# Patient Record
Sex: MALE | Race: WHITE | NOT HISPANIC OR LATINO | Employment: OTHER | URBAN - METROPOLITAN AREA
[De-identification: names, ages, dates, MRNs, and addresses within clinical notes are randomized per-mention and may not be internally consistent; named-entity substitution may affect disease eponyms.]

---

## 2017-02-11 LAB — HCV AB SER-ACNC: NEGATIVE

## 2017-05-02 ENCOUNTER — ALLSCRIPTS OFFICE VISIT (OUTPATIENT)
Dept: OTHER | Facility: OTHER | Age: 57
End: 2017-05-02

## 2017-06-30 LAB — EXTERNAL HIV SCREEN: NORMAL

## 2017-09-22 ENCOUNTER — ALLSCRIPTS OFFICE VISIT (OUTPATIENT)
Dept: OTHER | Facility: OTHER | Age: 57
End: 2017-09-22

## 2017-12-06 ENCOUNTER — ALLSCRIPTS OFFICE VISIT (OUTPATIENT)
Dept: OTHER | Facility: OTHER | Age: 57
End: 2017-12-06

## 2017-12-06 DIAGNOSIS — M62.81 MUSCLE WEAKNESS (GENERALIZED): ICD-10-CM

## 2017-12-06 DIAGNOSIS — E78.5 HYPERLIPIDEMIA: ICD-10-CM

## 2017-12-06 DIAGNOSIS — E11.9 TYPE 2 DIABETES MELLITUS WITHOUT COMPLICATIONS (HCC): ICD-10-CM

## 2017-12-06 DIAGNOSIS — D69.6 THROMBOCYTOPENIA (HCC): ICD-10-CM

## 2017-12-07 NOTE — PROGRESS NOTES
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)    Plan  Diabetes mellitus    · (1) HEMOGLOBIN A1C; Status:Active; Requested for:22Sna5315;    · (1) MICROALBUMIN CREATININE RATIO, RANDOM URINE; Status:Active; Requestedfor:23Thn4707;   Hyperlipidemia    · (1) LIPID PANEL, FASTING; Status:Active; Requested for:50Opx8047;   Muscle weakness    · (1) ALPHA 1 ANTITRYPSIN; Status:Active; Requested for:59Ade3666;    · (1) CBC/PLT/DIFF; Status:Active; Requested for:42Wgu5963;    · (1) CK (CPK); Status:Active; Requested for:93Zpt8359;    · (1) COENZYME Q10; Status:Active; Requested for:69Awu1560;    · (1) COMPREHENSIVE METABOLIC PANEL; Status:Active; Requested for:33Jwy9890;    · (1) LYME ANTIBODY, WESTERN BLOT; Status:Active; Requested for:97Erf1970;    · (1) MITOCHONDRIAL ANTIBODY; Status:Active; Requested for:97Ogn3529;    · (1) TSH; Status:Active; Requested for:86Jme3398;    · (1) VITAMIN D 25-HYDROXY; Status:Active; Requested for:50Uot6814;   Muscle weakness, Thrombocytopenia    · (1) FOLATE; Status:Active; Requested for:01Xsr5225;    · (1) VITAMIN B12; Status:Active; Requested for:03Lxi9814;   PMH: Myotonic dystrophy    · *1 - [facility]  *  Status: Active  Requested for: 64VJS9088  Care Summary provided  : Yes  Thrombocytopenia    · (1) LD (LDH); Status:Active; Requested for:81Xsy3840;    · (1) URIC ACID; Status:Active; Requested for:07Wzi1280;   I will obtain the ordered lab work, if there is anything grossly abnormal we will contact the patient prior to his next visit  I will see him in 1 month  I will refer him to Neurology  Discussion/Summary  With the patient's worsening weakness, additional studies are warranted    I will refer him to Neurology, he may need a repeat EMG, which is how he says he was diagnosed the last time  Chief Complaint  Chief Complaint Free Text Note Form: follow up htn,lipid  no refills needed      History of Present Illness  HPI: Patient is a 71-year-old male presenting to the office to establish as a new patient  He has a history of coronary artery disease, diabetes mellitus, hyperlipidemia, thrombocytopenia, weight loss, and a remote history of muscular dystonia  He had about a 20-35 lb weight loss approximately 2-3 years ago  He was then explain in he continues to complain    Primary Care Diagnostic Constellation: The patient is here today for a follow-up visit  His diabetes mellitus type 2 is stable  the patient is adherent with his medication regimen  -- the patient complains of medication side effects  The patient complains of side effect from Metformin  Medication side effects: diarrhea  His hyperlipidemia has been stable  His LDL goal is 100 mg/dL  the patient is adherent with his medication regimen  His Multi vessel CAD is stable  the patient is adherent with his medication regimen  Symptoms: denies chest pain,-- denies intermittent leg claudication,-- denies dyspnea,-- denies lower extremity edema,-- denies exercise intolerance,-- worsened fatigue,-- denies numbness of the feet,-- denies foot pain,-- denies a foot ulcer,-- denies visual impairment,-- denies muscle pain-- and-- worsened muscle weakness  Associated symptoms include no orthopnea,-- no polyuria,-- no focal neurologic deficits,-- no palpitations,-- no syncope,-- no headache,-- no PND,-- no memory loss,-- no polydipsia-- and-- no heartburn--   The patient presents with complaints of 20 - 29 pound recent weight loss  Previous Evaluation: Weight seems to have stabilized over the last year-year and a half  Lifestyle and Disease Management: Diet: He consumes a diverse and healthy diet  Weight Issues: He has weight concerns   Exercise: He does not (359 21) (G71 11)  Active Problems And Past Medical History Reviewed: The active problems and past medical history were reviewed and updated today  Surgical History  1  History of Cath Stent Placement   2  History of Cath Stent Placement   3  History of Cholecystectomy   4  History of Complete Colonoscopy   5  History of Elective Circumcision   6  History of Inguinal Hernia Repair   7  History of Open Treatment Of Multiple Fractures Of The Radial Shaft   8  History of Open Treatment Of Multiple Fractures Of The Ulnar Shaft   9  History of Tonsillectomy With Adenoidectomy  Surgical History Reviewed: The surgical history was reviewed and updated today  Family History  Mother    1  Family history of blood clots (V18 3) (Z82 49)   2  Family history of cardiac disorder (V17 49) (Z82 49)   3  Family history of malignant neoplasm (V16 9) (Z80 9)   4  Family history of High cholesterol  Uncle    5  Family history of coronary artery disease (V17 3) (Z82 49)  Family History Reviewed: The family history was reviewed and updated today  Social History     · Denied: History of Drug use   ·    · Never a smoker   · Social alcohol use (Z78 9)   · Weight loss (783 21) (R63 4)  Social History Reviewed: The social history was reviewed and updated today  The social history was reviewed and is unchanged  Current Meds   1  Aspirin EC Lo-Dose 81 MG TBEC; Take 1 tablet daily Recorded   2  Atorvastatin Calcium 40 MG Oral Tablet; take 1 tablet daily at bedtime; Therapy: 79XDY8256 to (96 019089)  Requested for: 12RPU7378; Last Rx:04Jan2017 Ordered   3  Cialis 5 MG Oral Tablet; One tablet daily; Therapy: 71RYD8936 to (Marcela Hong)  Requested for: 59Ycu5108; Last Rx:23Ico4632 Ordered   4  MetFORMIN HCl - 1000 MG Oral Tablet; take 1 tablet by mouth twice daily; Therapy: (Recorded:42Jgf7737) to Recorded   5  Xarelto 20 MG Oral Tablet; 1 tablet daily; Therapy: 95PAI5538 to Recorded    Allergies  1  No Known Drug Allergies    Vitals  Vital Signs    Recorded: 61XUM4158 97:56DT   Systolic 98   Diastolic 62   Height 5 ft 9 in   Weight 144 lb    BMI Calculated 21 27   BSA Calculated 1 8       Physical Exam   Constitutional  General appearance: No acute distress, well appearing and well nourished  Eyes  Conjunctiva and lids: No swelling, erythema, or discharge  Pupils and irises: Equal, round and reactive to light  Ears, Nose, Mouth, and Throat  External inspection of ears and nose: Normal    Otoscopic examination: Tympanic membrance translucent with normal light reflex  Canals patent without erythema  Nasal mucosa, septum, and turbinates: Normal without edema or erythema  Oropharynx: Normal with no erythema, edema, exudate or lesions  Pulmonary  Respiratory effort: No increased work of breathing or signs of respiratory distress  Auscultation of lungs: Clear to auscultation, equal breath sounds bilaterally, no wheezes, no rales, no rhonci  Cardiovascular  Palpation of heart: Normal PMI, no thrills  Auscultation of heart: Normal rate and rhythm, normal S1 and S2, without murmurs  Examination of extremities for edema and/or varicosities: Normal    Carotid pulses: Normal    Abdomen  Abdomen: Non-tender, no masses  Liver and spleen: No hepatomegaly or splenomegaly  Lymphatic  Palpation of lymph nodes in neck: No lymphadenopathy  Musculoskeletal  Gait and station: Normal    Digits and nails: Normal without clubbing or cyanosis  Inspection/palpation of joints, bones, and muscles: Normal  -- Decreased hand strength 4/5 bilaterally  Flexion and extension grossly intact with the arms and legs  Skin  Skin and subcutaneous tissue: Normal without rashes or lesions  Neurologic  Cranial nerves: Cranial nerves 2-12 intact  Reflexes: 2+ and symmetric  Sensation: No sensory loss     Psychiatric  Orientation to person, place and time: Normal    Mood and affect: Normal          Results/Data  I reviewed his lab work from last October, old notes that are available to me today  Reviewed the last cardiology note-cardiology says he stable        Future Appointments    Date/Time Provider Specialty Site   01/10/2018 01:00 PM Kristi Camara DO Family Medicine [facility]       Signatures   Electronically signed by : Todd Zavala DO; Dec  6 2017 12:44PM EST                       (Author)

## 2018-01-12 ENCOUNTER — LAB CONVERSION - ENCOUNTER (OUTPATIENT)
Dept: OTHER | Facility: OTHER | Age: 58
End: 2018-01-12

## 2018-01-12 LAB
25(OH)D3 SERPL-MCNC: 38 NG/ML (ref 30–100)
A/G RATIO (HISTORICAL): 2.1 (CALC) (ref 1–2.5)
ALBUMIN SERPL BCP-MCNC: 3.9 G/DL (ref 3.6–5.1)
ALP SERPL-CCNC: 52 U/L (ref 40–115)
ALPHA-1 ANTITRYPSIN (HISTORICAL): 143 MG/DL (ref 83–199)
ALT SERPL W P-5'-P-CCNC: 40 U/L (ref 9–46)
AST SERPL W P-5'-P-CCNC: 29 U/L (ref 10–35)
BASOPHILS # BLD AUTO: 0.8 %
BASOPHILS # BLD AUTO: 29 CELLS/UL (ref 0–200)
BILIRUB SERPL-MCNC: 1.4 MG/DL (ref 0.2–1.2)
BUN SERPL-MCNC: 18 MG/DL (ref 7–25)
BUN/CREA RATIO (HISTORICAL): ABNORMAL (CALC) (ref 6–22)
CALCIUM SERPL-MCNC: 9.1 MG/DL (ref 8.6–10.3)
CHLORIDE SERPL-SCNC: 103 MMOL/L (ref 98–110)
CHOLEST SERPL-MCNC: 133 MG/DL
CHOLEST/HDLC SERPL: 2.4 (CALC)
CK SERPL-CCNC: 344 U/L (ref 44–196)
CO2 SERPL-SCNC: 31 MMOL/L (ref 20–31)
CREAT SERPL-MCNC: 0.76 MG/DL (ref 0.7–1.33)
CREATININE, RANDOM URINE (HISTORICAL): 195 MG/DL (ref 20–370)
DEPRECATED RDW RBC AUTO: 12.7 % (ref 11–15)
EGFR AFRICAN AMERICAN (HISTORICAL): 117 ML/MIN/1.73M2
EGFR-AMERICAN CALC (HISTORICAL): 101 ML/MIN/1.73M2
EOSINOPHIL # BLD AUTO: 10.4 %
EOSINOPHIL # BLD AUTO: 374 CELLS/UL (ref 15–500)
FOLATE SERPL-MCNC: 23 NG/ML
GAMMA GLOBULIN (HISTORICAL): 1.9 G/DL (CALC) (ref 1.9–3.7)
GLUCOSE (HISTORICAL): 201 MG/DL (ref 65–99)
HBA1C MFR BLD HPLC: 7.4 % OF TOTAL HGB
HCT VFR BLD AUTO: 44.9 % (ref 38.5–50)
HDLC SERPL-MCNC: 56 MG/DL
HGB BLD-MCNC: 15.8 G/DL (ref 13.2–17.1)
LDH (HISTORICAL): 196 U/L (ref 120–250)
LDL CHOLESTEROL (HISTORICAL): 58 MG/DL (CALC)
LYME IGG/IGM AB (HISTORICAL): <0.9 INDEX
LYMPHOCYTES # BLD AUTO: 15.4 %
LYMPHOCYTES # BLD AUTO: 554 CELLS/UL (ref 850–3900)
Lab: 1.11 MG/L (ref 0.44–1.64)
MAGNESIUM, UR (HISTORICAL): 0.4 MG/DL
MCH RBC QN AUTO: 31.3 PG (ref 27–33)
MCHC RBC AUTO-ENTMCNC: 35.2 G/DL (ref 32–36)
MCV RBC AUTO: 88.9 FL (ref 80–100)
MICROALBUMIN/CREATININE RATIO (HISTORICAL): 2 MCG/MG CREAT
MITOCHONDRIAL ANTIBODY (HISTORICAL): NEGATIVE
MONOCYTES # BLD AUTO: 346 CELLS/UL (ref 200–950)
MONOCYTES (HISTORICAL): 9.6 %
NEUTROPHILS # BLD AUTO: 2297 CELLS/UL (ref 1500–7800)
NEUTROPHILS # BLD AUTO: 63.8 %
NON-HDL-CHOL (CHOL-HDL) (HISTORICAL): 77 MG/DL (CALC)
PLATELET # BLD AUTO: 114 THOUSAND/UL (ref 140–400)
PMV BLD AUTO: 10.5 FL (ref 7.5–12.5)
POTASSIUM SERPL-SCNC: 4.1 MMOL/L (ref 3.5–5.3)
RBC # BLD AUTO: 5.05 MILLION/UL (ref 4.2–5.8)
SODIUM SERPL-SCNC: 141 MMOL/L (ref 135–146)
TOTAL PROTEIN (HISTORICAL): 5.8 G/DL (ref 6.1–8.1)
TRIGL SERPL-MCNC: 105 MG/DL
TSH SERPL DL<=0.05 MIU/L-ACNC: 0.82 MIU/L (ref 0.4–4.5)
URIC ACID (HISTORICAL): 4.8 MG/DL (ref 4–8)
VIT B12 SERPL-MCNC: 417 PG/ML (ref 200–1100)
WBC # BLD AUTO: 3.6 THOUSAND/UL (ref 3.8–10.8)

## 2018-01-13 VITALS
HEIGHT: 69 IN | HEART RATE: 47 BPM | SYSTOLIC BLOOD PRESSURE: 98 MMHG | WEIGHT: 141.38 LBS | BODY MASS INDEX: 20.94 KG/M2 | DIASTOLIC BLOOD PRESSURE: 86 MMHG

## 2018-01-14 ENCOUNTER — GENERIC CONVERSION - ENCOUNTER (OUTPATIENT)
Dept: OTHER | Facility: OTHER | Age: 58
End: 2018-01-14

## 2018-01-15 ENCOUNTER — GENERIC CONVERSION - ENCOUNTER (OUTPATIENT)
Dept: OTHER | Facility: OTHER | Age: 58
End: 2018-01-15

## 2018-01-15 ENCOUNTER — TRANSCRIBE ORDERS (OUTPATIENT)
Dept: ADMINISTRATIVE | Facility: HOSPITAL | Age: 58
End: 2018-01-15

## 2018-01-15 ENCOUNTER — APPOINTMENT (OUTPATIENT)
Dept: RADIOLOGY | Facility: MEDICAL CENTER | Age: 58
End: 2018-01-15
Payer: COMMERCIAL

## 2018-01-15 DIAGNOSIS — R63.4 ABNORMAL WEIGHT LOSS: ICD-10-CM

## 2018-01-15 PROCEDURE — 71046 X-RAY EXAM CHEST 2 VIEWS: CPT

## 2018-01-16 ENCOUNTER — GENERIC CONVERSION - ENCOUNTER (OUTPATIENT)
Dept: OTHER | Facility: OTHER | Age: 58
End: 2018-01-16

## 2018-01-16 NOTE — MISCELLANEOUS
We had the pleasure doing a stress echo on the patient today  He exercised through 12-1/2 minutes of the Pato protocol  He reached target heart rate  He had no EKG, symptomatic or echo evidence for inducible ischemia  He is  doing well now 12 years status post stenting  He will continue metoprolol, Aspirin and his statin as before  I will see him again in one years time  Electronically signed by:Ventura RAMIREZ    May  2 2016  3:43PM EST

## 2018-01-18 NOTE — RESULT NOTES
Verified Results  ECHO STRESS TEST W CONTRAST IF INDICATED [id number] 02:37PM Mary Becerra     Test Name Result Flag Reference   ECHO STRESS TEST W CONTRAST IF INDICATED (Report)     [address]   [address]  [place], [address]   [phone]     Exercise Stress Echocardiography     Study date: 02-May-2016     Patient: Kristy Marcial   MR number: [id number]   Account number: [de-identified]   : 1960   Age: 64 years   Gender: Male   Study date: 02-May-2016   Status: Outpatient   [facility]   Height: 69 in   Weight: 153 lb   BP: 98// 62 mmHg     Indications: Evaluation of known coronary artery disease  Diagnosis: I25 10 - Atherosclerotic heart disease of native coronary artery   without angina pectoris     Sonographer: HIRA Lanza   Primary Physician: Valerie Snider MD   Referring Physician: Keely Vo MD   Group: [facility]   Interpreting Physician: Keely Vo MD     IMPRESSIONS:   Normal study after maximal exercise  SUMMARY     STRESS RESULTS:   Duration of exercise was 12 min and 30 sec  Maximal work rate was 11 5 METs  Maximal heart rate during stress was 150 bpm ( 91 % of maximal predicted heart   rate)  Target heart rate was achieved  There was no chest pain during stress  ECG CONCLUSIONS:   The stress ECG was negative for ischemia  BASELINE:   Estimated left ventricular ejection fraction was 60 %   HISTORY: The patient is a 64year old male  Chest pain status: no chest pain  Coronary artery disease risk factors: dyslipidemia and family history of   coronary artery disease  Cardiovascular history: coronary artery disease  Prior   cardiovascular procedures: percutaneous transcoronary angioplasty  Medications:   a beta blocker, aspirin, and a lipid lowering agent  REST ECG: Normal sinus rhythm   Poor Septal R wave progression with minor   nonspecific T wave abnls in the septal leads     PROCEDURE: The study was performed in the stress lab  The study was performed   in the [address] [place] [address]  The procedure was explained to the   patient and informed consent was obtained  Treadmill exercise testing was   performed, using the Madeleine protocol  Stress and rest echocardiographic   evaluation with 2D imaging, spectral Doppler, and color Doppler was performed   from multiple acoustic windows for evaluation of ventricular function  MADELEINE PROTOCOL:   HR bpm SBP mmHg DBP mmHg Symptoms   Baseline 54 98 62 none   Stage 1 72 122 62 --   Stage 2 80 -- -- --   Stage 3 103 158 68 --   Stage 4 141 178 70 --   Stage 5 148 -- -- --   Immediate 150 178 70 --   Recovery 2 81 162 80 --   Recovery 5 69 122 82 --     Resting SPO2 98%  Peak SPO2 96%  MEDICATIONS GIVEN: No medications or fluids given  STRESS RESULTS: Duration of exercise was 12 min and 30 sec  The patient   exercised to protocol stage 5  Maximal work rate was 11 5 METs  Maximal heart   rate during stress was 150 bpm ( 91 % of maximal predicted heart rate)  Target   heart rate was achieved  The heart rate response to stress was normal  Maximal   systolic blood pressure during stress was 178 mmHg  There was normal resting   blood pressure with an appropriate response to stress  The rate-pressure   product for the peak heart rate and blood pressure was 41854  There was no   chest pain during stress  The stress test was terminated due to achievement of   maximal (symptom limited) exercise and achievement of target heart rate  ECG CONCLUSIONS: The stress ECG was negative for ischemia  Arrhythmia during   stress: isolated premature ventricular beats  STRESS 2D ECHO RESULTS:     BASELINE: Left ventricular size was normal  Overall left ventricular systolic   function was normal  Estimated left ventricular ejection fraction was 60 %        PEAK STRESS: There was an appropriate reduction in left ventricular size  There   was an appropriate augmentation in LV function       Prepared and electronically signed by     Miley Mesa MD   Signed 02-O-9432 16:39:38

## 2018-01-22 ENCOUNTER — GENERIC CONVERSION - ENCOUNTER (OUTPATIENT)
Dept: FAMILY MEDICINE CLINIC | Facility: CLINIC | Age: 58
End: 2018-01-22

## 2018-01-23 VITALS
SYSTOLIC BLOOD PRESSURE: 98 MMHG | HEIGHT: 69 IN | BODY MASS INDEX: 21.33 KG/M2 | WEIGHT: 144 LBS | DIASTOLIC BLOOD PRESSURE: 62 MMHG

## 2018-01-23 NOTE — RESULT NOTES
Discussion/Summary   sugarss are a little high, one muscle marker is a little  elevated, will discuss when he comes in,  He should see a rheumatologist about the muscle issue  Has he ever seen hematology( blood specialist) about the low platelets?      Verified Results  (1) VITAMIN B12 11JEZ4179 07:02AM [facility]     Test Name Result Flag Reference   VITAMIN B12 417 pg/mL  200-1100     (1) FOLATE 46EZR0882 07:02AM [facility]     Test Name Result Flag Reference   FOLATE, SERUM 23 0 ng/mL     Reference Range                             Low:           <3 4                             Borderline:    3 4-5 4                             Normal:        >5 4     (1) CK (CPK) 17XZZ3407 07:02AM [facility]     Test Name Result Flag Reference   CREATINE KINASE, TOTAL 344 U/L H      (1) CBC/PLT/DIFF 19QEU9752 07:02AM [facility]     Test Name Result Flag Reference   WHITE BLOOD CELL COUNT 3 6 Thousand/uL L 3 8-10 8   RED BLOOD CELL COUNT 5 05 Million/uL  4 20-5 80   HEMOGLOBIN 15 8 g/dL  13 2-17 1   HEMATOCRIT 44 9 %  38 5-50 0   MCV 88 9 fL  80 0-100 0   MCH 31 3 pg  27 0-33 0   MCHC 35 2 g/dL  32 0-36 0   RDW 12 7 %  11 0-15 0   PLATELET COUNT 905 Thousand/uL L 140-400   ABSOLUTE NEUTROPHILS 2297 cells/uL  6059-7199   ABSOLUTE LYMPHOCYTES 554 cells/uL L 850-3900   ABSOLUTE MONOCYTES 346 cells/uL  200-950   ABSOLUTE EOSINOPHILS 374 cells/uL     ABSOLUTE BASOPHILS 29 cells/uL  0-200   NEUTROPHILS 63 8 %     LYMPHOCYTES 15 4 %     MONOCYTES 9 6 %     EOSINOPHILS 10 4 %     BASOPHILS 0 8 %     MPV 10 5 fL  7 5-12 5     (1) COMPREHENSIVE METABOLIC PANEL 83ABB4665 10:24ID [facility]     Test Name Result Flag Reference   GLUCOSE 201 mg/dL H 65-99   Fasting reference interval     For someone without known diabetes, a glucose  value >125 mg/dL indicates that they may have  diabetes and this should be confirmed with a  follow-up test    UREA NITROGEN (BUN) 18 mg/dL  7-25   CREATININE 0 76 mg/dL 0  70-1 33   For patients >52years of age, the reference limit  for Creatinine is approximately 13% higher for people  identified as -American  eGFR NON-AFR  AMERICAN 101 mL/min/1 73m2  > OR = 60   eGFR AFRICAN AMERICAN 117 mL/min/1 73m2  > OR = 60   BUN/CREATININE RATIO   0-32   NOT APPLICABLE (calc)   SODIUM 141 mmol/L  135-146   POTASSIUM 4 1 mmol/L  3 5-5 3   CHLORIDE 103 mmol/L     CARBON DIOXIDE 31 mmol/L  20-31   CALCIUM 9 1 mg/dL  8 6-10 3   PROTEIN, TOTAL 5 8 g/dL L 6 1-8 1   ALBUMIN 3 9 g/dL  3 6-5 1   GLOBULIN 1 9 g/dL (calc)  1 9-3 7   ALBUMIN/GLOBULIN RATIO 2 1 (calc)  1 0-2 5   BILIRUBIN, TOTAL 1 4 mg/dL H 0 2-1 2   ALKALINE PHOSPHATASE 52 U/L     AST 29 U/L  10-35   ALT 40 U/L  9-46     (1) LIPID PANEL, FASTING 43GEJ1043 07:02AM [facility]     Test Name Result Flag Reference   CHOLESTEROL, TOTAL 133 mg/dL  <200   HDL CHOLESTEROL 56 mg/dL  >79   TRIGLICERIDES 018 mg/dL  <150   LDL-CHOLESTEROL 58 mg/dL (calc)     Reference range: <100     Desirable range <100 mg/dL for patients with CHD or  diabetes and <70 mg/dL for diabetic patients with  known heart disease  LDL-C is now calculated using the Avi-Molina   calculation, which is a validated novel method providing   better accuracy than the Friedewald equation in the   estimation of LDL-C  Jeffrey Houser  [facility]  6283;270(17): [phone]   (http://[employer]/faq/MHT693)   CHOL/HDLC RATIO 2 4 (calc)  <5 0   NON HDL CHOLESTEROL 77 mg/dL (calc)  <130   For patients with diabetes plus 1 major ASCVD risk   factor, treating to a non-HDL-C goal of <100 mg/dL   (LDL-C of <70 mg/dL) is considered a therapeutic   option      (1) URIC ACID [id number] 07:02AM [facility]     Test Name Result Flag Reference   URIC ACID 4 8 mg/dL  4 0-8 0   Therapeutic target for gout patients: <6 0 mg/dL     (1) LD (LDH) 73WZQ0440 07:02AM Sonja Riley     Test Name Result Flag Reference    U/L  120-250     (Q) MICROALBUMIN, RANDOM URINE (W/CREATININE) [id number] 07:02AM [facility]     Test Name Result Flag Reference   CREATININE, RANDOM URINE 195 mg/dL     MICROALBUMIN 0 4 mg/dL     Reference Range  Not established   MICROALBUMIN/CREATININE$RATIO, RANDOM URINE 2 mcg/mg creat  <30   The ADA defines abnormalities in albumin  excretion as follows:     Category         Result (mcg/mg creatinine)     Normal                    <30  Microalbuminuria            Clinical albuminuria   > OR = 300     The ADA recommends that at least two of three  specimens collected within a 3-6 month period be  abnormal before considering a patient to be  within a diagnostic category  (Q) COENZYME Q10 [id number] 07:02AM [facility] [facility]     Test Name Result Flag Reference   COENZYME Q10 1 11 mg/L  0 44-1 64   This test was developed and its analytical  performance characteristics have been determined  by [place], [place]  It has not been cleared or approved by the FDA  This  assay has been validated pursuant to the CLIA  regulations and is used for clinical purposes  (Q) LYME DISEASE AB, TOTAL W/REFL WB (IGG, IGM) [id number] 07:02AM [facility] [employer]     Test Name Result Flag Reference   LYME AB SCREEN <0 90 index     Index                Interpretation                     -----                --------------                     < 0 90               Negative                     0  90-1 09            Equivocal                     > 1 09               Positive      As recommended by the Food and Drug Administration   (FDA), all samples with positive or equivocal   results in a Borrelia burgdorferi antibody screen  will be tested using a blot method  Positive or   equivocal screening test results should not be   interpreted as truly positive until verified as such   using a supplemental assay (e g , B  burgdorferi blot)       The screening test and/or blot for B  burgdorferi   antibodies may be falsely negative in early stages  of Lyme disease, including the period when erythema   migrans is apparent  (Q) ALPHA-1-ANTITRYPSIN QN [id number] 07:02AM [facility]     Test Name Result Flag Reference   ALPHA-1-ANTITRYPSIN  mg/dL       (Q) MITOCHONDRIAL ANTIBODY W/REFL TITER [id number] 07:02AM [facility]     Test Name Result Flag Reference   MITOCHONDRIAL AB SCREEN NEGATIVE  NEGATIVE     (Q) TSH, 3RD GENERATION [id number] 07:02AM [facility]     Test Name Result Flag Reference   TSH 0 82 mIU/L  0 40-4 50     *(Q) VITAMIN D, 25-HYDROXY, LC/MS/MS [id number] 07:02AM [facility]     Test Name Result Flag Reference   VITAMIN D, 25-OH, TOTAL 38 ng/mL     Vitamin D Status         25-OH Vitamin D:     Deficiency:                    <20 ng/mL  Insufficiency:             20 - 29 ng/mL  Optimal:                 > or = 30 ng/mL     For 25-OH Vitamin D testing on patients on   D2-supplementation and patients for whom quantitation   of D2 and D3 fractions is required, the QuestAssureD(TM)  25-OH VIT D, (D2,D3), LC/MS/MS is recommended: order   code 61761 (patients >2yrs)  For more information on this test, go to:  http://[employer]/faq/YBP687  (This link is being provided for   informational/educational purposes only )     (Q) HEMOGLOBIN A1c [id number] 07:02AM [facility]   REPORT COMMENT:  FASTING:YES     Test Name Result Flag Reference   HEMOGLOBIN A1c 7 4 % of total Hgb H <5 7   For someone without known diabetes, a hemoglobin A1c  value of 6 5% or greater indicates that they may have   diabetes and this should be confirmed with a follow-up   test      For someone with known diabetes, a value <7% indicates   that their diabetes is well controlled and a value   greater than or equal to 7% indicates suboptimal   control  A1c targets should be individualized based on   duration of diabetes, age, comorbid conditions, and   other considerations       Currently, no consensus exists regarding use of  hemoglobin A1c for diagnosis of diabetes for children

## 2018-01-23 NOTE — RESULT NOTES
Discussion/Summary   Chest x-ray was normal   Await the rest of his workup  Verified Results  * XR CHEST PA & LATERAL [id number] 12:19PM Maria Del Carmen Donnelly Order Number: [id number]     Test Name Result Flag Reference   XR CHEST PA & LATERAL (Report)     CHEST      INDICATION: Abnormal weight loss     COMPARISON: None     VIEWS: Frontal and lateral projections     IMAGES: 2     FINDINGS:        Cardiomediastinal silhouette appears unremarkable  The lungs are clear  No pneumothorax or pleural effusion  Visualized osseous structures appear within normal limits for the patient's age  IMPRESSION:     No active pulmonary disease         Workstation performed: [id number]     Signed by:   Curt Ureña MD   1/16/18

## 2018-01-24 VITALS
SYSTOLIC BLOOD PRESSURE: 118 MMHG | BODY MASS INDEX: 20.88 KG/M2 | WEIGHT: 141 LBS | HEIGHT: 69 IN | DIASTOLIC BLOOD PRESSURE: 80 MMHG

## 2018-02-05 ENCOUNTER — TRANSCRIBE ORDERS (OUTPATIENT)
Dept: ADMINISTRATIVE | Facility: HOSPITAL | Age: 58
End: 2018-02-05

## 2018-02-05 ENCOUNTER — OFFICE VISIT (OUTPATIENT)
Dept: HEMATOLOGY ONCOLOGY | Facility: CLINIC | Age: 58
End: 2018-02-05
Payer: COMMERCIAL

## 2018-02-05 ENCOUNTER — APPOINTMENT (OUTPATIENT)
Dept: LAB | Facility: MEDICAL CENTER | Age: 58
End: 2018-02-05
Payer: COMMERCIAL

## 2018-02-05 VITALS
BODY MASS INDEX: 21.48 KG/M2 | HEART RATE: 60 BPM | OXYGEN SATURATION: 97 % | WEIGHT: 145 LBS | SYSTOLIC BLOOD PRESSURE: 112 MMHG | HEIGHT: 69 IN | RESPIRATION RATE: 16 BRPM | DIASTOLIC BLOOD PRESSURE: 72 MMHG | TEMPERATURE: 97.1 F

## 2018-02-05 DIAGNOSIS — D72.819 LEUKOPENIA, UNSPECIFIED TYPE: Primary | ICD-10-CM

## 2018-02-05 DIAGNOSIS — D72.819 LEUKOPENIA, UNSPECIFIED TYPE: ICD-10-CM

## 2018-02-05 DIAGNOSIS — D69.6 THROMBOCYTOPENIA (HCC): ICD-10-CM

## 2018-02-05 LAB
IGA SERPL-MCNC: 40 MG/DL (ref 70–400)
IGG SERPL-MCNC: 361 MG/DL (ref 700–1600)
IGM SERPL-MCNC: 208 MG/DL (ref 40–230)

## 2018-02-05 PROCEDURE — 86334 IMMUNOFIX E-PHORESIS SERUM: CPT | Performed by: INTERNAL MEDICINE

## 2018-02-05 PROCEDURE — 84165 PROTEIN E-PHORESIS SERUM: CPT | Performed by: INTERNAL MEDICINE

## 2018-02-05 PROCEDURE — 87389 HIV-1 AG W/HIV-1&-2 AB AG IA: CPT | Performed by: INTERNAL MEDICINE

## 2018-02-05 PROCEDURE — 82784 ASSAY IGA/IGD/IGG/IGM EACH: CPT | Performed by: INTERNAL MEDICINE

## 2018-02-05 PROCEDURE — 86334 IMMUNOFIX E-PHORESIS SERUM: CPT | Performed by: PATHOLOGY

## 2018-02-05 PROCEDURE — 84165 PROTEIN E-PHORESIS SERUM: CPT | Performed by: PATHOLOGY

## 2018-02-05 PROCEDURE — 99244 OFF/OP CNSLTJ NEW/EST MOD 40: CPT | Performed by: INTERNAL MEDICINE

## 2018-02-05 PROCEDURE — 83883 ASSAY NEPHELOMETRY NOT SPEC: CPT | Performed by: INTERNAL MEDICINE

## 2018-02-05 PROCEDURE — 36415 COLL VENOUS BLD VENIPUNCTURE: CPT | Performed by: INTERNAL MEDICINE

## 2018-02-05 RX ORDER — ATORVASTATIN CALCIUM 40 MG/1
40 TABLET, FILM COATED ORAL DAILY
COMMUNITY
End: 2018-02-14 | Stop reason: SDUPTHER

## 2018-02-05 RX ORDER — ASPIRIN 81 MG/1
81 TABLET ORAL DAILY
COMMUNITY
End: 2019-03-27

## 2018-02-05 RX ORDER — GLIMEPIRIDE 1 MG/1
1 TABLET ORAL
COMMUNITY
End: 2018-02-14 | Stop reason: SDUPTHER

## 2018-02-05 NOTE — PROGRESS NOTES
Hematology / Oncology Outpatient Consult Note    Luis Galeano 62 y o  male [id number]         Date:  2/5/2018        Assessment / Plan:    A 80-year-old gentleman who has chronic mild leukopenia and thrombocytopenia  He has no anemia  His leukopenia and thrombocytopenia existed in 2014  His CBC has been stable since then  Based on history as well as physical examination, there is no clear etiology for leukopenia and thrombocytopenia found  S81 and folic acid deficiency were ruled out  He has no risk factors for HIV  His chemistry showed slightly decreased total protein with normal albumin  I recommended him to have SPEP and quantitative immunoglobulin to rule out plasma cell disorder or low-grade lymphoproliferative disorder which could be the reason for leukopenia and thrombocytopenia  I also recommended him to have HIV testing  My expectation further diagnostic yield is low  Once I obtain the laboratory report, I will contact him  Since he has very stable CBC for at least 4 years, he may not need to have any further investigation except I mentioned above  I recommended him to be monitored his CBC by primary care physician, assuming that his SPEP and HIV test were negative  He is in agreement with my recommendations  Subjective:     HPI:  A 80-year-old gentleman who was referred to me by primary care physician to evaluate his chronic mild leukopenia and thrombocytopenia  By looking back his laboratory, he had same leukopenia and thrombocytopenia since 2014  He has no symptomatology from hematology standpoint  He has no fever, chills or night sweats  Approximately 3-4 years ago, he has significant weight loss  However, in the last 18 months, he has weight has been stable  He has no complaint of pain  He denied any respiratory symptoms  He is a lifetime never smoker  He does not drink alcohol    He has congenital myotonia, 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 who has same condition  He has diabetes as well as coronary artery disease  He underwent stent placement in the past   He has history of pancreatitis, approximately 10 years ago  He has history of superficial thrombosis twice as well as deep venous thrombosis once in the past   Therefore, he has been on chronic anticoagulation with rivaroxaban  He has no history of liver cirrhosis  He has no risk factors for HIV or hepatitis  His performance status is normal       Interval History:          Objective:     Primary Diagnosis:    Chronic mild leukopenia and thrombocytopenia  Cancer Staging:  No matching staging information was found for the patient  Previous Hematologic/ Oncologic Treatment:         Current Hematologic/ Oncologic Treatment:      Observation  Disease Status:     NA    Test Results:    Pathology:        Radiology:        Laboratory:  WBC 3 6 hemoglobin 15 8 platelet count 087  Absolute lymphocyte was slightly decreased  Otherwise unremarkable differential   Total protein 5 8  Albumin 3 9  Otherwise unremarkable C MP   W71 and folic acid were within normal limits  Physical Exam:      General Appearance:    Alert, oriented        Eyes:    PERRL   Ears:    Normal external ear canals, both ears   Nose:   Nares normal, septum midline   Throat:   Mucosa moist  Pharynx without injection  Neck:   Supple       Lungs:     Clear to auscultation bilaterally   Chest Wall:    No tenderness or deformity    Heart:    Regular rate and rhythm       Abdomen:     Soft, non-tender, bowel sounds +, no organomegaly           Extremities:   Extremities no cyanosis or edema       Skin:   no rash or icterus  Lymph nodes:   Cervical, supraclavicular, and axillary nodes normal   Neurologic:   CNII-XII intact, normal strength, sensation and reflexes     Throughout          Breast exam: Not applicable  ROS: Review of Systems   Musculoskeletal:        Some muscle weakness     All other systems reviewed and are negative  Imaging: Xr Chest Pa & Lateral    Result Date: 1/16/2018  Narrative: CHEST INDICATION:  Abnormal weight loss COMPARISON:  None VIEWS:  Frontal and lateral projections IMAGES:  2 FINDINGS:     Cardiomediastinal silhouette appears unremarkable  The lungs are clear  No pneumothorax or pleural effusion  Visualized osseous structures appear within normal limits for the patient's age  Impression: No active pulmonary disease  Workstation performed: [id number]         Labs: No results found for: WBC, HGB, HCT, MCV, PLT  No results found for: NA, K, CL, CO2, ANIONGAP, BUN, CREATININE, GLUCOSE, GLUF, CALCIUM, CORRECTEDCA, AST, ALT, ALKPHOS, PROT, ALBUMIN, BILITOT, EGFR      Vital Sign:    Body surface area is 1 79 meters squared  Wt Readings from Last 3 Encounters:   02/05/18 65 8 kg (145 lb)   01/15/18 64 kg (141 lb)   12/06/17 65 3 kg (144 lb)        Temp Readings from Last 3 Encounters:   02/05/18 (!) 97 1 °F (36 2 °C) (Tympanic)        BP Readings from Last 3 Encounters:   02/05/18 112/72   01/15/18 118/80   12/06/17 98/62         Pulse Readings from Last 3 Encounters:   02/05/18 60   09/22/17 (!) 47   05/11/15 60     @LASTSAO2(3)@    Active Problems:   Patient Active Problem List   Diagnosis    Leukopenia    Thrombocytopenia (HCC)       Past Medical History:   Past Medical History:   Diagnosis Date    CAD (coronary artery disease)     Congenital myotonia     Diabetes ([address] )     DVT (deep vein thrombosis) in pregnancy ([facility] [address]     Pancreatitis     Superficial thrombophlebitis        Surgical History:   Past Surgical History:   Procedure Laterality Date    CHOLECYSTECTOMY         Family History:    Family History   Problem Relation Age of Onset    Brain cancer Mother        Cancer-related family history includes Brain cancer in his mother      Social History:   Social History     Social History    Marital status: /Civil Union     Spouse name: N/A    Number of children: N/A    Years of education: N/A     Occupational History    Not on file  Social History Main Topics    Smoking status: Never Smoker    Smokeless tobacco: Never Used    Alcohol use No    Drug use: No    Sexual activity: Not on file     Other Topics Concern    Not on file     Social History Narrative    No narrative on file       Current Medications:   Current Outpatient Prescriptions   Medication Sig Dispense Refill    aspirin (ECOTRIN LOW STRENGTH) 81 mg EC tablet Take 81 mg by mouth daily      atorvastatin (LIPITOR) 40 mg tablet Take 40 mg by mouth daily      glimepiride (AMARYL) 1 mg tablet Take 1 mg by mouth every morning before breakfast      metFORMIN (GLUCOPHAGE) 1000 MG tablet Take 1,000 mg by mouth 2 (two) times a day with meals      rivaroxaban (XARELTO) 20 mg tablet Take 20 mg by mouth       No current facility-administered medications for this visit          Allergies: No Known Allergies

## 2018-02-05 NOTE — LETTER
February 5, 2018     Je Said, DO  [id number]  [address]    Patient: Bryant Cisneros   YOB: 1960   Date of Visit: 2/5/2018       Dear Dr Aung Puckett: Thank you for referring Bryant Cisneros to me for evaluation  Below are my notes for this consultation  If you have questions, please do not hesitate to call me  I look forward to following your patient along with you  Sincerely,        Zaki Kovacs MD        CC: No Recipients  Zaki Kovacs MD  2/5/2018  2:39 PM  Sign at close encounter  Hematology / Oncology Outpatient Consult Note    Bryant Cisneros 62 y o  male DOB1960 [id number]         Date:  2/5/2018        Assessment / Plan:    A 55-year-old gentleman who has chronic mild leukopenia and thrombocytopenia  He has no anemia  His leukopenia and thrombocytopenia existed in 2014  His CBC has been stable since then  Based on history as well as physical examination, there is no clear etiology for leukopenia and thrombocytopenia found  W96 and folic acid deficiency were ruled out  He has no risk factors for HIV  His chemistry showed slightly decreased total protein with normal albumin  I recommended him to have SPEP and quantitative immunoglobulin to rule out plasma cell disorder or low-grade lymphoproliferative disorder which could be the reason for leukopenia and thrombocytopenia  I also recommended him to have HIV testing  My expectation further diagnostic yield is low  Once I obtain the laboratory report, I will contact him  Since he has very stable CBC for at least 4 years, he may not need to have any further investigation except I mentioned above  I recommended him to be monitored his CBC by primary care physician, assuming that his SPEP and HIV test were negative  He is in agreement with my recommendations          Subjective:     HPI:  A 55-year-old gentleman who was referred to me by primary care physician to evaluate his chronic mild leukopenia and thrombocytopenia  By looking back his laboratory, he had same leukopenia and thrombocytopenia since 2014  He has no symptomatology from hematology standpoint  He has no fever, chills or night sweats  Approximately 3-4 years ago, he has significant weight loss  However, in the last 18 months, he has weight has been stable  He has no complaint of pain  He denied any respiratory symptoms  He is a lifetime never smoker  He does not drink alcohol  He has congenital myotonia, 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 who has same condition  He has diabetes as well as coronary artery disease  He underwent stent placement in the past   He has history of pancreatitis, approximately 10 years ago  He has history of superficial thrombosis twice as well as deep venous thrombosis once in the past   Therefore, he has been on chronic anticoagulation with rivaroxaban  He has no history of liver cirrhosis  He has no risk factors for HIV or hepatitis  His performance status is normal       Interval History:          Objective:     Primary Diagnosis:    Chronic mild leukopenia and thrombocytopenia  Cancer Staging:  No matching staging information was found for the patient  Previous Hematologic/ Oncologic Treatment:         Current Hematologic/ Oncologic Treatment:      Observation  Disease Status:     NA    Test Results:    Pathology:        Radiology:        Laboratory:  WBC 3 6 hemoglobin 15 8 platelet count 638  Absolute lymphocyte was slightly decreased  Otherwise unremarkable differential   Total protein 5 8  Albumin 3 9  Otherwise unremarkable C MP   O94 and folic acid were within normal limits  Physical Exam:      General Appearance:    Alert, oriented        Eyes:    PERRL   Ears:    Normal external ear canals, both ears   Nose:   Nares normal, septum midline   Throat:   Mucosa moist  Pharynx without injection      Neck:   Supple       Lungs:     Clear to auscultation bilaterally Chest Wall:    No tenderness or deformity    Heart:    Regular rate and rhythm       Abdomen:     Soft, non-tender, bowel sounds +, no organomegaly           Extremities:   Extremities no cyanosis or edema       Skin:   no rash or icterus  Lymph nodes:   Cervical, supraclavicular, and axillary nodes normal   Neurologic:   CNII-XII intact, normal strength, sensation and reflexes     Throughout          Breast exam: Not applicable  ROS: Review of Systems   Musculoskeletal:        Some muscle weakness  All other systems reviewed and are negative  Imaging: Xr Chest Pa & Lateral    Result Date: 1/16/2018  Narrative: CHEST INDICATION:  Abnormal weight loss COMPARISON:  None VIEWS:  Frontal and lateral projections IMAGES:  2 FINDINGS:     Cardiomediastinal silhouette appears unremarkable  The lungs are clear  No pneumothorax or pleural effusion  Visualized osseous structures appear within normal limits for the patient's age  Impression: No active pulmonary disease  Workstation performed: [id number]         Labs: No results found for: WBC, HGB, HCT, MCV, PLT  No results found for: NA, K, CL, CO2, ANIONGAP, BUN, CREATININE, GLUCOSE, GLUF, CALCIUM, CORRECTEDCA, AST, ALT, ALKPHOS, PROT, ALBUMIN, BILITOT, EGFR      Vital Sign:    Body surface area is 1 79 meters squared      Wt Readings from Last 3 Encounters:   02/05/18 65 8 kg (145 lb)   01/15/18 64 kg (141 lb)   12/06/17 65 3 kg (144 lb)        Temp Readings from Last 3 Encounters:   02/05/18 (!) 97 1 °F (36 2 °C) (Tympanic)        BP Readings from Last 3 Encounters:   02/05/18 112/72   01/15/18 118/80   12/06/17 98/62         Pulse Readings from Last 3 Encounters:   02/05/18 60   09/22/17 (!) 47   05/11/15 60     @LASTSAO2(3)@    Active Problems:   Patient Active Problem List   Diagnosis    Leukopenia    Thrombocytopenia (HCC)       Past Medical History:   Past Medical History:   Diagnosis Date    CAD (coronary artery disease)     Congenital myotonia     [facility] [address] [facility]     DVT (deep vein thrombosis) in pregnancy ([facility] [address] )     Pancreatitis     Superficial thrombophlebitis        Surgical History:   Past Surgical History:   Procedure Laterality Date    CHOLECYSTECTOMY         Family History:    Family History   Problem Relation Age of Onset    Brain cancer Mother        Cancer-related family history includes Brain cancer in his mother  Social History:   Social History     Social History    Marital status: /Civil Union     Spouse name: N/A    Number of children: N/A    Years of education: N/A     Occupational History    Not on file  Social History Main Topics    Smoking status: Never Smoker    Smokeless tobacco: Never Used    Alcohol use No    Drug use: No    Sexual activity: Not on file     Other Topics Concern    Not on file     Social History Narrative    No narrative on file       Current Medications:   Current Outpatient Prescriptions   Medication Sig Dispense Refill    aspirin (ECOTRIN LOW STRENGTH) 81 mg EC tablet Take 81 mg by mouth daily      atorvastatin (LIPITOR) 40 mg tablet Take 40 mg by mouth daily      glimepiride (AMARYL) 1 mg tablet Take 1 mg by mouth every morning before breakfast      metFORMIN (GLUCOPHAGE) 1000 MG tablet Take 1,000 mg by mouth 2 (two) times a day with meals      rivaroxaban (XARELTO) 20 mg tablet Take 20 mg by mouth       No current facility-administered medications for this visit          Allergies: No Known Allergies

## 2018-02-06 LAB
KAPPA LC FREE SER-MCNC: 19.3 MG/L (ref 3.3–19.4)
KAPPA LC FREE/LAMBDA FREE SER: 1.35 {RATIO} (ref 0.26–1.65)
LAMBDA LC FREE SERPL-MCNC: 14.3 MG/L (ref 5.7–26.3)

## 2018-02-07 LAB — HIV 1+2 AB+HIV1 P24 AG SERPL QL IA: NORMAL

## 2018-02-08 LAB
ALBUMIN SERPL ELPH-MCNC: 3.83 G/DL (ref 3.5–5)
ALBUMIN SERPL ELPH-MCNC: 67.2 % (ref 52–65)
ALPHA1 GLOB SERPL ELPH-MCNC: 0.27 G/DL (ref 0.1–0.4)
ALPHA1 GLOB SERPL ELPH-MCNC: 4.8 % (ref 2.5–5)
ALPHA2 GLOB SERPL ELPH-MCNC: 0.55 G/DL (ref 0.4–1.2)
ALPHA2 GLOB SERPL ELPH-MCNC: 9.7 % (ref 7–13)
BETA GLOB ABNORMAL SERPL ELPH-MCNC: 0.41 G/DL (ref 0.4–0.8)
BETA1 GLOB SERPL ELPH-MCNC: 7.2 % (ref 5–13)
BETA2 GLOB SERPL ELPH-MCNC: 3.8 % (ref 2–8)
BETA2+GAMMA GLOB SERPL ELPH-MCNC: 0.22 G/DL (ref 0.2–0.5)
GAMMA GLOB ABNORMAL SERPL ELPH-MCNC: 0.42 G/DL (ref 0.5–1.6)
GAMMA GLOB SERPL ELPH-MCNC: 7.3 % (ref 12–22)
IGG/ALB SER: 2.05 {RATIO} (ref 1.1–1.8)
INTERPRETATION UR IFE-IMP: NORMAL
M PROTEIN 1 MFR SERPL ELPH: 2.8 %
M PROTEIN 1 SERPL ELPH-MCNC: 0.16 G/DL
PROT SERPL-MCNC: 5.7 G/DL (ref 6.4–8.2)

## 2018-02-11 PROBLEM — R19.4 CHANGE IN BOWEL HABITS: Status: ACTIVE | Noted: 2018-01-15

## 2018-02-11 RX ORDER — CYPROHEPTADINE HYDROCHLORIDE 4 MG/1
1 TABLET ORAL 3 TIMES DAILY
COMMUNITY
Start: 2018-01-15 | End: 2018-02-14 | Stop reason: SDUPTHER

## 2018-02-12 ENCOUNTER — TELEPHONE (OUTPATIENT)
Dept: HEMATOLOGY ONCOLOGY | Facility: CLINIC | Age: 58
End: 2018-02-12

## 2018-02-12 NOTE — TELEPHONE ENCOUNTER
I called patient and found voicemail  I left a message to call me back to discuss laboratory results

## 2018-02-13 ENCOUNTER — OFFICE VISIT (OUTPATIENT)
Dept: NEUROLOGY | Facility: CLINIC | Age: 58
End: 2018-02-13
Payer: COMMERCIAL

## 2018-02-13 ENCOUNTER — TELEPHONE (OUTPATIENT)
Dept: HEMATOLOGY ONCOLOGY | Facility: CLINIC | Age: 58
End: 2018-02-13

## 2018-02-13 VITALS
RESPIRATION RATE: 14 BRPM | HEIGHT: 69 IN | HEART RATE: 62 BPM | BODY MASS INDEX: 21.48 KG/M2 | WEIGHT: 145 LBS | SYSTOLIC BLOOD PRESSURE: 110 MMHG | DIASTOLIC BLOOD PRESSURE: 64 MMHG

## 2018-02-13 DIAGNOSIS — G62.89 OTHER POLYNEUROPATHY: ICD-10-CM

## 2018-02-13 DIAGNOSIS — R63.4 WEIGHT LOSS: ICD-10-CM

## 2018-02-13 DIAGNOSIS — E13.40 OTHER SPECIFIED DIABETES MELLITUS WITH DIABETIC NEUROPATHY, WITHOUT LONG-TERM CURRENT USE OF INSULIN (HCC): ICD-10-CM

## 2018-02-13 DIAGNOSIS — G71.11 MYOTONIC DYSTROPHY (HCC): Primary | ICD-10-CM

## 2018-02-13 DIAGNOSIS — I25.2 HISTORY OF MI (MYOCARDIAL INFARCTION): ICD-10-CM

## 2018-02-13 DIAGNOSIS — Z87.820 HISTORY OF MULTIPLE CONCUSSIONS: ICD-10-CM

## 2018-02-13 PROCEDURE — 99244 OFF/OP CNSLTJ NEW/EST MOD 40: CPT | Performed by: PSYCHIATRY & NEUROLOGY

## 2018-02-14 ENCOUNTER — OFFICE VISIT (OUTPATIENT)
Dept: FAMILY MEDICINE CLINIC | Facility: CLINIC | Age: 58
End: 2018-02-14
Payer: COMMERCIAL

## 2018-02-14 VITALS
HEIGHT: 69 IN | BODY MASS INDEX: 21.48 KG/M2 | SYSTOLIC BLOOD PRESSURE: 100 MMHG | DIASTOLIC BLOOD PRESSURE: 74 MMHG | TEMPERATURE: 98 F | WEIGHT: 145 LBS

## 2018-02-14 DIAGNOSIS — I25.10 ARTERIOSCLEROSIS OF CORONARY ARTERY: ICD-10-CM

## 2018-02-14 DIAGNOSIS — G71.11 MYOTONIC DYSTROPHY (HCC): ICD-10-CM

## 2018-02-14 DIAGNOSIS — R19.4 CHANGE IN BOWEL HABITS: ICD-10-CM

## 2018-02-14 DIAGNOSIS — D80.1 HYPOGAMMAGLOBULINEMIA (HCC): Primary | ICD-10-CM

## 2018-02-14 DIAGNOSIS — IMO0001 UNCONTROLLED TYPE 2 DIABETES MELLITUS WITHOUT COMPLICATION, WITHOUT LONG-TERM CURRENT USE OF INSULIN: Primary | ICD-10-CM

## 2018-02-14 PROCEDURE — 99214 OFFICE O/P EST MOD 30 MIN: CPT | Performed by: FAMILY MEDICINE

## 2018-02-14 RX ORDER — ATORVASTATIN CALCIUM 40 MG/1
40 TABLET, FILM COATED ORAL DAILY
Qty: 90 TABLET | Refills: 1 | Status: SHIPPED | OUTPATIENT
Start: 2018-02-14 | End: 2018-11-01 | Stop reason: SDUPTHER

## 2018-02-14 RX ORDER — GLIMEPIRIDE 1 MG/1
1 TABLET ORAL
Qty: 90 TABLET | Refills: 1 | Status: SHIPPED | OUTPATIENT
Start: 2018-02-14 | End: 2018-10-26 | Stop reason: SDUPTHER

## 2018-02-14 NOTE — TELEPHONE ENCOUNTER
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

## 2018-02-14 NOTE — PROGRESS NOTES
Assessment/Plan:    No problem-specific Assessment & Plan notes found for this encounter  Diagnoses and all orders for this visit:    Uncontrolled type 2 diabetes mellitus without complication, without long-term current use of insulin (HCC)  -     glimepiride (AMARYL) 1 mg tablet; Take 1 tablet (1 mg total) by mouth daily with breakfast  -     metFORMIN (GLUCOPHAGE) 1000 MG tablet; Take 1 tablet (1,000 mg total) by mouth 2 (two) times a day with meals  -     Comprehensive metabolic panel; Future  -     Hemoglobin A1c; Future    Arteriosclerosis of coronary artery  -     atorvastatin (LIPITOR) 40 mg tablet; Take 1 tablet (40 mg total) by mouth daily Hold until patient calls for refill  -     rivaroxaban (XARELTO) 20 mg tablet; Take 1 tablet (20 mg total) by mouth daily with breakfast    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[address] )    Change in bowel habits          Subjective:   Chief Complaint   Patient presents with    Hyperlipidemia    Diabetes          Patient ID: Daryl White is a 62 y o  male  Patient is a 49-year-old male presenting to the office for follow-up on his diabetes, fatigue, weight loss, muscle weakness, and general health  Since adding glimepiride, his sugars are better, mostly under 150  He has seen Hematology and Neurology since I last saw him  Neurology feels most to his current conditions can be explained by his history of myotonic dystrophy  Also I think some of his weight loss may be due to his uncontrolled diabetes as well  He has gained 4 lb since his last visit  The following portions of the patient's history were reviewed and updated as appropriate: allergies, current medications, past family history, past medical history, past social history, past surgical history and problem list     Review of Systems   Constitutional: Negative for appetite change, chills, fatigue and fever     HENT: Negative for congestion, ear pain, hearing loss, nosebleeds, postnasal drip, rhinorrhea, sinus pressure, sore throat, tinnitus and trouble swallowing  Eyes: Negative for photophobia, pain, discharge, redness, itching and visual disturbance  Respiratory: Negative for cough, chest tightness, shortness of breath and wheezing  Cardiovascular: Negative for chest pain, palpitations and leg swelling  Denies orthopnea , dyspnea on exertion   Gastrointestinal: Negative for abdominal distention, abdominal pain, blood in stool, constipation, diarrhea, nausea and vomiting  Endocrine: Negative  Genitourinary: Negative for difficulty urinating, dysuria, flank pain, frequency, hematuria and urgency  Denies nocturia , erectile dysfunction   Musculoskeletal: Negative for arthralgias, back pain, gait problem, joint swelling and myalgias  Denies joint stiffnes-some weakness with decreased strength  He is able to exercise for approximately 2 hours a day doing some cardio and light weights  Skin: Negative for pallor, rash and wound  Denies skin lesions   Allergic/Immunologic: Negative for environmental allergies, food allergies and immunocompromised state  Neurological: Negative for dizziness, tremors, seizures, syncope, speech difficulty, weakness, numbness and headaches  Hematological: Negative for adenopathy  Does not bruise/bleed easily  Psychiatric/Behavioral: Negative for behavioral problems, confusion, sleep disturbance and suicidal ideas  The patient is not nervous/anxious  Objective:    Vitals:    02/14/18 1122   BP: 100/74   Temp: 98 °F (36 7 °C)        Physical Exam   Constitutional: He is oriented to person, place, and time  He appears well-developed and well-nourished  HENT:   Head: Normocephalic and atraumatic  Nose: Nose normal    Mouth/Throat: Oropharynx is clear and moist    Eyes: Conjunctivae and EOM are normal  Pupils are equal, round, and reactive to light  Neck: Normal range of motion  Neck supple  No JVD present  No tracheal deviation present  No thyromegaly present  Cardiovascular: Normal rate, regular rhythm and intact distal pulses  No murmur heard  Pulmonary/Chest: Effort normal and breath sounds normal  He has no wheezes  He has no rales  Abdominal: Soft  Bowel sounds are normal  He exhibits no mass  There is no tenderness  There is no rebound and no guarding  Musculoskeletal: He exhibits no edema, tenderness or deformity  Lymphadenopathy:     He has no cervical adenopathy  Neurological: He is alert and oriented to person, place, and time  He has normal reflexes  No cranial nerve deficit  He exhibits normal muscle tone  Coordination normal    Skin: Skin is warm and dry  No lesion and no rash noted  Nails show no clubbing  Psychiatric: He has a normal mood and affect   Judgment normal

## 2018-02-15 DIAGNOSIS — G71.11 MYOTONIC DYSTROPHY (HCC): Primary | ICD-10-CM

## 2018-02-15 NOTE — PROGRESS NOTES
Placing ophthalmology consult, please call and notify patient that he should see opthalmology- per neuromuscular specialist's recommendations, if he has not done so recently  Thanks!     [facility]

## 2018-02-16 ENCOUNTER — TRANSCRIBE ORDERS (OUTPATIENT)
Dept: ADMINISTRATIVE | Facility: HOSPITAL | Age: 58
End: 2018-02-16

## 2018-02-16 ENCOUNTER — APPOINTMENT (OUTPATIENT)
Dept: LAB | Facility: MEDICAL CENTER | Age: 58
End: 2018-02-16
Payer: COMMERCIAL

## 2018-02-16 DIAGNOSIS — R63.4 LOSS OF WEIGHT: ICD-10-CM

## 2018-02-16 DIAGNOSIS — D80.1 COMMON VARIABLE AGAMMAGLOBULINEMIA (HCC): ICD-10-CM

## 2018-02-16 DIAGNOSIS — I43 DILATED CARDIOMYOPATHY SECONDARY TO MYOTONIC DYSTROPHY (HCC): Primary | ICD-10-CM

## 2018-02-16 DIAGNOSIS — I43 DILATED CARDIOMYOPATHY SECONDARY TO MYOTONIC DYSTROPHY (HCC): ICD-10-CM

## 2018-02-16 DIAGNOSIS — D69.49 OTHER PRIMARY THROMBOCYTOPENIA (HCC): ICD-10-CM

## 2018-02-16 DIAGNOSIS — R19.7 DIARRHEA, UNSPECIFIED TYPE: ICD-10-CM

## 2018-02-16 DIAGNOSIS — D70.8 CHRONIC BENIGN NEUTROPENIA (HCC): ICD-10-CM

## 2018-02-16 DIAGNOSIS — G71.11 DILATED CARDIOMYOPATHY SECONDARY TO MYOTONIC DYSTROPHY (HCC): ICD-10-CM

## 2018-02-16 DIAGNOSIS — G71.11 DILATED CARDIOMYOPATHY SECONDARY TO MYOTONIC DYSTROPHY (HCC): Primary | ICD-10-CM

## 2018-02-16 LAB
25(OH)D3 SERPL-MCNC: 22.9 NG/ML (ref 30–100)
BASOPHILS # BLD AUTO: 0.01 THOUSANDS/ΜL (ref 0–0.1)
BASOPHILS NFR BLD AUTO: 0 % (ref 0–1)
C3 SERPL-MCNC: 132 MG/DL (ref 90–180)
C4 SERPL-MCNC: 10 MG/DL (ref 10–40)
CRP SERPL QL: <3 MG/L
EOSINOPHIL # BLD AUTO: 0.21 THOUSAND/ΜL (ref 0–0.61)
EOSINOPHIL NFR BLD AUTO: 6 % (ref 0–6)
ERYTHROCYTE [DISTWIDTH] IN BLOOD BY AUTOMATED COUNT: 13.8 % (ref 11.6–15.1)
ERYTHROCYTE [SEDIMENTATION RATE] IN BLOOD: 2 MM/HOUR (ref 0–10)
HCT VFR BLD AUTO: 41.9 % (ref 36.5–49.3)
HGB BLD-MCNC: 14.6 G/DL (ref 12–17)
LYMPHOCYTES # BLD AUTO: 0.49 THOUSANDS/ΜL (ref 0.6–4.47)
LYMPHOCYTES NFR BLD AUTO: 13 % (ref 14–44)
MCH RBC QN AUTO: 31.1 PG (ref 26.8–34.3)
MCHC RBC AUTO-ENTMCNC: 34.8 G/DL (ref 31.4–37.4)
MCV RBC AUTO: 89 FL (ref 82–98)
MONOCYTES # BLD AUTO: 0.5 THOUSAND/ΜL (ref 0.17–1.22)
MONOCYTES NFR BLD AUTO: 13 % (ref 4–12)
NEUTROPHILS # BLD AUTO: 2.61 THOUSANDS/ΜL (ref 1.85–7.62)
NEUTS SEG NFR BLD AUTO: 68 % (ref 43–75)
NRBC BLD AUTO-RTO: 0 /100 WBCS
PLATELET # BLD AUTO: 116 THOUSANDS/UL (ref 149–390)
PMV BLD AUTO: 10.5 FL (ref 8.9–12.7)
RBC # BLD AUTO: 4.69 MILLION/UL (ref 3.88–5.62)
WBC # BLD AUTO: 3.83 THOUSAND/UL (ref 4.31–10.16)

## 2018-02-16 PROCEDURE — 85025 COMPLETE CBC W/AUTO DIFF WBC: CPT

## 2018-02-16 PROCEDURE — 86255 FLUORESCENT ANTIBODY SCREEN: CPT

## 2018-02-16 PROCEDURE — 86140 C-REACTIVE PROTEIN: CPT

## 2018-02-16 PROCEDURE — 83516 IMMUNOASSAY NONANTIBODY: CPT

## 2018-02-16 PROCEDURE — 86235 NUCLEAR ANTIGEN ANTIBODY: CPT

## 2018-02-16 PROCEDURE — 85652 RBC SED RATE AUTOMATED: CPT

## 2018-02-16 PROCEDURE — 82306 VITAMIN D 25 HYDROXY: CPT

## 2018-02-16 PROCEDURE — 36415 COLL VENOUS BLD VENIPUNCTURE: CPT

## 2018-02-16 PROCEDURE — 86038 ANTINUCLEAR ANTIBODIES: CPT

## 2018-02-16 PROCEDURE — 86225 DNA ANTIBODY NATIVE: CPT

## 2018-02-16 PROCEDURE — 82784 ASSAY IGA/IGD/IGG/IGM EACH: CPT

## 2018-02-16 PROCEDURE — 86160 COMPLEMENT ANTIGEN: CPT

## 2018-02-17 LAB
CK MB CFR SERPL ELPH: 18.4 NG/ML (ref 0–5)
DSDNA AB SER-ACNC: <1 IU/ML (ref 0–9)
ENA SS-A AB SER-ACNC: <0.2 AI (ref 0–0.9)
ENA SS-B AB SER-ACNC: <0.2 AI (ref 0–0.9)
ENDOMYSIUM IGA SER QL: NEGATIVE
GLIADIN PEPTIDE IGA SER-ACNC: 1 UNITS (ref 0–19)
GLIADIN PEPTIDE IGG SER-ACNC: 2 UNITS (ref 0–19)
IGA SERPL-MCNC: 34 MG/DL (ref 90–386)
TTG IGA SER-ACNC: <2 U/ML (ref 0–3)
TTG IGG SER-ACNC: <2 U/ML (ref 0–5)

## 2018-02-18 LAB — RYE IGE QN: NEGATIVE

## 2018-02-21 ENCOUNTER — TELEPHONE (OUTPATIENT)
Dept: FAMILY MEDICINE CLINIC | Facility: CLINIC | Age: 58
End: 2018-02-21

## 2018-02-28 ENCOUNTER — EVALUATION (OUTPATIENT)
Dept: PHYSICAL THERAPY | Facility: CLINIC | Age: 58
End: 2018-02-28
Payer: COMMERCIAL

## 2018-02-28 DIAGNOSIS — G71.11 MYOTONIC DYSTROPHY (HCC): ICD-10-CM

## 2018-02-28 DIAGNOSIS — G62.89 OTHER POLYNEUROPATHY: ICD-10-CM

## 2018-02-28 PROCEDURE — 97162 PT EVAL MOD COMPLEX 30 MIN: CPT | Performed by: PHYSICAL THERAPIST

## 2018-02-28 PROCEDURE — G8994 SUB PT/OT GOAL STATUS: HCPCS | Performed by: PHYSICAL THERAPIST

## 2018-02-28 PROCEDURE — G8993 SUB PT/OT CURRENT STATUS: HCPCS | Performed by: PHYSICAL THERAPIST

## 2018-02-28 NOTE — PROGRESS NOTES
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 Ambulatory referral to Physical Therapy   2  Other polyneuropathy G62 89 Ambulatory referral to Physical Therapy              Assessment  Impairments: abnormal muscle tone, activity intolerance, impaired balance, impaired physical strength and safety issue    Assessment details: Pt is a 62year old male presenting to PT with MD diagnosis of myotonic dystrophy  He demonstrates decreased functional strength/endurance, impaired balance, and decreased tolerance to activity  Patient would benefit from skilled PT services to address these issues and to maximize function  Thank you for the referral      Understanding of Dx/Px/POC: good   Prognosis: good    Goals  STG  1  Increase strength 1/2 grade in 4 weeks  2  Balance & endurance & gait & locomotion are improved by 50% in 4 weeks    LTG  1  Ambulation is improved to maximal level of function  2  IADL performance in related activities is improved to maximal level of function  3  Patient is independent with HEP  4  Stair climbing is improved to maximal level of function      Plan  Planned modality interventions: prn  Planned therapy interventions: balance, neuromuscular re-education, patient education, strengthening, functional ROM exercises, therapeutic exercise and home exercise program  Frequency: 2x week  Duration in visits: 8        Subjective Evaluation    History of Present Illness  Date of onset: 1988  Mechanism of injury: Pt is a 62year old male presenting to PT with MD diagnosis of myotonic dystrophy  He reports this was diagnosed at a young age (around 80) in his teens with a muscle biopsy  Reports his dad has a similar abnormality   States did not have muscle weakness then but states over the last 20 years has noted slow gradually diffuse muscle weakness  States most notable with gripping, with inability to release   Pt states he is active, he lifts and works out but feels he is not getting any stronger  Pt denies pain  Reports weakness does not interfere with daily activities of living  Does state difficulty ascending stairs  Pt states he is unable to run recreationally anymore  Reports occasional instances of tripping over feet  Radha Hernandes 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  Follow-up with neurologist scheduled for 3/12/18, states he will be having EMG testing at that time        PMH also significant for CAD with stent placement prior to having heart attack   States follows with cardiology  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 but had had medication (xarelto) for management  [facility] does have a mild polyneuropathy (underlying DMII), he reports not constant, states he does not feel off balance from this      Pain  Current pain ratin  At best pain ratin  At worst pain ratin    Social Support  Steps to enter house: no  Stairs in house: yes (split level)   Lives in: multiple-level home  Lives with: spouse    Employment status: not working  Hand dominance: right      Diagnostic Tests  No diagnostic tests performed  Patient Goals  Patient goals for therapy: increased strength, independence with ADLs/IADLs, return to sport/leisure activities and improved balance          Objective     Strength/Myotome Testing     Left Hip   Planes of Motion   Flexion: 4  External rotation: 3+  Internal rotation: 4    Right Hip   Planes of Motion   Flexion: 4  External rotation: 3+  Internal rotation: 4    Left Knee   Flexion: 5  Extension: 4+    Right Knee   Flexion: 5  Extension: 4+    Left Ankle/Foot   Dorsiflexion: 4+  Plantar flexion: 4    Right Ankle/Foot   Dorsiflexion: 4+  Plantar flexion: 4    Functional Assessment     Comments  Gross range of motion: wnl  DL squat: able to obtain full squat with minimal pain  SL squat: off-balance with collapse of bilateral hip   SL heel raise: able to perform 10 on each leg with no increase in pain  Reflexes (upper/lower): intact 2+ throughout   Limited hip IR PROM  Reduced quad length bilaterally  Transitions from supine to sit, difficulty displayed with increased time   Poor core strength     Balance:  DL eyes closed: moderate sway, 30 seconds  SL eyes open on L: LOB after 22 seconds  SL eyes open on R: LOB after 10 seconds         General Comments     Shoulder Comments   Upper quarter MMT: 4+/5 throughout   strength intact: unable to release  for 3 seconds        Flowsheet Rows    Flowsheet Row Most Recent Value   PT/OT G-Codes   Current Score  99   Projected Score  97   FOTO information reviewed  Yes   Assessment Type  Evaluation   G code set  Other PT/OT Secondary   Other PT Secondary Current Status ()  CI   Other PT Secondary Goal Status ()  CI          Precautions: CAD with stent placement, myotonic dystrophy, DMII, history of DVT    Daily Treatment Diary     Manual  2/28            prn                                                                     Exercise Diary  2/28            Bike warm up NV            Standing gastroc block stretch NV            SL balance on foam NV            Tandem balance on foam NV            Step ups with eccentric control NV            Standing marches/exaggerated gait NV            Functional heel raises NV            VG DL squats NV            BIODEX balance activities NV                                                                                                                                                                        POC to focus on functional strengthening/balance     Modalities  2/28            prn

## 2018-03-01 ENCOUNTER — TELEPHONE (OUTPATIENT)
Dept: FAMILY MEDICINE CLINIC | Facility: CLINIC | Age: 58
End: 2018-03-01

## 2018-03-06 ENCOUNTER — OFFICE VISIT (OUTPATIENT)
Dept: PHYSICAL THERAPY | Facility: CLINIC | Age: 58
End: 2018-03-06
Payer: COMMERCIAL

## 2018-03-06 DIAGNOSIS — G71.11 MYOTONIC DYSTROPHY (HCC): Primary | ICD-10-CM

## 2018-03-06 DIAGNOSIS — G62.89 OTHER POLYNEUROPATHY: ICD-10-CM

## 2018-03-06 PROCEDURE — 97112 NEUROMUSCULAR REEDUCATION: CPT

## 2018-03-06 PROCEDURE — 97110 THERAPEUTIC EXERCISES: CPT

## 2018-03-06 NOTE — PROGRESS NOTES
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    2  Other polyneuropathy G62 89                   Subjective: Patient had no new reports since initial evaluation  Objective: See treatment diary below  Progressed LE strength and balance / stabilization  Assessment: Tolerated treatment well  Demonstrated functional LE weakness and instability, apparent with LOB and occasional use of hand support with strengthening / balance program  Patient would benefit from continued PT      Plan: Continue per plan of care  Progress treatment as tolerated  Precautions: CAD with stent placement, myotonic dystrophy, DMII, history of DVT    Daily Treatment Diary     Manual  2/28 3/6           prn                                                                     Exercise Diary  2/28 3/6           Bike warm up NV 10 min L4           Standing gastroc block stretch NV 30"x3 b/l           SL balance on foam NV 10"x5 b/l           Tandem balance on foam NV 30"x2 ea           Step ups with eccentric control NV x10 b/l           Standing marches / exaggerated gait NV 2 laps           Functional heel raises NV 5"x10 b/l           VG DL squats NV 45% 4 min           BIODEX PS NV 30"x2 ea  12-10 ; 10-8           BIODEX LOS  med x2                                                                                                                                                           Biodex LOS results for 3/6 - 55%, 59 sec ; 65%, 48 sec    POC to focus on functional strengthening / balance     Modalities  2/28 3/6           prn                                         Updated and reviewed home program with patient    HEP: standing gastroc stretch at block, SL balance, tandem balance, exaggerated gait, functional heel raises

## 2018-03-08 ENCOUNTER — OFFICE VISIT (OUTPATIENT)
Dept: PHYSICAL THERAPY | Facility: CLINIC | Age: 58
End: 2018-03-08
Payer: COMMERCIAL

## 2018-03-08 DIAGNOSIS — G71.11 MYOTONIC DYSTROPHY (HCC): Primary | ICD-10-CM

## 2018-03-08 DIAGNOSIS — G62.89 OTHER POLYNEUROPATHY: ICD-10-CM

## 2018-03-08 PROCEDURE — 97112 NEUROMUSCULAR REEDUCATION: CPT

## 2018-03-08 PROCEDURE — 97110 THERAPEUTIC EXERCISES: CPT

## 2018-03-08 NOTE — PROGRESS NOTES
Daily Note     Today's date: 3/8/2018  Patient name: Kecia Hobbs  : 1960  MRN: [id number]  Referring provider: Tana Sepulveda DO  Dx:   Encounter Diagnosis     ICD-10-CM    1  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[address] ) G71 11    2  Other polyneuropathy G62 89                   Subjective: Patient reported no soreness following previous treatment  Compliant to home program with no difficulty  Objective: See treatment diary below  Progressed strengthening and balance program       Assessment: Good understanding of program  Demonstrated minimal difficulty or fatigue  Improved balance and stabilization with dynamic stability exercises  Plan: Continue per plan of care  Progress treatment as tolerated           Precautions: CAD with stent placement, myotonic dystrophy, DMII, history of DVT    Daily Treatment Diary     Manual  2/28 3/6 3/8          prn                                                                     Exercise Diary  2/28 3/6 3/8          Bike warm up NV 10 min L4 10 min  L3          Standing gastroc block stretch NV 30"x3 b/l 30"x3 b/l          SL balance on foam NV 10"x5 b/l 10"x5 b/l          Tandem balance on foam NV 30"x2 ea 30"x2 ea          Step ups with eccentric control NV x10 b/l x10 b/l          Standing marches / exaggerated gait NV 2 laps 2 laps          Functional heel raises NV 5"x10 b/l 5"x10 b/l          VG DL squats NV 45% 4 min 50% 5 min          BIODEX PS NV 30"x2 ea  12-10 ; 10-8 30"x3 10-8          BIODEX LOS  med x2  med x3                                                                                                                                                         Biodex LOS results for 3/8 - 47%, 48 sec ; 58%, 43 sec ; 49%, 47 sec    POC to focus on functional strengthening / balance     Modalities  2/28 3/6 3/8          prn                                         HEP: standing gastroc stretch at block, SL balance, tandem balance, exaggerated gait, functional heel raises

## 2018-03-12 ENCOUNTER — HOSPITAL ENCOUNTER (OUTPATIENT)
Dept: NEUROLOGY | Facility: CLINIC | Age: 58
Discharge: HOME | End: 2018-03-12
Payer: COMMERCIAL

## 2018-03-12 DIAGNOSIS — G71.11 MYOTONIC DYSTROPHY (HCC): ICD-10-CM

## 2018-03-12 PROCEDURE — 95886 MUSC TEST DONE W/N TEST COMP: CPT | Performed by: PSYCHIATRY & NEUROLOGY

## 2018-03-12 PROCEDURE — 95910 NRV CNDJ TEST 7-8 STUDIES: CPT | Performed by: PSYCHIATRY & NEUROLOGY

## 2018-03-13 ENCOUNTER — OFFICE VISIT (OUTPATIENT)
Dept: PHYSICAL THERAPY | Facility: CLINIC | Age: 58
End: 2018-03-13
Payer: COMMERCIAL

## 2018-03-13 DIAGNOSIS — G71.11 MYOTONIC DYSTROPHY (HCC): Primary | ICD-10-CM

## 2018-03-13 DIAGNOSIS — G62.89 OTHER POLYNEUROPATHY: ICD-10-CM

## 2018-03-13 PROCEDURE — 97112 NEUROMUSCULAR REEDUCATION: CPT

## 2018-03-13 PROCEDURE — 97110 THERAPEUTIC EXERCISES: CPT

## 2018-03-13 NOTE — PROGRESS NOTES
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 11    2  Other polyneuropathy G62 89                   Subjective: Patient reported he continues with independent gym routine in conjunction with treatment  Objective: See treatment diary below  Progressed strengthening and stability program        Assessment: Increased difficulty with progressions, demonstrating mild LOB on more dynamic surfaces and with functional strengthening  Plan: Continue per plan of care  Progress treatment as tolerated           Precautions: CAD with stent placement, myotonic dystrophy, DMII, history of DVT    Daily Treatment Diary     Manual  2/28 3/6 3/8 3/13         prn                                                                     Exercise Diary  2/28 3/6 3/8 3/13         Bike warm up NV 10 min L4 10 min  L3 10 min L3         Standing gastroc block stretch NV 30"x3 b/l 30"x3 b/l 30"x3 b/l         SL balance on foam NV 10"x5 b/l 10"x5 b/l 10"x  10 b/l         Tandem balance on foam NV 30"x2 ea 30"x2 ea 30"x2 ea         Step ups with eccentric control NV x10 b/l x10 b/l x10 b/l         Standing marches / exaggerated gait NV 2 laps 2 laps 2 laps         Functional heel raises NV 5"x10 b/l 5"x10 b/l 5"x10 b/l         VG DL squats NV 45% 4 min 50% 5 min added leg press         BIODEX PS NV 30"x2 ea  12-10 ; 10-8 30"x3 10-8 30"x3 9-7         BIODEX LOS  med x2  med x3 med x3         BIODEX maze    med x2         Leg press    130# 3x10                                                                                                                              Biodex LOS results for 3/13 - 63%, 41 sec ; 57%, 42 sec ; 59%,  43 sec  Biodex maze results for 3/13 - 16%, 1:21 ; 20%, 1:01    POC to focus on functional strengthening / balance     Modalities  2/28 3/6 3/8 3/13         prn                                         HEP: standing gastroc stretch at block, SL balance, tandem balance, exaggerated gait, functional heel raises     First 10 min of treatment not supervised

## 2018-03-15 ENCOUNTER — TELEPHONE (OUTPATIENT)
Dept: NEUROLOGY | Facility: CLINIC | Age: 58
End: 2018-03-15

## 2018-03-15 ENCOUNTER — OFFICE VISIT (OUTPATIENT)
Dept: PHYSICAL THERAPY | Facility: CLINIC | Age: 58
End: 2018-03-15
Payer: COMMERCIAL

## 2018-03-15 DIAGNOSIS — G71.11 MYOTONIC DYSTROPHY (HCC): Primary | ICD-10-CM

## 2018-03-15 DIAGNOSIS — G62.89 OTHER POLYNEUROPATHY: ICD-10-CM

## 2018-03-15 PROCEDURE — 97112 NEUROMUSCULAR REEDUCATION: CPT

## 2018-03-15 PROCEDURE — 97110 THERAPEUTIC EXERCISES: CPT

## 2018-03-15 NOTE — PROGRESS NOTES
Daily Note     Today's date: 3/15/2018  Patient name: Olu Velazquez  : 1960  MRN: [id number]  Referring provider: Emily Jiang DO  Dx:   Encounter Diagnosis     ICD-10-CM    1  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[address] ) G71 11    2  Other polyneuropathy G62 89                   Subjective: Patient reported he will be leaving for vacation over the next week  Objective: See treatment diary below      Assessment: Progressing steadily with functional LE strengthening  Improved stability, however continues to demonstrate a challenge on dynamic surfaces and when LEOLA is challenged, displaying increased sway and occasional LOB  Plan: Continue per plan of care  Progress treatment as tolerated           Precautions: CAD with stent placement, myotonic dystrophy, DMII, history of DVT    Daily Treatment Diary     Manual  2/28 3/6 3/8 3/13 3/15        prn                                                                     Exercise Diary  2/28 3/6 3/8 3/13 3/15        Bike warm up NV 10 min L4 10 min  L3 10 min L3 10 min L3        Standing gastroc block stretch NV 30"x3 b/l 30"x3 b/l 30"x3 b/l 30"x3 b/l        SL balance on foam NV 10"x5 b/l 10"x5 b/l 10"x  10 b/l 10"x  10 b/l        Tandem balance on foam NV 30"x2 ea 30"x2 ea 30"x2 ea 30"x2 ea        Step ups with eccentric control NV x10 b/l x10 b/l x10 b/l x10 b/l        Standing marches / exaggerated gait NV 2 laps 2 laps 2 laps 2 laps         Functional heel raises NV 5"x10 b/l 5"x10 b/l 5"x10 b/l 5"x10 b/l        VG DL squats NV 45% 4 min 50% 5 min added leg press         BIODEX PS NV 30"x2 ea  12-10 ; 10-8 30"x3 10-8 30"x3 9-7 30"x3 9-7        BIODEX LOS  med x2  med x3 med x3 med x3        BIODEX maze    med x2 med x3        Leg press    130# 3x10 130# 3x10                                                                                                                             Biodex LOS results for 3/15 - 78%, 41 sec ; 63%, 39 sec ; 79%,  39 sec    POC to focus on functional strengthening / balance     Modalities  2/28 3/6 3/8 3/13 3/15        prn                                         HEP: standing gastroc stretch at block, SL balance, tandem balance, exaggerated gait, functional heel raises     First 15 min of treatment not supervised

## 2018-03-20 ENCOUNTER — APPOINTMENT (OUTPATIENT)
Dept: PHYSICAL THERAPY | Facility: CLINIC | Age: 58
End: 2018-03-20
Payer: COMMERCIAL

## 2018-03-22 ENCOUNTER — APPOINTMENT (OUTPATIENT)
Dept: PHYSICAL THERAPY | Facility: CLINIC | Age: 58
End: 2018-03-22
Payer: COMMERCIAL

## 2018-03-27 ENCOUNTER — APPOINTMENT (OUTPATIENT)
Dept: PHYSICAL THERAPY | Facility: CLINIC | Age: 58
End: 2018-03-27
Payer: COMMERCIAL

## 2018-03-29 ENCOUNTER — APPOINTMENT (OUTPATIENT)
Dept: PHYSICAL THERAPY | Facility: CLINIC | Age: 58
End: 2018-03-29
Payer: COMMERCIAL

## 2018-04-10 LAB
ALBUMIN SERPL-MCNC: 3.9 G/DL (ref 3.6–5.1)
ALBUMIN/GLOB SERPL: 2 (CALC) (ref 1–2.5)
ALP SERPL-CCNC: 61 U/L (ref 40–115)
ALT SERPL-CCNC: 37 U/L (ref 9–46)
AST SERPL-CCNC: 25 U/L (ref 10–35)
BILIRUB SERPL-MCNC: 1.3 MG/DL (ref 0.2–1.2)
BUN SERPL-MCNC: 21 MG/DL (ref 7–25)
BUN/CREAT SERPL: ABNORMAL (CALC) (ref 6–22)
CALCIUM SERPL-MCNC: 9.7 MG/DL (ref 8.6–10.3)
CHLORIDE SERPL-SCNC: 103 MMOL/L (ref 98–110)
CO2 SERPL-SCNC: 30 MMOL/L (ref 20–31)
CREAT SERPL-MCNC: 0.74 MG/DL (ref 0.7–1.33)
GLOBULIN SER CALC-MCNC: 2 G/DL (CALC) (ref 1.9–3.7)
GLUCOSE SERPL-MCNC: 151 MG/DL (ref 65–99)
HBA1C MFR BLD: 6.4 % OF TOTAL HGB
POTASSIUM SERPL-SCNC: 4.5 MMOL/L (ref 3.5–5.3)
PROT SERPL-MCNC: 5.9 G/DL (ref 6.1–8.1)
SL AMB EGFR AFRICAN AMERICAN: 119 ML/MIN/1.73M2
SL AMB EGFR NON AFRICAN AMERICAN: 102 ML/MIN/1.73M2
SODIUM SERPL-SCNC: 141 MMOL/L (ref 135–146)

## 2018-05-17 ENCOUNTER — OFFICE VISIT (OUTPATIENT)
Dept: FAMILY MEDICINE CLINIC | Facility: CLINIC | Age: 58
End: 2018-05-17
Payer: COMMERCIAL

## 2018-05-17 VITALS — WEIGHT: 142 LBS | BODY MASS INDEX: 21.28 KG/M2 | DIASTOLIC BLOOD PRESSURE: 78 MMHG | SYSTOLIC BLOOD PRESSURE: 110 MMHG

## 2018-05-17 DIAGNOSIS — E11.9 CONTROLLED TYPE 2 DIABETES MELLITUS WITHOUT COMPLICATION, WITHOUT LONG-TERM CURRENT USE OF INSULIN (HCC): Primary | ICD-10-CM

## 2018-05-17 DIAGNOSIS — E78.2 MIXED HYPERLIPIDEMIA: ICD-10-CM

## 2018-05-17 DIAGNOSIS — Z12.5 SCREENING FOR PROSTATE CANCER: ICD-10-CM

## 2018-05-17 DIAGNOSIS — I25.10 ARTERIOSCLEROSIS OF CORONARY ARTERY: ICD-10-CM

## 2018-05-17 DIAGNOSIS — G71.11 MYOTONIC DYSTROPHY (HCC): ICD-10-CM

## 2018-05-17 PROBLEM — R19.4 CHANGE IN BOWEL HABITS: Status: RESOLVED | Noted: 2018-01-15 | Resolved: 2018-05-17

## 2018-05-17 PROCEDURE — 99214 OFFICE O/P EST MOD 30 MIN: CPT | Performed by: FAMILY MEDICINE

## 2018-05-17 NOTE — PROGRESS NOTES
Assessment/Plan:    Controlled type 2 diabetes mellitus without complication, without long-term current use of insulin (HCC)  Well controlled on current medications  Continue the same  Recheck lab in 6 months  Arteriosclerosis of coronary artery  Stable/asymptomatic  Continue current medications  Follow up with Cardiology as scheduled  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[facility] )  Relatively stable  Fatigue is manageable  Has follow-up appointment scheduled with Neurology  Diagnoses and all orders for this visit:    Controlled type 2 diabetes mellitus without complication, without long-term current use of insulin (HCC)  -     Comprehensive metabolic panel; Future  -     HEMOGLOBIN A1C W/ EAG ESTIMATION; Future    Arteriosclerosis of coronary artery  -     CBC and differential; Future  -     Lipid panel; Future    Mixed hyperlipidemia  -     CBC and differential; Future  -     Lipid panel; Future  -     TSH, 3rd generation; Future    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[facility] )    Screening for prostate cancer  -     PSA, Total Screen; Future          Subjective:   Chief Complaint   Patient presents with    Diabetes    Hyperlipidemia     no refills needed           Patient ID: Carmen Matute is a 62 y o  male  Patient is a 79-year-old male presenting to the office for follow-up on his diabetes, hypertension, hyperlipidemia, coronary artery disease, MI of tonic dystrophy  He had recent lab work which he would like to review  He has seen Cardiology, Ophthalmology, and Neurology all recently  He continues to work, and is doing fairly well  Sugars are running between 130-170 for the most part          The following portions of the patient's history were reviewed and updated as appropriate: allergies, current medications, past family history, past medical history, past social history, past surgical history and problem list     Review of Systems   Constitutional: Negative for appetite change, chills, diaphoresis, fatigue, fever and unexpected weight change  HENT: Negative for congestion, ear pain, hearing loss, nosebleeds, postnasal drip, rhinorrhea, sinus pressure, sore throat, tinnitus and trouble swallowing  Eyes: Negative for photophobia, pain, discharge, redness, itching and visual disturbance  Respiratory: Negative for cough, chest tightness, shortness of breath and wheezing  Cardiovascular: Negative for chest pain, palpitations and leg swelling  Denies orthopnea , dyspnea on exertion   Gastrointestinal: Negative for abdominal distention, abdominal pain, blood in stool, constipation, diarrhea, nausea and vomiting  Endocrine: Negative  Genitourinary: Negative for difficulty urinating, dysuria, flank pain, frequency, hematuria and urgency  Denies nocturia , erectile dysfunction   Musculoskeletal: Negative for arthralgias, back pain, gait problem, joint swelling and myalgias  Skin: Negative for pallor, rash and wound  Denies skin lesions   Allergic/Immunologic: Negative for environmental allergies, food allergies and immunocompromised state  Neurological: Positive for numbness  Negative for dizziness, tremors, seizures, syncope, speech difficulty and headaches  Hematological: Negative for adenopathy  Does not bruise/bleed easily  Psychiatric/Behavioral: Negative for behavioral problems, confusion, sleep disturbance and suicidal ideas  The patient is not nervous/anxious  Objective:      /78   Wt 64 4 kg (142 lb)   BMI 21 28 kg/m²          Physical Exam   Constitutional: He is oriented to person, place, and time  He appears well-developed and well-nourished  HENT:   Head: Normocephalic and atraumatic  Nose: Nose normal    Mouth/Throat: Oropharynx is clear and moist    Eyes: Conjunctivae and EOM are normal  Pupils are equal, round, and reactive to light  Neck: Normal range of motion  Neck supple  No JVD present  No tracheal deviation present  No thyromegaly present  Cardiovascular: Normal rate, regular rhythm and intact distal pulses  No murmur heard  Pulmonary/Chest: Effort normal and breath sounds normal  He has no wheezes  He has no rales  Abdominal: Soft  Bowel sounds are normal  He exhibits no mass  There is no tenderness  There is no rebound and no guarding  Musculoskeletal: He exhibits no edema, tenderness or deformity  Lymphadenopathy:     He has no cervical adenopathy  Neurological: He is alert and oriented to person, place, and time  He has normal reflexes  No cranial nerve deficit  He exhibits normal muscle tone  Coordination normal    Decreased sensation in a stocking glove distribution of the upper and lower extremities  Skin: Skin is warm and dry  No lesion and no rash noted  Nails show no clubbing  Psychiatric: He has a normal mood and affect  Judgment normal        No visits with results within 1 Month(s) from this visit  Latest known visit with results is:   Orders Only on 04/07/2018   Component Date Value Ref Range Status    SL AMB GLUCOSE 04/07/2018 151* 65 - 99 mg/dL Final    Comment:               Fasting reference interval     For someone without known diabetes, a glucose  value >125 mg/dL indicates that they may have  diabetes and this should be confirmed with a  follow-up test          BUN 04/07/2018 21  7 - 25 mg/dL Final    Creatinine, Serum 04/07/2018 0 74  0 70 - 1 33 mg/dL Final    Comment: For patients >52years of age, the reference limit  for Creatinine is approximately 13% higher for people  identified as -American           eGFR Non African American 04/07/2018 102  > OR = 60 mL/min/1 73m2 Final    SL AMB EGFR  04/07/2018 119  > OR = 60 mL/min/1 73m2 Final    SL AMB BUN/CREATININE RATIO 53/65/8580 NOT APPLICABLE  6 - 22 (calc) Final    SL AMB SODIUM 04/07/2018 141  135 - 146 mmol/L Final    SL AMB POTASSIUM 04/07/2018 4 5  3 5 - 5 3 mmol/L Final    SL AMB CHLORIDE 04/07/2018 103  98 - 110 mmol/L Final    SL AMB CARBON DIOXIDE 04/07/2018 30  20 - 31 mmol/L Final    SL AMB CALCIUM 04/07/2018 9 7  8 6 - 10 3 mg/dL Final    SL AMB PROTEIN, TOTAL 04/07/2018 5 9* 6 1 - 8 1 g/dL Final    Serum Albumin 04/07/2018 3 9  3 6 - 5 1 g/dL Final    SL AMB GLOBULIN 04/07/2018 2 0  1 9 - 3 7 g/dL (calc) Final    SL AMB ALBUMIN/GLOBULIN RATIO 04/07/2018 2 0  1 0 - 2 5 (calc) Final    SL AMB BILIRUBIN, TOTAL 04/07/2018 1 3* 0 2 - 1 2 mg/dL Final    SL AMB ALKALINE PHOSPHATASE 04/07/2018 61  40 - 115 U/L Final    SL AMB AST 04/07/2018 25  10 - 35 U/L Final    SL AMB ALT 04/07/2018 37  9 - 46 U/L Final    Hemoglobin A1C 04/07/2018 6 4* <5 7 % of total Hgb Final    Comment: For someone without known diabetes, a hemoglobin   A1c value between 5 7% and 6 4% is consistent with  prediabetes and should be confirmed with a   follow-up test      For someone with known diabetes, a value <7%  indicates that their diabetes is well controlled  A1c  targets should be individualized based on duration of  diabetes, age, comorbid conditions, and other  considerations  This assay result is consistent with an increased risk  of diabetes       Currently, no consensus exists regarding use of  hemoglobin A1c for diagnosis of diabetes for children         ]

## 2018-07-03 ENCOUNTER — TELEPHONE (OUTPATIENT)
Dept: FAMILY MEDICINE CLINIC | Facility: CLINIC | Age: 58
End: 2018-07-03

## 2018-07-03 DIAGNOSIS — B00.1 HERPES LABIALIS: Primary | ICD-10-CM

## 2018-07-03 RX ORDER — VALACYCLOVIR HYDROCHLORIDE 1 G/1
TABLET, FILM COATED ORAL
Qty: 2 TABLET | Refills: 0 | Status: SHIPPED | OUTPATIENT
Start: 2018-07-03 | End: 2019-11-21

## 2018-07-03 NOTE — TELEPHONE ENCOUNTER
Since he has a cold sore now I will send in valtrex For cold sores it is prescried 1000 mg 1 tablet followed by one more 12 hours later It is only taken twice

## 2018-08-27 DIAGNOSIS — I25.10 ARTERIOSCLEROSIS OF CORONARY ARTERY: ICD-10-CM

## 2018-08-27 RX ORDER — RIVAROXABAN 20 MG/1
TABLET, FILM COATED ORAL
Qty: 90 TABLET | Refills: 1 | Status: SHIPPED | OUTPATIENT
Start: 2018-08-27 | End: 2019-03-27 | Stop reason: SDUPTHER

## 2018-09-21 ENCOUNTER — OFFICE VISIT (OUTPATIENT)
Dept: FAMILY MEDICINE CLINIC | Facility: CLINIC | Age: 58
End: 2018-09-21
Payer: COMMERCIAL

## 2018-09-21 VITALS
WEIGHT: 137.2 LBS | BODY MASS INDEX: 20.32 KG/M2 | HEIGHT: 69 IN | DIASTOLIC BLOOD PRESSURE: 60 MMHG | TEMPERATURE: 98.4 F | SYSTOLIC BLOOD PRESSURE: 102 MMHG

## 2018-09-21 DIAGNOSIS — R42 VERTIGO: Primary | ICD-10-CM

## 2018-09-21 DIAGNOSIS — D72.819 LEUKOPENIA, UNSPECIFIED TYPE: ICD-10-CM

## 2018-09-21 DIAGNOSIS — I25.10 ARTERIOSCLEROSIS OF CORONARY ARTERY: ICD-10-CM

## 2018-09-21 DIAGNOSIS — H61.23 BILATERAL IMPACTED CERUMEN: ICD-10-CM

## 2018-09-21 DIAGNOSIS — Z23 NEED FOR VACCINATION: ICD-10-CM

## 2018-09-21 DIAGNOSIS — G71.11 MYOTONIC DYSTROPHY (HCC): ICD-10-CM

## 2018-09-21 DIAGNOSIS — E11.9 CONTROLLED TYPE 2 DIABETES MELLITUS WITHOUT COMPLICATION, WITHOUT LONG-TERM CURRENT USE OF INSULIN (HCC): ICD-10-CM

## 2018-09-21 DIAGNOSIS — D69.6 THROMBOCYTOPENIA (HCC): ICD-10-CM

## 2018-09-21 PROCEDURE — 3008F BODY MASS INDEX DOCD: CPT | Performed by: FAMILY MEDICINE

## 2018-09-21 PROCEDURE — 90682 RIV4 VACC RECOMBINANT DNA IM: CPT

## 2018-09-21 PROCEDURE — 90471 IMMUNIZATION ADMIN: CPT

## 2018-09-21 PROCEDURE — 99214 OFFICE O/P EST MOD 30 MIN: CPT | Performed by: FAMILY MEDICINE

## 2018-09-21 RX ORDER — MECLIZINE HYDROCHLORIDE 25 MG/1
25 TABLET ORAL EVERY 8 HOURS PRN
Qty: 40 TABLET | Refills: 2 | Status: SHIPPED | OUTPATIENT
Start: 2018-09-21 | End: 2020-11-30

## 2018-09-21 NOTE — PROGRESS NOTES
Assessment/Plan:         Diagnoses and all orders for this visit:    Vertigo  -     T4; Future  -     TSH, 3rd generation with T4 reflex; Future  -     Sedimentation rate, automated; Future  -     Lyme Antibody Profile with reflex to WB; Future    Bilateral impacted cerumen    Controlled type 2 diabetes mellitus without complication, without long-term current use of insulin (HCC)  -     Comprehensive metabolic panel; Future  -     Hemoglobin A1C; Future  -     Lipid panel; Future  -     Microalbumin / creatinine urine ratio    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HCC)    Thrombocytopenia (HCC)    Leukopenia, unspecified type    Arteriosclerosis of coronary artery    Need for vaccination  -     influenza vaccine, [id number], quadrivalent, recombinant, PF, 0 5 mL, for patients 18 yr+ (FLUBLOK)    Other orders  -     Ear cerumen removal          Subjective:   Chief Complaint   Patient presents with    Dizziness    Nausea     x  4 days           Patient ID: Brock Sanabria is a 62 y o  male  Patient is a 49-year-old male complaining of dizziness for the last 4 days  Similar to prior episodes  Denies any recent respiratory illness, congestion, ear trauma  Episodes last most of the day  He cannot identify a triggering maneuver or occurrence that makes it worse  Did not change with change in position  Does feel his ears are congested and has difficulty hearing  He will be due for lab work  He is also due for a flu shot  The following portions of the patient's history were reviewed and updated as appropriate: allergies, current medications, past family history, past medical history, past social history, past surgical history and problem list     Review of Systems   Constitutional: Positive for activity change, fatigue and unexpected weight change  HENT: Negative  Negative for ear pain (No pain but pressure, and some mild hearing loss  )  Eyes: Negative  Respiratory: Negative  Cardiovascular: Negative  Gastrointestinal: Negative  Endocrine: Negative  Genitourinary: Negative  Musculoskeletal: Negative  Skin: Negative for rash  Neurological: Positive for dizziness, weakness (Chronic) and light-headedness  Negative for tremors, seizures, syncope, facial asymmetry and numbness  Hematological: Negative for adenopathy  Psychiatric/Behavioral: Negative for confusion, decreased concentration, dysphoric mood and hallucinations  Objective:      /60   Temp 98 4 °F (36 9 °C)   Ht 5' 8 5" (1 74 m)   Wt 62 2 kg (137 lb 3 2 oz)   BMI 20 56 kg/m²          Physical Exam   Constitutional: He is oriented to person, place, and time  Vital signs are normal    HENT:   Head: Normocephalic and atraumatic  Nose: Nose normal    Mouth/Throat: Oropharynx is clear and moist    Bilateral external auditory canals are completely occluded with soft cerumen  Eyes: Conjunctivae and EOM are normal  Pupils are equal, round, and reactive to light  Neck: Normal range of motion  Neck supple  No JVD present  No tracheal deviation present  No thyromegaly present  Cardiovascular: Normal rate, regular rhythm and intact distal pulses  No murmur heard  Pulmonary/Chest: Effort normal and breath sounds normal  He has no wheezes  He has no rales  Abdominal: Soft  Bowel sounds are normal  He exhibits no mass  There is no tenderness  There is no rebound and no guarding  Musculoskeletal: He exhibits no edema, tenderness or deformity  Lymphadenopathy:     He has no cervical adenopathy  Neurological: He is alert and oriented to person, place, and time  He has normal reflexes  No cranial nerve deficit  He exhibits normal muscle tone  Coordination normal    Skin: Skin is warm and dry  No lesion and no rash noted  Nails show no clubbing  Psychiatric: He has a normal mood and affect   Judgment normal      Ear cerumen removal  Date/Time: 9/21/2018 10:55 AM  Performed by: Trinna Riedel  Authorized by: Yeimi Miller CHIKA     Patient location: Office  Other Assisting Provider: No    Consent:     Consent obtained:  Verbal    Risks discussed:  Bleeding, dizziness, incomplete removal, infection, pain and TM perforation    Alternatives discussed:  No treatment, alternative treatment and observation  Universal protocol:     Procedure explained and questions answered to patient or proxy's satisfaction: yes      Patient identity confirmed:  Verbally with patient  Procedure details:     Location: Bilateral external auditory canals  Procedure type comment:  Bilateral canals were irrigated with copious amounts of warm water productive for multiple boluses of soft brown wax  Removal was augmented with the use of a cerumen loop  Equipment used:  60 cc syringe, with an 18 gauge Angiocath  Post-procedure details:     Complication:  Macerated skin    Hearing quality:  Improved    Patient tolerance of procedure: Tolerated with minimal discomfort  Comments:      Patient may use hydrogen peroxide every 2-4 weeks to try to keep the cerumen load down

## 2018-09-29 DIAGNOSIS — IMO0001 UNCONTROLLED TYPE 2 DIABETES MELLITUS WITHOUT COMPLICATION, WITHOUT LONG-TERM CURRENT USE OF INSULIN: ICD-10-CM

## 2018-09-30 RX ORDER — METFORMIN HYDROCHLORIDE EXTENDED-RELEASE TABLETS 1000 MG/1
TABLET, FILM COATED, EXTENDED RELEASE ORAL
Qty: 180 TABLET | Refills: 1 | Status: SHIPPED | OUTPATIENT
Start: 2018-09-30 | End: 2019-04-11 | Stop reason: SDUPTHER

## 2018-10-11 ENCOUNTER — TELEPHONE (OUTPATIENT)
Dept: FAMILY MEDICINE CLINIC | Facility: CLINIC | Age: 58
End: 2018-10-11

## 2018-10-11 DIAGNOSIS — N52.01 ERECTILE DYSFUNCTION DUE TO ARTERIAL INSUFFICIENCY: Primary | ICD-10-CM

## 2018-10-11 RX ORDER — TADALAFIL 5 MG/1
5 TABLET ORAL DAILY PRN
Qty: 20 TABLET | Refills: 1 | Status: SHIPPED | OUTPATIENT
Start: 2018-10-11 | End: 2019-11-21 | Stop reason: ALTCHOICE

## 2018-10-11 NOTE — TELEPHONE ENCOUNTER
I have never prescribe Cialis for him, I do not know what dose he is taking  If he is already taking 20 mg, that is the highest dose

## 2018-10-17 ENCOUNTER — OFFICE VISIT (OUTPATIENT)
Dept: NEUROLOGY | Facility: CLINIC | Age: 58
End: 2018-10-17
Payer: COMMERCIAL

## 2018-10-17 ENCOUNTER — APPOINTMENT (OUTPATIENT)
Dept: LAB | Age: 58
End: 2018-10-17
Payer: COMMERCIAL

## 2018-10-17 VITALS
DIASTOLIC BLOOD PRESSURE: 80 MMHG | WEIGHT: 142 LBS | HEIGHT: 69 IN | HEART RATE: 66 BPM | RESPIRATION RATE: 14 BRPM | BODY MASS INDEX: 21.03 KG/M2 | SYSTOLIC BLOOD PRESSURE: 100 MMHG

## 2018-10-17 DIAGNOSIS — R42 VERTIGO: Primary | ICD-10-CM

## 2018-10-17 DIAGNOSIS — E11.9 CONTROLLED TYPE 2 DIABETES MELLITUS WITHOUT COMPLICATION, WITHOUT LONG-TERM CURRENT USE OF INSULIN (HCC): ICD-10-CM

## 2018-10-17 DIAGNOSIS — R42 VERTIGO: ICD-10-CM

## 2018-10-17 DIAGNOSIS — E78.2 MIXED HYPERLIPIDEMIA: ICD-10-CM

## 2018-10-17 DIAGNOSIS — G71.11 MYOTONIC DYSTROPHY (HCC): ICD-10-CM

## 2018-10-17 DIAGNOSIS — G62.9 POLYNEUROPATHY: ICD-10-CM

## 2018-10-17 LAB
ALBUMIN SERPL BCP-MCNC: 3.2 G/DL (ref 3.5–5)
ALP SERPL-CCNC: 65 U/L (ref 46–116)
ALT SERPL W P-5'-P-CCNC: 52 U/L (ref 12–78)
ANION GAP SERPL CALCULATED.3IONS-SCNC: 3 MMOL/L (ref 4–13)
AST SERPL W P-5'-P-CCNC: 22 U/L (ref 5–45)
BILIRUB SERPL-MCNC: 1.05 MG/DL (ref 0.2–1)
BUN SERPL-MCNC: 17 MG/DL (ref 5–25)
CALCIUM SERPL-MCNC: 8.6 MG/DL (ref 8.3–10.1)
CHLORIDE SERPL-SCNC: 103 MMOL/L (ref 100–108)
CHOLEST SERPL-MCNC: 128 MG/DL (ref 50–200)
CO2 SERPL-SCNC: 31 MMOL/L (ref 21–32)
CREAT SERPL-MCNC: 0.57 MG/DL (ref 0.6–1.3)
ERYTHROCYTE [SEDIMENTATION RATE] IN BLOOD: 4 MM/HOUR (ref 0–10)
EST. AVERAGE GLUCOSE BLD GHB EST-MCNC: 163 MG/DL
GFR SERPL CREATININE-BSD FRML MDRD: 113 ML/MIN/1.73SQ M
GLUCOSE P FAST SERPL-MCNC: 198 MG/DL (ref 65–99)
HBA1C MFR BLD: 7.3 % (ref 4.2–6.3)
HDLC SERPL-MCNC: 49 MG/DL (ref 40–60)
LDLC SERPL CALC-MCNC: 62 MG/DL (ref 0–100)
NONHDLC SERPL-MCNC: 79 MG/DL
POTASSIUM SERPL-SCNC: 4 MMOL/L (ref 3.5–5.3)
PROT SERPL-MCNC: 6.2 G/DL (ref 6.4–8.2)
SODIUM SERPL-SCNC: 137 MMOL/L (ref 136–145)
TRIGL SERPL-MCNC: 83 MG/DL
TSH SERPL DL<=0.05 MIU/L-ACNC: 0.62 UIU/ML (ref 0.36–3.74)

## 2018-10-17 PROCEDURE — 36415 COLL VENOUS BLD VENIPUNCTURE: CPT

## 2018-10-17 PROCEDURE — 86618 LYME DISEASE ANTIBODY: CPT

## 2018-10-17 PROCEDURE — 80053 COMPREHEN METABOLIC PANEL: CPT

## 2018-10-17 PROCEDURE — 84443 ASSAY THYROID STIM HORMONE: CPT

## 2018-10-17 PROCEDURE — 80061 LIPID PANEL: CPT

## 2018-10-17 PROCEDURE — 85652 RBC SED RATE AUTOMATED: CPT

## 2018-10-17 PROCEDURE — 83036 HEMOGLOBIN GLYCOSYLATED A1C: CPT

## 2018-10-17 PROCEDURE — 99215 OFFICE O/P EST HI 40 MIN: CPT | Performed by: PSYCHIATRY & NEUROLOGY

## 2018-10-17 NOTE — ASSESSMENT & PLAN NOTE
His exam shows some mild decrease in sensory modalities but his  EMG in March was negative for a polyneuropathy  At this point I have asked him to maintain a good control of his blood sugars and to watch his balance

## 2018-10-17 NOTE — ASSESSMENT & PLAN NOTE
This is a 63-year-old patient with a known history of myotonic dystrophy  The symptoms began when he was child  He reports continued persistent cramping, weakness and fatigable  weakness  his muscles are having a  difficult time relaxing after activity  He denies any recent  genetic testing  Overall the symptoms are manageable  He continues to exercise on a regular basis  Plan 1  He did fill out a form for gene DX for genetic testing for myotonic type 1 dystrophy  If negative  Could consider type 2   He will be notified about the coverage and we can proceed dependent on cost   We  discuss that there are medications available for excessive cramping however given his known history of cardiac disease mexiletine and/or tricyclics are relatively contraindicated  People have utilize Tegretol in the past for similar symptoms  At this point he is reluctant to start the medications  I informed him at cold weather  as well as with activity he will notice weakness in his arms and legs  He needs to watch for other symptoms of shortness of breath ,  presence of vision loss,  difficulty walking or difficulty swallowing

## 2018-10-17 NOTE — ASSESSMENT & PLAN NOTE
He does have complaints of vertigo  A positive Hallpike maneuver bilaterally worse on the right than the left  He was  utilizing meclizine  But  recently he has not utilize any recently   He does  keep hydrated  I have asked him to avoid using meclizine unless the  symptoms are severe and t I have referred him to vestibular therapy  I have informed him that was playing golf which he does not regular basis, the  vertiginous symptoms can increase

## 2018-10-17 NOTE — PROGRESS NOTES
Patient ID: Ciara Pizarro is a 62 y o  male  Assessment/Plan:    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[facility] )    This is a 77-year-old patient with a known history of myotonic dystrophy  The symptoms began when he was child  He reports continued persistent cramping, weakness and fatigable  weakness  his muscles are having a  difficult time relaxing after activity  He denies any recent  genetic testing  Overall the symptoms are manageable  He continues to exercise on a regular basis  Plan 1  He did fill out a form for gene DX for genetic testing for myotonic type 1 dystrophy  If negative  Could consider type 2   He will be notified about the coverage and we can proceed dependent on cost   We  discuss that there are medications available for excessive cramping however given his known history of cardiac disease mexiletine and/or tricyclics are relatively contraindicated  People have utilize Tegretol in the past for similar symptoms  At this point he is reluctant to start the medications  I informed him at cold weather  as well as with activity he will notice weakness in his arms and legs  He needs to watch for other symptoms of shortness of breath ,  presence of vision loss,  difficulty walking or difficulty swallowing  Vertigo    He does have complaints of vertigo  A positive Hallpike maneuver bilaterally worse on the right than the left  He was  utilizing meclizine  But  recently he has not utilize any recently   He does  keep hydrated  I have asked him to avoid using meclizine unless the  symptoms are severe and t I have referred him to vestibular therapy  I have informed him that was playing golf which he does not regular basis, the  vertiginous symptoms can increase  Polyneuropathy   His exam shows some mild decrease in sensory modalities but his  EMG in March was negative for a polyneuropathy    At this point I have asked him to maintain a good control of his blood sugars and to watch his balance  Diagnoses and all orders for this visit:    Vertigo    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[address] )    Polyneuropathy    Other orders  -     Ambulatory referral to Physical Therapy; Future       F/u in 3 mths     Subjective:          Mr Savanna Douglass is a pleasant 63 yo male with history of myotonic dystrophy  He was diagnosed in 1988 when he noticed difficulty walking and spasms spasms of his muscles    He had a muscle biopsy  He Does not believe he had genetic testing  States he had trouble "getting started" in sports and thus had testing done including his brothers and dad, and his father  were all found to have a similar abnormality  but his two brothers are without symptoms   His daughter is now noting some symptoms   He recently had  A granddaughter  2 months ago and she is without symptoms      States did not have muscle weakness  When he was young  then but states over the last 20 years has noted slow gradually diffuse muscle weakness  States most notable with gripping  States he has trouble with muscle relaxation after gripping and complains of decrease in strength  It is apparent as he playing golf       States history heart attack 2004, and has cardiac stent in  States follows with cardiology  he denies any problems with conduction  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- states has history of this in dad and likely inherited this from him  States significant weight loss despite eating similar to prior and diabetes better controlled now than prior  Recent labs were done today     He has now retired and plays golf on a regular bases      emg in march of 2018 was consistent with myotonia  But no evidence of a neuropathy     His other complaints include dizziness  The dizziness began approximately one month ago  he was [facility] at [facility] office e  Cerumen  was removed and he was given meclizine  It does help the  severe symptoms however he continues to have the symptoms of unsteadiness    When it is severe he will not a  spinning sensation and slight nausea  It is  worse in the morning when he attempts to get out of bed   It does improve as the day progresses  It is worse with standing and he admits that the right side is worse than the left side  He did have laboratory studies performed this morning  he is still able drive without problems      He   was a  and retired in November of 2017  He will be starting to baby-sit his granddaughter pain several weeks  He is    He still works out at [employer] a few days a week        he denies any falls and does reports some intermittent problem with his balance       On calcium and vit d ,     Today he denies any droopiness of his on eyes shortness of breath problems coughing stomach discomfort difficulty swallowing or excessive daytime sleepiness  He does report a history of erectile dysfunction  He does have a fatigue with excessive activity  Overall his spirits are doing well  Today I spent 50 mins with Meghna Cadearden and discussed his history , reviewed and records and formulated a plan   I answered all of his questions to his satisfaction                      The following portions of the patient's history were reviewed and updated as appropriate:   He  has a past medical history of CAD (coronary artery disease); Congenital myotonia; Diabetes ([facility] ); DVT (deep vein thrombosis) in pregnancy ([facility] ); 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[facility] ) (12/06/2017); Pancreatitis; Sleep apnea; and Superficial thrombophlebitis  He  has a past surgical history that includes Cholecystectomy; Coronary angioplasty with stent; Colonoscopy; Circumcision; Inguinal hernia repair; ORIF radial shaft fracture; ORIF ulnar / radial shaft fracture; and Tonsillectomy and adenoidectomy  His family history includes Brain cancer in his mother; Cancer in his mother; Clotting disorder in his mother; Coronary artery disease in his paternal uncle;  Heart disease in his mother; Hyperlipidemia in his mother  He  reports that he has never smoked  He has never used smokeless tobacco  He reports that he drinks alcohol  He reports that he does not use drugs  Current Outpatient Prescriptions   Medication Sig Dispense Refill    aspirin (ECOTRIN LOW STRENGTH) 81 mg EC tablet Take 81 mg by mouth daily      atorvastatin (LIPITOR) 40 mg tablet Take 1 tablet (40 mg total) by mouth daily Hold until patient calls for refill 90 tablet 1    glimepiride (AMARYL) 1 mg tablet Take 1 tablet (1 mg total) by mouth daily with breakfast 90 tablet 1    meclizine (ANTIVERT) 25 mg tablet Take 1 tablet (25 mg total) by mouth every 8 (eight) hours as needed for dizziness 40 tablet 2    metFORMIN (FORTAMET) 1000 MG (OSM) 24 hr tablet TAKE 1 TABLET BY MOUTH TWICE A DAY WITH MEALS  180 tablet 1    tadalafil (CIALIS) 5 MG tablet Take 1 tablet (5 mg total) by mouth daily as needed for erectile dysfunction 20 tablet 1    XARELTO 20 MG tablet TAKE 1 TABLET DAILY WITH BREAKFAST  90 tablet 1    valACYclovir (VALTREX) 1,000 mg tablet 1 tablet now then repeat in 12 hours 2 tablet 0     No current facility-administered medications for this visit  He has No Known Allergies            Objective:    Blood pressure 100/80, pulse 66, resp  rate 14, height 5' 8 5" (1 74 m), weight 64 4 kg (142 lb)  115/70 standing , sitting 110/70    Physical Exam   Constitutional: He appears well-developed  HENT:   Head: Normocephalic  Right Ear: Hearing normal    Left Ear: Hearing normal    Eyes: Pupils are equal, round, and reactive to light  Lids are normal    Neck: Normal range of motion  Cardiovascular: Normal rate  Pulmonary/Chest: Effort normal    Neurological:   Reflex Scores:       Tricep reflexes are 1+ on the right side and 1+ on the left side  Bicep reflexes are 2+ on the right side and 2+ on the left side  Patellar reflexes are 2+ on the right side and 2+ on the left side         Achilles reflexes are 1+ on the right side and 1+ on the left side  Psychiatric: He has a normal mood and affect  His speech is normal        Neurological Exam  Mental Status   Oriented to person, place, time and situation  Recent and remote memory are intact  Speech is normal  Language is fluent with no aphasia  Attention and concentration are normal     Cranial Nerves  CN II: Visual acuity is normal  Visual fields full to confrontation  CN III, IV, VI: Extraocular movements intact bilaterally  Normal lids and orbits bilaterally  Pupils equal round and reactive to light bilaterally  CN V: Facial sensation is normal   CN VII: Full and symmetric facial movement  CN VIII:  Right: Hearing is normal   Left: Hearing is normal   CN IX, X: Palate elevates symmetrically  Normal gag reflex  CN XI:  Right: Sternocleidomastoid strength is weak  Left: Sternocleidomastoid strength is weak  CN XII: Tongue midline without atrophy or fasciculations  Positive hallpike   Bilaterally  Worse on the right , tmi  Intact, scm weak with sustained exercise   Motor  Normal muscle bulk throughout  No fasciculations present  Increased muscle tone  No abnormal involuntary movements  He had normal strength in the upper and lower extremities however with sustained activity he developed increasing time  He also had difficulty with relaxation of his hand   He was also noted to have a bilateral facial droopiness  There is no evidence of ptosis       Sensory  10 sec in toes , temp / pp itnact , jps intact   Decrease in pinprick to the calf  His pinprick and light touch was symmetric in the upper extremities       Reflexes                                           Right                      Left  Biceps                                 2+                         2+  Triceps                                1+                         1+  Patellar                                2+                         2+  Achilles                                1+ 1+  Plantar                           Downgoing                Downgoing    Right pathological reflexes: Polo's absent  Left pathological reflexes: Polo's absent  Coordination  Right: Finger-to-nose normal  Rapid alternating movement normal  Heel-to-shin normal   Left: Finger-to-nose normal  Rapid alternating movement normal  Heel-to-shin normal     Gait Able to rise from chair without using arms  Positive Hallpike maneuver bilaterally  He was able to stand from sitting position without problems her when he ambulated  there is was  stiffness notice of his muscles as he ambulated for more than 10 feet  There is no evidence of a Romberg sign           ROS:    Review of Systems   Constitutional: Negative for appetite change and fever  HENT: Positive for trouble swallowing  Negative for hearing loss, tinnitus and voice change  Eyes: Negative  Negative for photophobia and pain  Respiratory: Negative  Negative for shortness of breath  Cardiovascular: Negative  Negative for palpitations  Gastrointestinal: Negative  Negative for nausea and vomiting  Endocrine: Negative  Negative for cold intolerance and heat intolerance  Genitourinary: Negative  Negative for dysuria, frequency and urgency  Musculoskeletal: Negative  Negative for myalgias and neck pain  Skin: Negative  Negative for rash  Neurological: Positive for dizziness and weakness  Negative for tremors, seizures, syncope, facial asymmetry, speech difficulty, light-headedness, numbness and headaches  Hematological: Negative  Does not bruise/bleed easily  Psychiatric/Behavioral: Negative  Negative for confusion, hallucinations and sleep disturbance

## 2018-10-18 LAB
B BURGDOR IGG SER IA-ACNC: 0.14
B BURGDOR IGM SER IA-ACNC: 0.25

## 2018-10-26 ENCOUNTER — OFFICE VISIT (OUTPATIENT)
Dept: FAMILY MEDICINE CLINIC | Facility: CLINIC | Age: 58
End: 2018-10-26
Payer: COMMERCIAL

## 2018-10-26 VITALS
WEIGHT: 143 LBS | BODY MASS INDEX: 21.18 KG/M2 | SYSTOLIC BLOOD PRESSURE: 110 MMHG | DIASTOLIC BLOOD PRESSURE: 68 MMHG | HEIGHT: 69 IN

## 2018-10-26 DIAGNOSIS — D72.819 LEUKOPENIA, UNSPECIFIED TYPE: ICD-10-CM

## 2018-10-26 DIAGNOSIS — I25.10 ARTERIOSCLEROSIS OF CORONARY ARTERY: ICD-10-CM

## 2018-10-26 DIAGNOSIS — D69.6 THROMBOCYTOPENIA (HCC): ICD-10-CM

## 2018-10-26 DIAGNOSIS — R42 VERTIGO: ICD-10-CM

## 2018-10-26 DIAGNOSIS — E78.2 MIXED HYPERLIPIDEMIA: ICD-10-CM

## 2018-10-26 DIAGNOSIS — IMO0001 UNCONTROLLED TYPE 2 DIABETES MELLITUS WITHOUT COMPLICATION, WITHOUT LONG-TERM CURRENT USE OF INSULIN: ICD-10-CM

## 2018-10-26 DIAGNOSIS — G71.11 MYOTONIC DYSTROPHY (HCC): ICD-10-CM

## 2018-10-26 DIAGNOSIS — E11.9 CONTROLLED TYPE 2 DIABETES MELLITUS WITHOUT COMPLICATION, WITHOUT LONG-TERM CURRENT USE OF INSULIN (HCC): Primary | ICD-10-CM

## 2018-10-26 DIAGNOSIS — Z23 NEED FOR VACCINATION: ICD-10-CM

## 2018-10-26 PROCEDURE — 90732 PPSV23 VACC 2 YRS+ SUBQ/IM: CPT | Performed by: FAMILY MEDICINE

## 2018-10-26 PROCEDURE — 90471 IMMUNIZATION ADMIN: CPT | Performed by: FAMILY MEDICINE

## 2018-10-26 PROCEDURE — 99214 OFFICE O/P EST MOD 30 MIN: CPT | Performed by: FAMILY MEDICINE

## 2018-10-26 RX ORDER — GLIMEPIRIDE 2 MG/1
2 TABLET ORAL
Qty: 30 TABLET | Refills: 5 | Status: SHIPPED | OUTPATIENT
Start: 2018-10-26 | End: 2019-01-29 | Stop reason: DRUGHIGH

## 2018-10-26 NOTE — ASSESSMENT & PLAN NOTE
HB A1c is 7 3  Sugars are running in 120-180  Patient is not happy with these levels, will increase his glimepiride to 2 mg a day and continue metformin at a 1000 twice a day  Recheck Hb A1c in 3 months

## 2018-10-26 NOTE — PROGRESS NOTES
Assessment/Plan:    Controlled type 2 diabetes mellitus without complication, without long-term current use of insulin (HCC)   HB A1c is 7 3  Sugars are running in 120-180  Patient is not happy with these levels, will increase his glimepiride to 2 mg a day and continue metformin at a 1000 twice a day  Recheck Hb A1c in 3 months  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[facility] )  The patient is to have additional testing through Neurology  Follow up with Neurology as scheduled  Mixed hyperlipidemia  Lab work is stable  Continue current medications  Recheck in 6 months    Vertigo  Improved, scheduled to start with vestibular training  Arteriosclerosis of coronary artery  Currently asymptomatic  Follow up with Cardiology as scheduled  Diagnoses and all orders for this visit:    Controlled type 2 diabetes mellitus without complication, without long-term current use of insulin (HCC)  -     CBC and differential; Future  -     Comprehensive metabolic panel; Future  -     Hemoglobin A1C; Future  -     Urinalysis with reflex to microscopic; Future    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[facility] )    Arteriosclerosis of coronary artery    Mixed hyperlipidemia    Vertigo    Thrombocytopenia (HCC)  -     CBC and differential; Future    Need for vaccination  -     PNEUMOCOCCAL POLYSACCHARIDE VACCINE 23-VALENT =>1YO SQ IM    Uncontrolled type 2 diabetes mellitus without complication, without long-term current use of insulin (HCC)  Comments:  Improved with current medications  Recheck lab in April  Orders:  -     glimepiride (AMARYL) 2 mg tablet; Take 1 tablet (2 mg total) by mouth daily with breakfast    Leukopenia, unspecified type          Subjective:   Chief Complaint   Patient presents with    Hyperlipidemia    Diabetes     no refills needed  review blood work           Patient ID: Fatmata Fitzgerald is a 62 y o  male      Patient is a 75-year-old male presenting to the office for follow-up on his hyperlipidemia, diabetes, coronary artery disease, myotonic dystrophy, vertigo, and general health  Since his last visit he saw Neurology and is quite pleased  Had a very thorough interview and exam, any understands his disease process better than he has ever before  Additional testing and therapy has been recommended  He has follow-up scheduled  He is pleased with his weight gain  The following portions of the patient's history were reviewed and updated as appropriate: allergies, current medications, past medical history, past social history and problem list     Review of Systems   Constitutional: Negative for appetite change, chills, diaphoresis, fatigue, fever and unexpected weight change  HENT: Negative for congestion, ear pain, hearing loss, nosebleeds, postnasal drip, rhinorrhea, sinus pressure, sore throat, tinnitus and trouble swallowing  Eyes: Negative for photophobia, pain, discharge, redness, itching and visual disturbance  Respiratory: Negative for cough, chest tightness, shortness of breath and wheezing  Cardiovascular: Negative for chest pain, palpitations and leg swelling  Denies orthopnea , dyspnea on exertion   Gastrointestinal: Negative for abdominal distention, abdominal pain, blood in stool, constipation, diarrhea, nausea and vomiting  Endocrine: Negative  Sugars are running somewhat higher than normal    Genitourinary: Negative for difficulty urinating, dysuria, flank pain, frequency, hematuria and urgency  Denies nocturia , erectile dysfunction   Musculoskeletal: Negative for arthralgias, back pain, gait problem, joint swelling and myalgias  Skin: Negative for pallor, rash and wound  Denies skin lesions   Allergic/Immunologic: Negative for environmental allergies, food allergies and immunocompromised state  Neurological: Positive for dizziness, weakness and numbness  Negative for tremors, seizures, syncope, speech difficulty and headaches  Hematological: Negative for adenopathy   Does not bruise/bleed easily  Psychiatric/Behavioral: Negative for behavioral problems, confusion, sleep disturbance and suicidal ideas  The patient is not nervous/anxious  Objective:      /68   Ht 5' 8 5" (1 74 m)   Wt 64 9 kg (143 lb)   BMI 21 43 kg/m²          Physical Exam   Constitutional: He is oriented to person, place, and time  He appears well-developed and well-nourished  HENT:   Head: Normocephalic and atraumatic  Nose: Nose normal    Mouth/Throat: Oropharynx is clear and moist    Eyes: Pupils are equal, round, and reactive to light  Conjunctivae and EOM are normal    Neck: Normal range of motion  Neck supple  No JVD present  No tracheal deviation present  No thyromegaly present  Cardiovascular: Normal rate, regular rhythm and intact distal pulses  No murmur heard  Pulmonary/Chest: Effort normal and breath sounds normal  He has no wheezes  He has no rales  Abdominal: Soft  Bowel sounds are normal  He exhibits no mass  There is no tenderness  There is no rebound and no guarding  Musculoskeletal: He exhibits no edema, tenderness or deformity  Lymphadenopathy:     He has no cervical adenopathy  Neurological: He is alert and oriented to person, place, and time  He has normal reflexes  No cranial nerve deficit  He exhibits abnormal muscle tone  Coordination normal    Skin: Skin is warm and dry  No lesion and no rash noted  Nails show no clubbing  Psychiatric: He has a normal mood and affect  Judgment normal    Nursing note and vitals reviewed

## 2018-10-29 ENCOUNTER — TELEPHONE (OUTPATIENT)
Dept: FAMILY MEDICINE CLINIC | Facility: CLINIC | Age: 58
End: 2018-10-29

## 2018-11-01 DIAGNOSIS — I25.10 ARTERIOSCLEROSIS OF CORONARY ARTERY: ICD-10-CM

## 2018-11-01 RX ORDER — ATORVASTATIN CALCIUM 40 MG/1
TABLET, FILM COATED ORAL
Qty: 90 TABLET | Refills: 1 | Status: SHIPPED | OUTPATIENT
Start: 2018-11-01 | End: 2019-05-15 | Stop reason: SDUPTHER

## 2018-11-15 ENCOUNTER — TELEPHONE (OUTPATIENT)
Dept: NEUROLOGY | Facility: CLINIC | Age: 58
End: 2018-11-15

## 2018-11-15 NOTE — TELEPHONE ENCOUNTER
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

## 2018-11-16 ENCOUNTER — TELEPHONE (OUTPATIENT)
Dept: FAMILY MEDICINE CLINIC | Facility: CLINIC | Age: 58
End: 2018-11-16

## 2018-11-19 ENCOUNTER — OFFICE VISIT (OUTPATIENT)
Dept: FAMILY MEDICINE CLINIC | Facility: CLINIC | Age: 58
End: 2018-11-19
Payer: COMMERCIAL

## 2018-11-19 VITALS
WEIGHT: 143 LBS | HEIGHT: 69 IN | BODY MASS INDEX: 21.18 KG/M2 | DIASTOLIC BLOOD PRESSURE: 72 MMHG | SYSTOLIC BLOOD PRESSURE: 110 MMHG

## 2018-11-19 DIAGNOSIS — T50.A95A ADVERSE EFFECT OF PNEUMOCOCCAL VACCINE, INITIAL ENCOUNTER: Primary | ICD-10-CM

## 2018-11-19 PROCEDURE — 1036F TOBACCO NON-USER: CPT | Performed by: FAMILY MEDICINE

## 2018-11-19 PROCEDURE — 3008F BODY MASS INDEX DOCD: CPT | Performed by: FAMILY MEDICINE

## 2018-11-19 PROCEDURE — 99213 OFFICE O/P EST LOW 20 MIN: CPT | Performed by: FAMILY MEDICINE

## 2018-11-19 RX ORDER — PREDNISONE 10 MG/1
TABLET ORAL
Qty: 15 TABLET | Refills: 0 | Status: SHIPPED | OUTPATIENT
Start: 2018-11-19 | End: 2019-05-22 | Stop reason: SINTOL

## 2018-11-19 NOTE — PROGRESS NOTES
Assessment/Plan:         Diagnoses and all orders for this visit:    Adverse effect of pneumococcal vaccine, initial encounter  Comments:  Warm compresses or heating pad 3 times daily  Take medication as prescribed  Call the office if symptoms do not  Orders:  -     predniSONE 10 mg tablet; 5 x 1 day, 4 x1 day, 3 x 1 day,2 x1 day,  1 x 1 day          Subjective:   Chief Complaint   Patient presents with    Arm Pain     left x 3 wks  Patient ID: Jazlyn Parker is a 62 y o  male  61 yo male c/o left deltoid pain, radiating down the left shoulder since recent pneumoccal vaccination  Has improved , but still present  The following portions of the patient's history were reviewed and updated as appropriate: allergies, current medications, past family history, past medical history, past social history, past surgical history and problem list     Review of Systems   Constitutional: Negative  HENT: Negative  Eyes: Negative  Respiratory: Negative  Cardiovascular: Negative  Gastrointestinal: Negative  Endocrine: Negative  Genitourinary: Negative  Musculoskeletal: Positive for myalgias  Skin: Negative for color change, rash and wound  Allergic/Immunologic: Negative for immunocompromised state  Neurological: Negative for dizziness, weakness, light-headedness and numbness  Hematological: Negative for adenopathy  Does not bruise/bleed easily  Psychiatric/Behavioral: Negative  Objective:      /72   Ht 5' 8 5" (1 74 m)   Wt 64 9 kg (143 lb)   BMI 21 43 kg/m²          Physical Exam   Constitutional: He is oriented to person, place, and time  Vital signs are normal  He appears well-developed and well-nourished  No distress  HENT:   Head: Normocephalic and atraumatic  Right Ear: External ear normal    Left Ear: External ear normal    Nose: Nose normal    Mouth/Throat: Oropharynx is clear and moist    Eyes: Pupils are equal, round, and reactive to light  Conjunctivae and EOM are normal    Neck: Normal range of motion  Neck supple  Cardiovascular: Normal rate, regular rhythm and normal heart sounds  No murmur heard  Pulmonary/Chest: Effort normal and breath sounds normal  He has no wheezes  Abdominal: Soft  Bowel sounds are normal    Musculoskeletal: Normal range of motion  Mild lateral left deltoid tenderness, no rom  Defect, do tenderness  Neurological: He is alert and oriented to person, place, and time  He has normal reflexes  No cranial nerve deficit  Skin: Skin is warm and dry  No lesion and no rash noted  Psychiatric: He has a normal mood and affect  Judgment normal    Nursing note and vitals reviewed

## 2019-01-18 ENCOUNTER — OFFICE VISIT (OUTPATIENT)
Dept: NEUROLOGY | Facility: CLINIC | Age: 59
End: 2019-01-18
Payer: COMMERCIAL

## 2019-01-18 VITALS
DIASTOLIC BLOOD PRESSURE: 80 MMHG | RESPIRATION RATE: 12 BRPM | HEIGHT: 69 IN | SYSTOLIC BLOOD PRESSURE: 110 MMHG | WEIGHT: 143 LBS | BODY MASS INDEX: 21.18 KG/M2

## 2019-01-18 DIAGNOSIS — R42 VERTIGO: ICD-10-CM

## 2019-01-18 DIAGNOSIS — G71.11 MYOTONIC DYSTROPHY (HCC): ICD-10-CM

## 2019-01-18 DIAGNOSIS — M79.622 LEFT UPPER ARM PAIN: Primary | ICD-10-CM

## 2019-01-18 PROCEDURE — 99214 OFFICE O/P EST MOD 30 MIN: CPT | Performed by: PSYCHIATRY & NEUROLOGY

## 2019-01-18 NOTE — PROGRESS NOTES
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[facility] )  The myotonic dystrophy is well controlled  Since he is watchgin his garndadughter , he has been exercsing less  He notes more fatigability with his muscles  I have suggested other ways to exercise and he will take these recommendations accordingly  We again to discuss the medications are available to him  We would like to avoid the use of mexiletine due to the potential interaction of his other medications  Tegretol is a possibility but would like to hold off for now  Genetic testing testign was suggested but not performed due to cost     Left upper arm pain  He did developed left arm pain after the pneumonia vaccine  This was treated with   Steroids which did help however now he continues to have intermittent pain in the left forearm which radiate down into his hand with intermittent tingling  This is somewhat is this is slowly improving  Will order an emg but  D/w pt that the sutdy could be canceled if the symptoms resolve      Vertigo  The vertigo is much better he does not need the use of meclizine at this time      F/ in 4mths    Diagnoses and all orders for this visit:    Left upper arm pain  -     EMG 2 Limb Upper Extremity; Future    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[facility] 75 )    Vertigo         Subjective:    Mr Sagrario Love is a pleasant 61 yo male with history of myotonic dystrophy  He was diagnosed in 1988 when he noticed difficulty walking and spasms spasms of his muscles    He had a muscle biopsy  He Does not believe he had genetic testing  States he had trouble "getting started" in sports and thus had testing done including his brothers and dad, and his father  were all found to have a similar abnormality  but his two brothers are without symptoms   His daughter is now noting some symptoms   He recently had  A granddaughter  2 months ago and she is without symptoms    We ordered genetic testign but it       States did not have muscle weakness  When he was young  then but states over the last 20 years has noted slow gradually diffuse muscle weakness  States most notable with gripping  States he has trouble with muscle relaxation after gripping and complains of decrease in strength  It is apparent as he playing golf       States history heart attack 2004, and has cardiac stent in  States follows with cardiology  he denies any problems with conduction  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- states has history of this in dad and likely inherited this from him      States significant weight loss despite eating similar to prior and diabetes better controlled now than prior      emg in march of 2018 was consistent with myotonia  But no evidence of a neuropathy      His other complaints include dizziness  The vertgio is much better and he denies any recent use of meds  Fili Po He is now taking care of his granddaughter on daily basis         He   was a  and retired in November of 2017   he denies any falls and does reports some intermittent problem with his balance        On calcium and vit d ,      Today he denies any droopiness of his on eyes shortness of breath problems coughing stomach discomfort difficulty swallowing or excessive daytime sleepiness  He does report a history of erectile dysfunction  He does have a fatigue with excessive activity  Overall his spirits are doing well  He had a pneumonia vaccine and developed left arm pain and numbness from the mid forarm to the hand                              The following portions of the patient's history were reviewed and updated as appropriate:   He  has a past medical history of CAD (coronary artery disease); Congenital myotonia; Diabetes ([facility] ); DVT (deep vein thrombosis) in pregnancy ([facility] ); 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[facility] ) (12/06/2017); Pancreatitis; Sleep apnea; and Superficial thrombophlebitis    He  has a past surgical history that includes Cholecystectomy; Coronary angioplasty with stent; Colonoscopy; Circumcision; Inguinal hernia repair; ORIF radial shaft fracture; ORIF ulnar / radial shaft fracture; and Tonsillectomy and adenoidectomy  His family history includes Brain cancer in his mother; Cancer in his mother; Clotting disorder in his mother; Coronary artery disease in his paternal uncle; Heart disease in his mother; Hyperlipidemia in his mother  He  reports that he has never smoked  He has never used smokeless tobacco  He reports that he drinks alcohol  He reports that he does not use drugs  Current Outpatient Prescriptions      neuomnia shotand , elft arm and pain     This is a 49-year-old patient with a known history of myotonic dystrophy  The symptoms began when he was child  He reports continued persistent cramping, weakness and fatigable  weakness  his muscles are having a  difficult time relaxing after activity  He denies any recent  genetic testing  Overall the symptoms are manageable  He continues to exercise on a regular basis        Plan 1  He did fill out a form for gene DX for genetic testing for myotonic type 1 dystrophy  If negative  Could consider type 2   He will be notified about the coverage and we can proceed dependent on cost   We  discuss that there are medications available for excessive cramping however given his known history of cardiac disease mexiletine and/or tricyclics are relatively contraindicated  People have utilize Tegretol in the past for similar symptoms  At this point he is reluctant to start the medications  I informed him at cold weather  as well as with activity he will notice weakness in his arms and legs  He needs to watch for other symptoms of shortness of breath ,  presence of vision loss,  difficulty walking or difficulty swallowing        Vertigo    He does have complaints of vertigo  A positive Hallpike maneuver bilaterally worse on the right than the left    He was  utilizing meclizine But  recently he has not utilize any recently   He does  keep hydrated        I have asked him to avoid using meclizine unless the  symptoms are severe and t I have referred him to vestibular therapy        I have informed him that was playing golf which he does not regular basis, the  vertiginous symptoms can increase       Polyneuropathy   His exam shows some mild decrease in sensory modalities but his  EMG in March was negative for a polyneuropathy  At this point I have asked him to maintain a good control of his blood sugars and to watch his balance                The following portions of the patient's history were reviewed and updated as appropriate:   He  has a past medical history of CAD (coronary artery disease); Congenital myotonia; Diabetes ([facility] ); DVT (deep vein thrombosis) in pregnancy ([facility] ); 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[facility] ) (12/06/2017); Pancreatitis; Sleep apnea; and Superficial thrombophlebitis  He  has a past surgical history that includes Cholecystectomy; Coronary angioplasty with stent; Colonoscopy; Circumcision; Inguinal hernia repair; ORIF radial shaft fracture; ORIF ulnar / radial shaft fracture; and Tonsillectomy and adenoidectomy  His family history includes Brain cancer in his mother; Cancer in his mother; Clotting disorder in his mother; Coronary artery disease in his paternal uncle; Heart disease in his mother; Hyperlipidemia in his mother  He  reports that he has never smoked  He has never used smokeless tobacco  He reports that he drinks alcohol  He reports that he does not use drugs    Current Outpatient Prescriptions   Medication Sig Dispense Refill    aspirin (ECOTRIN LOW STRENGTH) 81 mg EC tablet Take 81 mg by mouth daily      atorvastatin (LIPITOR) 40 mg tablet TAKE 1 TABLET DAILY 90 tablet 1    glimepiride (AMARYL) 2 mg tablet Take 1 tablet (2 mg total) by mouth daily with breakfast 30 tablet 5    metFORMIN (FORTAMET) 1000 MG (OSM) 24 hr tablet TAKE 1 TABLET BY MOUTH TWICE A DAY WITH MEALS  180 tablet 1    tadalafil (CIALIS) 5 MG tablet Take 1 tablet (5 mg total) by mouth daily as needed for erectile dysfunction 20 tablet 1    XARELTO 20 MG tablet TAKE 1 TABLET DAILY WITH BREAKFAST  90 tablet 1    meclizine (ANTIVERT) 25 mg tablet Take 1 tablet (25 mg total) by mouth every 8 (eight) hours as needed for dizziness (Patient not taking: Reported on 1/18/2019 ) 40 tablet 2    predniSONE 10 mg tablet 5 x 1 day, 4 x1 day, 3 x 1 day,2 x1 day,  1 x 1 day (Patient not taking: Reported on 1/18/2019 ) 15 tablet 0    valACYclovir (VALTREX) 1,000 mg tablet 1 tablet now then repeat in 12 hours 2 tablet 0     No current facility-administered medications for this visit  He has No Known Allergies          Ros   As per MA reviewed personally at today visit   Objective:    Blood pressure 110/80, resp  rate 12, height 5' 8 5" (1 74 m), weight 64 9 kg (143 lb)  Physical Exam   Constitutional: He appears well-developed  Eyes: Pupils are equal, round, and reactive to light  Lids are normal    Neurological:   Reflex Scores:       Tricep reflexes are 1+ on the right side and 1+ on the left side  Bicep reflexes are 2+ on the right side and 2+ on the left side  Patellar reflexes are 2+ on the right side and 2+ on the left side  Achilles reflexes are 1+ on the right side and 1+ on the left side  Psychiatric: He has a normal mood and affect  His speech is normal        Neurological Exam  Mental Status   Oriented to person, place, time and situation  Speech is normal  Language is fluent with no aphasia  Cranial Nerves  CN II: Visual acuity is normal  Visual fields full to confrontation  CN III, IV, VI: Extraocular movements intact bilaterally  Normal lids and orbits bilaterally  Pupils equal round and reactive to light bilaterally  CN V: Facial sensation is normal   CN VII: Full and symmetric facial movement  CN VIII: Hearing is normal   CN IX, X: Palate elevates symmetrically  Normal gag reflex  CN XI: Shoulder shrug strength is normal   CN XII: Tongue midline without atrophy or fasciculations  Motor   Increased muscle tone  Normal muscle bulk throughout  No fasciculations present  Increased muscle tone  No abnormal involuntary movements  He had normal strength in the upper and lower extremities however with sustained activity he developed increasing time  He also had difficulty with relaxation of his hand   He was also noted to have a bilateral facial droopiness  There is no evidence of ptosis    Sensory  Light touch is normal in upper and lower extremities  Vibration present in ankles and toes ,  Lt intact in  Left arm    Ptosis tinels sign   Reflexes                                           Right                      Left  Biceps                                 2+                         2+  Triceps                                1+                         1+  Patellar                                2+                         2+  Achilles                                1+                         1+    Coordination  Right: Finger-to-nose normal   Left: Finger-to-nose normal     Gait    He was able to stand from sitting position without problems her   when he ambulated  there is was  stiffness notice of his muscles as he ambulated for more than 10 feet  There is no evidence of a Romberg sign    ROS:    Review of Systems   Constitutional: Negative  Negative for appetite change and fever  HENT: Positive for trouble swallowing  Negative for hearing loss, tinnitus and voice change  Eyes: Negative  Negative for photophobia and pain  Respiratory: Negative  Negative for shortness of breath  Cardiovascular: Negative  Negative for palpitations  Gastrointestinal: Negative  Negative for nausea and vomiting  Endocrine: Negative  Negative for cold intolerance and heat intolerance  Genitourinary: Negative    Negative for dysuria, frequency and urgency  Musculoskeletal: Negative  Negative for myalgias and neck pain  Pain      Skin: Negative  Negative for rash  Neurological: Positive for dizziness  Negative for tremors, seizures, syncope, facial asymmetry, speech difficulty, weakness, light-headedness, numbness and headaches  Tingling     Hematological: Negative  Does not bruise/bleed easily  Psychiatric/Behavioral: Negative  Negative for confusion, hallucinations and sleep disturbance

## 2019-01-18 NOTE — ASSESSMENT & PLAN NOTE
He did developed left arm pain after the pneumonia vaccine  This was treated with   Steroids which did help however now he continues to have intermittent pain in the left forearm which radiate down into his hand with intermittent tingling  This is somewhat is this is slowly improving   Will order an emg but  D/w pt that the sutdy could be canceled if the symptoms resolve

## 2019-01-18 NOTE — ASSESSMENT & PLAN NOTE
The myotonic dystrophy is well controlled  Since he is watchgin his garndadughter , he has been exercsing less  He notes more fatigability with his muscles  I have suggested other ways to exercise and he will take these recommendations accordingly  We again to discuss the medications are available to him  We would like to avoid the use of mexiletine due to the potential interaction of his other medications  Tegretol is a possibility but would like to hold off for now      Genetic testing testign was suggested but not performed due to cost

## 2019-01-29 ENCOUNTER — APPOINTMENT (OUTPATIENT)
Dept: LAB | Age: 59
End: 2019-01-29
Payer: COMMERCIAL

## 2019-01-29 DIAGNOSIS — E11.9 CONTROLLED TYPE 2 DIABETES MELLITUS WITHOUT COMPLICATION, WITHOUT LONG-TERM CURRENT USE OF INSULIN (HCC): ICD-10-CM

## 2019-01-29 DIAGNOSIS — D69.6 THROMBOCYTOPENIA (HCC): ICD-10-CM

## 2019-01-29 LAB
ALBUMIN SERPL BCP-MCNC: 3.6 G/DL (ref 3.5–5)
ALP SERPL-CCNC: 67 U/L (ref 46–116)
ALT SERPL W P-5'-P-CCNC: 53 U/L (ref 12–78)
ANION GAP SERPL CALCULATED.3IONS-SCNC: 6 MMOL/L (ref 4–13)
AST SERPL W P-5'-P-CCNC: 28 U/L (ref 5–45)
BASOPHILS # BLD AUTO: 0.02 THOUSANDS/ΜL (ref 0–0.1)
BASOPHILS NFR BLD AUTO: 1 % (ref 0–1)
BILIRUB SERPL-MCNC: 1.01 MG/DL (ref 0.2–1)
BILIRUB UR QL STRIP: ABNORMAL
BUN SERPL-MCNC: 16 MG/DL (ref 5–25)
CALCIUM SERPL-MCNC: 9 MG/DL (ref 8.3–10.1)
CHLORIDE SERPL-SCNC: 103 MMOL/L (ref 100–108)
CLARITY UR: CLEAR
CO2 SERPL-SCNC: 30 MMOL/L (ref 21–32)
COLOR UR: YELLOW
CREAT SERPL-MCNC: 0.61 MG/DL (ref 0.6–1.3)
CREAT UR-MCNC: 129 MG/DL
EOSINOPHIL # BLD AUTO: 0.47 THOUSAND/ΜL (ref 0–0.61)
EOSINOPHIL NFR BLD AUTO: 11 % (ref 0–6)
ERYTHROCYTE [DISTWIDTH] IN BLOOD BY AUTOMATED COUNT: 13.4 % (ref 11.6–15.1)
EST. AVERAGE GLUCOSE BLD GHB EST-MCNC: 197 MG/DL
GFR SERPL CREATININE-BSD FRML MDRD: 110 ML/MIN/1.73SQ M
GLUCOSE P FAST SERPL-MCNC: 200 MG/DL (ref 65–99)
GLUCOSE UR STRIP-MCNC: ABNORMAL MG/DL
HBA1C MFR BLD: 8.5 % (ref 4.2–6.3)
HCT VFR BLD AUTO: 44.2 % (ref 36.5–49.3)
HGB BLD-MCNC: 15.1 G/DL (ref 12–17)
HGB UR QL STRIP.AUTO: NEGATIVE
IMM GRANULOCYTES # BLD AUTO: 0.01 THOUSAND/UL (ref 0–0.2)
IMM GRANULOCYTES NFR BLD AUTO: 0 % (ref 0–2)
KETONES UR STRIP-MCNC: NEGATIVE MG/DL
LEUKOCYTE ESTERASE UR QL STRIP: NEGATIVE
LYMPHOCYTES # BLD AUTO: 0.77 THOUSANDS/ΜL (ref 0.6–4.47)
LYMPHOCYTES NFR BLD AUTO: 18 % (ref 14–44)
MCH RBC QN AUTO: 31.5 PG (ref 26.8–34.3)
MCHC RBC AUTO-ENTMCNC: 34.2 G/DL (ref 31.4–37.4)
MCV RBC AUTO: 92 FL (ref 82–98)
MICROALBUMIN UR-MCNC: 7.2 MG/L (ref 0–20)
MICROALBUMIN/CREAT 24H UR: 6 MG/G CREATININE (ref 0–30)
MONOCYTES # BLD AUTO: 0.56 THOUSAND/ΜL (ref 0.17–1.22)
MONOCYTES NFR BLD AUTO: 13 % (ref 4–12)
NEUTROPHILS # BLD AUTO: 2.47 THOUSANDS/ΜL (ref 1.85–7.62)
NEUTS SEG NFR BLD AUTO: 57 % (ref 43–75)
NITRITE UR QL STRIP: NEGATIVE
NRBC BLD AUTO-RTO: 0 /100 WBCS
PH UR STRIP.AUTO: 5.5 [PH] (ref 4.5–8)
PLATELET # BLD AUTO: 117 THOUSANDS/UL (ref 149–390)
PMV BLD AUTO: 11.2 FL (ref 8.9–12.7)
POTASSIUM SERPL-SCNC: 4.2 MMOL/L (ref 3.5–5.3)
PROT SERPL-MCNC: 6.2 G/DL (ref 6.4–8.2)
PROT UR STRIP-MCNC: NEGATIVE MG/DL
RBC # BLD AUTO: 4.79 MILLION/UL (ref 3.88–5.62)
SODIUM SERPL-SCNC: 139 MMOL/L (ref 136–145)
SP GR UR STRIP.AUTO: 1.03 (ref 1–1.03)
T4 SERPL-MCNC: 8.9 UG/DL (ref 4.7–13.3)
UROBILINOGEN UR QL STRIP.AUTO: 0.2 E.U./DL
WBC # BLD AUTO: 4.3 THOUSAND/UL (ref 4.31–10.16)

## 2019-01-29 PROCEDURE — 3061F NEG MICROALBUMINURIA REV: CPT | Performed by: FAMILY MEDICINE

## 2019-01-29 PROCEDURE — 82043 UR ALBUMIN QUANTITATIVE: CPT | Performed by: FAMILY MEDICINE

## 2019-01-29 PROCEDURE — 36415 COLL VENOUS BLD VENIPUNCTURE: CPT

## 2019-01-29 PROCEDURE — 81003 URINALYSIS AUTO W/O SCOPE: CPT

## 2019-01-29 PROCEDURE — 85025 COMPLETE CBC W/AUTO DIFF WBC: CPT

## 2019-01-29 PROCEDURE — 83036 HEMOGLOBIN GLYCOSYLATED A1C: CPT

## 2019-01-29 PROCEDURE — 80053 COMPREHEN METABOLIC PANEL: CPT

## 2019-01-29 PROCEDURE — 84436 ASSAY OF TOTAL THYROXINE: CPT

## 2019-01-29 PROCEDURE — 82570 ASSAY OF URINE CREATININE: CPT | Performed by: FAMILY MEDICINE

## 2019-01-29 RX ORDER — GLIMEPIRIDE 4 MG/1
4 TABLET ORAL
Qty: 90 TABLET | Refills: 1 | Status: SHIPPED | OUTPATIENT
Start: 2019-01-29 | End: 2019-08-11 | Stop reason: SDUPTHER

## 2019-02-14 ENCOUNTER — OFFICE VISIT (OUTPATIENT)
Dept: FAMILY MEDICINE CLINIC | Facility: CLINIC | Age: 59
End: 2019-02-14
Payer: COMMERCIAL

## 2019-02-14 VITALS
HEIGHT: 69 IN | DIASTOLIC BLOOD PRESSURE: 72 MMHG | BODY MASS INDEX: 21.18 KG/M2 | WEIGHT: 143 LBS | SYSTOLIC BLOOD PRESSURE: 110 MMHG

## 2019-02-14 DIAGNOSIS — I25.10 ARTERIOSCLEROSIS OF CORONARY ARTERY: ICD-10-CM

## 2019-02-14 DIAGNOSIS — E11.9 CONTROLLED TYPE 2 DIABETES MELLITUS WITHOUT COMPLICATION, WITHOUT LONG-TERM CURRENT USE OF INSULIN (HCC): Primary | ICD-10-CM

## 2019-02-14 DIAGNOSIS — G71.11 MYOTONIC DYSTROPHY (HCC): ICD-10-CM

## 2019-02-14 DIAGNOSIS — E78.2 MIXED HYPERLIPIDEMIA: ICD-10-CM

## 2019-02-14 PROCEDURE — 3008F BODY MASS INDEX DOCD: CPT | Performed by: FAMILY MEDICINE

## 2019-02-14 PROCEDURE — 99214 OFFICE O/P EST MOD 30 MIN: CPT | Performed by: FAMILY MEDICINE

## 2019-02-14 PROCEDURE — 1036F TOBACCO NON-USER: CPT | Performed by: FAMILY MEDICINE

## 2019-02-14 NOTE — PROGRESS NOTES
Assessment/Plan:    Controlled type 2 diabetes mellitus without complication, without long-term current use of insulin ([address] )  Lab Results   Component Value Date    HGBA1C 8 5 (H) 01/29/2019    I reviewed the diabetic log the patient brought with him today  No sugars greater than 170, frequently greater than 240  Will read dedicate himself to exercise and keep track of his sugars  He will call me in 2 weeks  If the majority of sugars are not less than 170 I will add additional medication at that time  Either way recheck Hb A1c in 3 months  Mixed hyperlipidemia  Stable, continue atorvastatin  Recheck in 6 months    Arteriosclerosis of coronary artery  Asymptomatic at this time  Continue current medications  Recheck in the office in 3 months    Myotonic dystrophy [facility])  Follow-up with Neurology as scheduled  Diagnoses and all orders for this visit:    Controlled type 2 diabetes mellitus without complication, without long-term current use of insulin (HCC)  -     Hemoglobin A1C; Future  -     Comprehensive metabolic panel; Future    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[facility] [address] )    Mixed hyperlipidemia    Arteriosclerosis of coronary artery          Subjective:   Chief Complaint   Patient presents with    Diabetes          Patient ID: Virgilio Hu is a 62 y o  male  Patient is a 49-year-old male presenting to the office for follow-up on his diabetes, cholesterol, heart disease, and myotonic dystrophy  Sugars have been quite labile over the last couple of months  Frequently greater than 200  Patient has been baby-sitting his 9month-old granddaughter  He has her from 8 to 5 every day  Because of this, he has not exercised in over 5 months          The following portions of the patient's history were reviewed and updated as appropriate: allergies, current medications, past family history, past medical history, past social history, past surgical history and problem list     Review of Systems   Constitutional: Negative for appetite change, chills, diaphoresis, fatigue, fever and unexpected weight change  HENT: Negative for congestion, ear pain, hearing loss, nosebleeds, postnasal drip, rhinorrhea, sinus pressure, sore throat, tinnitus and trouble swallowing  Eyes: Negative for photophobia, pain, discharge, redness, itching and visual disturbance  Respiratory: Negative for cough, chest tightness, shortness of breath and wheezing  Cardiovascular: Negative for chest pain, palpitations and leg swelling  Denies orthopnea , dyspnea on exertion   Gastrointestinal: Negative for abdominal distention, abdominal pain, blood in stool, constipation, diarrhea, nausea and vomiting  Endocrine: Negative  Genitourinary: Negative for difficulty urinating, dysuria, flank pain, frequency, hematuria and urgency  Denies nocturia , erectile dysfunction   Musculoskeletal: Negative for arthralgias, back pain, gait problem, joint swelling and myalgias  Skin: Negative for pallor, rash and wound  Denies skin lesions   Allergic/Immunologic: Negative for environmental allergies, food allergies and immunocompromised state  Neurological: Negative for dizziness, tremors, seizures, syncope, speech difficulty, weakness, numbness and headaches  Hematological: Negative for adenopathy  Does not bruise/bleed easily  Psychiatric/Behavioral: Negative for behavioral problems, confusion, sleep disturbance and suicidal ideas  The patient is not nervous/anxious  Objective:      /72   Ht 5' 8 5" (1 74 m)   Wt 64 9 kg (143 lb)   BMI 21 43 kg/m²          Physical Exam   Constitutional: He is oriented to person, place, and time  He appears well-developed and well-nourished  HENT:   Head: Normocephalic and atraumatic  Nose: Nose normal    Mouth/Throat: Oropharynx is clear and moist    Eyes: Pupils are equal, round, and reactive to light  Conjunctivae and EOM are normal    Neck: Normal range of motion  Neck supple   No JVD present  No tracheal deviation present  No thyromegaly present  Cardiovascular: Normal rate, regular rhythm and intact distal pulses  No murmur heard  Pulses:       Dorsalis pedis pulses are 2+ on the right side, and 2+ on the left side  Posterior tibial pulses are 2+ on the right side, and 2+ on the left side  Pulmonary/Chest: Effort normal and breath sounds normal  He has no wheezes  He has no rales  Abdominal: Soft  Bowel sounds are normal  He exhibits no mass  There is no tenderness  There is no rebound and no guarding  Musculoskeletal: He exhibits no edema, tenderness or deformity  Feet:   Right Foot:   Skin Integrity: Negative for ulcer, skin breakdown, erythema, warmth, callus or dry skin  Left Foot:   Skin Integrity: Negative for ulcer, skin breakdown, erythema, warmth, callus or dry skin  Lymphadenopathy:     He has no cervical adenopathy  Neurological: He is alert and oriented to person, place, and time  He has normal reflexes  He displays normal reflexes  Coordination normal    Skin: Skin is warm and dry  No lesion and no rash noted  Nails show no clubbing  Psychiatric: He has a normal mood and affect  Judgment normal      BMI Counseling: Body mass index is 21 43 kg/m²  Discussed the patient's BMI with him  The BMI is in the acceptable range  Patient's shoes and socks removed  Right Foot/Ankle   Right Foot Inspection  Skin Exam: skin normal and skin intact no dry skin, no warmth, no callus, no erythema, no maceration, no abnormal color, no pre-ulcer, no ulcer and no callus                          Toe Exam: ROM and strength within normal limits  Sensory   Vibration: diminished and intact  Proprioception: intact   Monofilament testing: diminished  Vascular  Capillary refills: < 3 seconds  The right DP pulse is 2+  The right PT pulse is 2+       Left Foot/Ankle  Left Foot Inspection  Skin Exam: skin normal and skin intactno dry skin, no warmth, no erythema, no maceration, normal color, no pre-ulcer, no ulcer and no callus                         Toe Exam: ROM and strength within normal limits                   Sensory   Vibration: diminished  Proprioception: intact  Monofilament: diminished  Vascular  Capillary refills: < 3 seconds  The left DP pulse is 2+  The left PT pulse is 2+     Assign Risk Category:  No deformity present; ;        Risk: 1

## 2019-02-14 NOTE — ASSESSMENT & PLAN NOTE
Lab Results   Component Value Date    HGBA1C 8 5 (H) 01/29/2019    I reviewed the diabetic log the patient brought with him today  No sugars greater than 170, frequently greater than 240  Will read dedicate himself to exercise and keep track of his sugars  He will call me in 2 weeks  If the majority of sugars are not less than 170 I will add additional medication at that time  Either way recheck Hb A1c in 3 months

## 2019-03-27 DIAGNOSIS — I25.10 ARTERIOSCLEROSIS OF CORONARY ARTERY: ICD-10-CM

## 2019-03-27 RX ORDER — RIVAROXABAN 20 MG/1
TABLET, FILM COATED ORAL
Qty: 90 TABLET | Refills: 1 | Status: SHIPPED | OUTPATIENT
Start: 2019-03-27 | End: 2019-10-10 | Stop reason: SDUPTHER

## 2019-04-11 DIAGNOSIS — IMO0001 UNCONTROLLED TYPE 2 DIABETES MELLITUS WITHOUT COMPLICATION, WITHOUT LONG-TERM CURRENT USE OF INSULIN: ICD-10-CM

## 2019-04-11 RX ORDER — METFORMIN HYDROCHLORIDE EXTENDED-RELEASE TABLETS 1000 MG/1
TABLET, FILM COATED, EXTENDED RELEASE ORAL
Qty: 180 TABLET | Refills: 1 | Status: SHIPPED | OUTPATIENT
Start: 2019-04-11 | End: 2019-07-23 | Stop reason: SDUPTHER

## 2019-05-01 ENCOUNTER — APPOINTMENT (OUTPATIENT)
Dept: LAB | Age: 59
End: 2019-05-01
Payer: COMMERCIAL

## 2019-05-01 DIAGNOSIS — E11.9 CONTROLLED TYPE 2 DIABETES MELLITUS WITHOUT COMPLICATION, WITHOUT LONG-TERM CURRENT USE OF INSULIN (HCC): ICD-10-CM

## 2019-05-01 LAB
ALBUMIN SERPL BCP-MCNC: 3.5 G/DL (ref 3.5–5)
ALP SERPL-CCNC: 75 U/L (ref 46–116)
ALT SERPL W P-5'-P-CCNC: 51 U/L (ref 12–78)
ANION GAP SERPL CALCULATED.3IONS-SCNC: 5 MMOL/L (ref 4–13)
AST SERPL W P-5'-P-CCNC: 22 U/L (ref 5–45)
BILIRUB SERPL-MCNC: 1.21 MG/DL (ref 0.2–1)
BUN SERPL-MCNC: 17 MG/DL (ref 5–25)
CALCIUM SERPL-MCNC: 8.5 MG/DL (ref 8.3–10.1)
CHLORIDE SERPL-SCNC: 104 MMOL/L (ref 100–108)
CO2 SERPL-SCNC: 30 MMOL/L (ref 21–32)
CREAT SERPL-MCNC: 0.65 MG/DL (ref 0.6–1.3)
EST. AVERAGE GLUCOSE BLD GHB EST-MCNC: 197 MG/DL
GFR SERPL CREATININE-BSD FRML MDRD: 106 ML/MIN/1.73SQ M
GLUCOSE P FAST SERPL-MCNC: 191 MG/DL (ref 65–99)
HBA1C MFR BLD: 8.5 % (ref 4.2–6.3)
POTASSIUM SERPL-SCNC: 4 MMOL/L (ref 3.5–5.3)
PROT SERPL-MCNC: 6.1 G/DL (ref 6.4–8.2)
SODIUM SERPL-SCNC: 139 MMOL/L (ref 136–145)

## 2019-05-01 PROCEDURE — 80053 COMPREHEN METABOLIC PANEL: CPT

## 2019-05-01 PROCEDURE — 83036 HEMOGLOBIN GLYCOSYLATED A1C: CPT

## 2019-05-01 PROCEDURE — 36415 COLL VENOUS BLD VENIPUNCTURE: CPT

## 2019-05-06 ENCOUNTER — OFFICE VISIT (OUTPATIENT)
Dept: FAMILY MEDICINE CLINIC | Facility: CLINIC | Age: 59
End: 2019-05-06
Payer: COMMERCIAL

## 2019-05-06 VITALS
DIASTOLIC BLOOD PRESSURE: 70 MMHG | WEIGHT: 140.8 LBS | HEIGHT: 69 IN | BODY MASS INDEX: 20.86 KG/M2 | SYSTOLIC BLOOD PRESSURE: 124 MMHG

## 2019-05-06 DIAGNOSIS — E78.2 MIXED HYPERLIPIDEMIA: ICD-10-CM

## 2019-05-06 DIAGNOSIS — G71.11 MYOTONIC DYSTROPHY (HCC): ICD-10-CM

## 2019-05-06 DIAGNOSIS — H61.23 BILATERAL IMPACTED CERUMEN: ICD-10-CM

## 2019-05-06 DIAGNOSIS — I25.10 ARTERIOSCLEROSIS OF CORONARY ARTERY: ICD-10-CM

## 2019-05-06 DIAGNOSIS — D72.819 LEUKOPENIA, UNSPECIFIED TYPE: ICD-10-CM

## 2019-05-06 DIAGNOSIS — Z12.5 SCREENING FOR PROSTATE CANCER: ICD-10-CM

## 2019-05-06 DIAGNOSIS — E11.9 CONTROLLED TYPE 2 DIABETES MELLITUS WITHOUT COMPLICATION, WITHOUT LONG-TERM CURRENT USE OF INSULIN (HCC): Primary | ICD-10-CM

## 2019-05-06 DIAGNOSIS — D69.6 THROMBOCYTOPENIA (HCC): ICD-10-CM

## 2019-05-06 DIAGNOSIS — G62.9 POLYNEUROPATHY: ICD-10-CM

## 2019-05-06 DIAGNOSIS — R42 VERTIGO: ICD-10-CM

## 2019-05-06 PROCEDURE — 99214 OFFICE O/P EST MOD 30 MIN: CPT | Performed by: FAMILY MEDICINE

## 2019-05-06 PROCEDURE — 69210 REMOVE IMPACTED EAR WAX UNI: CPT | Performed by: FAMILY MEDICINE

## 2019-05-06 PROCEDURE — 3008F BODY MASS INDEX DOCD: CPT | Performed by: FAMILY MEDICINE

## 2019-05-06 PROCEDURE — 1036F TOBACCO NON-USER: CPT | Performed by: FAMILY MEDICINE

## 2019-05-07 ENCOUNTER — TELEPHONE (OUTPATIENT)
Dept: FAMILY MEDICINE CLINIC | Facility: CLINIC | Age: 59
End: 2019-05-07

## 2019-05-15 DIAGNOSIS — I25.10 ARTERIOSCLEROSIS OF CORONARY ARTERY: ICD-10-CM

## 2019-05-15 RX ORDER — ATORVASTATIN CALCIUM 40 MG/1
TABLET, FILM COATED ORAL
Qty: 90 TABLET | Refills: 1 | Status: SHIPPED | OUTPATIENT
Start: 2019-05-15 | End: 2019-11-30 | Stop reason: SDUPTHER

## 2019-05-22 ENCOUNTER — TELEPHONE (OUTPATIENT)
Dept: FAMILY MEDICINE CLINIC | Facility: CLINIC | Age: 59
End: 2019-05-22

## 2019-05-22 DIAGNOSIS — E11.9 CONTROLLED TYPE 2 DIABETES MELLITUS WITHOUT COMPLICATION, WITHOUT LONG-TERM CURRENT USE OF INSULIN (HCC): Primary | ICD-10-CM

## 2019-05-22 RX ORDER — NATEGLINIDE 120 MG/1
120 TABLET ORAL
Qty: 90 TABLET | Refills: 5 | Status: SHIPPED | OUTPATIENT
Start: 2019-05-22 | End: 2019-09-26 | Stop reason: ALTCHOICE

## 2019-06-03 ENCOUNTER — EVALUATION (OUTPATIENT)
Dept: PHYSICAL THERAPY | Facility: REHABILITATION | Age: 59
End: 2019-06-03
Payer: COMMERCIAL

## 2019-06-03 DIAGNOSIS — R42 VERTIGO: Primary | ICD-10-CM

## 2019-06-03 PROCEDURE — 97162 PT EVAL MOD COMPLEX 30 MIN: CPT

## 2019-06-10 ENCOUNTER — OFFICE VISIT (OUTPATIENT)
Dept: PHYSICAL THERAPY | Facility: REHABILITATION | Age: 59
End: 2019-06-10
Payer: COMMERCIAL

## 2019-06-10 DIAGNOSIS — R42 VERTIGO: ICD-10-CM

## 2019-06-10 DIAGNOSIS — H81.12 BPPV (BENIGN PAROXYSMAL POSITIONAL VERTIGO), LEFT: Primary | ICD-10-CM

## 2019-06-10 PROCEDURE — 95992 CANALITH REPOSITIONING PROC: CPT

## 2019-07-03 ENCOUNTER — TELEPHONE (OUTPATIENT)
Dept: FAMILY MEDICINE CLINIC | Facility: CLINIC | Age: 59
End: 2019-07-03

## 2019-07-03 DIAGNOSIS — E11.9 CONTROLLED TYPE 2 DIABETES MELLITUS WITHOUT COMPLICATION, WITHOUT LONG-TERM CURRENT USE OF INSULIN (HCC): Primary | ICD-10-CM

## 2019-07-03 NOTE — TELEPHONE ENCOUNTER
At this point I would recommend he see endocrinology  Referral is on the printer  Continues taking the same 3 medications for his diabetes, but he is going need additional therapy at this point

## 2019-07-12 LAB
LEFT EYE DIABETIC RETINOPATHY: NORMAL
RIGHT EYE DIABETIC RETINOPATHY: NORMAL

## 2019-07-12 PROCEDURE — 3072F LOW RISK FOR RETINOPATHY: CPT | Performed by: FAMILY MEDICINE

## 2019-07-17 RX ORDER — ACYCLOVIR 200 MG/1
CAPSULE ORAL
Refills: 1 | COMMUNITY
Start: 2019-05-20 | End: 2019-11-21

## 2019-07-17 RX ORDER — SITAGLIPTIN 50 MG/1
50 TABLET, FILM COATED ORAL DAILY
Refills: 1 | COMMUNITY
Start: 2019-06-02 | End: 2019-09-26 | Stop reason: ALTCHOICE

## 2019-07-23 DIAGNOSIS — IMO0001 UNCONTROLLED TYPE 2 DIABETES MELLITUS WITHOUT COMPLICATION, WITHOUT LONG-TERM CURRENT USE OF INSULIN: ICD-10-CM

## 2019-07-23 RX ORDER — METFORMIN HYDROCHLORIDE EXTENDED-RELEASE TABLETS 1000 MG/1
1000 TABLET, FILM COATED, EXTENDED RELEASE ORAL 2 TIMES DAILY WITH MEALS
Qty: 180 TABLET | Refills: 1 | Status: SHIPPED | OUTPATIENT
Start: 2019-07-23 | End: 2019-07-24

## 2019-07-24 ENCOUNTER — TELEPHONE (OUTPATIENT)
Dept: FAMILY MEDICINE CLINIC | Facility: CLINIC | Age: 59
End: 2019-07-24

## 2019-07-24 DIAGNOSIS — E11.9 CONTROLLED TYPE 2 DIABETES MELLITUS WITHOUT COMPLICATION, WITHOUT LONG-TERM CURRENT USE OF INSULIN (HCC): Primary | ICD-10-CM

## 2019-07-24 NOTE — TELEPHONE ENCOUNTER
I sent in  Do not keep track of his sugars, call me in 2 weeks let me know if they are still running okay

## 2019-08-05 ENCOUNTER — APPOINTMENT (OUTPATIENT)
Dept: LAB | Age: 59
End: 2019-08-05
Payer: COMMERCIAL

## 2019-08-05 DIAGNOSIS — D72.819 LEUKOPENIA, UNSPECIFIED TYPE: ICD-10-CM

## 2019-08-05 DIAGNOSIS — D69.6 THROMBOCYTOPENIA (HCC): ICD-10-CM

## 2019-08-05 DIAGNOSIS — Z12.5 SCREENING FOR PROSTATE CANCER: ICD-10-CM

## 2019-08-05 DIAGNOSIS — E11.9 CONTROLLED TYPE 2 DIABETES MELLITUS WITHOUT COMPLICATION, WITHOUT LONG-TERM CURRENT USE OF INSULIN (HCC): ICD-10-CM

## 2019-08-05 DIAGNOSIS — E78.2 MIXED HYPERLIPIDEMIA: ICD-10-CM

## 2019-08-05 DIAGNOSIS — I25.10 ARTERIOSCLEROSIS OF CORONARY ARTERY: ICD-10-CM

## 2019-08-05 LAB
ALBUMIN SERPL BCP-MCNC: 3.4 G/DL (ref 3.5–5)
ALP SERPL-CCNC: 67 U/L (ref 46–116)
ALT SERPL W P-5'-P-CCNC: 40 U/L (ref 12–78)
ANION GAP SERPL CALCULATED.3IONS-SCNC: 6 MMOL/L (ref 4–13)
AST SERPL W P-5'-P-CCNC: 19 U/L (ref 5–45)
BASOPHILS # BLD AUTO: 0.02 THOUSANDS/ΜL (ref 0–0.1)
BASOPHILS NFR BLD AUTO: 1 % (ref 0–1)
BILIRUB SERPL-MCNC: 0.95 MG/DL (ref 0.2–1)
BUN SERPL-MCNC: 15 MG/DL (ref 5–25)
CALCIUM SERPL-MCNC: 8.6 MG/DL (ref 8.3–10.1)
CHLORIDE SERPL-SCNC: 105 MMOL/L (ref 100–108)
CHOLEST SERPL-MCNC: 131 MG/DL (ref 50–200)
CO2 SERPL-SCNC: 30 MMOL/L (ref 21–32)
CREAT SERPL-MCNC: 0.54 MG/DL (ref 0.6–1.3)
CREAT UR-MCNC: 150 MG/DL
EOSINOPHIL # BLD AUTO: 0.32 THOUSAND/ΜL (ref 0–0.61)
EOSINOPHIL NFR BLD AUTO: 8 % (ref 0–6)
ERYTHROCYTE [DISTWIDTH] IN BLOOD BY AUTOMATED COUNT: 13.2 % (ref 11.6–15.1)
EST. AVERAGE GLUCOSE BLD GHB EST-MCNC: 214 MG/DL
GFR SERPL CREATININE-BSD FRML MDRD: 115 ML/MIN/1.73SQ M
GLUCOSE P FAST SERPL-MCNC: 258 MG/DL (ref 65–99)
HBA1C MFR BLD: 9.1 % (ref 4.2–6.3)
HCT VFR BLD AUTO: 43 % (ref 36.5–49.3)
HDLC SERPL-MCNC: 50 MG/DL (ref 40–60)
HGB BLD-MCNC: 14.5 G/DL (ref 12–17)
IMM GRANULOCYTES # BLD AUTO: 0.02 THOUSAND/UL (ref 0–0.2)
IMM GRANULOCYTES NFR BLD AUTO: 1 % (ref 0–2)
LDLC SERPL CALC-MCNC: 64 MG/DL (ref 0–100)
LYMPHOCYTES # BLD AUTO: 0.76 THOUSANDS/ΜL (ref 0.6–4.47)
LYMPHOCYTES NFR BLD AUTO: 20 % (ref 14–44)
MCH RBC QN AUTO: 31 PG (ref 26.8–34.3)
MCHC RBC AUTO-ENTMCNC: 33.7 G/DL (ref 31.4–37.4)
MCV RBC AUTO: 92 FL (ref 82–98)
MICROALBUMIN UR-MCNC: 11.2 MG/L (ref 0–20)
MICROALBUMIN/CREAT 24H UR: 7 MG/G CREATININE (ref 0–30)
MONOCYTES # BLD AUTO: 0.58 THOUSAND/ΜL (ref 0.17–1.22)
MONOCYTES NFR BLD AUTO: 15 % (ref 4–12)
NEUTROPHILS # BLD AUTO: 2.18 THOUSANDS/ΜL (ref 1.85–7.62)
NEUTS SEG NFR BLD AUTO: 55 % (ref 43–75)
NONHDLC SERPL-MCNC: 81 MG/DL
NRBC BLD AUTO-RTO: 0 /100 WBCS
PLATELET # BLD AUTO: 118 THOUSANDS/UL (ref 149–390)
PMV BLD AUTO: 10.8 FL (ref 8.9–12.7)
POTASSIUM SERPL-SCNC: 4.1 MMOL/L (ref 3.5–5.3)
PROT SERPL-MCNC: 5.9 G/DL (ref 6.4–8.2)
PSA SERPL-MCNC: 0.4 NG/ML (ref 0–4)
RBC # BLD AUTO: 4.68 MILLION/UL (ref 3.88–5.62)
SODIUM SERPL-SCNC: 141 MMOL/L (ref 136–145)
TRIGL SERPL-MCNC: 86 MG/DL
TSH SERPL DL<=0.05 MIU/L-ACNC: 0.67 UIU/ML (ref 0.36–3.74)
WBC # BLD AUTO: 3.88 THOUSAND/UL (ref 4.31–10.16)

## 2019-08-05 PROCEDURE — 3061F NEG MICROALBUMINURIA REV: CPT | Performed by: FAMILY MEDICINE

## 2019-08-05 PROCEDURE — 82570 ASSAY OF URINE CREATININE: CPT

## 2019-08-05 PROCEDURE — G0103 PSA SCREENING: HCPCS

## 2019-08-05 PROCEDURE — 36415 COLL VENOUS BLD VENIPUNCTURE: CPT

## 2019-08-05 PROCEDURE — 85025 COMPLETE CBC W/AUTO DIFF WBC: CPT

## 2019-08-05 PROCEDURE — 84443 ASSAY THYROID STIM HORMONE: CPT

## 2019-08-05 PROCEDURE — 82043 UR ALBUMIN QUANTITATIVE: CPT

## 2019-08-05 PROCEDURE — 80053 COMPREHEN METABOLIC PANEL: CPT

## 2019-08-05 PROCEDURE — 83036 HEMOGLOBIN GLYCOSYLATED A1C: CPT

## 2019-08-05 PROCEDURE — 80061 LIPID PANEL: CPT

## 2019-08-06 ENCOUNTER — OFFICE VISIT (OUTPATIENT)
Dept: CARDIOLOGY CLINIC | Facility: CLINIC | Age: 59
End: 2019-08-06
Payer: COMMERCIAL

## 2019-08-06 VITALS
HEART RATE: 60 BPM | SYSTOLIC BLOOD PRESSURE: 102 MMHG | WEIGHT: 136 LBS | BODY MASS INDEX: 20.61 KG/M2 | DIASTOLIC BLOOD PRESSURE: 60 MMHG | HEIGHT: 68 IN

## 2019-08-06 DIAGNOSIS — I25.10 ARTERIOSCLEROSIS OF CORONARY ARTERY: Primary | ICD-10-CM

## 2019-08-06 DIAGNOSIS — E78.2 MIXED HYPERLIPIDEMIA: ICD-10-CM

## 2019-08-06 DIAGNOSIS — E11.9 CONTROLLED TYPE 2 DIABETES MELLITUS WITHOUT COMPLICATION, WITHOUT LONG-TERM CURRENT USE OF INSULIN (HCC): Primary | ICD-10-CM

## 2019-08-06 PROCEDURE — 99213 OFFICE O/P EST LOW 20 MIN: CPT | Performed by: INTERNAL MEDICINE

## 2019-08-06 PROCEDURE — 93000 ELECTROCARDIOGRAM COMPLETE: CPT | Performed by: INTERNAL MEDICINE

## 2019-08-06 RX ORDER — ASPIRIN 81 MG/1
81 TABLET ORAL DAILY
COMMUNITY

## 2019-08-06 NOTE — PROGRESS NOTES
Cardiology Follow Up    Natalie Gaytan  1960  [id number]  [address] [facility] [id number]  [phone]    Reason for visit:  Over due for 1 year follow-up for CAD status post myocardial infarction in 2004 with stenting of the right coronary artery at that time  Also has a history of hyperlipidemia and history of DVT for which he is on Xarelto    1  Arteriosclerosis of coronary artery  POCT ECG   2  Mixed hyperlipidemia  POCT ECG       Interval History: Since his last visit he denies chest pain  He does get some dyspnea  This occurs more with singing than walking  He does have a m  Lightheadedness which does get better as the day goes on    He denies palpitations or swelling      Patient Active Problem List   Diagnosis    Leukopenia    Thrombocytopenia ([facility] )    Arteriosclerosis of coronary artery    Erectile dysfunction of non-organic origin    Controlled type 2 diabetes mellitus without complication, without long-term current use of insulin ([facility])    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[facility] )    Mixed hyperlipidemia    Vertigo    Polyneuropathy    Left upper arm pain    BPPV (benign paroxysmal positional vertigo), left     Past Medical History:   Diagnosis Date    CAD (coronary artery disease)     Congenital myotonia     Diabetes ([facility] )     DVT (deep vein thrombosis) in pregnancy ([place] [address] )     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[place] [address] ) 12/06/2017    Pancreatitis     Sleep apnea     Superficial thrombophlebitis      Social History     Socioeconomic History    Marital status: /Civil Union     Spouse name: Not on file    Number of children: Not on file    Years of education: Not on file    Highest education level: Not on file   Occupational History    Not on file   Social Needs    Financial resource strain: Not on file    Food insecurity:     Worry: Not on file     Inability: Not on file    Transportation needs:     Medical: Not on file     Non-medical: Not on file   Tobacco Use    Smoking status: Never Smoker    Smokeless tobacco: Never Used   Substance and Sexual Activity    Alcohol use: Yes     Comment: social    Drug use: No    Sexual activity: Not on file   Lifestyle    Physical activity:     Days per week: Not on file     Minutes per session: Not on file    Stress: Not on file   Relationships    Social connections:     Talks on phone: Not on file     Gets together: Not on file     Attends [employer] service: Not on file     Active member of club or organization: Not on file     Attends meetings of clubs or organizations: Not on file     Relationship status: Not on file    Intimate partner violence:     Fear of current or ex partner: Not on file     Emotionally abused: Not on file     Physically abused: Not on file     Forced sexual activity: Not on file   Other Topics Concern    Not on file   Social History Narrative    Weight loss      Family History   Problem Relation Age of Onset    Brain cancer Mother     Clotting disorder Mother     Heart disease Mother     Cancer Mother     Hyperlipidemia Mother     Coronary artery disease Paternal Uncle      Past Surgical History:   Procedure Laterality Date    CHOLECYSTECTOMY      CIRCUMCISION      Elective    COLONOSCOPY      complete, polyps 2 years ago    CORONARY ANGIOPLASTY WITH STENT PLACEMENT      stent to RCA 2004    INGUINAL HERNIA REPAIR      ORIF RADIAL SHAFT FRACTURE      Open Treatment of Multiple Fractures of the Radial Shaft    ORIF ULNAR / RADIAL SHAFT FRACTURE      Open Treatment of Multiple Fractures of the Ulnar Shaft    TONSILLECTOMY AND ADENOIDECTOMY         Current Outpatient Medications:     aspirin (ECOTRIN LOW STRENGTH) 81 mg EC tablet, Take 81 mg by mouth daily, Disp: , Rfl:     atorvastatin (LIPITOR) 40 mg tablet, TAKE 1 TABLET DAILY, Disp: 90 tablet, Rfl: 1    glimepiride (AMARYL) 4 mg tablet, Take 1 tablet (4 mg total) by mouth daily with breakfast, Disp: 90 tablet, Rfl: 1    metFORMIN (GLUCOPHAGE) 1000 MG tablet, Take 1 tablet (1,000 mg total) by mouth 2 (two) times a day with meals, Disp: 180 tablet, Rfl: 1    nateglinide (STARLIX) 120 mg tablet, Take 1 tablet (120 mg total) by mouth 3 (three) times a day before meals, Disp: 90 tablet, Rfl: 5    tadalafil (CIALIS) 5 MG tablet, Take 1 tablet (5 mg total) by mouth daily as needed for erectile dysfunction, Disp: 20 tablet, Rfl: 1    XARELTO 20 MG tablet, TAKE 1 TABLET DAILY WITH BREAKFAST , Disp: 90 tablet, Rfl: 1    acyclovir (ZOVIRAX) 200 mg capsule, TAKE ONE CAPSULE 5 TIMES DAILY FOR 5 DAYS AS NEEDED FOR COLD SORES , Disp: , Rfl: 1    glucose blood test strip, Test twice daily dx: E11 9, Disp: 100 each, Rfl: 1    JANUVIA 50 MG tablet, Take 50 mg by mouth daily, Disp: , Rfl: 1    meclizine (ANTIVERT) 25 mg tablet, Take 1 tablet (25 mg total) by mouth every 8 (eight) hours as needed for dizziness (Patient not taking: Reported on 8/6/2019), Disp: 40 tablet, Rfl: 2    valACYclovir (VALTREX) 1,000 mg tablet, 1 tablet now then repeat in 12 hours, Disp: 2 tablet, Rfl: 0  No Known Allergies    Review of Systems:  Review of Systems   Constitutional: Positive for fatigue and unexpected weight change  Negative for activity change and appetite change  Respiratory: Positive for shortness of breath  Negative for cough, chest tightness and wheezing  Cardiovascular: Negative for chest pain, palpitations and leg swelling  Gastrointestinal: Positive for abdominal pain, constipation and diarrhea  Negative for blood in stool  Genitourinary: Positive for frequency  Musculoskeletal: Negative for arthralgias and back pain  Neurological: Positive for dizziness, weakness and light-headedness         Physical Exam:  Vitals:    08/06/19 1801   BP: 102/60   Pulse: 60   Weight: 61 7 kg (136 lb)   Height: 5' 8" (1 727 m)       Physical Exam   Constitutional:   Asthenic middle aged male in NAD   HENT: Head: Normocephalic and atraumatic  Mouth/Throat: Oropharynx is clear and moist  No oropharyngeal exudate  Eyes: Conjunctivae are normal  No scleral icterus  Neck: Neck supple  Normal carotid pulses and no JVD present  Carotid bruit is not present  No thyromegaly present  Cardiovascular: Normal rate, regular rhythm, normal heart sounds and intact distal pulses  Exam reveals no gallop and no friction rub  No murmur heard  Pulmonary/Chest: Breath sounds normal  He has no wheezes  He has no rhonchi  He has no rales  Abdominal: Soft  He exhibits no mass  There is no hepatosplenomegaly  There is no tenderness  Musculoskeletal: He exhibits no edema  Discussion/Summary:  1  CAD-having no angina status post remote myocardial infarction and stenting  Negative stress test in 2016  Will repeat stress echo in 9 months time  Continue aspirin  2  Hyperlipidemia  Patient on atorvastatin  LDL cholesterol 64 with HDL cholesterol 50   Continue same    Follow-up 9 months at stress echo    Candace Hyde MD

## 2019-08-08 ENCOUNTER — OFFICE VISIT (OUTPATIENT)
Dept: FAMILY MEDICINE CLINIC | Facility: CLINIC | Age: 59
End: 2019-08-08
Payer: COMMERCIAL

## 2019-08-08 ENCOUNTER — TELEPHONE (OUTPATIENT)
Dept: FAMILY MEDICINE CLINIC | Facility: CLINIC | Age: 59
End: 2019-08-08

## 2019-08-08 VITALS
SYSTOLIC BLOOD PRESSURE: 102 MMHG | DIASTOLIC BLOOD PRESSURE: 68 MMHG | BODY MASS INDEX: 20.25 KG/M2 | HEIGHT: 68 IN | WEIGHT: 133.6 LBS

## 2019-08-08 DIAGNOSIS — I25.10 ARTERIOSCLEROSIS OF CORONARY ARTERY: ICD-10-CM

## 2019-08-08 DIAGNOSIS — IMO0001 UNCONTROLLED TYPE 2 DIABETES MELLITUS WITHOUT COMPLICATION, WITHOUT LONG-TERM CURRENT USE OF INSULIN: Primary | ICD-10-CM

## 2019-08-08 DIAGNOSIS — G71.11 MYOTONIC DYSTROPHY (HCC): ICD-10-CM

## 2019-08-08 DIAGNOSIS — E78.2 MIXED HYPERLIPIDEMIA: ICD-10-CM

## 2019-08-08 DIAGNOSIS — F52.21 ERECTILE DYSFUNCTION OF NON-ORGANIC ORIGIN: ICD-10-CM

## 2019-08-08 PROBLEM — M79.622 LEFT UPPER ARM PAIN: Status: RESOLVED | Noted: 2019-01-18 | Resolved: 2019-08-08

## 2019-08-08 PROBLEM — R42 VERTIGO: Status: RESOLVED | Noted: 2018-09-21 | Resolved: 2019-08-08

## 2019-08-08 PROCEDURE — 99214 OFFICE O/P EST MOD 30 MIN: CPT | Performed by: FAMILY MEDICINE

## 2019-08-08 PROCEDURE — 3008F BODY MASS INDEX DOCD: CPT | Performed by: FAMILY MEDICINE

## 2019-08-08 NOTE — PROGRESS NOTES
Assessment/Plan:    Uncontrolled type 2 diabetes mellitus without complication, without long-term current use of insulin ([facility])  Lab Results   Component Value Date    HGBA1C 9 1 (H) 08/05/2019       Discussed dietary accommodations  Will try to get more whole wheat more complex carbs into his diet, avoid simple carbs and sugars  Has an appointment with Endocrinology in September  Arteriosclerosis of coronary artery    Recently saw Cardiology, no changes  Does have a stress echo scheduled in 9 months  Erectile dysfunction of non-organic origin    May take 2 Cialis at a time if necessary  Mixed hyperlipidemia    Continue atorvastatin 40 mg daily  Recheck in the office in 6 months    Myotonic dystrophy [facility])   Has an appointment with Neurology coming up in the near future  Diagnoses and all orders for this visit:    Uncontrolled type 2 diabetes mellitus without complication, without long-term current use of insulin ([facility])  -     Hemoglobin A1C; Future  -     Comprehensive metabolic panel; Future    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[facility] [address] )    Arteriosclerosis of coronary artery    Erectile dysfunction of non-organic origin    Mixed hyperlipidemia          Subjective:   Chief Complaint   Patient presents with    Diabetes    Hyperlipidemia     review blood work  no refills needed           Patient ID: Carmen Matute is a 61 y o  male  Patient is a 51-year-old male presenting to the office for follow-up on his diabetes, hypertension, coronary artery disease  He had recent lab work which he would like to review  Staff already contacted him about his elevated HB A1c and he has scheduled an appointment with Endocrinology in the near future        The following portions of the patient's history were reviewed and updated as appropriate: allergies, current medications, past family history, past medical history, past social history, past surgical history and problem list     Review of Systems   Constitutional: Negative for appetite change, chills, diaphoresis, fatigue, fever and unexpected weight change  HENT: Negative for congestion, ear pain, hearing loss, nosebleeds, postnasal drip, rhinorrhea, sinus pressure, sore throat, tinnitus and trouble swallowing  Eyes: Negative for photophobia, pain, discharge, redness, itching and visual disturbance  Respiratory: Negative for cough, chest tightness, shortness of breath and wheezing  Cardiovascular: Negative for chest pain, palpitations and leg swelling  Denies orthopnea , dyspnea on exertion   Gastrointestinal: Negative for abdominal distention, abdominal pain, blood in stool, constipation, diarrhea, nausea and vomiting  Endocrine: Negative  Genitourinary: Negative for difficulty urinating, dysuria, flank pain, frequency, hematuria and urgency  Has erectile dysfunction,   One Cialis is not always work, has tried 2 with some success  He has not intermittent often, only 2-3 times a year  Musculoskeletal: Negative for arthralgias, back pain, gait problem, joint swelling and myalgias  Skin: Negative for pallor, rash and wound  Denies skin lesions   Allergic/Immunologic: Negative for environmental allergies, food allergies and immunocompromised state  Neurological: Positive for weakness and numbness  Negative for dizziness, tremors, seizures, syncope, speech difficulty and headaches  Hematological: Negative for adenopathy  Does not bruise/bleed easily  Psychiatric/Behavioral: Negative for behavioral problems, confusion, sleep disturbance and suicidal ideas  The patient is not nervous/anxious  Objective:      /68   Ht 5' 8" (1 727 m)   Wt 60 6 kg (133 lb 9 6 oz)   BMI 20 31 kg/m²          Physical Exam   Constitutional: He is oriented to person, place, and time  Under weight   HENT:   Head: Normocephalic and atraumatic     Nose: Nose normal    Mouth/Throat: Oropharynx is clear and moist    Eyes: Pupils are equal, round, and reactive to light  Conjunctivae and EOM are normal    Neck: Normal range of motion  Neck supple  No JVD present  No tracheal deviation present  No thyromegaly present  Cardiovascular: Normal rate, regular rhythm and intact distal pulses  No murmur heard  Pulmonary/Chest: Effort normal and breath sounds normal  He has no wheezes  He has no rales  Abdominal: Soft  Bowel sounds are normal  He exhibits no mass  There is no tenderness  There is no rebound and no guarding  Musculoskeletal: He exhibits no edema, tenderness or deformity  Lymphadenopathy:     He has no cervical adenopathy  Neurological: He is alert and oriented to person, place, and time  He has normal reflexes  He displays normal reflexes  No cranial nerve deficit  He exhibits normal muscle tone  Coordination normal    Skin: Skin is warm and dry  No lesion and no rash noted  Nails show no clubbing  Psychiatric: He has a normal mood and affect   Judgment normal

## 2019-08-08 NOTE — ASSESSMENT & PLAN NOTE
Lab Results   Component Value Date    HGBA1C 9 1 (H) 08/05/2019       Discussed dietary accommodations  Will try to get more whole wheat more complex carbs into his diet, avoid simple carbs and sugars  Has an appointment with Endocrinology in September

## 2019-08-11 DIAGNOSIS — E11.9 CONTROLLED TYPE 2 DIABETES MELLITUS WITHOUT COMPLICATION, WITHOUT LONG-TERM CURRENT USE OF INSULIN (HCC): ICD-10-CM

## 2019-08-11 RX ORDER — GLIMEPIRIDE 4 MG/1
4 TABLET ORAL
Qty: 30 TABLET | Refills: 5 | Status: SHIPPED | OUTPATIENT
Start: 2019-08-11 | End: 2019-11-26

## 2019-08-16 ENCOUNTER — OFFICE VISIT (OUTPATIENT)
Dept: NEUROLOGY | Facility: CLINIC | Age: 59
End: 2019-08-16
Payer: COMMERCIAL

## 2019-08-16 VITALS
SYSTOLIC BLOOD PRESSURE: 110 MMHG | WEIGHT: 134 LBS | HEART RATE: 52 BPM | DIASTOLIC BLOOD PRESSURE: 52 MMHG | HEIGHT: 68 IN | BODY MASS INDEX: 20.31 KG/M2

## 2019-08-16 DIAGNOSIS — R42 INTERMITTENT LIGHTHEADEDNESS: ICD-10-CM

## 2019-08-16 DIAGNOSIS — G71.11 MYOTONIC DYSTROPHY (HCC): Primary | ICD-10-CM

## 2019-08-16 PROCEDURE — 99214 OFFICE O/P EST MOD 30 MIN: CPT | Performed by: PSYCHIATRY & NEUROLOGY

## 2019-08-16 NOTE — ASSESSMENT & PLAN NOTE
He complains of intermittent lightheadedness  It occurs when in the morning when he attempts to stand  It also occurs throughout the day intermittently  He has no evidence orthostatic symptoms  He did recently undergo vestibular therapy with some some some improvement  I have asked him to increase his water intake, drink glass of water in the morning and perform  vestibular exercises        There are no other abnormalities on the exam

## 2019-08-16 NOTE — PROGRESS NOTES
Patient ID: Doug Lake is a 61 y o  male  Assessment/Plan:    Intermittent lightheadedness  He complains of intermittent lightheadedness  It occurs when in the morning when he attempts to stand  It also occurs throughout the day intermittently  He has no evidence orthostatic symptoms  He did recently undergo vestibular therapy with some some some improvement  I have asked him to increase his water intake, drink glass of water in the morning and perform  vestibular exercises  There are no other abnormalities on the exam         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[facility] [place] [facility] )         The myotonic dystrophy is well controlled  Since he is watching  his garndadughter , he has been exercsing less  He notes more fatigability with his muscles  He continues to play golf on a regular basis  I have informed him that exercise is extremely important  He denies any progressive symptoms and therefore there is no need to add medications such as Tegretol  He does have increase in symptoms Tegretol could be considered  We would like to avoid the use of mexiletine due to the potential interaction of his other medications  Tegretol is a possibility but would like to hold off for now      Genetic testing was suggested but not performed due to cost           Diagnoses and all orders for this visit:    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[facility] )    Intermittent lightheadedness       F/u in 4 months     Subjective:    Mr Van Silvestre is a pleasant 63 yo male with history of myotonic dystrophy who also presents with persistent symptoms of lightheadedness  He does  history of vertigo        He was diagnosed in 1988 when he noticed difficulty walking and spasms spasms of his muscles    He had a muscle biopsy  States he had trouble "getting started" in sports and thus had testing done including his brothers and dad, and his father  were all found to have a similar abnormality  but his two brothers are without symptoms    His daughter is now noting some symptoms   He recently had  A granddaughter  2 months ago and she is without symptoms   We ordered genetic testign but it it was too expensive  And he decided not to pursue it  When he was young  then but states over the last 20 years has noted slow gradually diffuse muscle weakness  States most notable with gripping  States he has trouble with muscle relaxation after gripping and complains of decrease in strength  It is apparent as he playing golf       States history heart attack 2004, and has cardiac stent in  States follows with cardiology  he denies any problems with conduction  He was recently evaluated by Cardiology  [facility] has had three blood clots and thus on Xarelto- [facility] has history of this in dad and likely inherited this from him      States significant weight loss despite eating similar to prior and diabetes better controlled now than prior      emg in march of 2018 was consistent with myotonia  But no evidence of a neuropathy      His other complaints include lightheadedness  This occurs more predominantly in the morning when he is attempting to stand  It also occurs when moving his head to bend  He does drink on a regular water on a regular basis and avoid H sugar did water due to his history of diabetes  He denies any double vision lateralizing weakness or numbness  He has been treated with vestibular therapy with some relief  He was given exercises which he performed at home for one week but he has not perform this on a regular basis  He is now taking care of his granddaughter on daily basis  he plays golf at least two or 3 times a week        He   was a  and retired in November of 2017   he denies any falls and does reports some intermittent problem with his balance        On calcium and vit d ,                   The following portions of the patient's history were reviewed and updated as appropriate: He  has a past medical history of CAD (coronary artery disease), Congenital myotonia, Diabetes ([facility] 75 ), DVT (deep vein thrombosis) in pregnancy [facility]), 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[facility] 75 ) (12/06/2017), Pancreatitis, Sleep apnea, and Superficial thrombophlebitis  He  has a past surgical history that includes Cholecystectomy; Coronary angioplasty with stent; Colonoscopy; Circumcision; Inguinal hernia repair; ORIF radial shaft fracture; ORIF ulnar / radial shaft fracture; and Tonsillectomy and adenoidectomy  His family history includes Brain cancer in his mother; Cancer in his mother; Clotting disorder in his mother; Coronary artery disease in his paternal uncle; Heart disease in his mother; Hyperlipidemia in his mother  He  reports that he has never smoked  He has never used smokeless tobacco  He reports that he drinks alcohol  He reports that he does not use drugs    Current Outpatient Medications   Medication Sig Dispense Refill    acyclovir (ZOVIRAX) 200 mg capsule TAKE ONE CAPSULE 5 TIMES DAILY FOR 5 DAYS AS NEEDED FOR COLD SORES   1    aspirin (ECOTRIN LOW STRENGTH) 81 mg EC tablet Take 81 mg by mouth daily      atorvastatin (LIPITOR) 40 mg tablet TAKE 1 TABLET DAILY 90 tablet 1    glimepiride (AMARYL) 4 mg tablet TAKE 1 TABLET (4 MG TOTAL) BY MOUTH DAILY WITH BREAKFAST 30 tablet 5    glucose blood test strip Test twice daily dx: E11 9 100 each 1    JANUVIA 50 MG tablet Take 50 mg by mouth daily  1    meclizine (ANTIVERT) 25 mg tablet Take 1 tablet (25 mg total) by mouth every 8 (eight) hours as needed for dizziness 40 tablet 2    metFORMIN (GLUCOPHAGE) 1000 MG tablet Take 1 tablet (1,000 mg total) by mouth 2 (two) times a day with meals 180 tablet 1    nateglinide (STARLIX) 120 mg tablet Take 1 tablet (120 mg total) by mouth 3 (three) times a day before meals 90 tablet 5    tadalafil (CIALIS) 5 MG tablet Take 1 tablet (5 mg total) by mouth daily as needed for erectile dysfunction 20 tablet 1    valACYclovir (VALTREX) 1,000 mg tablet 1 tablet now then repeat in 12 hours 2 tablet 0    XARELTO 20 MG tablet TAKE 1 TABLET DAILY WITH BREAKFAST  90 tablet 1     No current facility-administered medications for this visit  He has No Known Allergies            Objective:    Blood pressure 110/52, pulse (!) 52, height 5' 8" (1 727 m), weight 60 8 kg (134 lb)  Physical Exam   Constitutional: He appears well-developed  HENT:   Head: Normocephalic  Eyes: Pupils are equal, round, and reactive to light  Neurological:   Reflex Scores:       Tricep reflexes are 2+ on the right side and 2+ on the left side  Bicep reflexes are 2+ on the right side and 2+ on the left side  Patellar reflexes are 2+ on the right side and 2+ on the left side  Achilles reflexes are 1+ on the right side and 1+ on the left side  Psychiatric: His speech is normal    Vitals reviewed  Neurological Exam  Mental Status   Oriented to person, place, time and situation  Speech is normal  Language is fluent with no aphasia  Cranial Nerves  CN III, IV, VI: Pupils equal round and reactive to light bilaterally  Motor    Increased muscle tone  Normal muscle bulk throughout  No fasciculations present  Increased muscle tone  No abnormal involuntary movements  He had normal strength in the upper and lower extremities however with sustained activity he developed increasing time  He also had difficulty with relaxation of his hand   He was also noted to have a bilateral facial droopiness  There is no evidence of ptosis    Sensory  Light touch is normal in upper and lower extremities  Temperature is normal in upper and lower extremities       Reflexes                                           Right                      Left  Biceps                                 2+                         2+  Triceps                                2+                         2+  Patellar                                2+                         2+  Achilles                                1+ 1+  Plantar                           Downgoing                Downgoing    Right pathological reflexes: Polo's absent  Left pathological reflexes: Polo's absent  Coordination  Right: Finger-to-nose normal   Left: Finger-to-nose normal     Gait Able to rise from chair without using arms  No Romberg sign was noted  He did notice stiffness when he walks for more than 10 feet  Review of systems obtained by the medical assistant as below was reviewed with the patient at today's appointment    ROS:    Review of Systems   Constitutional: Negative  Negative for appetite change and fever  HENT: Negative  Negative for hearing loss, tinnitus, trouble swallowing and voice change  Eyes: Negative  Negative for photophobia and pain  Respiratory: Negative  Negative for shortness of breath  Cardiovascular: Negative  Negative for palpitations  Gastrointestinal: Positive for diarrhea  Negative for nausea and vomiting  Endocrine: Negative  Negative for cold intolerance and heat intolerance  Genitourinary: Negative  Negative for dysuria, frequency and urgency  Musculoskeletal: Negative  Negative for myalgias and neck pain  Skin: Negative  Negative for rash  Neurological: Positive for dizziness and light-headedness  Negative for tremors, seizures, syncope, facial asymmetry, speech difficulty, weakness, numbness and headaches  Hematological: Negative  Does not bruise/bleed easily  Psychiatric/Behavioral: Positive for confusion (Memory problems)  Negative for hallucinations and sleep disturbance

## 2019-08-16 NOTE — ASSESSMENT & PLAN NOTE
The myotonic dystrophy is well controlled  Since he is watching  his garndadughter , he has been exercsing less  He notes more fatigability with his muscles  He continues to play golf on a regular basis  I have informed him that exercise is extremely important  He denies any progressive symptoms and therefore there is no need to add medications such as Tegretol  He does have increase in symptoms Tegretol could be considered  We would like to avoid the use of mexiletine due to the potential interaction of his other medications    Tegretol is a possibility but would like to hold off for now      Genetic testing was suggested but not performed due to cost

## 2019-08-30 ENCOUNTER — TELEPHONE (OUTPATIENT)
Dept: FAMILY MEDICINE CLINIC | Facility: CLINIC | Age: 59
End: 2019-08-30

## 2019-08-30 NOTE — TELEPHONE ENCOUNTER
How long ago did he have his DVT? He may not need to be on this any longer  I am going to double check with Cardiology as well

## 2019-09-04 ENCOUNTER — OFFICE VISIT (OUTPATIENT)
Dept: FAMILY MEDICINE CLINIC | Facility: CLINIC | Age: 59
End: 2019-09-04
Payer: COMMERCIAL

## 2019-09-04 VITALS
DIASTOLIC BLOOD PRESSURE: 62 MMHG | WEIGHT: 136.6 LBS | SYSTOLIC BLOOD PRESSURE: 110 MMHG | BODY MASS INDEX: 20.7 KG/M2 | HEIGHT: 68 IN

## 2019-09-04 DIAGNOSIS — IMO0001 UNCONTROLLED TYPE 2 DIABETES MELLITUS WITHOUT COMPLICATION, WITHOUT LONG-TERM CURRENT USE OF INSULIN: ICD-10-CM

## 2019-09-04 DIAGNOSIS — R07.89 CHEST WALL PAIN: Primary | ICD-10-CM

## 2019-09-04 PROCEDURE — 99213 OFFICE O/P EST LOW 20 MIN: CPT | Performed by: FAMILY MEDICINE

## 2019-09-04 RX ORDER — PREDNISONE 10 MG/1
TABLET ORAL
Qty: 30 TABLET | Refills: 0 | Status: SHIPPED | OUTPATIENT
Start: 2019-09-04 | End: 2019-11-21

## 2019-09-04 NOTE — ASSESSMENT & PLAN NOTE
Lab Results   Component Value Date    HGBA1C 9 1 (H) 08/05/2019     Sugars will elevate with prednisone, follow up with endocrinology

## 2019-09-04 NOTE — PROGRESS NOTES
Assessment/Plan:    Uncontrolled type 2 diabetes mellitus without complication, without long-term current use of insulin ([facility])  Lab Results   Component Value Date    HGBA1C 9 1 (H) 08/05/2019     Sugars will elevate with prednisone, follow up with endocrinology  Diagnoses and all orders for this visit:    Chest wall pain  -     predniSONE 10 mg tablet; 5 x 2 days, 4 x 2 days, 3 x 2 days,2 x 2 days, 1 x 2 days  -     XR chest pa & lateral; Future    Uncontrolled type 2 diabetes mellitus without complication, without long-term current use of insulin ([facility])          Subjective:   Chief Complaint   Patient presents with    Arm Pain     right x 3 wks  Patient ID: Carmen Matute is a 61 y o  male  Patient is a 51-year-old male presenting to the office complaining of right anterior chest wall discomfort for about 3-4 weeks  He denies any recent trauma or overuse  Denies any triggering episode that he can think of  It is a burning / aching sensation mostly in the pectoral muscle  He has never had any discomfort like this before  He has a history of myotonic dystrophy, but then and has never been painful before  The following portions of the patient's history were reviewed and updated as appropriate: allergies, current medications, past family history, past medical history, past social history, past surgical history and problem list     Review of Systems   Constitutional: Negative for appetite change, chills, diaphoresis, fatigue, fever and unexpected weight change  HENT: Negative for congestion, ear pain, hearing loss, nosebleeds, postnasal drip, rhinorrhea, sinus pressure, sore throat, tinnitus and trouble swallowing  Eyes: Negative for photophobia, pain, discharge, redness, itching and visual disturbance  Respiratory: Negative for cough, chest tightness, shortness of breath and wheezing  Cardiovascular: Positive for chest pain (  Right lateral chest wall)   Negative for palpitations and leg swelling  Denies orthopnea , dyspnea on exertion   Gastrointestinal: Negative for abdominal distention, abdominal pain, blood in stool, constipation, diarrhea, nausea and vomiting  Endocrine: Negative  Genitourinary: Negative for difficulty urinating, dysuria, flank pain, frequency, hematuria and urgency  Denies nocturia , erectile dysfunction   Musculoskeletal: Negative for arthralgias, back pain, gait problem, joint swelling and myalgias  Skin: Negative for pallor, rash and wound  Denies skin lesions   Allergic/Immunologic: Negative for environmental allergies, food allergies and immunocompromised state  Neurological: Negative for dizziness, tremors, seizures, syncope, speech difficulty, weakness, numbness and headaches  Hematological: Negative for adenopathy  Does not bruise/bleed easily  Psychiatric/Behavioral: Negative for behavioral problems, confusion, sleep disturbance and suicidal ideas  The patient is not nervous/anxious  Objective:      /62   Ht 5' 8" (1 727 m)   Wt 62 kg (136 lb 9 6 oz)   BMI 20 77 kg/m²          Physical Exam   Constitutional: He is oriented to person, place, and time  He appears well-developed and well-nourished  HENT:   Head: Normocephalic and atraumatic  Nose: Nose normal    Mouth/Throat: Oropharynx is clear and moist    Eyes: Pupils are equal, round, and reactive to light  Conjunctivae and EOM are normal    Neck: Normal range of motion  Neck supple  Cardiovascular: Normal rate, regular rhythm and intact distal pulses  No murmur heard  Pulmonary/Chest: Effort normal and breath sounds normal  He has no wheezes  He has no rales  Abdominal: Soft  Bowel sounds are normal  There is no tenderness  Musculoskeletal: He exhibits tenderness  He exhibits no edema or deformity  Right pectoral tenderness, no discrete mass  Range of motion of the shoulder is normal   There is minimal crepitance in the right shoulder  Lymphadenopathy:     He has no cervical adenopathy  Neurological: He is alert and oriented to person, place, and time  He has normal reflexes  No cranial nerve deficit  Skin: Skin is warm and dry  No lesion and no rash noted  Nails show no clubbing  Psychiatric: He has a normal mood and affect  Judgment normal    Vitals reviewed

## 2019-09-07 ENCOUNTER — APPOINTMENT (OUTPATIENT)
Dept: RADIOLOGY | Age: 59
End: 2019-09-07
Payer: COMMERCIAL

## 2019-09-07 DIAGNOSIS — R07.89 CHEST WALL PAIN: ICD-10-CM

## 2019-09-07 PROCEDURE — 71046 X-RAY EXAM CHEST 2 VIEWS: CPT

## 2019-09-16 DIAGNOSIS — R07.89 CHEST WALL PAIN: Primary | ICD-10-CM

## 2019-09-16 DIAGNOSIS — M25.511 RIGHT SHOULDER PAIN, UNSPECIFIED CHRONICITY: ICD-10-CM

## 2019-09-23 ENCOUNTER — EVALUATION (OUTPATIENT)
Dept: PHYSICAL THERAPY | Facility: REHABILITATION | Age: 59
End: 2019-09-23
Payer: COMMERCIAL

## 2019-09-23 DIAGNOSIS — M25.511 ACUTE PAIN OF RIGHT SHOULDER: Primary | ICD-10-CM

## 2019-09-23 DIAGNOSIS — R07.89 CHEST WALL PAIN: ICD-10-CM

## 2019-09-23 PROCEDURE — 97162 PT EVAL MOD COMPLEX 30 MIN: CPT | Performed by: PHYSICAL THERAPIST

## 2019-09-23 PROCEDURE — 97110 THERAPEUTIC EXERCISES: CPT | Performed by: PHYSICAL THERAPIST

## 2019-09-23 NOTE — PROGRESS NOTES
PT Evaluation     Today's date: 2019  Patient name: Deepak Banuelos  : 1960  MRN: [id number]  Referring provider: Sherie Maria DO  Dx:   Encounter Diagnosis     ICD-10-CM    1  Acute pain of right shoulder M25 511    2  Chest wall pain R07 89                   Assessment  Assessment details: Pt is a 61year-old right handed male presenting to PT with one month history of right axilla pain with insidious onset  Pt presents with deficits in posture, right shoulder range of motion, joint mobility, flexibility and strength  Pt with significant inferior glenoid and humeral head tenderness  Pt may benefit from diagnostic imaging for right shoulder to rule out pathology  Pt is a good candidate for skilled PT intervention and will benefit from treatment in order to address his limitations and to restore maximal function  Impairments: abnormal coordination, abnormal or restricted ROM, activity intolerance, impaired physical strength and pain with function  Understanding of Dx/Px/POC: good   Prognosis: good    Goals  Short Term Goals: To be achieved by 4 weeks    1) Patient to be independent with basic HEP  2) Decrease pain to 4/10 at worst   3) Increase ROM by 5 degrees or greater in all deficient planes  4) Increase strength by 1/2 MMT grade or greater in all deficient planes  Long Term Goals: To be achieved by discharge    1) FOTO equal to or greater than 86   2) Patient to be independent with comprehensive HEP  3) Decrease pain to 2/10 at worst  for improved quality of life  4) Increase strength to 5/5 MMT grade in all deficient planes to improve a/iadls  5) Increase ROM to within normal limits          Plan  Patient would benefit from: PT eval and skilled PT  Planned modality interventions: cryotherapy and thermotherapy: hydrocollator packs  Planned therapy interventions: IADL retraining, body mechanics training, flexibility, functional ROM exercises, home exercise program, neuromuscular re-education, manual therapy, postural training, strengthening, stretching, therapeutic activities, therapeutic exercise and joint mobilization  Frequency: 2x week  Duration in visits: 8  Duration in weeks: 4  Treatment plan discussed with: patient        Subjective Evaluation    History of Present Illness  Mechanism of injury: Pt is a 61year-old right handed male presenting to PT with one month history of right axilla pain  Pt denies any preceding event, trauma or injury  Pt reports he woke up one day with the pain, and it has not improved or worsened over the past month  Pt went to PCP and right shoulder x-ray performed which was negative  Pt prescribed Prednisone for pain, which did not decrease his pain  Pt referred to OPPT  Pain Location: right axilla and lateral pec region    Pain Type: constant, burning, brush burn    Pain Intensity:  Current: 4  Best: 2  Worst: 4      Pt reports no limitations with his current daily routine  Pt reports he watches his 16 month old grand-daughter 5 days a week  Pt denies sleep disturbance secondary to pain       Pt reports decreased pain with: rest            Not a recurrent problem   Quality of life: good      Diagnostic Tests  X-ray: normal  Patient Goals  Patient goals for therapy: decreased pain          Objective     Postural Observations  Seated posture: poor  Standing posture: fair    Additional Postural Observation Details  Moderate forward head, bilateral rounded shoulders, increased thoracic kyphosis    Cervical/Thoracic Screen   Cervical range of motion within normal limits    Neurological Testing     Sensation     Shoulder   Left Shoulder   Intact: light touch    Right Shoulder   Intact: light touch    Active Range of Motion   Left Shoulder   Flexion: 155 degrees   Abduction: 145 degrees   External rotation BTH: T2   Internal rotation BTB: T10     Right Shoulder   Flexion: 145 degrees with pain  Abduction: 135 degrees   External rotation BTH: T1 with pain  Internal rotation BTB: T12     Passive Range of Motion   Left Shoulder   Flexion: 160 degrees   Abduction: 160 degrees   External rotation 90°: 90 degrees   Internal rotation 90°: 25 degrees     Right Shoulder   Flexion: 150 degrees with pain  Abduction: 140 degrees with pain  External rotation 90°: 90 degrees   Internal rotation 90°: 15 degrees     Joint Play   Left Shoulder  Joints within functional limits are the anterior capsule, posterior capsule, inferior capsule, AC joint and SC joint  Hypomobile in the thoracic spine  Right Shoulder  Joints within functional limits are the anterior capsule, AC joint and SC joint  Hypomobile in the posterior capsule, inferior capsule and thoracic spine  Strength/Myotome Testing     Left Shoulder     Planes of Motion   Flexion: 5   Abduction: 5   External rotation at 0°: 4+   Internal rotation at 0°: 5     Isolated Muscles   Biceps: 5   Lower trapezius: 3+   Middle trapezius: 3+   Triceps: 5     Right Shoulder     Planes of Motion   Flexion: 4+   Abduction: 4+   External rotation at 0°: 4   Internal rotation at 0°: 4+     Isolated Muscles   Biceps: 4+   Lower trapezius: 3+   Middle trapezius: 3+   Triceps: 4+     Tests     Left Shoulder   Negative drop arm, empty can, external rotation lag sign, Neer's and Speed's  Right Shoulder   Positive Speed's  Negative drop arm, empty can, external rotation lag sign and Neer's          Precautions: myotonic dystrophy, DM, CAD, CABG, h/o DVT

## 2019-09-26 ENCOUNTER — OFFICE VISIT (OUTPATIENT)
Dept: PHYSICAL THERAPY | Facility: REHABILITATION | Age: 59
End: 2019-09-26
Payer: COMMERCIAL

## 2019-09-26 ENCOUNTER — CONSULT (OUTPATIENT)
Dept: ENDOCRINOLOGY | Facility: CLINIC | Age: 59
End: 2019-09-26
Payer: COMMERCIAL

## 2019-09-26 ENCOUNTER — OFFICE VISIT (OUTPATIENT)
Dept: DIABETES SERVICES | Facility: CLINIC | Age: 59
End: 2019-09-26

## 2019-09-26 VITALS
DIASTOLIC BLOOD PRESSURE: 68 MMHG | HEIGHT: 68 IN | BODY MASS INDEX: 20.34 KG/M2 | SYSTOLIC BLOOD PRESSURE: 114 MMHG | WEIGHT: 134.2 LBS | HEART RATE: 58 BPM

## 2019-09-26 DIAGNOSIS — I25.10 ARTERIOSCLEROSIS OF CORONARY ARTERY: ICD-10-CM

## 2019-09-26 DIAGNOSIS — Z79.4 TYPE 2 DIABETES MELLITUS WITH HYPERGLYCEMIA, WITH LONG-TERM CURRENT USE OF INSULIN (HCC): Primary | ICD-10-CM

## 2019-09-26 DIAGNOSIS — E11.65 TYPE 2 DIABETES MELLITUS WITH HYPERGLYCEMIA, WITH LONG-TERM CURRENT USE OF INSULIN (HCC): ICD-10-CM

## 2019-09-26 DIAGNOSIS — IMO0002 UNCONTROLLED TYPE 2 DIABETES MELLITUS WITH CIRCULATORY DISORDER, WITHOUT LONG-TERM CURRENT USE OF INSULIN: ICD-10-CM

## 2019-09-26 DIAGNOSIS — M25.511 ACUTE PAIN OF RIGHT SHOULDER: Primary | ICD-10-CM

## 2019-09-26 DIAGNOSIS — Z79.4 TYPE 2 DIABETES MELLITUS WITH HYPERGLYCEMIA, WITH LONG-TERM CURRENT USE OF INSULIN (HCC): ICD-10-CM

## 2019-09-26 DIAGNOSIS — R07.89 CHEST WALL PAIN: ICD-10-CM

## 2019-09-26 DIAGNOSIS — E11.65 TYPE 2 DIABETES MELLITUS WITH HYPERGLYCEMIA, WITH LONG-TERM CURRENT USE OF INSULIN (HCC): Primary | ICD-10-CM

## 2019-09-26 DIAGNOSIS — E78.2 MIXED HYPERLIPIDEMIA: Primary | ICD-10-CM

## 2019-09-26 PROCEDURE — 97110 THERAPEUTIC EXERCISES: CPT | Performed by: PHYSICAL THERAPIST

## 2019-09-26 PROCEDURE — 97140 MANUAL THERAPY 1/> REGIONS: CPT | Performed by: PHYSICAL THERAPIST

## 2019-09-26 PROCEDURE — 99244 OFF/OP CNSLTJ NEW/EST MOD 40: CPT | Performed by: INTERNAL MEDICINE

## 2019-09-26 NOTE — PROGRESS NOTES
New Patient Progress Note      No chief complaint on file  Referring Provider  Tomeka Vincent Do  [address]  [address] [facility], [address] [facility]     History of Present Illness:   Connor Chavez is a 61 y o  male with a history of type 2 diabetes without long term use of insulin since long time  Diabetes course has been stable  Reports complications of diabetes  Denies recent illness or hospitalizations  Denies recent severe hypoglycemic or severe hyperglycemic episodes  Denies any issues with his current regimen  home glucose monitoring: are performed regularly  He checks blood sugars only in the morning before breakfast    Lab Results   Component Value Date    CREATININE 0 54 (L) 08/05/2019     Fasting blood sugars are in 180-250 range    Current regimen:   Glimepiride 4 mg daily, Januvia 50 mg daily, metformin 1000 mg twice a day, Starlix 120 mg 3 times daily    compliant most of the timedenies any side effects from medications  Denies Hypoglycemic episodes: No rare  H/o of hypoglycemia causing hospitalization or Intervention such as glucagon injection  or ambulance call :  No  Hypoglycemia symptoms: sweating    He tries to follow low carbohydrate diet  He has not seen nutrition is before                further diabetic ROS: no polyuria or polydipsia, no chest pain, dyspnea or TIAs, no numbness, tingling or pain in extremities, no unusual visual symptoms        Opthamology: sees regularly, no retinopathy  Podiatry:   Feet exam in by his PCP      Has hyperlipidemia: followed by PCP; on statin - tolerating well, no myalgias  compliant most of the time  denies any side effects from medications    He has history of pancreatitis x2 previously  He has history of pancreatitis before starting Januvia, currently is not taking Januvia because he did not feel well on Januvia    Lab Results   Component Value Date    HGBA1C 9 1 (H) 08/05/2019       Patient Active Problem List   Diagnosis    Leukopenia    Thrombocytopenia ([place] [address] )    Arteriosclerosis of coronary artery    Erectile dysfunction of non-organic origin    Uncontrolled type 2 diabetes mellitus without complication, without long-term current use of insulin (HCC)    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[place] [address] )    Mixed hyperlipidemia    Polyneuropathy    BPPV (benign paroxysmal positional vertigo), left    Intermittent lightheadedness      Past Medical History:   Diagnosis Date    CAD (coronary artery disease)     Congenital myotonia     Diabetes ([place] [address] )     DVT (deep vein thrombosis) in pregnancy     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[place] [address] ) 12/06/2017    Pancreatitis     Sleep apnea     Superficial thrombophlebitis       Past Surgical History:   Procedure Laterality Date    CHOLECYSTECTOMY      CIRCUMCISION      Elective    COLONOSCOPY      complete, polyps 2 years ago    CORONARY ANGIOPLASTY WITH STENT PLACEMENT      stent to RCA 2004    INGUINAL HERNIA REPAIR      ORIF RADIAL SHAFT FRACTURE      Open Treatment of Multiple Fractures of the Radial Shaft    ORIF ULNAR / RADIAL SHAFT FRACTURE      Open Treatment of Multiple Fractures of the Ulnar Shaft    TONSILLECTOMY AND ADENOIDECTOMY        Family History   Problem Relation Age of Onset    Brain cancer Mother     Clotting disorder Mother     Heart disease Mother     Cancer Mother     Hyperlipidemia Mother     Coronary artery disease Paternal Uncle      Social History     Tobacco Use    Smoking status: Never Smoker    Smokeless tobacco: Never Used   Substance Use Topics    Alcohol use: Yes     Comment: social     No Known Allergies      Current Outpatient Medications:     acyclovir (ZOVIRAX) 200 mg capsule, TAKE ONE CAPSULE 5 TIMES DAILY FOR 5 DAYS AS NEEDED FOR COLD SORES , Disp: , Rfl: 1    aspirin (ECOTRIN LOW STRENGTH) 81 mg EC tablet, Take 81 mg by mouth daily, Disp: , Rfl:     atorvastatin (LIPITOR) 40 mg tablet, TAKE 1 TABLET DAILY, Disp: 90 tablet, Rfl: 1    glimepiride (AMARYL) 4 mg tablet, TAKE 1 TABLET (4 MG TOTAL) BY MOUTH DAILY WITH BREAKFAST, Disp: 30 tablet, Rfl: 5    glucose blood test strip, Test twice daily dx: E11 9, Disp: 100 each, Rfl: 1    metFORMIN (GLUCOPHAGE) 1000 MG tablet, Take 1 tablet (1,000 mg total) by mouth 2 (two) times a day with meals, Disp: 180 tablet, Rfl: 1    tadalafil (CIALIS) 5 MG tablet, Take 1 tablet (5 mg total) by mouth daily as needed for erectile dysfunction, Disp: 20 tablet, Rfl: 1    XARELTO 20 MG tablet, TAKE 1 TABLET DAILY WITH BREAKFAST , Disp: 90 tablet, Rfl: 1    insulin glargine (LANTUS SOLOSTAR) 100 units/mL injection pen, Take 15 units in AM ( starting from 9/28/2019 ), Disp: 5 pen, Rfl: 3    Insulin Pen Needle 32G X 4 MM MISC, Use once daily, Disp: 50 each, Rfl: 3    meclizine (ANTIVERT) 25 mg tablet, Take 1 tablet (25 mg total) by mouth every 8 (eight) hours as needed for dizziness (Patient not taking: Reported on 9/23/2019), Disp: 40 tablet, Rfl: 2    predniSONE 10 mg tablet, 5 x 2 days, 4 x 2 days, 3 x 2 days,2 x 2 days, 1 x 2 days  (Patient not taking: Reported on 9/23/2019), Disp: 30 tablet, Rfl: 0    valACYclovir (VALTREX) 1,000 mg tablet, 1 tablet now then repeat in 12 hours (Patient not taking: Reported on 9/26/2019), Disp: 2 tablet, Rfl: 0  Review of Systems   Constitutional: Negative for activity change, diaphoresis, fatigue, fever and unexpected weight change  HENT: Negative  Eyes: Negative for visual disturbance  Respiratory: Negative for cough, chest tightness and shortness of breath  Cardiovascular: Negative for chest pain, palpitations and leg swelling  Gastrointestinal: Negative for abdominal pain, constipation, diarrhea, nausea and vomiting  Endocrine: Negative for cold intolerance, heat intolerance, polydipsia, polyphagia and polyuria  Genitourinary: Negative for dysuria, enuresis, frequency and urgency  Musculoskeletal: Negative for arthralgias and myalgias  Skin: Negative for pallor, rash and wound  Allergic/Immunologic: Negative  Neurological: Negative for dizziness, tremors and numbness  Hematological: Negative  Psychiatric/Behavioral: Negative  Physical Exam:  Body mass index is 20 41 kg/m²  /68   Pulse 58   Ht 5' 8" (1 727 m)   Wt 60 9 kg (134 lb 3 2 oz)   BMI 20 41 kg/m²    Wt Readings from Last 3 Encounters:   09/26/19 60 9 kg (134 lb 3 2 oz)   09/04/19 62 kg (136 lb 9 6 oz)   08/16/19 60 8 kg (134 lb)       Physical Exam   Constitutional: He is oriented to person, place, and time  He appears well-developed and well-nourished  No distress  HENT:   Head: Normocephalic and atraumatic  Eyes: Pupils are equal, round, and reactive to light  Conjunctivae and EOM are normal  Right eye exhibits no discharge  Left eye exhibits no discharge  Neck: Normal range of motion  Neck supple  No tracheal deviation present  No thyromegaly present  Cardiovascular: Normal rate, regular rhythm, normal heart sounds and intact distal pulses  Exam reveals no friction rub  No murmur heard  Pulmonary/Chest: Effort normal and breath sounds normal  He has no rales  He exhibits no tenderness  Abdominal: Soft  Bowel sounds are normal  He exhibits no distension  Musculoskeletal: Normal range of motion  He exhibits no edema, tenderness or deformity  Lymphadenopathy:     He has no cervical adenopathy  Neurological: He is alert and oriented to person, place, and time  He has normal reflexes  He displays normal reflexes  Coordination normal    Skin: Skin is warm and dry  No rash noted  He is not diaphoretic  No erythema  Psychiatric: He has a normal mood and affect  His behavior is normal    Vitals reviewed      Diabetic Foot Exam    Labs:   No components found for: HA1C  No components found for: GLU    Lab Results   Component Value Date    CREATININE 0 54 (L) 08/05/2019    CREATININE 0 65 05/01/2019    CREATININE 0 61 01/29/2019    BUN 15 08/05/2019     01/09/2018    K 4 1 08/05/2019  08/05/2019    CO2 30 08/05/2019     eGFR   Date Value Ref Range Status   08/05/2019 115 ml/min/1 73sq m Final     No components found for: [facility]    Lab Results   Component Value Date    CHOL 133 01/09/2018    HDL 50 08/05/2019    TRIG 86 08/05/2019       Lab Results   Component Value Date    ALT 40 08/05/2019    AST 19 08/05/2019    ALKPHOS 67 08/05/2019    BILITOT 1 4 (H) 01/09/2018       No results found for: TSH, FREET4, TSI    Impression:  1  Mixed hyperlipidemia    2  Type 2 diabetes mellitus with hyperglycemia, with long-term current use of insulin ([facility])    3  Arteriosclerosis of coronary artery    4  Uncontrolled type 2 diabetes mellitus with circulatory disorder, without long-term current use of insulin ([facility] )           Plan:    Diagnoses and all orders for this visit:    Mixed hyperlipidemia  Lab Results   Component Value Date    LDLCALC 64 08/05/2019       Continue statin, Lipitor 40 mg daily    Type 2 diabetes mellitus with hyperglycemia, with long-term current use of insulin ([facility])    Lab Results   Component Value Date    HGBA1C 9 1 (H) 08/05/2019    A1c is uncontrolled, suggestive of hyperglycemia  Blood sugar is elevated in the office  Discussed with patient about starting insulin Lantus 15 units in the morning  Discontinue Starlix  Continue Amaryl 4 mg daily with breakfast  Also discontinue Januvia because of history of pancreatitis  Discussed to check blood sugar 2-3 times daily and goal for blood sugar is 80 to 140 mg/dL  Will also order lisbet antibodies, islet cell antibody and C-peptide to assess pancreatic reserve(patient has lost tremendous amount of weight, which is not typical of type 2 diabetes) we need to rule out type 1 diabetes/KAMI  Discussed to send log in 2 weeks  Will refer to nutrition therapy, patient refused to go to diabetes classes  -     Ambulatory referral to Endocrinology  -     insulin glargine (LANTUS SOLOSTAR) 100 units/mL injection pen;  Take 15 units in AM ( starting from 9/28/2019 )  -     Insulin Pen Needle 32G X 4 MM MISC; Use once daily  -     Ambulatory referral to medical nutrition therapy for diabetes; Future  -     Basic metabolic panel; Future  -     Hemoglobin A1C; Future  -     Microalbumin / creatinine urine ratio; Future  -     C-peptide; Future  -     Glucose, fasting- Lab Collect; Future  -     Glutamic acid decarboxylase; Future  -     Anti-islet cell antibody; Future  -     Ambulatory referral to Diabetic Education; Future    Arteriosclerosis of coronary artery    Continue to follow up with Cardiology  Discussed importance of  diabetes control to prevent worsening of coronary artery disease    Uncontrolled type 2 diabetes mellitus with circulatory disorder, without long-term current use of insulin ([facility])    Lab Results   Component Value Date    HGBA1C 9 1 (H) 08/05/2019       -     insulin glargine (LANTUS SOLOSTAR) 100 units/mL injection pen; Take 15 units in AM ( starting from 9/28/2019 )  -     Insulin Pen Needle 32G X 4 MM MISC; Use once daily  -     Ambulatory referral to medical nutrition therapy for diabetes; Future  -     Basic metabolic panel; Future  -     Hemoglobin A1C; Future  -     Microalbumin / creatinine urine ratio; Future  -     Ambulatory referral to Diabetic Education; Future          Discussed with the patient and all questioned fully answered  He will call me if any problems arise      Counseled patient on diagnostic results, prognosis, risk and benefit of treatment options, instruction for management, importance of treatment compliance, Risk  factor reduction and impressions      Yrn Lopez MD

## 2019-09-26 NOTE — PROGRESS NOTES
Insulin Instruction  Met with Angelica Sabillon for initial insulin start education  Mary Lou Evangelista is currently taking the following diabetes medications: metformin 1,000 mg bi  d  with meals, Januvia 50 mg daily, glimepiride 4 mg with breakfast, starlix 120 mg t i d  a c  Once starting the insulin, Mary Lou Evangelista will stop taking starlix and januvia per instructions from Dr Marciano Hall MD   Prescribed Insulin: Lantus 15 units in AM     Patient instructed on: Insulin type; timing of insulin; site selection and rotation; proper technique of injection and insulin administration, safe needle disposal; medication storage; side effects and precautions of insulin  Comments: Mary Lou Evangelista has good understanding of insulin usage and demonstrated use of injection technique in the office  Patient instruction completed on Hypoglycemia: definition/risk, causes/symptoms, treatment, prevention of hypoglycemia, when to notify physician and EMS  Comments: Mary Lou Evangelista has good understanding of hypoglycemia and it's treatment  Patient instruction completed on Hyperglycemia: definition, causes/symptoms, treatment, prevention of hypoglycemia, when to notify physician and EMS  Comments: Mary Lou Evangelista has good understanding of hyperglycemia and treatment  Diabetes Education Record: Mary Lou Evangelista was given the following education material: Fast 15 List, Hypoglycemia Fact Sheet, Hyperglycemia Fact Sheet, Lantus instruction handout from the company  Patient response to instruction  Comprehension: good  Motivation: fair  Expected Compliance: fair  Readiness: preparation  Barriers to Learning: none known     Start- Stop: 3:00 pm - 3:24 pm  Total Minutes: 24 Minutes  Group or Individual Instruction: DSMT - I  Other: Marciano Hall MD        Thank you for referring your patient to [facility], it was a pleasure working with them today  Please feel free to call with any questions or concerns      Milagros Veronica, Mitesh Rosales [facility] [address] [facility] [id number]

## 2019-09-26 NOTE — PATIENT INSTRUCTIONS
Start administering Lantus injection of 15 units in the morning as instructed at today's visit  If symptoms of low blood sugar occur, test blood sugar  If blood sugar is below 70 mg/dl, follow 15/15 rule

## 2019-09-26 NOTE — PROGRESS NOTES
Daily Note     Today's date: 2019  Patient name: Brock Sanabria  : 1960  MRN: [id number]  Referring provider: Marta Stone DO  Dx:   Encounter Diagnosis     ICD-10-CM    1  Acute pain of right shoulder M25 511    2  Chest wall pain R07 89                   Subjective:  Pt reports he performed HEP without adverse reaction, however, no reduction in right axilla pain  He reports his pain is worst in the morning with waking, has a tendency to sleep on his right side  He reports the burning pain comes and goes throughout the day, not associated with any specific trigger or activity  Precautions: myotonic dystrophy, DM, CAD, CABG, h/o DVT    Objective: See treatment diary  Assessment: Pt with good tolerance to addition of manual techniques and progression of therapeutic exercise program  Continued assessment revealed irritation of axillary nerve most likely contributing to patient's symptoms  Symptoms reproduced by deep palpation of axilla with patient in supine position and hands interlocked behind his head  He reported improved right shoulder mobility following manual techniques, however, noted unchanged right axillary pain  Pt will benefit from continued skilled PT intervention in order to address her remaining limitations and to restore maximal function  Plan: Continue per plan of care  Progress note during next visit  Progress treatment as tolerated         See media at D/C for exercise diary

## 2019-09-26 NOTE — PATIENT INSTRUCTIONS
Hypoglycemia instructions   Lakia Marquez  9/26/2019  [id number]    Low Blood Sugar    Steps to treat low blood sugar  1  Test blood sugar if you have symptoms of low blood sugar:   Low Blood Sugar Symptoms:  o Sweaty  o Dizzy  o Rapid heartbeat  o Shaky    o Bad mood  o Hungry      2  Treat blood sugar less than 70 with 15 grams of fast-acting carbohydrate:   Examples of 15 grams Fast-Acting Carbohydrate:  o 4 oz juice  o 4 oz regular soda  o 3-4 glucose tablets (chew)  o 3-4 hard candies (chew)              3    Wait 15 minutes and test your blood sugar again           4   If blood sugar is less than 100, repeat steps 2-3       5  When your blood sugar is 100 or more, eat a snack if it will be longer than one hour until your next meal  The snack should be 15 grams of carbohydrate and a protein:   Examples of snacks:  o ½ sandwich  o 6 crackers with cheese  o Piece of fruit with cheese or peanut butter  o 6 crackers with peanut butter

## 2019-09-27 ENCOUNTER — LAB (OUTPATIENT)
Dept: LAB | Age: 59
End: 2019-09-27
Payer: COMMERCIAL

## 2019-09-27 DIAGNOSIS — Z79.4 TYPE 2 DIABETES MELLITUS WITH HYPERGLYCEMIA, WITH LONG-TERM CURRENT USE OF INSULIN (HCC): ICD-10-CM

## 2019-09-27 DIAGNOSIS — IMO0002 UNCONTROLLED TYPE 2 DIABETES MELLITUS WITH CIRCULATORY DISORDER, WITHOUT LONG-TERM CURRENT USE OF INSULIN: ICD-10-CM

## 2019-09-27 DIAGNOSIS — E11.65 TYPE 2 DIABETES MELLITUS WITH HYPERGLYCEMIA, WITH LONG-TERM CURRENT USE OF INSULIN (HCC): ICD-10-CM

## 2019-09-27 LAB — GLUCOSE P FAST SERPL-MCNC: 207 MG/DL (ref 65–99)

## 2019-09-27 PROCEDURE — 82947 ASSAY GLUCOSE BLOOD QUANT: CPT

## 2019-09-27 PROCEDURE — 83519 RIA NONANTIBODY: CPT

## 2019-09-27 PROCEDURE — 86341 ISLET CELL ANTIBODY: CPT

## 2019-09-27 PROCEDURE — 84681 ASSAY OF C-PEPTIDE: CPT

## 2019-09-27 PROCEDURE — 36415 COLL VENOUS BLD VENIPUNCTURE: CPT

## 2019-09-28 LAB — C PEPTIDE SERPL-MCNC: 1.3 NG/ML (ref 1.1–4.4)

## 2019-09-30 ENCOUNTER — OFFICE VISIT (OUTPATIENT)
Dept: PHYSICAL THERAPY | Facility: REHABILITATION | Age: 59
End: 2019-09-30
Payer: COMMERCIAL

## 2019-09-30 DIAGNOSIS — R07.89 CHEST WALL PAIN: ICD-10-CM

## 2019-09-30 DIAGNOSIS — M25.511 ACUTE PAIN OF RIGHT SHOULDER: Primary | ICD-10-CM

## 2019-09-30 LAB — PANC ISLET CELL AB TITR SER: NEGATIVE {TITER}

## 2019-09-30 PROCEDURE — 97140 MANUAL THERAPY 1/> REGIONS: CPT | Performed by: PHYSICAL THERAPIST

## 2019-09-30 PROCEDURE — 97110 THERAPEUTIC EXERCISES: CPT | Performed by: PHYSICAL THERAPIST

## 2019-09-30 NOTE — PROGRESS NOTES
Daily Note     Today's date: 2019  Patient name: Jason Hall  : 1960  MRN: [id number]  Referring provider: Anais Bailon DO  Dx:   Encounter Diagnosis     ICD-10-CM    1  Acute pain of right shoulder M25 511    2  Chest wall pain R07 89                   Subjective:  Pt reports he has been performing HEP daily at home, slight reduction in symptoms since last session  He reports his pain continues to be worst in the morning with waking, however, has been less intense over the past few days  Precautions: myotonic dystrophy, DM, CAD, CABG, h/o DVT    Objective: See treatment diary  Assessment: Pt with good tolerance to progression of therapeutic exercise program reporting mild fatigue with completion of prone scapular strengthening series  He reported improved right shoulder mobility following manual techniques and decreased right axillary pain  Pt issued updated HEP, reports no questions/concerns  Pt will benefit from continued skilled PT intervention in order to address her remaining limitations and to restore maximal function  Plan: Continue per plan of care  Progress treatment as tolerated         See media at D/C for exercise diary

## 2019-10-03 ENCOUNTER — APPOINTMENT (OUTPATIENT)
Dept: PHYSICAL THERAPY | Facility: REHABILITATION | Age: 59
End: 2019-10-03
Payer: COMMERCIAL

## 2019-10-03 LAB — GAD65 AB SER-ACNC: ABNORMAL U/ML (ref 0–5)

## 2019-10-04 ENCOUNTER — TELEPHONE (OUTPATIENT)
Dept: ENDOCRINOLOGY | Facility: CLINIC | Age: 59
End: 2019-10-04

## 2019-10-04 NOTE — TELEPHONE ENCOUNTER
----- Message from Maryse Goff MD sent at 10/4/2019 11:45 AM EDT -----  Please call the patient regarding his abnormal result  His antibodies for type 1 diabetes is +ve, his pancrease is not functioning to the 100%   Please ask pt if he started taking insulin and also he should submit his blood sugars log to assess is glycemic control  , thanks

## 2019-10-07 ENCOUNTER — TELEPHONE (OUTPATIENT)
Dept: ENDOCRINOLOGY | Facility: CLINIC | Age: 59
End: 2019-10-07

## 2019-10-07 ENCOUNTER — APPOINTMENT (OUTPATIENT)
Dept: PHYSICAL THERAPY | Facility: REHABILITATION | Age: 59
End: 2019-10-07
Payer: COMMERCIAL

## 2019-10-07 NOTE — TELEPHONE ENCOUNTER
----- Message from Olu Perez MD sent at 10/4/2019 11:45 AM EDT -----  Please call the patient regarding his abnormal result  His antibodies for type 1 diabetes is +ve, his pancrease is not functioning to the 100%   Please ask pt if he started taking insulin and also he should submit his blood sugars log to assess is glycemic control  , thanks

## 2019-10-07 NOTE — TELEPHONE ENCOUNTER
Spoke to pt who understood results   He will fax BS log tomorrow, pt is on vacation  Fax number given

## 2019-10-07 NOTE — TELEPHONE ENCOUNTER
Patient was informed of results by [facility] Staff today  Please see other telephone note from 10/7/19

## 2019-10-10 ENCOUNTER — OFFICE VISIT (OUTPATIENT)
Dept: PHYSICAL THERAPY | Facility: REHABILITATION | Age: 59
End: 2019-10-10
Payer: COMMERCIAL

## 2019-10-10 DIAGNOSIS — I25.10 ARTERIOSCLEROSIS OF CORONARY ARTERY: ICD-10-CM

## 2019-10-10 DIAGNOSIS — R07.89 CHEST WALL PAIN: ICD-10-CM

## 2019-10-10 DIAGNOSIS — M25.511 ACUTE PAIN OF RIGHT SHOULDER: Primary | ICD-10-CM

## 2019-10-10 PROCEDURE — 97140 MANUAL THERAPY 1/> REGIONS: CPT | Performed by: PHYSICAL THERAPIST

## 2019-10-10 RX ORDER — RIVAROXABAN 20 MG/1
TABLET, FILM COATED ORAL
Qty: 30 TABLET | Refills: 5 | Status: SHIPPED | OUTPATIENT
Start: 2019-10-10 | End: 2020-02-13

## 2019-10-14 ENCOUNTER — APPOINTMENT (OUTPATIENT)
Dept: PHYSICAL THERAPY | Facility: REHABILITATION | Age: 59
End: 2019-10-14
Payer: COMMERCIAL

## 2019-10-14 DIAGNOSIS — E11.65 TYPE 2 DIABETES MELLITUS WITH HYPERGLYCEMIA, WITH LONG-TERM CURRENT USE OF INSULIN (HCC): ICD-10-CM

## 2019-10-14 DIAGNOSIS — IMO0002 UNCONTROLLED TYPE 2 DIABETES MELLITUS WITH CIRCULATORY DISORDER, WITHOUT LONG-TERM CURRENT USE OF INSULIN: ICD-10-CM

## 2019-10-14 DIAGNOSIS — Z79.4 TYPE 2 DIABETES MELLITUS WITH HYPERGLYCEMIA, WITH LONG-TERM CURRENT USE OF INSULIN (HCC): ICD-10-CM

## 2019-10-14 NOTE — TELEPHONE ENCOUNTER
Meds-     Lantus 15u am  Amaryl 4mg QD  Metformin 100mg BID    Fasting blood sugars are slightly elevated  Please inform pt to increase the lantus to 17 units in AM   Continue rest same     Garladn Zarate MD

## 2019-10-15 ENCOUNTER — OFFICE VISIT (OUTPATIENT)
Dept: PHYSICAL THERAPY | Facility: REHABILITATION | Age: 59
End: 2019-10-15
Payer: COMMERCIAL

## 2019-10-15 DIAGNOSIS — R07.89 CHEST WALL PAIN: ICD-10-CM

## 2019-10-15 DIAGNOSIS — M25.511 ACUTE PAIN OF RIGHT SHOULDER: Primary | ICD-10-CM

## 2019-10-15 PROCEDURE — 97140 MANUAL THERAPY 1/> REGIONS: CPT

## 2019-10-15 PROCEDURE — 97110 THERAPEUTIC EXERCISES: CPT

## 2019-10-15 NOTE — PROGRESS NOTES
Daily Note     Today's date: 10/15/2019  Patient name: Wanda Ponce  : 1960  MRN: [id number]  Referring provider: Angeline Hu DO  Dx:   Encounter Diagnosis     ICD-10-CM    1  Acute pain of right shoulder M25 511    2  Chest wall pain R07 89                   Subjective:  Pt reports no change after Epley  Sore in axilla from manual work  He reports good days, bad days    Reportshe is quite sre after STM for about 2 days     Precautions: myotonic dystrophy, DM, CAD, CABG, h/o DVT            Assessment:    Painful in axilla, subscap  He did feel better after tratment    Plan: Continue per plan of care  Progress treatment as tolerated     Recheck [employer], neck NV     See media at D/C for exercise diary

## 2019-10-17 ENCOUNTER — OFFICE VISIT (OUTPATIENT)
Dept: PHYSICAL THERAPY | Facility: REHABILITATION | Age: 59
End: 2019-10-17
Payer: COMMERCIAL

## 2019-10-17 DIAGNOSIS — R07.89 CHEST WALL PAIN: ICD-10-CM

## 2019-10-17 DIAGNOSIS — M25.511 ACUTE PAIN OF RIGHT SHOULDER: Primary | ICD-10-CM

## 2019-10-17 PROCEDURE — 97110 THERAPEUTIC EXERCISES: CPT | Performed by: PHYSICAL THERAPIST

## 2019-10-17 PROCEDURE — 97140 MANUAL THERAPY 1/> REGIONS: CPT | Performed by: PHYSICAL THERAPIST

## 2019-10-17 NOTE — PROGRESS NOTES
Daily Note     Today's date: 10/17/2019  Patient name: Delilah Dickerson  : 1960  MRN: [id number]  Referring provider: Usman Salmeron DO  Dx:   Encounter Diagnosis     ICD-10-CM    1  Acute pain of right shoulder M25 511    2  Chest wall pain R07 89                   Subjective:  Pt reports he has also had more frequent dizziness with waking in the morning, especially with lifting his head up from the pillow  Pt reports the treatment for his dizziness at his last session did not seem to help  Pt reports reduced intensity and frequency of right axillary symptoms, feels PT has been helping with this  He states he has soreness following manual intervention that lasts about a day or so  Precautions: myotonic dystrophy, DM, CAD, CABG, h/o DVT    Objective: See treatment diary  +[place] Hallpike on L, negative on R   Epley performed with good response by Shan Collins DPT  Assessment: Pt's dizziness is chief complaint at this time, focus of this session was to evaluate and treat for possible BPPV  P with positive [place] Hallpike on left, good response to Epley  Pt instructed on home program to include Epley in static position to left, as well as VOR exercises  Pt continues with good response to manual techniques for right shoulder with improved range of motion and decreased symptoms  Will resume full treatment for shoulder, reassess Coral Fraga and add in cervical stretching at next session  Plan: Continue per plan of care  Progress treatment as tolerated         See media at D/C for exercise diary

## 2019-10-21 ENCOUNTER — TELEPHONE (OUTPATIENT)
Dept: FAMILY MEDICINE CLINIC | Facility: CLINIC | Age: 59
End: 2019-10-21

## 2019-10-21 ENCOUNTER — APPOINTMENT (OUTPATIENT)
Dept: PHYSICAL THERAPY | Facility: REHABILITATION | Age: 59
End: 2019-10-21
Payer: COMMERCIAL

## 2019-10-21 DIAGNOSIS — R42 VERTIGO: Primary | ICD-10-CM

## 2019-10-21 NOTE — TELEPHONE ENCOUNTER
He has been going to physical therapy for this  He really did not complain about the last time he was in  If he still having trouble we can certainly get carotid study  The type of dizziness he is having is not consistent with carotid disease, but we will check it for completeness sake  Next step may be to see ENT if the carotids are noncontributory

## 2019-10-22 ENCOUNTER — OFFICE VISIT (OUTPATIENT)
Dept: PHYSICAL THERAPY | Facility: REHABILITATION | Age: 59
End: 2019-10-22
Payer: COMMERCIAL

## 2019-10-22 DIAGNOSIS — M25.511 ACUTE PAIN OF RIGHT SHOULDER: Primary | ICD-10-CM

## 2019-10-22 DIAGNOSIS — R07.89 CHEST WALL PAIN: ICD-10-CM

## 2019-10-22 PROCEDURE — 97140 MANUAL THERAPY 1/> REGIONS: CPT

## 2019-10-22 PROCEDURE — 97112 NEUROMUSCULAR REEDUCATION: CPT

## 2019-10-22 PROCEDURE — 97110 THERAPEUTIC EXERCISES: CPT

## 2019-10-22 NOTE — PROGRESS NOTES
Daily Note     Today's date: 10/22/2019  Patient name: Azalea Bañuelos  : 1960  MRN: [id number]  Referring provider: Rand Matthew DO  Dx:   Encounter Diagnosis     ICD-10-CM    1  Acute pain of right shoulder M25 511    2  Chest wall pain R07 89                   Subjective:  Pt reports he his shoulder, chest wall pain has been much better  He tried doing eply at home but he felt worse  Today he did have AM vertigo & very minimal dizziness     Precautions: myotonic dystrophy, DM, CAD, CABG, h/o DVT    Objective: See treatment diary  Assessment:    Cont to have some tightness in axilla, subscap & with PROM IR   Added UT stretches which made him slightly light headed  No c/o with ex increases      Plan: Continue per plan of care  Progress treatment as tolerated         See media at D/C for exercise diary

## 2019-10-23 ENCOUNTER — OFFICE VISIT (OUTPATIENT)
Dept: PHYSICAL THERAPY | Facility: REHABILITATION | Age: 59
End: 2019-10-23
Payer: COMMERCIAL

## 2019-10-23 DIAGNOSIS — R42 VERTIGO: ICD-10-CM

## 2019-10-23 DIAGNOSIS — R07.89 CHEST WALL PAIN: ICD-10-CM

## 2019-10-23 DIAGNOSIS — M25.511 ACUTE PAIN OF RIGHT SHOULDER: Primary | ICD-10-CM

## 2019-10-23 PROCEDURE — 97164 PT RE-EVAL EST PLAN CARE: CPT | Performed by: PHYSICAL THERAPIST

## 2019-10-23 NOTE — PROGRESS NOTES
Daily Note     Today's date: 10/23/2019  Patient name: Kelli Phillips  : 1960  MRN: [id number]  Referring provider: Reyes Harris DO  Dx:   Encounter Diagnosis     ICD-10-CM    1  Acute pain of right shoulder M25 511    2  Chest wall pain R07 89    3  Vertigo R42                   Subjective:  Pt reports he his shoulder, chest wall pain has been much better  He tried doing eply at home but he felt worse  Today he did have AM vertigo & very minimal dizziness  Pt describes lightheadedness  Hx of hearing loss and left ear gets "clogged  Aggravating: head movement, looking down and then back up, looking overhead, transfer supine to sit, rolling over in bed  Occasional double and blurred vision       Precautions: myotonic dystrophy, DM, CAD, CABG, h/o DVT    Objective: See treatment diary  (-) modified vertebral artery test, sharp nasrin and alar ligament  Saccades: 1 hypo to right, hypo to up  (-)VOR cancellation, head thrust  Romberg: EO mild sway, EC significant sway  Sharpened Romberg: EO mod sway, EC loses balance  BP lying 116/64   BP immediately on sitting 115/62  Sapphire Hallpike R: dizziness lasting about 10"  Sapphire Hallpike L: dizziness lasting 15" reported worse then right   No nystagmus noted in either direction  Assessment:    Pt does not present with any central signs or signes of cervical involvement in vertigo  Tests for BPPV created dizziness but no nystagmus  He was treated with Epley maneuver for the greater dizziness on the left  He will need further evaluation as testing was slightly inconclusive  Pt is diabetic and recently went on insulin  Several of his medication have dizziness as a side effect  Plan: Continue per plan of care  Progress treatment as tolerated         See media at D/C for exercise diary

## 2019-10-24 ENCOUNTER — OFFICE VISIT (OUTPATIENT)
Dept: PHYSICAL THERAPY | Facility: REHABILITATION | Age: 59
End: 2019-10-24
Payer: COMMERCIAL

## 2019-10-24 DIAGNOSIS — M25.511 ACUTE PAIN OF RIGHT SHOULDER: Primary | ICD-10-CM

## 2019-10-24 DIAGNOSIS — R42 VERTIGO: ICD-10-CM

## 2019-10-24 DIAGNOSIS — R07.89 CHEST WALL PAIN: ICD-10-CM

## 2019-10-24 PROCEDURE — 97112 NEUROMUSCULAR REEDUCATION: CPT | Performed by: PHYSICAL THERAPIST

## 2019-10-24 PROCEDURE — 97140 MANUAL THERAPY 1/> REGIONS: CPT | Performed by: PHYSICAL THERAPIST

## 2019-10-25 NOTE — PROGRESS NOTES
Daily Note     Today's date: 10/24/2019  Patient name: Yandel Encinas  : 1960  MRN: [id number]  Referring provider: Asuncion Faulkner DO  Dx:   Encounter Diagnosis     ICD-10-CM    1  Acute pain of right shoulder M25 511    2  Chest wall pain R07 89    3  Vertigo R42                   Subjective:  Pt reports decreased dizziness following his last treatment for vertigo, reporting "I had an awesome day yesterday " He reports he did wake with some dizziness this morning but overall much less than last week  He reports continued reduction in frequency and intensity of right axillary symptoms  Precautions: myotonic dystrophy, DM, CAD, CABG, h/o DVT    Objective: See treatment diary  +[place] Hallpike on L, negative on R  Assessment: Pt with positive Sapphire Hallpike on left, good response to Epley with reduction in symptoms with completion  Reviewed home exercises for vertigo with patient, as well as positioning for Epley to be performed on his bed with support for his head and neck  Pt continues with good response to manual techniques for right shoulder with improved range of motion and decreased symptoms  Pt will benefit from continued skilled PT intervention in order to address his remaining limitations and to restore maximal function  Plan: Continue per plan of care  Progress treatment as tolerated         See media at D/C for exercise diary

## 2019-10-29 ENCOUNTER — HOSPITAL ENCOUNTER (OUTPATIENT)
Dept: NON INVASIVE DIAGNOSTICS | Facility: HOSPITAL | Age: 59
Discharge: HOME | End: 2019-10-29
Payer: COMMERCIAL

## 2019-10-29 ENCOUNTER — OFFICE VISIT (OUTPATIENT)
Dept: PHYSICAL THERAPY | Facility: REHABILITATION | Age: 59
End: 2019-10-29
Payer: COMMERCIAL

## 2019-10-29 DIAGNOSIS — R42 VERTIGO: ICD-10-CM

## 2019-10-29 DIAGNOSIS — M25.511 ACUTE PAIN OF RIGHT SHOULDER: Primary | ICD-10-CM

## 2019-10-29 DIAGNOSIS — R07.89 CHEST WALL PAIN: ICD-10-CM

## 2019-10-29 PROCEDURE — 97140 MANUAL THERAPY 1/> REGIONS: CPT

## 2019-10-29 PROCEDURE — 97112 NEUROMUSCULAR REEDUCATION: CPT

## 2019-10-29 PROCEDURE — 93880 EXTRACRANIAL BILAT STUDY: CPT | Performed by: SURGERY

## 2019-10-29 PROCEDURE — 93880 EXTRACRANIAL BILAT STUDY: CPT

## 2019-10-29 PROCEDURE — 97110 THERAPEUTIC EXERCISES: CPT

## 2019-10-29 NOTE — PROGRESS NOTES
Daily Note     Today's date: 10/29/2019  Patient name: Faye Chase  : 1960  MRN: [id number]  Referring provider: Sam Rapp DO  Dx:   Encounter Diagnosis     ICD-10-CM    1  Acute pain of right shoulder M25 511    2  Chest wall pain R07 89    3  Vertigo R42                   Subjective:  Pt  Reports no pain to (R) shoulder and no pain with TE or stretches  Pt  Does report a tingling sensation with manual work to the axilla Pt  Is not doing Epley exercises at home but he does note decrease in vertigo, he has yue doing VOR exercises     Precautions: myotonic dystrophy, DM, CAD, CABG, h/o DVT    Objective: See treatment diary  Assessment: Pt  Had good ROM /c minimal discomfort  AROM of (R) shoulder was equal to the unaffected side  Resistance was increased in exercises with no complaints  PT assessed (R) shoulder mobility and STM and PROM were d/c per PT's assessment  Follow up appt  for vertigo tomorrow    Treated by GAURI AZEVEDO under direct supervision of CD PTA      Plan: Continue per plan of care  Progress treatment as tolerated         See media at D/C for exercise diary

## 2019-10-30 ENCOUNTER — OFFICE VISIT (OUTPATIENT)
Dept: PHYSICAL THERAPY | Facility: REHABILITATION | Age: 59
End: 2019-10-30
Payer: COMMERCIAL

## 2019-10-30 DIAGNOSIS — R42 VERTIGO: Primary | ICD-10-CM

## 2019-10-30 PROCEDURE — 95992 CANALITH REPOSITIONING PROC: CPT | Performed by: PHYSICAL THERAPIST

## 2019-10-30 NOTE — PROGRESS NOTES
Daily Note     Today's date: 10/30/2019  Patient name: Yas Agrawal  : 1960  MRN: [id number]  Referring provider: Chris Cadena DO  Dx:   Encounter Diagnosis     ICD-10-CM    1  Vertigo R42                   Subjective:  Pt reports continued light headedness in the morning and dizziness with looking up  Precautions: myotonic dystrophy, DM, CAD, CABG, h/o DVT    Objective: See treatment diary     (+) Sapphire-Hallpike left last 12" Pt could not keep his eyes open enough to assess for nystagmus  Treated with canalith repositioning  Gave pt Sherita Wakefield exercises to start tomorrow  Assessment: Pt still tests positive with dizziness for BPPV  Will f/u with him next week and retest right side and horizontal canals as well  Plan: Continue per plan of care  Progress treatment as tolerated         See media at D/C for exercise diary

## 2019-10-31 ENCOUNTER — OFFICE VISIT (OUTPATIENT)
Dept: PHYSICAL THERAPY | Facility: REHABILITATION | Age: 59
End: 2019-10-31
Payer: COMMERCIAL

## 2019-10-31 DIAGNOSIS — M25.511 ACUTE PAIN OF RIGHT SHOULDER: ICD-10-CM

## 2019-10-31 DIAGNOSIS — R42 VERTIGO: Primary | ICD-10-CM

## 2019-10-31 DIAGNOSIS — R07.89 CHEST WALL PAIN: ICD-10-CM

## 2019-10-31 PROCEDURE — 97110 THERAPEUTIC EXERCISES: CPT

## 2019-10-31 PROCEDURE — 97112 NEUROMUSCULAR REEDUCATION: CPT

## 2019-10-31 NOTE — PROGRESS NOTES
Daily Note     Today's date: 10/31/2019  Patient name: Azalea Bañuelos  : 1960  MRN: [id number]  Referring provider: Rand Matthew DO  Dx:   Encounter Diagnosis     ICD-10-CM    1  Vertigo R42    2  Acute pain of right shoulder M25 511    3  Chest wall pain R07 89                   Subjective:    R shoulder, chest are much better, he feels he can D/C this today  He will cont for vertigo     Precautions: myotonic dystrophy, DM, CAD, CABG, h/o DVT    Objective: See treatment diary  Results from 10-30:  (+) Sapphire-Hallpike left last 12" Pt could not keep his eyes open enough to assess for nystagmus  Treated with canalith repositioning  Gave pt Lore Kaur exercises to start tomorrow  Assessment:   Good understanding and good form with ex, he is able to be I    Plan:  D/C shoulder to [facility]       See media at D/C for exercise diary

## 2019-11-05 ENCOUNTER — OFFICE VISIT (OUTPATIENT)
Dept: FAMILY MEDICINE CLINIC | Facility: CLINIC | Age: 59
End: 2019-11-05
Payer: COMMERCIAL

## 2019-11-05 VITALS
TEMPERATURE: 98.4 F | DIASTOLIC BLOOD PRESSURE: 70 MMHG | WEIGHT: 137 LBS | SYSTOLIC BLOOD PRESSURE: 118 MMHG | BODY MASS INDEX: 20.76 KG/M2 | HEIGHT: 68 IN

## 2019-11-05 DIAGNOSIS — H66.91 OTITIS OF RIGHT EAR: Primary | ICD-10-CM

## 2019-11-05 DIAGNOSIS — H61.23 EXCESSIVE CERUMEN IN BOTH EAR CANALS: ICD-10-CM

## 2019-11-05 PROCEDURE — 99213 OFFICE O/P EST LOW 20 MIN: CPT | Performed by: FAMILY MEDICINE

## 2019-11-05 PROCEDURE — 3008F BODY MASS INDEX DOCD: CPT | Performed by: FAMILY MEDICINE

## 2019-11-05 RX ORDER — CEFDINIR 300 MG/1
300 CAPSULE ORAL EVERY 12 HOURS SCHEDULED
Qty: 20 CAPSULE | Refills: 0 | Status: SHIPPED | OUTPATIENT
Start: 2019-11-05 | End: 2019-11-15

## 2019-11-05 NOTE — PROGRESS NOTES
Assessment/Plan:         Diagnoses and all orders for this visit:    Otitis of right ear  Comments:   take antibiotic until it is finished  Call the office if symptoms do not improve  Orders:  -     cefdinir (OMNICEF) 300 mg capsule; Take 1 capsule (300 mg total) by mouth every 12 (twelve) hours for 10 days    Excessive cerumen in both ear canals  Comments:    Cerumen removed from both canals easily with a cerumen loop  Subjective:   Chief Complaint   Patient presents with    Earache     right     Nasal Congestion          Patient ID: Onur Forrest is a 61 y o  male  Patient is a 59-year-old male presenting to the office complaining of right ear pain for the last 48 hours  He had some sinus congestion earlier this week  He also has a history of difficulty with cerumen impaction  The following portions of the patient's history were reviewed and updated as appropriate: allergies, current medications, past family history, past medical history, past social history, past surgical history and problem list     Review of Systems   Constitutional: Positive for activity change, appetite change and fatigue  Negative for unexpected weight change  HENT: Positive for congestion, ear pain, postnasal drip and rhinorrhea  Negative for ear discharge  Respiratory: Negative  Gastrointestinal: Negative  Genitourinary: Negative  Musculoskeletal: Negative for arthralgias and myalgias  Allergic/Immunologic: Negative for immunocompromised state  Neurological: Negative  Hematological: Negative for adenopathy  Psychiatric/Behavioral: Negative  Objective:      /70   Temp 98 4 °F (36 9 °C)   Ht 5' 8" (1 727 m)   Wt 62 1 kg (137 lb)   BMI 20 83 kg/m²          Physical Exam   Constitutional: He is oriented to person, place, and time  He appears well-developed and well-nourished  HENT:   Head: Normocephalic and atraumatic  Right Ear: Tympanic membrane is injected and bulging  Nose: Nose normal    Mouth/Throat: Oropharynx is clear and moist    B/l cerumen impaction   Eyes: Pupils are equal, round, and reactive to light  Conjunctivae and EOM are normal    Neck: Normal range of motion  Neck supple  No JVD present  No tracheal deviation present  No thyromegaly present  Cardiovascular: Normal rate, regular rhythm and intact distal pulses  No murmur heard  Pulmonary/Chest: Effort normal and breath sounds normal  He has no wheezes  He has no rales  Abdominal: Soft  Bowel sounds are normal  He exhibits no mass  There is no tenderness  There is no rebound and no guarding  Musculoskeletal: He exhibits no edema, tenderness or deformity  Lymphadenopathy:     He has no cervical adenopathy  Neurological: He is alert and oriented to person, place, and time  He has normal reflexes  He displays normal reflexes  No cranial nerve deficit  He exhibits normal muscle tone  Coordination normal    Skin: Skin is warm and dry  No lesion and no rash noted  Nails show no clubbing  Psychiatric: He has a normal mood and affect  Judgment normal        Procedure- cerumen removed from both external canals easily with a cerumen loop with 1 pass  Patient tolerated well

## 2019-11-06 ENCOUNTER — APPOINTMENT (OUTPATIENT)
Dept: PHYSICAL THERAPY | Facility: REHABILITATION | Age: 59
End: 2019-11-06
Payer: COMMERCIAL

## 2019-11-07 ENCOUNTER — TELEPHONE (OUTPATIENT)
Dept: FAMILY MEDICINE CLINIC | Facility: CLINIC | Age: 59
End: 2019-11-07

## 2019-11-07 NOTE — TELEPHONE ENCOUNTER
Needs to give the medicine a chance to work  Just started it  Today would be day 3 of his medicine  Has a very strong antibiotic

## 2019-11-08 ENCOUNTER — OFFICE VISIT (OUTPATIENT)
Dept: PHYSICAL THERAPY | Facility: REHABILITATION | Age: 59
End: 2019-11-08
Payer: COMMERCIAL

## 2019-11-08 DIAGNOSIS — R42 VERTIGO: Primary | ICD-10-CM

## 2019-11-08 PROCEDURE — 97140 MANUAL THERAPY 1/> REGIONS: CPT | Performed by: PHYSICAL THERAPIST

## 2019-11-08 NOTE — PROGRESS NOTES
Daily Note     Today's date: 2019  Patient name: Guillaume Marinelli  : 1960  MRN: [id number]  Referring provider: Quinn Cowan DO  Dx:   Encounter Diagnosis     ICD-10-CM    1  Vertigo R42                   Subjective:    Feeling just a small amount of light headedness at times but also notes that he has an ear infection  Precautions: myotonic dystrophy, DM, CAD, CABG, h/o DVT    Objective: See treatment diary     (+) Sapphire-Hallpike left 8" latency lasting  6" no nystagmus noted   Treated with canalith repositioning  Balance: SLS 10 sec w/sway  Romberg steady, EC mild sway    Assessment:   Pt is no longer having significant symptoms  He appears ready for discharge  He will call if he gets a recurrence  Plan:  D/C  to [facility]       See media at D/C for exercise diary

## 2019-11-13 ENCOUNTER — LAB (OUTPATIENT)
Dept: LAB | Age: 59
End: 2019-11-13
Payer: COMMERCIAL

## 2019-11-13 DIAGNOSIS — IMO0001 UNCONTROLLED TYPE 2 DIABETES MELLITUS WITHOUT COMPLICATION, WITHOUT LONG-TERM CURRENT USE OF INSULIN: ICD-10-CM

## 2019-11-13 LAB
ALBUMIN SERPL BCP-MCNC: 3.7 G/DL (ref 3.5–5)
ALP SERPL-CCNC: 65 U/L (ref 46–116)
ALT SERPL W P-5'-P-CCNC: 44 U/L (ref 12–78)
ANION GAP SERPL CALCULATED.3IONS-SCNC: 9 MMOL/L (ref 4–13)
AST SERPL W P-5'-P-CCNC: 22 U/L (ref 5–45)
BILIRUB SERPL-MCNC: 0.69 MG/DL (ref 0.2–1)
BUN SERPL-MCNC: 21 MG/DL (ref 5–25)
CALCIUM SERPL-MCNC: 9.2 MG/DL (ref 8.3–10.1)
CHLORIDE SERPL-SCNC: 106 MMOL/L (ref 100–108)
CO2 SERPL-SCNC: 29 MMOL/L (ref 21–32)
CREAT SERPL-MCNC: 0.68 MG/DL (ref 0.6–1.3)
CREAT UR-MCNC: 105 MG/DL
EST. AVERAGE GLUCOSE BLD GHB EST-MCNC: 157 MG/DL
GFR SERPL CREATININE-BSD FRML MDRD: 105 ML/MIN/1.73SQ M
GLUCOSE P FAST SERPL-MCNC: 169 MG/DL (ref 65–99)
HBA1C MFR BLD: 7.1 % (ref 4.2–6.3)
MICROALBUMIN UR-MCNC: 5.1 MG/L (ref 0–20)
MICROALBUMIN/CREAT 24H UR: 5 MG/G CREATININE (ref 0–30)
POTASSIUM SERPL-SCNC: 4.3 MMOL/L (ref 3.5–5.3)
PROT SERPL-MCNC: 6.5 G/DL (ref 6.4–8.2)
SODIUM SERPL-SCNC: 144 MMOL/L (ref 136–145)

## 2019-11-13 PROCEDURE — 82043 UR ALBUMIN QUANTITATIVE: CPT

## 2019-11-13 PROCEDURE — 36415 COLL VENOUS BLD VENIPUNCTURE: CPT

## 2019-11-13 PROCEDURE — 3061F NEG MICROALBUMINURIA REV: CPT | Performed by: FAMILY MEDICINE

## 2019-11-13 PROCEDURE — 80053 COMPREHEN METABOLIC PANEL: CPT

## 2019-11-13 PROCEDURE — 82570 ASSAY OF URINE CREATININE: CPT

## 2019-11-13 PROCEDURE — 83036 HEMOGLOBIN GLYCOSYLATED A1C: CPT

## 2019-11-21 ENCOUNTER — OFFICE VISIT (OUTPATIENT)
Dept: FAMILY MEDICINE CLINIC | Facility: CLINIC | Age: 59
End: 2019-11-21
Payer: COMMERCIAL

## 2019-11-21 VITALS
HEIGHT: 68 IN | SYSTOLIC BLOOD PRESSURE: 118 MMHG | DIASTOLIC BLOOD PRESSURE: 62 MMHG | WEIGHT: 142 LBS | BODY MASS INDEX: 21.52 KG/M2

## 2019-11-21 DIAGNOSIS — E78.2 MIXED HYPERLIPIDEMIA: ICD-10-CM

## 2019-11-21 DIAGNOSIS — G71.11 MYOTONIC DYSTROPHY (HCC): ICD-10-CM

## 2019-11-21 DIAGNOSIS — I25.10 ARTERIOSCLEROSIS OF CORONARY ARTERY: ICD-10-CM

## 2019-11-21 DIAGNOSIS — F52.21 ERECTILE DYSFUNCTION OF NON-ORGANIC ORIGIN: Primary | ICD-10-CM

## 2019-11-21 DIAGNOSIS — IMO0001 UNCONTROLLED TYPE 2 DIABETES MELLITUS WITHOUT COMPLICATION, WITHOUT LONG-TERM CURRENT USE OF INSULIN: ICD-10-CM

## 2019-11-21 PROBLEM — D72.819 LEUKOPENIA: Status: RESOLVED | Noted: 2018-02-05 | Resolved: 2019-11-21

## 2019-11-21 PROBLEM — R42 INTERMITTENT LIGHTHEADEDNESS: Status: RESOLVED | Noted: 2019-08-16 | Resolved: 2019-11-21

## 2019-11-21 PROBLEM — H81.12 BPPV (BENIGN PAROXYSMAL POSITIONAL VERTIGO), LEFT: Status: RESOLVED | Noted: 2019-06-10 | Resolved: 2019-11-21

## 2019-11-21 PROCEDURE — 99214 OFFICE O/P EST MOD 30 MIN: CPT | Performed by: FAMILY MEDICINE

## 2019-11-21 RX ORDER — SILDENAFIL 50 MG/1
50 TABLET, FILM COATED ORAL DAILY PRN
Qty: 10 TABLET | Refills: 0 | Status: SHIPPED | OUTPATIENT
Start: 2019-11-21 | End: 2020-11-30

## 2019-11-21 NOTE — ASSESSMENT & PLAN NOTE
Lab Results   Component Value Date    HGBA1C 7 1 (H) 11/13/2019    much improved  Will continue insulin and glimepiride for now  Would be interested in getting off metformin, will be meeting with Endocrinology next week and will discuss with them at that time  Recheck lab as per endocrinology

## 2019-11-21 NOTE — PROGRESS NOTES
Assessment/Plan:    Uncontrolled type 2 diabetes mellitus without complication, without long-term current use of insulin (HCC)    Lab Results   Component Value Date    HGBA1C 7 1 (H) 11/13/2019    much improved  Will continue insulin and glimepiride for now  Would be interested in getting off metformin, will be meeting with Endocrinology next week and will discuss with them at that time  Recheck lab as per endocrinology  Myotonic dystrophy [facility])    Follow-up with Neurology as scheduled  Mixed hyperlipidemia    Continue atorvastatin 40 mg daily  Erectile dysfunction of non-organic origin    Trial of Viagra  Arteriosclerosis of coronary artery    Continue atorvastatin 40 mg daily, 81 mg aspirin daily, Xarelto 20 mg  Follow up with Cardiology as scheduled  Diagnoses and all orders for this visit:    Erectile dysfunction of non-organic origin  -     sildenafil (VIAGRA) 50 MG tablet; Take 1 tablet (50 mg total) by mouth daily as needed for erectile dysfunction    Uncontrolled type 2 diabetes mellitus without complication, without long-term current use of insulin (HCC)    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[facility] [address] )    Mixed hyperlipidemia    Arteriosclerosis of coronary artery          Subjective:   Chief Complaint   Patient presents with    Diabetes    Hyperlipidemia     no refills needed           Patient ID: Jailene Reeder is a 61 y o  male  He is here for checkup on his diabetes, hyperlipidemia, call and coronary artery disease  He had lab work done recently which he would like to review with me  Overall he feels well, tolerating insulin well from endocrinology  He already got a flu shot this year  He would like to discuss his erectile dysfunction, he has been on Cialis off and on for a while and is not really helping him        The following portions of the patient's history were reviewed and updated as appropriate: allergies, current medications, past family history, past medical history, past social history, past surgical history and problem list     Review of Systems   Constitutional: Negative for appetite change, chills, diaphoresis, fatigue, fever and unexpected weight change  HENT: Negative for congestion, ear pain, hearing loss, nosebleeds, postnasal drip, rhinorrhea, sinus pressure, sore throat, tinnitus and trouble swallowing  Eyes: Negative for photophobia, pain, discharge, redness, itching and visual disturbance  Respiratory: Negative for cough, chest tightness, shortness of breath and wheezing  Cardiovascular: Negative for chest pain, palpitations and leg swelling  Denies orthopnea , dyspnea on exertion   Gastrointestinal: Negative for abdominal distention, abdominal pain, blood in stool, constipation, diarrhea, nausea and vomiting  Endocrine: Negative  Genitourinary: Negative for difficulty urinating, dysuria, flank pain, frequency, hematuria and urgency  Denies nocturia , erectile dysfunction   Musculoskeletal: Negative for arthralgias, back pain, gait problem, joint swelling and myalgias  Skin: Negative for pallor, rash and wound  Denies skin lesions   Allergic/Immunologic: Negative for environmental allergies, food allergies and immunocompromised state  Neurological: Negative for dizziness, tremors, seizures, syncope, speech difficulty, weakness, numbness and headaches  Hematological: Negative for adenopathy  Does not bruise/bleed easily  Psychiatric/Behavioral: Negative for behavioral problems, confusion, sleep disturbance and suicidal ideas  The patient is not nervous/anxious  Objective:      /62   Ht 5' 8" (1 727 m)   Wt 64 4 kg (142 lb)   BMI 21 59 kg/m²          Physical Exam   Constitutional: He is oriented to person, place, and time  He appears well-developed and well-nourished  HENT:   Head: Normocephalic and atraumatic     Nose: Nose normal    Mouth/Throat: Oropharynx is clear and moist    Eyes: Pupils are equal, round, and reactive to light  Conjunctivae and EOM are normal    Neck: Normal range of motion  Neck supple  No JVD present  No tracheal deviation present  No thyromegaly present  Cardiovascular: Normal rate, regular rhythm and intact distal pulses  No murmur heard  Pulmonary/Chest: Effort normal and breath sounds normal  He has no wheezes  He has no rales  Abdominal: Soft  Bowel sounds are normal  He exhibits no mass  There is no tenderness  There is no rebound and no guarding  Musculoskeletal: He exhibits no edema, tenderness or deformity  Lymphadenopathy:     He has no cervical adenopathy  Neurological: He is alert and oriented to person, place, and time  He has normal reflexes  He displays normal reflexes  No cranial nerve deficit  He exhibits normal muscle tone  Coordination normal    Skin: Skin is warm and dry  No lesion and no rash noted  Nails show no clubbing  Psychiatric: He has a normal mood and affect  Judgment normal    Nursing note and vitals reviewed

## 2019-11-21 NOTE — ASSESSMENT & PLAN NOTE
Continue atorvastatin 40 mg daily, 81 mg aspirin daily, Xarelto 20 mg  Follow up with Cardiology as scheduled

## 2019-11-26 ENCOUNTER — OFFICE VISIT (OUTPATIENT)
Dept: ENDOCRINOLOGY | Facility: CLINIC | Age: 59
End: 2019-11-26
Payer: COMMERCIAL

## 2019-11-26 VITALS
BODY MASS INDEX: 21.22 KG/M2 | SYSTOLIC BLOOD PRESSURE: 120 MMHG | WEIGHT: 140 LBS | DIASTOLIC BLOOD PRESSURE: 80 MMHG | HEIGHT: 68 IN

## 2019-11-26 DIAGNOSIS — E13.9 LADA (LATENT AUTOIMMUNE DIABETES IN ADULTS), MANAGED AS TYPE 1 (HCC): Primary | ICD-10-CM

## 2019-11-26 DIAGNOSIS — E78.2 MIXED HYPERLIPIDEMIA: ICD-10-CM

## 2019-11-26 PROCEDURE — 99214 OFFICE O/P EST MOD 30 MIN: CPT | Performed by: PHYSICIAN ASSISTANT

## 2019-11-26 RX ORDER — INSULIN LISPRO 100 [IU]/ML
INJECTION, SOLUTION INTRAVENOUS; SUBCUTANEOUS
Qty: 5 PEN | Refills: 2 | Status: SHIPPED | OUTPATIENT
Start: 2019-11-26 | End: 2019-12-03 | Stop reason: SDUPTHER

## 2019-11-30 DIAGNOSIS — I25.10 ARTERIOSCLEROSIS OF CORONARY ARTERY: ICD-10-CM

## 2019-12-01 RX ORDER — ATORVASTATIN CALCIUM 40 MG/1
TABLET, FILM COATED ORAL
Qty: 30 TABLET | Refills: 5 | Status: SHIPPED | OUTPATIENT
Start: 2019-12-01 | End: 2020-05-03

## 2019-12-03 DIAGNOSIS — Z79.4 TYPE 2 DIABETES MELLITUS WITH HYPERGLYCEMIA, WITH LONG-TERM CURRENT USE OF INSULIN (HCC): ICD-10-CM

## 2019-12-03 DIAGNOSIS — IMO0002 UNCONTROLLED TYPE 2 DIABETES MELLITUS WITH CIRCULATORY DISORDER, WITHOUT LONG-TERM CURRENT USE OF INSULIN: ICD-10-CM

## 2019-12-03 DIAGNOSIS — E11.65 TYPE 2 DIABETES MELLITUS WITH HYPERGLYCEMIA, WITH LONG-TERM CURRENT USE OF INSULIN (HCC): ICD-10-CM

## 2019-12-03 DIAGNOSIS — E13.9 LADA (LATENT AUTOIMMUNE DIABETES IN ADULTS), MANAGED AS TYPE 1 (HCC): ICD-10-CM

## 2019-12-03 NOTE — TELEPHONE ENCOUNTER
Per pts wife, insurance said it would be cheaper for them [facility]    Insulin and needles ordered

## 2019-12-04 RX ORDER — INSULIN LISPRO 100 [IU]/ML
INJECTION, SOLUTION INTRAVENOUS; SUBCUTANEOUS
Qty: 5 PEN | Refills: 1 | Status: SHIPPED | OUTPATIENT
Start: 2019-12-04 | End: 2019-12-05 | Stop reason: DRUGHIGH

## 2019-12-05 DIAGNOSIS — E13.9 LADA (LATENT AUTOIMMUNE DIABETES IN ADULTS), MANAGED AS TYPE 1 (HCC): ICD-10-CM

## 2019-12-05 RX ORDER — INSULIN LISPRO 100 [IU]/ML
INJECTION, SOLUTION INTRAVENOUS; SUBCUTANEOUS
Qty: 5 PEN | Refills: 1
Start: 2019-12-05 | End: 2020-01-08

## 2019-12-05 NOTE — TELEPHONE ENCOUNTER
Blood glucose levels are elevated  Increase humalog to 4 units before breakfast, lunch, and dinner  Give scale to use with meal time insulin: 1 unit for every 50 over 150 mg/dl  150-200 = 1  201-249 = 2  250-300 = 3   301-349 = 4  > 350 = 5    Check blood glucose 3 times a day      Summer Hall PA-C

## 2020-01-07 ENCOUNTER — OFFICE VISIT (OUTPATIENT)
Dept: DIABETES SERVICES | Facility: CLINIC | Age: 60
End: 2020-01-07
Payer: COMMERCIAL

## 2020-01-07 DIAGNOSIS — E13.9 LATENT AUTOIMMUNE DIABETES IN ADULTS (LADA), MANAGED AS TYPE 1 (HCC): Primary | ICD-10-CM

## 2020-01-07 PROCEDURE — 98960 EDU&TRN PT SELF-MGMT NQHP 1: CPT | Performed by: DIETITIAN, REGISTERED

## 2020-01-07 NOTE — PROGRESS NOTES
Type 1 Basics     HPI: Chica Mcneil is a year 61year old male recently diagnosed with Type 1 Diabetes  He was previously diagnosed with Type 2 Diabetes  SARINA antibodies were positive and C-peptide low normal at 1 3  His wife has accompanied him today for Type 1 Basic Education  She questions why his blood glucose was better controlled when he was on oral agents versus insulin  Explained basic pathology of T1DM as well as honeymoon phase  She also questions whether lab results indicating T1DM are correct and states that she has not seen the labs herself  Otoniel's wife states that she will review his MyChart to view his labs      Monitoring Blood Sugars:     Instructions for Meter Teaching- Patient instructed in the following:  Site selection and skin preparation, Loading strips and lancet device, meter activation, obtaining blood sample, test strip and lancet disposal and recording log book entries  Patient has good understanding of material covered       Testing frequency: Instructed David Can to test BG 4 times per day, before each meal (prior to insulin injection) and before bedtime  Also test BG If experiencing any signs or symptoms of hypoglycemia      Goal Blood Sugars:   Premeal  mg/dl, even better 70 -110 mg/dl  2hr after a meal <180 mg/dl, even better <140 mg/dl  A1C <7%, even better <6 5%      Insulin Instruction:     Otoniel is currently taking the following diabetes medications: Lantus 17 units in the morning; Humalog 4 units before meals plus sliding scale  Patient provided home blood glucose log from past 2 weeks with several readings above 400 mg/dL  Discussed and reviewed log with Summer Lopez PA-C while patient in office today  Per Summer, dose of Humalog increased to 6 units TID a c  plus scale  Reviewed how to correctly use sliding scale      Patient instructed on:  Insulin type; timing of insulin; site selection and rotation; proper technique of injection and insulin administration, safe needle disposal; medication storage; side effects and precautions of insulin  Comments: Otoniel has not been priming before each injection and also states that he often does not take lunchtime dose of Humalog  Emphasized to Jhonahtan Ruiz and his wife that he should dial up to 2 units to prime, use the 2 units as the sir shot, and then dial up his dose  Otoniel has good understanding of insulin usage and demonstrated use of injection technique in the office       Patient instruction completed on Hypoglycemia: definition/risk, causes/symptoms, treatment, prevention of hypoglycemia, when to notify physician and EMS  Comments: Reviewed 15/15 rule with Jhonathan Ruiz and his wife        Patient instruction completed on Hyperglycemia: definition, causes/symptoms, treatment, prevention of hyperglycemia, when to notify physician and EMS    Comments: Otoniel has good understanding of hyperglycemia and treatment      Ketone Testing:     Patient instructed on: pathophysiology of ketone development, signs/symptoms, testing procedure, treatment, signs of dehydration, when to contact doctor, test strip expiration after opening  Otoniel was instructed to test for ketones if:  2 BG readings in a row > 250 mg/dl, if he is sick, nauseous, vomiting, experiencing abdominal pain, has flushed skin, has difficulty breathing, has fruity smelling breath, feels confused, or feels prolonged tiredness      Sick Day Guidelines     Patient instructed on: impact of illness on blood sugar, continuing to take insulin, having both sugar free liquids and carb containing liquids on hand, drinking carb containing fluids if unable to eat, ketone testing frequency, blood sugar testing frequency, when to contact the doctor, sugar free versions of cough drops/syrups, over the counter medications that may elevate BG         Glucagon teaching     Discussed the use of glucagon, indication for use, storage, administration of glucagon, and post injection recommendations  I demonstrated glucagon use with demo kit in office to Otoniel and his wife  Otoniel's wife was able to demonstrate correct technique in the office today using demo kit   Nica Polanco and his wife to notify both EMS and the doctor if a severe low requiring glucagon is needed       Diabetes Education Record:     Charline Pizano the following education material: 15/15 Rule, Hypoglycemia Fact Sheet, Hyperglycemia Fact Sheet  Ketone Testing, Glucagon instruction sheet, Injection Site Selection and Rotation handout, Sick Day Guidelines for Type 1      Marshall Wiseman will return for Medical Nutrition Therapy appointment  Patient response to instruction  Comprehension: good  Motivation: good  Expected Compliance: good  Readiness: action  Barriers to Learning: none known        Begin Time: 3:35 pm  End Time: 4:46 pm  Referring Provider: Summer Hall PARahulC     Thank you for referring your patient to [facility], it was a pleasure working with them today   Please feel free to call with any questions or concerns      [facility], [address]  [facility] [place] [id number]

## 2020-01-08 ENCOUNTER — TELEPHONE (OUTPATIENT)
Dept: DIABETES SERVICES | Facility: CLINIC | Age: 60
End: 2020-01-08

## 2020-01-08 ENCOUNTER — TELEPHONE (OUTPATIENT)
Dept: ENDOCRINOLOGY | Facility: CLINIC | Age: 60
End: 2020-01-08

## 2020-01-08 DIAGNOSIS — E13.9 LADA (LATENT AUTOIMMUNE DIABETES IN ADULTS), MANAGED AS TYPE 1 (HCC): Primary | ICD-10-CM

## 2020-01-08 DIAGNOSIS — E13.9 LADA (LATENT AUTOIMMUNE DIABETES IN ADULTS), MANAGED AS TYPE 1 (HCC): ICD-10-CM

## 2020-01-08 RX ORDER — INSULIN LISPRO 100 [IU]/ML
INJECTION, SOLUTION INTRAVENOUS; SUBCUTANEOUS
Qty: 5 PEN | Refills: 1
Start: 2020-01-08 | End: 2020-01-08 | Stop reason: SDUPTHER

## 2020-01-08 RX ORDER — INSULIN LISPRO 100 [IU]/ML
INJECTION, SOLUTION INTRAVENOUS; SUBCUTANEOUS
Qty: 5 PEN | Refills: 1
Start: 2020-01-08 | End: 2020-01-27

## 2020-01-08 RX ORDER — METFORMIN HYDROCHLORIDE 500 MG/1
500 TABLET, EXTENDED RELEASE ORAL
Qty: 90 TABLET | Refills: 1 | Status: SHIPPED | OUTPATIENT
Start: 2020-01-08 | End: 2020-02-03 | Stop reason: SINTOL

## 2020-01-08 NOTE — TELEPHONE ENCOUNTER
Discussed and reviewed patient provided home blood glucose log (scanned under media) from last 2 weeks with PRIYA Caldera  which reveals several readings in the 300- 400 mg/dL range  Per Summer, dose of Humalog increased to 6 units TID a c  + scale  Patient was informed of changes during Type 1 Basics education session today in the office and will begin following new regimen  Med list needs to be updated to reflect changes

## 2020-01-08 NOTE — TELEPHONE ENCOUNTER
She has questions concerning his insulin  His Humalog was increased to 6u but confused why Lantus can't be increased? His bs this am was 260 at lunch 405 and continues to climb through the day

## 2020-01-08 NOTE — TELEPHONE ENCOUNTER
Spoke to patient who stated he didn't call, his wife did  Called his wife  She is wondering why only Humalog was increased yesterday  Discussed Lantus can be increased however, we like to make small changes at time to avoid hypoglycemia  Basal and bolus dosing should also be about 50-50% and he was taking more long acting insulin than boluses  His BG increased after meals suggesting his bolus was not adequate  BG this morning was 269 and at lunch it was 410  He had given himself 10 units with breakfast   Last night BG was 165 before dinner  Advised to increase Humalog at breakfast to 8 units and continue 6 units with lunch and dinner plus scale  Patients wife also inquired about oral meds as he was better controlled on that  Discussed honeymoon period and that glimepiride should not be used with type 1 DM  Can start metformin 500 mg with dinner  Send log on Monday  Call sooner if any urgent changes or BG persistently over 300 mg/dl      Summer Hall PA-C

## 2020-01-27 DIAGNOSIS — E13.9 LADA (LATENT AUTOIMMUNE DIABETES IN ADULTS), MANAGED AS TYPE 1 (HCC): ICD-10-CM

## 2020-01-27 DIAGNOSIS — Z79.4 TYPE 2 DIABETES MELLITUS WITH HYPERGLYCEMIA, WITH LONG-TERM CURRENT USE OF INSULIN (HCC): ICD-10-CM

## 2020-01-27 DIAGNOSIS — IMO0002 UNCONTROLLED TYPE 2 DIABETES MELLITUS WITH CIRCULATORY DISORDER, WITHOUT LONG-TERM CURRENT USE OF INSULIN: ICD-10-CM

## 2020-01-27 DIAGNOSIS — E11.65 TYPE 2 DIABETES MELLITUS WITH HYPERGLYCEMIA, WITH LONG-TERM CURRENT USE OF INSULIN (HCC): ICD-10-CM

## 2020-01-27 RX ORDER — INSULIN LISPRO 100 [IU]/ML
INJECTION, SOLUTION INTRAVENOUS; SUBCUTANEOUS
Qty: 5 PEN | Refills: 1
Start: 2020-01-27

## 2020-01-27 NOTE — TELEPHONE ENCOUNTER
Spoke to pts wife arabella who understood changes  Med list updated  She did say Metformin does upset his stomach, and doesn't really want to increase metformin due to stomach issues  I asked that he try and if he has any issues to call

## 2020-01-27 NOTE — TELEPHONE ENCOUNTER
BG levels are mostly elevated in the high 100s to 200s range  Take metformin 500 mg with lunch and dinner  Increase Lantus to 24 units and Humalog to 10 units TID with meals plus scale  Call if BG levels persistently less than 70 mg/dl or over 300 mg/dl       Summer Hall PA-C

## 2020-02-03 ENCOUNTER — TELEPHONE (OUTPATIENT)
Dept: ENDOCRINOLOGY | Facility: CLINIC | Age: 60
End: 2020-02-03

## 2020-02-03 DIAGNOSIS — E13.9 LADA (LATENT AUTOIMMUNE DIABETES IN ADULTS), MANAGED AS TYPE 1 (HCC): ICD-10-CM

## 2020-02-03 NOTE — TELEPHONE ENCOUNTER
Metformin was restarted because patient had tolerated 1000 mg BID in the past  But due to recent symptoms, we can discontinue medication  Considering he has a history of pancreatitis and previously tolerated higher doses of metformin , I do suggest he follow-up with his PCP regarding his symptoms  For abdominal pain, nausea, vomiting, he should go to the ER  Advise patient to send log so adjustments can be made to regimen if necessary

## 2020-02-03 NOTE — TELEPHONE ENCOUNTER
Spoke to pat's wife arabella who understood to have pt stop metformin and see pcp for nausea stomach pain     Will send bs log    Med list updated

## 2020-02-03 NOTE — TELEPHONE ENCOUNTER
Patient's wife called said he is feeling dizzy and has stomach pain   Since we increased his metformin to lunch & dinner   humalog 10u TID + scale  Lantus 24u am  Per pts wife he can not tolerate Metformin     [phone]

## 2020-02-04 ENCOUNTER — TELEPHONE (OUTPATIENT)
Dept: ENDOCRINOLOGY | Facility: CLINIC | Age: 60
End: 2020-02-04

## 2020-02-04 NOTE — TELEPHONE ENCOUNTER
BG levels were starting to improve  For now continue current treatment  Send BG log 1 week after stopping metformin  Keep carb intake consistent to limit fluctuations in BG levels / to avoid hyperglycemia and hypoglycemia         Summer Hall PA-C

## 2020-02-04 NOTE — TELEPHONE ENCOUNTER
bs log    meds-  lantus 24u QD  humalog 10u TID + scale  Metformin taken for last time 2/3/20 at lunch (as instructed)

## 2020-02-06 ENCOUNTER — TELEPHONE (OUTPATIENT)
Dept: FAMILY MEDICINE CLINIC | Facility: CLINIC | Age: 60
End: 2020-02-06

## 2020-02-06 DIAGNOSIS — R42 DIZZINESS: ICD-10-CM

## 2020-02-06 DIAGNOSIS — H93.8X3 PRESSURE SENSATION IN BOTH EARS: ICD-10-CM

## 2020-02-06 DIAGNOSIS — IMO0001 UNCONTROLLED TYPE 2 DIABETES MELLITUS WITHOUT COMPLICATION, WITHOUT LONG-TERM CURRENT USE OF INSULIN: Primary | ICD-10-CM

## 2020-02-12 DIAGNOSIS — I25.10 ARTERIOSCLEROSIS OF CORONARY ARTERY: ICD-10-CM

## 2020-02-13 RX ORDER — RIVAROXABAN 20 MG/1
TABLET, FILM COATED ORAL
Qty: 30 TABLET | Refills: 5 | Status: SHIPPED | OUTPATIENT
Start: 2020-02-13 | End: 2020-08-20

## 2020-02-18 ENCOUNTER — TELEPHONE (OUTPATIENT)
Dept: NEUROLOGY | Facility: CLINIC | Age: 60
End: 2020-02-18

## 2020-02-21 ENCOUNTER — OFFICE VISIT (OUTPATIENT)
Dept: NEUROLOGY | Facility: CLINIC | Age: 60
End: 2020-02-21
Payer: COMMERCIAL

## 2020-02-21 VITALS
RESPIRATION RATE: 14 BRPM | BODY MASS INDEX: 21.29 KG/M2 | HEIGHT: 68 IN | HEART RATE: 70 BPM | DIASTOLIC BLOOD PRESSURE: 64 MMHG | SYSTOLIC BLOOD PRESSURE: 118 MMHG

## 2020-02-21 DIAGNOSIS — G71.11 MYOTONIC DYSTROPHY (HCC): ICD-10-CM

## 2020-02-21 DIAGNOSIS — R42 VERTIGO: Primary | ICD-10-CM

## 2020-02-21 PROCEDURE — 2022F DILAT RTA XM EVC RTNOPTHY: CPT | Performed by: PSYCHIATRY & NEUROLOGY

## 2020-02-21 PROCEDURE — 99214 OFFICE O/P EST MOD 30 MIN: CPT | Performed by: PSYCHIATRY & NEUROLOGY

## 2020-02-21 PROCEDURE — 1036F TOBACCO NON-USER: CPT | Performed by: PSYCHIATRY & NEUROLOGY

## 2020-02-21 RX ORDER — DIAZEPAM 2 MG/1
TABLET ORAL
Qty: 10 TABLET | Refills: 0 | Status: SHIPPED | OUTPATIENT
Start: 2020-02-21 | End: 2020-11-30

## 2020-02-21 NOTE — ASSESSMENT & PLAN NOTE
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  He has continued to continuing to express symptoms of vertigo  This is worse when looking towards the right  I have asked him to perform exercises for vestibular therapy on the right  He is to exercises on a regular basis  He does have an appointment with ENT in the next month  Today he had excessive cerumen noted the ears and I have informed him that this could certainly help  I have also asked him to utilize Allegra  Daily he informs me that the Q 24 Allegra can cause drowsiness  I also provided him prescription for Valium half a tablet to one tablet of the 2 mg to be taken for severe vertigo      If the symptoms do not improve he is certainly call our offices in MRI imaging study could be obtained at that time

## 2020-02-21 NOTE — ASSESSMENT & PLAN NOTE
This myotonic dystrophy is stable at this time  Will continue to monitor for progression of his symptoms

## 2020-02-21 NOTE — PROGRESS NOTES
Patient ID: Edelmira Fabian is a 61 y o  male  Assessment/Plan:    Vertigo  Solomon Skiff is a 29-year-old patient with myotonic dystrophy  He has continued to continuing to express symptoms of vertigo  This is worse when looking towards the right  I have asked him to perform exercises for vestibular therapy on the right  He is to exercises on a regular basis  He does have an appointment with ENT in the next month  Today he had excessive cerumen noted the ears and I have informed him that this could certainly help  I have also asked him to utilize Allegra  Daily he informs me that the Q 24 Allegra can cause drowsiness  I also provided him prescription for Valium half a tablet to one tablet of the 2 mg to be taken for severe vertigo  If the symptoms do not improve he is certainly call our offices in MRI imaging study could be obtained at that time      Myotonic dystrophy [facility])  This myotonic dystrophy is stable at this time  Will continue to monitor for progression of his symptoms  Diagnoses and all orders for this visit:    Vertigo  -     diazepam (VALIUM) 2 mg tablet; 1/2 to 1 tablet prn for severe vertigo every 8 hours    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[address] )         Subjective:    He complains of intermittent lightheadedness  It occurs when in the morning when he attempts to stand  It also occurs throughout the day intermittently  He has no evidence orthostatic symptoms  He did recently undergo vestibular therapy with some some some improvement      I have asked him to increase his water intake, drink glass of water in the morning and perform  vestibular exercises         There are no other abnormalities on the exam            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[address] )              Mr Salvatore Hutson is a pleasant 61 yo male with history of myotonic dystrophy who also presents with persistent symptoms of lightheadedness  He does  history of vertigo   At the last visit I did suggest vestibular therapy    We also suggested increasing his oral liquid intake this symptoms improved with vestibular therapy however now they have returned  They occur when he is moving his head to the right  They also occur when he is playing golf bending for ambulating  This can be quite difficult for the patient  He has previously tried Antivert with no relief  He is currently not performing any of the exercises that were suggested  He does have a history of myotonic dystrophy  He was diagnosed in 1988 when he noticed difficulty walking and spasms spasms of his muscles    He had a muscle biopsy  States he had trouble "getting started" in sports and thus had testing done including his brothers and dad, and his father  were all found to have a similar abnormality  but his two brothers are without symptoms   We did discuss the use of either mexiletine or Tegretol but he felt that the symptoms did not warrant the use of medication     When he was young  then but states over the last 20 years has noted slow gradually diffuse muscle weakness  States most notable with gripping  States he has trouble with muscle relaxation after gripping and complains of decrease in strength        States history heart attack 2004, and has cardiac stent in  [facility] follows with cardiology  he denies any problems with conduction  He was recently evaluated by Cardiology  [facility] has had three blood clots and thus on Xarelto- [facility] has history of this in dad and likely inherited this from him      States significant weight loss despite eating similar to prior and diabetes better controlled now than prior  Metformin was discontinued thinking that this could be contributing to periods of dizziness    emg in march of 2018 was consistent with myotonia  But no evidence of a neuropathy         He is now taking care of his granddaughter on daily basis     he plays golf at least two or 3 times a week        He   was a  and retired in November of 2017   he denies any falls and does reports some intermittent problem with his balance        On calcium and vit d ,                                 The following portions of the patient's history were reviewed and updated as appropriate:   He  has a past medical history of CAD (coronary artery disease), Congenital myotonia, Diabetes ([facility] [address] ), DVT (deep vein thrombosis) in pregnancy, 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[facility] [address] ) (12/06/2017), Pancreatitis, Sleep apnea, and Superficial thrombophlebitis  He  has a past surgical history that includes Cholecystectomy; Coronary angioplasty with stent; Colonoscopy; Circumcision; Inguinal hernia repair; ORIF radial shaft fracture; ORIF ulnar / radial shaft fracture; and Tonsillectomy and adenoidectomy  His family history includes Brain cancer in his mother; Cancer in his mother; Clotting disorder in his mother; Coronary artery disease in his paternal uncle; Heart disease in his mother; Hyperlipidemia in his mother  He  reports that he has never smoked  He has never used smokeless tobacco  He reports that he drinks alcohol  He reports that he does not use drugs    Current Outpatient Medications   Medication Sig Dispense Refill    aspirin (ECOTRIN LOW STRENGTH) 81 mg EC tablet Take 81 mg by mouth daily      atorvastatin (LIPITOR) 40 mg tablet TAKE 1 TABLET DAILY 30 tablet 5    glucagon (GLUCAGON EMERGENCY) 1 MG injection Inject 1 mg under the skin once as needed for low blood sugar for up to 1 dose 1 kit 0    glucose blood test strip Test twice daily dx: E11 9 100 each 1    insulin glargine (LANTUS SOLOSTAR) 100 units/mL injection pen Take 24 units in AM 5 pen 1    insulin lispro (HUMALOG KWIKPEN) 100 units/mL injection pen Inject 10units TID +scale 1 unit for every 50 over 150 mg/dl)150-200 = 1; 201-249 = 2; 250-300 = 3; 301-349 = 4; > 350 = 5 5 pen 1    Insulin Pen Needle 32G X 4 MM MISC Use to inject insulin 3 times daily 300 each 1    sildenafil (VIAGRA) 50 MG tablet Take 1 tablet (50 mg total) by mouth daily as needed for erectile dysfunction 10 tablet 0    XARELTO 20 MG tablet TAKE 1 TABLET BY MOUTH EVERY DAY WITH BREAKFAST 30 tablet 5    diazepam (VALIUM) 2 mg tablet 1/2 to 1 tablet prn for severe vertigo every 8 hours 10 tablet 0    meclizine (ANTIVERT) 25 mg tablet Take 1 tablet (25 mg total) by mouth every 8 (eight) hours as needed for dizziness (Patient not taking: Reported on 11/26/2019) 40 tablet 2     No current facility-administered medications for this visit  He has No Known Allergies            Objective:    Blood pressure 118/64, pulse 70, resp  rate 14, height 5' 8" (1 727 m)  Physical Exam   Constitutional: He appears well-developed  HENT:   Right Ear: No hearing normal    Neurological: He has normal strength  Reflex Scores:       Tricep reflexes are 1+ on the right side and 1+ on the left side  Bicep reflexes are 2+ on the right side and 2+ on the left side  Patellar reflexes are 2+ on the right side and 2+ on the left side  Achilles reflexes are 1+ on the right side and 1+ on the left side  Psychiatric: His speech is normal        Neurological Exam  Mental Status  Awake  Oriented to person, place, time and situation  Speech is normal     Cranial Nerves  CN II: Visual fields full to confrontation  CN III, IV, VI: Extraocular movements intact bilaterally  Right pupil: 4 mm  Left pupil: 4 mm  CN V: Facial sensation is normal   CN VII: Full and symmetric facial movement  CN VIII:  Right: Hearing is decreased  CN XI: Shoulder shrug strength is normal   He had cerumen noted bilaterally  Air conduction is greater than go bone conduction on the left the decreased on the right, no ptosis was noted today  Motor  Normal muscle bulk throughout  No fasciculations present  Strength is 5/5 throughout all four extremities  However he had stiffness  No myotonia was noted on today's exam but he did had difficulty relaxing the muscles      Sensory  Light touch is normal in upper and lower extremities  Temperature is normal in upper and lower extremities  Reflexes                                           Right                      Left  Biceps                                 2+                         2+  Triceps                                1+                         1+  Patellar                                2+                         2+  Achilles                                1+                         1+  Plantar                           Downgoing                Downgoing    Right pathological reflexes: Polo's absent  Left pathological reflexes: Polo's absent  Coordination  Right: Finger-to-nose normal   Left: Finger-to-nose normal     Gait  Normal casual, toe, heel and tandem gait  Able to rise from chair without using arms  He had a positive Hallpike maneuver when moving his head to the right  No nystagmus was noted during the study but he did have exacerbation of his symptoms  Review of systems obtained from the medical assistant as below was reviewed with the patient at today's visit  ROS:    Review of Systems   Constitutional: Negative  Negative for appetite change and fever  HENT: Positive for congestion  Negative for hearing loss, tinnitus, trouble swallowing and voice change  Eyes: Negative  Negative for photophobia and pain  Respiratory: Negative  Negative for shortness of breath  Cardiovascular: Negative  Negative for palpitations  Gastrointestinal: Negative  Negative for nausea and vomiting  Endocrine: Negative  Negative for cold intolerance and heat intolerance  Genitourinary: Negative  Negative for dysuria, frequency and urgency  Musculoskeletal: Negative  Negative for myalgias and neck pain  Skin: Negative  Negative for rash  Neurological: Positive for dizziness and light-headedness  Negative for tremors, seizures, syncope, facial asymmetry, speech difficulty, weakness, numbness and headaches     Hematological: Negative  Does not bruise/bleed easily  Psychiatric/Behavioral: Negative  Negative for confusion, hallucinations and sleep disturbance

## 2020-02-24 DIAGNOSIS — E11.9 CONTROLLED TYPE 2 DIABETES MELLITUS WITHOUT COMPLICATION, WITHOUT LONG-TERM CURRENT USE OF INSULIN (HCC): ICD-10-CM

## 2020-02-26 ENCOUNTER — TELEPHONE (OUTPATIENT)
Dept: DIABETES SERVICES | Facility: CLINIC | Age: 60
End: 2020-02-26

## 2020-02-26 NOTE — TELEPHONE ENCOUNTER
GEE--Pt left a message on the education line that he was "discontinuing his service" with our doctors because he found somewhere closer to home   Canceled appointment with Summer on 3 3

## 2020-03-25 ENCOUNTER — TELEMEDICINE (OUTPATIENT)
Dept: FAMILY MEDICINE CLINIC | Facility: CLINIC | Age: 60
End: 2020-03-25
Payer: COMMERCIAL

## 2020-03-25 DIAGNOSIS — G71.11 MYOTONIC DYSTROPHY (HCC): ICD-10-CM

## 2020-03-25 DIAGNOSIS — K29.00 ACUTE GASTRITIS WITHOUT HEMORRHAGE, UNSPECIFIED GASTRITIS TYPE: Primary | ICD-10-CM

## 2020-03-25 DIAGNOSIS — D69.6 THROMBOCYTOPENIA (HCC): ICD-10-CM

## 2020-03-25 PROCEDURE — 99214 OFFICE O/P EST MOD 30 MIN: CPT | Performed by: FAMILY MEDICINE

## 2020-03-25 RX ORDER — PANTOPRAZOLE SODIUM 20 MG/1
20 TABLET, DELAYED RELEASE ORAL DAILY
Qty: 90 TABLET | Refills: 1 | Status: SHIPPED | OUTPATIENT
Start: 2020-03-25 | End: 2020-09-15

## 2020-03-25 NOTE — PROGRESS NOTES
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[address] )       Other    Thrombocytopenia ([facility] [address] )               Reason for visit is   Emergency room follow-up for abdominal pain  Encounter provider Cristiano Ramirez DO    Provider located at [address] [place] [address] [address] [id number]      Recent Visits  No visits were found meeting these conditions  Showing recent visits within past 7 days and meeting all other requirements     Future Appointments  No visits were found meeting these conditions  Showing future appointments within next 150 days and meeting all other requirements        After connecting through [employer], the patient was identified by name and date of birth  Yas Agrawal was informed that this is a telemedicine visit and that the visit is being conducted through [employer] and patient was informed that this is not a secure, HIPAA-complaint platform  he agrees to proceed  which may not be secure and therefore, might not be HIPAA-compliant  My office door was closed  No one else was in the room  He acknowledged consent and understanding of privacy and security of the video platform  The patient has agreed to participate and understands they can discontinue the visit at any time  Subjective  Yas Agrawal is a 61 y o  male  He was seen at [facility] for abdominal pain approximately 3 days ago  States that late last week he developed epigastric discomfort that he described as sharp  Eating did make it worse    He was taking Advil for it, that did not make it better, or worse  He was given pantoprazole in the emergency department, that did improve his discomfort  He had a CT scan, and lab work which were all unremarkable  He was discharged on Maalox  This does help somewhat, but the discomfort comes back 5-6 hours after he takes medication  He is able to sleep at this time         Past Medical History:   Diagnosis Date    CAD (coronary artery disease)     Congenital myotonia     [facility] vein [facility]DVT) ([facility])     after tear of gastrocnemus muscle    Diabetes ([facility] [address] )     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[address] ) 12/06/2017    Pancreatitis     three times    Sleep apnea     not using a C-PAP    Superficial thrombophlebitis        Past Surgical History:   Procedure Laterality Date    CHOLECYSTECTOMY      CIRCUMCISION      Elective    COLONOSCOPY      complete, polyps 2 years ago    CORONARY ANGIOPLASTY WITH STENT PLACEMENT      stent to RCA 2004    INGUINAL HERNIA REPAIR      ORIF RADIAL SHAFT FRACTURE Left     Open Treatment of Multiple Fractures of the Radial Shaft    ORIF ULNAR / RADIAL SHAFT FRACTURE Left     Open Treatment of Multiple Fractures of the Ulnar Shaft    TONSILLECTOMY AND ADENOIDECTOMY         Current Outpatient Medications   Medication Sig Dispense Refill    aspirin (ECOTRIN LOW STRENGTH) 81 mg EC tablet Take 81 mg by mouth daily      atorvastatin (LIPITOR) 40 mg tablet TAKE 1 TABLET DAILY 30 tablet 5    diazepam (VALIUM) 2 mg tablet 1/2 to 1 tablet prn for severe vertigo every 8 hours (Patient not taking: Reported on 3/3/2020) 10 tablet 0    glucagon (GLUCAGON EMERGENCY) 1 MG injection Inject 1 mg under the skin once as needed for low blood sugar for up to 1 dose 1 kit 0    glucose blood test strip TEST TWICE A  each 5    insulin glargine (LANTUS SOLOSTAR) 100 units/mL injection pen Take 24 units in AM 5 pen 1    insulin lispro (HUMALOG KWIKPEN) 100 units/mL injection pen Inject 10units TID +scale 1 unit for every 50 over 150 mg/dl)150-200 = 1; 201-249 = 2; 250-300 = 3; 301-349 = 4; > 350 = 5 5 pen 1    Insulin Pen Needle 32G X 4 MM MISC Use to inject insulin 3 times daily 300 each 1    meclizine (ANTIVERT) 25 mg tablet Take 1 tablet (25 mg total) by mouth every 8 (eight) hours as needed for dizziness (Patient not taking: Reported on 11/26/2019) 40 tablet 2    pantoprazole (PROTONIX) 20 mg tablet Take 1 tablet (20 mg total) by mouth daily 90 tablet 1    sildenafil (VIAGRA) 50 MG tablet Take 1 tablet (50 mg total) by mouth daily as needed for erectile dysfunction 10 tablet 0    XARELTO 20 MG tablet TAKE 1 TABLET BY MOUTH EVERY DAY WITH BREAKFAST 30 tablet 5     No current facility-administered medications for this visit  No Known Allergies    Review of Systems   Constitutional: Positive for activity change and appetite change  Negative for fatigue and unexpected weight change  HENT: Negative  Eyes: Negative for visual disturbance  Respiratory: Negative for cough, shortness of breath and wheezing  Cardiovascular: Negative for chest pain, palpitations and leg swelling  Gastrointestinal: Positive for abdominal pain, diarrhea (After his CT scan ) and nausea  Negative for vomiting  Endocrine: Negative for cold intolerance and heat intolerance  Genitourinary: Negative  Musculoskeletal: Negative for back pain and myalgias  Skin: Negative for rash  Allergic/Immunologic: Negative for immunocompromised state  Neurological: Negative for dizziness, weakness and light-headedness  Psychiatric/Behavioral: Negative for agitation and confusion  The patient is nervous/anxious  Lab/x-ray-  Data from [facility] we will lumbar was reviewed on Care everywhere, platelet count was mildly low at 115,000 hundred fifteen thousand but patient has had this in the past   Rest his lab work was unremarkable    CT scan showed an old calcified cystic mass of his left adrenal, but this has been seen in the past, has not changed  Physical Exam   On the video screen, the patient did not appear to be in any obvious distress or anxious  I spent 15 minutes with the patient during this visit

## 2020-03-25 NOTE — ASSESSMENT & PLAN NOTE
Take pantoprazole daily  Avoid over-the-counter NSAIDs, minimize caffeine  To call the office in 2 weeks and give me an update  I will see him in 4-6 weeks to see how he is doing

## 2020-04-29 ENCOUNTER — TELEPHONE (OUTPATIENT)
Dept: CARDIOLOGY CLINIC | Facility: CLINIC | Age: 60
End: 2020-04-29

## 2020-05-03 DIAGNOSIS — I25.10 ARTERIOSCLEROSIS OF CORONARY ARTERY: ICD-10-CM

## 2020-05-03 RX ORDER — ATORVASTATIN CALCIUM 40 MG/1
TABLET, FILM COATED ORAL
Qty: 90 TABLET | Refills: 1 | Status: SHIPPED | OUTPATIENT
Start: 2020-05-03 | End: 2020-10-25

## 2020-05-13 ENCOUNTER — HOSPITAL ENCOUNTER (OUTPATIENT)
Dept: NON INVASIVE DIAGNOSTICS | Facility: HOSPITAL | Age: 60
Discharge: HOME | End: 2020-05-13
Attending: INTERNAL MEDICINE
Payer: COMMERCIAL

## 2020-05-13 DIAGNOSIS — I25.10 ARTERIOSCLEROSIS OF CORONARY ARTERY: ICD-10-CM

## 2020-05-13 PROCEDURE — 93351 STRESS TTE COMPLETE: CPT

## 2020-05-13 PROCEDURE — 93350 STRESS TTE ONLY: CPT

## 2020-05-15 ENCOUNTER — TELEMEDICINE (OUTPATIENT)
Dept: CARDIOLOGY CLINIC | Facility: CLINIC | Age: 60
End: 2020-05-15
Payer: COMMERCIAL

## 2020-05-15 VITALS — BODY MASS INDEX: 22.5 KG/M2 | SYSTOLIC BLOOD PRESSURE: 105 MMHG | HEIGHT: 68 IN | DIASTOLIC BLOOD PRESSURE: 67 MMHG

## 2020-05-15 DIAGNOSIS — E78.2 MIXED HYPERLIPIDEMIA: ICD-10-CM

## 2020-05-15 DIAGNOSIS — I25.10 ARTERIOSCLEROSIS OF CORONARY ARTERY: Primary | ICD-10-CM

## 2020-05-15 LAB
CHEST PAIN STATEMENT: NORMAL
MAX DIASTOLIC BP: 80 MMHG
MAX HEART RATE: 146 BPM
MAX PREDICTED HEART RATE: 160 BPM
MAX. SYSTOLIC BP: 166 MMHG
PROTOCOL NAME: NORMAL
TARGET HR FORMULA: NORMAL
TEST INDICATION: NORMAL
TIME IN EXERCISE PHASE: NORMAL

## 2020-05-15 PROCEDURE — 99441 PR PHYS/QHP TELEPHONE EVALUATION 5-10 MIN: CPT | Performed by: INTERNAL MEDICINE

## 2020-05-28 ENCOUNTER — OFFICE VISIT (OUTPATIENT)
Dept: FAMILY MEDICINE CLINIC | Facility: CLINIC | Age: 60
End: 2020-05-28
Payer: COMMERCIAL

## 2020-05-28 VITALS
SYSTOLIC BLOOD PRESSURE: 110 MMHG | TEMPERATURE: 98.4 F | WEIGHT: 150 LBS | DIASTOLIC BLOOD PRESSURE: 68 MMHG | BODY MASS INDEX: 22.73 KG/M2 | HEIGHT: 68 IN

## 2020-05-28 DIAGNOSIS — E13.9 LADA (LATENT AUTOIMMUNE DIABETES IN ADULTS), MANAGED AS TYPE 1 (HCC): Primary | ICD-10-CM

## 2020-05-28 DIAGNOSIS — E78.2 MIXED HYPERLIPIDEMIA: ICD-10-CM

## 2020-05-28 DIAGNOSIS — I25.10 ARTERIOSCLEROSIS OF CORONARY ARTERY: ICD-10-CM

## 2020-05-28 PROCEDURE — 1036F TOBACCO NON-USER: CPT | Performed by: FAMILY MEDICINE

## 2020-05-28 PROCEDURE — 2022F DILAT RTA XM EVC RTNOPTHY: CPT | Performed by: FAMILY MEDICINE

## 2020-05-28 PROCEDURE — 99214 OFFICE O/P EST MOD 30 MIN: CPT | Performed by: FAMILY MEDICINE

## 2020-05-28 PROCEDURE — 3008F BODY MASS INDEX DOCD: CPT | Performed by: FAMILY MEDICINE

## 2020-06-08 ENCOUNTER — APPOINTMENT (OUTPATIENT)
Dept: LAB | Age: 60
End: 2020-06-08
Payer: COMMERCIAL

## 2020-06-08 DIAGNOSIS — I25.10 ARTERIOSCLEROSIS OF CORONARY ARTERY: ICD-10-CM

## 2020-06-08 DIAGNOSIS — E78.2 MIXED HYPERLIPIDEMIA: ICD-10-CM

## 2020-06-08 DIAGNOSIS — E13.9 LADA (LATENT AUTOIMMUNE DIABETES IN ADULTS), MANAGED AS TYPE 1 (HCC): ICD-10-CM

## 2020-06-08 LAB
ALBUMIN SERPL BCP-MCNC: 3.2 G/DL (ref 3.5–5)
ALP SERPL-CCNC: 69 U/L (ref 46–116)
ALT SERPL W P-5'-P-CCNC: 62 U/L (ref 12–78)
ANION GAP SERPL CALCULATED.3IONS-SCNC: 6 MMOL/L (ref 4–13)
AST SERPL W P-5'-P-CCNC: 33 U/L (ref 5–45)
BASOPHILS # BLD AUTO: 0.02 THOUSANDS/ΜL (ref 0–0.1)
BASOPHILS NFR BLD AUTO: 0 % (ref 0–1)
BILIRUB SERPL-MCNC: 0.78 MG/DL (ref 0.2–1)
BUN SERPL-MCNC: 19 MG/DL (ref 5–25)
CALCIUM SERPL-MCNC: 8.5 MG/DL (ref 8.3–10.1)
CHLORIDE SERPL-SCNC: 105 MMOL/L (ref 100–108)
CHOLEST SERPL-MCNC: 120 MG/DL (ref 50–200)
CO2 SERPL-SCNC: 28 MMOL/L (ref 21–32)
CREAT SERPL-MCNC: 0.76 MG/DL (ref 0.6–1.3)
EOSINOPHIL # BLD AUTO: 0.52 THOUSAND/ΜL (ref 0–0.61)
EOSINOPHIL NFR BLD AUTO: 11 % (ref 0–6)
ERYTHROCYTE [DISTWIDTH] IN BLOOD BY AUTOMATED COUNT: 13.3 % (ref 11.6–15.1)
EST. AVERAGE GLUCOSE BLD GHB EST-MCNC: 140 MG/DL
GFR SERPL CREATININE-BSD FRML MDRD: 99 ML/MIN/1.73SQ M
GLUCOSE P FAST SERPL-MCNC: 220 MG/DL (ref 65–99)
HBA1C MFR BLD: 6.5 %
HCT VFR BLD AUTO: 43.4 % (ref 36.5–49.3)
HDLC SERPL-MCNC: 45 MG/DL
HGB BLD-MCNC: 14.3 G/DL (ref 12–17)
IMM GRANULOCYTES # BLD AUTO: 0.01 THOUSAND/UL (ref 0–0.2)
IMM GRANULOCYTES NFR BLD AUTO: 0 % (ref 0–2)
LDLC SERPL CALC-MCNC: 61 MG/DL (ref 0–100)
LYMPHOCYTES # BLD AUTO: 0.81 THOUSANDS/ΜL (ref 0.6–4.47)
LYMPHOCYTES NFR BLD AUTO: 18 % (ref 14–44)
MCH RBC QN AUTO: 30.6 PG (ref 26.8–34.3)
MCHC RBC AUTO-ENTMCNC: 32.9 G/DL (ref 31.4–37.4)
MCV RBC AUTO: 93 FL (ref 82–98)
MONOCYTES # BLD AUTO: 0.58 THOUSAND/ΜL (ref 0.17–1.22)
MONOCYTES NFR BLD AUTO: 13 % (ref 4–12)
NEUTROPHILS # BLD AUTO: 2.67 THOUSANDS/ΜL (ref 1.85–7.62)
NEUTS SEG NFR BLD AUTO: 58 % (ref 43–75)
NONHDLC SERPL-MCNC: 75 MG/DL
NRBC BLD AUTO-RTO: 0 /100 WBCS
PLATELET # BLD AUTO: 124 THOUSANDS/UL (ref 149–390)
PMV BLD AUTO: 10.4 FL (ref 8.9–12.7)
POTASSIUM SERPL-SCNC: 4.2 MMOL/L (ref 3.5–5.3)
PROT SERPL-MCNC: 6 G/DL (ref 6.4–8.2)
RBC # BLD AUTO: 4.68 MILLION/UL (ref 3.88–5.62)
SODIUM SERPL-SCNC: 139 MMOL/L (ref 136–145)
TRIGL SERPL-MCNC: 69 MG/DL
TSH SERPL DL<=0.05 MIU/L-ACNC: 0.72 UIU/ML (ref 0.36–3.74)
WBC # BLD AUTO: 4.61 THOUSAND/UL (ref 4.31–10.16)

## 2020-06-08 PROCEDURE — 85025 COMPLETE CBC W/AUTO DIFF WBC: CPT

## 2020-06-08 PROCEDURE — 36415 COLL VENOUS BLD VENIPUNCTURE: CPT

## 2020-06-08 PROCEDURE — 84443 ASSAY THYROID STIM HORMONE: CPT

## 2020-06-08 PROCEDURE — 83036 HEMOGLOBIN GLYCOSYLATED A1C: CPT

## 2020-06-08 PROCEDURE — 3044F HG A1C LEVEL LT 7.0%: CPT | Performed by: PSYCHIATRY & NEUROLOGY

## 2020-06-08 PROCEDURE — 80053 COMPREHEN METABOLIC PANEL: CPT

## 2020-06-08 PROCEDURE — 80061 LIPID PANEL: CPT

## 2020-07-15 LAB
LEFT EYE DIABETIC RETINOPATHY: NORMAL
RIGHT EYE DIABETIC RETINOPATHY: NORMAL

## 2020-08-20 ENCOUNTER — TELEPHONE (OUTPATIENT)
Dept: NEUROLOGY | Facility: CLINIC | Age: 60
End: 2020-08-20

## 2020-08-20 DIAGNOSIS — I25.10 ARTERIOSCLEROSIS OF CORONARY ARTERY: ICD-10-CM

## 2020-08-20 RX ORDER — RIVAROXABAN 20 MG/1
TABLET, FILM COATED ORAL
Qty: 90 TABLET | Refills: 1 | Status: SHIPPED | OUTPATIENT
Start: 2020-08-20 | End: 2021-02-18

## 2020-08-20 NOTE — TELEPHONE ENCOUNTER
8/21/2020 10:20AM Dr Hali Camargo at [facility] to confirm and review covid screening questions and to complete registration, please transfer to [address]  ADVISED OF NO SHOW FEE  Please make aware of mask, visitor and temp policy

## 2020-08-21 ENCOUNTER — OFFICE VISIT (OUTPATIENT)
Dept: NEUROLOGY | Facility: CLINIC | Age: 60
End: 2020-08-21
Payer: COMMERCIAL

## 2020-08-21 VITALS
SYSTOLIC BLOOD PRESSURE: 102 MMHG | OXYGEN SATURATION: 96 % | HEART RATE: 63 BPM | TEMPERATURE: 98 F | WEIGHT: 149 LBS | DIASTOLIC BLOOD PRESSURE: 66 MMHG | HEIGHT: 68 IN | RESPIRATION RATE: 14 BRPM | BODY MASS INDEX: 22.58 KG/M2

## 2020-08-21 DIAGNOSIS — R42 VERTIGO: Primary | ICD-10-CM

## 2020-08-21 PROCEDURE — 3044F HG A1C LEVEL LT 7.0%: CPT | Performed by: PSYCHIATRY & NEUROLOGY

## 2020-08-21 PROCEDURE — 2022F DILAT RTA XM EVC RTNOPTHY: CPT | Performed by: PSYCHIATRY & NEUROLOGY

## 2020-08-21 PROCEDURE — 3008F BODY MASS INDEX DOCD: CPT | Performed by: PSYCHIATRY & NEUROLOGY

## 2020-08-21 PROCEDURE — 1036F TOBACCO NON-USER: CPT | Performed by: PSYCHIATRY & NEUROLOGY

## 2020-08-21 PROCEDURE — 99214 OFFICE O/P EST MOD 30 MIN: CPT | Performed by: PSYCHIATRY & NEUROLOGY

## 2020-08-21 NOTE — ASSESSMENT & PLAN NOTE
Priyank Orourke is a 40-year-old patient with known myotonic dystrophy  This is stable and he has adjusted his lifestyle

## 2020-08-21 NOTE — PROGRESS NOTES
Patient ID: Kecia Hobbs is a 61 y o  male  Assessment/Plan:    Vertigo  He continues to experience vertigo however this is very positional   This occurs he is sitting from a supine position  Tapia pike  maneuver was performed in the supine position and his head was moved either side did have an exacerbation of the vertigo  He has been doing vestibular exercises however I have asked him to reevlauated     This is quite atypical   He had no problem with orthostatic hypotension    hearing is intact , extraocular muscles did show some bilateral nystagmus  were however given his history of myotonic dystrophy I would like him obtain a of the brain including the IAC's     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[address] )  Fariba Case is a 41-year-old patient with known myotonic dystrophy  This is stable and he has adjusted his lifestyle  He had questions regarding his stomach and potential pancreatic insufficiency  I have asked him to ask his family physician      Diagnoses and all orders for this visit:    Vertigo  -     Ambulatory referral to Physical Therapy; Future  -     MRI brain IAC wo and w contrast; Future  -     Basic metabolic panel; Future     He is to return to offices in several months if there is any other new questions or    Subjective:             Mr Buddy Shankar is a pleasant 61 yo male with history of myotonic dystrophy who also presents with persistent symptoms of lightheadedness  He does  history of vertigo   At the last visit I did suggest vestibular therapy  We also suggested increasing his oral liquid intake this symptoms improved with vestibular therapy however now they have returned  Originally occurred when moving his head to the right playing  I would also occur when he will be playing golf and moving his head with ambulating  He did undergo an ENT evaluation who did clean at his ear and found no abnormalities  Now he reports the symptoms occur when he goes from a supine to sitting position    He has severe vertigo  Denies any hearing loss  He denies any double vision he has been doing some vestibular therapy exercises     He has previously tried Antivert with no relief            He does have a history of myotonic dystrophy  He was diagnosed in 1988 when he noticed difficulty walking and spasms spasms of his muscles    He had a muscle biopsy  States he had trouble "getting started" in sports and thus had testing done including his brothers and dad, and his father  were all found to have a similar abnormality  but his two brothers are without symptoms   We did discuss the use of either mexiletine or Tegretol but he felt that the symptoms did not warrant the use of medication     When he was young  then but states over the last 20 years has noted slow gradually diffuse muscle weakness  States most notable with gripping  States he has trouble with muscle relaxation after gripping and complains of decrease in strength         States history heart attack 2004, and has cardiac stent in  States follows with cardiology  he denies any problems with conduction  He was recently evaluated by Cardiology  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- states has history of this in dad and likely inherited this from him         emg in march of 2018 was consistent with myotonia  But no evidence of a neuropathy         He is now taking care of his 2 grandchildren  on daily basis  he plays golf at least two or 3 times a week        He   was a  and retired in November of 2017   he denies any falls and does reports some intermittent problem with his balance        On calcium and vit d ,                                        The following portions of the patient's history were reviewed and updated as appropriate:   He  has a past medical history of CAD (coronary artery disease), Congenital myotonia, Deep vein thrombosis (DVT) ([facility] [address] ), Diabetes ([facility] ), 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[facility] ) (12/06/2017), Pancreatitis, Sleep apnea, and Superficial thrombophlebitis  He  has a past surgical history that includes Cholecystectomy; Coronary angioplasty with stent; Colonoscopy; Circumcision; Inguinal hernia repair; ORIF radial shaft fracture (Left); ORIF ulnar / radial shaft fracture (Left); and Tonsillectomy and adenoidectomy  His family history includes Brain cancer in his mother; Cancer in his mother; Clotting disorder in his mother; Coronary artery disease in his paternal uncle; Heart disease in his mother; Hyperlipidemia in his mother  He  reports that he has never smoked  He has never used smokeless tobacco  He reports current alcohol use  He reports that he does not use drugs    Current Outpatient Medications   Medication Sig Dispense Refill    aspirin (ECOTRIN LOW STRENGTH) 81 mg EC tablet Take 81 mg by mouth daily      atorvastatin (LIPITOR) 40 mg tablet TAKE 1 TABLET DAILY 90 tablet 1    glucagon (GLUCAGON EMERGENCY) 1 MG injection Inject 1 mg under the skin once as needed for low blood sugar for up to 1 dose 1 kit 0    insulin glargine (LANTUS SOLOSTAR) 100 units/mL injection pen Take 24 units in AM 5 pen 1    insulin lispro (HUMALOG KWIKPEN) 100 units/mL injection pen Inject 10units TID +scale 1 unit for every 50 over 150 mg/dl)150-200 = 1; 201-249 = 2; 250-300 = 3; 301-349 = 4; > 350 = 5 5 pen 1    Insulin Pen Needle 32G X 4 MM MISC Use to inject insulin 3 times daily 300 each 1    pantoprazole (PROTONIX) 20 mg tablet Take 1 tablet (20 mg total) by mouth daily 90 tablet 1    Xarelto 20 MG tablet TAKE 1 TABLET BY MOUTH EVERY DAY WITH BREAKFAST 90 tablet 1    diazepam (VALIUM) 2 mg tablet 1/2 to 1 tablet prn for severe vertigo every 8 hours (Patient not taking: Reported on 5/28/2020) 10 tablet 0    glucose blood test strip TEST TWICE A DAY (Patient not taking: Reported on 8/21/2020) 100 each 5    meclizine (ANTIVERT) 25 mg tablet Take 1 tablet (25 mg total) by mouth every 8 (eight) hours as needed for dizziness (Patient not taking: Reported on 5/28/2020) 40 tablet 2    sildenafil (VIAGRA) 50 MG tablet Take 1 tablet (50 mg total) by mouth daily as needed for erectile dysfunction (Patient not taking: Reported on 8/21/2020) 10 tablet 0     No current facility-administered medications for this visit  He has No Known Allergies            Objective:    Blood pressure 102/66, pulse 63, temperature 98 °F (36 7 °C), resp  rate 14, height 5' 8" (1 727 m), weight 67 6 kg (149 lb), SpO2 96 %  Supine 110/70, sitting 100 60 , standing 100/65     Physical Exam  Vitals signs reviewed  HENT:      Head: Normocephalic  Eyes:      General: Lids are normal       Extraocular Movements: Extraocular movements intact  Pupils: Pupils are equal, round, and reactive to light  Neck:      Musculoskeletal: Normal range of motion  Neurological:      Mental Status: He is alert  Deep Tendon Reflexes: Strength normal       Reflex Scores:       Tricep reflexes are 1+ on the right side and 1+ on the left side  Bicep reflexes are 2+ on the right side and 2+ on the left side  Patellar reflexes are 2+ on the right side and 2+ on the left side  Achilles reflexes are 1+ on the right side and 1+ on the left side  Neurological Exam  Mental Status  Alert  Cranial Nerves  CN II: Visual acuity is normal  Visual fields full to confrontation  CN III, IV, VI: Extraocular movements intact bilaterally  Normal lids and orbits bilaterally  Pupils equal round and reactive to light bilaterally  CN V: Facial sensation is normal   CN VII: Full and symmetric facial movement  CN VIII: Hearing is normal   CN IX, X: Palate elevates symmetrically  Normal gag reflex  CN XI: Shoulder shrug strength is normal   CN XII: Tongue midline without atrophy or fasciculations  During the Hallpike maneuver he did have some bilateral nystagmus  Motor  Normal muscle bulk throughout  No fasciculations present   Strength is 5/5 throughout all four extremities  He has difficulty with relaxing the muscles, no myotonia was noted   Sensory  Light touch is normal in upper and lower extremities  Temperature is normal in upper and lower extremities  Reflexes                                           Right                      Left  Biceps                                 2+                         2+  Triceps                                1+                         1+  Patellar                                2+                         2+  Achilles                                1+                         1+  Plantar                           Downgoing                Downgoing    Coordination  Right: Finger-to-nose normal   Left: Finger-to-nose normal     Gait  Normal casual, toe, heel and tandem gait  Able to rise from chair without using arms  Review of systems obtained from the medical assistant as below was reviewed with the patient at today's  Visit   ROS:    Review of Systems   Constitutional: Negative  Negative for appetite change and fever  HENT: Negative  Negative for hearing loss, tinnitus, trouble swallowing and voice change  Eyes: Negative  Negative for photophobia and pain  Respiratory: Negative  Negative for shortness of breath  Cardiovascular: Negative  Negative for palpitations  Gastrointestinal: Negative for nausea and vomiting  Stomach pain in the morning comes and goes  Endocrine: Negative  Negative for cold intolerance  Genitourinary: Negative  Negative for dysuria, frequency and urgency  Musculoskeletal: Negative  Negative for myalgias and neck pain  Skin: Negative  Negative for rash  Allergic/Immunologic: Negative  Neurological: Positive for dizziness  Negative for tremors, seizures, syncope, facial asymmetry, speech difficulty, weakness, light-headedness, numbness and headaches  Hematological: Negative  Does not bruise/bleed easily  Psychiatric/Behavioral: Negative    Negative for confusion, hallucinations and sleep disturbance  All other systems reviewed and are negative

## 2020-08-21 NOTE — ASSESSMENT & PLAN NOTE
He continues to experience vertigo however this is very positional   This occurs he is sitting from a supine position  Tapia pike  maneuver was performed in the supine position and his head was moved either side did have an exacerbation of the vertigo  He has been doing vestibular exercises however I have asked him to reevlauated     This is quite atypical   He had no problem with orthostatic hypotension    hearing is intact , extraocular muscles did show some bilateral nystagmus    were however given his history of myotonic dystrophy I would like him obtain a of the brain including the IAC's

## 2020-08-31 ENCOUNTER — EVALUATION (OUTPATIENT)
Dept: PHYSICAL THERAPY | Facility: REHABILITATION | Age: 60
End: 2020-08-31
Payer: COMMERCIAL

## 2020-08-31 DIAGNOSIS — R42 DIZZINESS: Primary | ICD-10-CM

## 2020-08-31 DIAGNOSIS — R42 VERTIGO: ICD-10-CM

## 2020-08-31 PROCEDURE — 97162 PT EVAL MOD COMPLEX 30 MIN: CPT | Performed by: PHYSICAL THERAPIST

## 2020-08-31 PROCEDURE — 95992 CANALITH REPOSITIONING PROC: CPT | Performed by: PHYSICAL THERAPIST

## 2020-08-31 NOTE — PROGRESS NOTES
PT Evaluation     Today's date: 2020  Patient name: Virgilio Hu  : 1960  MRN: [id number]  Referring provider: Robert Mckee DO  Dx:   Encounter Diagnosis     ICD-10-CM    1  Vertigo  R42 Ambulatory referral to Physical Therapy                  Assessment  Assessment details: Pt is a 62 yo male with dizziness that began 2 years ago and has occurred intermittently  Symptoms are greatest in the morning and then decrease as the day goes on  He notes good days and bad days  He denies spinning but notes that he gets lightheaded and feels confusion when it occurs  He does have IDDM and he notes that he checks his blood sugar frequently  Lightheadedness often occurs when he is hungry  He is working with a diabetic counselor to better control his blood sugar  Oculomotor exam was negative  He did have mild symptoms with VBI testing on the left which deserves f/u  His balance is impaired greater so with his eyes closed  Sapphire-Hallpike was positive on the left with nystagmus occurring after a 15" latency  He sat up due to not tolerating the feeling so duration could not be assessed  On the second attempt he did not get nystagmus but noted symptoms so he was treated with canalith repositioning  He will be retested at subsequent visits  There are likely multiple contributing factors to his dizziness  We will address positional vertigo and he will continue to work with the diabetic counselor  I also spoke to patient about foot care for diabetics on this visit      Impairments: impaired balance and poor posture     Symptom irritability: moderateUnderstanding of Dx/Px/POC: excellent   Prognosis: good    Plan  Patient would benefit from: skilled physical therapy  Planned therapy interventions: patient education, balance, canalith repositioning, graded exercise and home exercise program  Frequency: 2x week  Duration in weeks: 12  Treatment plan discussed with: patient        Subjective Evaluation    History of Present Illness  Mechanism of injury: Dizziness has been going on for about 2 years  He was diagnosed as a diabetic 4 years ago  He started taking insulin about 1 year ago  He checks his blood sugar up to 6 x a day  Objective     Concurrent Complaints  Positive for nausea/motion sickness, visual change, hearing loss and memory loss  Negative for headaches, tinnitus, aural fullness, poor concentration and peripheral neuropathy  Neuro Exam:     Dizziness  Positive for disequilibrium, motion sickness, light-headedness and floating or swimming  Negative for vertigo, oscillopsia, rocking or swaying and diplopia (blurry)  Exacerbating factors  Positive for bending over, rolling in bed, looking up and supine to/from sitting  Negative for walking, turning head, optokinetic movement and walking in busy environment  Symptoms   Duration: minutes  Frequency: most mornings  Intensity at best: 0/10  Intensity at worst: 7/10    Headaches   Patient reports headaches: No      Cervical exam   Ligament Laxity Testing   Alar ligament: WNL  Sharp Jose Alejandro:  WNL  Modified VBI   Left: symptomatic  Right: asymptomatic    Oculomotor exam   Oculomotor ROM: WNL  Resting nystagmus: not present   Gaze holding nystagmus: not present left   Smooth pursuits: within normal limits  Vertical saccades: normal  Horizontal saccades: normal  Convergence: normal  Cover test: normal  Head thrust: left normal    Positional testing   [place]-Hallpike   Right posterior canal: upbeating  Sapphire-Hallpike comment: 15" latency pt sat up due to not tolerating it after 5"  Positional testing comment: Tandem 5"  SLS R 15"  L 15"      Balance assessments   MCTSIB   Eyes open level surface: 60"  Eyes open foam surface: 60"  Eyes closed level surface: 60" sway  Eyes closed foam surface: 10" sway S             Precautions: DM      Manuals 8/31            Faye stroud R 1x                                                   Neuro Re-Ed             Italo Hernandez             Tandem stand             Romberg on foam                                                                 Ther Ex                                                                                                                     Ther Activity                                       Gait Training                                       Modalities

## 2020-09-02 ENCOUNTER — OFFICE VISIT (OUTPATIENT)
Dept: PHYSICAL THERAPY | Facility: REHABILITATION | Age: 60
End: 2020-09-02
Payer: COMMERCIAL

## 2020-09-02 DIAGNOSIS — R42 VERTIGO: Primary | ICD-10-CM

## 2020-09-02 DIAGNOSIS — R42 DIZZINESS: ICD-10-CM

## 2020-09-02 PROCEDURE — 97112 NEUROMUSCULAR REEDUCATION: CPT | Performed by: PHYSICAL THERAPIST

## 2020-09-02 PROCEDURE — 95992 CANALITH REPOSITIONING PROC: CPT | Performed by: PHYSICAL THERAPIST

## 2020-09-02 NOTE — PROGRESS NOTES
Daily Note     Today's date: 2020  Patient name: Roslyn Chan  : 1960  MRN: [id number]  Referring provider: Vince Smart DO  Dx:   Encounter Diagnosis     ICD-10-CM    1  Vertigo  R42    2  Dizziness  R42                   Subjective: Feeling much better since his IE      Objective: See treatment diary below      Assessment: Pt is demonstrating improved balance and decreased dizziness  Sapphire-hallpike was positive on the right with a 15" latency lasting 10"      Plan: Continue per plan of care        Precautions: DM      Manuals            Canalith repo R 1x 1x                                                  Neuro Re-Ed             Delcia Gillespie             Tandem stand  20"           Romberg on foam  60"EC           Tandem on foam  5 laps           Side steps on foam  5 laps           SLS  15"x3           Biodex Bal  LOS static and level 12/random static and L12                                                                                                                                Ther Activity                                       Gait Training                                       Modalities

## 2020-09-08 ENCOUNTER — OFFICE VISIT (OUTPATIENT)
Dept: PHYSICAL THERAPY | Facility: REHABILITATION | Age: 60
End: 2020-09-08
Payer: COMMERCIAL

## 2020-09-08 DIAGNOSIS — R42 VERTIGO: Primary | ICD-10-CM

## 2020-09-08 DIAGNOSIS — R42 DIZZINESS: ICD-10-CM

## 2020-09-08 PROCEDURE — 95992 CANALITH REPOSITIONING PROC: CPT | Performed by: PHYSICAL THERAPIST

## 2020-09-08 PROCEDURE — 97112 NEUROMUSCULAR REEDUCATION: CPT | Performed by: PHYSICAL THERAPIST

## 2020-09-08 NOTE — PROGRESS NOTES
Daily Note     Today's date: 2020  Patient name: Flori Navarro  : 1960  MRN: [id number]  Referring provider: Lashanda Ramos DO  Dx:   Encounter Diagnosis     ICD-10-CM    1  Vertigo  R42    2  Dizziness  R42                   Subjective: Some very mild dizziness with exercises  Notes that his blood sugar was a little low which may have contributed to it  He also notes that he felt it in sitting with his head back  Objective: See treatment diary below      Assessment: Performed [facility] and this made him dizzy  It subsided within 60" afterward  [place]-Hallpike was done after this exercise and there was no nystagmus but he did report dizziness/blurryness so Epley was performed on the right  He continues to have symptoms so habituation exercises of Evelyn were added to HEP  Plan: Continue per plan of care        Precautions: DM      Manuals           Canalith repo R 1x 1x 1x                                                 Neuro Re-Ed             Daryl Shaikh   5x          Tandem stand  20" On foam 30" ea          Romberg on foam  60"EC 60"x2 EC          Tandem on foam  5 laps 5 laps          Side steps on foam  5 laps 5 laps          SLS  15"x3           Biodex Bal  LOS static and level 12/random static and L12 LOS static and level 12/random static and L 12          Walk with head turns   2 laps each                                                                                                                  Ther Activity                                       Gait Training                                       Modalities

## 2020-09-10 ENCOUNTER — OFFICE VISIT (OUTPATIENT)
Dept: PHYSICAL THERAPY | Facility: REHABILITATION | Age: 60
End: 2020-09-10
Payer: COMMERCIAL

## 2020-09-10 DIAGNOSIS — R42 VERTIGO: Primary | ICD-10-CM

## 2020-09-10 DIAGNOSIS — R42 DIZZINESS: ICD-10-CM

## 2020-09-10 PROCEDURE — 97112 NEUROMUSCULAR REEDUCATION: CPT

## 2020-09-10 NOTE — PROGRESS NOTES
Daily Note     Today's date: 9/10/2020  Patient name: Mercy Vasquez  : 1960  MRN: [id number]  Referring provider: Bel Easley DO  Dx:   Encounter Diagnosis     ICD-10-CM    1  Vertigo  R42    2  Dizziness  R42                   Subjective:   Reports he has not been dizzy or lightheaded      Objective: See treatment diary below      Assessment:    A little instability with walk with head turns but he did not feel dizzy  No symptoms noted with  jhoan hallpike      Plan: Continue per plan of care        Precautions: DM      Manuals 8/31 9/2 9/8 9-10         Canalith repo R 1x 1x 1x                                                 Neuro Re-Ed             Hakeem Shaikh   5x          Tandem stand  20" On foam 30" ea On foam 30" ea         Romberg on foam  60"EC 60"x2 EC 60" x2         Tandem on foam  5 laps 5 laps 5 laps         Side steps on foam  5 laps 5 laps 5 laps         SLS  15"x3           Biodex Bal  LOS static and level 12/random static and L12 LOS static and level 12/random static and L 12 LOS static & L 12 & random static & L 12 2'ea         Walk with head turns   2 laps each          SLS foam    20" x2 ea                                                                                                    Ther Activity                                       Gait Training                                       Modalities

## 2020-09-14 ENCOUNTER — TELEMEDICINE (OUTPATIENT)
Dept: FAMILY MEDICINE CLINIC | Facility: CLINIC | Age: 60
End: 2020-09-14
Payer: COMMERCIAL

## 2020-09-14 ENCOUNTER — TELEPHONE (OUTPATIENT)
Dept: OTHER | Facility: OTHER | Age: 60
End: 2020-09-14

## 2020-09-14 ENCOUNTER — NURSE TRIAGE (OUTPATIENT)
Dept: OTHER | Facility: OTHER | Age: 60
End: 2020-09-14

## 2020-09-14 VITALS — TEMPERATURE: 99.4 F

## 2020-09-14 DIAGNOSIS — Z11.59 ENCOUNTER FOR SCREENING FOR OTHER VIRAL DISEASES: Primary | ICD-10-CM

## 2020-09-14 PROCEDURE — 99213 OFFICE O/P EST LOW 20 MIN: CPT | Performed by: FAMILY MEDICINE

## 2020-09-14 NOTE — PROGRESS NOTES
COVID-19 Virtual Visit     Assessment/Plan:    Problem List Items Addressed This Visit     None        This virtual check-in was done via [employer] Du[employer] and patient was informed that this is not a secure, HIPAA-complaint platform  He agrees to proceed     Disposition:      After clarifying the patient's history, my suspicion for COVID-19 infection is very low    I spent 12 minutes directly with the patient during this visit    Encounter provider Antonette Stewart DO    Provider located at [address] [place] [address] Road [id number]    Recent Visits  No visits were found meeting these conditions  Showing recent visits within past 7 days and meeting all other requirements     Today's Visits  Date Type Provider Dept   09/14/20 Telemedicine Antonette Stewart DO [facility]   Showing today's visits and meeting all other requirements     Future Appointments  No visits were found meeting these conditions  Showing future appointments within next 150 days and meeting all other requirements        Patient agrees to participate in a virtual check in via telephone or video visit instead of presenting to the office to address urgent/immediate medical needs  Patient is aware this is a billable service  After connecting through [employer], the patient was identified by name and date of birth  Debby Coe was informed that this was a telemedicine visit and that the exam was being conducted confidentially over secure lines  My office door was closed  No one else was in the room  Debby Coe acknowledged consent and understanding of privacy and security of the telemedicine visit  I informed the patient that I have reviewed his record in Epic and presented the opportunity for him to ask any questions regarding the visit today  The patient agreed to participate  Subjective  Debby Coe is a 61 y o  male who is concerned about COVID-19    He had low-grade fever of 100, and felt poorly yesterday  Woke up this morning still felt a little bad, took some Advil and feels much better right now  He reports fever and nausea  He has not experienced chills, cough, shortness of breath, headache, sore throat, anorexia, fatigue, myalgias, anosmia, abdominal pain, diarrhea, nausea and vomiting He has not had contact with a person who is under investigation for or who is positive for COVID-19 within the last 14 days  He has not been hospitalized recently for fever and/or lower respiratory symptoms      Past Medical History:   Diagnosis Date    CAD (coronary artery disease)     Congenital myotonia     [facility]) [facility])     after tear of gastrocnemus muscle    Diabetes ([facility] [address]     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[facility] 12/06/2017    Pancreatitis     three times    Sleep apnea     not using a C-PAP    Superficial thrombophlebitis        Past Surgical History:   Procedure Laterality Date    CHOLECYSTECTOMY      CIRCUMCISION      Elective    COLONOSCOPY      complete, polyps 2 years ago    CORONARY ANGIOPLASTY WITH STENT PLACEMENT      stent to RCA 2004    INGUINAL HERNIA REPAIR      ORIF RADIAL SHAFT FRACTURE Left     Open Treatment of Multiple Fractures of the Radial Shaft    ORIF ULNAR / RADIAL SHAFT FRACTURE Left     Open Treatment of Multiple Fractures of the Ulnar Shaft    TONSILLECTOMY AND ADENOIDECTOMY         Current Outpatient Medications   Medication Sig Dispense Refill    aspirin (ECOTRIN LOW STRENGTH) 81 mg EC tablet Take 81 mg by mouth daily      atorvastatin (LIPITOR) 40 mg tablet TAKE 1 TABLET DAILY 90 tablet 1    diazepam (VALIUM) 2 mg tablet 1/2 to 1 tablet prn for severe vertigo every 8 hours (Patient not taking: Reported on 5/28/2020) 10 tablet 0    glucagon (GLUCAGON EMERGENCY) 1 MG injection Inject 1 mg under the skin once as needed for low blood sugar for up to 1 dose 1 kit 0    glucose blood test strip TEST TWICE A DAY (Patient not taking: Reported on 8/21/2020) 100 each 5    insulin glargine (LANTUS SOLOSTAR) 100 units/mL injection pen Take 24 units in AM 5 pen 1    insulin lispro (HUMALOG KWIKPEN) 100 units/mL injection pen Inject 10units TID +scale 1 unit for every 50 over 150 mg/dl)150-200 = 1; 201-249 = 2; 250-300 = 3; 301-349 = 4; > 350 = 5 5 pen 1    Insulin Pen Needle 32G X 4 MM MISC Use to inject insulin 3 times daily 300 each 1    meclizine (ANTIVERT) 25 mg tablet Take 1 tablet (25 mg total) by mouth every 8 (eight) hours as needed for dizziness (Patient not taking: Reported on 5/28/2020) 40 tablet 2    pantoprazole (PROTONIX) 20 mg tablet Take 1 tablet (20 mg total) by mouth daily 90 tablet 1    sildenafil (VIAGRA) 50 MG tablet Take 1 tablet (50 mg total) by mouth daily as needed for erectile dysfunction (Patient not taking: Reported on 8/21/2020) 10 tablet 0    Xarelto 20 MG tablet TAKE 1 TABLET BY MOUTH EVERY DAY WITH BREAKFAST 90 tablet 1     No current facility-administered medications for this visit  No Known Allergies    Review of Systems    Video Exam    Vitals:    09/14/20 1239   Temp: 99 4 °F (37 4 °C)         Otoniel appears healthy, no distress  Physical Exam  Constitutional:       General: He is not in acute distress  Appearance: He is well-developed  HENT:      Head: Normocephalic and atraumatic  Eyes:      Conjunctiva/sclera: Conjunctivae normal    Pulmonary:      Effort: Pulmonary effort is normal    Neurological:      Mental Status: He is alert and oriented to person, place, and time  Psychiatric:         Judgment: Judgment normal           VIRTUAL VISIT DISCLAIMER    Fatmata Fitzgerald acknowledges that he has consented to an online visit or consultation  He understands that the online visit is based solely on information provided by him, and that, in the absence of a face-to-face physical evaluation by the physician, the diagnosis he receives is both limited and provisional in terms of accuracy and completeness   This is not intended to replace a full medical face-to-face evaluation by the physician  Daryl White understands and accepts these terms

## 2020-09-14 NOTE — TELEPHONE ENCOUNTER
Reason for Disposition   [1] COVID-19 infection diagnosed or suspected AND [2] mild symptoms (fever, cough) AND [3] no trouble breathing or other complications    Answer Assessment - Initial Assessment Questions  1  COVID-19 DIAGNOSIS: "Who made your Coronavirus (COVID-19) diagnosis?" "Was it confirmed by a positive lab test?" If not diagnosed by a HCP, ask "Are there lots of cases (community spread) where you live?" (See public health department website, if unsure)    * MAJOR community spread: high number of cases; numbers of cases are increasing; many people hospitalized  * MINOR community spread: low number of cases; not increasing; few or no people hospitalized      [place]  2  ONSET: "When did the COVID-19 symptoms start?"      Started late Sunday afternoon  3  WORST SYMPTOM: "What is your worst symptom?" (e g , cough, fever, shortness of breath, muscle aches)      Body aches  4  COUGH: "Do you have a cough?" If so, ask: "How bad is the cough?"        Denies  5  FEVER: "Do you have a fever?" If so, ask: "What is your temperature, how was it measured, and when did it start?"      100 / orally  6  RESPIRATORY STATUS: "Describe your breathing?" (e g , shortness of breath, wheezing, unable to speak)       Denies  7  BETTER-SAME-WORSE: "Are you getting better, staying the same or getting worse compared to yesterday?"  If getting worse, ask, "In what way?"      Same  8  HIGH RISK DISEASE: "Do you have any chronic medical problems?" (e g , asthma, heart or lung disease, weak immune system, etc )      Yes  9  PREGNANCY: "Is there any chance you are pregnant?" "When was your last menstrual period?"      NA  10  OTHER SYMPTOMS: "Do you have any other symptoms?"  (e g , runny nose, headache, sore throat, loss of smell)        Body aches, feels slightly dizzy when he stands up      Protocols used: CORONAVIRUS (ILGDI-01) - DIAGNOSED OR SUSPECTED-ADULT-

## 2020-09-14 NOTE — TELEPHONE ENCOUNTER
Regarding: Chills and Achy  ----- Message from Paz Green sent at 9/14/2020  7:30 AM EDT -----  "He has a fever of 100   Chills and he is achy "

## 2020-09-14 NOTE — TELEPHONE ENCOUNTER
Good morning,     I scheduled patient for a Virtual Visit with Dr Chris Mcgregor this morning at 1045  PLEASE use wife's phone number- [phone]  Wife stated that patient has had a virtual visit in the past and they already have [employer] Duo set up on her phone      Thank you,   [place]

## 2020-09-15 DIAGNOSIS — K29.00 ACUTE GASTRITIS WITHOUT HEMORRHAGE, UNSPECIFIED GASTRITIS TYPE: ICD-10-CM

## 2020-09-15 RX ORDER — PANTOPRAZOLE SODIUM 20 MG/1
TABLET, DELAYED RELEASE ORAL
Qty: 90 TABLET | Refills: 1 | Status: SHIPPED | OUTPATIENT
Start: 2020-09-15 | End: 2021-03-18

## 2020-09-16 ENCOUNTER — OFFICE VISIT (OUTPATIENT)
Dept: PHYSICAL THERAPY | Facility: REHABILITATION | Age: 60
End: 2020-09-16
Payer: COMMERCIAL

## 2020-09-16 DIAGNOSIS — R42 DIZZINESS: ICD-10-CM

## 2020-09-16 DIAGNOSIS — R42 VERTIGO: Primary | ICD-10-CM

## 2020-09-16 PROCEDURE — 97140 MANUAL THERAPY 1/> REGIONS: CPT | Performed by: PHYSICAL THERAPIST

## 2020-09-16 PROCEDURE — 97112 NEUROMUSCULAR REEDUCATION: CPT

## 2020-09-16 NOTE — PROGRESS NOTES
Daily Note     Today's date: 2020  Patient name: Shena Walton  : 1960  MRN: [id number]  Referring provider: Keysha Mccormick DO  Dx:   Encounter Diagnosis     ICD-10-CM    1  Vertigo  R42    2  Dizziness  R42                   Subjective: Pt reports that he is doing well with no dizziness currently  Notes some in the morning that doesn't not bother him anymore  He went to the chiropractor earlier today and when he laid back completely flat he almost passed out  Objective: See treatment diary below      Assessment: Pt continues to be challenged with current program  Focused on neuromuscular re education and balance training today with unsteadiness present with most exercises, needing min Ax1  Mostly challenged with biodex as well as tandem walking today but improved with cuing to focus and take his time  Manuals were performed by PT  Plan: Continue per plan of care        Precautions: DM      Manuals -10 9/16        Canalith repo R 1x 1x 1x  2x NT                                               Neuro Re-Ed             Azell Barley   5x          Tandem stand  20" On foam 30" ea On foam 30" ea On foam 30"x2 ea        Romberg on foam  60"EC 60"x2 EC 60" x2 60"x2 EC        Tandem on foam  5 laps 5 laps 5 laps 5 laps        Side steps on foam  5 laps 5 laps 5 laps 5 laps        SLS  15"x3           Biodex Bal  LOS static and level 12/random static and L12 LOS static and level 12/random static and L 12 LOS static & L 12 & random static & L 12 2'ea LOS static & L 12 & random static & L 12 2'ea        Walk with head turns   2 laps each          SLS foam    20" x2 ea 30" x2 ea                                                                                                   Ther Activity                                       Gait Training                                       Modalities

## 2020-09-21 ENCOUNTER — OFFICE VISIT (OUTPATIENT)
Dept: PHYSICAL THERAPY | Facility: REHABILITATION | Age: 60
End: 2020-09-21
Payer: COMMERCIAL

## 2020-09-21 DIAGNOSIS — R42 VERTIGO: Primary | ICD-10-CM

## 2020-09-21 DIAGNOSIS — R42 DIZZINESS: ICD-10-CM

## 2020-09-21 PROCEDURE — 97112 NEUROMUSCULAR REEDUCATION: CPT

## 2020-09-21 NOTE — PROGRESS NOTES
Daily Note     Today's date: 2020  Patient name: Miguel Angel Carvajal  : 1960  MRN: [id number]  Referring provider: Maia Roberts DO  Dx:   No diagnosis found  Subjective: Pt reports that he forgot to do arana daroff over the weekend  Laying totally flat still bothers him      Objective: See treatment diary below      Assessment:   Most challenged with SLS on foam   He cont to have some instability with stationary tandem on foam  He did well with new ex   PT felt he did not need canalith repos today    Plan: Continue per plan of care        Precautions: DM      Manuals  9-10 -       Canalith repo R 1x 1x 1x  2x NT np                                              Neuro Re-Ed             [facility]   5x          Tandem stand  20" On foam 30" ea On foam 30" ea On foam 30"x2 ea On foam 30'x2       Romberg on foam  60"EC 60"x2 EC 60" x2 60"x2 EC 60"x2       Tandem on foam  5 laps 5 laps 5 laps 5 laps 5 laps       Side steps on foam  5 laps 5 laps 5 laps 5 laps 5 laps       SLS  15"x3           Biodex Bal  LOS static and level 12/random static and L12 LOS static and level 12/random static and L 12 LOS static & L 12 & random static & L 12 2'ea LOS static & L 12 & random static & L 12 2'ea LOS static x1 & L 12 x2 & random static 1x & L 12 x1       Walk with head turns   2 laps each          SLS foam    20" x2 ea 30" x2 ea 30"x2       Bosu flat side      1' balance & 10 squats       Lunge walk w ball OH      20'x 2 laps                                                                        Ther Activity                                       Gait Training                                       Modalities

## 2020-09-23 ENCOUNTER — APPOINTMENT (OUTPATIENT)
Dept: LAB | Age: 60
End: 2020-09-23
Payer: COMMERCIAL

## 2020-09-23 ENCOUNTER — OFFICE VISIT (OUTPATIENT)
Dept: PHYSICAL THERAPY | Facility: REHABILITATION | Age: 60
End: 2020-09-23
Payer: COMMERCIAL

## 2020-09-23 DIAGNOSIS — R42 VERTIGO: Primary | ICD-10-CM

## 2020-09-23 DIAGNOSIS — R42 VERTIGO: ICD-10-CM

## 2020-09-23 DIAGNOSIS — R42 DIZZINESS: ICD-10-CM

## 2020-09-23 LAB
ANION GAP SERPL CALCULATED.3IONS-SCNC: 5 MMOL/L (ref 4–13)
BUN SERPL-MCNC: 19 MG/DL (ref 5–25)
CALCIUM SERPL-MCNC: 9.3 MG/DL (ref 8.3–10.1)
CHLORIDE SERPL-SCNC: 106 MMOL/L (ref 100–108)
CO2 SERPL-SCNC: 30 MMOL/L (ref 21–32)
CREAT SERPL-MCNC: 0.6 MG/DL (ref 0.6–1.3)
GFR SERPL CREATININE-BSD FRML MDRD: 109 ML/MIN/1.73SQ M
GLUCOSE P FAST SERPL-MCNC: 233 MG/DL (ref 65–99)
POTASSIUM SERPL-SCNC: 4.3 MMOL/L (ref 3.5–5.3)
SODIUM SERPL-SCNC: 141 MMOL/L (ref 136–145)

## 2020-09-23 PROCEDURE — 97112 NEUROMUSCULAR REEDUCATION: CPT | Performed by: PHYSICAL MEDICINE & REHABILITATION

## 2020-09-23 PROCEDURE — 36415 COLL VENOUS BLD VENIPUNCTURE: CPT

## 2020-09-23 PROCEDURE — 80048 BASIC METABOLIC PNL TOTAL CA: CPT

## 2020-09-23 NOTE — PROGRESS NOTES
Daily Note     Today's date: 2020  Patient name: Daryl White  : 1960  MRN: [id number]  Referring provider: Lia Faustin DO  Dx:   Encounter Diagnosis     ICD-10-CM    1  Vertigo  R42    2  Dizziness  R42        Start Time: 130  Stop Time:   Total time in clinic (min): 40 minutes    Subjective: Pt states that there may have been a slight reduction in intensity of dizziness when he wakes up  His chief complaint is dizziness while lying flat and when her first wakes up in the morning  Objective: See treatment diary below      Assessment: Tolerated treatment well  Patient exhibited good technique with therapeutic exercises, would benefit from continued PT and no report of dizziness during treatment or habituation activities  Plan: Progress treatment as tolerated         Precautions: DM      Manuals 8/31 9/2 9/8 9-10 9/16 9-21       Canalith repo R 1x 1x 1x  2x NT np                                              Neuro Re-Ed             Tim Muro   5x          Tandem stand  20" On foam 30" ea On foam 30" ea On foam 30"x2 ea On foam 30'x2       Romberg on foam  60"EC 60"x2 EC 60" x2 60"x2 EC 60"x2       Tandem on foam  5 laps 5 laps 5 laps 5 laps 5 laps       Side steps on foam  5 laps 5 laps 5 laps 5 laps 5 laps       SLS  15"x3           Biodex Bal  LOS static and level 12/random static and L12 LOS static and level 12/random static and L 12 LOS static & L 12 & random static & L 12 2'ea LOS static & L 12 & random static & L 12 2'ea LOS static x1 & L 12 x2 & random static 1x & L 12 x1 LOS        Maze    [place]          Walk with head turns   2 laps each    4 laps      SLS foam    20" x2 ea 30" x2 ea 30"x2 3 x 30"      Bosu flat side      1' balance & 10 squats 10 c 5" hold squats      Lunge walk w ball OH      20'x 2 laps                                                                        Ther Activity                                       Gait Training Modalities

## 2020-09-24 ENCOUNTER — TELEPHONE (OUTPATIENT)
Dept: NEUROLOGY | Facility: CLINIC | Age: 60
End: 2020-09-24

## 2020-09-24 NOTE — TELEPHONE ENCOUNTER
----- Message from Maribel Zelaya DO sent at 9/23/2020  5:46 PM EDT -----  High glucose , history of DM

## 2020-09-25 ENCOUNTER — TELEPHONE (OUTPATIENT)
Dept: FAMILY MEDICINE CLINIC | Facility: CLINIC | Age: 60
End: 2020-09-25

## 2020-09-25 NOTE — TELEPHONE ENCOUNTER
----- Message from Janet Singleton DO sent at 9/24/2020  4:00 PM EDT -----      ----- Message -----  From: Soo Ma RN  Sent: 9/24/2020   3:12 PM EDT  To: Janet Singleton DO

## 2020-09-28 ENCOUNTER — OFFICE VISIT (OUTPATIENT)
Dept: PHYSICAL THERAPY | Facility: REHABILITATION | Age: 60
End: 2020-09-28
Payer: COMMERCIAL

## 2020-09-28 DIAGNOSIS — R42 DIZZINESS: ICD-10-CM

## 2020-09-28 DIAGNOSIS — R42 VERTIGO: Primary | ICD-10-CM

## 2020-09-28 PROCEDURE — 97140 MANUAL THERAPY 1/> REGIONS: CPT | Performed by: PHYSICAL THERAPIST

## 2020-09-28 PROCEDURE — 97112 NEUROMUSCULAR REEDUCATION: CPT

## 2020-09-28 NOTE — TELEPHONE ENCOUNTER
Okay, make sure he follows up with endocrinology  Sugars seem quite labile, no pattern to it  Needs to be more consistent with his meals

## 2020-09-28 NOTE — PROGRESS NOTES
Daily Note     Today's date: 2020  Patient name: Yeni Chaudhary  : 1960  MRN: [id number]  Referring provider: Abel Manzanares DO  Dx:   Encounter Diagnosis     ICD-10-CM    1  Vertigo  R42    2  Dizziness  R42        Start Time: 1515          Subjective: Pt reports he cannot do arana daroff first thing in AM, it will make him light headed & queasy  Later in day it is OK  He has MRI next week      Objective: See treatment diary below      Assessment: Tolerated treatment well  Had some light headedness with 360 turns  Some instability with softer foam & sway  Dizziness w  L jhoan halpike & was treated with L eply  Plan: Progress treatment as tolerated         Precautions: DM      Manuals  9-10 9/16 9-21 9-23 9-28     Canalith repo R 1x 1x 1x  2x NT np  jhoan halpike & eply                                            Neuro Re-Ed             Haylie Close   5x          Tandem stand  20" On foam 30" ea On foam 30" ea On foam 30"x2 ea On foam 30'x2  Soft foam    30"x2     Romberg on foam  60"EC 60"x2 EC 60" x2 60"x2 EC 60"x2  Soft foam EC 60"x2     Tandem on foam  5 laps 5 laps 5 laps 5 laps 5 laps  5 laps     Side steps on foam  5 laps 5 laps 5 laps 5 laps 5 laps       SLS  15"x3           Biodex Bal  LOS static and level 12/random static and L12 LOS static and level 12/random static and L 12 LOS static & L 12 & random static & L 12 2'ea LOS static & L 12 & random static & L 12 2'ea LOS static x1 & L 12 x2 & random static 1x & L 12 x1 LOS static &L 12         Maze static & L12         LOS static  & L 12 & maze static & L 12     Walk with head turns   2 laps each    4 laps  4 laps & 2 laps w 360 turns     SLS foam    20" x2 ea 30" x2 ea 30"x2 3 x 30" 3x30"     Bosu flat side      1' balance & 10 squats 10 c 5" hold squats 1' balance & squats 20x     Lunge walk w ball OH      20'x 2 laps  20' x 3 laps                                                                      Ther Activity Gait Training                                       Modalities

## 2020-09-28 NOTE — TELEPHONE ENCOUNTER
Spoke with pt again-he sees endocrinology who manages his meds but his blood sugar numbers are:    Before breakfast  After lunch  After dinner  9/21  220   188   252  9/22  240   220   252  9/23 195   302   117  9/24       105  9/25 246   183   150  9/26    251   195  9/27 228   201  9/28 205

## 2020-09-30 ENCOUNTER — OFFICE VISIT (OUTPATIENT)
Dept: PHYSICAL THERAPY | Facility: REHABILITATION | Age: 60
End: 2020-09-30
Payer: COMMERCIAL

## 2020-09-30 DIAGNOSIS — R42 VERTIGO: Primary | ICD-10-CM

## 2020-09-30 DIAGNOSIS — R42 DIZZINESS: ICD-10-CM

## 2020-09-30 PROCEDURE — 97112 NEUROMUSCULAR REEDUCATION: CPT

## 2020-09-30 NOTE — PROGRESS NOTES
Daily Note     Today's date: 2020  Patient name: Hood Chavira  : 1960  MRN: [id number]  Referring provider: Yan Ruiz DO  Dx:   Encounter Diagnosis     ICD-10-CM    1  Vertigo  R42    2  Dizziness  R42                   Subjective: Pt reports he was able to lay flat for a few minutes on Monday night      Objective: See treatment diary below      Assessment:  Slight light headed with 360 today, better than last time      Some sway on softer foam but no LOB   PT instr him in self eply should he need it      Plan:   D/C to HEP     Precautions: DM      Manuals  9-10 9    Canalith repo R 1x 1x 1x  2x NT np  jhoan halpike & eply                                            Neuro Re-Ed             Roosvelt Flack   5x          Tandem stand  20" On foam 30" ea On foam 30" ea On foam 30"x2 ea On foam 30'x2  Soft foam    30"x2 Soft foam    30"x2    Romberg on foam  60"EC 60"x2 EC 60" x2 60"x2 EC 60"x2  Soft foam EC 60"x2 Soft foam   EC 60"x2    Tandem on foam  5 laps 5 laps 5 laps 5 laps 5 laps  5 laps 5 laps    Side steps on foam  5 laps 5 laps 5 laps 5 laps 5 laps       SLS  15"x3           Biodex Bal  LOS static and level 12/random static and L12 LOS static and level 12/random static and L 12 LOS static & L 12 & random static & L 12 2'ea LOS static & L 12 & random static & L 12 2'ea LOS static x1 & L 12 x2 & random static 1x & L 12 x1 LOS static &L 12         Maze static & L12         LOS static  & L 12 & maze static & L 12 LOS L 12 x2 & catch random mediam L 12 2'    Walk with head turns   2 laps each    4 laps  4 laps & 2 laps w 360 turns 4 laps ea    SLS foam    20" x2 ea 30" x2 ea 30"x2 3 x 30" 3x30" 3x30"    Bosu flat side      1' balance & 10 squats 10 c 5" hold squats 1' balance & squats 20x 1' bal & squat 20x    Lunge walk w ball OH      20'x 2 laps  20' x 3 laps 20' 3 laps                                                                     Ther Activity Gait Training                                       Modalities

## 2020-10-06 ENCOUNTER — HOSPITAL ENCOUNTER (OUTPATIENT)
Dept: MRI IMAGING | Facility: HOSPITAL | Age: 60
Discharge: HOME | End: 2020-10-06
Attending: PSYCHIATRY & NEUROLOGY
Payer: COMMERCIAL

## 2020-10-06 DIAGNOSIS — R42 VERTIGO: ICD-10-CM

## 2020-10-06 PROCEDURE — A9585 GADOBUTROL INJECTION: HCPCS | Performed by: PSYCHIATRY & NEUROLOGY

## 2020-10-06 PROCEDURE — G1004 CDSM NDSC: HCPCS

## 2020-10-06 PROCEDURE — 70553 MRI BRAIN STEM W/O & W/DYE: CPT

## 2020-10-06 RX ADMIN — GADOBUTROL 7 ML: 604.72 INJECTION INTRAVENOUS at 18:29

## 2020-10-25 DIAGNOSIS — I25.10 ARTERIOSCLEROSIS OF CORONARY ARTERY: ICD-10-CM

## 2020-10-25 RX ORDER — ATORVASTATIN CALCIUM 40 MG/1
TABLET, FILM COATED ORAL
Qty: 90 TABLET | Refills: 1 | Status: SHIPPED | OUTPATIENT
Start: 2020-10-25 | End: 2021-04-26

## 2020-10-27 ENCOUNTER — TELEPHONE (OUTPATIENT)
Dept: NEUROLOGY | Facility: CLINIC | Age: 60
End: 2020-10-27

## 2020-10-28 ENCOUNTER — OFFICE VISIT (OUTPATIENT)
Dept: NEUROLOGY | Facility: CLINIC | Age: 60
End: 2020-10-28
Payer: COMMERCIAL

## 2020-10-28 VITALS
BODY MASS INDEX: 22.96 KG/M2 | WEIGHT: 155 LBS | HEART RATE: 57 BPM | HEIGHT: 69 IN | SYSTOLIC BLOOD PRESSURE: 100 MMHG | TEMPERATURE: 97.8 F | DIASTOLIC BLOOD PRESSURE: 60 MMHG

## 2020-10-28 DIAGNOSIS — I69.391 CVA, OLD, DYSPHAGIA: ICD-10-CM

## 2020-10-28 DIAGNOSIS — R42 VERTIGO: Primary | ICD-10-CM

## 2020-10-28 DIAGNOSIS — Z86.73 OLD CEREBROVASCULAR ACCIDENT (CVA) WITHOUT LATE EFFECT: ICD-10-CM

## 2020-10-28 PROCEDURE — 99214 OFFICE O/P EST MOD 30 MIN: CPT | Performed by: PSYCHIATRY & NEUROLOGY

## 2020-10-28 RX ORDER — FLASH GLUCOSE SENSOR
KIT MISCELLANEOUS
COMMUNITY
Start: 2020-10-07

## 2020-10-28 RX ORDER — ACYCLOVIR 200 MG/1
CAPSULE ORAL
COMMUNITY
Start: 2020-07-27 | End: 2022-01-24 | Stop reason: SDUPTHER

## 2020-11-10 ENCOUNTER — TRANSCRIBE ORDERS (OUTPATIENT)
Dept: URGENT CARE | Age: 60
End: 2020-11-10

## 2020-11-10 ENCOUNTER — LAB (OUTPATIENT)
Dept: LAB | Age: 60
End: 2020-11-10
Payer: COMMERCIAL

## 2020-11-10 DIAGNOSIS — E10.69 TYPE 1 DIABETES MELLITUS WITH OTHER SPECIFIED COMPLICATION (HCC): ICD-10-CM

## 2020-11-10 DIAGNOSIS — E10.69 TYPE 1 DIABETES MELLITUS WITH OTHER SPECIFIED COMPLICATION (HCC): Primary | ICD-10-CM

## 2020-11-10 LAB
ANION GAP SERPL CALCULATED.3IONS-SCNC: 6 MMOL/L (ref 4–13)
BUN SERPL-MCNC: 20 MG/DL (ref 5–25)
CALCIUM SERPL-MCNC: 9.2 MG/DL (ref 8.3–10.1)
CHLORIDE SERPL-SCNC: 105 MMOL/L (ref 100–108)
CHOLEST SERPL-MCNC: 134 MG/DL (ref 50–200)
CO2 SERPL-SCNC: 30 MMOL/L (ref 21–32)
CREAT SERPL-MCNC: 0.7 MG/DL (ref 0.6–1.3)
CREAT UR-MCNC: 105 MG/DL
EST. AVERAGE GLUCOSE BLD GHB EST-MCNC: 169 MG/DL
GFR SERPL CREATININE-BSD FRML MDRD: 103 ML/MIN/1.73SQ M
GLUCOSE P FAST SERPL-MCNC: 218 MG/DL (ref 65–99)
HBA1C MFR BLD: 7.5 %
HDLC SERPL-MCNC: 50 MG/DL
LDLC SERPL CALC-MCNC: 67 MG/DL (ref 0–100)
MICROALBUMIN UR-MCNC: <5 MG/L (ref 0–20)
MICROALBUMIN/CREAT 24H UR: <5 MG/G CREATININE (ref 0–30)
NONHDLC SERPL-MCNC: 84 MG/DL
POTASSIUM SERPL-SCNC: 4 MMOL/L (ref 3.5–5.3)
SODIUM SERPL-SCNC: 141 MMOL/L (ref 136–145)
TRIGL SERPL-MCNC: 86 MG/DL

## 2020-11-10 PROCEDURE — 80061 LIPID PANEL: CPT

## 2020-11-10 PROCEDURE — 36415 COLL VENOUS BLD VENIPUNCTURE: CPT

## 2020-11-10 PROCEDURE — 82570 ASSAY OF URINE CREATININE: CPT | Performed by: INTERNAL MEDICINE

## 2020-11-10 PROCEDURE — 83036 HEMOGLOBIN GLYCOSYLATED A1C: CPT

## 2020-11-10 PROCEDURE — 82043 UR ALBUMIN QUANTITATIVE: CPT | Performed by: INTERNAL MEDICINE

## 2020-11-10 PROCEDURE — 3051F HG A1C>EQUAL 7.0%<8.0%: CPT | Performed by: PSYCHIATRY & NEUROLOGY

## 2020-11-10 PROCEDURE — 80048 BASIC METABOLIC PNL TOTAL CA: CPT

## 2020-11-11 ENCOUNTER — HOSPITAL ENCOUNTER (OUTPATIENT)
Dept: NON INVASIVE DIAGNOSTICS | Facility: HOSPITAL | Age: 60
Discharge: HOME | End: 2020-11-11
Attending: PSYCHIATRY & NEUROLOGY
Payer: COMMERCIAL

## 2020-11-11 DIAGNOSIS — I69.391 CVA, OLD, DYSPHAGIA: ICD-10-CM

## 2020-11-11 DIAGNOSIS — R42 VERTIGO: ICD-10-CM

## 2020-11-11 PROCEDURE — 93880 EXTRACRANIAL BILAT STUDY: CPT

## 2020-11-11 PROCEDURE — 93880 EXTRACRANIAL BILAT STUDY: CPT | Performed by: SURGERY

## 2020-11-12 ENCOUNTER — TELEPHONE (OUTPATIENT)
Dept: NEUROLOGY | Facility: CLINIC | Age: 60
End: 2020-11-12

## 2020-11-30 ENCOUNTER — OFFICE VISIT (OUTPATIENT)
Dept: FAMILY MEDICINE CLINIC | Facility: CLINIC | Age: 60
End: 2020-11-30
Payer: COMMERCIAL

## 2020-11-30 VITALS
DIASTOLIC BLOOD PRESSURE: 60 MMHG | HEIGHT: 69 IN | BODY MASS INDEX: 22.9 KG/M2 | SYSTOLIC BLOOD PRESSURE: 110 MMHG | TEMPERATURE: 97.7 F | WEIGHT: 154.6 LBS

## 2020-11-30 DIAGNOSIS — F52.21 ERECTILE DYSFUNCTION OF NON-ORGANIC ORIGIN: ICD-10-CM

## 2020-11-30 DIAGNOSIS — G62.9 POLYNEUROPATHY: ICD-10-CM

## 2020-11-30 DIAGNOSIS — E13.9 LADA (LATENT AUTOIMMUNE DIABETES IN ADULTS), MANAGED AS TYPE 1 (HCC): Primary | ICD-10-CM

## 2020-11-30 DIAGNOSIS — G71.11 MYOTONIC DYSTROPHY (HCC): ICD-10-CM

## 2020-11-30 DIAGNOSIS — I25.10 ARTERIOSCLEROSIS OF CORONARY ARTERY: ICD-10-CM

## 2020-11-30 DIAGNOSIS — E78.2 MIXED HYPERLIPIDEMIA: ICD-10-CM

## 2020-11-30 DIAGNOSIS — Z86.73 OLD CEREBROVASCULAR ACCIDENT (CVA) WITHOUT LATE EFFECT: ICD-10-CM

## 2020-11-30 DIAGNOSIS — Z23 NEED FOR VACCINATION: ICD-10-CM

## 2020-11-30 DIAGNOSIS — R42 VERTIGO: ICD-10-CM

## 2020-11-30 DIAGNOSIS — K29.00 ACUTE GASTRITIS WITHOUT HEMORRHAGE, UNSPECIFIED GASTRITIS TYPE: ICD-10-CM

## 2020-11-30 DIAGNOSIS — Z12.5 SCREENING FOR PROSTATE CANCER: ICD-10-CM

## 2020-11-30 PROCEDURE — 3008F BODY MASS INDEX DOCD: CPT | Performed by: PSYCHIATRY & NEUROLOGY

## 2020-11-30 PROCEDURE — 90670 PCV13 VACCINE IM: CPT | Performed by: FAMILY MEDICINE

## 2020-11-30 PROCEDURE — 90471 IMMUNIZATION ADMIN: CPT | Performed by: FAMILY MEDICINE

## 2020-11-30 PROCEDURE — 99214 OFFICE O/P EST MOD 30 MIN: CPT | Performed by: FAMILY MEDICINE

## 2020-11-30 RX ORDER — SILDENAFIL 100 MG/1
100 TABLET, FILM COATED ORAL DAILY PRN
Qty: 10 TABLET | Refills: 1 | Status: SHIPPED | OUTPATIENT
Start: 2020-11-30 | End: 2021-07-02 | Stop reason: SDUPTHER

## 2020-12-26 ENCOUNTER — TELEPHONE (OUTPATIENT)
Dept: OTHER | Facility: OTHER | Age: 60
End: 2020-12-26

## 2020-12-26 ENCOUNTER — TELEMEDICINE (OUTPATIENT)
Dept: FAMILY MEDICINE CLINIC | Facility: CLINIC | Age: 60
End: 2020-12-26
Payer: COMMERCIAL

## 2020-12-26 DIAGNOSIS — B34.9 VIRAL INFECTION, UNSPECIFIED: ICD-10-CM

## 2020-12-26 DIAGNOSIS — Z20.822 EXPOSURE TO COVID-19 VIRUS: ICD-10-CM

## 2020-12-26 PROCEDURE — U0003 INFECTIOUS AGENT DETECTION BY NUCLEIC ACID (DNA OR RNA); SEVERE ACUTE RESPIRATORY SYNDROME CORONAVIRUS 2 (SARS-COV-2) (CORONAVIRUS DISEASE [COVID-19]), AMPLIFIED PROBE TECHNIQUE, MAKING USE OF HIGH THROUGHPUT TECHNOLOGIES AS DESCRIBED BY CMS-2020-01-R: HCPCS | Performed by: FAMILY MEDICINE

## 2020-12-26 PROCEDURE — 99213 OFFICE O/P EST LOW 20 MIN: CPT | Performed by: FAMILY MEDICINE

## 2020-12-28 LAB — SARS-COV-2 RNA SPEC QL NAA+PROBE: NOT DETECTED

## 2021-02-15 PROBLEM — R06.09 DOE (DYSPNEA ON EXERTION): Status: ACTIVE | Noted: 2021-02-15

## 2021-02-15 PROBLEM — R06.00 DOE (DYSPNEA ON EXERTION): Status: ACTIVE | Noted: 2021-02-15

## 2021-02-16 ENCOUNTER — OFFICE VISIT (OUTPATIENT)
Dept: CARDIOLOGY CLINIC | Facility: CLINIC | Age: 61
End: 2021-02-16
Payer: COMMERCIAL

## 2021-02-16 VITALS
DIASTOLIC BLOOD PRESSURE: 74 MMHG | WEIGHT: 156 LBS | SYSTOLIC BLOOD PRESSURE: 112 MMHG | BODY MASS INDEX: 23.11 KG/M2 | RESPIRATION RATE: 16 BRPM | HEIGHT: 69 IN | HEART RATE: 60 BPM | TEMPERATURE: 97.4 F

## 2021-02-16 DIAGNOSIS — E78.2 MIXED HYPERLIPIDEMIA: ICD-10-CM

## 2021-02-16 DIAGNOSIS — R06.00 DOE (DYSPNEA ON EXERTION): ICD-10-CM

## 2021-02-16 DIAGNOSIS — I25.10 ARTERIOSCLEROSIS OF CORONARY ARTERY: Primary | ICD-10-CM

## 2021-02-16 PROCEDURE — 99214 OFFICE O/P EST MOD 30 MIN: CPT | Performed by: INTERNAL MEDICINE

## 2021-02-16 NOTE — PROGRESS NOTES
Cardiology Follow Up    Yvonne Jean  1960  [id number]  [address] [address]  [phone]    Reason for visit: Here ahead of time for LOJA    has known CAD s/p MI/stent in 2004(RCA) and HLP    1  Arteriosclerosis of coronary artery     2  Mixed hyperlipidemia     3  LOJA (dyspnea on exertion)         Interval History:   Since his last visit he has had some increased LOJA  Fregoso Aicha He denies chest pain  He denies palpitations or edema   He does get some dizziness jose in the AM  He is last active and has gained wt (20 lbs in 1 5 years)      Patient Active Problem List   Diagnosis    Thrombocytopenia ([facility] )    Arteriosclerosis of coronary artery    Erectile dysfunction of non-organic origin    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), 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[place] [address] )    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[address] )    Mixed hyperlipidemia    Vertigo    Polyneuropathy    Acute gastritis without hemorrhage    Old cerebrovascular accident (CVA) without late effect    LOJA (dyspnea on exertion)     Past Medical History:   Diagnosis Date    CAD (coronary artery disease)     Congenital myotonia     [facility]     after tear of gastrocnemus muscle    Diabetes ([facility] )     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[address] ) 12/06/2017    Pancreatitis     three times    Sleep apnea     not using a C-PAP    Superficial thrombophlebitis      Social History     Socioeconomic History    Marital status: /Civil Union     Spouse name: Not on file    Number of children: Not on file    Years of education: Not on file    Highest education level: Not on file   Occupational History    Not on file   Social Needs    Financial resource strain: Not on file    Food insecurity     Worry: Not on file     Inability: Not on file    Transportation needs     Medical: Not on file     Non-medical: Not on file   Tobacco Use    Smoking status: Never Smoker  Smokeless tobacco: Never Used   Substance and Sexual Activity    Alcohol use: Yes     Comment: social    Drug use: No    Sexual activity: Not on file   Lifestyle    Physical activity     Days per week: Not on file     Minutes per session: Not on file    Stress: Not on file   Relationships    Social connections     Talks on phone: Not on file     Gets together: Not on file     Attends Spiritism service: Not on file     Active member of club or organization: Not on file     Attends meetings of clubs or organizations: Not on file     Relationship status: Not on file    Intimate partner violence     Fear of current or ex partner: Not on file     Emotionally abused: Not on file     Physically abused: Not on file     Forced sexual activity: Not on file   Other Topics Concern    Not on file   Social History Narrative    Consumes 1 cup of tea  And 1 glass of tea per day        Weight loss      Family History   Problem Relation Age of Onset    Brain cancer Mother     Clotting disorder Mother     Heart disease Mother     Cancer Mother     Hyperlipidemia Mother     Coronary artery disease Paternal Uncle      Past Surgical History:   Procedure Laterality Date    CHOLECYSTECTOMY      CIRCUMCISION      Elective    COLONOSCOPY      complete, polyps 2 years ago    CORONARY ANGIOPLASTY WITH STENT PLACEMENT      stent to RCA 2004    INGUINAL HERNIA REPAIR      ORIF RADIAL SHAFT FRACTURE Left     Open Treatment of Multiple Fractures of the Radial Shaft    ORIF ULNAR / RADIAL SHAFT FRACTURE Left     Open Treatment of Multiple Fractures of the Ulnar Shaft    TONSILLECTOMY AND ADENOIDECTOMY         Current Outpatient Medications:     acyclovir (ZOVIRAX) 200 mg capsule, TAKE ONE CAPSULE 5 TIMES DAILY FOR 5 DAYS AS NEEDED FOR COLD SORES, Disp: , Rfl:     aspirin (ECOTRIN LOW STRENGTH) 81 mg EC tablet, Take 81 mg by mouth daily, Disp: , Rfl:     atorvastatin (LIPITOR) 40 mg tablet, TAKE 1 TABLET BY MOUTH EVERY DAY, Disp: 90 tablet, Rfl: 1    Continuous Blood Gluc Sensor (FreeStyle Nehemias 14 Day Sensor) MISC, USE AS DIRECTED , CHANGING EVERY 14 DAYS , Disp: , Rfl:     insulin glargine (LANTUS SOLOSTAR) 100 units/mL injection pen, Take 24 units in AM, Disp: 5 pen, Rfl: 1    insulin lispro (HUMALOG KWIKPEN) 100 units/mL injection pen, Inject 10units TID +scale 1 unit for every 50 over 150 mg/dl)150-200 = 1; 201-249 = 2; 250-300 = 3; 301-349 = 4; > 350 = 5, Disp: 5 pen, Rfl: 1    Insulin Pen Needle 32G X 4 MM MISC, Use to inject insulin 3 times daily, Disp: 300 each, Rfl: 1    pantoprazole (PROTONIX) 20 mg tablet, TAKE 1 TABLET BY MOUTH EVERY DAY, Disp: 90 tablet, Rfl: 1    sildenafil (VIAGRA) 100 mg tablet, Take 1 tablet (100 mg total) by mouth daily as needed for erectile dysfunction, Disp: 10 tablet, Rfl: 1    Xarelto 20 MG tablet, TAKE 1 TABLET BY MOUTH EVERY DAY WITH BREAKFAST, Disp: 90 tablet, Rfl: 1  No Known Allergies    Review of Systems:  Review of Systems   Constitutional: Positive for fatigue and unexpected weight change  Negative for activity change and appetite change  Respiratory: Positive for shortness of breath  Negative for cough, chest tightness and wheezing  Cardiovascular: Negative for chest pain, palpitations and leg swelling  Gastrointestinal: Positive for constipation  Negative for abdominal pain, blood in stool and diarrhea  Genitourinary: Positive for frequency  Negative for dysuria, hematuria and urgency  Musculoskeletal: Negative for arthralgias, back pain, gait problem and joint swelling  Neurological: Positive for dizziness and light-headedness  Negative for syncope, speech difficulty and headaches  Psychiatric/Behavioral: Negative for agitation, behavioral problems, confusion and decreased concentration         Physical Exam:  Vitals:    02/16/21 1436   BP: 112/74   Pulse: 60   Resp: 16   Temp: (!) 97 4 °F (36 3 °C)   Weight: 70 8 kg (156 lb)   Height: 5' 9" (1 753 m) Physical Exam  Constitutional:       General: He is not in acute distress  Appearance: Normal appearance  He is normal weight  He is not ill-appearing  HENT:      Head: Normocephalic and atraumatic  Mouth/Throat:      Mouth: Mucous membranes are moist       Pharynx: No oropharyngeal exudate or posterior oropharyngeal erythema  Eyes:      General: No scleral icterus  Conjunctiva/sclera: Conjunctivae normal    Neck:      Musculoskeletal: Neck supple  Thyroid: No thyroid mass or thyromegaly  Vascular: Normal carotid pulses  No carotid bruit or JVD  Cardiovascular:      Rate and Rhythm: Normal rate and regular rhythm  Pulses: Normal pulses  Heart sounds: No murmur  No friction rub  No gallop  Pulmonary:      Breath sounds: Normal breath sounds  No wheezing, rhonchi or rales  Abdominal:      Palpations: Abdomen is soft  There is no hepatomegaly, splenomegaly or mass  Tenderness: There is no abdominal tenderness  Musculoskeletal:         General: No swelling or deformity  Skin:     General: Skin is warm  Coloration: Skin is not jaundiced or pale  Findings: No bruising, erythema, lesion or rash  Neurological:      General: No focal deficit present  Mental Status: He is oriented to person, place, and time  Cranial Nerves: No cranial nerve deficit  Sensory: Sensory deficit present  Motor: No weakness  Psychiatric:         Mood and Affect: Mood normal          Behavior: Behavior normal          Thought Content: Thought content normal          Judgment: Judgment normal          Discussion/Summary:  1  CAD status post remote stenting  Had stress test last year and exercised to a good level of exertion and had borderline EKG changes without overt evidence of ischemia however acquisition post exercise was at a low heart rate  Patient on aspirin  Is having dyspnea on exertion  See comments below  2  Hyperlipidemia    LDL cholesterol 67 with HDL cholesterol 50 and triglycerides 86 when checked in November  Patient on atorvastatin 40 mg daily  Continue same  3  Dyspnea on exertion  No evidence for fluid overload on exam   No murmurs  Echo showed normal LV function  Somewhat suspicious this could be an anginal equivalent  Will order exercise Myoview test to follow-up on somewhat equivocal stress echo especially with new onset dyspnea on exertion    He has gained some weight and is less active which could account for this but need to exclude coronary disease in light of his history of premature CAD and remote stenting          Ramila Guzmán MD

## 2021-02-18 DIAGNOSIS — I25.10 ARTERIOSCLEROSIS OF CORONARY ARTERY: ICD-10-CM

## 2021-02-18 RX ORDER — RIVAROXABAN 20 MG/1
TABLET, FILM COATED ORAL
Qty: 90 TABLET | Refills: 1 | Status: SHIPPED | OUTPATIENT
Start: 2021-02-18 | End: 2021-08-18

## 2021-03-02 ENCOUNTER — TELEPHONE (OUTPATIENT)
Dept: NEUROLOGY | Facility: CLINIC | Age: 61
End: 2021-03-02

## 2021-03-02 ENCOUNTER — HOSPITAL ENCOUNTER (OUTPATIENT)
Dept: NON INVASIVE DIAGNOSTICS | Facility: CLINIC | Age: 61
Discharge: HOME | End: 2021-03-02
Payer: COMMERCIAL

## 2021-03-02 DIAGNOSIS — I25.10 ARTERIOSCLEROSIS OF CORONARY ARTERY: ICD-10-CM

## 2021-03-02 PROCEDURE — 93017 CV STRESS TEST TRACING ONLY: CPT

## 2021-03-02 PROCEDURE — G1004 CDSM NDSC: HCPCS

## 2021-03-02 PROCEDURE — 78452 HT MUSCLE IMAGE SPECT MULT: CPT

## 2021-03-02 PROCEDURE — 78452 HT MUSCLE IMAGE SPECT MULT: CPT | Performed by: INTERNAL MEDICINE

## 2021-03-02 PROCEDURE — A9502 TC99M TETROFOSMIN: HCPCS

## 2021-03-02 PROCEDURE — 93016 CV STRESS TEST SUPVJ ONLY: CPT | Performed by: INTERNAL MEDICINE

## 2021-03-02 PROCEDURE — 93018 CV STRESS TEST I&R ONLY: CPT | Performed by: INTERNAL MEDICINE

## 2021-03-02 NOTE — TELEPHONE ENCOUNTER
Registration completed 3/3/2021 8AM Dr Geovanna Amaya at [facility]  Covid-screening questions completed  Aware of all policies - mask, visitor and no show fee

## 2021-03-03 ENCOUNTER — OFFICE VISIT (OUTPATIENT)
Dept: NEUROLOGY | Facility: CLINIC | Age: 61
End: 2021-03-03
Payer: COMMERCIAL

## 2021-03-03 VITALS
SYSTOLIC BLOOD PRESSURE: 116 MMHG | HEIGHT: 69 IN | DIASTOLIC BLOOD PRESSURE: 62 MMHG | HEART RATE: 64 BPM | BODY MASS INDEX: 23.79 KG/M2 | WEIGHT: 160.6 LBS

## 2021-03-03 DIAGNOSIS — E55.9 VITAMIN D DEFICIENCY: Primary | ICD-10-CM

## 2021-03-03 DIAGNOSIS — R42 VERTIGO: ICD-10-CM

## 2021-03-03 DIAGNOSIS — G71.11 MYOTONIC DYSTROPHY (HCC): ICD-10-CM

## 2021-03-03 LAB
MAX DIASTOLIC BP: 88 MMHG
MAX HEART RATE: 144 BPM
MAX PREDICTED HEART RATE: 160 BPM
MAX. SYSTOLIC BP: 184 MMHG
PROTOCOL NAME: NORMAL
REASON FOR TERMINATION: NORMAL
TARGET HR FORMULA: NORMAL
TEST INDICATION: NORMAL
TIME IN EXERCISE PHASE: NORMAL

## 2021-03-03 PROCEDURE — 3008F BODY MASS INDEX DOCD: CPT | Performed by: PSYCHIATRY & NEUROLOGY

## 2021-03-03 PROCEDURE — 99214 OFFICE O/P EST MOD 30 MIN: CPT | Performed by: PSYCHIATRY & NEUROLOGY

## 2021-03-03 PROCEDURE — 1036F TOBACCO NON-USER: CPT | Performed by: PSYCHIATRY & NEUROLOGY

## 2021-03-03 RX ORDER — MECLIZINE HCL 12.5 MG/1
12.5 TABLET ORAL EVERY 8 HOURS SCHEDULED
Qty: 30 TABLET | Refills: 1 | Status: SHIPPED | OUTPATIENT
Start: 2021-03-03 | End: 2021-04-13 | Stop reason: SDUPTHER

## 2021-03-03 NOTE — ASSESSMENT & PLAN NOTE
Mayuri Woods has myotonic dystrophy and his symptoms are well controlled  He does report due to a lack of exercise he has had more spasms  Hopefully with the better weather he can exercise more

## 2021-03-03 NOTE — ASSESSMENT & PLAN NOTE
Bryant Merlos does have intermittent vertigo  This is worse with certain positions such as lying down  He did drive Antivert in the which provided intermittent relief  He is also on Xarelto long-term for multiple DVTs previously he did have a carotid Doppler studies which showed no evidence of carotid stenosis  He reports his symptoms have slightly improved and he does utilize vitamin-D on regular basis  He was evaluated by ENT in March of 2020 and there is no evidence of a labyrinth in abnormality  He questions the potential of Meniere's disease  At this point I have informed him that this is more of a ENT diagnosis and in March of 2020 there were no abnormalities to be suggestive of this  he could be  seen ENT in the future if needed    We also discussed that is usually treated with diuretics which would like to avoid due to his known cardiac disease     I Reordered  the Antivert in case his symptoms become severe

## 2021-03-03 NOTE — PROGRESS NOTES
Patient ID: Frances Glililand is a 61 y o  male  Assessment/Plan:    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[facility] )  Cherie Mueller has myotonic dystrophy and his symptoms are well controlled  He does report due to a lack of exercise he has had more spasms  Hopefully with the better weather he can exercise more  Vertigo  Cherie Mueller does have intermittent vertigo  This is worse with certain positions such as lying down  He did drive Antivert in the which provided intermittent relief  He is also on Xarelto long-term for multiple DVTs previously he did have a carotid Doppler studies which showed no evidence of carotid stenosis  He reports his symptoms have slightly improved and he does utilize vitamin-D on regular basis  He was evaluated by ENT in March of 2020 and there is no evidence of a labyrinth in abnormality  He questions the potential of Meniere's disease  At this point I have informed him that this is more of a ENT diagnosis and in March of 2020 there were no abnormalities to be suggestive of this  he could be  seen ENT in the future if needed  We also discussed that is usually treated with diuretics which would like to avoid due to his known cardiac disease     I Reordered  the Antivert in case his symptoms become severe    Vitamin D deficiency  He has a  vitamin-D deficiency and is on vitamin-D replacement  Will be  rechecking his vitamin-D level       Diagnoses and all orders for this visit:    Vitamin D deficiency  -     Vitamin D 25 hydroxy; Future    Vertigo  -     meclizine (ANTIVERT) 12 5 MG tablet; Take 1 tablet (12 5 mg total) by mouth every 8 (eight) hours Prn vertigo    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HCC)         Subjective:    Mr Willie Leon is a pleasant 62 yo male with history of myotonic dystrophy last presented in our offices with lightheadedness  he reports the lightheadedness and vertigo occurs in the certain positions such as lying down    He in the past did try Antivert with no relief but he is willing to try this again he reports the vitamin-D has helped and his symptoms have somewhat improved  He questions if he has Meniere's disease  In March of 2020 he was seen by ENT who did not find any evidence of a labyrinth in etiology  He also questions if this could be due to a vitamin-D deficiency in 2018 at a vitamin-D level 22 9 and he continues on vitamin-D  He is unclear regarding the dose             He does have a history of myotonic dystrophy  He was diagnosed in 1988 when he noticed difficulty walking and spasms spasms of his muscles    He had a muscle biopsy  States he had trouble "getting started" in sports and thus had testing done including his brothers and dad, and his father  were all found to have a similar abnormality  but his two brothers are without symptoms   Reports the symptoms are slightly worse due to his lack of activity  When he was young  then but states over the last 20 years has noted slow gradually diffuse muscle weakness  States most notable with gripping  States he has trouble with muscle relaxation after gripping and complains of decrease in strength         States history heart attack 2004, and has cardiac stent in  [facility] follows with cardiology  he denies any problems with conduction  He was recently evaluated by Cardiology  Have a stress test yesterday  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- hospitals has history of this in dad and likely inherited this from him      He did have an MRI of brain performed recently  There is no abnormalities in the CP junction however there is evidence of a chronic insult in the left temporoparietal region with enlarged ventricle  A Doppler study showed less than 50% stenosis         emg in march of 2018 was consistent with myotonia  But no evidence of a neuropathy         He is now taking care of his 2 grandchildren  on daily basis     he plays golf at least two or 3 times a week        He   was a  and retired in November of 2017   he denies any falls and does reports some intermittent problem with his balance        On vit d ,         Mri of brain 10/06/20   IMPRESSION:  Mild chronic microangiopathic white matter disease  Probable 1 6 cm chronic infarction posterolateral left temporoparietal junction     No acute ischemic disease     He is currently on aspirin and Xarelto                       The following portions of the patient's history were reviewed and updated as appropriate:   He  has a past medical history of CAD (coronary artery disease), Congenital myotonia, Deep vein thrombosis (DVT) ([facility] ), Diabetes ([facility] ), 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[facility] ) (12/06/2017), Pancreatitis, Sleep apnea, and Superficial thrombophlebitis  He  has a past surgical history that includes Cholecystectomy; Coronary angioplasty with stent; Colonoscopy; Circumcision; Inguinal hernia repair; ORIF radial shaft fracture (Left); ORIF ulnar / radial shaft fracture (Left); and Tonsillectomy and adenoidectomy  His family history includes Brain cancer in his mother; Cancer in his mother; Clotting disorder in his mother; Coronary artery disease in his paternal uncle; Heart disease in his mother; Hyperlipidemia in his mother  He  reports that he has never smoked  He has never used smokeless tobacco  He reports current alcohol use  He reports that he does not use drugs  Current Outpatient Medications   Medication Sig Dispense Refill    acyclovir (ZOVIRAX) 200 mg capsule TAKE ONE CAPSULE 5 TIMES DAILY FOR 5 DAYS AS NEEDED FOR COLD SORES      aspirin (ECOTRIN LOW STRENGTH) 81 mg EC tablet Take 81 mg by mouth daily      atorvastatin (LIPITOR) 40 mg tablet TAKE 1 TABLET BY MOUTH EVERY DAY 90 tablet 1    Continuous Blood Gluc Sensor (FreeStyle Nehemias 14 Day Sensor) MISC USE AS DIRECTED , CHANGING EVERY 14 DAYS        insulin glargine (LANTUS SOLOSTAR) 100 units/mL injection pen Take 24 units in AM 5 pen 1    insulin lispro (HUMALOG KWIKPEN) 100 units/mL injection pen Inject 10units TID +scale 1 unit for every 50 over 150 mg/dl)150-200 = 1; 201-249 = 2; 250-300 = 3; 301-349 = 4; > 350 = 5 5 pen 1    Insulin Pen Needle 32G X 4 MM MISC Use to inject insulin 3 times daily 300 each 1    pantoprazole (PROTONIX) 20 mg tablet TAKE 1 TABLET BY MOUTH EVERY DAY 90 tablet 1    sildenafil (VIAGRA) 100 mg tablet Take 1 tablet (100 mg total) by mouth daily as needed for erectile dysfunction 10 tablet 1    Xarelto 20 MG tablet TAKE 1 TABLET BY MOUTH EVERY DAY WITH BREAKFAST 90 tablet 1    meclizine (ANTIVERT) 12 5 MG tablet Take 1 tablet (12 5 mg total) by mouth every 8 (eight) hours Prn vertigo 30 tablet 1     No current facility-administered medications for this visit  He has No Known Allergies            Objective:    Blood pressure 116/62, pulse 64, height 5' 9" (1 753 m), weight 72 8 kg (160 lb 9 6 oz)  Physical Exam  Eyes:      General: Lids are normal       Extraocular Movements: Extraocular movements intact  Pupils: Pupils are equal, round, and reactive to light  Neck:      Musculoskeletal: Normal range of motion  Cardiovascular:      Rate and Rhythm: Normal rate  Neurological:      Mental Status: He is alert  Deep Tendon Reflexes: Strength normal       Reflex Scores:       Tricep reflexes are 1+ on the right side and 1+ on the left side  Bicep reflexes are 2+ on the right side and 2+ on the left side  Patellar reflexes are 2+ on the right side and 2+ on the left side  Achilles reflexes are 1+ on the right side and 1+ on the left side  Neurological Exam  Mental Status  Alert  Oriented to person, place and time  Language is fluent with no aphasia  Cranial Nerves  CN II: Visual acuity is normal  Visual fields full to confrontation  CN III, IV, VI: Extraocular movements intact bilaterally  Normal lids and orbits bilaterally  Pupils equal round and reactive to light bilaterally    CN V: Facial sensation is normal   CN VII: Full and symmetric facial movement  CN VIII: Hearing is normal   CN IX, X: Palate elevates symmetrically  Normal gag reflex  CN XI: Shoulder shrug strength is normal   CN XII: Tongue midline without atrophy or fasciculations  He did have an abnormal Hallpike maneuver when looking toward the right  This did not occur when looking to the left   Motor  Normal muscle bulk throughout  No fasciculations present  Strength is 5/5 throughout all four extremities  He had difficult time relaxing his muscle , but there is no evidence of percussion myotonia  Sensory  Light touch is normal in upper and lower extremities  Temperature is normal in upper and lower extremities  Reflexes                                           Right                      Left  Biceps                                 2+                         2+  Triceps                                1+                         1+  Patellar                                2+                         2+  Achilles                                1+                         1+  Plantar                           Downgoing                Downgoing    Right pathological reflexes: Polo's absent  Left pathological reflexes: Polo's absent  Coordination  Right: Finger-to-nose normal   Left: Finger-to-nose normal     Gait Able to rise from chair without using arms  He was able to tandem gait       Review of systems obtained from medical assistant as below was reviewed with the patient at today's  Visit   ROS:    Review of Systems   Constitutional: Negative  Negative for appetite change and fever  HENT: Negative  Negative for hearing loss, tinnitus, trouble swallowing and voice change  Eyes: Negative  Negative for photophobia and pain  Respiratory: Negative  Negative for shortness of breath  Cardiovascular: Negative  Negative for palpitations  Gastrointestinal: Negative  Negative for nausea and vomiting  Endocrine: Negative    Negative for cold intolerance  Genitourinary: Negative  Negative for dysuria, frequency and urgency  Musculoskeletal: Negative  Negative for myalgias and neck pain  Skin: Negative  Negative for rash  Neurological: Positive for dizziness and light-headedness  Negative for tremors, seizures, syncope, facial asymmetry, speech difficulty, weakness, numbness and headaches  Hematological: Negative  Does not bruise/bleed easily  Psychiatric/Behavioral: Negative  Negative for confusion, hallucinations and sleep disturbance

## 2021-03-03 NOTE — ASSESSMENT & PLAN NOTE
He has a  vitamin-D deficiency and is on vitamin-D replacement   Will be  rechecking his vitamin-D level

## 2021-03-18 DIAGNOSIS — K29.00 ACUTE GASTRITIS WITHOUT HEMORRHAGE, UNSPECIFIED GASTRITIS TYPE: ICD-10-CM

## 2021-03-18 RX ORDER — PANTOPRAZOLE SODIUM 20 MG/1
TABLET, DELAYED RELEASE ORAL
Qty: 90 TABLET | Refills: 1 | Status: SHIPPED | OUTPATIENT
Start: 2021-03-18 | End: 2021-10-14

## 2021-04-13 DIAGNOSIS — R42 VERTIGO: ICD-10-CM

## 2021-04-13 RX ORDER — MECLIZINE HCL 12.5 MG/1
12.5 TABLET ORAL EVERY 8 HOURS PRN
Qty: 30 TABLET | Refills: 1 | Status: CANCELLED | OUTPATIENT
Start: 2021-04-13

## 2021-04-13 RX ORDER — MECLIZINE HCL 12.5 MG/1
12.5 TABLET ORAL EVERY 8 HOURS SCHEDULED
Qty: 30 TABLET | Refills: 0 | Status: SHIPPED | OUTPATIENT
Start: 2021-04-13 | End: 2022-01-20

## 2021-04-13 NOTE — TELEPHONE ENCOUNTER
"Lauren Mcnulty DO-  I ordered Antivert on his patient on 03/03 2021 at his appointment for 30 pills with one refill   please check and see if he has any refills left  If he does not he has taken 60 tablets in the last six weeks  Please ask him how he is doing "    I called the pharmacy earlier because I noticed there was a refill on file  The pharmacy said they did not have anything on file for the patient  The patient also made me aware that he was not sure if he was supposed to be taking the meclizine scheduled every 8 hours  I informed the patient that he can use it every 8 hours on an as needed basis  If he does not feel he needs it, it does not need to be taken  Patient was not aware of this and was taking it every 8 hours scheduled  I made him aware to only take PRN and he verbalized understanding

## 2021-04-16 ENCOUNTER — OFFICE VISIT (OUTPATIENT)
Dept: CARDIOLOGY CLINIC | Facility: CLINIC | Age: 61
End: 2021-04-16
Payer: COMMERCIAL

## 2021-04-16 VITALS
BODY MASS INDEX: 23.78 KG/M2 | SYSTOLIC BLOOD PRESSURE: 118 MMHG | WEIGHT: 161 LBS | DIASTOLIC BLOOD PRESSURE: 68 MMHG | HEART RATE: 63 BPM

## 2021-04-16 DIAGNOSIS — E78.2 MIXED HYPERLIPIDEMIA: ICD-10-CM

## 2021-04-16 DIAGNOSIS — R06.00 DOE (DYSPNEA ON EXERTION): ICD-10-CM

## 2021-04-16 DIAGNOSIS — I25.10 ARTERIOSCLEROSIS OF CORONARY ARTERY: Primary | ICD-10-CM

## 2021-04-16 PROCEDURE — 1036F TOBACCO NON-USER: CPT | Performed by: INTERNAL MEDICINE

## 2021-04-16 PROCEDURE — 99213 OFFICE O/P EST LOW 20 MIN: CPT | Performed by: INTERNAL MEDICINE

## 2021-04-16 NOTE — PROGRESS NOTES
Cardiology Follow Up    Crystal Cota  1960  [id number]  [address] [facility] [address] [phone]      Reason for visit:  Patient here for follow-up of CAD status post MI and stent in 2004  Also has hyperlipidemia       1  Arteriosclerosis of coronary artery     2  Mixed hyperlipidemia     3  LOJA (dyspnea on exertion)         Interval History:  Since the patient's last visit he underwent exercise nuclear stress testing  He reached target heart rate and exercised 9 minutes 16 seconds of the Pato protocol  He had some vague chest discomfort but no EKG or Myoview evidence for ischemia  He states the dyspnea on exertion he reported 2 months ago has improved  He denies peripheral edema  He does get dizziness which sounds like vertigo since his associated with spinning and nausea  This occurs more in the a m  He denies true lightheadedness      Patient Active Problem List   Diagnosis    Thrombocytopenia ([facility] )    Arteriosclerosis of coronary artery    Erectile dysfunction of non-organic origin    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), 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[facility] )    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    Mixed hyperlipidemia    Vertigo    Polyneuropathy    Acute gastritis without hemorrhage    Old cerebrovascular accident (CVA) without late effect    LOJA (dyspnea on exertion)    Vitamin D deficiency     Past Medical History:   Diagnosis Date    CAD (coronary artery disease)     Congenital myotonia     [facility]DVT) ([facility])     after tear of gastrocnemus muscle    Diabetes ([facility]     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[facility] ) 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    Pancreatitis     three times    Sleep apnea     not using a C-PAP    Superficial thrombophlebitis      Social History     Socioeconomic History    Marital status: /Civil Union     Spouse name: Not on file    Number of children: Not on file    Years of education: Not on file    Highest education level: Not on file   Occupational History    Not on file   Social Needs    Financial resource strain: Not on file    Food insecurity     Worry: Not on file     Inability: Not on file    Transportation needs     Medical: Not on file     Non-medical: Not on file   Tobacco Use    Smoking status: Never Smoker    Smokeless tobacco: Never Used   Substance and Sexual Activity    Alcohol use: Yes     Comment: social    Drug use: No    Sexual activity: Not on file   Lifestyle    Physical activity     Days per week: Not on file     Minutes per session: Not on file    Stress: Not on file   Relationships    Social connections     Talks on phone: Not on file     Gets together: Not on file     Attends Yazidism service: Not on file     Active member of club or organization: Not on file     Attends meetings of clubs or organizations: Not on file     Relationship status: Not on file    Intimate partner violence     Fear of current or ex partner: Not on file     Emotionally abused: Not on file     Physically abused: Not on file     Forced sexual activity: Not on file   Other Topics Concern    Not on file   Social History Narrative    Consumes 1 cup of tea  And 1 glass of tea per day        Weight loss      Family History   Problem Relation Age of Onset    Brain cancer Mother     Clotting disorder Mother     Heart disease Mother     Cancer Mother     Hyperlipidemia Mother     Coronary artery disease Paternal Uncle      Past Surgical History:   Procedure Laterality Date    CHOLECYSTECTOMY      CIRCUMCISION      Elective    COLONOSCOPY      complete, polyps 2 years ago    CORONARY ANGIOPLASTY WITH STENT PLACEMENT      stent to RCA 2004    INGUINAL HERNIA REPAIR      ORIF RADIAL SHAFT FRACTURE Left     Open Treatment of Multiple Fractures of the Radial Shaft    ORIF ULNAR / RADIAL SHAFT FRACTURE Left     Open Treatment of Multiple Fractures of the Ulnar Shaft    TONSILLECTOMY AND ADENOIDECTOMY         Current Outpatient Medications:     acyclovir (ZOVIRAX) 200 mg capsule, TAKE ONE CAPSULE 5 TIMES DAILY FOR 5 DAYS AS NEEDED FOR COLD SORES, Disp: , Rfl:     aspirin (ECOTRIN LOW STRENGTH) 81 mg EC tablet, Take 81 mg by mouth daily, Disp: , Rfl:     atorvastatin (LIPITOR) 40 mg tablet, TAKE 1 TABLET BY MOUTH EVERY DAY, Disp: 90 tablet, Rfl: 1    insulin glargine (LANTUS SOLOSTAR) 100 units/mL injection pen, Take 24 units in AM, Disp: 5 pen, Rfl: 1    insulin lispro (HUMALOG KWIKPEN) 100 units/mL injection pen, Inject 10units TID +scale 1 unit for every 50 over 150 mg/dl)150-200 = 1; 201-249 = 2; 250-300 = 3; 301-349 = 4; > 350 = 5, Disp: 5 pen, Rfl: 1    meclizine (ANTIVERT) 12 5 MG tablet, Take 1 tablet (12 5 mg total) by mouth every 8 (eight) hours Prn vertigo, Disp: 30 tablet, Rfl: 0    pantoprazole (PROTONIX) 20 mg tablet, TAKE 1 TABLET BY MOUTH EVERY DAY, Disp: 90 tablet, Rfl: 1    sildenafil (VIAGRA) 100 mg tablet, Take 1 tablet (100 mg total) by mouth daily as needed for erectile dysfunction, Disp: 10 tablet, Rfl: 1    Xarelto 20 MG tablet, TAKE 1 TABLET BY MOUTH EVERY DAY WITH BREAKFAST, Disp: 90 tablet, Rfl: 1    Continuous Blood Gluc Sensor (FreeStyle Nehemias 14 Day Sensor) MISC, USE AS DIRECTED , CHANGING EVERY 14 DAYS , Disp: , Rfl:     Insulin Pen Needle 32G X 4 MM MISC, Use to inject insulin 3 times daily, Disp: 300 each, Rfl: 1  No Known Allergies    Review of Systems:  Review of Systems   Constitutional: Positive for fatigue  Negative for activity change, appetite change and unexpected weight change  Respiratory: Positive for shortness of breath  Negative for cough, chest tightness and wheezing  Cardiovascular: Negative for chest pain, palpitations and leg swelling  Gastrointestinal: Negative for blood in stool, constipation and diarrhea  Genitourinary: Negative for frequency and hematuria  Musculoskeletal: Negative for arthralgias and back pain  Neurological: Positive for dizziness  Negative for light-headedness  Physical Exam:  Vitals:    04/16/21 1556   BP: 118/68   BP Location: Right arm   Patient Position: Sitting   Cuff Size: Large   Pulse: 63   Weight: 73 kg (161 lb)       Physical Exam  Constitutional:       General: He is not in acute distress  Appearance: He is normal weight  HENT:      Head: Normocephalic  Mouth/Throat:      Mouth: Mucous membranes are moist       Pharynx: No oropharyngeal exudate or posterior oropharyngeal erythema  Eyes:      General: No scleral icterus  Conjunctiva/sclera: Conjunctivae normal    Neck:      Musculoskeletal: Neck supple  Thyroid: No thyroid mass or thyromegaly  Vascular: Normal carotid pulses  No carotid bruit or JVD  Cardiovascular:      Rate and Rhythm: Normal rate and regular rhythm  Pulses: Normal pulses  Heart sounds: No murmur  No friction rub  No gallop  Pulmonary:      Breath sounds: Normal breath sounds  No wheezing, rhonchi or rales  Abdominal:      Palpations: Abdomen is soft  There is no hepatomegaly, splenomegaly or mass  Tenderness: There is no abdominal tenderness  Musculoskeletal:         General: No swelling  Discussion/Summary:  1  CAD status post remote stenting right coronary artery  Recent stress test showed no ischemia to a good level of exertion  With prompted the stress test was dyspnea on exertion which has improved spontaneously  He did have some atypical chest discomfort which likely does not represent angina since he has not experienced that since then  At this point I think it is unlikely he has obstructive CAD  Continue low-dose aspirin  2  Next hyperlipidemia  Recent LDL cholesterol 67 on current dose of atorvastatin 40 mg daily  Continue same  3  Dyspnea on exertion  Improved  Unlikely related to coronary ischemia    Ejection fraction also was normal with no evidence for scarring despite report of inferior infarction in 2004    Follow-up 1 year      Owen Graham MD

## 2021-04-26 DIAGNOSIS — I25.10 ARTERIOSCLEROSIS OF CORONARY ARTERY: ICD-10-CM

## 2021-04-26 RX ORDER — ATORVASTATIN CALCIUM 40 MG/1
TABLET, FILM COATED ORAL
Qty: 90 TABLET | Refills: 1 | Status: SHIPPED | OUTPATIENT
Start: 2021-04-26 | End: 2021-12-02

## 2021-05-06 ENCOUNTER — APPOINTMENT (OUTPATIENT)
Dept: LAB | Age: 61
End: 2021-05-06
Payer: COMMERCIAL

## 2021-05-06 ENCOUNTER — TRANSCRIBE ORDERS (OUTPATIENT)
Dept: URGENT CARE | Age: 61
End: 2021-05-06

## 2021-05-06 DIAGNOSIS — E10.69 TYPE 1 DIABETES MELLITUS WITH OTHER SPECIFIED COMPLICATION (HCC): Primary | ICD-10-CM

## 2021-05-06 DIAGNOSIS — E11.69 DIABETES MELLITUS ASSOCIATED WITH HORMONAL ETIOLOGY (HCC): ICD-10-CM

## 2021-05-06 DIAGNOSIS — E78.2 MIXED HYPERLIPIDEMIA: ICD-10-CM

## 2021-05-06 DIAGNOSIS — E55.9 VITAMIN D DEFICIENCY: ICD-10-CM

## 2021-05-06 DIAGNOSIS — E10.69 TYPE 1 DIABETES MELLITUS WITH OTHER SPECIFIED COMPLICATION (HCC): ICD-10-CM

## 2021-05-06 DIAGNOSIS — E13.9 LADA (LATENT AUTOIMMUNE DIABETES IN ADULTS), MANAGED AS TYPE 1 (HCC): ICD-10-CM

## 2021-05-06 DIAGNOSIS — Z12.5 SCREENING FOR PROSTATE CANCER: ICD-10-CM

## 2021-05-06 LAB
25(OH)D3 SERPL-MCNC: 43 NG/ML (ref 30–100)
ALBUMIN SERPL BCP-MCNC: 3.1 G/DL (ref 3.5–5)
ALP SERPL-CCNC: 72 U/L (ref 46–116)
ALT SERPL W P-5'-P-CCNC: 94 U/L (ref 12–78)
ANION GAP SERPL CALCULATED.3IONS-SCNC: 4 MMOL/L (ref 4–13)
AST SERPL W P-5'-P-CCNC: 46 U/L (ref 5–45)
BASOPHILS # BLD AUTO: 0.03 THOUSANDS/ΜL (ref 0–0.1)
BASOPHILS NFR BLD AUTO: 1 % (ref 0–1)
BILIRUB SERPL-MCNC: 0.97 MG/DL (ref 0.2–1)
BUN SERPL-MCNC: 18 MG/DL (ref 5–25)
CALCIUM ALBUM COR SERPL-MCNC: 9.4 MG/DL (ref 8.3–10.1)
CALCIUM SERPL-MCNC: 8.7 MG/DL (ref 8.3–10.1)
CHLORIDE SERPL-SCNC: 106 MMOL/L (ref 100–108)
CHOLEST SERPL-MCNC: 126 MG/DL (ref 50–200)
CO2 SERPL-SCNC: 30 MMOL/L (ref 21–32)
CREAT SERPL-MCNC: 0.62 MG/DL (ref 0.6–1.3)
CREAT UR-MCNC: 137 MG/DL
EOSINOPHIL # BLD AUTO: 0.38 THOUSAND/ΜL (ref 0–0.61)
EOSINOPHIL NFR BLD AUTO: 9 % (ref 0–6)
ERYTHROCYTE [DISTWIDTH] IN BLOOD BY AUTOMATED COUNT: 13.9 % (ref 11.6–15.1)
EST. AVERAGE GLUCOSE BLD GHB EST-MCNC: 163 MG/DL
GFR SERPL CREATININE-BSD FRML MDRD: 107 ML/MIN/1.73SQ M
GLUCOSE P FAST SERPL-MCNC: 231 MG/DL (ref 65–99)
HBA1C MFR BLD: 7.3 %
HCT VFR BLD AUTO: 42.7 % (ref 36.5–49.3)
HDLC SERPL-MCNC: 44 MG/DL
HGB BLD-MCNC: 13.8 G/DL (ref 12–17)
IMM GRANULOCYTES # BLD AUTO: 0.02 THOUSAND/UL (ref 0–0.2)
IMM GRANULOCYTES NFR BLD AUTO: 1 % (ref 0–2)
LDLC SERPL CALC-MCNC: 68 MG/DL (ref 0–100)
LYMPHOCYTES # BLD AUTO: 0.59 THOUSANDS/ΜL (ref 0.6–4.47)
LYMPHOCYTES NFR BLD AUTO: 14 % (ref 14–44)
MCH RBC QN AUTO: 29.1 PG (ref 26.8–34.3)
MCHC RBC AUTO-ENTMCNC: 32.3 G/DL (ref 31.4–37.4)
MCV RBC AUTO: 90 FL (ref 82–98)
MICROALBUMIN UR-MCNC: 6 MG/L (ref 0–20)
MICROALBUMIN/CREAT 24H UR: 4 MG/G CREATININE (ref 0–30)
MONOCYTES # BLD AUTO: 0.43 THOUSAND/ΜL (ref 0.17–1.22)
MONOCYTES NFR BLD AUTO: 10 % (ref 4–12)
NEUTROPHILS # BLD AUTO: 2.7 THOUSANDS/ΜL (ref 1.85–7.62)
NEUTS SEG NFR BLD AUTO: 65 % (ref 43–75)
NONHDLC SERPL-MCNC: 82 MG/DL
NRBC BLD AUTO-RTO: 0 /100 WBCS
PLATELET # BLD AUTO: 121 THOUSANDS/UL (ref 149–390)
PMV BLD AUTO: 10.2 FL (ref 8.9–12.7)
POTASSIUM SERPL-SCNC: 4.4 MMOL/L (ref 3.5–5.3)
PROT SERPL-MCNC: 6 G/DL (ref 6.4–8.2)
PSA SERPL-MCNC: 0.4 NG/ML (ref 0–4)
RBC # BLD AUTO: 4.74 MILLION/UL (ref 3.88–5.62)
SODIUM SERPL-SCNC: 140 MMOL/L (ref 136–145)
TRIGL SERPL-MCNC: 72 MG/DL
WBC # BLD AUTO: 4.15 THOUSAND/UL (ref 4.31–10.16)

## 2021-05-06 PROCEDURE — 83036 HEMOGLOBIN GLYCOSYLATED A1C: CPT

## 2021-05-06 PROCEDURE — 36415 COLL VENOUS BLD VENIPUNCTURE: CPT

## 2021-05-06 PROCEDURE — 80053 COMPREHEN METABOLIC PANEL: CPT

## 2021-05-06 PROCEDURE — G0103 PSA SCREENING: HCPCS

## 2021-05-06 PROCEDURE — 82306 VITAMIN D 25 HYDROXY: CPT

## 2021-05-06 PROCEDURE — 82043 UR ALBUMIN QUANTITATIVE: CPT | Performed by: INTERNAL MEDICINE

## 2021-05-06 PROCEDURE — 3051F HG A1C>EQUAL 7.0%<8.0%: CPT | Performed by: INTERNAL MEDICINE

## 2021-05-06 PROCEDURE — 80061 LIPID PANEL: CPT

## 2021-05-06 PROCEDURE — 82570 ASSAY OF URINE CREATININE: CPT | Performed by: INTERNAL MEDICINE

## 2021-05-06 PROCEDURE — 3061F NEG MICROALBUMINURIA REV: CPT | Performed by: INTERNAL MEDICINE

## 2021-05-06 PROCEDURE — 85025 COMPLETE CBC W/AUTO DIFF WBC: CPT

## 2021-05-25 ENCOUNTER — RA CDI HCC (OUTPATIENT)
Dept: OTHER | Facility: HOSPITAL | Age: 61
End: 2021-05-25

## 2021-05-25 NOTE — PROGRESS NOTES
[facility]  coding opportunities             Chart reviewed, (number of) suggestions sent to provider: 1           Patients insurance company: [employer] (Medicare Advantage and [employer])     Visit status: Patient canceled the appointment        Re: Yeni Chaudhary    Based on clinical documentation indicated in the medical record, it appears that the patient may have the following condition:    D69 6 - Thrombocytopenia    If this is correct, please document, assess, and code at your next visit on 6/2/2021    [place] [id number]  coding opportunities             Chart reviewed, (number of) suggestions sent to provider: 1           Patients insurance company: [employer] (Medicare Advantage and [employer])

## 2021-06-08 ENCOUNTER — RA CDI HCC (OUTPATIENT)
Dept: OTHER | Facility: HOSPITAL | Age: 61
End: 2021-06-08

## 2021-06-08 NOTE — PROGRESS NOTES
[facility]  coding opportunities          Chart reviewed, no opportunity found: CHART REVIEWED, NO OPPORTUNITY FOUND              Patients insurance company:  [employer] [facility] (Medicare Advantage and Commercial)

## 2021-06-25 ENCOUNTER — RA CDI HCC (OUTPATIENT)
Dept: OTHER | Facility: HOSPITAL | Age: 61
End: 2021-06-25

## 2021-06-25 NOTE — PROGRESS NOTES
[facility]  [facility]          Chart reviewed, no opportunity found: CHART REVIEWED, NO OPPORTUNITY FOUND                     Patients insurance company:  [employer] [facility] (Medicare Advantage and Commercial)

## 2021-07-02 ENCOUNTER — OFFICE VISIT (OUTPATIENT)
Dept: FAMILY MEDICINE CLINIC | Facility: CLINIC | Age: 61
End: 2021-07-02
Payer: COMMERCIAL

## 2021-07-02 VITALS
HEIGHT: 69 IN | DIASTOLIC BLOOD PRESSURE: 70 MMHG | TEMPERATURE: 97.9 F | SYSTOLIC BLOOD PRESSURE: 118 MMHG | BODY MASS INDEX: 23.52 KG/M2 | WEIGHT: 158.8 LBS

## 2021-07-02 DIAGNOSIS — D69.6 THROMBOCYTOPENIA (HCC): ICD-10-CM

## 2021-07-02 DIAGNOSIS — Z86.718 HISTORY OF RECURRENT DEEP VEIN THROMBOSIS (DVT): ICD-10-CM

## 2021-07-02 DIAGNOSIS — E11.69 MIXED HYPERLIPIDEMIA DUE TO TYPE 2 DIABETES MELLITUS (HCC): ICD-10-CM

## 2021-07-02 DIAGNOSIS — E78.2 MIXED HYPERLIPIDEMIA: ICD-10-CM

## 2021-07-02 DIAGNOSIS — E13.9 LADA (LATENT AUTOIMMUNE DIABETES IN ADULTS), MANAGED AS TYPE 1 (HCC): Primary | ICD-10-CM

## 2021-07-02 DIAGNOSIS — K29.50 CHRONIC GASTRITIS WITHOUT BLEEDING, UNSPECIFIED GASTRITIS TYPE: ICD-10-CM

## 2021-07-02 DIAGNOSIS — M67.911 DYSFUNCTION OF RIGHT ROTATOR CUFF: ICD-10-CM

## 2021-07-02 DIAGNOSIS — G71.11 MYOTONIC DYSTROPHY (HCC): ICD-10-CM

## 2021-07-02 DIAGNOSIS — Z86.73 OLD CEREBROVASCULAR ACCIDENT (CVA) WITHOUT LATE EFFECT: ICD-10-CM

## 2021-07-02 DIAGNOSIS — E55.9 VITAMIN D DEFICIENCY: ICD-10-CM

## 2021-07-02 DIAGNOSIS — F52.21 ERECTILE DYSFUNCTION OF NON-ORGANIC ORIGIN: ICD-10-CM

## 2021-07-02 DIAGNOSIS — E78.2 MIXED HYPERLIPIDEMIA DUE TO TYPE 2 DIABETES MELLITUS (HCC): ICD-10-CM

## 2021-07-02 PROCEDURE — 1036F TOBACCO NON-USER: CPT | Performed by: FAMILY MEDICINE

## 2021-07-02 PROCEDURE — 3725F SCREEN DEPRESSION PERFORMED: CPT | Performed by: FAMILY MEDICINE

## 2021-07-02 PROCEDURE — 99214 OFFICE O/P EST MOD 30 MIN: CPT | Performed by: FAMILY MEDICINE

## 2021-07-02 PROCEDURE — 3008F BODY MASS INDEX DOCD: CPT | Performed by: FAMILY MEDICINE

## 2021-07-02 RX ORDER — SILDENAFIL 100 MG/1
100 TABLET, FILM COATED ORAL DAILY PRN
Qty: 10 TABLET | Refills: 1 | Status: SHIPPED | OUTPATIENT
Start: 2021-07-02 | End: 2022-01-24 | Stop reason: SDUPTHER

## 2021-07-02 NOTE — ASSESSMENT & PLAN NOTE
Lab Results   Component Value Date    HGBA1C 7 3 (H) 05/06/2021    continue Lantus 32 units daily  Follow-up with endocrinology as scheduled

## 2021-07-02 NOTE — PROGRESS NOTES
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), 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[place] [address] )  -     Comprehensive metabolic panel; Future  -     Hemoglobin A1C; Future  -     Microalbumin / creatinine urine ratio; Future    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[place] [address] )    Mixed hyperlipidemia  -     CBC and differential; Future  -     Comprehensive metabolic panel; Future  -     Lipid panel; Future  -     TSH, 3rd generation; Future    Chronic gastritis without bleeding, unspecified gastritis type    Old cerebrovascular accident (CVA) without late effect    Vitamin D deficiency  -     Vitamin D 25 hydroxy; Future    Mixed hyperlipidemia due to type 2 diabetes mellitus (HCC)    Erectile dysfunction of non-organic origin  -     sildenafil (VIAGRA) 100 mg tablet; Take 1 tablet (100 mg total) by mouth daily as needed for erectile dysfunction    Thrombocytopenia (HCC)    Dysfunction of right rotator cuff  -     Ambulatory referral to Physical Therapy; Future    History of recurrent deep vein thrombosis (DVT)          Subjective:   Chief Complaint   Patient presents with    Diabetes    Hyperlipidemia     refill viagra           Patient ID: Luma Arenas is a 64 y o  male  He is here for follow-up on his diabetes, hyperlipidemia, gastritis, polyneuropathy, myotonic dystrophy, history of CVA, vitamin-D deficiency, history of recurrent DVTs  In general checkup    He had lab work done back in May and would like to review it  He is also complaining of chronic right shoulder pain  Has been going to the chiropractor without relief  The following portions of the patient's history were reviewed and updated as appropriate: allergies, current medications, past family history, past medical history, past social history, past surgical history and problem list     Review of Systems   Constitutional: Negative for appetite change, chills, diaphoresis, fatigue, fever and unexpected weight change  HENT: Negative for congestion, ear pain, hearing loss, nosebleeds, postnasal drip, rhinorrhea, sinus pressure, sore throat, tinnitus and trouble swallowing  Eyes: Negative for photophobia, pain, discharge, redness, itching and visual disturbance  Respiratory: Negative for cough, chest tightness, shortness of breath and wheezing  Cardiovascular: Negative for chest pain, palpitations and leg swelling  Denies orthopnea , dyspnea on exertion   Gastrointestinal: Negative for abdominal distention, abdominal pain, blood in stool, constipation, diarrhea, nausea and vomiting  Endocrine: Negative  Genitourinary: Negative for difficulty urinating, dysuria, flank pain, frequency, hematuria and urgency  Denies nocturia , erectile dysfunction   Musculoskeletal: Positive for arthralgias  Negative for back pain, gait problem, joint swelling and myalgias  Skin: Negative for pallor, rash and wound  Denies skin lesions   Allergic/Immunologic: Negative for environmental allergies, food allergies and immunocompromised state  Neurological: Positive for weakness and numbness  Negative for dizziness, tremors, seizures, syncope, speech difficulty and headaches  Hematological: Negative for adenopathy  Does not bruise/bleed easily  Psychiatric/Behavioral: Negative for behavioral problems, confusion, sleep disturbance and suicidal ideas  The patient is not nervous/anxious            Objective:      BP 118/70   Temp 97 9 °F (36 6 °C)   Ht 5' 9" (1 753 m)   Wt 72 kg (158 lb 12 8 oz)   BMI 23 45 kg/m²          Physical Exam  Vitals and nursing note reviewed  Constitutional:       Appearance: He is well-developed  HENT:      Head: Normocephalic and atraumatic  Right Ear: Tympanic membrane and ear canal normal       Left Ear: Tympanic membrane and ear canal normal       Nose: Nose normal    Eyes:      Conjunctiva/sclera: Conjunctivae normal       Pupils: Pupils are equal, round, and reactive to light  Neck:      Thyroid: No thyromegaly  Vascular: No JVD  Trachea: No tracheal deviation  Cardiovascular:      Rate and Rhythm: Normal rate and regular rhythm  Heart sounds: No murmur heard  Pulmonary:      Effort: Pulmonary effort is normal       Breath sounds: Normal breath sounds  No wheezing or rales  Abdominal:      General: Bowel sounds are normal       Palpations: Abdomen is soft  There is no mass  Tenderness: There is no abdominal tenderness  There is no guarding or rebound  Musculoskeletal:         General: No tenderness or deformity  Cervical back: Normal range of motion and neck supple  Lymphadenopathy:      Cervical: No cervical adenopathy  Skin:     General: Skin is warm and dry  Capillary Refill: Capillary refill takes less than 2 seconds  Findings: No lesion or rash  Nails: There is no clubbing  Neurological:      Mental Status: He is alert and oriented to person, place, and time  Mental status is at baseline  Cranial Nerves: No cranial nerve deficit  Motor: No abnormal muscle tone  Coordination: Coordination normal       Deep Tendon Reflexes: Reflexes are normal and symmetric     Psychiatric:         Mood and Affect: Mood normal          Judgment: Judgment normal

## 2021-07-12 ENCOUNTER — OFFICE VISIT (OUTPATIENT)
Dept: PHYSICAL THERAPY | Facility: REHABILITATION | Age: 61
End: 2021-07-12
Payer: COMMERCIAL

## 2021-07-12 DIAGNOSIS — M67.911 DYSFUNCTION OF RIGHT ROTATOR CUFF: Primary | ICD-10-CM

## 2021-07-12 PROCEDURE — 97161 PT EVAL LOW COMPLEX 20 MIN: CPT | Performed by: PHYSICAL THERAPIST

## 2021-07-12 PROCEDURE — 97140 MANUAL THERAPY 1/> REGIONS: CPT | Performed by: PHYSICAL THERAPIST

## 2021-07-12 NOTE — PROGRESS NOTES
PT Evaluation     Today's date: 2021  Patient name: Kentrell To  : 1960  MRN: [id number]  Referring provider: Brian Recio DO  Dx:   Encounter Diagnosis     ICD-10-CM    1  Dysfunction of right rotator cuff  M67 911                   Assessment  Assessment details: Pt is a pleasant 64 y o  male presenting to outpatient physical therapy with Dysfunction of right rotator cuff  (primary encounter diagnosis)  Pt presents with pain, decreased range of motion, decreased strength, and decreased tolerance to activity  Pt displays movement impairment diagnosis of R scapular hypomobility dysfunction  Pt is a good candidate for outpatient physical therapy and would benefit from skilled physical therapy to address limitations and to achieve goals  Thank you for this referral    Impairments: abnormal coordination, abnormal or restricted ROM, activity intolerance, impaired physical strength and pain with function  Understanding of Dx/Px/POC: good   Prognosis: good    Goals  ST  Patient will report 25% decrease in pain to reach into overhead cabinet in 4 weeks  2  Patient will demonstrate 25% improvement in ROM to throw a ball in 4 weeks  3  Patient will demonstrate 1/2 grade improvement in strength in 4 weeks  LT  Patient will be able to perform IADLS without restriction or pain by discharge  2  Patient will be independent in HEP by discharge  3  Patient will be able to return to recreational/work duties without restriction or pain by discharge        Plan  Patient would benefit from: PT eval and skilled PT  Planned modality interventions: cryotherapy and thermotherapy: hydrocollator packs  Planned therapy interventions: IADL retraining, body mechanics training, flexibility, functional ROM exercises, home exercise program, neuromuscular re-education, manual therapy, postural training, strengthening, stretching, therapeutic activities, therapeutic exercise and joint mobilization  Frequency: 2x week  Duration in visits: 8  Duration in weeks: 4  Treatment plan discussed with: patient        Subjective Evaluation    History of Present Illness  Mechanism of injury: 21  Pt comes to therapy reporting insidious onset of R shoulder pain over the past few months  States he has had to sleep on his side vs back due to dizziness the past few months, which he states may have contributed to shoulder pain  AGGS: reaching back (as to throw a ball), reaching across body, reaching out to side  LOC: superior-posterior aspect of R > L shoulder, radiating down to mid-tricep region; Pinching quality  EASES: avoiding agg motions  Reports tingling in area of pain  GOAL:S relieve pain with throwing motion  Pain  Current pain ratin  At worst pain ratin    Patient Goals  Patient goals for therapy: decreased pain and increased motion          Objective     Palpation     Right   Hypertonic in the rhomboids  Tenderness of the rhomboids       Active Range of Motion   Left Shoulder   External rotation BTH: C7   Internal rotation BTB: L1     Right Shoulder   Flexion: 95 degrees with pain  Abduction: 90 degrees with pain  External rotation BTH: C2   Internal rotation BTB: sacrum     Passive Range of Motion     Right Shoulder   Flexion: 100 degrees with pain  Abduction: 95 degrees with pain  External rotation 45°: 45 degrees with pain    Scapular Mobility     Right Shoulder   Scapular mobility: fair    Strength/Myotome Testing     Left Shoulder     Planes of Motion   Flexion: 4-   Abduction: 4-   External rotation at 0°: 4   Internal rotation at 0°: 4+     Isolated Muscles   Biceps: 4+   Triceps: 4+     Right Shoulder     Planes of Motion   Flexion: 3-   Abduction: 3-   External rotation at 0°: 4-   Internal rotation at 0°: 4+     Isolated Muscles   Biceps: 4+   Triceps: 4+              Precautions: DM    Daily Treatment Diary    Date             FOTO IE            Re-Eval IE Manuals    scap mobs/TPR R rhomboids JANIA            MFD R rhomb nv            GHJ mobs nv                         Neuro Re-Ed     scap depress                                        Prone I, T, row                                                    Ther Ex    MWM flex             MWM scap             Rhomboid stretch             Prayer stretch                                                                 Ther Activity    UBE                          Gait Training                              Modalities

## 2021-07-15 ENCOUNTER — OFFICE VISIT (OUTPATIENT)
Dept: PHYSICAL THERAPY | Facility: REHABILITATION | Age: 61
End: 2021-07-15
Payer: COMMERCIAL

## 2021-07-15 DIAGNOSIS — M67.911 DYSFUNCTION OF RIGHT ROTATOR CUFF: Primary | ICD-10-CM

## 2021-07-15 PROCEDURE — 97112 NEUROMUSCULAR REEDUCATION: CPT

## 2021-07-15 PROCEDURE — 97110 THERAPEUTIC EXERCISES: CPT

## 2021-07-15 PROCEDURE — 97140 MANUAL THERAPY 1/> REGIONS: CPT

## 2021-07-15 NOTE — PROGRESS NOTES
Daily Note     Today's date: 7/15/2021  Patient name: La Frausto  : 1960  MRN: [id number]  Referring provider: Laurel Vera DO  Dx:   Encounter Diagnosis     ICD-10-CM    1  Dysfunction of right rotator cuff  M67 911                   Subjective: pt reports pain with elevating R UE into abd and flexion  Denied pain at rest  He denied adverse reaction following IE  Objective: See treatment diary below      Assessment: Tolerated treatment well  Good response with addition of manuals and exercises  Patient demonstrated fatigue post treatment, exhibited good technique with therapeutic exercises and would benefit from continued PT per POC  Issued/reviewed HEP; no questions or concerns expressed  Plan: Continue per plan of care  Progress treatment as tolerated         Precautions: DM    Daily Treatment Diary    Date 7/12 7/15           FOTO IE            Re-Eval IE               Manuals    scap mobs/TPR R rhomboids JANIA JANIA/TE           MFD R rhomb nv nv           GHJ mobs nv JANIA                        Neuro Re-Ed     scap depress   5"2x10                                     Prone I, T, row  5" 2x10 ea                                                  Ther Ex    MWM flex             MWM scap             Rhomboid stretch  30"x3  (HEP)           Prayer stretch  15"x5  (HEP)                                                               Ther Activity    UBE  3' ea                        Gait Training                              Modalities

## 2021-07-20 ENCOUNTER — OFFICE VISIT (OUTPATIENT)
Dept: PHYSICAL THERAPY | Facility: REHABILITATION | Age: 61
End: 2021-07-20
Payer: COMMERCIAL

## 2021-07-20 DIAGNOSIS — M67.911 DYSFUNCTION OF RIGHT ROTATOR CUFF: Primary | ICD-10-CM

## 2021-07-20 PROCEDURE — 97110 THERAPEUTIC EXERCISES: CPT | Performed by: PHYSICAL THERAPIST

## 2021-07-20 PROCEDURE — 97112 NEUROMUSCULAR REEDUCATION: CPT | Performed by: PHYSICAL THERAPIST

## 2021-07-20 PROCEDURE — 97140 MANUAL THERAPY 1/> REGIONS: CPT | Performed by: PHYSICAL THERAPIST

## 2021-07-20 NOTE — PROGRESS NOTES
Daily Note     Today's date: 2021  Patient name: Jason Hall  : 1960  MRN: [id number]  Referring provider: Anais Bailon DO  Dx:   Encounter Diagnosis     ICD-10-CM    1  Dysfunction of right rotator cuff  M67 911                   Subjective: Pt reports he feels his motion is improving, however, continues to report difficulty with reaching motions  Objective: See treatment diary below      Assessment: Tolerated treatment well  Pt continues to demonstrate signs/symptoms consistent with GHJ impingement, however, eased with MWM posterior humeral glide  Demonstrates moderate pec hypomobility  Edu patient RE: pec stretches at homePatient demonstrated fatigue post treatment, exhibited good technique with therapeutic exercises and would benefit from continued PT      Plan: Progress treatment as tolerated         Precautions: DM    Daily Treatment Diary    Date 7/12 7/15 7/20          FOTO IE            Re-Eval IE               Manuals    scap mobs/TPR R rhomboids JANIA JANIA/TE JANIA          MFD R rhomb nv nv           GHJ mobs nv JANIA JANIA Gr IV                       Neuro Re-Ed     scap depress   5"2x10 5"2x10          Prone I, T, row  5" 2x10 ea 5" 2x10 ea                                                 Ther Ex    MWM flex             MWM scap             Rhomboid stretch  30"x3  (HEP)           Prayer stretch  15"x5  (HEP)           Doorway pec myriam stretch   30"x3          Doorway pec minor stretch   30"x3                                    Ther Activity    UBE  3' ea 3' ea                       Gait Training                              Modalities

## 2021-07-22 ENCOUNTER — OFFICE VISIT (OUTPATIENT)
Dept: PHYSICAL THERAPY | Facility: REHABILITATION | Age: 61
End: 2021-07-22
Payer: COMMERCIAL

## 2021-07-22 DIAGNOSIS — M67.911 DYSFUNCTION OF RIGHT ROTATOR CUFF: Primary | ICD-10-CM

## 2021-07-22 PROCEDURE — 97110 THERAPEUTIC EXERCISES: CPT

## 2021-07-22 PROCEDURE — 97140 MANUAL THERAPY 1/> REGIONS: CPT

## 2021-07-22 PROCEDURE — 97112 NEUROMUSCULAR REEDUCATION: CPT

## 2021-07-22 NOTE — PROGRESS NOTES
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 with family history with mild phenotype in both brothers and dad  (description sounds like he had muscle fiber biopsy)     He has diffuse trace muscle weakness and delayed relaxation with hand  but no significant paresis is noted  - He also has clinical signs of a peripheral neuropathy (likely from long standing diabetes) which could further contribute to his generalized weakness   - Will obtain EMG for further evaluation  - Check CK  - Discussed taking CoQ10 as some reports of this supplementation helping to some degree with Myotonic dystrophy  - Will refer him to PT  - He denies any pain with relaxation abnormality with MD being an issue- thus not starting him on any medication in this regard  - Will refer him to [facility] specialist for further recommendations in this regard  - He is agreeable with the above treatment plan  Diagnoses and all orders for this visit:    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[facility] )    -     CK (with reflex to MB); Future  -     EMG 2 limb lower extremity; Future  -     Ambulatory referral to Neurology; Future  -     Ambulatory referral to Physical Therapy; Future    Weight loss        - Work up per PCP    History of multiple concussions- states last one decades ago playing Rugby  - Mild long term memory impairment but no issues with ADLs or complicated tasks    Other polyneuropathy  -     Ambulatory referral to Physical Therapy; Future    Other specified diabetes mellitus with diabetic neuropathy, without long-term current use of insulin ([facility] )        - Per PCP      Subjective:    HPI    Mr Jermaine Chambers is a pleasant 63 yo male with history of myotonic dystrophy    States this was diagnosed at a young age in his teens with a muscle biopsy- describes it a needle being stuck and a sample being looked at under a microscope right then and there  Does not believe he had genetic testing  States he had trouble "getting started" in sports and thus had testing done including his brothers and dad, and they were all found to have a similar abnormality  States did not have muscle weakness then but states over the last 20 years has noted slow gradually diffuse muscle weakness  States most notable with gripping  States he has trouble with muscle relaxation after gripping  States history heart attack 2004, and has cardiac stent in  States follows with cardiology  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- states has history of this in dad and likely inherited this from him  Does have low platelets which have been overall stable and follows with hematology  States significant weight loss for no specific etiology over the past few years  States is on diabetes medication and states on medication- and better control now than prior HgbA1C approx 7 now vs 9 a few years ago per him  States significant weight loss despite eating similar to prior and diabetes better controlled now than prior    States worsening long term memory than short term memory- states was a  prior to retiring November and no issues with that  He does have a mild PN, states off balance but denies  Falls  Does state hands and feet are cold all the time but no neuropathic pain  The following portions of the patient's history were reviewed and updated as appropriate: allergies, current medications, past family history, past medical history, past social history, past surgical history and problem list          Objective:    Blood pressure 110/64, pulse 62, resp  rate 14, height 5' 8 5" (1 74 m), weight 65 8 kg (145 lb)  Physical Exam   Constitutional: He appears well-developed and well-nourished  HENT:   Head: Normocephalic and atraumatic     Eyes: Conjunctivae and EOM are normal  Pupils are equal, round, and reactive to light  Neck: Normal range of motion  Neck supple  Cardiovascular: Normal rate and regular rhythm  Pulmonary/Chest: Effort normal    Musculoskeletal: Normal range of motion  Neurological: He has normal reflexes  A sensory deficit is present  Nursing note and vitals reviewed  Neurological Exam    Mental Status  The patient is alert and oriented to person, place, time, and situation  His recent and remote memory are normal  He has no dysarthria  His language is fluent with no aphasia  He has normal attention span and concentration  He has a normal fund of knowledge  Cranial Nerves    CN II: The patient's visual acuity and visual fields are normal   CN III, IV, VI: The patient's pupils are equally round and reactive to light and ocular movements are normal   CN V: The patient has normal facial sensation  CN VII:  The patient has symmetric facial movement  CN VIII:  The patient's hearing is normal   CN IX, X: The patient has symmetric palate movement and normal gag reflex  CN XI: The patient's shoulder shrug strength is normal   CN XII: The patient's tongue is midline without atrophy or fasciculations  Motor  The patient has normal muscle bulk throughout  His overall muscle tone is normal throughout  He has normal movement  5-/5 strength diffusely with delayed relaxation noted with bilateral hand   Sensory   He has a dermatomal sensory deficit  Stocking glove distribution sensory loss b/l LE and UE most notable to PP     Reflexes  Deep tendon reflexes are 2+ and symmetric in all four extremities with downgoing toes bilaterally  Gait and Coordination   He has a wide stance  He has abnormal tandem gait  Romberg's sign is negative  He has normal right heel to shin and normal left heel to shin coordination  He has normal right finger to nose and normal left finger to nose coordination            ROS:    Review of Systems   Constitutional:        Recent weight loss   HENT: Positive for congestion and sinus pressure  Eyes: Negative  Respiratory: Negative  Cardiovascular: Negative  Gastrointestinal: Positive for diarrhea  Endocrine: Negative  Genitourinary:        Loss of sexual drive  Erection difficulties   Musculoskeletal:        Muscle weakness   Skin: Negative  Allergic/Immunologic: Negative  Neurological:        Snoring  Memory problems  Cramping   Hematological: Negative  Psychiatric/Behavioral: Negative  07794 Exp Problem Focused - Mod. Complex

## 2021-07-22 NOTE — PROGRESS NOTES
Daily Note     Today's date: 2021  Patient name: Terese Land  : 1960  MRN: [id number]  Referring provider: Megha Neumann DO  Dx:   Encounter Diagnosis     ICD-10-CM    1  Dysfunction of right rotator cuff  M67 911                   Subjective: pt reports relief following last visit, but continued pain with reaching OH into flexion and abduction  Objective: See treatment diary below      Assessment: Tolerated treatment well  Decreased pain post manuals with improved PROM/AROM  Patient demonstrated fatigue post treatment, exhibited good technique with therapeutic exercises and would benefit from continued PT      Plan: Continue per plan of care  Progress treatment as tolerated         Precautions: DM    Daily Treatment Diary    Date 7/12 7/15 7/20 7/22         FOTO IE            Re-Eval IE               Manuals    scap mobs/TPR R rhomboids JANIA JANIA/TE JANIA JANIA/TE         MFD R rhomb nv nv           GHJ mobs nv JANIA JANIA Gr IV JANIA Gr IV                      Neuro Re-Ed     scap depress   5"2x10 5"2x10 5" 2x10         Prone I, T, row  5" 2x10 ea 5" 2x10 ea 5"   2x10 ea                                                Ther Ex    MWM flex             MWM scap             Rhomboid stretch  30"x3  (HEP)           Prayer stretch  15"x5  (HEP)           Doorway pec myriam stretch   30"x3 30"x3         Doorway pec minor stretch   30"x3 30"x3                                   Ther Activity    UBE  3' ea 3' ea 3' ea                      Gait Training                              Modalities

## 2021-07-27 ENCOUNTER — OFFICE VISIT (OUTPATIENT)
Dept: PHYSICAL THERAPY | Facility: REHABILITATION | Age: 61
End: 2021-07-27
Payer: COMMERCIAL

## 2021-07-27 DIAGNOSIS — M67.911 DYSFUNCTION OF RIGHT ROTATOR CUFF: Primary | ICD-10-CM

## 2021-07-27 PROCEDURE — 97110 THERAPEUTIC EXERCISES: CPT | Performed by: PHYSICAL THERAPIST

## 2021-07-27 PROCEDURE — 97140 MANUAL THERAPY 1/> REGIONS: CPT | Performed by: PHYSICAL THERAPIST

## 2021-07-27 PROCEDURE — 97112 NEUROMUSCULAR REEDUCATION: CPT | Performed by: PHYSICAL THERAPIST

## 2021-07-27 NOTE — PROGRESS NOTES
Daily Note     Today's date: 2021  Patient name: Mac Marquez  : 1960  MRN: [id number]  Referring provider: Indra Melgoza DO  Dx:   Encounter Diagnosis     ICD-10-CM    1  Dysfunction of right rotator cuff  M67 911                   Subjective: pt states he is feeling about the same in the shoulder, noting the pain the most still in the 90/90 position on the lateral aspect of the upper arm  Objective: See treatment diary below      Assessment: Tolerated treatment well  Patient demonstrated fatigue post treatment, exhibited good technique with therapeutic exercises and would benefit from continued PT  Demonstrated increase in motion after mobs, but continued to not pain during abduction  trialed MWM with inferior glide, providing no relief; adjusted placement more distal towards greater tubercle with decreased symptoms noted  Pain reproduced bilaterally during pec stretch towards proximal humerus, which was resolved when assisted to hold arm in the air  Trialed post capsule doorway stretch with no relief  Found decrease in symptoms at 90/90 position after performing seated self inf glide/distraction  Plan: Continue per plan of care  Progress treatment as tolerated         Precautions: DM    Daily Treatment Diary    Date 7/12 7/15 7/20 7/22 7/29        FOTO IE            Re-Eval IE               Manuals    scap mobs/TPR R rhomboids JANIA JANIA/TE JANIA JANIA/TE JG        MFD R rhomb nv nv           GHJ mobs nv JANIA JANIA Gr IV JANIA Gr IV JG Gr IV                     Neuro Re-Ed     scap depress   5"2x10 5"2x10 5" 2x10 5" 2x10        Prone I, T, row  5" 2x10 ea 5" 2x10 ea 5"   2x10 ea 5"  2x10 ea                                               Ther Ex    MWM flex             MWM scap             Rhomboid stretch  30"x3  (HEP)           Prayer stretch  15"x5  (HEP)           Doorway pec myriam stretch   30"x3 30"x3 30" x3        Doorway pec minor stretch   30"x3 30"x3 30"x3        Seated self  R inf glide/LAD      15" x5                     Ther Activity    UBE  3' ea 3' ea 3' ea 3' ea                     Gait Training                              Modalities

## 2021-07-29 ENCOUNTER — OFFICE VISIT (OUTPATIENT)
Dept: PHYSICAL THERAPY | Facility: REHABILITATION | Age: 61
End: 2021-07-29
Payer: COMMERCIAL

## 2021-07-29 DIAGNOSIS — M67.911 DYSFUNCTION OF RIGHT ROTATOR CUFF: Primary | ICD-10-CM

## 2021-07-29 PROCEDURE — 97140 MANUAL THERAPY 1/> REGIONS: CPT | Performed by: PHYSICAL THERAPIST

## 2021-07-29 PROCEDURE — 97110 THERAPEUTIC EXERCISES: CPT | Performed by: PHYSICAL THERAPIST

## 2021-07-29 PROCEDURE — 97112 NEUROMUSCULAR REEDUCATION: CPT | Performed by: PHYSICAL THERAPIST

## 2021-07-29 NOTE — PROGRESS NOTES
Daily Note     Today's date: 2021  Patient name: Lakia Marquez  : 1960  MRN: [id number]  Referring provider: Sukh Nash DO  Dx:   Encounter Diagnosis     ICD-10-CM    1  Dysfunction of right rotator cuff  M67 911                   Subjective: pt states his shoulder is feeling a little better this morning  States he went golfing yesterday with pain/discomfort during or after  Objective: See treatment diary below      Assessment: Tolerated treatment well  Patient demonstrated fatigue post treatment, exhibited good technique with therapeutic exercises and would benefit from continued PT  Noted decreased pain post-mobs and self LAD  Continued to feel discomfort with doorway pec stretch which ceased while inf glide was provided during stretch  Plan: Continue per plan of care  Progress treatment as tolerated         Precautions: DM    Daily Treatment Diary    Date 7/12 7/15 7/20 7/22 7/27 7/29       FOTO IE            Re-Eval IE               Manuals    scap mobs/TPR R rhomboids JANIA JANIA/TE JAINA JANIA/TE JG JG       MFD R rhomb nv nv           GHJ mobs nv JANIA JANIA Gr IV JANIA Gr IV JG Gr IV JG Gr IV       LAD      JG       Neuro Re-Ed     scap depress   5"2x10 5"2x10 5" 2x10 5" 2x10        Prone I, T, row  5" 2x10 ea 5" 2x10 ea 5"   2x10 ea 5"  2x10 ea 5" 2x10 ea, 5# rows                                              Ther Ex    MWM flex             MWM scap             Rhomboid stretch  30"x3  (HEP)           Prayer stretch  15"x5  (HEP)           Doorway pec myriam stretch   30"x3 30"x3 30" x3 30" x3        Doorway pec minor stretch   30"x3 30"x3 30"x3 30" x 3       Seated self  R inf glide/LAD      15" x5 15" x5                    Ther Activity    UBE  3' ea 3' ea 3' ea 3' ea 3' ea                    Gait Training                              Modalities

## 2021-08-03 ENCOUNTER — OFFICE VISIT (OUTPATIENT)
Dept: PHYSICAL THERAPY | Facility: REHABILITATION | Age: 61
End: 2021-08-03
Payer: COMMERCIAL

## 2021-08-03 DIAGNOSIS — M67.911 DYSFUNCTION OF RIGHT ROTATOR CUFF: Primary | ICD-10-CM

## 2021-08-03 PROCEDURE — 97110 THERAPEUTIC EXERCISES: CPT

## 2021-08-03 PROCEDURE — 97530 THERAPEUTIC ACTIVITIES: CPT

## 2021-08-03 PROCEDURE — 97140 MANUAL THERAPY 1/> REGIONS: CPT

## 2021-08-03 NOTE — PROGRESS NOTES
Daily Note     Today's date: 8/3/2021  Patient name: Natalie Gaytan  : 1960  MRN: [id number]  Referring provider: Parul Newell, *  Dx:   Encounter Diagnosis     ICD-10-CM    1  Dysfunction of right rotator cuff  M67 911                   Subjective: Pt reports that his shoulder still bothers him but he has noticed some improvement since starting therapy  He reports that the exercises help him loosen up but then the pain comes back  Objective: See treatment diary below      Assessment: Tolerated treatment well  Patient demonstrated fatigue post treatment, exhibited good technique with therapeutic exercises and would benefit from continued PT  Pt continues to report pain with pec stretch in doorway but noted decrease in symptoms with inferior glide post stretch  Next visit trial self TPR with tennis ball at wall with pt if tolerable  Plan: Continue per plan of care  Progress treatment as tolerated         Precautions: DM    Daily Treatment Diary    Date 7/12 7/15 7/20 7/22 7/27 7/29 8/3      FOTO IE            Re-Eval IE               Manuals    scap mobs/TPR R rhomboids JANIA JANIA/TE JANIA JANIA/TE JG JG ML      MFD R rhomb nv nv           GHJ mobs nv JANIA JANIA Gr IV JANIA Gr IV JG Gr IV JG Gr IV ML Gr IV      LAD      JG ML      Neuro Re-Ed     scap depress   5"2x10 5"2x10 5" 2x10 5" 2x10        Prone I, T, row  5" 2x10 ea 5" 2x10 ea 5"   2x10 ea 5"  2x10 ea 5" 2x10 ea, 5# rows 5" 2x10 ea, 5# rows                                             Ther Ex    MWM flex             MWM scap             Rhomboid stretch  30"x3  (HEP)           Prayer stretch  15"x5  (HEP)           Doorway pec myriam stretch   30"x3 30"x3 30" x3 30" x3  30" x3       Doorway pec minor stretch   30"x3 30"x3 30"x3 30" x 3 30" x3      Seated self  R inf glide/LAD      15" x5 15" x5 15" x5                    Ther Activity    UBE  3' ea 3' ea 3' ea 3' ea 3' ea 3' ea                   Gait Training                              Modalities

## 2021-08-05 ENCOUNTER — OFFICE VISIT (OUTPATIENT)
Dept: PHYSICAL THERAPY | Facility: REHABILITATION | Age: 61
End: 2021-08-05
Payer: COMMERCIAL

## 2021-08-05 DIAGNOSIS — M67.911 DYSFUNCTION OF RIGHT ROTATOR CUFF: Primary | ICD-10-CM

## 2021-08-05 PROCEDURE — 97110 THERAPEUTIC EXERCISES: CPT

## 2021-08-05 PROCEDURE — 97530 THERAPEUTIC ACTIVITIES: CPT

## 2021-08-05 PROCEDURE — 97140 MANUAL THERAPY 1/> REGIONS: CPT

## 2021-08-05 NOTE — PROGRESS NOTES
Daily Note     Today's date: 2021  Patient name: Sunitha Islas  : 1960  MRN: [id number]  Referring provider: Alejandro Landeros, *  Dx:   Encounter Diagnosis     ICD-10-CM    1  Dysfunction of right rotator cuff  M67 911                   Subjective:  Patient reports that his shoulder is better than when he first started but it still hurts especially in the morning and at night with certain movements  Patient notes that he tried throwing a ball over hand and he immediately had sharp pain  Objective: See treatment diary below      Assessment: Patient tolerated treatment well  Provided cues to ensure good ex technique (scap retraction with prone ex)  Patient noted no R shoulder pain post treatment  Patient exhibited good technique with therapeutic exercises and would benefit from continued PT to attain set goals  Plan: Continue per plan of care        Precautions: DM    Daily Treatment Diary    Date 7/12 7/15 7/20 7/22 7/27 7/29 8/3 8/5     FOTO IE            Re-Eval IE               Manuals    scap mobs/TPR R rhomboids JANIA JANIA/TE JANIA JANIA/TE JG JG ML CC scap ROM     MFD R rhomb nv nv           GHJ mobs nv JANIA JANIA Gr IV JANIA Gr IV JG Gr IV JG Gr IV ML Gr IV ML     LAD      JG ML CC     Neuro Re-Ed     scap depress   5"2x10 5"2x10 5" 2x10 5" 2x10        Prone I, T, row  5" 2x10 ea 5" 2x10 ea 5"   2x10 ea 5"  2x10 ea 5" 2x10 ea, 5# rows 5" 2x10 ea, 5# rows 5" 2x10 ea, 5# rows                                            Ther Ex    MWM flex             MWM scap             Rhomboid stretch  30"x3  (HEP)           Prayer stretch  15"x5  (HEP)           Doorway pec myriam stretch   30"x3 30"x3 30" x3 30" x3  30" x3  30"x3     Doorway pec minor stretch   30"x3 30"x3 30"x3 30" x 3 30" x3 30"x3     Seated self  R inf glide/LAD      15" x5 15" x5 15" x5  15" x5                  Ther Activity    UBE  3' ea 3' ea 3' ea 3' ea 3' ea 3' ea 3' ea                  Gait Training                              Modalities

## 2021-08-10 ENCOUNTER — OFFICE VISIT (OUTPATIENT)
Dept: PHYSICAL THERAPY | Facility: REHABILITATION | Age: 61
End: 2021-08-10
Payer: COMMERCIAL

## 2021-08-10 DIAGNOSIS — M67.911 DYSFUNCTION OF RIGHT ROTATOR CUFF: Primary | ICD-10-CM

## 2021-08-10 PROCEDURE — 97112 NEUROMUSCULAR REEDUCATION: CPT | Performed by: PHYSICAL THERAPIST

## 2021-08-10 PROCEDURE — 97140 MANUAL THERAPY 1/> REGIONS: CPT | Performed by: PHYSICAL THERAPIST

## 2021-08-10 PROCEDURE — 97110 THERAPEUTIC EXERCISES: CPT | Performed by: PHYSICAL THERAPIST

## 2021-08-10 NOTE — PROGRESS NOTES
Daily Note     Today's date: 8/10/2021  Patient name: Torito Victor  : 1960  MRN: [id number]  Referring provider: Raghavendra Elaine, *  Dx:   Encounter Diagnosis     ICD-10-CM    1  Dysfunction of right rotator cuff  M67 911                   Subjective: Pt reports he continues to have discomfort with reaching behind, as when reaching into back seat of car  States out to the side reaching is also bothersome  Objective: See treatment diary below      Assessment: Tolerated treatment well  Demonstrates restricted active flexion, abduction, and horizontal abduction/extension motion, with pain reported in superior-lateral aspect of R GHJ  Pain reduced and improved with manual inferior glide applied to proximal GHJ  Issued patient self-mob technique for home with theraband and given updated stretches  Patient exhibited good technique with therapeutic exercises and would benefit from continued PT      Plan: Progress treatment as tolerated         Precautions: DM    Daily Treatment Diary    Date 7/12 7/15 7/20 7/22 7/27 7/29 8/3 8/5 8/10    FOTO IE            Re-Eval IE               Manuals    scap mobs/TPR R rhomboids JANIA JANIA/TE JANIA JANIA/TE JG JG ML CC scap ROM     MFD R rhomb nv nv           GHJ mobs nv JANIA JANIA Gr IV JANIA Gr IV JG Gr IV JG Gr IV ML Gr IV ML JANIA Gr IV inf & post    LAD      JG ML CC JANIA    Neuro Re-Ed     scap depress   5"2x10 5"2x10 5" 2x10 5" 2x10        Prone I, T, row  5" 2x10 ea 5" 2x10 ea 5"   2x10 ea 5"  2x10 ea 5" 2x10 ea, 5# rows 5" 2x10 ea, 5# rows 5" 2x10 ea, 5# rows     Prashanth scap retract         13 6# 2x10    [employer] horiz abd - unilat         4 8# 2x10                 Ther Ex    MWM flex             MWM scap             Rhomboid stretch  30"x3  (HEP)           Prayer stretch  15"x5  (HEP)       10"x10    Posterior cap stretch         10"x10    Doorway pec myriam stretch   30"x3 30"x3 30" x3 30" x3  30" x3  30"x3 30"x3    Doorway pec minor stretch   30"x3 30"x3 30"x3 30" x 3 30" x3 30"x3 Seated self  R inf glide/LAD      15" x5 15" x5 15" x5  15" x5                  Ther Activity    UBE  3' ea 3' ea 3' ea 3' ea 3' ea 3' ea 3' ea                  Gait Training                              Modalities

## 2021-08-12 ENCOUNTER — TELEMEDICINE (OUTPATIENT)
Dept: FAMILY MEDICINE CLINIC | Facility: CLINIC | Age: 61
End: 2021-08-12
Payer: COMMERCIAL

## 2021-08-12 ENCOUNTER — OFFICE VISIT (OUTPATIENT)
Dept: PHYSICAL THERAPY | Facility: REHABILITATION | Age: 61
End: 2021-08-12
Payer: COMMERCIAL

## 2021-08-12 DIAGNOSIS — J01.10 ACUTE NON-RECURRENT FRONTAL SINUSITIS: Primary | ICD-10-CM

## 2021-08-12 DIAGNOSIS — M67.911 DYSFUNCTION OF RIGHT ROTATOR CUFF: Primary | ICD-10-CM

## 2021-08-12 PROCEDURE — 97112 NEUROMUSCULAR REEDUCATION: CPT

## 2021-08-12 PROCEDURE — 97140 MANUAL THERAPY 1/> REGIONS: CPT

## 2021-08-12 PROCEDURE — 97110 THERAPEUTIC EXERCISES: CPT

## 2021-08-12 PROCEDURE — 99214 OFFICE O/P EST MOD 30 MIN: CPT | Performed by: FAMILY MEDICINE

## 2021-08-12 RX ORDER — CEFDINIR 300 MG/1
300 CAPSULE ORAL EVERY 12 HOURS SCHEDULED
Qty: 20 CAPSULE | Refills: 0 | Status: SHIPPED | OUTPATIENT
Start: 2021-08-12 | End: 2021-08-22

## 2021-08-12 NOTE — PROGRESS NOTES
Daily Note     Today's date: 2021  Patient name: Natalie Gaytan  : 1960  MRN: [id number]  Referring provider: Parul Newell, *  Dx:   Encounter Diagnosis     ICD-10-CM    1  Dysfunction of right rotator cuff  M67 911                   Subjective: pt reports difficulty with reaching behind his seat in the car, but has improved with less pain doing so  Objective: See treatment diary below      Assessment: Tolerated treatment well  Pain in end range flexion, abd, and ER with passive motion, but reduced slightly post manual techniques  Appropriate response with exercises today  Patient demonstrated fatigue post treatment, exhibited good technique with therapeutic exercises and would benefit from continued PT      Plan: Continue per plan of care  Progress treatment as tolerated         Precautions: DM    Daily Treatment Diary    Date 7/12 7/15 7/20 7/22 7/27 7/29 8/3 8/5 8/10 8/12   FOTO IE         perf   Re-Eval IE               Manuals    scap mobs/TPR R rhomboids JANIA JANIA/TE JANIA JANIA/TE JG JG ML CC scap ROM  TE  scap ROM   MFD R rhomb nv nv           GHJ mobs nv JANIA JANIA Gr IV JANIA Gr IV JG Gr IV JG Gr IV ML Gr IV ML JANIA Gr IV inf & post JANIA Gr IV inf & post   LAD      JG ML CC JANIA TE   Neuro Re-Ed     scap depress   5"2x10 5"2x10 5" 2x10 5" 2x10        Prone I, T, row  5" 2x10 ea 5" 2x10 ea 5"   2x10 ea 5"  2x10 ea 5" 2x10 ea, 5# rows 5" 2x10 ea, 5# rows 5" 2x10 ea, 5# rows     Prashanth scap retract         13 6# 2x10 13 6# 2x10   [place] horiz abd - unilat         4 8# 2x10 4 8# 2x10                Ther Ex    MWM flex             MWM scap             Rhomboid stretch  30"x3  (HEP)           Prayer stretch  15"x5  (HEP)       10"x10 10"x10   Posterior cap stretch         10"x10 30"x3   Doorway pec myriam stretch   30"x3 30"x3 30" x3 30" x3  30" x3  30"x3 30"x3    Doorway pec minor stretch   30"x3 30"x3 30"x3 30" x 3 30" x3 30"x3  30"x3   Seated self  R inf glide/LAD      15" x5 15" x5 15" x5  15" x5  30"x3 Ther Activity    UBE  3' ea 3' ea 3' ea 3' ea 3' ea 3' ea 3' ea 3' ea 3' ea                Gait Training                              Modalities

## 2021-08-12 NOTE — PROGRESS NOTES
COVID-19 Outpatient Progress Note    Assessment/Plan:    Problem List Items Addressed This Visit     None      Visit Diagnoses     Acute non-recurrent frontal sinusitis    -  Primary    Relevant Medications    cefdinir (OMNICEF) 300 mg capsule         Disposition:     After clarifying the patient's history, my suspicion for COVID-19 infection is very low  I will call antibiotics in form with his persistent symptoms  May take Sudafed as needed  Call the office if symptoms do not improve  I have spent 8 minutes directly with the patient  Greater than 50% of this time was spent in counseling/coordination of care regarding: risks and benefits of treatment options, instructions for management and patient and family education  Verification of patient location:    Patient is located in the following state in which I hold an active license [place]    Encounter provider Deena Lindquist DO    Provider located at [facility] [address] & [facility] [address] [phone]    Recent Visits  No visits were found meeting these conditions  Showing recent visits within past 7 days and meeting all other requirements  Today's Visits  Date Type Provider Dept   08/12/21 [address] Primary Care   Showing today's visits and meeting all other requirements  Future Appointments  No visits were found meeting these conditions  Showing future appointments within next 150 days and meeting all other requirements     This virtual check-in was done via [employer] and patient was informed that this is not a secure, HIPAA-compliant platform  He agrees to proceed  Patient agrees to participate in a virtual check in via telephone or video visit instead of presenting to the office to address urgent/immediate medical needs  Patient is aware this is a billable service      After connecting through [facility], the patient was identified by name and date of birth  Lakia Marquez was informed that this was a telemedicine visit and that the exam was being conducted confidentially over secure lines  My office door was closed  No one else was in the room  Lakia Marquez acknowledged consent and understanding of privacy and security of the telemedicine visit  I informed the patient that I have reviewed his record in Epic and presented the opportunity for him to ask any questions regarding the visit today  The patient agreed to participate  Subjective:   Lakia Marquez is a 64 y o  male who is concerned about COVID-19  Patient's symptoms include fatigue, nasal congestion, rhinorrhea, shortness of breath, myalgias and headache  Patient denies fever, chills, malaise, sore throat, anosmia, loss of taste, cough, chest tightness, abdominal pain, nausea, vomiting and diarrhea  Date of symptom onset: 8/3/2021    Exposure:   Contact with a person who is under investigation (PUI) for or who is positive for COVID-19 within the last 14 days?: No    Hospitalized recently for fever and/or lower respiratory symptoms?: No      Currently a healthcare worker that is involved in direct patient care?: No      Works in a special setting where the risk of COVID-19 transmission may be high? (this may include long-term care, correctional and long-term facilities; homeless shelters; assisted-living facilities and group homes ): No      Resident in a special setting where the risk of COVID-19 transmission may be high? (this may include long-term care, correctional and long-term facilities; homeless shelters; assisted-living facilities and group homes ): No        Patient has been symptomatic for week and a half  He does have grandchildren who had runny noses and go to       Lab Results   Component Value Date    SARSCOV2 Not Detected 12/26/2020     Past Medical History:   Diagnosis Date    CAD (coronary artery disease)     Congenital myotonia  Deep vein thrombosis (DVT) (HCC)     after tear of gastrocnemus muscle    Diabetes ([facility] )     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[facility] [facility] ) 12/06/2017    Pancreatitis     three times    Sleep apnea     not using a C-PAP    Superficial thrombophlebitis      Past Surgical History:   Procedure Laterality Date    CHOLECYSTECTOMY      CIRCUMCISION      Elective    COLONOSCOPY      complete, polyps 2 years ago    CORONARY ANGIOPLASTY WITH STENT PLACEMENT      stent to RCA 2004    INGUINAL HERNIA REPAIR      ORIF RADIAL SHAFT FRACTURE Left     Open Treatment of Multiple Fractures of the Radial Shaft    ORIF ULNAR / RADIAL SHAFT FRACTURE Left     Open Treatment of Multiple Fractures of the Ulnar Shaft    TONSILLECTOMY AND ADENOIDECTOMY       Current Outpatient Medications   Medication Sig Dispense Refill    acyclovir (ZOVIRAX) 200 mg capsule TAKE ONE CAPSULE 5 TIMES DAILY FOR 5 DAYS AS NEEDED FOR COLD SORES      aspirin (ECOTRIN LOW STRENGTH) 81 mg EC tablet Take 81 mg by mouth daily      atorvastatin (LIPITOR) 40 mg tablet TAKE 1 TABLET BY MOUTH EVERY DAY 90 tablet 1    cefdinir (OMNICEF) 300 mg capsule Take 1 capsule (300 mg total) by mouth every 12 (twelve) hours for 10 days 20 capsule 0    Continuous Blood Gluc Sensor (FreeStyle Nehemias 14 Day Sensor) MISC USE AS DIRECTED , CHANGING EVERY 14 DAYS        insulin glargine (LANTUS SOLOSTAR) 100 units/mL injection pen Take 24 units in AM 5 pen 1    insulin lispro (HUMALOG KWIKPEN) 100 units/mL injection pen Inject 10units TID +scale 1 unit for every 50 over 150 mg/dl)150-200 = 1; 201-249 = 2; 250-300 = 3; 301-349 = 4; > 350 = 5 5 pen 1    Insulin Pen Needle 32G X 4 MM MISC Use to inject insulin 3 times daily 300 each 1    meclizine (ANTIVERT) 12 5 MG tablet Take 1 tablet (12 5 mg total) by mouth every 8 (eight) hours Prn vertigo 30 tablet 0    pantoprazole (PROTONIX) 20 mg tablet TAKE 1 TABLET BY MOUTH EVERY DAY 90 tablet 1    sildenafil (VIAGRA) 100 mg tablet Take 1 tablet (100 mg total) by mouth daily as needed for erectile dysfunction 10 tablet 1    Xarelto 20 MG tablet TAKE 1 TABLET BY MOUTH EVERY DAY WITH BREAKFAST 90 tablet 1     No current facility-administered medications for this visit  No Known Allergies    Review of Systems   Constitutional: Positive for fatigue  Negative for chills and fever  HENT: Positive for congestion and rhinorrhea  Negative for sore throat  Respiratory: Positive for shortness of breath  Negative for cough and chest tightness  Gastrointestinal: Negative for abdominal pain, diarrhea, nausea and vomiting  Musculoskeletal: Positive for myalgias  Neurological: Positive for headaches  Objective: There were no vitals filed for this visit  Physical Exam  Constitutional:       Appearance: He is well-developed  HENT:      Head: Normocephalic and atraumatic  Eyes:      Conjunctiva/sclera: Conjunctivae normal    Pulmonary:      Effort: Pulmonary effort is normal    Neurological:      Mental Status: He is alert and oriented to person, place, and time  Psychiatric:         Judgment: Judgment normal          VIRTUAL VISIT DISCLAIMER    Luma Arenas verbally agrees to participate in [employer]  Pt is aware that [employer] could be limited without vital signs or the ability to perform a full hands-on physical exam  Otoniel Martinez understands he or the provider may request at any time to terminate the video visit and request the patient to seek care or treatment in person

## 2021-08-18 DIAGNOSIS — I25.10 ARTERIOSCLEROSIS OF CORONARY ARTERY: ICD-10-CM

## 2021-08-18 RX ORDER — RIVAROXABAN 20 MG/1
TABLET, FILM COATED ORAL
Qty: 90 TABLET | Refills: 1 | Status: SHIPPED | OUTPATIENT
Start: 2021-08-18 | End: 2022-03-03

## 2021-08-24 ENCOUNTER — APPOINTMENT (OUTPATIENT)
Dept: PHYSICAL THERAPY | Facility: REHABILITATION | Age: 61
End: 2021-08-24
Payer: COMMERCIAL

## 2021-08-26 ENCOUNTER — APPOINTMENT (OUTPATIENT)
Dept: PHYSICAL THERAPY | Facility: REHABILITATION | Age: 61
End: 2021-08-26
Payer: COMMERCIAL

## 2021-08-31 ENCOUNTER — OFFICE VISIT (OUTPATIENT)
Dept: PHYSICAL THERAPY | Facility: REHABILITATION | Age: 61
End: 2021-08-31
Payer: COMMERCIAL

## 2021-08-31 DIAGNOSIS — M67.911 DYSFUNCTION OF RIGHT ROTATOR CUFF: Primary | ICD-10-CM

## 2021-08-31 PROCEDURE — 97112 NEUROMUSCULAR REEDUCATION: CPT | Performed by: PHYSICAL THERAPIST

## 2021-08-31 PROCEDURE — 97110 THERAPEUTIC EXERCISES: CPT | Performed by: PHYSICAL THERAPIST

## 2021-08-31 NOTE — PROGRESS NOTES
PT Re-Evaluation     Today's date: 2021  Patient name: Jose Cortes  : 1960  MRN: [id number]  Referring provider: Purnima Raphael, *  Dx:   Encounter Diagnosis     ICD-10-CM    1  Dysfunction of right rotator cuff  M67 911                   Assessment  Assessment details: Jose Cortes has been treated in outpatient physical therapy over the past 6 weeks for diagnosis/complaints of Dysfunction of right rotator cuff  (primary encounter diagnosis)  Pt demonstrates increased range of motion, improved strength, decreased pain, and increased activity tolerance  Pt continues to present with pain, decreased range of motion, decreased strength, and decreased tolerance to activity  Pt would continue to benefit from skilled physical therapy to address limitations and to achieve goals  Thank you for this referral   Impairments: abnormal coordination, abnormal or restricted ROM, activity intolerance, impaired physical strength and pain with function  Understanding of Dx/Px/POC: good   Prognosis: good    Goals  ST  Patient will report 25% decrease in pain to reach into overhead cabinet in 4 weeks  - MET  2  Patient will demonstrate 25% improvement in ROM to throw a ball in 4 weeks  - PARTIALLY MET  3  Patient will demonstrate 1/2 grade improvement in strength in 4 weeks  - MET    LT  Patient will be able to perform IADLS without restriction or pain by discharge  2  Patient will be independent in HEP by discharge  3  Patient will be able to return to recreational/work duties without restriction or pain by discharge        Plan  Patient would benefit from: PT eval and skilled PT  Planned modality interventions: cryotherapy and thermotherapy: hydrocollator packs  Planned therapy interventions: IADL retraining, body mechanics training, flexibility, functional ROM exercises, home exercise program, neuromuscular re-education, manual therapy, postural training, strengthening, stretching, therapeutic activities, therapeutic exercise and joint mobilization  Frequency: 2x week  Duration in visits: 8  Duration in weeks: 4  Treatment plan discussed with: patient        Subjective Evaluation    History of Present Illness  Mechanism of injury: 21  Pt comes to therapy reporting insidious onset of R shoulder pain over the past few months  States he has had to sleep on his side vs back due to dizziness the past few months, which he states may have contributed to shoulder pain  AGGS: reaching back (as to throw a ball), reaching across body, reaching out to side  LOC: superior-posterior aspect of R > L shoulder, radiating down to mid-tricep region; Pinching quality  EASES: avoiding agg motions  Reports tingling in area of pain  GOAL:S relieve pain with throwing motion      21  Pt reports he continues to have pain with reaching motions, particularly into horizontal abduction and extension, as well as overhead motions  States he notices symptoms the most when reaching into the backseat of his car from the 's seat  Notes pain is located throughout upper right arm, described as a sharp pain    Pain  Current pain ratin  At worst pain ratin    Patient Goals  Patient goals for therapy: decreased pain and increased motion          Objective     Active Range of Motion   Left Shoulder   External rotation BTH: C7   Internal rotation BTB: L1     Right Shoulder   Flexion: 120 degrees with pain  Abduction: 90 degrees with pain  External rotation BTH: C2   Internal rotation BTB: sacrum     Passive Range of Motion     Right Shoulder   Flexion: 100 degrees with pain  Abduction: 95 degrees with pain  External rotation 45°: 80 degrees with pain    Scapular Mobility     Right Shoulder   Scapular mobility: fair    Strength/Myotome Testing     Left Shoulder     Planes of Motion   Flexion: 4-   Abduction: 4-   External rotation at 0°: 4   Internal rotation at 0°: 4+     Isolated Muscles   Biceps: 4+ Triceps: 4+     Right Shoulder     Planes of Motion   Flexion: 4-   Abduction: 4-   External rotation at 0°: 4   Internal rotation at 0°: 4+     Isolated Muscles   Biceps: 4+   Triceps: 4+              Precautions: DM    Daily Treatment Diary    Date 8/31 7/15 7/20 7/22 7/27 7/29 8/3 8/5 8/10 8/12   FOTO          perf   Re-Eval RE               Manuals    scap mobs/TPR R rhomboids np JANIA/TE JANIA JANIA/TE JG JG ML CC scap ROM  TE  scap ROM   GHJ mobs np JANIA JANIA Gr IV JANIA Gr IV JG Gr IV JG Gr IV ML Gr IV ML JANIA Gr IV inf & post JANIA Gr IV inf & post   LAD np     JG ML CC JANIA TE   Neuro Re-Ed     scap depress   5"2x10 5"2x10 5" 2x10 5" 2x10        Prone I, T, row 5" 2x10 ea, 5# rows 5" 2x10 ea 5" 2x10 ea 5"   2x10 ea 5"  2x10 ea 5" 2x10 ea, 5# rows 5" 2x10 ea, 5# rows 5" 2x10 ea, 5# rows     Prashanth scap retract         13 6# 2x10 13 6# 2x10   [place] horiz abd - unilat [id number]        4 8# 2x10 4 8# 2x10   TB W's Bluetb  2x10            TB ER walk outs c body rotation bluetb 2x10            Shoulder flex c ER TB Red tb 1x10                         Ther Ex    Prayer stretch  15"x5  (HEP)       10"x10 10"x10   Posterior cap stretch         10"x10 30"x3   Doorway pec myriam stretch   30"x3 30"x3 30" x3 30" x3  30" x3  30"x3 30"x3    Doorway pec minor stretch   30"x3 30"x3 30"x3 30" x 3 30" x3 30"x3  30"x3   Seated self  R inf glide/LAD      15" x5 15" x5 15" x5  15" x5  30"x3                Ther Activity    UBE 3' ea 3' ea 3' ea 3' ea 3' ea 3' ea 3' ea 3' ea 3' ea 3' ea                Gait Training                              Modalities

## 2021-09-02 ENCOUNTER — TELEPHONE (OUTPATIENT)
Dept: NEUROLOGY | Facility: CLINIC | Age: 61
End: 2021-09-02

## 2021-09-02 ENCOUNTER — APPOINTMENT (OUTPATIENT)
Dept: PHYSICAL THERAPY | Facility: REHABILITATION | Age: 61
End: 2021-09-02
Payer: COMMERCIAL

## 2021-09-02 NOTE — TELEPHONE ENCOUNTER
Called and spoke to patient  Patient confirmed upcoming apt with Dr Clif Spicer on 9/8/21 @ 8 am  Patient has no issues or concerns at this time

## 2021-09-07 ENCOUNTER — OFFICE VISIT (OUTPATIENT)
Dept: PHYSICAL THERAPY | Facility: REHABILITATION | Age: 61
End: 2021-09-07
Payer: COMMERCIAL

## 2021-09-07 ENCOUNTER — TELEPHONE (OUTPATIENT)
Dept: NEUROLOGY | Facility: CLINIC | Age: 61
End: 2021-09-07

## 2021-09-07 DIAGNOSIS — M67.911 DYSFUNCTION OF RIGHT ROTATOR CUFF: Primary | ICD-10-CM

## 2021-09-07 PROCEDURE — 97110 THERAPEUTIC EXERCISES: CPT | Performed by: PHYSICAL THERAPIST

## 2021-09-07 PROCEDURE — 97112 NEUROMUSCULAR REEDUCATION: CPT | Performed by: PHYSICAL THERAPIST

## 2021-09-07 NOTE — PROGRESS NOTES
Daily Note     Today's date: 2021  Patient name: Romero Hill  : 1960  MRN: [id number]  Referring provider: Chao Mesa, *  Dx:   Encounter Diagnosis     ICD-10-CM    1  Dysfunction of right rotator cuff  M67 911                   Subjective: Pt comes to therapy reporting some improvement in shoulder pain  States he feels the newly prescribed exercises have been beneficial        Objective: See treatment diary below      Assessment: Tolerated treatment well  Patient demonstrated fatigue post treatment, exhibited good technique with therapeutic exercises and would benefit from continued PT      Plan: Progress treatment as tolerated         Precautions: DM    Daily Treatment Diary    Date 8/31 9/7 7/20 7/22 7/27 7/29 8/3 8/5 8/10 8/12   FOTO          perf   Re-Eval RE               Manuals    scap mobs/TPR R rhomboids np  JANIA JANIA/TE JG JG ML CC scap ROM  TE  scap ROM   GHJ mobs np  JANIA Gr IV JANIA Gr IV JG Gr IV JG Gr IV ML Gr IV ML JANIA Gr IV inf & post JANIA Gr IV inf & post   LAD np     JG ML CC JANIA TE   Neuro Re-Ed     scap depress    5"2x10 5" 2x10 5" 2x10        Prone I, T, row 5" 2x10 ea, 5# rows 5" 2x10 ea, 5# rows 5" 2x10 ea 5"   2x10 ea 5"  2x10 ea 5" 2x10 ea, 5# rows 5" 2x10 ea, 5# rows 5" 2x10 ea, 5# rows     Seated ER @ 90*  3# 2x10           Prone ER @ 90*  0# 2x10           [place] scap retract  20 2   2x10       13 6# 2x10 13 6# 2x10   [place] horiz abd - unilat [id number] Black 2x10       4 8# 2x10 4 8# 2x10   TB W's Bluetb  2x10 Prashanth 14 5# 2x10           TB ER walk outs c body rotation bluetb 2x10 [place]  5 7 x10            Shoulder flex c ER TB Red tb 1x10 [place] 1 5 2x10                        Ther Ex    Prayer stretch         10"x10 10"x10   Posterior cap stretch         10"x10 30"x3   Doorway pec myriam stretch   30"x3 30"x3 30" x3 30" x3  30" x3  30"x3 30"x3    Doorway pec minor stretch   30"x3 30"x3 30"x3 30" x 3 30" x3 30"x3  30"x3   Seated self  R inf glide/LAD      15" x5 15" x5 15" x5 15" x5  30"x3                Ther Activity    UBE 3' ea 3' ea 3' ea 3' ea 3' ea 3' ea 3' ea 3' ea 3' ea 3' ea                Gait Training                              Modalities

## 2021-09-09 ENCOUNTER — OFFICE VISIT (OUTPATIENT)
Dept: PHYSICAL THERAPY | Facility: REHABILITATION | Age: 61
End: 2021-09-09
Payer: COMMERCIAL

## 2021-09-09 DIAGNOSIS — M67.911 DYSFUNCTION OF RIGHT ROTATOR CUFF: Primary | ICD-10-CM

## 2021-09-09 PROCEDURE — 97110 THERAPEUTIC EXERCISES: CPT

## 2021-09-09 PROCEDURE — 97112 NEUROMUSCULAR REEDUCATION: CPT

## 2021-09-09 NOTE — PROGRESS NOTES
Daily Note     Today's date: 2021  Patient name: Jose Cortes  : 1960  MRN: [id number]  Referring provider: Purnima Raphael, *  Dx:   Encounter Diagnosis     ICD-10-CM    1  Dysfunction of right rotator cuff  M67 911                   Subjective: pt reports his shoulder has been feeling pretty good today  He denied pain and  that his shoulder is starting to feel stronger pre-tx  Objective: See treatment diary below      Assessment: Tolerated treatment well  Intermittent cues to initiate exercises, but overall good carry over  Patient demonstrated fatigue post treatment, exhibited good technique with therapeutic exercises and would benefit from continued PT  Plan: Continue per plan of care  Progress treatment as tolerated         Precautions: DM    Daily Treatment Diary    Date 8/31 9/7 9/9 7/22 7/27 7/29 8/3 8/5 8/10 8/12   FOTO          perf   Re-Eval RE               Manuals    scap mobs/TPR R rhomboids np   JANIA/TE JG JG ML CC scap ROM  TE  scap ROM   GHJ mobs np   JANIA Gr IV JG Gr IV JG Gr IV ML Gr IV ML JANIA Gr IV inf & post JANIA Gr IV inf & post   LAD np     JG ML CC JANIA TE   Neuro Re-Ed     scap depress     5" 2x10 5" 2x10        Prone I, T, row 5" 2x10 ea, 5# rows 5" 2x10 ea, 5# rows I's 2#  T's 0#  10# rows  5" 2x10 ea,  5"   2x10 ea 5"  2x10 ea 5" 2x10 ea, 5# rows 5" 2x10 ea, 5# rows 5" 2x10 ea, 5# rows     Seated ER @ 90*  3# 2x10 5# 2x10          Prone ER @ 90*  0# 2x10 0# 2x10          [place] scap retract  20 2   2x10 20 2   2x10      13 6# 2x10 13 6# 2x10   Prashanth horiz abd - unilat [id number] Black 2x10 nv      4 8# 2x10 4 8# 2x10   TB W's Bluetb  2x10 Prashanth 14 5# 2x10 Prashanth 14 5# 2x10          TB ER walk outs c body rotation bluetb 2x10 [place]  5 7 x10  [place]  5 7 x10          Shoulder flex c ER TB Red tb 1x10 Prashanth 1 5 2x10 Prashanth 1 5 2x10                       Ther Ex    Prayer stretch         10"x10 10"x10   Posterior cap stretch         10"x10 30"x3   Doorway pec myriam stretch    30"x3 30" x3 30" x3  30" x3  30"x3 30"x3    Doorway pec minor stretch    30"x3 30"x3 30" x 3 30" x3 30"x3  30"x3   Seated self  R inf glide/LAD      15" x5 15" x5 15" x5  15" x5  30"x3                Ther Activity    UBE 3' ea 3' ea 3' ea 3' ea 3' ea 3' ea 3' ea 3' ea 3' ea 3' ea                Gait Training                              Modalities

## 2021-09-14 ENCOUNTER — OFFICE VISIT (OUTPATIENT)
Dept: PHYSICAL THERAPY | Facility: REHABILITATION | Age: 61
End: 2021-09-14
Payer: COMMERCIAL

## 2021-09-14 ENCOUNTER — TELEPHONE (OUTPATIENT)
Dept: NEUROLOGY | Facility: CLINIC | Age: 61
End: 2021-09-14

## 2021-09-14 DIAGNOSIS — M67.911 DYSFUNCTION OF RIGHT ROTATOR CUFF: Primary | ICD-10-CM

## 2021-09-14 PROCEDURE — 97112 NEUROMUSCULAR REEDUCATION: CPT | Performed by: PHYSICAL THERAPIST

## 2021-09-14 PROCEDURE — 97110 THERAPEUTIC EXERCISES: CPT | Performed by: PHYSICAL THERAPIST

## 2021-09-14 NOTE — PROGRESS NOTES
Daily Note     Today's date: 2021  Patient name: Carmen Cho  : 1960  MRN: [id number]  Referring provider: Virgil Waddell, *  Dx:   Encounter Diagnosis     ICD-10-CM    1  Dysfunction of right rotator cuff  M67 911                   Subjective: Pt reports he attempted throwing a baseball recently, however, had pain trying to do so  Objective: See treatment diary below      Assessment: Tolerated treatment well  Patient demonstrated fatigue post treatment, exhibited good technique with therapeutic exercises and would benefit from continued PT      Plan: Progress treatment as tolerated         Precautions: DM    Daily Treatment Diary    Date 8/31 9/7 9/9 9/14 7/27 7/29 8/3 8/5 8/10 8/12   FOTO          perf   Re-Eval RE               Manuals    scap mobs/TPR R rhomboids np    JG JG ML CC scap ROM  TE  scap ROM   GHJ mobs np    JG Gr IV JG Gr IV ML Gr IV ML JANIA Gr IV inf & post JANIA Gr IV inf & post   LAD np     JG ML CC JANIA TE   Neuro Re-Ed     scap depress      5" 2x10        Prone I, T, row 5" 2x10 ea, 5# rows 5" 2x10 ea, 5# rows I's 2#  T's 0#  10# rows  5" 2x10ea I's 2#  T's 0#  10# rows  5" 2x10ea 5"  2x10 ea 5" 2x10 ea, 5# rows 5" 2x10 ea, 5# rows 5" 2x10 ea, 5# rows     Seated ER @ 90*  3# 2x10 5# 2x10 6# 2x10         Prone ER @ 90*  0# 2x10 0# 2x10 np         [place] scap retract  20 2   2x10 20 2   2x10 20 2#  3x10     13 6# 2x10 13 6# 2x10   [place] horiz abd - unilat [id number] Black 2x10 nv Black 3x10     4 8# 2x10 4 8# 2x10   TB W's Bluetb  2x10 [place] 14 5# 2x10 Prashanth 14 5# 2x10 [place] 15 0# 3x10         TB ER walk outs c body rotation bluetb 2x10 Prash[place]  5 7 x10  [place]  5 7 x10 Prashanth  1 8 2x10         Shoulder flex c ER TB Red tb 1x10 Prashanth 1 5 2x10 [place] 1 5 2x10 [place] 1 8# 2x10         TB wall walk    ytb x10                      Ther Ex    Prayer stretch         10"x10 10"x10   Posterior cap stretch         10"x10 30"x3   Doorway pec myriam stretch     30" x3 30" x3  30" x3 30"x3 30"x3    Doorway pec minor stretch     30"x3 30" x 3 30" x3 30"x3  30"x3   Seated self  R inf glide/LAD      15" x5 15" x5 15" x5  15" x5  30"x3                Ther Activity    UBE 3' ea 3' ea 3' ea 3' ea 3' ea 3' ea 3' ea 3' ea 3' ea 3' ea                Gait Training                              Modalities

## 2021-09-15 ENCOUNTER — OFFICE VISIT (OUTPATIENT)
Dept: NEUROLOGY | Facility: CLINIC | Age: 61
End: 2021-09-15
Payer: COMMERCIAL

## 2021-09-15 VITALS
TEMPERATURE: 96.8 F | WEIGHT: 161.4 LBS | RESPIRATION RATE: 18 BRPM | SYSTOLIC BLOOD PRESSURE: 108 MMHG | DIASTOLIC BLOOD PRESSURE: 68 MMHG | HEIGHT: 68 IN | HEART RATE: 53 BPM | BODY MASS INDEX: 24.46 KG/M2

## 2021-09-15 DIAGNOSIS — R42 VERTIGO: ICD-10-CM

## 2021-09-15 DIAGNOSIS — G71.11 MYOTONIC DYSTROPHY (HCC): Primary | ICD-10-CM

## 2021-09-15 PROCEDURE — 99213 OFFICE O/P EST LOW 20 MIN: CPT | Performed by: PSYCHIATRY & NEUROLOGY

## 2021-09-15 PROCEDURE — 1036F TOBACCO NON-USER: CPT | Performed by: PSYCHIATRY & NEUROLOGY

## 2021-09-15 PROCEDURE — 3008F BODY MASS INDEX DOCD: CPT | Performed by: PSYCHIATRY & NEUROLOGY

## 2021-09-15 NOTE — ASSESSMENT & PLAN NOTE
Lino Clark  has intermittent vertigo  It is worse when he is lying supine  He is on Xarelto long-term for multiple DVTs  He does utilize vitamin-D on a regular basis and his symptoms have improved      He does have Antivert for when symptoms are severe

## 2021-09-15 NOTE — ASSESSMENT & PLAN NOTE
Maria Elena is a 28-year-old gentleman with a history of of myotonic dystrophy  He is stable  He has minimal symptoms if he exercises    We discussed potential medications at her available if his symptoms worsen suggest mexiletine orValium but there is no need for them at this time

## 2021-09-15 NOTE — PROGRESS NOTES
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[facility] )      Rodolfo Hall is a 77-year-old gentleman with a history of of myotonic dystrophy  He is stable  He has minimal symptoms if he exercises  We discussed potential medications at her available if his symptoms worsen suggest mexiletine orValium but there is no need for them at this time     Vertigo  Rodolfo Hall  has intermittent vertigo  It is worse when he is lying supine  He is on Xarelto long-term for multiple DVTs  He does utilize vitamin-D on a regular basis and his symptoms have improved  He does have Antivert for when symptoms are severe       Diagnoses and all orders for this visit:    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[facility] )    Vertigo         Subjective:    Mr Lynne Rojas is a pleasant 62 yo male with history of myotonic dystrophy last presented in our offices with lightheadedness  he reports the lightheadedness and vertigo occurs in the certain positions such as lying down  Overall this is improved with the use of vitamin-D  A repeat vitamin-D level is scheduled for a few months       He questions if he has Meniere's disease  In March of 2020 he was seen by ENT who did not find any evidence of a labyrinth in etiology      He also questions if this could be due to a vitamin-D deficiency in 2018 at a vitamin-D level 22 9 and he continues on vitamin-D  He is unclear regarding the dose             He does have a history of myotonic dystrophy  He was diagnosed in 1988 when he noticed difficulty walking and spasms spasms of his muscles    He had a muscle biopsy  States he had trouble "getting started" in sports and thus had testing done including his brothers and dad, and his father  were all found to have a similar abnormality  but his two brothers are without symptoms   Reports the symptoms are slightly worse due to his lack of activity    They have improved over the summer as he has been playing workup          When he was young  then but states over the last 20 years has noted slow gradually diffuse muscle weakness  States most notable with gripping      States he has trouble with muscle relaxation after gripping and complains of decrease in strength         States history heart attack 2004, and has cardiac stent in  States follows with cardiology  he denies any problems with conduction  He was recently evaluated by Cardiology  Have a stress test yesterday  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- states has history of this in dad and likely inherited this from him          emg in march of 2018 was consistent with myotonia  But no evidence of a neuropathy         He is now taking care of his 2 grandchildren  on daily basis  he plays golf at least two or 3 times a week        He   was a  and retired in November of 2017   he denies any falls and does reports some intermittent problem with his balance                Mri of brain 10/06/20   IMPRESSION:  Mild chronic microangiopathic white matter disease  Probable 1 6 cm chronic infarction posterolateral left temporoparietal junction     No acute ischemic disease  tehrapy , shoerul , get alonso matthew exrecalistair nabilaeliza nascimento   [address]            The following portions of the patient's history were reviewed and updated as appropriate:   He  has a past medical history of CAD (coronary artery disease), Congenital myotonia, Deep vein thrombosis (DVT) ([facility] ), Diabetes ([facility] ), 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[facility] ) (12/06/2017), Pancreatitis, Sleep apnea, and Superficial thrombophlebitis  He  has a past surgical history that includes Cholecystectomy; Coronary angioplasty with stent; Colonoscopy; Circumcision; Inguinal hernia repair; ORIF radial shaft fracture (Left); ORIF ulnar / radial shaft fracture (Left); and Tonsillectomy and adenoidectomy    His family history includes Brain cancer in his mother; Cancer in his mother; Clotting disorder in his mother; Coronary artery disease in his paternal uncle; Heart disease in his mother; Hyperlipidemia in his mother  He  reports that he has never smoked  He has never used smokeless tobacco  He reports current alcohol use  He reports that he does not use drugs  Current Outpatient Medications   Medication Sig Dispense Refill    acyclovir (ZOVIRAX) 200 mg capsule TAKE ONE CAPSULE 5 TIMES DAILY FOR 5 DAYS AS NEEDED FOR COLD SORES      aspirin (ECOTRIN LOW STRENGTH) 81 mg EC tablet Take 81 mg by mouth daily      atorvastatin (LIPITOR) 40 mg tablet TAKE 1 TABLET BY MOUTH EVERY DAY 90 tablet 1    Continuous Blood Gluc Sensor (FreeStyle Nehemias 14 Day Sensor) MISC USE AS DIRECTED , CHANGING EVERY 14 DAYS   insulin glargine (LANTUS SOLOSTAR) 100 units/mL injection pen Take 24 units in AM (Patient taking differently: Inject 36 Units under the skin daily Take 36 units in AM) 5 pen 1    insulin lispro (HUMALOG KWIKPEN) 100 units/mL injection pen Inject 10units TID +scale 1 unit for every 50 over 150 mg/dl)150-200 = 1; 201-249 = 2; 250-300 = 3; 301-349 = 4; > 350 = 5 5 pen 1    Insulin Pen Needle 32G X 4 MM MISC Use to inject insulin 3 times daily 300 each 1    pantoprazole (PROTONIX) 20 mg tablet TAKE 1 TABLET BY MOUTH EVERY DAY 90 tablet 1    sildenafil (VIAGRA) 100 mg tablet Take 1 tablet (100 mg total) by mouth daily as needed for erectile dysfunction 10 tablet 1    Xarelto 20 MG tablet TAKE 1 TABLET BY MOUTH EVERY DAY WITH BREAKFAST (Patient taking differently: daily with dinner ) 90 tablet 1    meclizine (ANTIVERT) 12 5 MG tablet Take 1 tablet (12 5 mg total) by mouth every 8 (eight) hours Prn vertigo (Patient not taking: Reported on 9/15/2021) 30 tablet 0     No current facility-administered medications for this visit  He has No Known Allergies            Objective:    Blood pressure 108/68, pulse (!) 53, temperature (!) 96 8 °F (36 °C), temperature source Tympanic, resp   rate 18, height 5' 8" (1 727 m), weight 73 2 kg (161 lb 6 4 oz)  Physical Exam  Eyes:      General: Lids are normal       Extraocular Movements: Extraocular movements intact  Pupils: Pupils are equal, round, and reactive to light  Neurological:      Deep Tendon Reflexes: Strength normal       Reflex Scores:       Tricep reflexes are 1+ on the right side and 1+ on the left side  Bicep reflexes are 2+ on the right side and 2+ on the left side  Patellar reflexes are 2+ on the right side and 2+ on the left side  Achilles reflexes are 1+ on the right side and 1+ on the left side  Neurological Exam  Mental Status  Awake, alert and oriented to person, place and time  Cranial Nerves  CN II: Visual acuity is normal  Visual fields full to confrontation  CN III, IV, VI: Extraocular movements intact bilaterally  Normal lids and orbits bilaterally  Pupils equal round and reactive to light bilaterally  CN V: Facial sensation is normal   CN VII: Full and symmetric facial movement  CN VIII: Hearing is normal   CN IX, X: Palate elevates symmetrically  Normal gag reflex  CN XI: Shoulder shrug strength is normal   CN XII: Tongue midline without atrophy or fasciculations  Motor  Normal muscle bulk throughout  No fasciculations present  Strength is 5/5 throughout all four extremities  Difficulty relaxing his muscles with hand   There is no evidence of myotonia  with a Tinel sign at the wrist      Sensory  Light touch is normal in upper and lower extremities  Temperature is normal in upper and lower extremities       Reflexes                                           Right                      Left  Biceps                                 2+                         2+  Triceps                                1+                         1+  Patellar                                2+                         2+  Achilles                                1+                         1+  Plantar                           Downgoing Downgoing    Left pathological reflexes: Polo's absent  Coordination  Right: Finger-to-nose normal   Left: Finger-to-nose normal     Gait  Normal casual, toe, heel and tandem gait  Able to rise from chair without using arms  Review review of systems obtained from the medical assistant as below was reviewed with the patient at today's   Visit     ROS:    Review of Systems   Constitutional: Negative  Negative for appetite change and fever  HENT: Negative  Negative for hearing loss, tinnitus, trouble swallowing and voice change  Eyes: Negative  Negative for photophobia and pain  Respiratory: Negative  Negative for shortness of breath  Cardiovascular: Negative  Negative for palpitations  Gastrointestinal: Negative  Negative for nausea and vomiting  Endocrine: Negative  Negative for cold intolerance  Genitourinary: Negative  Negative for dysuria, frequency and urgency  Musculoskeletal: Negative  Negative for myalgias and neck pain  Skin: Negative  Negative for rash  Neurological: Negative  Negative for dizziness, tremors, seizures, syncope, facial asymmetry, speech difficulty, weakness, light-headedness, numbness and headaches  Hematological: Negative  Does not bruise/bleed easily  Psychiatric/Behavioral: Negative  Negative for confusion, hallucinations and sleep disturbance

## 2021-09-16 ENCOUNTER — OFFICE VISIT (OUTPATIENT)
Dept: PHYSICAL THERAPY | Facility: REHABILITATION | Age: 61
End: 2021-09-16
Payer: COMMERCIAL

## 2021-09-16 DIAGNOSIS — M67.911 DYSFUNCTION OF RIGHT ROTATOR CUFF: Primary | ICD-10-CM

## 2021-09-16 PROCEDURE — 97110 THERAPEUTIC EXERCISES: CPT

## 2021-09-16 PROCEDURE — 97112 NEUROMUSCULAR REEDUCATION: CPT

## 2021-09-16 NOTE — PROGRESS NOTES
Daily Note     Today's date: 2021  Patient name: Peter Raman  : 1960  MRN: [id number]  Referring provider: Deja Burton, *  Dx:   Encounter Diagnosis     ICD-10-CM    1  Dysfunction of right rotator cuff  M67 911                   Subjective: pt reports great amount of soreness in shoulder following last visit  He noted most difficulty with reaching behind his back into extension with pain in posterior shoulder with doing so  Objective: See treatment diary below      Assessment: Tolerated treatment well  Good control with exercises  Appropriate level of challenge with current program  Patient demonstrated fatigue post treatment, exhibited good technique with therapeutic exercises and would benefit from continued PT      Plan: Continue per plan of care  Progress treatment as tolerated         Precautions: DM    Daily Treatment Diary    Date 8/31 9/7 9/9 9/14 9/16 7/29 8/3 8/5 8/10 8/12   FOTO   perf       perf   Re-Eval RE               Manuals    scap mobs/TPR R rhomboids np     JG ML CC scap ROM  TE  scap ROM   GHJ mobs np     JG Gr IV ML Gr IV ML JANIA Gr IV inf & post JANIA Gr IV inf & post   LAD np     JG ML CC JANIA TE   Neuro Re-Ed     scap depress              Prone I, T, row 5" 2x10 ea, 5# rows 5" 2x10 ea, 5# rows I's 2#  T's 0#  10# rows  5" 2x10ea I's 2#  T's 0#  10# rows  5" 2x10ea I's 2#  T's 0#  10# rows  5" 2x10ea 5" 2x10 ea, 5# rows 5" 2x10 ea, 5# rows 5" 2x10 ea, 5# rows     Seated ER @ 90*  3# 2x10 5# 2x10 6# 2x10 6# 2x10        Prone ER @ 90*  0# 2x10 0# 2x10 np         Prashanth scap retract  20 2   2x10 20 2   2x10 20 2#  3x10 20 2#  3x10    13 6# 2x10 13 6# 2x10   [place] horiz abd - unilat [id number] Black 2x10 nv Black 3x10 Black 3x10    4 8# 2x10 4 8# 2x10   TB W's Bluetb  2x10 [place] 14 5# 2x10 Prashanth 14 5# 2x10 [place] 15 0# 3x10 Prashanth 15 0# 3x10        TB ER walk outs c body rotation bluetb 2x10 [place]  5 7 x10  Prashanth  5 7 x10 Prashanth  1 8 2x10 Prashanth  2 0 2x10 Shoulder flex c ER TB Red tb 1x10 [place] 1 5 2x10 [place] 1 5 2x10 [place] 1 8# 2x10 [place]  2 0 2x10        TB wall walk    ytb x10 ytb x10                     Ther Ex    Prayer stretch         10"x10 10"x10   Posterior cap stretch         10"x10 30"x3   Doorway pec myriam stretch      30" x3  30" x3  30"x3 30"x3    Doorway pec minor stretch      30" x 3 30" x3 30"x3  30"x3   Seated self  R inf glide/LAD       15" x5 15" x5  15" x5  30"x3                Ther Activity    UBE 3' ea 3' ea 3' ea 3' ea 3' ea 3' ea 3' ea 3' ea 3' ea 3' ea                Gait Training                              Modalities

## 2021-09-21 ENCOUNTER — OFFICE VISIT (OUTPATIENT)
Dept: PHYSICAL THERAPY | Facility: REHABILITATION | Age: 61
End: 2021-09-21
Payer: COMMERCIAL

## 2021-09-21 DIAGNOSIS — M67.911 DYSFUNCTION OF RIGHT ROTATOR CUFF: Primary | ICD-10-CM

## 2021-09-21 PROCEDURE — 97112 NEUROMUSCULAR REEDUCATION: CPT

## 2021-09-21 PROCEDURE — 97110 THERAPEUTIC EXERCISES: CPT

## 2021-09-21 NOTE — PROGRESS NOTES
Daily Note     Today's date: 2021  Patient name: Carmen Matute  : 1960  MRN: [id number]  Referring provider: Taras Holguin, *  Dx:   Encounter Diagnosis     ICD-10-CM    1  Dysfunction of right rotator cuff  M67 911                   Subjective: Pt states he is doing ok  He still wants to be able to throw a baseball but is unable to  Objective: See treatment diary below      Assessment: Pt tolerated treatment session fairly well today  Challenged mostly with strength and endurance in RTC  Plan to continue focusing on RTC strength, specifically ER  Plan: Continue per plan of care        Precautions: DM    Daily Treatment Diary    Date 8/31 9/7 9/9 9/14 9/16 9/21 8/3 8/5 8/10 8/12   FOTO   perf       perf   Re-Eval RE               Manuals    scap mobs/TPR R rhomboids np      ML CC scap ROM  TE  scap ROM   GHJ mobs np      ML Gr IV ML JANIA Gr IV inf & post JANIA Gr IV inf & post   LAD np      ML CC JANIA TE   Neuro Re-Ed     scap depress              Prone I, T, row 5" 2x10 ea, 5# rows 5" 2x10 ea, 5# rows I's 2#  T's 0#  10# rows  5" 2x10ea I's 2#  T's 0#  10# rows  5" 2x10ea I's 2#  T's 0#  10# rows  5" 2x10ea I's 2#  T's 0#  10# rows  5" 2x10ea 5" 2x10 ea, 5# rows 5" 2x10 ea, 5# rows     Seated ER @ 90*  3# 2x10 5# 2x10 6# 2x10 6# 2x10 6# 2x10       Prone ER @ 90*  0# 2x10 0# 2x10 np         Prashanth scap retract  20 2   2x10 20 2   2x10 20 2#  3x10 20 2#  3x10 20 2#  3x10   13 6# 2x10 13 6# 2x10   [place] horiz abd - unilat [id number] Black 2x10 nv Black 3x10 Black 3x10 Black 3x10   4 8# 2x10 4 8# 2x10   TB W's Bluetb  2x10 [place] 14 5# 2x10 [place] 14 5# 2x10 [place] 15 0# 3x10 [place] 15 0# 3x10 [place] 15 0# 3x10       TB ER walk outs c body rotation bluetb 2x10 Prashanth  5 7 x10  Prashanth  5 7 x10 Prashanth  1 8 2x10 [place]  2 0 2x10 [place]  2 0 2x10       Shoulder flex c ER TB Red tb 1x10 Prashanth 1 5 2x10 [place] 1 5 2x10 Prashanth 1 8# 2x10 Prashanth  2 0 2x10 [place]  2 0 2x10       TB wall walk    ytb x10 ytb x10 gtb x10                    Ther Ex    Prayer stretch         10"x10 10"x10   Posterior cap stretch         10"x10 30"x3   Doorway pec myriam stretch       30" x3  30"x3 30"x3    Doorway pec minor stretch       30" x3 30"x3  30"x3   Seated self  R inf glide/LAD        15" x5  15" x5  30"x3                Ther Activity    UBE 3' ea 3' ea 3' ea 3' ea 3' ea 3' ea 3' ea 3' ea 3' ea 3' ea                Gait Training                              Modalities

## 2021-09-23 ENCOUNTER — APPOINTMENT (OUTPATIENT)
Dept: PHYSICAL THERAPY | Facility: REHABILITATION | Age: 61
End: 2021-09-23
Payer: COMMERCIAL

## 2021-09-23 ENCOUNTER — OFFICE VISIT (OUTPATIENT)
Dept: PHYSICAL THERAPY | Facility: REHABILITATION | Age: 61
End: 2021-09-23
Payer: COMMERCIAL

## 2021-09-23 DIAGNOSIS — M67.911 DYSFUNCTION OF RIGHT ROTATOR CUFF: Primary | ICD-10-CM

## 2021-09-23 PROCEDURE — 97110 THERAPEUTIC EXERCISES: CPT

## 2021-09-23 PROCEDURE — 97530 THERAPEUTIC ACTIVITIES: CPT

## 2021-09-23 PROCEDURE — 97112 NEUROMUSCULAR REEDUCATION: CPT

## 2021-09-23 NOTE — PROGRESS NOTES
Daily Note     Today's date: 2021  Patient name: Fiona Mckeon  : 1960  MRN: [id number]  Referring provider: Yani Landeros, *  Dx:   Encounter Diagnosis     ICD-10-CM    1  Dysfunction of right rotator cuff  M67 911                   Subjective: pt reports with c/o intermittent sharp pain in proximal humeral region  He noted pain occurs with reaching into flexion and abduction  Objective: See treatment diary below      Assessment: Tolerated treatment well and without complaints  Intermittent cues to maintain correct form with prashanth W's due ot compensatory motions; corrected with visual and verbal cues  Patient demonstrated fatigue post treatment, exhibited good technique with therapeutic exercises and would benefit from continued PT      Plan: Continue per plan of care  Progress treatment as tolerated         Precautions: DM    Daily Treatment Diary    Date 8/31 9/7 9/9 9/14 9/16 9/21 9/23 8/5 8/10 8/12   FOTO   perf       perf   Re-Eval RE               Manuals    scap mobs/TPR R rhomboids np       CC scap ROM  TE  scap ROM   GHJ mobs np       ML JANIA Gr IV inf & post JANIA Gr IV inf & post   LAD np       CC JANIA TE   Neuro Re-Ed     scap depress              Prone I, T, row 5" 2x10 ea, 5# rows 5" 2x10 ea, 5# rows I's 2#  T's 0#  10# rows  5" 2x10ea I's 2#  T's 0#  10# rows  5" 2x10ea I's 2#  T's 0#  10# rows  5" 2x10ea I's 2#  T's 0#  10# rows  5" 2x10ea I's 3#  T's 0#  10# rows  5" 2x10ea 5" 2x10 ea, 5# rows     Seated ER @ 90*  3# 2x10 5# 2x10 6# 2x10 6# 2x10 6# 2x10 6# 2x10      Prone ER @ 90*  0# 2x10 0# 2x10 np         [place] scap retract  20 2   2x10 20 2   2x10 20 2#  3x10 20 2#  3x10 20 2#  3x10 20 2#  3x10  13 6# 2x10 13 6# 2x10   [place] horiz abd - unilat [id number] Black 2x10 nv Black 3x10 Black 3x10 Black 3x10 Black 3x10  4 8# 2x10 4 8# 2x10   TB W's Bluetb  2x10 Prashanth 14 5# 2x10 [place] 14 5# 2x10 [place] 15 0# 3x10 Prashanth 15 0# 3x10 [place] 15 0# 3x10 Prashanth 15 0# 3x10      TB ER walk outs c body rotation bluetb 2x10 Prashanth  5 7 x10  Prashanth  5 7 x10 [place]  1 8 2x10 [place]  2 0 2x10 Prashanth  2 0 2x10 [place]  2 0 2x10      Shoulder flex c ER TB Red tb 1x10 Prashanth 1 5 2x10 [place] 1 5 2x10 Prashanth 1 8# 2x10 Prashanth  2 0 2x10 Prashanth  2 0 2x10 Prashanth  2 0 2x10      TB wall walk    ytb x10 ytb x10 gtb x10 gtb x10                   Ther Ex    Prayer stretch         10"x10 10"x10   Posterior cap stretch         10"x10 30"x3   Doorway pec myriam stretch        30"x3 30"x3    Doorway pec minor stretch        30"x3  30"x3   Seated self  R inf glide/LAD         15" x5  30"x3                Ther Activity    UBE 3' ea 3' ea 3' ea 3' ea 3' ea 3' ea 3' ea 3' ea 3' ea 3' ea                Gait Training                              Modalities

## 2021-09-24 ENCOUNTER — APPOINTMENT (OUTPATIENT)
Dept: PHYSICAL THERAPY | Facility: REHABILITATION | Age: 61
End: 2021-09-24
Payer: COMMERCIAL

## 2021-09-28 ENCOUNTER — OFFICE VISIT (OUTPATIENT)
Dept: PHYSICAL THERAPY | Facility: REHABILITATION | Age: 61
End: 2021-09-28
Payer: COMMERCIAL

## 2021-09-28 DIAGNOSIS — M67.911 DYSFUNCTION OF RIGHT ROTATOR CUFF: Primary | ICD-10-CM

## 2021-09-28 PROCEDURE — 97112 NEUROMUSCULAR REEDUCATION: CPT | Performed by: PHYSICAL THERAPIST

## 2021-09-28 PROCEDURE — 97110 THERAPEUTIC EXERCISES: CPT | Performed by: PHYSICAL THERAPIST

## 2021-09-28 NOTE — PROGRESS NOTES
Daily Note     Today's date: 2021  Patient name: Liza Gayle  : 1960  MRN: [id number]  Referring provider: Tico Saldana, *  Dx:   Encounter Diagnosis     ICD-10-CM    1  Dysfunction of right rotator cuff  M67 911                   Subjective: Pt comes to therapy denying pain or discomfort  Denies discomfort following last treatment session  Objective: See treatment diary below      Assessment: Tolerated treatment well  Patient demonstrated fatigue post treatment, exhibited good technique with therapeutic exercises and would benefit from continued PT      Plan: Progress treatment as tolerated         Precautions: DM    Daily Treatment Diary    Date 8/31 9/7 9/9 9/14 9/16 9/21 9/23 9/28 8/10 8/12   FOTO   perf       perf   Re-Eval RE               Manuals    scap mobs/TPR R rhomboids np         TE  scap ROM   GHJ mobs np        JANIA Gr IV inf & post JANIA Gr IV inf & post   LAD np        JANIA TE   Neuro Re-Ed     scap depress              Prone I, T, row 5" 2x10 ea, 5# rows 5" 2x10 ea, 5# rows I's 2#  T's 0#  10# rows 2x10ea I's 2#  T's 0#  10# rows 2x10ea I's 2#  T's 0#  10# rows 2x10ea I's 2#  T's 0#  10# rows 2x10ea I's 3#  T's 0#  10# rows 2x10ea I's 4#  T's 2#  10# rows  2x10ea     Seated ER @ 90*  3# 2x10 5# 2x10 6# 2x10 6# 2x10 6# 2x10 6# 2x10 6# 3x10     Prone ER @ 90*  0# 2x10 0# 2x10 np         [place] scap retract  20 2   2x10 20 2   2x10 20 2#  3x10 20 2#  3x10 20 2#  3x10 20 2#  3x10 21 4#  3x10 13 6# 2x10 13 6# 2x10   [place] horiz abd - unilat [id number] Black 2x10 nv Black 3x10 Black 3x10 Black 3x10 Black 3x10 [place]  4 8 x10  3 6 x10 4 8# 2x10 4 8# 2x10   TB W's Bluetb  2x10 [place] 14 5# 2x10 Prashanth 14 5# 2x10 Prashanth 15 0# 3x10 [place] 15 0# 3x10 [place] 15 0# 3x10 [place] 15 0# 3x10 Prashanth 15 0# 3x10     TB ER walk outs c body rotation bluetb 2x10 [place]  5 7 x10  Prashanth  5 7 x10 [place]  1 8 2x10 Prashanth  2 0 2x10 Prashanth  2 0 2x10 [place]  2 0 2x10 [place]  3 0 2x10     Shoulder flex c ER TB Red tb 1x10 [place] 1 5 2x10 [place] 1 5 2x10 [place] 1 8# 2x10 [place]  2 0 2x10 Prashanth  2 0 2x10 Prashanth  2 0 2x10 Prashanth  3 0 2x10     TB wall walk    ytb x10 ytb x10 gtb x10 gtb x10 gtb x10                  Ther Ex    Prayer stretch         10"x10 10"x10   Posterior cap stretch         10"x10 30"x3   Doorway pec myriam stretch         30"x3    Doorway pec minor stretch          30"x3   Seated self  R inf glide/LAD           30"x3                Ther Activity    UBE 3' ea 3' ea 3' ea 3' ea 3' ea 3' ea 3' ea 3' ea 3' ea 3' ea                Gait Training                              Modalities

## 2021-09-29 ENCOUNTER — OFFICE VISIT (OUTPATIENT)
Dept: PHYSICAL THERAPY | Facility: REHABILITATION | Age: 61
End: 2021-09-29
Payer: COMMERCIAL

## 2021-09-29 DIAGNOSIS — M67.911 DYSFUNCTION OF RIGHT ROTATOR CUFF: Primary | ICD-10-CM

## 2021-09-29 PROCEDURE — 97110 THERAPEUTIC EXERCISES: CPT | Performed by: PHYSICAL THERAPIST

## 2021-09-29 PROCEDURE — 97112 NEUROMUSCULAR REEDUCATION: CPT | Performed by: PHYSICAL THERAPIST

## 2021-09-29 NOTE — PROGRESS NOTES
Daily Note     Today's date: 2021  Patient name: Angelica Sabillon  : 1960  MRN: [id number]  Referring provider: Ronald Hein, *  Dx:   Encounter Diagnosis     ICD-10-CM    1  Dysfunction of right rotator cuff  M67 911                   Subjective: Pt comes to therapy denying pain or discomfort  Denies discomfort following last treatment session  Objective: See treatment diary below      Assessment: Tolerated treatment well  Challenged with newly added exercises today  Patient demonstrated fatigue post treatment, exhibited good technique with therapeutic exercises and would benefit from continued PT      Plan: Progress treatment as tolerated         Precautions: DM    Daily Treatment Diary    Date    FOTO   perf       perf   Re-Eval RE               Manuals    scap mobs/TPR R rhomboids np         TE  scap ROM   GHJ mobs np         JANIA Gr IV inf & post   LAD np         TE   Neuro Re-Ed     scap depress              Prone I, T, row 5" 2x10 ea, 5# rows 5" 2x10 ea, 5# rows I's 4#  T's 2#  rows 10#   2x10ea I's 4#  T's 2#  rows 10#   2x10ea I's 4#  T's 2#  rows 10#   2x10ea I's 4#  T's 2#  rows 10#   2x10ea I's 4#  T's 2#  rows 10#   2x10ea I's 4#  T's 2#  rows 10#   2x10ea I's 4#  T's 2#  rows 10#   2x10ea    Seated ER @ 90*  3# 2x10 5# 2x10 6# 2x10 6# 2x10 6# 2x10 6# 2x10 6# 3x10     Prone ER @ 90*  0# 2x10 0# 2x10 np         Prashanth scap retract  20 2   2x10 20 2   2x10 20 2#  3x10 20 2#  3x10 20 2#  3x10 20 2#  3x10 21 4#  3x10  13 6# 2x10   Prashanth horiz abd - unilat [id number] Black 2x10 nv Black 3x10 Black 3x10 Black 3x10 Black 3x10 [place]  4 8 x10  3 6 x10  4 8# 2x10   TB W's Bluetb  2x10 Prashanth 14 5# 2x10 [place] 14 5# 2x10 Prashanth 15 0# 3x10 [place] 15 0# 3x10 Prashanth 15 0# 3x10 [place] 15 0# 3x10 Prashanth 15 0# 3x10     TB ER walk outs c body rotation bluetb 2x10 Prashanth  5 7 x10  Prashanth  5 7 x10 Prashanth  1 8 2x10 Prashanth  2 0 2x10 Prashanth  2 0 2x10 [place]  2 0 2x10 [place]  3 0 2x10     Shoulder flex c ER TB Red tb 1x10 Prashanth 1 5 2x10 [place] 1 5 2x10 [place] 1 8# 2x10 Prashanth  2 0 2x10 Prashanth  2 0 2x10 [place]  2 0 2x10 [place]  3 0 2x10     TB wall walk    ytb x10 ytb x10 gtb x10 gtb x10 gtb x10 gtb x10    SA roll ups         c ytb x10    SA 3-way reach         ytb x5 ea    TB flex c pulse         ytb x10    Ball circles on wall - 4 way         2# x10 ea                 Ther Ex                                                                                  Ther Activity    UBE 3' ea 3' ea 3' ea 3' ea 3' ea 3' ea 3' ea 3' ea 3' ea 3' ea                Gait Training                              Modalities

## 2021-09-30 ENCOUNTER — APPOINTMENT (OUTPATIENT)
Dept: PHYSICAL THERAPY | Facility: REHABILITATION | Age: 61
End: 2021-09-30
Payer: COMMERCIAL

## 2021-10-05 ENCOUNTER — OFFICE VISIT (OUTPATIENT)
Dept: PHYSICAL THERAPY | Facility: REHABILITATION | Age: 61
End: 2021-10-05
Payer: COMMERCIAL

## 2021-10-05 DIAGNOSIS — M67.911 DYSFUNCTION OF RIGHT ROTATOR CUFF: Primary | ICD-10-CM

## 2021-10-05 PROCEDURE — 97112 NEUROMUSCULAR REEDUCATION: CPT

## 2021-10-05 PROCEDURE — 97110 THERAPEUTIC EXERCISES: CPT

## 2021-10-06 ENCOUNTER — OFFICE VISIT (OUTPATIENT)
Dept: PHYSICAL THERAPY | Facility: REHABILITATION | Age: 61
End: 2021-10-06
Payer: COMMERCIAL

## 2021-10-06 DIAGNOSIS — M67.911 DYSFUNCTION OF RIGHT ROTATOR CUFF: Primary | ICD-10-CM

## 2021-10-06 PROCEDURE — 97110 THERAPEUTIC EXERCISES: CPT

## 2021-10-06 PROCEDURE — 97112 NEUROMUSCULAR REEDUCATION: CPT

## 2021-10-07 ENCOUNTER — APPOINTMENT (OUTPATIENT)
Dept: PHYSICAL THERAPY | Facility: REHABILITATION | Age: 61
End: 2021-10-07
Payer: COMMERCIAL

## 2021-10-12 ENCOUNTER — OFFICE VISIT (OUTPATIENT)
Dept: PHYSICAL THERAPY | Facility: REHABILITATION | Age: 61
End: 2021-10-12
Payer: COMMERCIAL

## 2021-10-12 DIAGNOSIS — M67.911 DYSFUNCTION OF RIGHT ROTATOR CUFF: Primary | ICD-10-CM

## 2021-10-12 PROCEDURE — 97112 NEUROMUSCULAR REEDUCATION: CPT

## 2021-10-12 PROCEDURE — 97530 THERAPEUTIC ACTIVITIES: CPT

## 2021-10-14 ENCOUNTER — APPOINTMENT (OUTPATIENT)
Dept: PHYSICAL THERAPY | Facility: REHABILITATION | Age: 61
End: 2021-10-14
Payer: COMMERCIAL

## 2021-10-20 ENCOUNTER — OFFICE VISIT (OUTPATIENT)
Dept: PHYSICAL THERAPY | Facility: REHABILITATION | Age: 61
End: 2021-10-20
Payer: COMMERCIAL

## 2021-10-20 DIAGNOSIS — M67.911 DYSFUNCTION OF RIGHT ROTATOR CUFF: Primary | ICD-10-CM

## 2021-10-20 PROCEDURE — 97530 THERAPEUTIC ACTIVITIES: CPT

## 2021-10-20 PROCEDURE — 97112 NEUROMUSCULAR REEDUCATION: CPT

## 2021-12-02 DIAGNOSIS — I25.10 ARTERIOSCLEROSIS OF CORONARY ARTERY: ICD-10-CM

## 2021-12-02 RX ORDER — ATORVASTATIN CALCIUM 40 MG/1
TABLET, FILM COATED ORAL
Qty: 90 TABLET | Refills: 1 | Status: SHIPPED | OUTPATIENT
Start: 2021-12-02 | End: 2022-06-06

## 2021-12-14 ENCOUNTER — CLINICAL SUPPORT (OUTPATIENT)
Dept: FAMILY MEDICINE CLINIC | Facility: CLINIC | Age: 61
End: 2021-12-14
Payer: COMMERCIAL
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
## 2021-12-14 PROCEDURE — 90471 IMMUNIZATION ADMIN: CPT | Performed by: FAMILY MEDICINE

## 2021-12-14 PROCEDURE — 90715 TDAP VACCINE 7 YRS/> IM: CPT | Performed by: FAMILY MEDICINE

## 2022-01-12 ENCOUNTER — VBI (OUTPATIENT)
Dept: ADMINISTRATIVE | Facility: OTHER | Age: 62
End: 2022-01-12

## 2022-01-18 ENCOUNTER — APPOINTMENT (OUTPATIENT)
Dept: LAB | Age: 62
End: 2022-01-18
Payer: COMMERCIAL

## 2022-01-18 DIAGNOSIS — E11.69 DIABETES MELLITUS ASSOCIATED WITH HORMONAL ETIOLOGY (HCC): ICD-10-CM

## 2022-01-18 DIAGNOSIS — E78.2 MIXED HYPERLIPIDEMIA: ICD-10-CM

## 2022-01-18 DIAGNOSIS — E13.9 LADA (LATENT AUTOIMMUNE DIABETES IN ADULTS), MANAGED AS TYPE 1 (HCC): ICD-10-CM

## 2022-01-18 DIAGNOSIS — E55.9 VITAMIN D DEFICIENCY: ICD-10-CM

## 2022-01-18 DIAGNOSIS — E10.69 TYPE 1 DIABETES MELLITUS WITH OTHER SPECIFIED COMPLICATION (HCC): ICD-10-CM

## 2022-01-18 LAB
ALBUMIN SERPL BCP-MCNC: 3.2 G/DL (ref 3.5–5)
ALP SERPL-CCNC: 75 U/L (ref 46–116)
ALT SERPL W P-5'-P-CCNC: 53 U/L (ref 12–78)
ANION GAP SERPL CALCULATED.3IONS-SCNC: 3 MMOL/L (ref 4–13)
AST SERPL W P-5'-P-CCNC: 31 U/L (ref 5–45)
BASOPHILS # BLD AUTO: 0.02 THOUSANDS/ΜL (ref 0–0.1)
BASOPHILS NFR BLD AUTO: 1 % (ref 0–1)
BILIRUB SERPL-MCNC: 0.74 MG/DL (ref 0.2–1)
BUN SERPL-MCNC: 22 MG/DL (ref 5–25)
CALCIUM ALBUM COR SERPL-MCNC: 9.1 MG/DL (ref 8.3–10.1)
CALCIUM SERPL-MCNC: 8.5 MG/DL (ref 8.3–10.1)
CHLORIDE SERPL-SCNC: 105 MMOL/L (ref 100–108)
CHOLEST SERPL-MCNC: 154 MG/DL
CO2 SERPL-SCNC: 31 MMOL/L (ref 21–32)
CREAT SERPL-MCNC: 0.73 MG/DL (ref 0.6–1.3)
EOSINOPHIL # BLD AUTO: 0.38 THOUSAND/ΜL (ref 0–0.61)
EOSINOPHIL NFR BLD AUTO: 10 % (ref 0–6)
ERYTHROCYTE [DISTWIDTH] IN BLOOD BY AUTOMATED COUNT: 14.5 % (ref 11.6–15.1)
EST. AVERAGE GLUCOSE BLD GHB EST-MCNC: 197 MG/DL
GFR SERPL CREATININE-BSD FRML MDRD: 100 ML/MIN/1.73SQ M
GLUCOSE P FAST SERPL-MCNC: 237 MG/DL (ref 65–99)
HBA1C MFR BLD: 8.5 %
HCT VFR BLD AUTO: 42.1 % (ref 36.5–49.3)
HDLC SERPL-MCNC: 41 MG/DL
HGB BLD-MCNC: 13.7 G/DL (ref 12–17)
IMM GRANULOCYTES # BLD AUTO: 0.02 THOUSAND/UL (ref 0–0.2)
IMM GRANULOCYTES NFR BLD AUTO: 1 % (ref 0–2)
LDLC SERPL CALC-MCNC: 91 MG/DL (ref 0–100)
LYMPHOCYTES # BLD AUTO: 0.81 THOUSANDS/ΜL (ref 0.6–4.47)
LYMPHOCYTES NFR BLD AUTO: 22 % (ref 14–44)
MCH RBC QN AUTO: 28.1 PG (ref 26.8–34.3)
MCHC RBC AUTO-ENTMCNC: 32.5 G/DL (ref 31.4–37.4)
MCV RBC AUTO: 86 FL (ref 82–98)
MONOCYTES # BLD AUTO: 0.48 THOUSAND/ΜL (ref 0.17–1.22)
MONOCYTES NFR BLD AUTO: 13 % (ref 4–12)
NEUTROPHILS # BLD AUTO: 2.03 THOUSANDS/ΜL (ref 1.85–7.62)
NEUTS SEG NFR BLD AUTO: 53 % (ref 43–75)
NONHDLC SERPL-MCNC: 113 MG/DL
NRBC BLD AUTO-RTO: 0 /100 WBCS
PLATELET # BLD AUTO: 125 THOUSANDS/UL (ref 149–390)
PMV BLD AUTO: 10 FL (ref 8.9–12.7)
POTASSIUM SERPL-SCNC: 4.8 MMOL/L (ref 3.5–5.3)
PROT SERPL-MCNC: 6.2 G/DL (ref 6.4–8.2)
RBC # BLD AUTO: 4.87 MILLION/UL (ref 3.88–5.62)
SODIUM SERPL-SCNC: 139 MMOL/L (ref 136–145)
TRIGL SERPL-MCNC: 109 MG/DL
TSH SERPL DL<=0.05 MIU/L-ACNC: 1.08 UIU/ML (ref 0.36–3.74)
WBC # BLD AUTO: 3.74 THOUSAND/UL (ref 4.31–10.16)

## 2022-01-18 PROCEDURE — 84443 ASSAY THYROID STIM HORMONE: CPT

## 2022-01-18 PROCEDURE — 3052F HG A1C>EQUAL 8.0%<EQUAL 9.0%: CPT | Performed by: INTERNAL MEDICINE

## 2022-01-18 PROCEDURE — 80053 COMPREHEN METABOLIC PANEL: CPT

## 2022-01-18 PROCEDURE — 83036 HEMOGLOBIN GLYCOSYLATED A1C: CPT

## 2022-01-18 PROCEDURE — 82306 VITAMIN D 25 HYDROXY: CPT

## 2022-01-18 PROCEDURE — 85025 COMPLETE CBC W/AUTO DIFF WBC: CPT

## 2022-01-18 PROCEDURE — 80061 LIPID PANEL: CPT

## 2022-01-18 PROCEDURE — 36415 COLL VENOUS BLD VENIPUNCTURE: CPT

## 2022-01-19 LAB — 25(OH)D3 SERPL-MCNC: 39.6 NG/ML (ref 30–100)

## 2022-01-24 ENCOUNTER — OFFICE VISIT (OUTPATIENT)
Dept: FAMILY MEDICINE CLINIC | Facility: CLINIC | Age: 62
End: 2022-01-24
Payer: COMMERCIAL

## 2022-01-24 VITALS
SYSTOLIC BLOOD PRESSURE: 102 MMHG | DIASTOLIC BLOOD PRESSURE: 62 MMHG | WEIGHT: 164.4 LBS | TEMPERATURE: 97.8 F | BODY MASS INDEX: 24.92 KG/M2 | HEIGHT: 68 IN

## 2022-01-24 DIAGNOSIS — I25.10 ARTERIOSCLEROSIS OF CORONARY ARTERY: ICD-10-CM

## 2022-01-24 DIAGNOSIS — Z86.73 OLD CEREBROVASCULAR ACCIDENT (CVA) WITHOUT LATE EFFECT: ICD-10-CM

## 2022-01-24 DIAGNOSIS — B00.1 RECURRENT COLD SORES: ICD-10-CM

## 2022-01-24 DIAGNOSIS — G62.9 POLYNEUROPATHY: ICD-10-CM

## 2022-01-24 DIAGNOSIS — D69.6 THROMBOCYTOPENIA (HCC): ICD-10-CM

## 2022-01-24 DIAGNOSIS — F52.21 ERECTILE DYSFUNCTION OF NON-ORGANIC ORIGIN: ICD-10-CM

## 2022-01-24 DIAGNOSIS — H61.23 BILATERAL IMPACTED CERUMEN: ICD-10-CM

## 2022-01-24 DIAGNOSIS — E11.69 MIXED HYPERLIPIDEMIA DUE TO TYPE 2 DIABETES MELLITUS (HCC): ICD-10-CM

## 2022-01-24 DIAGNOSIS — E78.2 MIXED HYPERLIPIDEMIA DUE TO TYPE 2 DIABETES MELLITUS (HCC): ICD-10-CM

## 2022-01-24 DIAGNOSIS — M16.11 PRIMARY OSTEOARTHRITIS OF RIGHT HIP: ICD-10-CM

## 2022-01-24 DIAGNOSIS — Z01.818 PREOPERATIVE EXAMINATION: Primary | ICD-10-CM

## 2022-01-24 DIAGNOSIS — K29.50 CHRONIC GASTRITIS WITHOUT BLEEDING, UNSPECIFIED GASTRITIS TYPE: ICD-10-CM

## 2022-01-24 DIAGNOSIS — E13.9 LADA (LATENT AUTOIMMUNE DIABETES IN ADULTS), MANAGED AS TYPE 1 (HCC): ICD-10-CM

## 2022-01-24 DIAGNOSIS — Z86.718 HISTORY OF RECURRENT DEEP VEIN THROMBOSIS (DVT): ICD-10-CM

## 2022-01-24 DIAGNOSIS — G71.11 MYOTONIC DYSTROPHY (HCC): ICD-10-CM

## 2022-01-24 PROBLEM — Z01.810 PREOP CARDIOVASCULAR EXAM: Status: ACTIVE | Noted: 2022-01-24

## 2022-01-24 PROCEDURE — 99214 OFFICE O/P EST MOD 30 MIN: CPT | Performed by: FAMILY MEDICINE

## 2022-01-24 RX ORDER — SILDENAFIL 100 MG/1
100 TABLET, FILM COATED ORAL DAILY PRN
Qty: 10 TABLET | Refills: 1 | Status: SHIPPED | OUTPATIENT
Start: 2022-01-24 | End: 2022-01-24

## 2022-01-24 RX ORDER — ACYCLOVIR 200 MG/1
200 CAPSULE ORAL
Qty: 25 CAPSULE | Refills: 3 | Status: SHIPPED | OUTPATIENT
Start: 2022-01-24 | End: 2022-01-24

## 2022-01-24 RX ORDER — ACYCLOVIR 200 MG/1
200 CAPSULE ORAL
Qty: 25 CAPSULE | Refills: 0 | Status: SHIPPED | OUTPATIENT
Start: 2022-01-24 | End: 2022-01-29

## 2022-01-24 RX ORDER — ACYCLOVIR 200 MG/1
200 CAPSULE ORAL
Qty: 25 CAPSULE | Refills: 0 | Status: SHIPPED | OUTPATIENT
Start: 2022-01-24 | End: 2022-01-24 | Stop reason: SDUPTHER

## 2022-01-24 RX ORDER — SILDENAFIL 100 MG/1
100 TABLET, FILM COATED ORAL DAILY PRN
Qty: 10 TABLET | Refills: 1 | Status: SHIPPED | OUTPATIENT
Start: 2022-01-24

## 2022-01-24 RX ORDER — SILDENAFIL 100 MG/1
100 TABLET, FILM COATED ORAL DAILY PRN
Qty: 10 TABLET | Refills: 1 | Status: SHIPPED | OUTPATIENT
Start: 2022-01-24 | End: 2022-01-24 | Stop reason: SDUPTHER

## 2022-01-24 NOTE — ASSESSMENT & PLAN NOTE
Normal stress test last year    Continue atorvastatin 40 mg daily, aspirin 81 mg daily recheck in 6 months

## 2022-01-24 NOTE — ASSESSMENT & PLAN NOTE
Lab Results   Component Value Date    HGBA1C 8 5 (H) 01/18/2022   Sees endocrinology, they made recent changes to his basal insulin  Trying to get a insulin pump approved

## 2022-01-24 NOTE — PROGRESS NOTES
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[address] )  Stable, follow-up with neurology as scheduled    Mixed hyperlipidemia due to type 2 diabetes mellitus ([facility] )    Lab Results   Component Value Date    HGBA1C 8 5 (H) 01/18/2022   Stable on atorvastatin 40 mg daily  Continue same  Recheck 6 months    History of recurrent deep vein thrombosis (DVT)  Lifetime anticoagulation  May stop Xarelto 5 days prior to surgery  Chronic gastritis without bleeding  Continue pantoprazole 20 mg daily  Recheck in 6 months    Bilateral impacted cerumen  Cerumen removed with minimal difficulty using cerumen loop  Diagnoses and all orders for this visit:    Preoperative examination    Primary osteoarthritis of right hip    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), 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[facility] )    Arteriosclerosis of coronary artery    Thrombocytopenia ([place] [address] )    Mixed hyperlipidemia due to type 2 diabetes mellitus ([place] [address] )    Polyneuropathy    Old cerebrovascular accident (CVA) without late effect    History of recurrent deep vein thrombosis (DVT)    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[facility] )    Chronic gastritis without bleeding, unspecified gastritis type    Erectile dysfunction of non-organic origin  -     sildenafil (VIAGRA) 100 mg tablet; Take 1 tablet (100 mg total) by mouth daily as needed for erectile dysfunction    Recurrent cold sores  -     acyclovir (ZOVIRAX) 200 mg capsule;  Take 1 capsule (200 mg total) by mouth 5 (five) times a day for 5 days    Bilateral impacted cerumen  -     Ear cerumen removal          Subjective:   Chief Complaint   Patient presents with    Pre-op Exam     02/17/2022 right hip Dr Melita Starkey          Patient ID: Emily Lopez is a 64 y o  male  He is here for preoperative examination prior to right hip replacement  Patient has had intermittent pain in the hip for years, recently saw Orthopedic surgery x-ray showed advanced degeneration  Oral medications are severely limited with his history of chronic gastritis and his chronic anticoagulation  He decided that he wanted to have replacement and is here to proceed  He has had multiple surgical procedures done in the past and has never had an adverse reaction to general anesthesia or sedation  He denies any current chest pain, dyspnea on exertion, or other cardiovascular symptoms  He denies any febrile or respiratory illnesses recently  He saw Cardiology last year with a normal stress test and has not had any symptoms since  The following portions of the patient's history were reviewed and updated as appropriate: allergies, current medications, past family history, past medical history, past social history, past surgical history and problem list     Review of Systems   Constitutional: Negative for appetite change, chills, diaphoresis, fatigue, fever and unexpected weight change  HENT: Positive for hearing loss ( pressure in his ears similar to prior episodes of impacted cerumen)  Negative for congestion, ear pain, nosebleeds, postnasal drip, rhinorrhea, sinus pressure, sore throat, tinnitus and trouble swallowing  Eyes: Negative for photophobia, pain, discharge, redness, itching and visual disturbance  Respiratory: Negative for cough, chest tightness, shortness of breath and wheezing  Cardiovascular: Negative for chest pain, palpitations and leg swelling          Denies orthopnea , dyspnea on exertion   Gastrointestinal: Negative for abdominal distention, abdominal pain, blood in stool, constipation, diarrhea, nausea and vomiting  Endocrine: Negative  Genitourinary: Negative for difficulty urinating, dysuria, flank pain, frequency, hematuria and urgency  Denies nocturia , erectile dysfunction   Musculoskeletal: Positive for arthralgias and gait problem  Negative for back pain, joint swelling and myalgias  Skin: Negative for pallor, rash and wound  Denies skin lesions   Allergic/Immunologic: Negative for environmental allergies, food allergies and immunocompromised state  Neurological: Positive for dizziness (Chronic, stable) and weakness (Chronic, stable)  Negative for tremors, seizures, syncope, speech difficulty, numbness and headaches  Hematological: Negative for adenopathy  Does not bruise/bleed easily  Psychiatric/Behavioral: Negative for behavioral problems, confusion, sleep disturbance and suicidal ideas  The patient is not nervous/anxious  Objective:      /62   Temp 97 8 °F (36 6 °C)   Ht 5' 8" (1 727 m)   Wt 74 6 kg (164 lb 6 4 oz)   BMI 25 00 kg/m²            Physical Exam  Vitals and nursing note reviewed  Constitutional:       Appearance: He is well-developed  HENT:      Head: Normocephalic and atraumatic  Right Ear: Tympanic membrane and ear canal normal  There is impacted cerumen  Left Ear: Tympanic membrane and ear canal normal  There is impacted cerumen  Nose: Nose normal    Eyes:      Conjunctiva/sclera: Conjunctivae normal       Pupils: Pupils are equal, round, and reactive to light  Neck:      Thyroid: No thyromegaly  Vascular: No JVD  Trachea: No tracheal deviation  Cardiovascular:      Rate and Rhythm: Normal rate and regular rhythm  Heart sounds: No murmur heard  Pulmonary:      Effort: Pulmonary effort is normal       Breath sounds: Normal breath sounds  No wheezing or rales  Abdominal:      General: Bowel sounds are normal       Palpations: Abdomen is soft  There is no mass  Tenderness: There is no abdominal tenderness  There is no guarding or rebound  Musculoskeletal:         General: No deformity  Cervical back: Normal range of motion and neck supple  Right hip: Tenderness present  Decreased range of motion  Lymphadenopathy:      Cervical: No cervical adenopathy  Skin:     General: Skin is warm and dry  Findings: No lesion or rash  Nails: There is no clubbing  Neurological:      Mental Status: He is alert and oriented to person, place, and time  Cranial Nerves: No cranial nerve deficit  Motor: No abnormal muscle tone  Coordination: Coordination normal       Deep Tendon Reflexes: Reflexes are normal and symmetric  Reflexes normal    Psychiatric:         Judgment: Judgment normal        Ear cerumen removal    Date/Time: 1/24/2022 10:00 AM  Performed by: Naveed Flores DO  Authorized by: Naveed Flores DO   Universal Protocol:  Risks and benefits: risks, benefits and alternatives were discussed  Consent given by: patient  Patient understanding: patient states understanding of the procedure being performed      Patient location: Office  Procedure details:     Location: Bilateral     Procedure type: curette      Equipment used:  Using an otoscope and a cerumen loop, multiple attempts or need bilaterally to remove copious amounts of soft yellow/brown wax  Patient tolerated well  Post-procedure details:     Complication:  None    Hearing quality:  Normal    Patient tolerance of procedure: Tolerated well, no immediate complications  Comments:      Recommend patient use hydrogen peroxide every couple weeks to prevent this from happening again

## 2022-01-24 NOTE — ASSESSMENT & PLAN NOTE
Lab Results   Component Value Date    HGBA1C 8 5 (H) 01/18/2022   Stable on atorvastatin 40 mg daily  Continue same    Recheck 6 months

## 2022-01-25 ENCOUNTER — OFFICE VISIT (OUTPATIENT)
Dept: CARDIOLOGY CLINIC | Facility: CLINIC | Age: 62
End: 2022-01-25
Payer: COMMERCIAL

## 2022-01-25 VITALS
HEIGHT: 68 IN | RESPIRATION RATE: 16 BRPM | SYSTOLIC BLOOD PRESSURE: 118 MMHG | HEART RATE: 52 BPM | BODY MASS INDEX: 24.86 KG/M2 | DIASTOLIC BLOOD PRESSURE: 82 MMHG | WEIGHT: 164 LBS

## 2022-01-25 DIAGNOSIS — I25.10 ARTERIOSCLEROSIS OF CORONARY ARTERY: Primary | ICD-10-CM

## 2022-01-25 DIAGNOSIS — E78.2 MIXED HYPERLIPIDEMIA: ICD-10-CM

## 2022-01-25 DIAGNOSIS — Z01.810 PREOP CARDIOVASCULAR EXAM: ICD-10-CM

## 2022-01-25 PROCEDURE — 99214 OFFICE O/P EST MOD 30 MIN: CPT | Performed by: INTERNAL MEDICINE

## 2022-01-25 PROCEDURE — 1036F TOBACCO NON-USER: CPT | Performed by: INTERNAL MEDICINE

## 2022-01-25 PROCEDURE — 3008F BODY MASS INDEX DOCD: CPT | Performed by: INTERNAL MEDICINE

## 2022-01-25 PROCEDURE — 93000 ELECTROCARDIOGRAM COMPLETE: CPT | Performed by: INTERNAL MEDICINE

## 2022-01-25 NOTE — PROGRESS NOTES
Cardiology Follow Up    Hood Chavira  1960  [id number]  [address] [facility] [address]  [phone]    Reason for visit:  Patient here had time for clearance for right total hip replacement  Has known CAD status post myocardial infarction and stent in 2004 also has history of hyperlipidemia  1  Arteriosclerosis of coronary artery  POCT ECG   2  Mixed hyperlipidemia     3  Preop cardiovascular exam         Interval History:  The patient anticipates having total hip replacement on February 17  Since his last visit here, he denies chest pain or shortness of breath  He does get dizziness which sounds more like vertigo fairly frequently  He denies peripheral edema or palpitations      Patient Active Problem List   Diagnosis    Thrombocytopenia ([facility] )    Arteriosclerosis of coronary artery    Erectile dysfunction of non-organic origin    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), 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[facility] )    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[facility] )    Mixed hyperlipidemia    Vertigo    Polyneuropathy    Chronic gastritis without bleeding    Old cerebrovascular accident (CVA) without late effect    LOJA (dyspnea on exertion)    Vitamin D deficiency    Mixed hyperlipidemia due to type 2 diabetes mellitus ([facility] )    History of recurrent deep vein thrombosis (DVT)    Dysfunction of right rotator cuff    Primary osteoarthritis of right hip    Bilateral impacted cerumen    Recurrent cold sores    Preop cardiovascular exam     Past Medical History:   Diagnosis Date    CAD (coronary artery disease)     Congenital myotonia     [facility]     after tear of gastrocnemus muscle    Diabetes ([facility] )     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[facility] ) 12/06/2017    Pancreatitis     three times    Sleep apnea     not using a C-PAP    Superficial thrombophlebitis      Social History     Socioeconomic History    Marital status: /Civil Union     Spouse name: Not on file    Number of children: Not on file    Years of education: Not on file    Highest education level: Not on file   Occupational History    Not on file   Tobacco Use    Smoking status: Never Smoker    Smokeless tobacco: Never Used   Vaping Use    Vaping Use: Never used   Substance and Sexual Activity    Alcohol use: Yes     Comment: social    Drug use: No    Sexual activity: Not on file   Other Topics Concern    Not on file   Social History Narrative    Consumes 1 cup of tea  And 1 glass of tea per day        Weight loss     Social Determinants of Health     Financial Resource Strain: Not on file   Food Insecurity: Not on file   Transportation Needs: Not on file   Physical Activity: Not on file   Stress: Not on file   Social Connections: Not on file   Intimate Partner Violence: Not on file   Housing Stability: Not on file      Family History   Problem Relation Age of Onset    Brain cancer Mother     Clotting disorder Mother     Heart disease Mother     Cancer Mother     Hyperlipidemia Mother     Coronary artery disease Paternal Uncle      Past Surgical History:   Procedure Laterality Date    CHOLECYSTECTOMY      CIRCUMCISION      Elective    COLONOSCOPY      complete, polyps 2 years ago    CORONARY ANGIOPLASTY WITH STENT PLACEMENT      stent to RCA 2004    INGUINAL HERNIA REPAIR      ORIF RADIAL SHAFT FRACTURE Left     Open Treatment of Multiple Fractures of the Radial Shaft    ORIF ULNAR / RADIAL SHAFT FRACTURE Left     Open Treatment of Multiple Fractures of the Ulnar Shaft    TONSILLECTOMY AND ADENOIDECTOMY         Current Outpatient Medications:     acyclovir (ZOVIRAX) 200 mg capsule, Take 1 capsule (200 mg total) by mouth 5 (five) times a day for 5 days, Disp: 25 capsule, Rfl: 0    aspirin (ECOTRIN LOW STRENGTH) 81 mg EC tablet, Take 81 mg by mouth daily, Disp: , Rfl:     atorvastatin (LIPITOR) 40 mg tablet, TAKE 1 TABLET BY MOUTH EVERY DAY, Disp: 90 tablet, Rfl: 1    Continuous Blood Gluc Sensor (FreeStyle Nehemias 14 Day Sensor) MISC, USE AS DIRECTED , CHANGING EVERY 14 DAYS , Disp: , Rfl:     insulin glargine (LANTUS SOLOSTAR) 100 units/mL injection pen, Take 24 units in AM, Disp: 5 pen, Rfl: 1    insulin lispro (HUMALOG KWIKPEN) 100 units/mL injection pen, Inject 10units TID +scale 1 unit for every 50 over 150 mg/dl)150-200 = 1; 201-249 = 2; 250-300 = 3; 301-349 = 4; > 350 = 5, Disp: 5 pen, Rfl: 1    Insulin Pen Needle 32G X 4 MM MISC, Use to inject insulin 3 times daily, Disp: 300 each, Rfl: 1    pantoprazole (PROTONIX) 20 mg tablet, TAKE 1 TABLET BY MOUTH EVERY DAY, Disp: 90 tablet, Rfl: 1    sildenafil (VIAGRA) 100 mg tablet, Take 1 tablet (100 mg total) by mouth daily as needed for erectile dysfunction, Disp: 10 tablet, Rfl: 1    Xarelto 20 MG tablet, TAKE 1 TABLET BY MOUTH EVERY DAY WITH BREAKFAST, Disp: 90 tablet, Rfl: 1  No Known Allergies    Review of Systems:  Review of Systems   Constitutional: Positive for fatigue  Negative for activity change, appetite change and unexpected weight change  Respiratory: Negative for cough, chest tightness, shortness of breath and wheezing  Cardiovascular: Negative for chest pain, palpitations and leg swelling  Gastrointestinal: Negative for abdominal pain, blood in stool, constipation and diarrhea  Genitourinary: Negative for dysuria, frequency, hematuria and urgency  Musculoskeletal: Positive for arthralgias  Negative for back pain, gait problem and joint swelling  Neurological: Positive for dizziness  Negative for speech difficulty, light-headedness and headaches  Psychiatric/Behavioral: Negative for agitation, behavioral problems, confusion and decreased concentration         Physical Exam:  Vitals:    01/25/22 1450   BP: 118/82   Pulse: (!) 52   Resp: 16   Weight: 74 4 kg (164 lb)   Height: 5' 8" (1 727 m)       Physical Exam  Constitutional:       General: He is not in acute distress  Appearance: He is obese  He is not ill-appearing  HENT:      Head: Normocephalic and atraumatic  Mouth/Throat:      Mouth: Mucous membranes are moist       Pharynx: No oropharyngeal exudate or posterior oropharyngeal erythema  Eyes:      General: No scleral icterus  Conjunctiva/sclera: Conjunctivae normal    Neck:      Thyroid: No thyroid mass or thyromegaly  Vascular: Normal carotid pulses  No carotid bruit or JVD  Cardiovascular:      Rate and Rhythm: Regular rhythm  Bradycardia present  Pulses: Normal pulses  Heart sounds: No murmur heard  No friction rub  No gallop  Pulmonary:      Breath sounds: Normal breath sounds  No wheezing, rhonchi or rales  Abdominal:      Palpations: Abdomen is soft  There is no hepatomegaly, splenomegaly or mass  Tenderness: There is no abdominal tenderness  Musculoskeletal:         General: No swelling or deformity  Skin:     General: Skin is warm  Coloration: Skin is not jaundiced or pale  Findings: No bruising, erythema, lesion or rash  Neurological:      General: No focal deficit present  Mental Status: He is alert and oriented to person, place, and time  Cranial Nerves: No cranial nerve deficit  Sensory: No sensory deficit  Motor: No weakness  Psychiatric:         Mood and Affect: Mood normal          Behavior: Behavior normal          Thought Content: Thought content normal          Judgment: Judgment normal          Discussion/Summary:  1  CAD status post remote stent of the right coronary artery in 2004  Negative Myoview stress test in March of last year  Patient on low-dose aspirin  Having no angina  2  Hyperlipidemia  Patient on atorvastatin 40 mg daily  LDL cholesterol 91 with HDL cholesterol 41 and triglycerides 109  Generally has LDL cholesterol is under 70 and this is a bit of an outlying test likely due to dietary differences  No change for now  3   Preoperative clearance for total hip replacement  Patient low risk for upcoming surgery  I feel patient represents low risk for surgery  He may stop Xarelto which he takes for DVT prevention after his last dose the evening of 02/14/2022  He will need some form of systemic anticoagulation postoperatively to prevent DVT  The preventative dose for Eliquis would be 10 mg daily to be transition back to 20 mg daily when the surgeon felt it would be safe to do this  If it is felt the bleeding risk of Xarelto at 10 mg is too high he should get Lovenox 40 mg subQ daily until it is felt he can start his Xarelto again  4  Hx DVT    remote    on xarelto    see above comments    FU one year with associate    Corinna Yoon MD

## 2022-02-04 ENCOUNTER — TELEPHONE (OUTPATIENT)
Dept: FAMILY MEDICINE CLINIC | Facility: CLINIC | Age: 62
End: 2022-02-04

## 2022-02-04 ENCOUNTER — TELEMEDICINE (OUTPATIENT)
Dept: FAMILY MEDICINE CLINIC | Facility: CLINIC | Age: 62
End: 2022-02-04
Payer: COMMERCIAL

## 2022-02-04 DIAGNOSIS — U07.1 COVID-19: Primary | ICD-10-CM

## 2022-02-04 PROCEDURE — 99213 OFFICE O/P EST LOW 20 MIN: CPT | Performed by: FAMILY MEDICINE

## 2022-02-04 RX ORDER — PREDNISONE 10 MG/1
TABLET ORAL
Qty: 15 TABLET | Refills: 0 | Status: SHIPPED | OUTPATIENT
Start: 2022-02-04 | End: 2022-04-21

## 2022-02-04 RX ORDER — PREDNISONE 10 MG/1
TABLET ORAL
Qty: 15 TABLET | Refills: 0 | Status: SHIPPED | OUTPATIENT
Start: 2022-02-04 | End: 2022-02-04

## 2022-02-04 NOTE — PROGRESS NOTES
COVID-19 Outpatient Progress Note    Assessment/Plan:    Problem List Items Addressed This Visit     None      Visit Diagnoses     COVID-19    -  Primary    Relevant Medications    predniSONE 10 mg tablet         Disposition:     Discussed symptom directed medication options with patient  Discussed vitamin D, vitamin C, and/or zinc supplementation with patient  [address]    Your healthcare provider and/or public health staff have evaluated you and have determined that you do not need to remain in the hospital at this time   At this time you can be isolated at home where you will be monitored by staff from your local or state health department  You should carefully follow the prevention and isolation steps below until a healthcare provider or local or state health department says that you can return to your normal activities  Stay home except to get medical care    People who are mildly ill with COVID-19 are able to isolate at home during their illness  You should restrict activities outside your home, except for getting medical care  Do not go to work, school, or public areas  Avoid using public transportation, ride-sharing, or taxis  Separate yourself from other people and animals in your home    People: As much as possible, you should stay in a specific room and away from other people in your home  Also, you should use a separate bathroom, if available  Animals: You should restrict contact with pets and other animals while you are sick with COVID-19, just like you would around other people  Although there have not been reports of pets or other animals becoming sick with COVID-19, it is still recommended that people sick with COVID-19 limit contact with animals until more information is known about the virus  When possible, have another member of your household care for your animals while you are sick   If you are sick with COVID-19, avoid contact with your pet, including petting, snuggling, being kissed or licked, and sharing food  If you must care for your pet or be around animals while you are sick, wash your hands before and after you interact with pets and wear a facemask  See COVID-19 and Animals for more information  Call ahead before visiting your doctor    If you have a medical appointment, call the healthcare provider and tell them that you have or may have COVID-19  This will help the healthcare providers office take steps to keep other people from getting infected or exposed  Wear a facemask    You should wear a facemask when you are around other people (e g , sharing a room or vehicle) or pets and before you enter a healthcare providers office  If you are not able to wear a facemask (for example, because it causes trouble breathing), then people who live with you should not stay in the same room with you, or they should wear a facemask if they enter your room  Cover your coughs and sneezes    Cover your mouth and nose with a tissue when you cough or sneeze  Throw used tissues in a lined trash can  Immediately wash your hands with soap and water for at least 20 seconds or, if soap and water are not available, clean your hands with an alcohol-based hand  that contains at least 60% alcohol  Clean your hands often    Wash your hands often with soap and water for at least 20 seconds, especially after blowing your nose, coughing, or sneezing; going to the bathroom; and before eating or preparing food  If soap and water are not readily available, use an alcohol-based hand  with at least 60% alcohol, covering all surfaces of your hands and rubbing them together until they feel dry  Soap and water are the best option if hands are visibly dirty  Avoid touching your eyes, nose, and mouth with unwashed hands      Avoid sharing personal household items    You should not share dishes, drinking glasses, cups, eating utensils, towels, or bedding with other people or pets in your home  After using these items, they should be washed thoroughly with soap and water  Clean all high-touch surfaces everyday    High touch surfaces include counters, tabletops, doorknobs, bathroom fixtures, toilets, phones, keyboards, tablets, and bedside tables  Also, clean any surfaces that may have blood, stool, or body fluids on them  Use a household cleaning spray or wipe, according to the label instructions  Labels contain instructions for safe and effective use of the cleaning product including precautions you should take when applying the product, such as wearing gloves and making sure you have good ventilation during use of the product  Monitor your symptoms    Seek prompt medical attention if your illness is worsening (e g , difficulty breathing)  Before seeking care, call your healthcare provider and tell them that you have, or are being evaluated for, COVID-19  Put on a facemask before you enter the facility  These steps will help the healthcare providers office to keep other people in the office or waiting room from getting infected or exposed  Ask your healthcare provider to call the local or [facility] health department  Persons who are placed under active monitoring or facilitated self-monitoring should follow instructions provided by their local health department or occupational health professionals, as appropriate  If you have a medical emergency and need to call 911, notify the dispatch personnel that you have, or are being evaluated for COVID-19  If possible, put on a facemask before emergency medical services arrive  Discontinuing home isolation    Patients with confirmed COVID-19 should remain under home isolation precautions until the following conditions are met:    They have had no fever for at least 24 hours (that is one full day of no fever without the use medicine that reduces fevers)  AND  other symptoms have improved (for example, when their cough or shortness of breath have improved)  AND  If had mild or moderate illness, at least 10 days have passed since their symptoms first appeared or if severe illness (needed oxygen) or immunosuppressed, at least 20 days have passed since symptoms first appeared  Patients with confirmed COVID-19 should also notify close contacts (including their workplace) and ask that they self-quarantine  Currently, close contact is defined as being within 6 feet for 15 minutes or more from the period 24 hours starting 48 hours before symptom onset to the time at which the patient went into isolation   Close contacts of patients diagnosed with COVID-19 should be instructed by the patient to self-quarantine for 14 days from the last time of their last contact with the patient  Source: [employer]      I have spent 10 minutes directly with the patient  Greater than 50% of this time was spent in counseling/coordination of care regarding: diagnostic results, prognosis, risks and benefits of treatment options, instructions for management and patient and family education  Needs to quarantine at home through the weekend, may end quarantine on the 7th  But will need to wear his mask for additional 5 days  He knows to be quarantine at home for now  Will send in the medication discussed, patient already takes some vitamin-D he will get additional see  Encounter provider Raghu Sharpe DO    Provider located at [address] [place] [address] [address]  [facility] [address] [phone]    Recent Visits  No visits were found meeting these conditions    Showing recent visits within past 7 days and meeting all other requirements  Today's Visits  Date Type Provider Dept   02/04/22 [address], [address] Primary Care   02/04/22 Telephone Francisco Javier [facility] Primary Care   Showing today's visits and meeting all other requirements  Future Appointments  No visits were found meeting these conditions  Showing future appointments within next 150 days and meeting all other requirements       Patient agrees to participate in a virtual check in via telephone or video visit instead of presenting to the office to address urgent/immediate medical needs  Patient is aware this is a billable service  After connecting through [facility], the patient was identified by name and date of birth  Anushka Romero was informed that this was a telemedicine visit and that the exam was being conducted confidentially over secure lines  My office door was closed  No one else was in the room  Anushka Romero acknowledged consent and understanding of privacy and security of the telemedicine visit  I informed the patient that I have reviewed his record in Epic and presented the opportunity for him to ask any questions regarding the visit today  The patient agreed to participate  Verification of patient location:  Patient is located in the following state in which I hold an active license: [place]    Subjective:   Anushka Romero is a 64 y o  male who is concerned about COVID-19  Patient's symptoms include fatigue, malaise, nasal congestion, rhinorrhea, sore throat, cough and headache  Patient denies fever, chills, anosmia, loss of taste, shortness of breath, chest tightness, abdominal pain, nausea, vomiting, diarrhea and myalgias       - Date of symptom onset: 2/1/2022      COVID-19 vaccination status: Fully vaccinated with booster    Exposure:   Contact with a person who is under investigation (PUI) for or who is positive for COVID-19 within the last 14 days?: Yes    Hospitalized recently for fever and/or lower respiratory symptoms?: No      Currently a healthcare worker that is involved in direct patient care?: No      Works in a special setting where the risk of COVID-19 transmission may be high? (this may include long-term care, correctional and long term facilities; homeless shelters; assisted-living facilities and group homes ): No      Resident in a special setting where the risk of COVID-19 transmission may be high? 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    Pancreatitis     three times    Sleep apnea     not using a C-PAP    Superficial thrombophlebitis      Past Surgical History:   Procedure Laterality Date    CHOLECYSTECTOMY      CIRCUMCISION      Elective    COLONOSCOPY      complete, polyps 2 years ago    CORONARY ANGIOPLASTY WITH STENT PLACEMENT      stent to RCA 2004    INGUINAL HERNIA REPAIR      ORIF RADIAL SHAFT FRACTURE Left     Open Treatment of Multiple Fractures of the Radial Shaft    ORIF ULNAR / RADIAL SHAFT FRACTURE Left     Open Treatment of Multiple Fractures of the Ulnar Shaft    TONSILLECTOMY AND ADENOIDECTOMY       Current Outpatient Medications   Medication Sig Dispense Refill    acyclovir (ZOVIRAX) 200 mg capsule Take 1 capsule (200 mg total) by mouth 5 (five) times a day for 5 days 25 capsule 0    aspirin (ECOTRIN LOW STRENGTH) 81 mg EC tablet Take 81 mg by mouth daily      atorvastatin (LIPITOR) 40 mg tablet TAKE 1 TABLET BY MOUTH EVERY DAY 90 tablet 1    Continuous Blood Gluc Sensor (FreeStyle Nehemias 14 Day Sensor) MISC USE AS DIRECTED , CHANGING EVERY 14 DAYS        insulin glargine (LANTUS SOLOSTAR) 100 units/mL injection pen Take 24 units in AM 5 pen 1    insulin lispro (HUMALOG KWIKPEN) 100 units/mL injection pen Inject 10units TID +scale 1 unit for every 50 over 150 mg/dl)150-200 = 1; 201-249 = 2; 250-300 = 3; 301-349 = 4; > 350 = 5 5 pen 1    Insulin Pen Needle 32G X 4 MM MISC Use to inject insulin 3 times daily 300 each 1    pantoprazole (PROTONIX) 20 mg tablet TAKE 1 TABLET BY MOUTH EVERY DAY 90 tablet 1    predniSONE 10 mg tablet 5 x 1 day, 4 x1 day, 3 x 1 day,2 x1 day,  1 x 1 day 15 tablet 0    sildenafil (VIAGRA) 100 mg tablet Take 1 tablet (100 mg total) by mouth daily as needed for erectile dysfunction 10 tablet 1    Xarelto 20 MG tablet TAKE 1 TABLET BY MOUTH EVERY DAY WITH BREAKFAST 90 tablet 1     No current facility-administered medications for this visit  No Known Allergies    Review of Systems   Constitutional: Positive for activity change and fatigue  Negative for appetite change, chills and fever  HENT: Positive for congestion, rhinorrhea and sore throat  Respiratory: Positive for cough  Negative for chest tightness and shortness of breath  Gastrointestinal: Negative for abdominal pain, diarrhea, nausea and vomiting  Musculoskeletal: Negative for myalgias  Neurological: Positive for headaches  Objective: There were no vitals filed for this visit  Physical Exam  Vitals and nursing note reviewed  Constitutional:       Appearance: He is well-developed  He is not ill-appearing  HENT:      Head: Normocephalic and atraumatic  Eyes:      Conjunctiva/sclera: Conjunctivae normal    Pulmonary:      Effort: Pulmonary effort is normal    Neurological:      Mental Status: He is alert and oriented to person, place, and time  Psychiatric:         Mood and Affect: Mood normal          Judgment: Judgment normal          VIRTUAL VISIT DISCLAIMER    Shena Anton verbally agrees to participate in [employer]   Pt is aware that Virtual Care Services could be limited without vital signs or the ability to perform a full hands-on physical exam  Otoniel Martinez understands he or the provider may request at any time to terminate the video visit and request the patient to seek care or treatment in person

## 2022-02-08 ENCOUNTER — TELEPHONE (OUTPATIENT)
Dept: CARDIOLOGY CLINIC | Facility: CLINIC | Age: 62
End: 2022-02-08

## 2022-02-08 NOTE — TELEPHONE ENCOUNTER
Wide called asking when he can stop his ASA prior to hip replacement  Spke to Dr Adry Almaguer per Dr Adry Almaguer can stop one week prior  Called wife with his response

## 2022-02-21 ENCOUNTER — TELEPHONE (OUTPATIENT)
Dept: CARDIOLOGY CLINIC | Facility: CLINIC | Age: 62
End: 2022-02-21

## 2022-02-21 NOTE — TELEPHONE ENCOUNTER
Phone call from wife  Patient had total hip replacement surgery 2/17/22  Patient was instructed to restart Xarelto 10mg through this Friday, then return to Xarelto 20mg starting Saturday  Wife questions aspirin restart  Has not taken aspirin since 2/13/22  Surgeon suggested she contact cardiology  Please advise

## 2022-02-21 NOTE — TELEPHONE ENCOUNTER
No need to rush going back on aspirin   Marco Antonio Sapp  would restart next Monday 2/28  Please call to advise to start next monday

## 2022-02-21 NOTE — TELEPHONE ENCOUNTER
Phone call to wife per Dr Elinor Alexandre  Wait until Monday 2/28/22 to restart aspirin  Wife acknowledges and understands

## 2022-03-08 ENCOUNTER — EVALUATION (OUTPATIENT)
Dept: PHYSICAL THERAPY | Facility: REHABILITATION | Age: 62
End: 2022-03-08
Payer: COMMERCIAL

## 2022-03-08 DIAGNOSIS — Z96.641 STATUS POST RIGHT HIP REPLACEMENT: Primary | ICD-10-CM

## 2022-03-08 PROCEDURE — 97110 THERAPEUTIC EXERCISES: CPT | Performed by: PHYSICAL THERAPIST

## 2022-03-08 PROCEDURE — 97161 PT EVAL LOW COMPLEX 20 MIN: CPT | Performed by: PHYSICAL THERAPIST

## 2022-03-08 NOTE — LETTER
2022    Felix Garza MD  C[facility]    Patient: Carlos Enrique Villar   YOB: 1960   Date of Visit: 3/8/2022     Encounter Diagnosis     ICD-10-CM    1  Status post right hip replacement  [id number]        Dear Dr Emerita Pepe:    Thank you for your recent referral of Carlos Enrique Villar  Please review the attached evaluation summary from Otoniel's recent visit  Please verify that you agree with the plan of care by signing the attached order  If you have any questions or concerns, please do not hesitate to call  I sincerely appreciate the opportunity to share in the care of one of your patients and hope to have another opportunity to work with you in the near future  Sincerely,    Rodney Hicks, PT      Referring Provider:      I certify that I have read the below Plan of Care and certify the need for these services furnished under this plan of treatment while under my care  MD Dinesh Manzanares [facility] [id number]  [facility]  [phone]  Via Fax: [phone]          PT Evaluation     Today's date: 3/8/2022  Patient name: Carlos Enrique Villar  : 1960  MRN: [id number]  Referring provider: Mallie Primrose, MD  Dx:   Encounter Diagnosis     ICD-10-CM    1  Status post right hip replacement  [id number]                   Assessment  Assessment details: 3/8/2022  Pt is a pleasant 64year old male that presents to outpatient physical therapy s/p R MALENA  Pt demonstrates decreased ROM, decreased strengthen, activity intolerance, and impaired functional mobility  Pt expressed understanding following review of post-op protocol  Pt would benefit from skilled physical therapy to address noted impairments, meet patient's goals, and to return to PLOF     Impairments: abnormal coordination, abnormal muscle tone, abnormal or restricted ROM, activity intolerance and pain with function    Symptom irritability: lowUnderstanding of Dx/Px/POC: good   Prognosis: good    Goals  STGs:   1  Pt will demonstrate increased ROM by at least 25% within 3 weeks  2  Pt will demonstrate increased strength by at least 1/2 grade within 3 weeks  3  Pt be able to perform light aerobic activities such as walking for at least 30 minutes without pain within 3 weeks  LTGs:  1  Pt will be able to perform all IADLs without AD without limitation or pain upon discharge  2  Pt will be independent with HEP upon discharge  3  Pt will report being able to perform all activities with babysitting grandchildren without pain or limitation upon discharge  Plan  Patient would benefit from: PT eval and skilled physical therapy  Planned modality interventions: cryotherapy, TENS and electrical stimulation/Russian stimulation  Planned therapy interventions: IADL retraining, manual therapy, massage, neuromuscular re-education, patient education, therapeutic exercise, therapeutic activities, stretching, strengthening, functional ROM exercises, flexibility, home exercise program and balance  Frequency: 2x week  Duration in visits: 16  Duration in weeks: 8  Treatment plan discussed with: patient        Subjective Evaluation    History of Present Illness  Mechanism of injury: 3/8/2022  Pt presents s/p R MALENA on 2/17/2022  Pt reports that he received a posterior approach arthroplasty  He reports that he has been able to ambulate around his home with the cane over the last week without issues  He states that he was able to ambulate around the mall for approx  20 minutes before he started to feel sore  Reports that most of his pain occurs at night  Describes symptoms as throbbing and are mostly located at his incision  Symptoms occasionally occur at his knee and anterior leg  Denies numbness/tingling  Denies redness or increase of skin temp     AGGS: standing for long periods of time, walking  EASES: medication, light exercises   Goals: return to day to day with pain, be able to watch grandchildren  Pain  Current pain rating: 3  At best pain rating: 3  At worst pain ratin    Patient Goals  Patient goals for therapy: increased strength, decreased pain and return to sport/leisure activities          Objective     Palpation     Additional Palpation Details  Tenderness with palpation to R hip abductors and L quad    Passive Range of Motion     Right Hip   Flexion: 90 degrees   [facility]  External rotation (90/90): [facility]EM[facility]  Internal rotation (90/90): 0 degrees     Additional Passive Range of Motion Details  PROM performed pre post-op protocol   Pt reported mild stretch with R hip abd and ER    Strength/Myotome Testing     Additional Strength Details  R knee ext: 4/5  R knee flex: 4/5  R dorsiflex: 4+/5  R plantarflex: 4+/5    General Comments:      Hip Comments   (-) calf redness/tenderness                Precautions: Posterior MALENA precautions, diabetes, history of CAD      Daily Treatment Diary    Date 3/8            FOTO IE            Re-Eval IE               Manuals    Hip PROM (no add/IR)                                                    Neuro Re-Ed                                                                                                Ther Ex    Supine heel slides              Standing marches             Standing hip ext/abd             Standing Heel raises             Mini squats             Standing knee flexion             Seated LAQ                                                                 Ther Activity    Bike                          Gait Training                              Modalities    Ice PRN                            Evaluation and treatment was performed by student physical therapist, Arturo Hobbs  Session was supervised throughout by [employer]  Attestation signed by [employer], PT at 3/8/2022 11:28 AM:  I supervised the visit  We discussed the case to ensure appropriate continuation and progression of care and I reviewed the documentation

## 2022-03-08 NOTE — PROGRESS NOTES
PT Evaluation     Today's date: 3/8/2022  Patient name: Emily Lopez  : 1960  MRN: [id number]  Referring provider: Karina Montes De Oca MD  Dx:   Encounter Diagnosis     ICD-10-CM    1  Status post right hip replacement  Z96 641                   Assessment  Assessment details: 3/8/2022  Pt is a pleasant 64year old male that presents to outpatient physical therapy s/p R MALENA  Pt demonstrates decreased ROM, decreased strengthen, activity intolerance, and impaired functional mobility  Pt expressed understanding following review of post-op protocol  Pt would benefit from skilled physical therapy to address noted impairments, meet patient's goals, and to return to PLOF  Impairments: abnormal coordination, abnormal muscle tone, abnormal or restricted ROM, activity intolerance and pain with function    Symptom irritability: lowUnderstanding of Dx/Px/POC: good   Prognosis: good    Goals  STGs:   1  Pt will demonstrate increased ROM by at least 25% within 3 weeks  2  Pt will demonstrate increased strength by at least 1/2 grade within 3 weeks  3  Pt be able to perform light aerobic activities such as walking for at least 30 minutes without pain within 3 weeks  LTGs:  1  Pt will be able to perform all IADLs without AD without limitation or pain upon discharge  2  Pt will be independent with HEP upon discharge  3  Pt will report being able to perform all activities with babysitting grandchildren without pain or limitation upon discharge       Plan  Patient would benefit from: PT eval and skilled physical therapy  Planned modality interventions: cryotherapy, TENS and electrical stimulation/Russian stimulation  Planned therapy interventions: IADL retraining, manual therapy, massage, neuromuscular re-education, patient education, therapeutic exercise, therapeutic activities, stretching, strengthening, functional ROM exercises, flexibility, home exercise program and balance  Frequency: 2x week  Duration in visits: 16  Duration in weeks: 8  Treatment plan discussed with: patient        Subjective Evaluation    History of Present Illness  Mechanism of injury: 3/8/2022  Pt presents s/p R MALENA on 2022  Pt reports that he received a posterior approach arthroplasty  He reports that he has been able to ambulate around his home with the cane over the last week without issues  He states that he was able [place] ambulate around the mall for approx  20 minutes before he started to feel sore  Reports that most of his pain occurs at night  Describes symptoms as throbbing and are mostly located at his incision  Symptoms occasionally occur at his knee and anterior leg  Denies numbness/tingling  Denies redness or increase of skin temp  AGGS: standing for long periods of time, walking  EASES: medication, light exercises   Goals: return to day to day with pain, be able to watch grandchildren  Pain  Current pain rating: 3  At best pain rating: 3  At worst pain ratin    Patient Goals  Patient goals for therapy: increased strength, decreased pain and return to sport/leisure activities          Objective     Palpation     Additional Palpation Details  Tenderness with palpation to R hip abductors and L quad    Passive Range of Motion     Right Hip   Flexion: 90 degrees   Abduction: [facility]  External rotation (90/90):  [facility]  Internal rotation (90/90): 0 degrees     Additional Passive Range of Motion Details  PROM performed pre post-op protocol   Pt reported mild stretch with R hip abd and ER    Strength/Myotome Testing     Additional Strength Details  R knee ext: 4/5  R knee flex: 4/5  R dorsiflex: 4+/5  R plantarflex: 4+/5    General Comments:      Hip Comments   (-) calf redness/tenderness                Precautions: Posterior MALENA precautions, diabetes, history of CAD      Daily Treatment Diary    Date 3/8            FOTO IE            Re-Eval IE               Manuals    Hip PROM (no add/IR) Neuro Re-Ed                                                                                                Ther Ex    Supine heel slides              Standing marches             Standing hip ext/abd             Standing Heel raises             Mini squats             Standing knee flexion             Seated LAQ                                                                 Ther Activity    Bike                          Gait Training                              Modalities    Ice PRN                            Evaluation and treatment was performed by student physical therapistSamuel  Session was supervised throughout by Jerel Maria

## 2022-03-09 ENCOUNTER — OFFICE VISIT (OUTPATIENT)
Dept: PHYSICAL THERAPY | Facility: REHABILITATION | Age: 62
End: 2022-03-09
Payer: COMMERCIAL

## 2022-03-09 DIAGNOSIS — Z96.641 STATUS POST RIGHT HIP REPLACEMENT: Primary | ICD-10-CM

## 2022-03-09 PROCEDURE — 97110 THERAPEUTIC EXERCISES: CPT

## 2022-03-09 PROCEDURE — 97140 MANUAL THERAPY 1/> REGIONS: CPT

## 2022-03-09 NOTE — PROGRESS NOTES
Daily Note     Today's date: 3/9/2022  Patient name: Olu Velazquez  : 1960  MRN: [id number]  Referring provider: Chato Adkins MD  Dx:   Encounter Diagnosis     ICD-10-CM    1  Status post right hip replacement  Z96 641                   Subjective: pt reports with c/o soreness in hip prior to beginning today  Objective: See treatment diary below      Assessment: Tolerated treatment well and without complaints  Good response with addition of exercises and manuals  Patient demonstrated fatigue post treatment, exhibited good technique with therapeutic exercises and would benefit from continued PT      Plan: Continue per plan of care  Progress treatment as tolerated         Precautions: Posterior MALENA precautions, diabetes, history of CAD      Daily Treatment Diary    Date 3/8 3/9           FOTO IE            Re-Eval IE               Manuals    Hip PROM (no add/IR)  TE                                                  Neuro Re-Ed                                                                                                Ther Ex    Supine heel slides   5"2x10           Standing marches  2x10           Standing hip ext/abd  2x10           Standing Heel raises  2x10           Mini squats  2x10           Standing knee flexion  2x10           Seated LAQ  5" 2x10                                                               Ther Activity    Bike  5 min                        Gait Training                              Modalities    Ice PRN

## 2022-03-14 ENCOUNTER — OFFICE VISIT (OUTPATIENT)
Dept: PHYSICAL THERAPY | Facility: REHABILITATION | Age: 62
End: 2022-03-14
Payer: COMMERCIAL

## 2022-03-14 DIAGNOSIS — Z96.641 STATUS POST RIGHT HIP REPLACEMENT: Primary | ICD-10-CM

## 2022-03-14 PROCEDURE — 97140 MANUAL THERAPY 1/> REGIONS: CPT

## 2022-03-14 PROCEDURE — 97110 THERAPEUTIC EXERCISES: CPT

## 2022-03-14 NOTE — PROGRESS NOTES
Daily Note     Today's date: 3/14/2022  Patient name: Jesenia Zelaya  : 1960  MRN: [id number]  Referring provider: Shaggy Balderas MD  Dx:   Encounter Diagnosis     ICD-10-CM    1  Status post right hip replacement  Z96 641                   Subjective:  Natalia Kemp reports that his hip is feeling good and he did sleep better last night  He notes sleeping is the most challenging  He notes going down steps is fine but going up is a bit of a challenge because his quad is weak  Objective: See treatment diary below      Assessment: Patient tolerated treatment well  Patient performed ex and received manual therapy as noted  Progressed ex as noted with appropriate challenge and good patient tolerance  Patient ambulated through the clinic without a AD with good steadiness noted  Patient would benefit from continued PT intervention to address deficits and attain set goals  Plan: Continue per plan of care        Precautions: Posterior MALENA precautions, diabetes, history of CAD      Daily Treatment Diary    Date 3/8 3/9 314          FOTO IE            Re-Eval IE               Manuals    Hip PROM (no add/IR)  TE CC                                                 Neuro Re-Ed                                                                                                Ther Ex    Supine heel slides   5"2x10           Standing marches  2x10 2x10 alt          Standing hip ext/abd  2x10 2x10 b/l          Standing Heel raises  2x10 3x10          Mini squats  2x10 3x10          Standing knee flexion  2x10 1#  2x10          Seated LAQ  5" 2x10 1#  5"   2x10          Side stepping   10 ft x4 laps                                                 Ther Activity    Bike  5 min 8 min                       Gait Training                              Modalities    Ice PRN   x10'

## 2022-03-16 ENCOUNTER — OFFICE VISIT (OUTPATIENT)
Dept: PHYSICAL THERAPY | Facility: REHABILITATION | Age: 62
End: 2022-03-16
Payer: COMMERCIAL

## 2022-03-16 DIAGNOSIS — Z96.641 STATUS POST RIGHT HIP REPLACEMENT: Primary | ICD-10-CM

## 2022-03-16 PROCEDURE — 97110 THERAPEUTIC EXERCISES: CPT

## 2022-03-16 PROCEDURE — 97140 MANUAL THERAPY 1/> REGIONS: CPT

## 2022-03-16 NOTE — PROGRESS NOTES
Daily Note     Today's date: 3/16/2022  Patient name: Miguel Angel Carvajal  : 1960  MRN: [id number]  Referring provider: Becky Mason MD  Dx:   Encounter Diagnosis     ICD-10-CM    1  Status post right hip replacement  Z96 641                   Subjective: pt reports with c/o soreness in anterior thigh for the past several weeks  He denied pain/soreness in hip pre-tx  Objective: See treatment diary below      Assessment: Tolerated treatment well  Good recall and challenge with current exercise program  He noted some soreness in R LE post exercises  Patient demonstrated fatigue post treatment, exhibited good technique with therapeutic exercises and would benefit from continued PT      Plan: Continue per plan of care  Progress treatment as tolerated         Precautions: Posterior MALENA precautions, diabetes, history of CAD      Daily Treatment Diary    Date 3/8 3/9 314 3/16         FOTO IE            Re-Eval IE               Manuals    Hip PROM (no add/IR)  TE CC TE                                                Neuro Re-Ed                                                                                                Ther Ex    Supine heel slides   5"2x10           Standing marches  2x10 2x10 alt 2x10 alt         Standing hip ext/abd  2x10 2x10 b/l          Standing Heel raises  2x10 3x10 3x10         Mini squats  2x10 3x10 3x10         Standing knee flexion  2x10 1#  2x10 1#  2x10         Seated LAQ  5" 2x10 1#  5"   2x10          Side stepping   10 ft x4 laps 10 ft x4 laps                                                Ther Activity    Bike  5 min 8 min 8 min                      Gait Training                              Modalities    Ice PRN   x10' deferred

## 2022-03-21 ENCOUNTER — OFFICE VISIT (OUTPATIENT)
Dept: PHYSICAL THERAPY | Facility: REHABILITATION | Age: 62
End: 2022-03-21
Payer: COMMERCIAL

## 2022-03-21 DIAGNOSIS — Z96.641 STATUS POST RIGHT HIP REPLACEMENT: Primary | ICD-10-CM

## 2022-03-21 PROCEDURE — 97110 THERAPEUTIC EXERCISES: CPT | Performed by: PHYSICAL THERAPIST

## 2022-03-21 PROCEDURE — 97112 NEUROMUSCULAR REEDUCATION: CPT | Performed by: PHYSICAL THERAPIST

## 2022-03-21 NOTE — PROGRESS NOTES
Daily Note     Today's date: 3/21/2022  Patient name: Jadiel Lackey  : 1960  MRN: [id number]  Referring provider: Yanique Gonzales MD  Dx:   Encounter Diagnosis     ICD-10-CM    1  Status post right hip replacement  Z96 641                   Subjective: Pt reports he was having a "really good day" yesterday and moving around well, however, is having more soreness today  Objective: See treatment diary below      Assessment: Tolerated treatment well  Challenged with newly added PREs and progressions  Patient demonstrated fatigue post treatment, exhibited good technique with therapeutic exercises and would benefit from continued PT      Plan: Progress treatment as tolerated         Precautions: Posterior MALENA precautions, diabetes, history of CAD      Daily Treatment Diary    Date 3/8 3/9 314 3/16 3/21        FOTO IE            Re-Eval IE               Manuals    Hip PROM (no add/IR)  TE CC TE JANIA                                               Neuro Re-Ed     Bridges                                                                                            Ther Ex    Supine heel slides   5"2x10   HEP        Standing marches  2x10 2x10 alt 2x10 alt HEP        Standing hip ext/abd  2x10 2x10 b/l  2x10 ea b/l        Standing Heel raises  2x10 3x10 3x10 HEP        Mini squats  2x10 3x10 3x10 HEP        Standing knee flexion  2x10 1#  2x10 1#  2x10 HEP        Seated LAQ  5" 2x10 1#  5"   2x10          Side stepping   10 ft x4 laps 10 ft x4 laps Prashanth         SA leg ext     33# 2x10        SA hamstring curl     33# 2x10                     Ther Activity    Bike  5 min 8 min 8 min TM 5' 1 8        Step ups - fwd, lat     1R 2x10 ea        [place] side step, backward walk     10# x10 ea b/l                     Gait Training                              Modalities    Ice PRN   x10' deferred

## 2022-03-23 ENCOUNTER — OFFICE VISIT (OUTPATIENT)
Dept: PHYSICAL THERAPY | Facility: REHABILITATION | Age: 62
End: 2022-03-23
Payer: COMMERCIAL

## 2022-03-23 DIAGNOSIS — Z96.641 STATUS POST RIGHT HIP REPLACEMENT: Primary | ICD-10-CM

## 2022-03-23 PROCEDURE — 97110 THERAPEUTIC EXERCISES: CPT

## 2022-03-23 PROCEDURE — 97112 NEUROMUSCULAR REEDUCATION: CPT

## 2022-03-28 ENCOUNTER — OFFICE VISIT (OUTPATIENT)
Dept: PHYSICAL THERAPY | Facility: REHABILITATION | Age: 62
End: 2022-03-28
Payer: COMMERCIAL

## 2022-03-28 DIAGNOSIS — Z96.641 STATUS POST RIGHT HIP REPLACEMENT: Primary | ICD-10-CM

## 2022-03-28 PROCEDURE — 97140 MANUAL THERAPY 1/> REGIONS: CPT | Performed by: PHYSICAL THERAPIST

## 2022-03-28 PROCEDURE — 97110 THERAPEUTIC EXERCISES: CPT | Performed by: PHYSICAL THERAPIST

## 2022-03-28 NOTE — PROGRESS NOTES
Daily Note     Today's date: 3/28/2022  Patient name: Ciara Pizarro  : 1960  MRN: [id number]  Referring provider: Vik Huber MD  Dx:   Encounter Diagnosis     ICD-10-CM    1  Status post right hip replacement  Z96 641                   Subjective: Pt comes to therapy denying pain or discomfort  Denies discomfort following last treatment session  Objective: See treatment diary below      Assessment: Tolerated treatment well  Patient demonstrated fatigue post treatment, exhibited good technique with therapeutic exercises and would benefit from continued PT      Plan: Progress treatment as tolerated         Precautions: Posterior MALENA precautions, diabetes, history of CAD      Daily Treatment Diary    Date 3/8 3/9 314 3/16 3/21 3/23 3/28      FOTO IE            Re-Eval IE               Manuals    Hip PROM (no add/IR)  TE CC TE JANIA CC JANIA                                             Neuro Re-Ed                                                         Ther Ex    Supine heel slides   5"2x10   HEP -       Standing marches  2x10 2x10 alt 2x10 alt HEP -       Standing hip ext/abd  2x10 2x10 b/l  2x10 ea b/l 1#  2x10 ea b/l 1#  2x10 ea b/l      Standing Heel raises  2x10 3x10 3x10 HEP -       Mini squats  2x10 3x10 3x10 HEP -       Standing knee flexion  2x10 1#  2x10 1#  2x10 HEP -       Seated LAQ  5" 2x10 1#  5"   2x10          Side stepping   10 ft x4 laps 10 ft x4 laps [place]  -       SA leg ext     33# 2x10 33#  3x10   44#  3x10      SA hamstring curl     33# 2x10 44#  3x10 55#  3x10                   Ther Activity    Bike  5 min 8 min 8 min TM 5' 1 8 TM  1 8  x8' TM  1 8  x8'      Step ups - fwd, lat     1R 2x10 ea 1R  2x10 ea 1R  2x10 ea      Hip hike       0R 2x10 b/l      [place] side step, backward walk     10# x10 ea b/l hema  10#  x10 ea bl hema  10#  x10 ea bl                   Gait Training                              Modalities    Ice PRN   x10' deferred  10'

## 2022-03-30 ENCOUNTER — OFFICE VISIT (OUTPATIENT)
Dept: PHYSICAL THERAPY | Facility: REHABILITATION | Age: 62
End: 2022-03-30
Payer: COMMERCIAL

## 2022-03-30 DIAGNOSIS — Z96.641 STATUS POST RIGHT HIP REPLACEMENT: Primary | ICD-10-CM

## 2022-03-30 PROCEDURE — 97110 THERAPEUTIC EXERCISES: CPT

## 2022-03-30 PROCEDURE — 97140 MANUAL THERAPY 1/> REGIONS: CPT

## 2022-03-30 NOTE — PROGRESS NOTES
Daily Note     Today's date: 3/30/2022  Patient name: Ochoa Cifuentes  : 1960  MRN: [id number]  Referring provider: Froylan Elkins MD  Dx:   Encounter Diagnosis     ICD-10-CM    1  Status post right hip replacement  Z96 641                   Subjective: pt reports soreness/ttp in posterolateral hip, but otherwise no significant pain  He noted difficulty ascending stairs  due to discomfort  Objective: See treatment diary below      Assessment: Tolerated treatment well  Good response with exercises and progressions with no adverse reactions  Decreased soreness in hip following PROM  Patient demonstrated fatigue post treatment, exhibited good technique with therapeutic exercises and would benefit from continued PT      Plan: Continue per plan of care  Progress treatment as tolerated         Precautions: Posterior MALENA precautions, diabetes, history of CAD      Daily Treatment Diary    Date 3/8 3/9 314 3/16 3/21 3/23 3/28 3/30     FOTO IE            Re-Eval IE               Manuals    Hip PROM (no add/IR)  TE CC TE JANIA CC JANIA TE                                            Neuro Re-Ed                                                         Ther Ex    Supine heel slides   5"2x10   HEP -       Standing marches  2x10 2x10 alt 2x10 alt HEP -       Standing hip ext/abd  2x10 2x10 b/l  2x10 ea b/l 1#  2x10 ea b/l 1#  2x10 ea b/l 2# 2x10 ea b/l     Standing Heel raises  2x10 3x10 3x10 HEP -       Mini squats  2x10 3x10 3x10 HEP -       Standing knee flexion  2x10 1#  2x10 1#  2x10 HEP -       Seated LAQ  5" 2x10 1#  5"   2x10          Side stepping   10 ft x4 laps 10 ft x4 laps [place]  -       SA leg ext     33# 2x10 33#  3x10   44#  3x10 44#  2x10     SA hamstring curl     33# 2x10 44#  3x10 55#  3x10 55#  3x10                  Ther Activity    Bike  5 min 8 min 8 min TM 5' 1 8 TM  1 8  x8' TM  1 8  x8' TM  1 8  x8'     Step ups - fwd, lat     1R 2x10 ea 1R  2x10 ea 1R  2x10 ea 1R  2x10 ea     Hip hike       0R 2x10 b/l 0R 2x10 b/l     [place] side step, backward walk     10# x10 ea b/l hema  10#  x10 ea bl hema  10#  x10 ea bl hema  10#  x10 ea bl                  Gait Training                              Modalities    Ice PRN   x10' deferred  10'

## 2022-04-04 ENCOUNTER — TELEPHONE (OUTPATIENT)
Dept: NEUROLOGY | Facility: CLINIC | Age: 62
End: 2022-04-04

## 2022-04-04 ENCOUNTER — OFFICE VISIT (OUTPATIENT)
Dept: PHYSICAL THERAPY | Facility: REHABILITATION | Age: 62
End: 2022-04-04
Payer: COMMERCIAL

## 2022-04-04 DIAGNOSIS — Z96.641 STATUS POST RIGHT HIP REPLACEMENT: Primary | ICD-10-CM

## 2022-04-04 PROCEDURE — 97110 THERAPEUTIC EXERCISES: CPT

## 2022-04-04 PROCEDURE — 97140 MANUAL THERAPY 1/> REGIONS: CPT

## 2022-04-04 NOTE — PROGRESS NOTES
Daily Note     Today's date: 2022  Patient name: Yen Palacios  : 1960  MRN: [id number]  Referring provider: Frank Buitrago MD  Dx:   Encounter Diagnosis     ICD-10-CM    1  Status post right hip replacement  Z96 641                   Subjective:  Janny Navarro reports that he has pain on the outside of his hip with reciprocal stairs and some soreness in his incision  Objective: See treatment diary below      Assessment: Patient tolerated treatment well  Patient performed ex and received manual therapy as noted  Patient performed ex without reports of pain  R hip ROM is improving as is LE strength  Patient would benefit from continued PT to address deficits and attain set goals  Plan: Continue per plan of care        Precautions: Posterior MALENA precautions, diabetes, history of CAD      Daily Treatment Diary    Date 3/8 3/9 314 3/16 3/21 3/23 3/28 3/30 4/4    FOTO IE            Re-Eval IE               Manuals    Hip PROM (no add/IR)  TE CC TE JANIA CC JANIA TE CC                                           Neuro Re-Ed                                                         Ther Ex    Supine heel slides   5"2x10   HEP -       Standing marches  2x10 2x10 alt 2x10 alt HEP -       Standing hip ext/abd  2x10 2x10 b/l  2x10 ea b/l 1#  2x10 ea b/l 1#  2x10 ea b/l 2# 2x10 ea b/l 2# 2x10 ea b/l    Standing Heel raises  2x10 3x10 3x10 HEP -       Mini squats  2x10 3x10 3x10 HEP -       Standing knee flexion  2x10 1#  2x10 1#  2x10 HEP -       Seated LAQ  5" 2x10 1#  5"   2x10          Side stepping   10 ft x4 laps 10 ft x4 laps [place]  -       SA leg ext     33# 2x10 33#  3x10   44#  3x10 44#  2x10 44#  2x10,  x5    SA hamstring curl     33# 2x10 44#  3x10 55#  3x10 55#  3x10 55#  3x10                 Ther Activity    Bike  5 min 8 min 8 min TM 5' 1 8 TM  1 8  x8' TM  1 8  x8' TM  1 8  x8' TM  2 0  x8'    Step ups - fwd, lat     1R 2x10 ea 1R  2x10 ea 1R  2x10 ea 1R  2x10 ea 1R  2x10    Hip hike       0R 2x10 b/l 0R 2x10 b/l 0R  2x10 b/l    [place] side step, backward walk     10# x10 ea b/l hema  10#  x10 ea bl hema  10#  x10 ea bl hema  10#  x10 ea bl hema  12 1  x10 ea b/l                 Gait Training                              Modalities    Ice PRN   x10' deferred  10'

## 2022-04-04 NOTE — TELEPHONE ENCOUNTER
I called and left a voicemail message about patients upcoming appointment with Dr Alma Bhatt on 4/11/22 @ 9 am  No need for 2nd call  I requested a call back to our office to confirm the appointment or if the patient has any issues or concerns or cannot keep this appointment

## 2022-04-06 ENCOUNTER — OFFICE VISIT (OUTPATIENT)
Dept: PHYSICAL THERAPY | Facility: REHABILITATION | Age: 62
End: 2022-04-06
Payer: COMMERCIAL

## 2022-04-06 DIAGNOSIS — Z96.641 STATUS POST RIGHT HIP REPLACEMENT: Primary | ICD-10-CM

## 2022-04-06 PROCEDURE — 97140 MANUAL THERAPY 1/> REGIONS: CPT | Performed by: PHYSICAL THERAPIST

## 2022-04-06 PROCEDURE — 97110 THERAPEUTIC EXERCISES: CPT | Performed by: PHYSICAL THERAPIST

## 2022-04-06 NOTE — PROGRESS NOTES
Daily Note     Today's date: 2022  Patient name: Frances Gilliland  : 1960  MRN: [id number]  Referring provider: Rajwinder Neumann MD  Dx:   Encounter Diagnosis     ICD-10-CM    1  Status post right hip replacement  Z96 641                   Subjective: Pt comes to therapy noting he has continued difficulty at times with stair climibng at home  Objective: See treatment diary below      Assessment: Tolerated treatment well  Patient demonstrated fatigue post treatment, exhibited good technique with therapeutic exercises and would benefit from continued PT      Plan: Progress treatment as tolerated         Precautions: Posterior MALENA precautions, diabetes, history of CAD      Daily Treatment Diary    Date 3/8 3/9 314 3/16 3/21 3/23 3/28 3/30 4/4 4/6   FOTO IE         nv   Re-Eval IE               Manuals    Hip PROM (no add/IR)  TE CC TE JANIA CC JANIA TE CC                                           Neuro Re-Ed                                                         Ther Ex    Supine heel slides   5"2x10   HEP -       Standing marches  2x10 2x10 alt 2x10 alt HEP -       Standing hip ext/abd  2x10 2x10 b/l  2x10 ea b/l 1#  2x10 ea b/l 1#  2x10 ea b/l 2# 2x10 ea b/l 2# 2x10 ea b/l 5# 2x10 ea b/l   Standing Heel raises  2x10 3x10 3x10 HEP -       Mini squats  2x10 3x10 3x10 HEP -    3x10   Standing knee flexion  2x10 1#  2x10 1#  2x10 HEP -       Seated LAQ  5" 2x10 1#  5"   2x10          Side stepping   10 ft x4 laps 10 ft x4 laps Prashanth  -       SA leg ext     33# 2x10 33#  3x10   44#  3x10 44#  2x10 44#  2x10  x5 nv   SA hamstring curl     33# 2x10 44#  3x10 55#  3x10 55#  3x10 55#  3x10 nv   SLR - flex, abd, ext          2x10 ea                             Ther Activity    Bike  5 min 8 min 8 min TM 5' 1 8 TM  1 8  x8' TM  1 8  x8' TM  1 8  x8' TM  2 0  x8' TM 2 0 10'   Step ups - fwd, lat     1R 2x10 ea 1R  2x10 ea 1R  2x10 ea 1R  2x10 ea 1R  2x10 hold   Hip hike       0R 2x10 b/l 0R 2x10 b/l 0R  2x10 b/l 1R 2x10   [place] side step, backward walk     10# x10 ea b/l prashanth  10#  x10 ea bl prashanth  10#  x10 ea bl prashanth  10#  x10 ea bl prashanth  12 1  x10 ea b/l nv                Gait Training                              Modalities    Ice PRN   x10' deferred  10'

## 2022-04-11 ENCOUNTER — OFFICE VISIT (OUTPATIENT)
Dept: NEUROLOGY | Facility: CLINIC | Age: 62
End: 2022-04-11
Payer: COMMERCIAL

## 2022-04-11 ENCOUNTER — OFFICE VISIT (OUTPATIENT)
Dept: PHYSICAL THERAPY | Facility: REHABILITATION | Age: 62
End: 2022-04-11
Payer: COMMERCIAL

## 2022-04-11 VITALS
SYSTOLIC BLOOD PRESSURE: 126 MMHG | HEIGHT: 69 IN | DIASTOLIC BLOOD PRESSURE: 74 MMHG | HEART RATE: 62 BPM | WEIGHT: 161.8 LBS | TEMPERATURE: 97.4 F | OXYGEN SATURATION: 98 % | BODY MASS INDEX: 23.96 KG/M2 | RESPIRATION RATE: 18 BRPM

## 2022-04-11 DIAGNOSIS — Z96.641 STATUS POST RIGHT HIP REPLACEMENT: Primary | ICD-10-CM

## 2022-04-11 DIAGNOSIS — R42 VERTIGO: Primary | ICD-10-CM

## 2022-04-11 PROCEDURE — 99213 OFFICE O/P EST LOW 20 MIN: CPT | Performed by: PSYCHIATRY & NEUROLOGY

## 2022-04-11 PROCEDURE — 97530 THERAPEUTIC ACTIVITIES: CPT

## 2022-04-11 PROCEDURE — 97110 THERAPEUTIC EXERCISES: CPT

## 2022-04-11 PROCEDURE — 3008F BODY MASS INDEX DOCD: CPT | Performed by: FAMILY MEDICINE

## 2022-04-11 RX ORDER — OMEGA-3S/DHA/EPA/FISH OIL/D3 300MG-1000
1 CAPSULE ORAL EVERY MORNING
COMMUNITY

## 2022-04-11 RX ORDER — AMOXICILLIN 500 MG/1
TABLET, FILM COATED ORAL
COMMUNITY
Start: 2022-03-11

## 2022-04-11 RX ORDER — METHOCARBAMOL 500 MG/1
500 TABLET, FILM COATED ORAL EVERY 6 HOURS PRN
COMMUNITY
Start: 2022-03-28

## 2022-04-11 NOTE — ASSESSMENT & PLAN NOTE
Julienne Stanley is a 17-year-old gentleman with a history of of myotonic dystrophy  He is stable  He has minimal symptoms if he exercises  We discussed potential medications at her available if his symptoms worsen suggest mexiletine or Valium but there is no need for them at this time   He continues to do well with exercise but unfortunately due to his recent hip replacement this is been limited  He has noted more stiffness and inability to relax his muscles  He has gradually increases exercise    If and when he would like to initiate any medications he should inform us accordingly

## 2022-04-11 NOTE — PROGRESS NOTES
Patient ID: Donald Madden is a 58 y o  male  Assessment/Plan:    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[facility] )  Gabi Villalobos is a 80-year-old gentleman with a history of of myotonic dystrophy  He is stable  He has minimal symptoms if he exercises  We discussed potential medications at her available if his symptoms worsen suggest mexiletine or Valium but there is no need for them at this time   He continues to do well with exercise but unfortunately due to his recent hip replacement this is been limited  He has noted more stiffness and inability to relax his muscles  He has gradually increases exercise  If and when he would like to initiate any medications he should inform us accordingly    Vertigo  Gabi Villalobos has intermittment   vertigo  This occurs when his head is supine and flat  In the past vestibular therapy did proved to be helpful  He does move his head side to side or when he sleeps sitting up this does not occur as often  In the past he was seen by ENT who did not feel he had Meniere's disease  He is receiving physical therapy for his hep and I have ordered repeat vestibular therapy  This may be helpful for the patient  We would like to avoid additional medications hospital       Diagnoses and all orders for this visit:    Vertigo  -     Ambulatory Referral to Physical Therapy; Future    Other orders  -     amoxicillin (AMOXIL) 500 MG tablet; TAKE 4 TABLETS BY MOUTH 1 HOUR BEFORE DENTIST  -     cholecalciferol (VITAMIN D3) 400 units tablet; Take 1 tablet by mouth every morning  -     methocarbamol (ROBAXIN) 500 mg tablet; Take 500 mg by mouth every 6 (six) hours as needed         Subjective:    Mr Lizandro Concepcion is a pleasant 57 yo male with history of myotonic dystrophy last presented in our offices with lightheadedness  vertigo occurs in the certain positions such as lying down and supine        He questions if he has Meniere's disease    In March of 2020 he was seen by ENT who did not find any evidence of a labyrinth in etiology  He does sleep was with his head up  When he moves his head side to side he does not denies any symptoms  In the past vestibular therapy to remove the crystals proved to be helpful           He does have a history of myotonic dystrophy  He was diagnosed in 1988 when he noticed difficulty walking and spasms spasms of his muscles    He had a muscle biopsy  States he had trouble "getting started" in sports and thus had testing done including his brothers and dad, and his father  were all found to have a similar abnormality  but his two brothers are without symptoms   Reports the symptoms are slightly worse due to his lack of activity           When he was young  then but states over the last 20 years has noted slow gradually diffuse muscle weakness  States most notable with gripping      States he has trouble with muscle relaxation after gripping and complains of decrease in strength  The symptoms are overall stable  Unfortunately he has been unable to exercise due to his recent hip replacement  I his hip replacement was performed in February and he did notice some tingling in his legs  This is slowly improving  He is undergoing physical therapy twice a week and exercising at home independently twice a day         States history heart attack 2004, and has cardiac stent in  States follows with cardiology  he denies any problems with conduction  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- states has history of this in dad and likely inherited this from him          emg in march of 2018 was consistent with myotonia  But no evidence of a neuropathy               He   was a  and retired in November of 2017   he denies any falls and does reports some intermittent problem with his balance                 Mri of brain 10/06/20   IMPRESSION:  Mild chronic microangiopathic white matter disease    Probable 1 6 cm chronic infarction posterolateral left temporoparietal junction     No acute ischemic disease  malaika frank get sbetteranda dand exrecsies nabila azam   [address]                                       The following portions of the patient's history were reviewed and updated as appropriate:   He  has a past medical history of CAD (coronary artery disease), Congenital myotonia, Deep vein thrombosis (DVT) ([facility] ), Diabetes ([facility] ), 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[facility] ) (12/06/2017), Pancreatitis, Sleep apnea, and Superficial thrombophlebitis  He  has a past surgical history that includes Cholecystectomy; Coronary angioplasty with stent; Colonoscopy; Circumcision; Inguinal hernia repair; ORIF radial shaft fracture (Left); ORIF ulnar / radial shaft fracture (Left); and Tonsillectomy and adenoidectomy  His family history includes Brain cancer in his mother; Cancer in his mother; Clotting disorder in his mother; Coronary artery disease in his paternal uncle; Heart disease in his mother; Hyperlipidemia in his mother  He  reports that he has never smoked  He has never used smokeless tobacco  He reports current alcohol use  He reports that he does not use drugs  Current Outpatient Medications   Medication Sig Dispense Refill    amoxicillin (AMOXIL) 500 MG tablet TAKE 4 TABLETS BY MOUTH 1 HOUR BEFORE DENTIST      aspirin (ECOTRIN LOW STRENGTH) 81 mg EC tablet Take 81 mg by mouth daily      atorvastatin (LIPITOR) 40 mg tablet TAKE 1 TABLET BY MOUTH EVERY DAY 90 tablet 1    cholecalciferol (VITAMIN D3) 400 units tablet Take 1 tablet by mouth every morning      Continuous Blood Gluc Sensor (FreeStyle Nehemias 14 Day Sensor) MISC USE AS DIRECTED , CHANGING EVERY 14 DAYS        insulin glargine (LANTUS SOLOSTAR) 100 units/mL injection pen Take 24 units in AM (Patient taking differently: Inject 40 Units under the skin daily Take 24 units in AM ) 5 pen 1    insulin lispro (HUMALOG KWIKPEN) 100 units/mL injection pen Inject 10units TID +scale 1 unit for every 50 over 150 mg/dl)150-200 = 1; 201-249 = 2; 250-300 = 3; 301-349 = 4; > 350 = 5 5 pen 1    Insulin Pen Needle 32G X 4 MM MISC Use to inject insulin 3 times daily 300 each 1    methocarbamol (ROBAXIN) 500 mg tablet Take 500 mg by mouth every 6 (six) hours as needed      pantoprazole (PROTONIX) 20 mg tablet TAKE 1 TABLET BY MOUTH EVERY DAY 90 tablet 1    sildenafil (VIAGRA) 100 mg tablet Take 1 tablet (100 mg total) by mouth daily as needed for erectile dysfunction 10 tablet 1    Xarelto 20 MG tablet TAKE 1 TABLET BY MOUTH EVERY DAY WITH BREAKFAST 90 tablet 1    acyclovir (ZOVIRAX) 200 mg capsule Take 1 capsule (200 mg total) by mouth 5 (five) times a day for 5 days 25 capsule 0    predniSONE 10 mg tablet 5 x 1 day, 4 x1 day, 3 x 1 day,2 x1 day,  1 x 1 day (Patient not taking: Reported on 4/11/2022 ) 15 tablet 0     No current facility-administered medications for this visit  He has No Known Allergies            Objective:    Blood pressure 126/74, pulse 62, temperature (!) 97 4 °F (36 3 °C), temperature source Oral, resp  rate 18, height 5' 8 5" (1 74 m), weight 73 4 kg (161 lb 12 8 oz), SpO2 98 %  Physical Exam  Eyes:      General: Lids are normal       Extraocular Movements: Extraocular movements intact  Pupils: Pupils are equal, round, and reactive to light  Neurological:      Deep Tendon Reflexes: Strength normal       Reflex Scores:       Tricep reflexes are 1+ on the right side and 1+ on the left side  Bicep reflexes are 2+ on the right side and 2+ on the left side  Patellar reflexes are 2+ on the right side and 2+ on the left side  Achilles reflexes are 1+ on the right side and 1+ on the left side  Neurological Exam  Mental Status  Awake, alert and oriented to person, place and time  Oriented to person, place and time  Language is fluent with no aphasia  Cranial Nerves  CN II: Visual acuity is normal  Visual fields full to confrontation  CN III, IV, VI: Extraocular movements intact bilaterally   Normal lids and orbits bilaterally  Pupils equal round and reactive to light bilaterally  CN V: Facial sensation is normal   CN VII: Full and symmetric facial movement  CN VIII: Hearing is normal   CN IX, X: Palate elevates symmetrically  Normal gag reflex  CN XI: Shoulder shrug strength is normal   CN XII: Tongue midline without atrophy or fasciculations  Motor   Strength is 5/5 throughout all four extremities  He Had difficulty relaxing his muscle  He had no evidence of a Tinel sign  Sensory  Light touch is normal in upper and lower extremities  Reflexes                                           Right                      Left  Biceps                                 2+                         2+  Triceps                                1+                         1+  Patellar                                2+                         2+  Achilles                                1+                         1+  Plantar                           Downgoing                Downgoing    Right pathological reflexes: Polo's absent  Left pathological reflexes: Polo's absent  Coordination  Right: Finger-to-nose normal   Left: Finger-to-nose normal     Gait Able to rise from chair without using arms  He had an antilagic  Gait due to residual from right hip replacement   Review of systems obtained from the medical assistant as below was reviewed with the patient at today's visit    ROS:    Review of Systems   Constitutional: Negative  Negative for appetite change and fever  HENT: Negative  Negative for hearing loss, tinnitus, trouble swallowing and voice change  Eyes: Negative  Negative for photophobia and pain  Respiratory: Positive for shortness of breath (with steps)  Cardiovascular: Negative  Negative for palpitations  Gastrointestinal: Negative  Negative for nausea and vomiting  Endocrine: Negative  Negative for cold intolerance  Genitourinary: Negative    Negative for dysuria, frequency and urgency  Musculoskeletal: Positive for gait problem (giat dysturbance due to hip replacement on the right ) and joint swelling  Negative for myalgias and neck pain  Skin: Negative  Negative for rash  Allergic/Immunologic: Negative  Neurological: Positive for dizziness (in the morning)  Negative for tremors, seizures, syncope, facial asymmetry, speech difficulty, weakness, light-headedness, numbness and headaches  Hematological: Negative  Does not bruise/bleed easily  Psychiatric/Behavioral: Negative  Negative for confusion, hallucinations and sleep disturbance       He can return to our offices in several months

## 2022-04-11 NOTE — ASSESSMENT & PLAN NOTE
Apple Arenas has intermittment   vertigo  This occurs when his head is supine and flat  In the past vestibular therapy did proved to be helpful  He does move his head side to side or when he sleeps sitting up this does not occur as often  In the past he was seen by ENT who did not feel he had Meniere's disease  He is receiving physical therapy for his hep and I have ordered repeat vestibular therapy  This may be helpful for the patient    We would like to avoid additional medications hospital

## 2022-04-11 NOTE — PROGRESS NOTES
Daily Note     Today's date: 2022  Patient name: Nacho Gastelum  : 1960  MRN: [id number]  Referring provider: Aurora Ruiz MD  Dx:   Encounter Diagnosis     ICD-10-CM    1  Status post right hip replacement  Z96 641                   Subjective:  Wanda Montoya reports that he had a phone visit with Dr Naveed POWERS and he has been released to do a light jog for short distances  Wanda Montoya reports that his hip is feeling really good this am        Objective: See treatment diary below      Assessment: Patient tolerated treatment well  Patient performed ex and received manual therapy as noted  Patient demonstrated a good understanding of the exercises performed  Patient tolerated increased pace walk well  Patient's goal is to progress to a light jog prior to returning to [facility]  Patient would benefit from continued PT intervention to address deficits and attain set goals  Plan: Continue per plan of care        Precautions: Posterior MALENA precautions, diabetes, history of CAD      Daily Treatment Diary    Date   314 3/16 3/21 3/23 3/28 3/30 4/4 4/6   FOTO          nv   Re-Eval                Manuals    Hip PROM (no add/IR) CC  CC TE JANIA CC JANIA TE CC                                           Neuro Re-Ed                                                         Ther Ex    Supine heel slides      HEP -       Standing marches   2x10 alt 2x10 alt HEP -       Standing hip ext/abd 4# 2x10 ea b/l  2x10 b/l  2x10 ea b/l 1#  2x10 ea b/l 1#  2x10 ea b/l 2# 2x10 ea b/l 2# 2x10 ea b/l 5# 2x10 ea b/l   Standing Heel raises   3x10 3x10 HEP -       Mini squats 3x10  3x10 3x10 HEP -    3x10   Standing knee flexion   1#  2x10 1#  2x10 HEP -       Seated LAQ   1#  5"   2x10          Side stepping   10 ft x4 laps 10 ft x4 laps Prashanth  -       SA leg ext 44#  3x10    33# 2x10 33#  3x10   44#  3x10 44#  2x10 44#  2x10  x5 nv   SA hamstring curl 66#  2x10      33# 2x10 44#  3x10 55#  3x10 55#  3x10 55#  3x10 nv   SLR - flex, abd, ext 2x10 ea         2x10 ea                             Ther Activity    Bike TM 2 0 x7' 3 0 mph x3'  8 min 8 min TM 5' 1 8 TM  1 8  x8' TM  1 8  x8' TM  1 8  x8' TM  2 0  x8' TM 2 0 10'   Step ups - fwd, lat -    1R 2x10 ea 1R  2x10 ea 1R  2x10 ea 1R  2x10 ea 1R  2x10 hold   Hip hike 1R  2x10      0R 2x10 b/l 0R 2x10 b/l 0R  2x10 b/l 1R 2x10   [place] side step, backward walk hema 12 1  x10 ea b/l    10# x10 ea b/l hema  10#  x10 ea bl hema  10#  x10 ea bl hema  10#  x10 ea bl hema  12 1  x10 ea b/l nv                Gait Training                              Modalities    Ice PRN   x10' deferred  10'

## 2022-04-13 ENCOUNTER — EVALUATION (OUTPATIENT)
Dept: PHYSICAL THERAPY | Facility: REHABILITATION | Age: 62
End: 2022-04-13
Payer: COMMERCIAL

## 2022-04-13 DIAGNOSIS — H81.12 BENIGN PAROXYSMAL VERTIGO OF LEFT EAR: ICD-10-CM

## 2022-04-13 DIAGNOSIS — H81.11 BENIGN PAROXYSMAL VERTIGO OF RIGHT EAR: Primary | ICD-10-CM

## 2022-04-13 PROCEDURE — 95992 CANALITH REPOSITIONING PROC: CPT | Performed by: PHYSICAL THERAPIST

## 2022-04-13 PROCEDURE — 97164 PT RE-EVAL EST PLAN CARE: CPT | Performed by: PHYSICAL THERAPIST

## 2022-04-13 NOTE — PROGRESS NOTES
PT Evaluation     Today's date: 2022  Patient name: Azalea Bañuelos  : 1960  MRN: [id number]  Referring provider: Griffin Clement MD  Dx:   Encounter Diagnosis     ICD-10-CM    1  Benign paroxysmal vertigo of right ear  H81 11                   Assessment  Assessment details: Pt is a pleasant 58 y o  male who presents to outpatient physical therapy with c/o vertigo/dizziness  Pt displays good oculomotor functioning, static balance, and gait within functional limits  Displays negative screens for VBI or cervicogenic pathologic origins  Pt displays positive left Reydon-Hallpike test, with torsional up-beating nystagmus and subjective reports of spinning  Pt treated via CRT directed to left posterior canal x3 and instructed to sit in waiting room with head upright x20 minutes prior to leaving  Also given home instructions of avoiding bending over at waist for next 24 hours  Will be reassessed next visit and will determine further plan of care as appropriate  Pt will benefit from skilled physical therapy to address functional limitations and to achieve goals  Thank you for this referral   Impairments: abnormal movement, activity intolerance and impaired balance  Other impairment: dizziness  Understanding of Dx/Px/POC: good   Prognosis: good    Goals  ST  Patient will report </= 2 episodes of vertigo in 2 weeks  2  Patient will demonstrate 25% improvement in balance in 4 weeks  LT  Patient will be able to perform IADLS without restriction or pain by discharge  2  Patient will be independent in HEP by discharge      Plan  Patient would benefit from: PT eval and skilled PT  Planned therapy interventions: body mechanics training, home exercise program, neuromuscular re-education, manual therapy, postural training, balance/weight bearing training, transfer training, self care and canalith repositioning  Frequency: 2x week  Duration in visits: 4  Duration in weeks: 2  Treatment plan discussed with: patient        Subjective Evaluation    History of Present Illness  Mechanism of injury: 04/13/22  Pt reports approximately 10+ year history of vertigo/dizziness  States he has had repositioning maneuver performed 4x in the past, with minimal long-lasting improvement  Reports he also tried Meclizine in the past, with no reported improvements  States symptoms occur when he lies down fully supine  Describes symptoms as dizziness in his head, denying room spinning symptoms  States symptoms last approximately 10-15 seconds       VBI: (-) Diplopia, (+) Dizziness, (-) Dysphagia, (-) Dysarthria, (-) Drop attacks, (-) Nausea, (-) Vomiting, (-) Sensory changes, (-) Nystagmus     Patient Goals  Patient goal: reduce dizziness with supine position        Objective   Neuro Exam:     Oculomotor exam   Oculomotor ROM: WNL  Resting nystagmus: not present   Gaze holding nystagmus: not present left  and not present right  Smooth pursuits: within normal limits  Vertical saccades: normal  Horizontal saccades: normal  Convergence: normal  Head thrust: left normal and right normal    Positional testing   [place]-Hallpike   Left posterior canal: symptomatic, torsional and upbeating  Right posterior canal: symptomatic and WNL  Roll test   Left horizontal canal: WNL  Right horizontal canal: WNL             Precautions: Posterior MALENA precautions, diabetes, history of CAD      Daily Treatment Diary    Date 4/11 4/13  3/16 3/21 3/23 3/28 3/30 4/4 4/6   FOTO          nv   Re-Eval  vestib              Manuals    Hip PROM (no add/IR) CC   TE JANIA CC JANIA TE CC    CRT - L post  x3                                     Neuro Re-Ed                                                         Ther Ex    Supine heel slides      HEP -       Standing marches    2x10 alt HEP -       Standing hip ext/abd 4# 2x10 ea b/l    2x10 ea b/l 1#  2x10 ea b/l 1#  2x10 ea b/l 2# 2x10 ea b/l 2# 2x10 ea b/l 5# 2x10 ea b/l   Standing Heel raises    3x10 HEP - Mini squats 3x10   3x10 HEP -    3x10   Standing knee flexion    1#  2x10 HEP -       Seated LAQ             Side stepping    10 ft x4 laps Prashanth  -       SA leg ext 44#  3x10    33# 2x10 33#  3x10   44#  3x10 44#  2x10 44#  2x10  x5 nv   SA hamstring curl 66#  2x10      33# 2x10 44#  3x10 55#  3x10 55#  3x10 55#  3x10 nv   SLR - flex, abd, ext 2x10 ea         2x10 ea                             Ther Activity    Bike TM 2 0 x7' 3 0 mph x3'   8 min TM 5' 1 8 TM  1 8  x8' TM  1 8  x8' TM  1 8  x8' TM  2 0  x8' TM 2 0 10'   Step ups - fwd, lat -    1R 2x10 ea 1R  2x10 ea 1R  2x10 ea 1R  2x10 ea 1R  2x10 hold   Hip hike 1R  2x10      0R 2x10 b/l 0R 2x10 b/l 0R  2x10 b/l 1R 2x10   [place] side step, backward walk prashanth 12 1  x10 ea b/l    10# x10 ea b/l prashanth  10#  x10 ea bl prashanth  10#  x10 ea bl prashanth  10#  x10 ea bl prashanth  12 1  x10 ea b/l nv                Gait Training                              Modalities    Ice PRN    deferred  8'

## 2022-04-14 ENCOUNTER — OFFICE VISIT (OUTPATIENT)
Dept: PHYSICAL THERAPY | Facility: REHABILITATION | Age: 62
End: 2022-04-14
Payer: COMMERCIAL

## 2022-04-14 DIAGNOSIS — H81.11 BENIGN PAROXYSMAL VERTIGO OF RIGHT EAR: Primary | ICD-10-CM

## 2022-04-14 DIAGNOSIS — H81.12 BENIGN PAROXYSMAL VERTIGO OF LEFT EAR: ICD-10-CM

## 2022-04-14 DIAGNOSIS — Z96.641 STATUS POST RIGHT HIP REPLACEMENT: ICD-10-CM

## 2022-04-14 PROCEDURE — 97110 THERAPEUTIC EXERCISES: CPT | Performed by: PHYSICAL THERAPIST

## 2022-04-14 PROCEDURE — 97140 MANUAL THERAPY 1/> REGIONS: CPT | Performed by: PHYSICAL THERAPIST

## 2022-04-14 NOTE — PROGRESS NOTES
Daily Note     Today's date: 2022  Patient name: Guanaco Montes De Oca  : 1960  MRN: [id number]  Referring provider: Oksana Angelucci, MD  Dx:   Encounter Diagnosis     ICD-10-CM    1  Benign paroxysmal vertigo of right ear  H81 11    2  Benign paroxysmal vertigo of left ear  H81 12    3  Status post right hip replacement  Z96 641                   Subjective: Pt reports that he is not dizzy unless he lies flat  Hip is a little sore today from the rain coming in  He would like to resume umpiring in a few weeks  Objective: See treatment diary below      Assessment: Tolerated treatment well  Short jog went well  Tested umpiring "squat" and it was difficult so patient performed squats going into this positions  Sapphire-Hallpike was negative today  Patient would benefit from continued PT      Plan: Continue per plan of care        Precautions: Posterior MALENA precautions, diabetes, history of CAD      Daily Treatment Diary    Date 4/11 4/13 4/14 3/16 3/21 3/23 3/28 3/30 4/4 4   FOTO          nv   Re-Eval  vestib              Manuals    Hip PROM (no add/IR) CC  NT TE JANIA CC JANIA TE CC    CRT - L post  x3 x1                                    Neuro Re-Ed                                                         Ther Ex    Supine heel slides      HEP -       Standing marches    2x10 alt HEP -       Standing hip ext/abd 4# 2x10 ea b/l  5# 2x10 b/l  2x10 ea b/l 1#  2x10 ea b/l 1#  2x10 ea b/l 2# 2x10 ea b/l 2# 2x10 ea b/l 5# 2x10 ea b/l   Standing Heel raises    3x10 HEP -       Mini squats 3x10  umpire position 5" 3x10 3x10 HEP -    3x10   Standing knee flexion    1#  2x10 HEP -       Seated LAQ             Side stepping    10 ft x4 laps [place]  -       SA leg ext 44#  3x10  44# 3x10  33# 2x10 33#  3x10   44#  3x10 44#  2x10 44#  2x10  x5 nv   SA hamstring curl 66#  2x10    66# 2x10  33# 2x10 44#  3x10 55#  3x10 55#  3x10 55#  3x10 nv   SLR - flex, abd, ext 2x10 ea  10"x10       2x10 ea   clamshells   10"x10 Ther Activity    Bike TM 2 0 x7' 3 0 mph x3'  TM 2'intervals of 3 0 and one 30" 3 8 (jog) 8 min TM 5' 1 8 TM  1 8  x8' TM  1 8  x8' TM  1 8  x8' TM  2 0  x8' TM 2 0 10'   Step ups - fwd, lat -    1R 2x10 ea 1R  2x10 ea 1R  2x10 ea 1R  2x10 ea 1R  2x10 hold   Hip hike 1R  2x10      0R 2x10 b/l 0R 2x10 b/l 0R  2x10 b/l 1R 2x10   [place] side step, backward walk hema 12 1  x10 ea b/l    10# x10 ea b/l hema  10#  x10 ea bl hema  10#  x10 ea bl hema  10#  x10 ea bl hema  12 1  x10 ea b/l nv                Gait Training                              Modalities    Ice PRN    deferred  8'

## 2022-04-18 ENCOUNTER — OFFICE VISIT (OUTPATIENT)
Dept: PHYSICAL THERAPY | Facility: REHABILITATION | Age: 62
End: 2022-04-18
Payer: COMMERCIAL

## 2022-04-18 DIAGNOSIS — Z96.641 STATUS POST RIGHT HIP REPLACEMENT: Primary | ICD-10-CM

## 2022-04-18 PROCEDURE — 97140 MANUAL THERAPY 1/> REGIONS: CPT

## 2022-04-18 PROCEDURE — 97110 THERAPEUTIC EXERCISES: CPT

## 2022-04-18 NOTE — PROGRESS NOTES
Daily Note     Today's date: 2022  Patient name: Rubens Pinon  : 1960  MRN: [id number]  Referring provider: Filemon Cardenas MD  Dx:   Encounter Diagnosis     ICD-10-CM    1  Status post right hip replacement  Z96 641                   Subjective:  Guido Camargo reports that his hip is feeling good  He notes that his thigh is sore from walking into his fire pit otherwise he feels good  Objective: See treatment diary below      Assessment: Patient tolerated treatment well  Patient performed ex and received manual therapy as noted  Patient noted fatigue during jog interval, denied pain/soreness  R hip ROM and strength continued to improve  Plan: Continue per plan of care        Precautions: Posterior MALENA precautions, diabetes, history of CAD      Daily Treatment Diary    Date 4/11 4/13 4/14 4/18  3/23 3/28 3/30 4/4 4/6   FOTO          nv   Re-Eval  vestib              Manuals    Hip PROM (no add/IR) CC  NT CC  CC JANIA TE CC    CRT - L post  x3 x1                                    Neuro Re-Ed                                                         Ther Ex    Supine heel slides       -       Standing marches      -       Standing hip ext/abd 4# 2x10 ea b/l  5# 2x10 b/l 5#  2x10 b/l  1#  2x10 ea b/l 1#  2x10 ea b/l 2# 2x10 ea b/l 2# 2x10 ea b/l 5# 2x10 ea b/l   Standing Heel raises      -       Mini squats 3x10  umpire position 5" 3x10 Umpire post  5"  3x10  -    3x10   Standing knee flexion     HEP -       Seated LAQ             Side stepping       -       SA leg ext 44#  3x10  44# 3x10 44#  3x10  33#  3x10   44#  3x10 44#  2x10 44#  2x10  x5 nv   SA hamstring curl 66#  2x10    66# 2x10 66#  3x10  44#  3x10 55#  3x10 55#  3x10 55#  3x10 nv   SLR - flex, abd, ext 2x10 ea  10"x10 10"x10      2x10 ea   clamshells   10"x10 10"x10                      Ther Activity    Bike TM 2 0 x7' 3 0 mph x3'  TM 2'intervals of 3 0 and one 30" 3 8 (jog) TM 2' walk 2 0 mph   3 0x2'  3 8 mph TM  TM  1 8  x8' TM  1 8  x8' TM  1 8  x8' TM  2 0  x8' TM 2 0 10'   Step ups - fwd, lat -     1R  2x10 ea 1R  2x10 ea 1R  2x10 ea 1R  2x10 hold   Hip hike 1R  2x10   1R 2x10 ea   0R 2x10 b/l 0R 2x10 b/l 0R  2x10 b/l 1R 2x10   [place] side step, backward walk hema 12 1  x10 ea b/l   hema  12 6  x10 ea b/l  hema  10#  x10 ea bl hema  10#  x10 ea bl hema  10#  x10 ea bl hema  12 1  x10 ea b/l nv                Gait Training                              Modalities    Ice PRN      10'

## 2022-04-20 ENCOUNTER — OFFICE VISIT (OUTPATIENT)
Dept: PHYSICAL THERAPY | Facility: REHABILITATION | Age: 62
End: 2022-04-20
Payer: COMMERCIAL

## 2022-04-20 DIAGNOSIS — Z96.641 STATUS POST RIGHT HIP REPLACEMENT: Primary | ICD-10-CM

## 2022-04-20 PROCEDURE — 97110 THERAPEUTIC EXERCISES: CPT

## 2022-04-20 PROCEDURE — 97140 MANUAL THERAPY 1/> REGIONS: CPT

## 2022-04-20 NOTE — PROGRESS NOTES
Daily Note     Today's date: 2022  Patient name: Irene Blackwell  : 1960  MRN: [id number]  Referring provider: Kacy Browning MD  Dx:   Encounter Diagnosis     ICD-10-CM    1  Status post right hip replacement  [id number]                   Subjective:  Patient reports his hip is good but his thigh is still sore from walking into the fire pit  Objective: See treatment diary below      Assessment: Patient tolerated treatment well  Patient performed ex and received manual therapy as noted  Patient performed ex with appropriate challenge and no reports of R hip soreness  Patient is making progress with R hip ROM, strength and daily function  Patient would benefit from continued PT intervention to address deficits and attain set goals  Plan: Continue per plan of care        Precautions: Posterior MALENA precautions, diabetes, history of CAD      Daily Treatment Diary    Date 4/11 4/13 4/14 4/18 4/20  3/28 3/30 4/4 4/6   FOTO     perf     nv   Re-Eval  vestib              Manuals    Hip PROM (no add/IR) CC  NT CC CC  JANIA TE CC    CRT - L post  x3 x1                                    Neuro Re-Ed                                                         Ther Ex    Supine heel slides       -       Standing marches      -       Standing hip ext/abd 4# 2x10 ea b/l  5# 2x10 b/l 5#  2x10 b/l 5#  2x10   b/l  1#  2x10 ea b/l 2# 2x10 ea b/l 2# 2x10 ea b/l 5# 2x10 ea b/l   Standing Heel raises      -       Mini squats 3x10  umpire position 5" 3x10 Umpire post  5"  3x10 Umpire position  5"  3x10 on foam -    3x10   Standing knee flexion     HEP -       Seated LAQ             Side stepping       -       SA leg ext 44#  3x10  44# 3x10 44#  3x10 55#  2x10    44#  3x10 44#  2x10 44#  2x10  x5 nv   SA hamstring curl 66#  2x10    66# 2x10 66#  3x10 66#  3x10  55#  3x10 55#  3x10 55#  3x10 nv   SLR - flex, abd, ext 2x10 ea  10"x10 10"x10 10"x10     2x10 ea   clamshells   10"x10 10"x10 10"x10        Golf swing      * Ther Activity    Bike TM 2 0 x7' 3 0 mph x3'  TM 2'intervals of 3 0 and one 30" 3 8 (jog) TM 2' walk 2 0 mph   3 0x2'  3 8 mph TM  Walk 2 0 2',   3 0mph x2'  3 6 mph x2' x2  TM  1 8  x8' TM  1 8  x8' TM  2 0  x8' TM 2 0 10'   Step ups - fwd, lat -      1R  2x10 ea 1R  2x10 ea 1R  2x10 hold   Hip hike 1R  2x10   1R 2x10 ea 1R  5#  2x10 ea  0R 2x10 b/l 0R 2x10 b/l 0R  2x10 b/l 1R 2x10   Prashanth side step, backward walk prashanth 12 1  x10 ea b/l   prashanth  12 6  x10 ea b/l prashanth  13 5  x10 ea b/l  prashanth  10#  x10 ea bl prashanth  10#  x10 ea bl prashanth  12 1  x10 ea b/l nv                Gait Training                              Modalities    Ice PRN      10'

## 2022-04-21 ENCOUNTER — TELEMEDICINE (OUTPATIENT)
Dept: FAMILY MEDICINE CLINIC | Facility: CLINIC | Age: 62
End: 2022-04-21
Payer: COMMERCIAL

## 2022-04-21 DIAGNOSIS — B34.9 VIRAL INFECTION, UNSPECIFIED: Primary | ICD-10-CM

## 2022-04-21 DIAGNOSIS — B34.9 VIRAL INFECTION, UNSPECIFIED: ICD-10-CM

## 2022-04-21 PROBLEM — Z01.810 PREOP CARDIOVASCULAR EXAM: Status: RESOLVED | Noted: 2022-01-24 | Resolved: 2022-04-21

## 2022-04-21 PROBLEM — R06.09 DOE (DYSPNEA ON EXERTION): Status: RESOLVED | Noted: 2021-02-15 | Resolved: 2022-04-21

## 2022-04-21 PROBLEM — H61.23 BILATERAL IMPACTED CERUMEN: Status: RESOLVED | Noted: 2022-01-24 | Resolved: 2022-04-21

## 2022-04-21 PROBLEM — R06.00 DOE (DYSPNEA ON EXERTION): Status: RESOLVED | Noted: 2021-02-15 | Resolved: 2022-04-21

## 2022-04-21 PROCEDURE — 1036F TOBACCO NON-USER: CPT | Performed by: FAMILY MEDICINE

## 2022-04-21 PROCEDURE — 87636 SARSCOV2 & INF A&B AMP PRB: CPT | Performed by: FAMILY MEDICINE

## 2022-04-21 PROCEDURE — 99212 OFFICE O/P EST SF 10 MIN: CPT | Performed by: FAMILY MEDICINE

## 2022-04-21 RX ORDER — DEXTROMETHORPHAN HYDROBROMIDE AND PROMETHAZINE HYDROCHLORIDE 15; 6.25 MG/5ML; MG/5ML
5 SOLUTION ORAL 4 TIMES DAILY PRN
Qty: 240 ML | Refills: 0 | Status: SHIPPED | OUTPATIENT
Start: 2022-04-21 | End: 2022-04-21 | Stop reason: SDUPTHER

## 2022-04-21 RX ORDER — DEXTROMETHORPHAN HYDROBROMIDE AND PROMETHAZINE HYDROCHLORIDE 15; 6.25 MG/5ML; MG/5ML
5 SOLUTION ORAL 4 TIMES DAILY PRN
Qty: 240 ML | Refills: 0 | Status: SHIPPED | OUTPATIENT
Start: 2022-04-21

## 2022-04-21 RX ORDER — PREDNISONE 10 MG/1
TABLET ORAL
Qty: 15 TABLET | Refills: 0 | Status: SHIPPED | OUTPATIENT
Start: 2022-04-21 | End: 2022-04-21 | Stop reason: SDUPTHER

## 2022-04-21 RX ORDER — PREDNISONE 10 MG/1
TABLET ORAL
Qty: 15 TABLET | Refills: 0 | Status: SHIPPED | OUTPATIENT
Start: 2022-04-21

## 2022-04-21 NOTE — PROGRESS NOTES
COVID-19 Outpatient Progress Note    Assessment/Plan:    Problem List Items Addressed This Visit     None      Visit Diagnoses     Viral infection, unspecified    -  Primary    Relevant Medications    predniSONE 10 mg tablet    Promethazine-DM (PHENERGAN-DM) 6 25-15 mg/5 mL oral syrup    Other Relevant Orders    Covid/Flu- Mobile Van or [facility] Now Collect         Disposition:     Referred patient to centralized site to test for COVID-19/Influenza  Discussed symptom directed medication options with patient  Discussed vitamin D, vitamin C, and/or zinc supplementation with patient  [address]    Your healthcare provider and/or public health staff have evaluated you and have determined that you do not need to remain in the hospital at this time   At this time you can be isolated at home where you will be monitored by staff from your local or state health department  You should carefully follow the prevention and isolation steps below until a healthcare provider or local or state health department says that you can return to your normal activities  Stay home except to get medical care    People who are mildly ill with COVID-19 are able to isolate at home during their illness  You should restrict activities outside your home, except for getting medical care  Do not go to work, school, or public areas  Avoid using public transportation, ride-sharing, or taxis  Separate yourself from other people and animals in your home    People: As much as possible, you should stay in a specific room and away from other people in your home  Also, you should use a separate bathroom, if available  Animals: You should restrict contact with pets and other animals while you are sick with COVID-19, just like you would around other people   Although there have not been reports of pets or other animals becoming sick with COVID-19, it is still recommended that people sick with COVID-19 limit contact with animals until more information is known about the virus  When possible, have another member of your household care for your animals while you are sick  If you are sick with COVID-19, avoid contact with your pet, including petting, snuggling, being kissed or licked, and sharing food  If you must care for your pet or be around animals while you are sick, wash your hands before and after you interact with pets and wear a facemask  See COVID-19 and Animals for more information  Call ahead before visiting your doctor    If you have a medical appointment, call the healthcare provider and tell them that you have or may have COVID-19  This will help the healthcare providers office take steps to keep other people from getting infected or exposed  Wear a facemask    You should wear a facemask when you are around other people (e g , sharing a room or vehicle) or pets and before you enter a healthcare providers office  If you are not able to wear a facemask (for example, because it causes trouble breathing), then people who live with you should not stay in the same room with you, or they should wear a facemask if they enter your room  Cover your coughs and sneezes    Cover your mouth and nose with a tissue when you cough or sneeze  Throw used tissues in a lined trash can  Immediately wash your hands with soap and water for at least 20 seconds or, if soap and water are not available, clean your hands with an alcohol-based hand  that contains at least 60% alcohol  Clean your hands often    Wash your hands often with soap and water for at least 20 seconds, especially after blowing your nose, coughing, or sneezing; going to the bathroom; and before eating or preparing food  If soap and water are not readily available, use an alcohol-based hand  with at least 60% alcohol, covering all surfaces of your hands and rubbing them together until they feel dry    Soap and water are the best option if hands are visibly dirty  Avoid touching your eyes, nose, and mouth with unwashed hands  Avoid sharing personal household items    You should not share dishes, drinking glasses, cups, eating utensils, towels, or bedding with other people or pets in your home  After using these items, they should be washed thoroughly with soap and water  Clean all high-touch surfaces everyday    High touch surfaces include counters, tabletops, doorknobs, bathroom fixtures, toilets, phones, keyboards, tablets, and bedside tables  Also, clean any surfaces that may have blood, stool, or body fluids on them  Use a household cleaning spray or wipe, according to the label instructions  Labels contain instructions for safe and effective use of the cleaning product including precautions you should take when applying the product, such as wearing gloves and making sure you have good ventilation during use of the product  Monitor your symptoms    Seek prompt medical attention if your illness is worsening (e g , difficulty breathing)  Before seeking care, call your healthcare provider and tell them that you have, or are being evaluated for, COVID-19  Put on a facemask before you enter the facility  These steps will help the healthcare providers office to keep other people in the office or waiting room from getting infected or exposed  Ask your healthcare provider to call the local or state health department  Persons who are placed under active monitoring or facilitated self-monitoring should follow instructions provided by their local health department or occupational health professionals, as appropriate  If you have a medical emergency and need to call 911, notify the dispatch personnel that you have, or are being evaluated for COVID-19  If possible, put on a facemask before emergency medical services arrive      Discontinuing home isolation    Patients with confirmed COVID-19 should remain under home isolation precautions until the following conditions are met: They have had no fever for at least 24 hours (that is one full day of no fever without the use medicine that reduces fevers)  AND  other symptoms have improved (for example, when their cough or shortness of breath have improved)  AND  If had mild or moderate illness, at least 10 days have passed since their symptoms first appeared or if severe illness (needed oxygen) or immunosuppressed, at least 20 days have passed since symptoms first appeared  Patients with confirmed COVID-19 should also notify close contacts (including their workplace) and ask that they self-quarantine  Currently, close contact is defined as being within 6 feet for 15 minutes or more from the period 24 hours starting 48 hours before symptom onset to the time at which the patient went into isolation   Close contacts of patients diagnosed with COVID-19 should be instructed by the patient to self-quarantine for 14 days from the last time of their last contact with the patient  Source: [employer]      I have spent 10 minutes directly with the patient  Greater than 50% of this time was spent in counseling/coordination of care regarding: risks and benefits of treatment options, instructions for management, patient and family education, importance of treatment compliance, risk factor reductions and impressions  Encounter provider Sharon Holguin DO    Provider located at [address] [place] [address] [address]  [facility] [id number] [phone]    Recent Visits  No visits were found meeting these conditions    Showing recent visits within past 7 days and meeting all other requirements  Today's Visits  Date Type Provider Dept   04/21/22 [address], [address] [place] [address] Drive [facility]   Showing today's visits and meeting all other requirements  Future Appointments  No visits were found meeting these conditions  Showing future appointments within next 150 days and meeting all other requirements       Patient agrees to participate in a virtual check in via telephone or video visit instead of presenting to the office to address urgent/immediate medical needs  Patient is aware this is a billable service  After connecting through Telephone, the patient was identified by name and date of birth  Edelmira Fabian was informed that this was a telemedicine visit and that the exam was being conducted confidentially over secure lines  My office door was closed  No one else was in the room  Edelmira Fabian acknowledged consent and understanding of privacy and security of the telemedicine visit  I informed the patient that I have reviewed his record in Epic and presented the opportunity for him to ask any questions regarding the visit today  The patient agreed to participate  Verification of patient location:  Patient is located in the following state in which I hold an active license: [place]    Subjective:   Edelmira Fabian is a 58 y o  male who is concerned about COVID-19  Patient's symptoms include fatigue, malaise, nasal congestion, rhinorrhea, sore throat, cough, shortness of breath and myalgias  Patient denies fever, chills, anosmia, loss of taste, chest tightness, nausea, vomiting, diarrhea and headaches       - Date of symptom onset: 4/19/2022      COVID-19 vaccination status: Fully vaccinated with booster    Exposure:   Contact with a person who is under investigation (PUI) for or who is positive for COVID-19 within the last 14 days?: No    Hospitalized recently for fever and/or lower respiratory symptoms?: No      Currently a healthcare worker that is involved in direct patient care?: No      Works in a special setting where the risk of COVID-19 transmission may be high? (this may include long-term care, correctional and senior living facilities; homeless shelters; assisted-living facilities and group homes ): No      Resident in a special setting where the risk of COVID-19 transmission may be high? 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    Pancreatitis     three times    Sleep apnea     not using a C-PAP    Superficial thrombophlebitis      Past Surgical History:   Procedure Laterality Date    CHOLECYSTECTOMY      CIRCUMCISION      Elective    COLONOSCOPY      complete, polyps 2 years ago    CORONARY ANGIOPLASTY WITH STENT PLACEMENT      stent to RCA 2004    INGUINAL HERNIA REPAIR      ORIF RADIAL SHAFT FRACTURE Left     Open Treatment of Multiple Fractures of the Radial Shaft    ORIF ULNAR / RADIAL SHAFT FRACTURE Left     Open Treatment of Multiple Fractures of the Ulnar Shaft    TONSILLECTOMY AND ADENOIDECTOMY       Current Outpatient Medications   Medication Sig Dispense Refill    acyclovir (ZOVIRAX) 200 mg capsule Take 1 capsule (200 mg total) by mouth 5 (five) times a day for 5 days 25 capsule 0    amoxicillin (AMOXIL) 500 MG tablet TAKE 4 TABLETS BY MOUTH 1 HOUR BEFORE DENTIST      aspirin (ECOTRIN LOW STRENGTH) 81 mg EC tablet Take 81 mg by mouth daily      atorvastatin (LIPITOR) 40 mg tablet TAKE 1 TABLET BY MOUTH EVERY DAY 90 tablet 1    cholecalciferol (VITAMIN D3) 400 units tablet Take 1 tablet by mouth every morning      Continuous Blood Gluc Sensor (FreeStyle Nehemias 14 Day Sensor) MISC USE AS DIRECTED , CHANGING EVERY 14 DAYS        insulin glargine (LANTUS SOLOSTAR) 100 units/mL injection pen Take 24 units in AM (Patient taking differently: Inject 40 Units under the skin daily Take 24 units in AM ) 5 pen 1    insulin lispro (HUMALOG KWIKPEN) 100 units/mL injection pen Inject 10units TID +scale 1 unit for every 50 over 150 mg/dl)150-200 = 1; 201-249 = 2; 250-300 = 3; 301-349 = 4; > 350 = 5 5 pen 1    Insulin Pen Needle 32G X 4 MM MISC Use to inject insulin 3 times daily 300 each 1    methocarbamol (ROBAXIN) 500 mg tablet Take 500 mg by mouth every 6 (six) hours as needed      pantoprazole (PROTONIX) 20 mg tablet TAKE 1 TABLET BY MOUTH EVERY DAY 90 tablet 1    predniSONE 10 mg tablet 5 x 1 day, 4 x1 day, 3 x 1 day,2 x1 day,  1 x 1 day 15 tablet 0    Promethazine-DM (PHENERGAN-DM) 6 25-15 mg/5 mL oral syrup Take 5 mL by mouth 4 (four) times a day as needed for cough 240 mL 0    sildenafil (VIAGRA) 100 mg tablet Take 1 tablet (100 mg total) by mouth daily as needed for erectile dysfunction 10 tablet 1    Xarelto 20 MG tablet TAKE 1 TABLET BY MOUTH EVERY DAY WITH BREAKFAST 90 tablet 1     No current facility-administered medications for this visit  No Known Allergies    Review of Systems   Constitutional: Positive for activity change, appetite change and fatigue  Negative for chills and fever  HENT: Positive for congestion, rhinorrhea and sore throat  Respiratory: Positive for cough and shortness of breath  Negative for chest tightness  Gastrointestinal: Negative for diarrhea, nausea and vomiting  Musculoskeletal: Positive for myalgias  Neurological: Negative for headaches  Objective: There were no vitals filed for this visit  Physical Exam    VIRTUAL VISIT DISCLAIMER    Nacho Gaytanvers verbally agrees to participate in [employer]  Pt is aware that [employer] could be limited without vital signs or the ability to perform a full hands-on physical exam  Otoniel Juan understands he or the provider may request at any time to terminate the video visit and request the patient to seek care or treatment in person  no fever and no chills.

## 2022-04-22 LAB
FLUAV RNA RESP QL NAA+PROBE: NEGATIVE
FLUBV RNA RESP QL NAA+PROBE: NEGATIVE
SARS-COV-2 RNA RESP QL NAA+PROBE: NEGATIVE

## 2022-04-23 ENCOUNTER — NURSE TRIAGE (OUTPATIENT)
Dept: OTHER | Facility: OTHER | Age: 62
End: 2022-04-23

## 2022-04-23 NOTE — TELEPHONE ENCOUNTER
Reason for Disposition   [1] Continuous (nonstop) coughing interferes with work or school AND [2] no improvement using cough treatment per protocol    Answer Assessment - Initial Assessment Questions  1  ONSET: "When did the cough begin?"      4/192  SEVERITY: "How bad is the cough today?"       Moderate-severe  3  SPUTUM: "Describe the color of your sputum" (none, dry cough; clear, white, yellow, green)     Yellow sputum  4  HEMOPTYSIS: "Are you coughing up any blood?" If so ask: "How much?" (flecks, streaks, tablespoons, etc )     Denies   5  DIFFICULTY BREATHING: "Are you having difficulty breathing?" If Yes, ask: "How bad is it?" (e g , mild, moderate, severe)     - MILD: No SOB at rest, mild SOB with walking, speaks normally in sentences, can lay down, no retractions, pulse < 100      - MODERATE: SOB at rest, SOB with minimal exertion and prefers to sit, cannot lie down flat, speaks in phrases, mild retractions, audible wheezing, pulse 100-120      - SEVERE: Very SOB at rest, speaks in single words, struggling to breathe, sitting hunched forward, retractions, pulse > 120       Denies   6  FEVER: "Do you have a fever?" If Yes, ask: "What is your temperature, how was it measured, and when did it start?"      Denies   7  CARDIAC HISTORY: "Do you have any history of heart disease?" (e g , heart attack, congestive heart failure)      Heart disease  8  LUNG HISTORY: "Do you have any history of lung disease?"  (e g , pulmonary embolus, asthma, emphysema)      Denies   9  PE RISK FACTORS: "Do you have a history of blood clots?" (or: recent major surgery, recent prolonged travel, bedridden)      Denies   10  OTHER SYMPTOMS: "Do you have any other symptoms?" (e g , runny nose, wheezing, chest pain)      Nasal drainage  12   TRAVEL: "Have you traveled out of the country in the last month?" (e g , travel history, exposures)        Denies    Protocols used: COUGH - ACUTE NON-PRODUCTIVE-ADULT-AH

## 2022-04-23 NOTE — TELEPHONE ENCOUNTER
Patient seen in office on 4/21/22 for r/o COVID/FLU  Negative for both  Continues with moist productive cough (yellow sputum) nasal drainage Moderate-severe  Denies chest pain,SOB, breathing difficulty, or fever  Wife concerned may be bronchitis  Will like patient placed on Antibiotic  Reports no additional symptoms  Reports cough worsening, and keeping him up throughout night  Promethazine-DM not effective  call provider contacted and informed of patients concerns and status  On-call Provider to send medication (not specified) in  Patient informed of providers recommendation  Verbalized understanding and agreeable with disposition  No further questions

## 2022-04-25 ENCOUNTER — APPOINTMENT (OUTPATIENT)
Dept: PHYSICAL THERAPY | Facility: REHABILITATION | Age: 62
End: 2022-04-25
Payer: COMMERCIAL

## 2022-04-27 ENCOUNTER — APPOINTMENT (OUTPATIENT)
Dept: PHYSICAL THERAPY | Facility: REHABILITATION | Age: 62
End: 2022-04-27
Payer: COMMERCIAL

## 2022-05-04 ENCOUNTER — EVALUATION (OUTPATIENT)
Dept: PHYSICAL THERAPY | Facility: REHABILITATION | Age: 62
End: 2022-05-04
Payer: COMMERCIAL

## 2022-05-04 DIAGNOSIS — H81.12 BENIGN PAROXYSMAL VERTIGO OF LEFT EAR: ICD-10-CM

## 2022-05-04 DIAGNOSIS — Z96.641 STATUS POST RIGHT HIP REPLACEMENT: Primary | ICD-10-CM

## 2022-05-04 DIAGNOSIS — H81.11 BENIGN PAROXYSMAL VERTIGO OF RIGHT EAR: ICD-10-CM

## 2022-05-04 PROCEDURE — 97110 THERAPEUTIC EXERCISES: CPT | Performed by: PHYSICAL THERAPIST

## 2022-05-04 PROCEDURE — 97140 MANUAL THERAPY 1/> REGIONS: CPT | Performed by: PHYSICAL THERAPIST

## 2022-05-04 NOTE — PROGRESS NOTES
PT Re-Evaluation     Today's date: 2022  Patient name: Yvonne Jean  : 1960  MRN: [id number]  Referring provider: Luke Duque MD  Dx:   Encounter Diagnosis     ICD-10-CM    1  Status post right hip replacement  Z96 641    2  Benign paroxysmal vertigo of right ear  H81 11    3  Benign paroxysmal vertigo of left ear  H81 12                   Assessment  Assessment details: Yvonne Jean has been treated in outpatient physical therapy for diagnosis/complaints of Status post right hip replacement  (primary encounter diagnosis)  He returns after a 2-week hiatus secondary to hospitalization  As a result of this hospitalization, he has regressed in his strength progression and endurance  He maintains good range of motion and mobility  However, he would continue to benefit from skilled physical therapy to address limitations and to achieve goals  Thank you for this referral   Impairments: abnormal coordination, abnormal or restricted ROM, abnormal movement, activity intolerance, impaired balance, impaired physical strength and pain with function  Other impairment: dizziness  Understanding of Dx/Px/POC: good   Prognosis: good    Goals  STG: updated as of 22  1  Patient will be able to negotiate up and down stairs in reciprocal pattern with 1 HR in 4 weeks  2  Patient will be able to get down and up from the floor independently without difficulty in 4 weeks  LTG: updated as of 22  1  Patient will be able to ambulate up to 2 miles without difficulty by discharge  2  Patient will be able to negotiate up and down stairs in reciprocal pattern without HR by discharge       Plan  Patient would benefit from: PT eval and skilled PT  Planned modality interventions: cryotherapy and thermotherapy: hydrocollator packs  Planned therapy interventions: body mechanics training, home exercise program, neuromuscular re-education, manual therapy, postural training, balance/weight bearing training, transfer training, self care and canalith repositioning  Frequency: 2x week  Duration in visits: 8  Duration in weeks: 4  Treatment plan discussed with: patient        Subjective Evaluation    History of Present Illness  Mechanism of injury: 03/08/2022  Pt presents s/p R MALENA on 2/17/2022  Pt reports that he received a posterior approach arthroplasty  He reports that he has been able to ambulate around his home with the cane over the last week without issues  He states that he was able to ambulate around the [place] for approx  20 minutes before he started to feel sore  Reports that most of his pain occurs at night  Describes symptoms as throbbing and are mostly located at his incision  Symptoms occasionally occur at his knee and anterior leg  Denies numbness/tingling  Denies redness or increase of skin temp  AGGS: standing for long periods of time, walking  EASES: medication, light exercises   Goals: return to day to day with pain, be able to watch grandchildren      04/13/22  Pt reports approximately 10+ year history of vertigo/dizziness  States he has had repositioning maneuver performed 4x in the past, with minimal long-lasting improvement  Reports he also tried Meclizine in the past, with no reported improvements  States symptoms occur when he lies down fully supine  Describes symptoms as dizziness in his head, denying room spinning symptoms  States symptoms last approximately 10-15 seconds  VBI: (-) Diplopia, (+) Dizziness, (-) Dysphagia, (-) Dysarthria, (-) Drop attacks, (-) Nausea, (-) Vomiting, (-) Sensory changes, (-) Nystagmus       05/04/22  Pt returns to therapy after 2 week hiatus secondary to hospitalization  States his diagnosis went back and forth between COVID and pneumonia  Reports he was having significant mucus buildup in his lungs  States he is still receiving breathing treatments and is regularly checking his SaO2 stats  Reports he feels he has lost significant strength from his hospitalization  Notes climbing stairs is now difficult for him, as well as, decreased endurance  States he hasn't had spinning dizziness lately, however, reports lightheadedness regularly since hospitalization  Patient Goals  Patient goals for therapy: increased motion, increased strength and independence with ADLs/IADLs  Patient goal: reduce dizziness with supine position        Objective     Active Range of Motion     Right Hip   Flexion: WFL  Abduction: WFL  External rotation (90/90): [place] [place]  Internal rotation (90/90): WFL    Passive Range of Motion     Right Hip   Flexion: WFL  Abduction: WFL  External rotation (90/90): [place] [place]  Internal rotation (90/90):  [facility]    Strength/Myotome Testing     Right Hip   Planes of Motion   Flexion: 4  Extension: 4-  Abduction: 4-  External rotation: 4-  Internal rotation: 4  Neuro Exam:     Oculomotor exam   Oculomotor ROM: WNL  Resting nystagmus: not present   Gaze holding nystagmus: not present left  and not present right  Smooth pursuits: within normal limits  Vertical saccades: normal  Horizontal saccades: normal  Convergence: normal  Head thrust: left normal and right normal    Positional testing   [place]-Hallpike   Left posterior canal: symptomatic, torsional and upbeating  Right posterior canal: symptomatic and WNL  Roll test   Left horizontal canal: WNL  Right horizontal canal: WNL             Precautions: Posterior MALENA precautions, diabetes, history of CAD      Daily Treatment Diary    Date 4/11 4/13 4/14 4/18 4/20 5/4  3/30 4/4 4/6   FOTO     perf     nv   Re-Eval  vestib    perf          Manuals    Hip PROM (no add/IR) CC  NT CC CC JANIA  TE CC    CRT - L post  x3 x1                                    Neuro Re-Ed     SLS foam      nv                                              Ther Ex    Standing hip ext/abd 4# 2x10 ea b/l  5# 2x10 b/l 5#  2x10 b/l 5#  2x10   b/l 2# 2x10 b/l  2# 2x10 ea b/l 2# 2x10 ea b/l 5# 2x10 ea b/l   Mini squats 3x10  umpire position 5" 3x10 Umpire post  5"  3x10 Umpire position  5"  3x10 on foam 2x10    3x10   SA leg ext 44#  3x10  44# 3x10 44#  3x10 55#  2x10 33# 2x10  44#  2x10 44#  2x10  x5 nv   SA hamstring curl 66#  2x10    66# 2x10 66#  3x10 66#  3x10 55# 3x10  55#  3x10 55#  3x10 nv   SLR - flex, abd, ext 2x10 ea  10"x10 10"x10 10"x10 10"x10    2x10 ea   clamshells   10"x10 10"x10 10"x10 nv       Bridges c TB      nv                    Ther Activity    Bike TM 2 0 x7' 3 0 mph x3'  TM 2'intervals of 3 0 and one 30" 3 8 (jog) TM 2' walk 2 0 mph   3 0x2'  3 8 mph TM  Walk 2 0 2',   3 0mph x2'  3 6 mph x2' x2 Bike x5'  TM  1 8  x8' TM  2 0  x8' TM 2 0 10'   Step ups - fwd, lat -       1R  2x10 ea 1R  2x10 hold   Hip hike 1R  2x10   1R 2x10 ea 1R  5#  2x10 ea np  0R 2x10 b/l 0R  2x10 b/l 1R 2x10   [place] side step, backward walk hema 12 1  x10 ea b/l   hema  12 6  x10 ea b/l hema  13 5  x10 ea b/l np  hema  10#  x10 ea bl hema  12 1  x10 ea b/l nv                Gait Training                              Modalities    Ice PRN

## 2022-05-04 NOTE — LETTER
May 11, 2022    MD SOPHIA Avalos/Froilan Anglin    Patient: Rosalinda Darby   YOB: 1960   Date of Visit: 2022     Encounter Diagnosis     ICD-10-CM    1  Status post right hip replacement  Z96 641    2  Benign paroxysmal vertigo of right ear  H81 11    3  Benign paroxysmal vertigo of left ear  H81 12        Dear Dr Yarely Romero:    Thank you for your recent referral of Rosalinda Darby  Please review the attached evaluation summary from Otoniel's recent visit  Please verify that you agree with the plan of care by signing the attached order  If you have any questions or concerns, please do not hesitate to call  I sincerely appreciate the opportunity to share in the care of one of your patients and hope to have another opportunity to work with you in the near future  Sincerely,    Evita Aguilar, PT      Referring Provider:      I certify that I have read the below Plan of Care and certify the need for these services furnished under this plan of treatment while under my care  Lily Horan MD  [facility] [phone]  Via Fax: [phone]          PT Re-Evaluation     Today's date: 2022  Patient name: Rosalinda Darby  : 1960  MRN: [id number]  Referring provider: Dash Mckenna MD  Dx:   Encounter Diagnosis     ICD-10-CM    1  Status post right hip replacement  Z96 641    2  Benign paroxysmal vertigo of right ear  H81 11    3  Benign paroxysmal vertigo of left ear  H81 12                   Assessment  Assessment details: Rosalinda Darby has been treated in outpatient physical therapy for diagnosis/complaints of Status post right hip replacement  (primary encounter diagnosis)  He returns after a 2-week hiatus secondary to hospitalization  As a result of this hospitalization, he has regressed in his strength progression and endurance   He maintains good range of motion and mobility  However, he would continue to benefit from skilled physical therapy to address limitations and to achieve goals  Thank you for this referral   Impairments: abnormal coordination, abnormal or restricted ROM, abnormal movement, activity intolerance, impaired balance, impaired physical strength and pain with function  Other impairment: dizziness  Understanding of Dx/Px/POC: good   Prognosis: good    Goals  STG: updated as of 5/4/22  1  Patient will be able to negotiate up and down stairs in reciprocal pattern with 1 HR in 4 weeks  2  Patient will be able to get down and up from the floor independently without difficulty in 4 weeks  LTG: updated as of 5/4/22  1  Patient will be able to ambulate up to 2 miles without difficulty by discharge  2  Patient will be able to negotiate up and down stairs in reciprocal pattern without HR by discharge  Plan  Patient would benefit from: PT eval and skilled PT  Planned modality interventions: cryotherapy and thermotherapy: hydrocollator packs  Planned therapy interventions: body mechanics training, home exercise program, neuromuscular re-education, manual therapy, postural training, balance/weight bearing training, transfer training, self care and canalith repositioning  Frequency: 2x week  Duration in visits: 8  Duration in weeks: 4  Treatment plan discussed with: patient        Subjective Evaluation    History of Present Illness  Mechanism of injury: 03/08/2022  Pt presents s/p R MALENA on 2/17/2022  Pt reports that he received a posterior approach arthroplasty  He reports that he has been able to ambulate around his home with the cane over the last week without issues  He states that he was able [place] for approx  20 minutes before he started to feel sore  Reports that most of his pain occurs at night  Describes symptoms as throbbing and are mostly located at his incision  Symptoms occasionally occur at his knee and anterior leg   Denies numbness/tingling  Denies redness or increase of skin temp  AGGS: standing for long periods of time, walking  EASES: medication, light exercises   Goals: return to day to day with pain, be able to watch grandchildren      04/13/22  Pt reports approximately 10+ year history of vertigo/dizziness  States he has had repositioning maneuver performed 4x in the past, with minimal long-lasting improvement  Reports he also tried Meclizine in the past, with no reported improvements  States symptoms occur when he lies down fully supine  Describes symptoms as dizziness in his head, denying room spinning symptoms  States symptoms last approximately 10-15 seconds  VBI: (-) Diplopia, (+) Dizziness, (-) Dysphagia, (-) Dysarthria, (-) Drop attacks, (-) Nausea, (-) Vomiting, (-) Sensory changes, (-) Nystagmus       05/04/22  Pt returns to therapy after 2 week hiatus secondary to hospitalization  States his diagnosis went back and forth between COVID and pneumonia  Reports he was having significant mucus buildup in his lungs  States he is still receiving breathing treatments and is regularly checking his SaO2 stats  Reports he feels he has lost significant strength from his hospitalization  Notes climbing stairs is now difficult for him, as well as, decreased endurance  States he hasn't had spinning dizziness lately, however, reports lightheadedness regularly since hospitalization  Patient Goals  Patient goals for therapy: increased motion, increased strength and independence with ADLs/IADLs  Patient goal: reduce dizziness with supine position        Objective     Active Range of Motion     Right Hip   Flexion: WFL  Abduction: WFL  External rotation (90/90): [place] [place]  Internal rotation (90/90): WFL    Passive Range of Motion     Right Hip   Flexion: WFL  Abduction: WFL  External rotation (90/90): [facility] [place]  Internal rotation (90/90):  [facility]    Strength/Myotome Testing     Right Hip   Planes of Motion   Flexion: 4  Extension: 4-  Abduction: 4-  External rotation: 4-  Internal rotation: 4  Neuro Exam:     Oculomotor exam   Oculomotor ROM: WNL  Resting nystagmus: not present   Gaze holding nystagmus: not present left  and not present right  Smooth pursuits: within normal limits  Vertical saccades: normal  Horizontal saccades: normal  Convergence: normal  Head thrust: left normal and right normal    Positional testing   [place]-Hallpike   Left posterior canal: symptomatic, torsional and upbeating  Right posterior canal: symptomatic and WNL  Roll test   Left horizontal canal: WNL  Right horizontal canal: WNL             Precautions: Posterior MALENA precautions, diabetes, history of CAD      Daily Treatment Diary    Date 4/11 4/13 4/14 4/18 4/20 5/4  3/30 4/4 4/6   FOTO     perf     nv   Re-Eval  vestib    perf          Manuals    Hip PROM (no add/IR) CC  NT CC CC JANIA  TE CC    CRT - L post  x3 x1                                    Neuro Re-Ed     SLS foam      nv                                              Ther Ex    Standing hip ext/abd 4# 2x10 ea b/l  5# 2x10 b/l 5#  2x10 b/l 5#  2x10   b/l 2# 2x10 b/l  2# 2x10 ea b/l 2# 2x10 ea b/l 5# 2x10 ea b/l   Mini squats 3x10  umpire position 5" 3x10 Umpire post  5"  3x10 Umpire position  5"  3x10 on foam 2x10    3x10   SA leg ext 44#  3x10  44# 3x10 44#  3x10 55#  2x10 33# 2x10  44#  2x10 44#  2x10  x5 nv   SA hamstring curl 66#  2x10    66# 2x10 66#  3x10 66#  3x10 55# 3x10  55#  3x10 55#  3x10 nv   SLR - flex, abd, ext 2x10 ea  10"x10 10"x10 10"x10 10"x10    2x10 ea   clamshells   10"x10 10"x10 10"x10 nv       Bridges c TB      nv                    Ther Activity    Bike TM 2 0 x7' 3 0 mph x3'  TM 2'intervals of 3 0 and one 30" 3 8 (jog) TM 2' walk 2 0 mph   3 0x2'  3 8 mph TM  Walk 2 0 2',   3 0mph x2'  3 6 mph x2' x2 Bike x5'  TM  1 8  x8' TM  2 0  x8' TM 2 0 10'   Step ups - fwd, lat -       1R  2x10 ea 1R  2x10 hold   Hip hike 1R  2x10   1R 2x10 ea 1R  5#  2x10 ea np  0R 2x10 b/l 0R  2x10 b/l 1R 2x10   Prashanth side step, backward walk prashanth 12 1  x10 ea b/l   prashanth  12 6  x10 ea b/l prashanth  13 5  x10 ea b/l np  prashanth  10#  x10 ea bl prashanth  12 1  x10 ea b/l nv                Gait Training                              Modalities    Ice PRN

## 2022-05-05 ENCOUNTER — OFFICE VISIT (OUTPATIENT)
Dept: PHYSICAL THERAPY | Facility: REHABILITATION | Age: 62
End: 2022-05-05
Payer: COMMERCIAL

## 2022-05-05 DIAGNOSIS — Z96.641 STATUS POST RIGHT HIP REPLACEMENT: Primary | ICD-10-CM

## 2022-05-05 PROCEDURE — 97112 NEUROMUSCULAR REEDUCATION: CPT

## 2022-05-05 PROCEDURE — 97110 THERAPEUTIC EXERCISES: CPT

## 2022-05-05 NOTE — PROGRESS NOTES
Daily Note     Today's date: 2022  Patient name: Demetrice Dougherty  : 1960  MRN: [id number]  Referring provider: Jesse Villareal MD  Dx:   Encounter Diagnosis     ICD-10-CM    1  Status post right hip replacement  Z96 641                   Subjective:   Julienne Stanley reports that the front of his hip was sore walking in but he noted this improved with exercise  Julienne Stanley reports that he is doing better breathing wise but still notes fatigue  Objective: See treatment diary below      Assessment: Patient tolerated treatment well  Patient performed ex and received manual therapy as noted  Patient was encouraged to perform exercise within his tolerance and to rest as needed  Good R hip ROM noted  Patient does report some difficulty putting his socks on  Patient would benefit from continued PT intervention to address deficits and attain set goals  Plan: Continue per plan of care        Precautions: Posterior MALENA precautions, diabetes, history of CAD      Daily Treatment Diary    Date    FOTO     perf     nv   Re-Eval  vestib    perf          Manuals    Hip PROM (no add/IR) CC  NT CC CC JANIA CC  CC    CRT - L post  x3 x1                                    Neuro Re-Ed     SLS foam      nv 20"x3                                             Ther Ex    Standing hip ext/abd 4# 2x10 ea b/l  5# 2x10 b/l 5#  2x10 b/l 5#  2x10   b/l 2# 2x10 b/l 2#  2x10  b/l  2# 2x10 ea b/l 5# 2x10 ea b/l   Mini squats 3x10  umpire position 5" 3x10 Umpire post  5"  3x10 Umpire position  5"  3x10 on foam 2x10 2x10   3x10   SA leg ext 44#  3x10  44# 3x10 44#  3x10 55#  2x10 33# 2x10 33#  2x10    44#  2x10  x5 nv   SA hamstring curl 66#  2x10    66# 2x10 66#  3x10 66#  3x10 55# 3x10 66#  2x10    55#  3x10 nv   SLR - flex, abd, ext 2x10 ea  10"x10 10"x10 10"x10 10"x10 10x10"   2x10 ea   clamshells   10"x10 10"x10 10"x10 nv gtb      KROGNI c TB      nv gtb 5" x15                   Ther Activity Bike TM 2 0 x7' 3 0 mph x3'  TM 2'intervals of 3 0 and one 30" 3 8 (jog) TM 2' walk 2 0 mph   3 0x2'  3 8 mph TM  Walk 2 0 2',   3 0mph x2'  3 6 mph x2' x2 Bike x5' TM 2 0 mph x8'  TM  2 0  x8' TM 2 0 10'   Step ups - fwd, lat -       1R  2x10 ea 1R  2x10 hold   Hip hike 1R  2x10   1R 2x10 ea 1R  5#  2x10 ea np 1R 2#  2x10  ea 0R 2x10 b/l 0R  2x10 b/l 1R 2x10   [place] side step, backward walk hema 12 1  x10 ea b/l   hema  12 6  x10 ea b/l hema  13 5  x10 ea b/l np  hema  10#  x10 ea bl hema  12 1  x10 ea b/l nv                Gait Training                              Modalities    Ice PRN

## 2022-05-10 ENCOUNTER — OFFICE VISIT (OUTPATIENT)
Dept: PHYSICAL THERAPY | Facility: REHABILITATION | Age: 62
End: 2022-05-10
Payer: COMMERCIAL

## 2022-05-10 DIAGNOSIS — Z96.641 STATUS POST RIGHT HIP REPLACEMENT: Primary | ICD-10-CM

## 2022-05-10 PROCEDURE — 97112 NEUROMUSCULAR REEDUCATION: CPT

## 2022-05-10 PROCEDURE — 97110 THERAPEUTIC EXERCISES: CPT

## 2022-05-10 PROCEDURE — 97140 MANUAL THERAPY 1/> REGIONS: CPT

## 2022-05-10 NOTE — PROGRESS NOTES
Daily Note     Today's date: 5/10/2022  Patient name: Yvonne Jean  : 1960  MRN: [id number]  Referring provider: Vesta Calvo MD  Dx:   Encounter Diagnosis     ICD-10-CM    1  Status post right hip replacement  Z96 641                   Subjective: pt reports with c/o constant dull pain in lateral hip into anterior thigh  He denied adverse reaction following last visit and feels his endurance is slowly improving  Objective: See treatment diary below      Assessment: Tolerated treatment well  Challenged with current program  Intermittent cues to avoid compensatory movements with standing hip exercises, but corrected upon VC's  PROM tolerated well with most restrictions into hip extension  Assessed R quad flexibility and soft tissue mobility 2* c/o continued pain in anterior thighwhich was positive for restrictions  Performed manual quad stretch in prone with reports of decreased pain upon completion  Reviewed prone quad stretch with strap for home  Patient demonstrated fatigue post treatment, exhibited good technique with therapeutic exercises and would benefit from continued PT      Plan: Continue per plan of care  Progress treatment as tolerated         Precautions: Posterior MALENA precautions, diabetes, history of CAD      Daily Treatment Diary    Date 4/11 4/13 4/14 4/18 4/20 5/4 5/5 5/10  4/6   FOTO     perf     nv   Re-Eval  vestib    perf          Manuals    Hip PROM (no add/IR) CC  NT CC CC JANIA CC TE     CRT - L post  x3 x1          R quad stretch        Prone TE                  Neuro Re-Ed     SLS foam      nv 20"x3 nv                                            Ther Ex    Standing hip ext/abd 4# 2x10 ea b/l  5# 2x10 b/l 5#  2x10 b/l 5#  2x10   b/l 2# 2x10 b/l 2#  2x10  b/l 2#  2x10  b/l  5# 2x10 ea b/l   Mini squats 3x10  umpire position 5" 3x10 Umpire post  5"  3x10 Umpire position  5"  3x10 on foam 2x10 2x10 3x10  3x10   SA leg ext 44#  3x10  44# 3x10 44#  3x10 55#  2x10 33# 2x10 33#  2x10   33#  2x10  nv   SA hamstring curl 66#  2x10    66# 2x10 66#  3x10 66#  3x10 55# 3x10 66#  2x10   66#  2x10  nv   SLR - flex, abd, ext 2x10 ea  10"x10 10"x10 10"x10 10"x10 10x10" 10"x10  2x10 ea   clamshells   10"x10 10"x10 10"x10 nv gtb      Bridges c TB      nv gtb 5" x15 nv     Prone quad stretch        30"x3 HEP     Ther Activity    Bike TM 2 0 x7' 3 0 mph x3'  TM 2'intervals of 3 0 and one 30" 3 8 (jog) TM 2' walk 2 0 mph   3 0x2'  3 8 mph TM  Walk 2 0 2',   3 0mph x2'  3 6 mph x2' x2 Bike x5' TM 2 0 mph x8' TM 2 0 mph x8'  TM 2 0 10'   Step ups - fwd, lat -         hold   Hip hike 1R  2x10   1R 2x10 ea 1R  5#  2x10 ea np 1R 2#  2x10  ea 1R 2#  2x10  ea  1R 2x10   [place] side step, backward walk hema 12 1  x10 ea b/l   hema  12 6  x10 ea b/l hema  13 5  x10 ea b/l np    nv                Gait Training                              Modalities    Ice PRN

## 2022-05-12 ENCOUNTER — OFFICE VISIT (OUTPATIENT)
Dept: PHYSICAL THERAPY | Facility: REHABILITATION | Age: 62
End: 2022-05-12
Payer: COMMERCIAL

## 2022-05-12 DIAGNOSIS — Z96.641 STATUS POST RIGHT HIP REPLACEMENT: Primary | ICD-10-CM

## 2022-05-12 PROCEDURE — 97112 NEUROMUSCULAR REEDUCATION: CPT

## 2022-05-12 PROCEDURE — 97110 THERAPEUTIC EXERCISES: CPT

## 2022-05-12 PROCEDURE — 97140 MANUAL THERAPY 1/> REGIONS: CPT

## 2022-05-12 NOTE — PROGRESS NOTES
Daily Note     Today's date: 2022  Patient name: Kelli Phillips  : 1960  MRN: [id number]  Referring provider: Keiko Coles MD  Dx:   Encounter Diagnosis     ICD-10-CM    1  Status post right hip replacement  Z96 641                   Subjective:  Lori Foster reports that the discomfort in the front of his hip is better since his quad was stretched at his last visit  Objective: See treatment diary below      Assessment: Patient tolerated treatment well  Patient performed ex and received manual therapy as noted  Progressed ex as indicated with appropriate patient challenge noted  Patient demonstrates improving R hip ROM   + R quad restriction noted compared to L however this is improving overall  Patient would benefit from continued PT intervention to address deficits and attain set goals  Plan: Continue per plan of care           Precautions: Posterior MALENA precautions, diabetes, history of CAD      Daily Treatment Diary    Date 4/11 4/13 4/14 4/18 4/20 5/4 5/5 5/10 5/12    FOTO     perf        Re-Eval  vestib    perf          Manuals    Hip PROM (no add/IR) CC  NT CC CC JANIA CC TE CC    CRT - L post  x3 x1          R quad stretch        Prone TE Prone CC                 Neuro Re-Ed     SLS foam      nv 20"x3 nv                                            Ther Ex    Standing hip ext/abd 4# 2x10 ea b/l  5# 2x10 b/l 5#  2x10 b/l 5#  2x10   b/l 2# 2x10 b/l 2#  2x10  b/l 2#  2x10  b/l 3#  2x10   b/l    Mini squats 3x10  umpire position 5" 3x10 Umpire post  5"  3x10 Umpire position  5"  3x10 on foam 2x10 2x10 3x10 3x10    SA leg ext 44#  3x10  44# 3x10 44#  3x10 55#  2x10 33# 2x10 33#  2x10   33#  2x10 44#  3x10      SA hamstring curl 66#  2x10    66# 2x10 66#  3x10 66#  3x10 55# 3x10 66#  2x10   66#  2x10 66#  3x10    SLR - flex, abd, ext 2x10 ea  10"x10 10"x10 10"x10 10"x10 10x10" 10"x10 10"x10    clamshells   10"x10 10"x10 10"x10 nv gtb  gtb  10"x10    Bridges c TB      nv gtb 5" x15 nv gtb  5" 2x10    Prone quad stretch        30"x3 HEP 30"x3    Ther Activity    Bike TM 2 0 x7' 3 0 mph x3'  TM 2'intervals of 3 0 and one 30" 3 8 (jog) TM 2' walk 2 0 mph   3 0x2'  3 8 mph TM  Walk 2 0 2',   3 0mph x2'  3 6 mph x2' x2 Bike x5' TM 2 0 mph x8' TM 2 0 mph x8' TM 2 0 mph  x8'    Step ups - fwd, lat -            Hip hike 1R  2x10   1R 2x10 ea 1R  5#  2x10 ea np 1R 2#  2x10  ea 1R 2#  2x10  ea 1R 3#  2x10    Prashanth side step, backward walk prashanth 12 1  x10 ea b/l   prashanth  12 6  x10 ea b/l prashanth  13 5  x10 ea b/l np                    Gait Training                              Modalities    Ice PRN

## 2022-05-17 ENCOUNTER — OFFICE VISIT (OUTPATIENT)
Dept: PHYSICAL THERAPY | Facility: REHABILITATION | Age: 62
End: 2022-05-17
Payer: COMMERCIAL

## 2022-05-17 DIAGNOSIS — Z96.641 STATUS POST RIGHT HIP REPLACEMENT: Primary | ICD-10-CM

## 2022-05-17 PROCEDURE — 97110 THERAPEUTIC EXERCISES: CPT

## 2022-05-17 PROCEDURE — 97140 MANUAL THERAPY 1/> REGIONS: CPT

## 2022-05-17 NOTE — PROGRESS NOTES
Daily Note     Today's date: 2022  Patient name: Guillaume Marinelli  : 1960  MRN: [id number]  Referring provider: Cale Lake MD  Dx:   Encounter Diagnosis     ICD-10-CM    1  Status post right hip replacement  Z96 641                   Subjective: Pt reports some "usual" pain in his hip today, not limiting activity  Objective: See treatment diary below      Assessment: Patient tolerated treatment well  Completes TE with appropriate challenge and resultant fatigue with progressions from lv  Progressed hip strengthening this visit with good tolerance  Patient would benefit from continued skilled PT to address current deficits and maximize function  Plan: Continue per plan of care           Precautions: Posterior MALENA precautions, diabetes, history of CAD      Daily Treatment Diary    Date 4/11 4/13 4/14 4/18 4/20 5/4 5/5 5/10 5/12 5/17   FOTO     perf        Re-Eval  vestib    perf          Manuals    Hip PROM (no add/IR) CC  NT CC CC JANIA CC TE CC SC   CRT - L post  x3 x1          R quad stretch        Prone TE Prone CC prone  SC                Neuro Re-Ed     SLS foam      nv 20"x3 nv                                            Ther Ex    Standing hip ext/abd 4# 2x10 ea b/l  5# 2x10 b/l 5#  2x10 b/l 5#  2x10   b/l 2# 2x10 b/l 2#  2x10  b/l 2#  2x10  b/l 3#  2x10   b/l 3#  2x10 b/l   Mini squats 3x10  umpire position 5" 3x10 Umpire post  5"  3x10 Umpire position  5"  3x10 on foam 2x10 2x10 3x10 3x10 3x10   SA leg ext 44#  3x10  44# 3x10 44#  3x10 55#  2x10 33# 2x10 33#  2x10   33#  2x10 44#  3x10   44#  3x10   SA hamstring curl 66#  2x10    66# 2x10 66#  3x10 66#  3x10 55# 3x10 66#  2x10   66#  2x10 66#  3x10 66#  3x10   SLR - flex, abd, ext 2x10 ea  10"x10 10"x10 10"x10 10"x10 10x10" 10"x10 10"x10 10"x10   clamshells   10"x10 10"x10 10"x10 nv gtb  gtb  10"x10 btb  10"x10   Bridges c TB      nv gtb 5" x15 nv gtb  5" 2x10 btb   5" 2x10   Prone quad stretch        30"x3 HEP 30"x3 30"x3   Ther Activity    Bike TM 2 0 x7' 3 0 mph x3'  TM 2'intervals of 3 0 and one 30" 3 8 (jog) TM 2' walk 2 0 mph   3 0x2'  3 8 mph TM  Walk 2 0 2',   3 0mph x2'  3 6 mph x2' x2 Bike x5' TM 2 0 mph x8' TM 2 0 mph x8' TM 2 0 mph  x8' TM 2 0  Mph  x8'   Step ups - fwd, lat -            Hip hike 1R  2x10   1R 2x10 ea 1R  5#  2x10 ea np 1R 2#  2x10  ea 1R 2#  2x10  ea 1R 3#  2x10 1R 3#  2x10   Prashanth side step, backward walk prashanth 12 1  x10 ea b/l   prashanth  12 6  x10 ea b/l prashanth  13 5  x10 ea b/l np                    Gait Training                              Modalities    Ice PRN

## 2022-05-18 ENCOUNTER — OFFICE VISIT (OUTPATIENT)
Dept: PHYSICAL THERAPY | Facility: REHABILITATION | Age: 62
End: 2022-05-18
Payer: COMMERCIAL

## 2022-05-18 DIAGNOSIS — Z96.641 STATUS POST RIGHT HIP REPLACEMENT: Primary | ICD-10-CM

## 2022-05-18 PROCEDURE — 97110 THERAPEUTIC EXERCISES: CPT | Performed by: PHYSICAL THERAPIST

## 2022-05-18 PROCEDURE — 97140 MANUAL THERAPY 1/> REGIONS: CPT | Performed by: PHYSICAL THERAPIST

## 2022-05-18 NOTE — PROGRESS NOTES
Daily Note     Today's date: 2022  Patient name: Nacho Gastelum  : 1960  MRN: [id number]  Referring provider: Aurora Ruiz MD  Dx:   Encounter Diagnosis     ICD-10-CM    1  Status post right hip replacement  Z96 641    2  Benign paroxysmal vertigo of right ear  H81 11    3  Benign paroxysmal vertigo of left ear  H81 12                   Subjective: Pt comes to therapy stating he feels his stamina is improving  Objective: See treatment diary below      Assessment: Tolerated treatment well  Patient exhibited good technique with therapeutic exercises and would benefit from continued PT      Plan: Progress treatment as tolerated         Precautions: Posterior MALENA precautions, diabetes, history of CAD      Daily Treatment Diary    Date 5/18  4/14 4/18 4/20 5/4 5/5 5/10 5/12 5/17   FOTO     perf        Re-Eval      perf          Manuals    Hip PROM (no add/IR) JANIA  NT CC CC JANIA CC TE CC SC   CRT - L post   x1          R quad stretch Prone JANIA       Prone TE Prone CC prone  SC                Neuro Re-Ed     SLS foam nv     nv 20"x3 nv                                            Ther Ex    Standing hip ext/abd 4# 2x10 b/l  5# 2x10 b/l 5#  2x10 b/l 5#  2x10   b/l 2# 2x10 b/l 2#  2x10  b/l 2#  2x10  b/l 3#  2x10   b/l 3#  2x10 b/l   Mini squats 3x10  umpire position 5" 3x10 Umpire post  5"  3x10 Umpire position  5"  3x10 on foam 2x10 2x10 3x10 3x10 3x10   SA leg ext 44#  3x10  44# 3x10 44#  3x10 55#  2x10 33# 2x10 33#  2x10   33#  2x10 44#  3x10   44#  3x10   SA hamstring curl 66#  3x10  66# 2x10 66#  3x10 66#  3x10 55# 3x10 66#  2x10   66#  2x10 66#  3x10 66#  3x10   SLR - flex, abd, ext 10"x10  10"x10 10"x10 10"x10 10"x10 10x10" 10"x10 10"x10 10"x10   clamshells btb  10"x10  10"x10 10"x10 10"x10 nv gtb  gtb  10"x10 btb  10"x10   Bridges c TB btb   5" 2x10     nv gtb 5" x15 nv gtb  5" 2x10 btb   5" 2x10   Prone quad stretch 30"x3       30"x3 HEP 30"x3 30"x3   Ther Activity    Bike TM 2 0  Mph  x8'  TM 2'intervals of 3 0 and one 30" 3 8 (jog) TM 2' walk 2 0 mph   3 0x2'  3 8 mph TM  Walk 2 0 2',   3 0mph x2'  3 6 mph x2' x2 Bike x5' TM 2 0 mph x8' TM 2 0 mph x8' TM 2 0 mph  x8' TM 2 0  Mph  x8'   Hip hike missed   1R 2x10 ea 1R  5#  2x10 ea np 1R 2#  2x10  ea 1R 2#  2x10  ea 1R 3#  2x10 1R 3#  2x10   Prashanth side step, backward walk    prashanth  12 6  x10 ea b/l prashanth  13 5  x10 ea b/l np                    Gait Training                              Modalities    Ice PRN

## 2022-05-19 ENCOUNTER — APPOINTMENT (OUTPATIENT)
Dept: PHYSICAL THERAPY | Facility: REHABILITATION | Age: 62
End: 2022-05-19
Payer: COMMERCIAL

## 2022-05-24 ENCOUNTER — OFFICE VISIT (OUTPATIENT)
Dept: PHYSICAL THERAPY | Facility: REHABILITATION | Age: 62
End: 2022-05-24
Payer: COMMERCIAL

## 2022-05-24 DIAGNOSIS — Z96.641 STATUS POST RIGHT HIP REPLACEMENT: Primary | ICD-10-CM

## 2022-05-24 PROCEDURE — 97110 THERAPEUTIC EXERCISES: CPT | Performed by: PHYSICAL THERAPIST

## 2022-05-24 PROCEDURE — 97112 NEUROMUSCULAR REEDUCATION: CPT | Performed by: PHYSICAL THERAPIST

## 2022-05-24 PROCEDURE — 97140 MANUAL THERAPY 1/> REGIONS: CPT | Performed by: PHYSICAL THERAPIST

## 2022-05-24 NOTE — PROGRESS NOTES
Daily Note     Today's date: 2022  Patient name: Guillaume Marinelli  : 1960  MRN: [id number]  Referring provider: Cale Lake MD  Dx:   Encounter Diagnosis     ICD-10-CM    1  Status post right hip replacement  Z96 641                   Subjective: Pt comes to therapy denying pain or discomfort  Denies discomfort following last treatment session  States he still feels his LEs are weak  Objective: See treatment diary below      Assessment: Tolerated treatment well  Responded favorably to leg press  Demonstrates continued challenge with maintaining level pelvis with SLS  Patient demonstrated fatigue post treatment, exhibited good technique with therapeutic exercises and would benefit from continued PT      Plan: Progress treatment as tolerated         Precautions: Posterior MALENA precautions, diabetes, history of CAD  DOS: 22    Daily Treatment Diary    Date 5/18 5/24  4/18 4/20 5/4 5/5 5/10 5/12 5/17   FOTO     perf        Re-Eval      perf          Manuals    Hip PROM (no add/IR) JANIA JANIA  CC CC JANIA CC TE CC SC   CRT - L post             R quad stretch Prone JANIA Prone JANIA      Prone TE Prone CC prone  SC                Neuro Re-Ed     SLS foam nv 30"x3    nv 20"x3 nv                                            Ther Ex    Standing hip ext/abd 4# 2x10 b/l 5# 2x10 b/l  5#  2x10 b/l 5#  2x10   b/l 2# 2x10 b/l 2#  2x10  b/l 2#  2x10  b/l 3#  2x10   b/l 3#  2x10 b/l   Mini squats 3x10 3x10  Umpire post  5"  3x10 Umpire position  5"  3x10 on foam 2x10 2x10 3x10 3x10 3x10   SA leg ext 44#  3x10 44# 3x19  44#  3x10 55#  2x10 33# 2x10 33#  2x10   33#  2x10 44#  3x10   44#  3x10   SA hamstring curl 66#  3x10 77# 3x10  66#  3x10 66#  3x10 55# 3x10 66#  2x10   66#  2x10 66#  3x10 66#  3x10   Leg press  132# 3x10           SLR - flex, abd, ext 10"x10 10"x10  10"x10 10"x10 10"x10 10x10" 10"x10 10"x10 10"x10   clamshells btb  10"x10 btb 2x10  10"x10 10"x10 nv gtb  gtb  10"x10 btb  10"x10   Bridges c TB btb   5" 2x10 btb 2x10    nv gtb 5" x15 nv gtb  5" 2x10 btb   5" 2x10   Prone quad stretch 30"x3 30"x3      30"x3 HEP 30"x3 30"x3   Ther Activity    Bike TM 2 0  Mph  x8' TM 2 0  Mph  x8'  TM 2' walk 2 0 mph   3 0x2'  3 8 mph TM  Walk 2 0 2',   3 0mph x2'  3 6 mph x2' x2 Bike x5' TM 2 0 mph x8' TM 2 0 mph x8' TM 2 0 mph  x8' TM 2 0  Mph  x8'   Hip hike missed biodex 2x10 b/l  1R 2x10 ea 1R  5#  2x10 ea np 1R 2#  2x10  ea 1R 2#  2x10  ea 1R 3#  2x10 1R 3#  2x10   Prashanth side step, backward walk    prashanth  12 6  x10 ea b/l prashanth  13 5  x10 ea b/l np                    Gait Training                              Modalities    Ice PRN

## 2022-05-26 ENCOUNTER — OFFICE VISIT (OUTPATIENT)
Dept: PHYSICAL THERAPY | Facility: REHABILITATION | Age: 62
End: 2022-05-26
Payer: COMMERCIAL

## 2022-05-26 DIAGNOSIS — Z96.641 STATUS POST RIGHT HIP REPLACEMENT: Primary | ICD-10-CM

## 2022-05-26 PROCEDURE — 97530 THERAPEUTIC ACTIVITIES: CPT

## 2022-05-26 PROCEDURE — 97110 THERAPEUTIC EXERCISES: CPT

## 2022-05-26 PROCEDURE — 97140 MANUAL THERAPY 1/> REGIONS: CPT

## 2022-05-26 NOTE — PROGRESS NOTES
Daily Note     Today's date: 2022  Patient name: Demetrice Dougherty  : 1960  MRN: [id number]  Referring provider: Jesse Villareal MD  Dx:   Encounter Diagnosis     ICD-10-CM    1  Status post right hip replacement  Z96 641                   Subjective: Pt reports to therapy with no new complaints to offer, states he felt good following last tx  Objective: See treatment diary below      Assessment: Tolerated treatment well  Improvement maintaining level pelvis during SLS following cuing  Demonstrates appropriate challenge and fatigue with TE  Patient demonstrated fatigue post treatment, exhibited good technique with therapeutic exercises and would benefit from continued PT      Plan: Progress treatment as tolerated         Precautions: Posterior MALENA precautions, diabetes, history of CAD  DOS: 22    Daily Treatment Diary    Date 5/18 5/24 5/26 4/18 4/20 5/4 5/5 5/10 5/12 5/17   FOTO     perf        Re-Eval      perf          Manuals    Hip PROM (no add/IR) JANIA JANIA SC CC CC JANIA CC TE CC SC   CRT - L post             R quad stretch Prone JANIA Prone JANIA Prone  SC     Prone TE Prone CC prone  SC                Neuro Re-Ed     SLS foam nv 30"x3 30"x3   nv 20"x3 nv                                            Ther Ex    Standing hip ext/abd 4# 2x10 b/l 5# 2x10 b/l 5#  2x10   b/l 5#  2x10 b/l 5#  2x10   b/l 2# 2x10 b/l 2#  2x10  b/l 2#  2x10  b/l 3#  2x10   b/l 3#  2x10 b/l   Mini squats 3x10 3x10 3x10 Umpire post  5"  3x10 Umpire position  5"  3x10 on foam 2x10 2x10 3x10 3x10 3x10   SA leg ext 44#  3x10 44# 3x19 44#  3x10 44#  3x10 55#  2x10 33# 2x10 33#  2x10   33#  2x10 44#  3x10   44#  3x10   SA hamstring curl 66#  3x10 77# 3x10 77#  3x10 66#  3x10 66#  3x10 55# 3x10 66#  2x10   66#  2x10 66#  3x10 66#  3x10   Leg press  132# 3x10 132#  1x10  154#  2x10          SLR - flex, abd, ext 10"x10 10"x10 10"x10 10"x10 10"x10 10"x10 10x10" 10"x10 10"x10 10"x10   clamshells btb  10"x10 btb 2x10 btb  2x10 10"x10 10"x10 nv gtb  gtb  10"x10 btb  10"x10   Bridges c TB btb   5" 2x10 btb 2x10 btb  2x10   nv gtb 5" x15 nv gtb  5" 2x10 btb   5" 2x10   Prone quad stretch 30"x3 30"x3 30"x3     30"x3 HEP 30"x3 30"x3   Ther Activity    Bike TM 2 0  Mph  x8' TM 2 0  Mph  x8' TM 2 0 mph  x8' TM 2' walk 2 0 mph   3 0x2'  3 8 mph TM  Walk 2 0 2',   3 0mph x2'  3 6 mph x2' x2 Bike x5' TM 2 0 mph x8' TM 2 0 mph x8' TM 2 0 mph  x8' TM 2 0  Mph  x8'   Hip hike missed biodex 2x10 b/l biodex  2x10   b/l 1R 2x10 ea 1R  5#  2x10 ea np 1R 2#  2x10  ea 1R 2#  2x10  ea 1R 3#  2x10 1R 3#  2x10   Prashanth side step, backward walk    prashanth  12 6  x10 ea b/l prashanth  13 5  x10 ea b/l np                    Gait Training                              Modalities    Ice PRN

## 2022-06-01 ENCOUNTER — OFFICE VISIT (OUTPATIENT)
Dept: PHYSICAL THERAPY | Facility: REHABILITATION | Age: 62
End: 2022-06-01
Payer: COMMERCIAL

## 2022-06-01 DIAGNOSIS — Z96.641 STATUS POST RIGHT HIP REPLACEMENT: Primary | ICD-10-CM

## 2022-06-01 PROCEDURE — 97112 NEUROMUSCULAR REEDUCATION: CPT

## 2022-06-01 PROCEDURE — 97110 THERAPEUTIC EXERCISES: CPT

## 2022-06-01 PROCEDURE — 97530 THERAPEUTIC ACTIVITIES: CPT

## 2022-06-01 PROCEDURE — 97140 MANUAL THERAPY 1/> REGIONS: CPT

## 2022-06-01 NOTE — PROGRESS NOTES
Daily Note     Today's date: 2022  Patient name: Jerman Rocha  : 1960  MRN: [id number]  Referring provider: Olga Tovar MD  Dx:   Encounter Diagnosis     ICD-10-CM    1  Status post right hip replacement  Z96 641                   Subjective:  Lauretha Fleischer reports that his hip still bothers him everyday  He states that the Dr told him it would take about a year to fully recover  Objective: See treatment diary below      Assessment: Patient tolerated treatment well  Patient performed ex and received manual therapy as noted  Improving R hip ROM and strength noted  Patient does note some functional improvement however is still compensating to put his shoes/sock on  Patient would benefit from continued PT intervention to address deficits and attain set goals  Plan: Continue per plan of care        Precautions: Posterior MALENA precautions, diabetes, history of CAD  DOS: 22    Daily Treatment Diary    Date 5/18 5/24 5/26 6/1   5/5 5/10 5/12 5/17   FOTO             Re-Eval                Manuals    Hip PROM (no add/IR) JANIA JANIA SC CC   CC TE CC SC   CRT - L post             R quad stretch Prone JANIA Prone JANIA Prone  SC Prone  CC    Prone TE Prone CC prone  SC                Neuro Re-Ed     SLS foam nv 30"x3 30"x3 30"x3   20"x3 nv                                            Ther Ex    Standing hip ext/abd 4# 2x10 b/l 5# 2x10 b/l 5#  2x10   b/l 5#  2x10   b/l   2#  2x10  b/l 2#  2x10  b/l 3#  2x10   b/l 3#  2x10 b/l   Mini squats 3x10 3x10 3x10    2x10 3x10 3x10 3x10   SA leg ext 44#  3x10 44# 3x19 44#  3x10 44#  3x10   33#  2x10   33#  2x10 44#  3x10   44#  3x10   SA hamstring curl 66#  3x10 77# 3x10 77#  3x10 77#  3x10   66#  2x10   66#  2x10 66#  3x10 66#  3x10   Leg press  132# 3x10 132#  1x10  154#  2x10 154#  3x10           SLR - flex, abd, ext 10"x10 10"x10 10"x10 10"x10   10x10" 10"x10 10"x10 10"x10   clamshells btb  10"x10 btb 2x10 btb  2x10 btb  2x10   gtb  gtb  10"x10 btb  10"x10 Ibis c TB btb   5" 2x10 btb 2x10 btb  2x10 btb  2x10   gtb 5" x15 nv gtb  5" 2x10 btb   5" 2x10   Prone quad stretch 30"x3 30"x3 30"x3 man    30"x3 HEP 30"x3 30"x3   Ther Activity    Bike TM 2 0  Mph  x8' TM 2 0  Mph  x8' TM 2 0 mph  x8' TM walk 2 0  x8'   TM 2 0 mph x8' TM 2 0 mph x8' TM 2 0 mph  x8' TM 2 0  Mph  x8'   Hip hike missed biodex 2x10 b/l biodex  2x10   b/l biodex   1R 2#  2x10  ea 1R 2#  2x10  ea 1R 3#  2x10 1R 3#  2x10   [place] side step, backward walk                          Gait Training                              Modalities    Ice PRN

## 2022-06-02 ENCOUNTER — APPOINTMENT (OUTPATIENT)
Dept: PHYSICAL THERAPY | Facility: REHABILITATION | Age: 62
End: 2022-06-02
Payer: COMMERCIAL

## 2022-06-04 DIAGNOSIS — I25.10 ARTERIOSCLEROSIS OF CORONARY ARTERY: ICD-10-CM

## 2022-06-06 RX ORDER — ATORVASTATIN CALCIUM 40 MG/1
TABLET, FILM COATED ORAL
Qty: 90 TABLET | Refills: 1 | Status: SHIPPED | OUTPATIENT
Start: 2022-06-06

## 2022-06-07 ENCOUNTER — OFFICE VISIT (OUTPATIENT)
Dept: PHYSICAL THERAPY | Facility: REHABILITATION | Age: 62
End: 2022-06-07
Payer: COMMERCIAL

## 2022-06-07 DIAGNOSIS — Z96.641 STATUS POST RIGHT HIP REPLACEMENT: Primary | ICD-10-CM

## 2022-06-07 PROCEDURE — 97110 THERAPEUTIC EXERCISES: CPT

## 2022-06-07 PROCEDURE — 97140 MANUAL THERAPY 1/> REGIONS: CPT

## 2022-06-07 PROCEDURE — 97112 NEUROMUSCULAR REEDUCATION: CPT

## 2022-06-07 NOTE — PROGRESS NOTES
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 and it went well  He denied having any pain during the game or later in the day following  Cherie Net reports that his anterior hip pain is better since he started doing some exercise at night before going to bed  Cherie Net reports having some mild posterior hip soreness currently possibly due to sleep in a weird position  Objective: See treatment diary below      Assessment: Patient tolerated treatment well  Patient performed ex and received manual therapy as noted  Patient demonstrates improving strength and mobility with the ability to return to some regular activities without symptoms  Reviewed HEP and issued theraband for home use  Plan: Continue per plan of care  Anticipate DC next visit         Precautions: Posterior MALENA precautions, diabetes, history of CAD  DOS: 22    Daily Treatment Diary    Date 5/18 5/24 5/26 6/1 6/7   5/10 5/12 5/17   FOTO             Re-Eval                Manuals    Hip PROM (no add/IR) JANIA JANIA SC CC CC   TE CC SC   CRT - L post             R quad stretch Prone JANIA Prone JANIA Prone  SC Prone  CC Prone  CC   Prone TE Prone CC prone  SC                Neuro Re-Ed     SLS foam nv 30"x3 30"x3 30"x3 30"x3   nv                                            Ther Ex    Standing hip ext/abd 4# 2x10 b/l 5# 2x10 b/l 5#  2x10   b/l 5#  2x10   b/l OTB  2x10 b/l   2#  2x10  b/l 3#  2x10   b/l 3#  2x10 b/l   Mini squats 3x10 3x10 3x10     3x10 3x10 3x10   SA leg ext 44#  3x10 44# 3x19 44#  3x10 44#  3x10 44#  3x10     33#  2x10 44#  3x10   44#  3x10   SA hamstring curl 66#  3x10 77# 3x10 77#  3x10 77#  3x10 77#  3x10     66#  2x10 66#  3x10 66#  3x10   Leg press  132# 3x10 132#  1x10  154#  2x10 154#  3x10   154#  3x10          SLR - flex, abd, ext 10"x10 10"x10 10"x10 10"x10 10"x10   10"x10 10"x10 10"x10   clamshells btb  10"x10 btb 2x10 btb  2x10 btb  2x10 Purple  2x10    gtb  10"x10 btb  10"x10   Bridges c TB btb   5" 2x10 btb 2x10 btb  2x10 btb  2x10 Purple  2x10   nv gtb  5" 2x10 btb   5" 2x10   Prone quad stretch 30"x3 30"x3 30"x3 man man   30"x3 HEP 30"x3 30"x3   Ther Activity    Bike TM 2 0  Mph  x8' TM 2 0  Mph  x8' TM 2 0 mph  x8' TM walk 2 0  x8' TM 3 0 walk x8'   TM 2 0 mph x8' TM 2 0 mph  x8' TM 2 0  Mph  x8'   Hip hike missed biodex 2x10 b/l biodex  2x10   b/l biodex  2x10 b/l biodex  2x10 b/l   1R 2#  2x10  ea 1R 3#  2x10 1R 3#  2x10   [place] side step, backward walk                          Gait Training                              Modalities    Ice PRN

## 2022-06-15 ENCOUNTER — OFFICE VISIT (OUTPATIENT)
Dept: PHYSICAL THERAPY | Facility: REHABILITATION | Age: 62
End: 2022-06-15
Payer: COMMERCIAL

## 2022-06-15 DIAGNOSIS — Z96.641 STATUS POST RIGHT HIP REPLACEMENT: Primary | ICD-10-CM

## 2022-06-15 PROCEDURE — 97140 MANUAL THERAPY 1/> REGIONS: CPT

## 2022-06-15 PROCEDURE — 97112 NEUROMUSCULAR REEDUCATION: CPT

## 2022-06-15 PROCEDURE — 97110 THERAPEUTIC EXERCISES: CPT

## 2022-06-15 NOTE — PROGRESS NOTES
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 weekend without any issues  Objective: See treatment diary below      Assessment: Pt tolerated treatment well  Pt demonstrated good recall as well as proper mechanics throughout all exercises on this date  Patient demonstrated fatigue post treatment, exhibited good technique with therapeutic exercises and would benefit from continued PT  Plan: Continue per plan of care  Progress treatment as tolerated         Precautions: Posterior MALENA precautions, diabetes, history of CAD  DOS: 22    Daily Treatment Diary    Date 5/18 5/24 5/26 6/1 6/7 6/15  5/10 5/12 5/17   FOTO             Re-Eval                Manuals    Hip PROM (no add/IR) JANIA JANIA SC CC CC ML  TE CC SC   CRT - L post             R quad stretch Prone JANIA Prone JANIA Prone  SC Prone  CC Prone  CC Prone ML  Prone TE Prone CC prone  SC                Neuro Re-Ed     SLS foam nv 30"x3 30"x3 30"x3 30"x3 Missed NV  nv                                            Ther Ex    Standing hip ext/abd 4# 2x10 b/l 5# 2x10 b/l 5#  2x10   b/l 5#  2x10   b/l OTB  2x10 b/l 5# 2x10 b/l  2#  2x10  b/l 3#  2x10   b/l 3#  2x10 b/l   Mini squats 3x10 3x10 3x10   3x10  3x10 3x10 3x10   SA leg ext 44#  3x10 44# 3x19 44#  3x10 44#  3x10 44#  3x10 44# 3x10    33#  2x10 44#  3x10   44#  3x10   SA hamstring curl 66#  3x10 77# 3x10 77#  3x10 77#  3x10 77#  3x10 77# 3x10    66#  2x10 66#  3x10 66#  3x10   Leg press  132# 3x10 132#  1x10  154#  2x10 154#  3x10   154#  3x10   154# 3x10       SLR - flex, abd, ext 10"x10 10"x10 10"x10 10"x10 10"x10 10"x10  10"x10 10"x10 10"x10   denise btb  10"x10 btb 2x10 btb  2x10 btb  2x10 Purple  2x10 Purple 2x10   gtb  10"x10 btb  10"x10   Bridges c TB btb   5" 2x10 btb 2x10 btb  2x10 btb  2x10 Purple  2x10 Purple 2x10  nv gtb  5" 2x10 btb   5" 2x10   Prone quad stretch 30"x3 30"x3 30"x3 man man   30"x3 HEP 30"x3 30"x3   Ther Activity    Bike TM 2 0  Mph  x8' TM 2 0  Mph  x8' TM 2 0 mph  x8' TM walk 2 0  x8' TM 3 0 walk x8' TM 3 0 walk x8'  TM 2 0 mph x8' TM 2 0 mph  x8' TM 2 0  Mph  x8'   Hip hike missed biodex 2x10 b/l biodex  2x10   b/l biodex  2x10 b/l biodex  2x10 b/l biodex  2x10 b/l  1R 2#  2x10  ea 1R 3#  2x10 1R 3#  2x10   [place] side step, backward walk                          Gait Training                              Modalities    Ice PRN

## 2022-06-16 ENCOUNTER — APPOINTMENT (OUTPATIENT)
Dept: PHYSICAL THERAPY | Facility: REHABILITATION | Age: 62
End: 2022-06-16
Payer: COMMERCIAL

## 2022-06-21 ENCOUNTER — OFFICE VISIT (OUTPATIENT)
Dept: PHYSICAL THERAPY | Facility: REHABILITATION | Age: 62
End: 2022-06-21
Payer: COMMERCIAL

## 2022-06-21 DIAGNOSIS — Z96.641 STATUS POST RIGHT HIP REPLACEMENT: Primary | ICD-10-CM

## 2022-06-21 PROCEDURE — 97140 MANUAL THERAPY 1/> REGIONS: CPT

## 2022-06-21 PROCEDURE — 97110 THERAPEUTIC EXERCISES: CPT

## 2022-06-21 PROCEDURE — 97530 THERAPEUTIC ACTIVITIES: CPT

## 2022-06-21 NOTE — PROGRESS NOTES
Daily Note     Today's date: 2022  Patient name: Nisreen Oliver  : 1960  MRN: [id number]  Referring provider: Kandice Nixon MD  Dx:   Encounter Diagnosis     ICD-10-CM    1  Status post right hip replacement  Z96 641                   Subjective: Pt reports he continues to feel good without issues  He states last week was supposed to be his last treatment, however he had one more appointment scheduled for today  Objective: See treatment diary below      Assessment: Pt tolerated treatment well and without issue  Performs exercises using good technique with appropriate challenge and fatigue  Pt is compliant with HEP and states he is comfortable with all exercises  Demonstrates good PROM and mobility with R hip  Plan: Pt is appropriate for D/C to HEP at this time       Precautions: Posterior MALENA precautions, diabetes, history of CAD  DOS: 22    Daily Treatment Diary    Date 5/18 5/24 5/26 6/1 6/7 6/15 6/21 5/10 5/12 5/17   FOTO             Re-Eval                Manuals    Hip PROM (no add/IR) JANIA JANIA SC CC CC ML SC TE CC SC   CRT - L post             R quad stretch Prone JANIA Prone JANIA Prone  SC Prone  CC Prone  CC Prone ML Prone  SC Prone TE Prone CC prone  SC                Neuro Re-Ed     SLS foam nv 30"x3 30"x3 30"x3 30"x3 Missed NV  nv                                            Ther Ex    Standing hip ext/abd 4# 2x10 b/l 5# 2x10 b/l 5#  2x10   b/l 5#  2x10   b/l OTB  2x10 b/l 5# 2x10 b/l 5#  2x10 b/l 2#  2x10  b/l 3#  2x10   b/l 3#  2x10 b/l   Mini squats 3x10 3x10 3x10   3x10 3x10 3x10 3x10 3x10   SA leg ext 44#  3x10 44# 3x19 44#  3x10 44#  3x10 44#  3x10 44# 3x10 55#  3x10 33#  2x10 44#  3x10   44#  3x10   SA hamstring curl 66#  3x10 77# 3x10 77#  3x10 77#  3x10 77#  3x10 77# 3x10 77#  3x10 66#  2x10 66#  3x10 66#  3x10   Leg press  132# 3x10 132#  1x10  154#  2x10 154#  3x10   154#  3x10   154# 3x10 176#  3x10  198#  1x10      SLR - flex, abd, ext 10"x10 10"x10 10"x10 10"x10 10"x10 10"x10 10"x10 10"x10 10"x10 10"x10   clamshells btb  10"x10 btb 2x10 btb  2x10 btb  2x10 Purple  2x10 Purple 2x10 Purple   2x10  gtb  10"x10 btb  10"x10   Bridges c TB btb   5" 2x10 btb 2x10 btb  2x10 btb  2x10 Purple  2x10 Purple 2x10 Purple  2x10 nv gtb  5" 2x10 btb   5" 2x10   Prone quad stretch 30"x3 30"x3 30"x3 man man   30"x3 HEP 30"x3 30"x3   Ther Activity    Bike TM 2 0  Mph  x8' TM 2 0  Mph  x8' TM 2 0 mph  x8' TM walk 2 0  x8' TM 3 0 walk x8' TM 3 0 walk x8' TM  3 2 walk   x8' TM 2 0 mph x8' TM 2 0 mph  x8' TM 2 0  Mph  x8'   Hip hike missed biodex 2x10 b/l biodex  2x10   b/l biodex  2x10 b/l biodex  2x10 b/l biodex  2x10 b/l biodex  2x10  b/l 1R 2#  2x10  ea 1R 3#  2x10 1R 3#  2x10   [place] side step, backward walk                          Gait Training                              Modalities    Ice PRN

## 2022-10-20 ENCOUNTER — TELEPHONE (OUTPATIENT)
Dept: FAMILY MEDICINE CLINIC | Facility: CLINIC | Age: 62
End: 2022-10-20

## 2022-10-20 DIAGNOSIS — I25.10 ARTERIOSCLEROSIS OF CORONARY ARTERY: ICD-10-CM

## 2022-10-20 RX ORDER — RIVAROXABAN 20 MG/1
TABLET, FILM COATED ORAL
Qty: 90 TABLET | Refills: 1 | Status: SHIPPED | OUTPATIENT
Start: 2022-10-20

## 2022-10-20 NOTE — TELEPHONE ENCOUNTER
----- Message from Calvin Davis DO sent at 10/20/2022 10:23 AM EDT -----  Due for an appointment please schedule

## 2022-11-01 ENCOUNTER — APPOINTMENT (OUTPATIENT)
Dept: LAB | Age: 62
End: 2022-11-01

## 2022-11-01 DIAGNOSIS — E10.69 TYPE I DIABETES MELLITUS WITH HYPEROSMOLARITY, UNCONTROLLED (HCC): ICD-10-CM

## 2022-11-01 DIAGNOSIS — E10.65 TYPE I DIABETES MELLITUS WITH HYPEROSMOLARITY, UNCONTROLLED (HCC): ICD-10-CM

## 2022-11-01 DIAGNOSIS — E87.0 TYPE I DIABETES MELLITUS WITH HYPEROSMOLARITY, UNCONTROLLED (HCC): ICD-10-CM

## 2022-11-01 LAB
ALBUMIN SERPL BCP-MCNC: 2.9 G/DL (ref 3.5–5)
ALP SERPL-CCNC: 69 U/L (ref 46–116)
ALT SERPL W P-5'-P-CCNC: 59 U/L (ref 12–78)
ANION GAP SERPL CALCULATED.3IONS-SCNC: 4 MMOL/L (ref 4–13)
AST SERPL W P-5'-P-CCNC: 27 U/L (ref 5–45)
BILIRUB SERPL-MCNC: 0.56 MG/DL (ref 0.2–1)
BUN SERPL-MCNC: 19 MG/DL (ref 5–25)
CALCIUM ALBUM COR SERPL-MCNC: 9.6 MG/DL (ref 8.3–10.1)
CALCIUM SERPL-MCNC: 8.7 MG/DL (ref 8.3–10.1)
CHLORIDE SERPL-SCNC: 110 MMOL/L (ref 96–108)
CHOLEST SERPL-MCNC: 104 MG/DL
CO2 SERPL-SCNC: 29 MMOL/L (ref 21–32)
CREAT SERPL-MCNC: 0.74 MG/DL (ref 0.6–1.3)
EST. AVERAGE GLUCOSE BLD GHB EST-MCNC: 194 MG/DL
GFR SERPL CREATININE-BSD FRML MDRD: 98 ML/MIN/1.73SQ M
GLUCOSE P FAST SERPL-MCNC: 114 MG/DL (ref 65–99)
HBA1C MFR BLD: 8.4 %
HDLC SERPL-MCNC: 32 MG/DL
LDLC SERPL CALC-MCNC: 59 MG/DL (ref 0–100)
NONHDLC SERPL-MCNC: 72 MG/DL
POTASSIUM SERPL-SCNC: 4.1 MMOL/L (ref 3.5–5.3)
PROT SERPL-MCNC: 5.7 G/DL (ref 6.4–8.4)
SODIUM SERPL-SCNC: 143 MMOL/L (ref 135–147)
TRIGL SERPL-MCNC: 67 MG/DL

## 2022-11-03 ENCOUNTER — OFFICE VISIT (OUTPATIENT)
Dept: PHYSICAL THERAPY | Facility: REHABILITATION | Age: 62
End: 2022-11-03

## 2022-11-03 DIAGNOSIS — M72.2 PLANTAR FASCIAL FIBROMATOSIS: Primary | ICD-10-CM

## 2022-11-03 NOTE — PROGRESS NOTES
PT Evaluation     Today's date: 11/3/2022  Patient name: Murphy Lora  : 1960  MRN: [id number]  Referring provider: Hema Martins MD  Dx:   Encounter Diagnosis     ICD-10-CM    1  Plantar fascial fibromatosis  M72 2                   Assessment  Assessment details: Pt is a pleasant 58 y o  male presenting to outpatient physical therapy with Plantar fascial fibromatosis  (primary encounter diagnosis)  Pt presents with pain, decreased range of motion, decreased strength, and decreased tolerance to activity  Demonstrates movement impairment diagnosis of left foot hypomobility dysfunction  Pt is a good candidate for outpatient physical therapy and would benefit from skilled physical therapy to address limitations and to achieve goals  Thank you for this referral    Impairments: abnormal coordination, abnormal or restricted ROM, activity intolerance, impaired physical strength and pain with function  Understanding of Dx/Px/POC: good   Prognosis: good    Goals  ST  Patient will report 50% decrease in pain with standing and walking in 4 weeks  2  Patient will demonstrate 25% improvement in ROM in 4 weeks  3  Patient will demonstrate 1/2 grade improvement in strength in 4 weeks  LT  Patient will be able to perform IADLS without restriction or pain by discharge  2  Patient will be independent in HEP by discharge  3  Patient will be able to return to recreational/work duties without restriction or pain by discharge        Plan  Patient would benefit from: PT eval and skilled PT  Planned modality interventions: cryotherapy and thermotherapy: hydrocollator packs  Planned therapy interventions: IADL retraining, body mechanics training, flexibility, functional ROM exercises, home exercise program, neuromuscular re-education, manual therapy, postural training, strengthening, stretching, therapeutic activities, therapeutic exercise and joint mobilization  Frequency: 2x week  Duration in visits: 8  Duration in weeks: 4  Treatment plan discussed with: patient        Subjective Evaluation    History of Present Illness  Mechanism of injury: 22  Pt comes to therapy with c/o L foot pain, which started approx 1 week ago, of unknown etiology and insidious onset  Notes he has been applying ice which has helped reduce pain levels by about 50%  AGGS: standing, walking, especially in the morning  LOC: mid-left PF, in area of 5th met head, sore, aching  EASES: ice, rest    GOALS: walk, stand, without pain  Pain  Current pain ratin  At worst pain ratin    Patient Goals  Patient goals for therapy: decreased pain and return to sport/leisure activities          Objective     Active Range of Motion   Left Ankle/Foot   Dorsiflexion (ke): 0 degrees   Plantar flexion: 45 degrees   Inversion: 25 degrees   Eversion: 10 degrees     Passive Range of Motion   Left Ankle/Foot    Dorsiflexion (ke): 0 degrees   Plantar flexion: WFL  Inversion: WFL  Eversion: WFL    Strength/Myotome Testing     Left Ankle/Foot   Dorsiflexion: 4+  Plantar flexion: 4+  Inversion: 4+  Eversion: 4+    Tests   Left Ankle/Foot   Positive for windlass  Negative for anterior drawer and eversion talar tilt       Additional Tests Details  22   TTP over PF, worse with extension of toes             Precautions: CAD, myotonic dystrophy, h/o DVT, DM II    Daily Treatment Diary    Date 11/3            FOTO IE            Re-Eval IE               Manuals    IASTM gastroc                                                    Neuro Re-Ed                                                                                                Ther Ex    PF towel str 30"x4            TB ankle DF purp 5"x15            Standing gastroc str 30"x4            Prostretch nv                                                                Ther Activity    Bike                           Gait Training                              Modalities    US - continuous, L PF 3 3 mhz  1 0 w/cm2 x8'

## 2022-11-08 ENCOUNTER — OFFICE VISIT (OUTPATIENT)
Dept: PHYSICAL THERAPY | Facility: REHABILITATION | Age: 62
End: 2022-11-08

## 2022-11-08 DIAGNOSIS — M72.2 PLANTAR FASCIAL FIBROMATOSIS: Primary | ICD-10-CM

## 2022-11-08 NOTE — PROGRESS NOTES
Daily Note     Today's date: 2022  Patient name: Frances Gilliland  : 1960  MRN: [id number]  Referring provider: Rajwinder Neumann MD  Dx:   Encounter Diagnosis     ICD-10-CM    1  Plantar fascial fibromatosis  M72 2                   Subjective:  Cherie Mueller reports that he is feeling much better already  He notes having no pain currently  Objective: See treatment diary below      Assessment: Patient tolerated treatment well  Patient performed ex and received modality and manual therapy as noted  Added pro stretch with good patient tolerance  + medial and proximal plantar fascia restriction noted which improved with manual therapy performed  Patient is responding well to treatment and would benefit from continued PT intervention to address deficits and attain set goals  Plan: Continue per plan of care        Precautions: CAD, myotonic dystrophy, h/o DVT, DM II    Daily Treatment Diary    Date 11/3 11/8           FOTO IE            Re-Eval IE               Manuals    IASTM gastroc  CC                                                  Neuro Re-Ed                                                                                                Ther Ex    PF towel str 30"x4 30"x4           TB ankle DF purp 5"x15 purp  5"x20           Standing gastroc str 30"x4 30"x4           Prostretch nv 30"x4                                                               Ther Activity    Bike                           Gait Training                              Modalities    US - continuous, L PF 3 3 mhz  1 0 w/cm2 x8' 3 3mhz  1 0 w/cm2  x8'

## 2022-11-10 ENCOUNTER — OFFICE VISIT (OUTPATIENT)
Dept: PHYSICAL THERAPY | Facility: REHABILITATION | Age: 62
End: 2022-11-10

## 2022-11-10 ENCOUNTER — TELEPHONE (OUTPATIENT)
Dept: NEUROLOGY | Facility: CLINIC | Age: 62
End: 2022-11-10

## 2022-11-10 DIAGNOSIS — M72.2 PLANTAR FASCIAL FIBROMATOSIS: Primary | ICD-10-CM

## 2022-11-10 NOTE — TELEPHONE ENCOUNTER
I called and left a voicemail message about patients upcoming appointment with Mel Whiting on 11/16/22 @ 11:30 am  I requested a call back to our office to confirm the appointment or if the patient has any issues or concerns or cannot keep this appointment

## 2022-11-10 NOTE — PROGRESS NOTES
Daily Note     Today's date: 11/10/2022  Patient name: Chica Mcneil  : 1960  MRN: [id number]  Referring provider: Giovanna Hameed MD  Dx:   Encounter Diagnosis     ICD-10-CM    1  Plantar fascial fibromatosis  M72 2        Start Time: 815          Subjective: Pt reports that he is no longer having pain and today will be his last day      Objective: See treatment diary below      Assessment: Mild tenderness over the APL and soft tissue restrictions present  Pt instructed to continue HEP and self massage        Plan: Discharge     Precautions: CAD, myotonic dystrophy, h/o DVT, DM II    Daily Treatment Diary    Date 11/3 11/8 11/10          FOTO IE            Re-Eval IE               Manuals    IASTM gastroc  CC NT                                                 Neuro Re-Ed                                                                                                Ther Ex    PF towel str 30"x4 30"x4 30"x4          TB ankle DF purp 5"x15 purp  5"x20 purp 5" x20          Standing gastroc str 30"x4 30"x4 30"x4          Prostretch nv 30"x4 30"x4          HR/TR   2x10 ea                                                 Ther Activity    Bike    *                       Gait Training                              Modalities    US - continuous, L PF 3 3 mhz  1 0 w/cm2 x8' 3 3mhz  1 0 w/cm2  x8' 8" as before

## 2022-11-16 ENCOUNTER — OFFICE VISIT (OUTPATIENT)
Dept: NEUROLOGY | Facility: CLINIC | Age: 62
End: 2022-11-16

## 2022-11-16 VITALS
HEART RATE: 64 BPM | DIASTOLIC BLOOD PRESSURE: 72 MMHG | RESPIRATION RATE: 18 BRPM | BODY MASS INDEX: 24.26 KG/M2 | TEMPERATURE: 96.8 F | WEIGHT: 163.8 LBS | SYSTOLIC BLOOD PRESSURE: 108 MMHG | HEIGHT: 69 IN | OXYGEN SATURATION: 98 %

## 2022-11-16 DIAGNOSIS — G71.11 MYOTONIC DYSTROPHY (HCC): ICD-10-CM

## 2022-11-16 DIAGNOSIS — R42 VERTIGO: Primary | ICD-10-CM

## 2022-11-16 NOTE — ASSESSMENT & PLAN NOTE
Cara Parkinson is a 61-year-old gentleman with a history of of myotonic dystrophy   He is stable  Dale Mean has minimal symptoms if he exercises  He reports decrease in exercise recently  We discussed prior use mexiletine Valium but felt that there is no need for them    I have emphasized the need to exercise especially in the cold weather he does report his symptoms do exacerbate  In the cold weather

## 2022-11-16 NOTE — PROGRESS NOTES
Patient ID: Akira Santiago is a 58 y o  male  Assessment/Plan:    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[facility] )  Tereza Hubbard is a 60-year-old gentleman with a history of of myotonic dystrophy   He is stable  Sveta Workman has minimal symptoms if he exercises  He reports decrease in exercise recently  We discussed prior use mexiletine Valium but felt that there is no need for them  I have emphasized the need to exercise especially in the cold weather he does report his symptoms do exacerbate  In the cold weather     Vertigo  Tereza Hubbard does complain of intermittent vertigo  This occurs in certain positions specially in the supine position  Over the last few days his symptoms have not occurred  He does utilize exercise program in the morning  I have asked him to continue to performed exercises but if he does have increase in symptomatology we can order vestibular therapy       There are no diagnoses linked to this encounter  Subjective:    Mr Stephani Ventura is a pleasant 59 yo male with history of myotonic dystrophy presents in a follow-up visit      Does have a history of vertigo which occurs more in the supine position and when he moves his head to the right  In the past vestibular therapy was helpful  Recently he has had minimal symptoms  He is now on a pump for his diabetes and his blood sugars are better controlled               He does have a history of myotonic dystrophy  He was diagnosed in 1988 when he noticed difficulty walking and spasms spasms of his muscles    This is worse in cold weather    He had a muscle biopsy  States he had trouble "getting started" in sports and thus had testing done including his brothers and dad, and his father  were all found to have a similar abnormality  but his two brothers are without symptoms   States most notable with gripping      States he has trouble with muscle relaxation after gripping and complains of decrease in strength  The symptoms are overall stable  His hip has improved    His blood sugars were high but now they are better controlled with the pump          States history heart attack 2004, and has cardiac stent in  States follows with cardiology  he denies any problems with conduction  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- states has history of this in dad and likely inherited this from him          emg in march of 2018 was consistent with myotonia  But no evidence of a neuropathy               He   was a  and retired in November of 2017   he denies any falls and does reports some intermittent problem with his balance                                    The following portions of the patient's history were reviewed and updated as appropriate:   He  has a past medical history of CAD (coronary artery disease), Congenital myotonia, Deep vein thrombosis (DVT) ([facility] ), Diabetes ([facility] ), 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[facility] ) (12/06/2017), Pancreatitis, Sleep apnea, and Superficial thrombophlebitis  He  has a past surgical history that includes Cholecystectomy; Coronary angioplasty with stent; Colonoscopy; Circumcision; Inguinal hernia repair; ORIF radial shaft fracture (Left); ORIF ulnar / radial shaft fracture (Left); and Tonsillectomy and adenoidectomy  His family history includes Brain cancer in his mother; Cancer in his mother; Clotting disorder in his mother; Coronary artery disease in his paternal uncle; Heart disease in his mother; Hyperlipidemia in his mother  He  reports that he has never smoked  He has never used smokeless tobacco  He reports current alcohol use  He reports that he does not use drugs    Current Outpatient Medications   Medication Sig Dispense Refill   • amoxicillin (AMOXIL) 500 MG tablet TAKE 4 TABLETS BY MOUTH 1 HOUR BEFORE DENTIST     • aspirin (ECOTRIN LOW STRENGTH) 81 mg EC tablet Take 81 mg by mouth daily     • atorvastatin (LIPITOR) 40 mg tablet TAKE 1 TABLET BY MOUTH EVERY DAY 90 tablet 1   • cholecalciferol (VITAMIN D3) 400 units tablet Take 1 tablet by mouth every morning     • pantoprazole (PROTONIX) 20 mg tablet TAKE 1 TABLET BY MOUTH EVERY DAY 90 tablet 1   • sildenafil (VIAGRA) 100 mg tablet Take 1 tablet (100 mg total) by mouth daily as needed for erectile dysfunction 10 tablet 1   • Xarelto 20 MG tablet TAKE 1 TABLET BY MOUTH EVERY DAY WITH BREAKFAST 90 tablet 1   • acyclovir (ZOVIRAX) 200 mg capsule Take 1 capsule (200 mg total) by mouth 5 (five) times a day for 5 days 25 capsule 0   • benzonatate (TESSALON) 200 MG capsule Take 1 capsule (200 mg total) by mouth 3 (three) times a day as needed for cough 20 capsule 0   • Continuous Blood Gluc Sensor (FreeStyle Nehemias 14 Day Sensor) MISC USE AS DIRECTED , CHANGING EVERY 14 DAYS  • insulin glargine (LANTUS SOLOSTAR) 100 units/mL injection pen Take 24 units in AM (Patient taking differently: Inject 40 Units under the skin daily Take 24 units in AM ) 5 pen 1   • insulin lispro (HUMALOG KWIKPEN) 100 units/mL injection pen Inject 10units TID +scale 1 unit for every 50 over 150 mg/dl)150-200 = 1; 201-249 = 2; 250-300 = 3; 301-349 = 4; > 350 = 5 5 pen 1   • Insulin Pen Needle 32G X 4 MM MISC Use to inject insulin 3 times daily 300 each 1   • methocarbamol (ROBAXIN) 500 mg tablet Take 500 mg by mouth every 6 (six) hours as needed     • predniSONE 10 mg tablet 5 x 1 day, 4 x1 day, 3 x 1 day,2 x1 day,  1 x 1 day (Patient not taking: Reported on 11/16/2022) 15 tablet 0   • Promethazine-DM (PHENERGAN-DM) 6 25-15 mg/5 mL oral syrup Take 5 mL by mouth 4 (four) times a day as needed for cough 240 mL 0     No current facility-administered medications for this visit  He has No Known Allergies        Objective:    Blood pressure 108/72, pulse 64, temperature (!) 96 8 °F (36 °C), temperature source Tympanic, resp  rate 18, height 5' 8 5" (1 74 m), weight 74 3 kg (163 lb 12 8 oz), SpO2 98 %      Physical Exam  Neurological:      Motor: Motor strength is normal       Deep Tendon Reflexes:      Reflex Scores:       Tricep reflexes are 1+ on the right side and 1+ on the left side  Bicep reflexes are 2+ on the right side and 2+ on the left side  Patellar reflexes are 1+ on the right side and 1+ on the left side  Achilles reflexes are 1+ on the right side and 1+ on the left side  Neurological Exam    Cranial Nerves  CN V: Facial sensation is normal   CN VIII: Hearing is normal   CN IX, X: Palate elevates symmetrically  CN XI: Shoulder shrug strength is normal   CN XII: Tongue midline without atrophy or fasciculations  TMi  intact  Motor  Normal muscle bulk throughout  Increased muscle tone  Strength is 5/5 throughout all four extremities  He did have difficulty relaxing his muscles  There is no evidence of myotonic with percussion  Sensory  Light touch is normal in upper and lower extremities  Temperature is normal in upper and lower extremities  Reflexes                                            Right                      Left  Biceps                                 2+                         2+  Triceps                                1+                         1+  Patellar                                1+                         1+  Achilles                                1+                         1+  Plantar                           Downgoing                Downgoing    Coordination  Right: Finger-to-nose normal Left: Finger-to-nose normal     Gait  Normal casual, toe, heel and tandem gait  Able to rise from chair without using arms  Review of systems obtained from the medical assistant as below was reviewed with the patient at today's  Visit       ROS:    Review of Systems   Constitutional: Negative  Negative for appetite change and fever  HENT: Negative  Negative for hearing loss, tinnitus, trouble swallowing and voice change  Eyes: Negative  Negative for photophobia, pain and visual disturbance  Respiratory: Negative  Negative for shortness of breath      Cardiovascular: Negative  Negative for palpitations  Gastrointestinal: Negative  Negative for nausea and vomiting  Endocrine: Negative  Negative for cold intolerance  Genitourinary: Negative  Negative for dysuria, frequency and urgency  Musculoskeletal: Negative  Negative for gait problem, myalgias and neck pain  Skin: Negative  Negative for rash  Allergic/Immunologic: Negative  Neurological: Positive for dizziness  Negative for tremors, seizures, syncope, facial asymmetry, speech difficulty, weakness, light-headedness, numbness and headaches  Hematological: Negative  Does not bruise/bleed easily  Psychiatric/Behavioral: Negative  Negative for confusion, hallucinations and sleep disturbance

## 2022-11-16 NOTE — ASSESSMENT & PLAN NOTE
Anaid Feldman does complain of intermittent vertigo  This occurs in certain positions specially in the supine position  Over the last few days his symptoms have not occurred  He does utilize exercise program in the morning    I have asked him to continue to performed exercises but if he does have increase in symptomatology we can order vestibular therapy

## 2022-12-08 ENCOUNTER — VBI (OUTPATIENT)
Dept: ADMINISTRATIVE | Facility: OTHER | Age: 62
End: 2022-12-08

## 2023-01-18 ENCOUNTER — OFFICE VISIT (OUTPATIENT)
Dept: CARDIOLOGY CLINIC | Facility: CLINIC | Age: 63
End: 2023-01-18

## 2023-01-18 VITALS
WEIGHT: 163 LBS | HEART RATE: 62 BPM | HEIGHT: 68 IN | SYSTOLIC BLOOD PRESSURE: 110 MMHG | DIASTOLIC BLOOD PRESSURE: 80 MMHG | BODY MASS INDEX: 24.71 KG/M2

## 2023-01-18 DIAGNOSIS — I25.10 ARTERIOSCLEROSIS OF CORONARY ARTERY: Primary | ICD-10-CM

## 2023-01-18 RX ORDER — BLOOD-GLUCOSE TRANSMITTER
EACH MISCELLANEOUS
COMMUNITY
Start: 2022-10-03

## 2023-01-18 RX ORDER — BLOOD-GLUCOSE SENSOR
EACH MISCELLANEOUS
COMMUNITY
Start: 2022-10-03

## 2023-01-18 RX ORDER — GLUCAGON INJECTION, SOLUTION 1 MG/.2ML
INJECTION, SOLUTION SUBCUTANEOUS
COMMUNITY
Start: 2022-10-21

## 2023-01-18 RX ORDER — INSULIN PMP CART,AUT,G6/7,CNTR
1 EACH SUBCUTANEOUS
COMMUNITY
Start: 2022-08-04

## 2023-01-18 NOTE — PROGRESS NOTES
Cardiology             Stacey Mauricio  1960  [id number]              Assessment/Plan:    Remote CAD, status post myocardial infarction and RCA stenting in 2004  History of DVT, on Xarelto anticoagulation  Dyslipidemia      No symptoms of angina  Continue aspirin, atorvastatin  Encouraged regular exercise  He is not smoking  Prior lipid panel reviewed, well controlled, continue atorvastatin           Follow-up in 1 year        Interval History: This is a very pleasant 78-year-old male with CAD status post myocardial infarction and right coronary artery stenting in 2004  The patient had followed Dr Elke Christine in the past before he re he also has a history of DVT for which she is on Xarelto  He was last seen 1/2022 at which time he was felt to be at low cardiac risk for total hip replacement  He presents today for follow-up with no complaints  He denies chest pain, shortness of breath, dizziness, palpitations, lower extremity edema                  Vitals:  Vitals:    01/18/23 1317   BP: 110/80   Pulse: 62   Weight: 73 9 kg (163 lb)   Height: 5' 8" (1 727 m)         Past Medical History:   Diagnosis Date   • CAD (coronary artery disease)    • Congenital myotonia    • Deep vein thrombosis (DVT) (HCC)     after tear of gastrocnemus muscle   • Diabetes ([facility] )    • 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[facility] [address] ) 12/06/2017   • Pancreatitis     three times   • Sleep apnea     not using a C-PAP   • Superficial thrombophlebitis      Social History     Socioeconomic History   • Marital status: /Civil Union     Spouse name: Not on file   • Number of children: Not on file   • Years of education: Not on file   • Highest education level: Not on file   Occupational History   • Not on file   Tobacco Use   • Smoking status: Never   • Smokeless tobacco: Never   Vaping Use   • Vaping Use: Never used   Substance and Sexual Activity   • Alcohol use: Yes     Comment: social   • Drug use: No   • Sexual activity: Not on file   Other Topics Concern   • Not on file   Social History Narrative    Consumes 1 cup of tea  And 1 glass of tea per day        Weight loss     Social Determinants of Health     Financial Resource Strain: Not on file   Food Insecurity: Not on file   Transportation Needs: Not on file   Physical Activity: Not on file   Stress: Not on file   Social Connections: Not on file   Intimate Partner Violence: Not on file   Housing Stability: Not on file      Family History   Problem Relation Age of Onset   • Brain cancer Mother    • Clotting disorder Mother    • Heart disease Mother    • Cancer Mother    • Hyperlipidemia Mother    • Coronary artery disease Paternal Uncle      Past Surgical History:   Procedure Laterality Date   • CHOLECYSTECTOMY     • CIRCUMCISION      Elective   • COLONOSCOPY      complete, polyps 2 years ago   • CORONARY ANGIOPLASTY WITH STENT PLACEMENT      stent to RCA 2004   • INGUINAL HERNIA REPAIR     • ORIF RADIAL SHAFT FRACTURE Left     Open Treatment of Multiple Fractures of the Radial Shaft   • ORIF ULNAR / RADIAL SHAFT FRACTURE Left     Open Treatment of Multiple Fractures of the Ulnar Shaft   • TONSILLECTOMY AND ADENOIDECTOMY         Current Outpatient Medications:   •  acyclovir (ZOVIRAX) 200 mg capsule, Take 1 capsule (200 mg total) by mouth 5 (five) times a day for 5 days (Patient taking differently: Take 200 mg by mouth 5 (five) times a day Patient takes PRN), Disp: 25 capsule, Rfl: 0  •  amoxicillin (AMOXIL) 500 MG tablet, TAKE 4 TABLETS BY MOUTH 1 HOUR BEFORE DENTIST, Disp: , Rfl:   •  aspirin (ECOTRIN LOW STRENGTH) 81 mg EC tablet, Take 81 mg by mouth daily, Disp: , Rfl:   •  atorvastatin (LIPITOR) 40 mg tablet, TAKE 1 TABLET BY MOUTH EVERY DAY, Disp: 90 tablet, Rfl: 1  •  cholecalciferol (VITAMIN D3) 400 units tablet, Take 1 tablet by mouth every morning, Disp: , Rfl:   •  Continuous Blood Gluc Sensor (Dexcom G6 Sensor) MISC, Change every 10 days   E10 65, Disp: , Rfl:   •  Continuous Blood Gluc Transmit (Dexcom G6 Transmitter) MISC, Change every 90 days  E10 65, Disp: , Rfl:   •  Gvoke HypoPen 2-Pack 1 MG/0 2ML SOAJ, INJECT 1 MG UNDER THE SKIN AS NEEDED (FOR SEVERE HYPOGLYCEMIA)  E10 65, Disp: , Rfl:   •  Insulin Disposable Pump (Omnipod 5 G6 Intro, Gen 5,) KIT, Inject 1 each under the skin every third day, Disp: , Rfl:   •  pantoprazole (PROTONIX) 20 mg tablet, TAKE 1 TABLET BY MOUTH EVERY DAY, Disp: 90 tablet, Rfl: 1  •  sildenafil (VIAGRA) 100 mg tablet, Take 1 tablet (100 mg total) by mouth daily as needed for erectile dysfunction, Disp: 10 tablet, Rfl: 1  •  Xarelto 20 MG tablet, TAKE 1 TABLET BY MOUTH EVERY DAY WITH BREAKFAST, Disp: 90 tablet, Rfl: 1        Review of Systems:  Review of Systems   Respiratory: Negative  Cardiovascular: Negative  All other systems reviewed and are negative  Physical Exam:  Physical Exam  Constitutional:       General: He is not in acute distress  Appearance: He is well-developed  He is not diaphoretic  HENT:      Head: Normocephalic and atraumatic  Eyes:      General: No scleral icterus  Right eye: No discharge  Pupils: Pupils are equal, round, and reactive to light  Neck:      Thyroid: No thyromegaly  Cardiovascular:      Rate and Rhythm: Normal rate and regular rhythm  Heart sounds: Normal heart sounds  No murmur heard  No friction rub  No gallop  Pulmonary:      Effort: Pulmonary effort is normal       Breath sounds: Normal breath sounds  Abdominal:      General: There is no distension  Tenderness: There is no abdominal tenderness  There is no guarding or rebound  Musculoskeletal:         General: Normal range of motion  Cervical back: Normal range of motion and neck supple  Skin:     General: Skin is warm and dry  Coloration: Skin is not pale  Findings: No erythema or rash  Neurological:      Mental Status: He is alert and oriented to person, place, and time        Coordination: Coordination normal    Psychiatric:         Behavior: Behavior normal          Thought Content: Thought content normal          Judgment: Judgment normal          This note was completed in part utilizing M-Modal Fluency Direct Software  Grammatical errors, random word insertions, spelling mistakes, and incomplete sentences can be an occasional consequence of this system secondary to software limitations, ambient noise, and hardware issues  If you have any questions or concerns about the content, text, or information contained within the body of this dictation, please contact the provider for clarification

## 2023-01-30 DIAGNOSIS — I25.10 ARTERIOSCLEROSIS OF CORONARY ARTERY: ICD-10-CM

## 2023-01-30 RX ORDER — ATORVASTATIN CALCIUM 40 MG/1
40 TABLET, FILM COATED ORAL DAILY
Qty: 90 TABLET | Refills: 1 | Status: SHIPPED | OUTPATIENT
Start: 2023-01-30

## 2023-03-22 ENCOUNTER — TELEPHONE (OUTPATIENT)
Dept: CARDIOLOGY CLINIC | Facility: CLINIC | Age: 63
End: 2023-03-22

## 2023-05-04 ENCOUNTER — TELEPHONE (OUTPATIENT)
Dept: NEUROLOGY | Facility: CLINIC | Age: 63
End: 2023-05-04

## 2023-05-04 NOTE — TELEPHONE ENCOUNTER
I called and left a voicemail message reminding the patient of there upcoming appointment with Sydnee Sanchez on 5/17/23 @ 9 am  I requested a call back to our office to confirm the appointment or if the patient has any issues or concerns or cannot keep this appointment

## 2023-05-17 ENCOUNTER — TELEPHONE (OUTPATIENT)
Dept: NEUROLOGY | Facility: CLINIC | Age: 63
End: 2023-05-17

## 2023-05-17 ENCOUNTER — OFFICE VISIT (OUTPATIENT)
Dept: NEUROLOGY | Facility: CLINIC | Age: 63
End: 2023-05-17

## 2023-05-17 VITALS
RESPIRATION RATE: 18 BRPM | HEIGHT: 69 IN | DIASTOLIC BLOOD PRESSURE: 76 MMHG | SYSTOLIC BLOOD PRESSURE: 110 MMHG | OXYGEN SATURATION: 97 % | BODY MASS INDEX: 23.79 KG/M2 | TEMPERATURE: 97.7 F | HEART RATE: 59 BPM | WEIGHT: 160.6 LBS

## 2023-05-17 DIAGNOSIS — R42 VERTIGO: Primary | ICD-10-CM

## 2023-05-17 RX ORDER — SODIUM, POTASSIUM,MAG SULFATES 17.5-3.13G
SOLUTION, RECONSTITUTED, ORAL ORAL
COMMUNITY
Start: 2023-03-03

## 2023-05-17 NOTE — ASSESSMENT & PLAN NOTE
Does complain of vertigo when moving his head a certain way  He is undergoing an exercise program which has helped  He was on vitamins which stopped  He is also on vitamins for memory  Aliyah Siddiqui He will inform us about the name of these vitamins    Overall this is stable

## 2023-05-17 NOTE — PROGRESS NOTES
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  Overall it is stable  We did discuss the use of mexiletine and Valium but he does not require it   The symptoms do increase in the interim this could be reconsidered    Vertigo  Does complain of vertigo when moving his head a certain way  He is undergoing an exercise program which has helped  He was on vitamins which stopped  He is also on vitamins for memory  Marchia Grass He will inform us about the name of these vitamins  Overall this is stable        There are no diagnoses linked to this encounter  Subjective:    Mr Catrina Worthy is a pleasant 59 yo male with history of myotonic dystrophy presents in a follow-up visit        Does have a history of vertigo which occurs more in the supine position and when he moves his head to the right  In the past vestibular therapy was helpful  Recently he has had minimal symptoms  He does perform vertiginous exercises which does help  His was using vitamins for vertigo but these stopped after he ran out  He is unsure if this provided any benefit      He is now on a pump for his diabetes and his blood sugars are better controlled               He does have a history of myotonic dystrophy  He was diagnosed in 1988 when he noticed difficulty walking and spasms spasms of his muscles    This is worse in cold weather    He had a muscle biopsy  States he had trouble \"getting started\" in sports and thus had testing done including his brothers and dad, and his father  were all found to have a similar abnormality  but his two brothers are without symptoms   States most notable with gripping  Overall the symptoms are stable  We had discussed the use of medications in the past but he felt that these were unnecessary at that time            States history heart attack 2004, and has cardiac stent in  States follows with cardiology  he denies any problems with " conduction  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- states has history of this in dad and likely inherited this from him          emg in march of 2018 was consistent with myotonia  But no evidence of a neuropathy               He   was a  and retired in November of 2017   he denies any falls and does reports some intermittent problem with his balance  Sarahalexa Davis He is also on vitamins for his memory    Report a great deal of stress at home                          The following portions of the patient's history were reviewed and updated as appropriate:   He  has a past medical history of CAD (coronary artery disease), Congenital myotonia, Deep vein thrombosis (DVT) ([facility] ), Diabetes ([facility] ), 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[facility] ) (12/06/2017), Pancreatitis, Sleep apnea, and Superficial thrombophlebitis  He  has a past surgical history that includes Cholecystectomy; Coronary angioplasty with stent; Colonoscopy; Circumcision; Inguinal hernia repair; ORIF radial shaft fracture (Left); ORIF ulnar / radial shaft fracture (Left); and Tonsillectomy and adenoidectomy  His family history includes Brain cancer in his mother; Cancer in his mother; Clotting disorder in his mother; Coronary artery disease in his paternal uncle; Heart disease in his mother; Hyperlipidemia in his mother  He  reports that he has never smoked  He has never used smokeless tobacco  He reports current alcohol use  He reports that he does not use drugs    Current Outpatient Medications   Medication Sig Dispense Refill   • acyclovir (ZOVIRAX) 200 mg capsule Take 1 capsule (200 mg total) by mouth 5 (five) times a day for 5 days (Patient taking differently: Take 200 mg by mouth 5 (five) times a day Patient takes PRN) 25 capsule 0   • amoxicillin (AMOXIL) 500 MG tablet TAKE 4 TABLETS BY MOUTH 1 HOUR BEFORE DENTIST     • aspirin (ECOTRIN LOW STRENGTH) 81 mg EC tablet Take 81 mg by mouth daily     • atorvastatin (LIPITOR) 40 mg tablet Take 1 tablet "(40 mg total) by mouth daily 90 tablet 1   • cholecalciferol (VITAMIN D3) 400 units tablet Take 1 tablet by mouth every morning     • Continuous Blood Gluc Sensor (Dexcom G6 Sensor) MISC Change every 10 days  E10 65     • Continuous Blood Gluc Transmit (Dexcom G6 Transmitter) MISC Change every 90 days  E10 65     • Gvoke HypoPen 2-Pack 1 MG/0 2ML SOAJ INJECT 1 MG UNDER THE SKIN AS NEEDED (FOR SEVERE HYPOGLYCEMIA)  E10 65     • Insulin Disposable Pump (Omnipod 5 G6 Intro, Gen 5,) KIT Inject 1 each under the skin every third day     • Na Sulfate-K Sulfate-Mg Sulf 17 5-3 13-1 6 GM/177ML SOLN DRINK 177 MILLILITER BY MOUTH AS DIRECTED     • pantoprazole (PROTONIX) 20 mg tablet TAKE 1 TABLET BY MOUTH EVERY DAY 90 tablet 1   • sildenafil (VIAGRA) 100 mg tablet Take 1 tablet (100 mg total) by mouth daily as needed for erectile dysfunction 10 tablet 1   • Xarelto 20 MG tablet TAKE 1 TABLET BY MOUTH EVERY DAY WITH BREAKFAST 90 tablet 1     No current facility-administered medications for this visit  He has No Known Allergies            Objective:    Blood pressure 110/76, pulse 59, temperature 97 7 °F (36 5 °C), temperature source Tympanic, resp  rate 18, height 5' 8 5\" (1 74 m), weight 72 8 kg (160 lb 9 6 oz), SpO2 97 %  Physical Exam  Eyes:      General: Lids are normal       Extraocular Movements: Extraocular movements intact  Pupils: Pupils are equal, round, and reactive to light  Neurological:      Motor: Motor strength is normal       Deep Tendon Reflexes:      Reflex Scores:       Tricep reflexes are 2+ on the right side and 2+ on the left side  Bicep reflexes are 2+ on the right side and 2+ on the left side  Patellar reflexes are 2+ on the right side and 2+ on the left side  Achilles reflexes are 1+ on the right side and 1+ on the left side  Neurological Exam  Mental Status   Oriented to person, place and time  Language is fluent with no aphasia      Cranial Nerves  CN II: Visual " acuity is normal  Visual fields full to confrontation  CN III, IV, VI: Extraocular movements intact bilaterally  Normal lids and orbits bilaterally  Pupils equal round and reactive to light bilaterally  CN V: Facial sensation is normal   CN VII: Full and symmetric facial movement  CN VIII: Hearing is normal   CN IX, X: Palate elevates symmetrically  Normal gag reflex  CN XI: Shoulder shrug strength is normal   CN XII: Tongue midline without atrophy or fasciculations  Ocular muscles were intact  No nystagmus was noted  Motor   Strength is 5/5 throughout all four extremities  He does have  difficulty relaxing his muscles  Sensory  Light touch is normal in upper and lower extremities  Temperature is normal in upper and lower extremities  Reflexes                                            Right                      Left  Biceps                                 2+                         2+  Triceps                                2+                         2+  Patellar                                2+                         2+  Achilles                                1+                         1+  Plantar                           Downgoing                Downgoing    Coordination  Right: Finger-to-nose normal Left: Finger-to-nose normal     Gait  Normal casual, toe, heel and tandem gait  He had no percussion myotonia   Review of systems obtained from the medical assistant as below was reviewed with the patient at today's visit  ROS:    Review of Systems   Constitutional: Negative for appetite change and fever  HENT: Negative  Negative for hearing loss, tinnitus, trouble swallowing (at times) and voice change  Eyes: Negative  Negative for photophobia, pain and visual disturbance  Respiratory: Positive for shortness of breath (when he cuts the grass)  Cardiovascular: Negative  Negative for palpitations  Gastrointestinal: Negative  Negative for nausea and vomiting     Endocrine: Negative  Negative for cold intolerance  Genitourinary: Negative  Negative for dysuria, frequency and urgency  Musculoskeletal: Positive for gait problem (balance issues at times)  Negative for myalgias and neck pain  Skin: Negative  Negative for rash  Allergic/Immunologic: Negative  Neurological: Positive for dizziness and light-headedness  Negative for tremors, seizures, syncope, facial asymmetry, speech difficulty, weakness, numbness and headaches  Hematological: Negative  Does not bruise/bleed easily  Psychiatric/Behavioral: Negative for confusion, hallucinations and sleep disturbance          Memory issues at times- short term

## 2023-05-17 NOTE — ASSESSMENT & PLAN NOTE
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

## 2023-05-17 NOTE — TELEPHONE ENCOUNTER
Recd vm  yes, this message is for Dr Marcelo Bumpers  I had an appointment with her and I am taking a supplement and she wanting to know what the name of it was  This actually called neuroTonix  its for memory and focus  It says, and there's a bunch of different vitamins that it includes  That's  the information that she wanted  Okay, thank you  Rose Marie Gonzales [phone]    patient of Dr Marcelo Bumpers, last visit today, 5/17; he said he takes 2 daily; could not confirm ingredients as he said he wasn't home  I looked it up and says key ingredients include  (what look to be) probiotics and peppermint oil

## 2023-05-18 NOTE — TELEPHONE ENCOUNTER
Madhu Askew, DO  Neurology [place] [id number] Team 2; Liliane Ladd, RN 19 hours ago (4:47 PM)     I looked up the vitamins as well   Please let him know that there has been no specific studies performed to compare at this vitamin supplement with memory   It includes probiotics as well as other different vitamins   It is up to him if he would like to take it   It should not affect his myotonic dystrophy

## 2023-06-06 ENCOUNTER — TELEPHONE (OUTPATIENT)
Dept: CARDIOLOGY CLINIC | Facility: CLINIC | Age: 63
End: 2023-06-06

## 2023-06-06 DIAGNOSIS — I25.10 ARTERIOSCLEROSIS OF CORONARY ARTERY: ICD-10-CM

## 2023-06-06 NOTE — TELEPHONE ENCOUNTER
Sent in refill for Xarelto to [facility] in [place]  Please let patient know that any medications/treatment for erectile dysfunction would have to come from PCP  Thank you

## 2023-06-06 NOTE — TELEPHONE ENCOUNTER
PC from patient asking if Dr Willis Becker will refill his Xarelto and Viagra  He used to see Dr Maral Mcfarland  but now sees [employer] and she is refusing to fill these two meds that Dr Maral Mcfarland filled  She states it should be prescribed by Cardiology  Please advise  Candice Levy wants them to go to [facility] in [place]

## 2023-08-13 DIAGNOSIS — I25.10 ARTERIOSCLEROSIS OF CORONARY ARTERY: ICD-10-CM

## 2023-08-14 RX ORDER — ATORVASTATIN CALCIUM 40 MG/1
40 TABLET, FILM COATED ORAL DAILY
Qty: 90 TABLET | Refills: 1 | Status: SHIPPED | OUTPATIENT
Start: 2023-08-14

## 2023-10-10 ENCOUNTER — TELEPHONE (OUTPATIENT)
Dept: CARDIOLOGY CLINIC | Facility: CLINIC | Age: 63
End: 2023-10-10

## 2023-10-30 ENCOUNTER — TELEPHONE (OUTPATIENT)
Dept: CARDIOLOGY CLINIC | Facility: CLINIC | Age: 63
End: 2023-10-30

## 2023-10-30 NOTE — TELEPHONE ENCOUNTER
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 Message to Dr Fransisco Cifuentes.

## 2023-10-31 NOTE — TELEPHONE ENCOUNTER
I called him back and once again got voicemail. I left a voicemail to call us back if he is feeling well without any changes since his last visit I can clear him over the phone. Please let me know if he does call back and mentions that he feels fine without any changes. Thank you.

## 2023-11-08 NOTE — TELEPHONE ENCOUNTER
Patient calling Dr Beltran Pearson back with information that he is having no issues or changes in health and doing well for his colonoscopy on 11/30. As certified below, I, or a nurse practitioner or physician assistant working with me, had a face-to-face encounter that meets the physician face-to-face encounter requirements.

## 2023-12-06 ENCOUNTER — EVALUATION (OUTPATIENT)
Dept: PHYSICAL THERAPY | Facility: REHABILITATION | Age: 63
End: 2023-12-06
Payer: COMMERCIAL

## 2023-12-06 DIAGNOSIS — M25.522 CHRONIC PAIN OF LEFT ELBOW: ICD-10-CM

## 2023-12-06 DIAGNOSIS — M25.512 LEFT SHOULDER PAIN, UNSPECIFIED CHRONICITY: Primary | ICD-10-CM

## 2023-12-06 DIAGNOSIS — G89.29 CHRONIC PAIN OF LEFT ELBOW: ICD-10-CM

## 2023-12-06 PROCEDURE — 97162 PT EVAL MOD COMPLEX 30 MIN: CPT | Performed by: PHYSICAL THERAPIST

## 2023-12-06 PROCEDURE — 97140 MANUAL THERAPY 1/> REGIONS: CPT | Performed by: PHYSICAL THERAPIST

## 2023-12-06 NOTE — LETTER
2023    Beena Farias MD  [address]    Patient: Jacquie Jones   YOB: 1960   Date of Visit: 2023     Encounter Diagnosis     ICD-10-CM    1. Left shoulder pain, unspecified chronicity  M25.512       2. Chronic pain of left elbow  M25.522     G89.29           Dear Dr. Ashley Colón: Thank you for your recent referral of Jacquie Jones. Please review the attached evaluation summary from Otoniel's recent visit. Please verify that you agree with the plan of care by signing the attached order. If you have any questions or concerns, please do not hesitate to call. I sincerely appreciate the opportunity to share in the care of one of your patients and hope to have another opportunity to work with you in the near future. Sincerely,    Gabriel Voss, PT      Referring Provider:      I certify that I have read the below Plan of Care and certify the need for these services furnished under this plan of treatment while under my care. Beena Farias MD  [address] [place] [address]  Via Fax: [phone]          PT Evaluation     Today's date: 2023  Patient name: Jacquie Jones  : 1960  MRN: [id number]  Referring provider: Beena Farias MD  Dx:   Encounter Diagnosis     ICD-10-CM    1. Left shoulder pain, unspecified chronicity  M25.512       2. Chronic pain of left elbow  M25.522     G89.29           Start Time: 0900  Stop Time: 0953  Total time in clinic (min): 53 minutes    Assessment  Assessment details: Pt is a pleasant 61 y.o. male presenting to outpatient physical therapy with Left shoulder pain, unspecified chronicity  (primary encounter diagnosis). Pt presents with pain, decreased active and passive left GHJ range of motion, decreased strength, decreased GHJ accessory mobility, and decreased tolerance to activity.      Pt is a good candidate for outpatient physical therapy and would benefit from skilled physical therapy to address limitations and to achieve goals. Thank you for this referral.   Impairments: abnormal coordination, abnormal or restricted ROM, activity intolerance, impaired physical strength and pain with function  Understanding of Dx/Px/POC: good   Prognosis: good    Goals  Short-Term Goals (4 weeks)   1. Patient will decrease worst rating of pain by 25% to improve quality of life. 2. Patient will increase strength by 1/2 MMT to improve quality of life with improved efficiency of daily activities. 3. Patient will improve ROM by 25% indicating improved mobility of affected area. Long-Term Goals (8 weeks)   1. Patient will decrease pain by 50% at worst in comparison to IE indicating significant reduction in pain and improved quality of life. 2. Patient will demonstrate strength WFL compared to IE levels indicating ability to independently manage pain symptoms to accomplish daily activities. 3. Patient will be independent with HEP with good form accomplished. Plan  Patient would benefit from: PT eval and skilled PT  Planned modality interventions: cryotherapy and thermotherapy: hydrocollator packs  Planned therapy interventions: IADL retraining, body mechanics training, flexibility, functional ROM exercises, home exercise program, neuromuscular re-education, manual therapy, postural training, strengthening, stretching, therapeutic activities, therapeutic exercise and joint mobilization  Frequency: 2x week  Duration in visits: 12  Duration in weeks: 6  Treatment plan discussed with: patient      Subjective Evaluation    History of Present Illness  Mechanism of injury: 12/06/23  Pt comes to therapy reporting approx 6-month history of left shoulder and elbow pain, denying injury or trauma. Reports symptoms are typically exacerbated by activity (reaching, lifting). Denies pain or issues with playing golf.      Reports he consulted his orthopedic physician earlier this week, where he received a CSI in the medial left elbow (area of most pain), which seemed to help considerably. Denies night pain. AGGS: reaching behind, lifting, reaching OH  LOC: posterolateral left shoulder, medial epicondyle of left arm; Occasional paresthesias. EASES: avoiding agg motions; rest  GOALS: relieve pain with reaching and lifting  Patient Goals  Patient goals for therapy: decreased pain, increased motion, increased strength, independence with ADLs/IADLs and return to sport/leisure activities    Pain  Current pain ratin  At worst pain ratin        Objective     Active Range of Motion   Left Shoulder   Flexion: 100 degrees with pain  Abduction: 80 degrees with pain  External rotation BTH: C4 with pain  Internal rotation BTB: L1 with pain    Left Elbow   Flexion: WFL  Extension: WFL  Forearm supination: WFL  Forearm pronation: WFL    Left Wrist   Wrist flexion: WFL  Wrist extension: WFL  Radial deviation: WFL  Ulnar deviation: WFL      Passive Range of Motion   Left Shoulder   Flexion: 105 degrees with pain  Abduction: 85 degrees with pain  External rotation 90°: 45 degrees with pain  Internal rotation 90°: WFL    Left Wrist   Wrist flexion: WFL  Wrist extension: WFL  Radial deviation: WFL  Ulnar deviation: WFL    Scapular Mobility   Left Shoulder   Scapular mobility: fair  Scapular Mobility with Shoulder to 90° FF   Scapular elevation: minimal  Upward rotation: inadequate    Strength/Myotome Testing     Left Shoulder     Planes of Motion   Flexion: 3-   Abduction: 3-   External rotation at 0°: 4-   Internal rotation at 0°: 4     Left Elbow   Flexion: 4+  Extension: 4+  Forearm supination: 4+  Forearm pronation: 4+    Left Wrist/Hand   Wrist extension: 4+  Wrist flexion: 4+  Radial deviation: 4+  Ulnar deviation: 4+    Tests     Left Shoulder   Negative scapular relocation and scapular assistance .      Additional Tests Details  23  TTP along posterolateral aspect of left upper arm, left rhomboids, medial epicondyle   Decreased posterior GHJ mobility  Hypertonicity of L pec myriam             Precautions: CAD, myotonic dystrophy, h/o DVT, DM II    Daily Treatment Diary    Date 12/6            FOTO IE            Re-Eval IE            Auth visit # 1               Manuals    Post & inf GHJ mobs JANIA gr 4            LUE LAD JANIA            Pec stretch JANIA                         Neuro Re-Ed     TB no monies              TB scap retract                                                                              Ther Ex    Supine pec stretch             Table slides - flex, scap             Table prayer stretch                          Stand wand ext             Stand wand scap             TPR at wall c active flex, abd                          Ther Activity    Pulleys                                        Gait Training                              Modalities

## 2023-12-06 NOTE — PROGRESS NOTES
PT Evaluation     Today's date: 2023  Patient name: Hugo Mast  : 1960  MRN: [id number]  Referring provider: Reilly Butterfield MD  Dx:   Encounter Diagnosis     ICD-10-CM    1. Left shoulder pain, unspecified chronicity  M25.512       2. Chronic pain of left elbow  M25.522     G89.29           Start Time: 0900  Stop Time: 0953  Total time in clinic (min): 53 minutes    Assessment  Assessment details: Pt is a pleasant 61 y.o. male presenting to outpatient physical therapy with Left shoulder pain, unspecified chronicity  (primary encounter diagnosis). Pt presents with pain, decreased active and passive left GHJ range of motion, decreased strength, decreased GHJ accessory mobility, and decreased tolerance to activity. Pt is a good candidate for outpatient physical therapy and would benefit from skilled physical therapy to address limitations and to achieve goals. Thank you for this referral.   Impairments: abnormal coordination, abnormal or restricted ROM, activity intolerance, impaired physical strength and pain with function  Understanding of Dx/Px/POC: good   Prognosis: good    Goals  Short-Term Goals (4 weeks)   1. Patient will decrease worst rating of pain by 25% to improve quality of life. 2. Patient will increase strength by 1/2 MMT to improve quality of life with improved efficiency of daily activities. 3. Patient will improve ROM by 25% indicating improved mobility of affected area. Long-Term Goals (8 weeks)   1. Patient will decrease pain by 50% at worst in comparison to IE indicating significant reduction in pain and improved quality of life. 2. Patient will demonstrate strength WFL compared to IE levels indicating ability to independently manage pain symptoms to accomplish daily activities. 3. Patient will be independent with HEP with good form accomplished.       Plan  Patient would benefit from: PT eval and skilled PT  Planned modality interventions: cryotherapy and thermotherapy: hydrocollator packs  Planned therapy interventions: IADL retraining, body mechanics training, flexibility, functional ROM exercises, home exercise program, neuromuscular re-education, manual therapy, postural training, strengthening, stretching, therapeutic activities, therapeutic exercise and joint mobilization  Frequency: 2x week  Duration in visits: 12  Duration in weeks: 6  Treatment plan discussed with: patient      Subjective Evaluation    History of Present Illness  Mechanism of injury: 23  Pt comes to therapy reporting approx 6-month history of left shoulder and elbow pain, denying injury or trauma. Reports symptoms are typically exacerbated by activity (reaching, lifting). Denies pain or issues with playing golf. Reports he consulted his orthopedic physician earlier this week, where he received a CSI in the medial left elbow (area of most pain), which seemed to help considerably. Denies night pain. AGGS: reaching behind, lifting, reaching OH  LOC: posterolateral left shoulder, medial epicondyle of left arm; Occasional paresthesias.    EASES: avoiding agg motions; rest  GOALS: relieve pain with reaching and lifting  Patient Goals  Patient goals for therapy: decreased pain, increased motion, increased strength, independence with ADLs/IADLs and return to sport/leisure activities    Pain  Current pain ratin  At worst pain ratin        Objective     Active Range of Motion   Left Shoulder   Flexion: 100 degrees with pain  Abduction: 80 degrees with pain  External rotation BTH: C4 with pain  Internal rotation BTB: L1 with pain    Left Elbow   Flexion: WFL  Extension: WFL  Forearm supination: WFL  Forearm pronation: WFL    Left Wrist   Wrist flexion: WFL  Wrist extension: WFL  Radial deviation: WFL  Ulnar deviation: WFL      Passive Range of Motion   Left Shoulder   Flexion: 105 degrees with pain  Abduction: 85 degrees with pain  External rotation 90°: 45 degrees with pain  Internal rotation 90°: WFL    Left Wrist   Wrist flexion: WFL  Wrist extension: WFL  Radial deviation: WFL  Ulnar deviation: WFL    Scapular Mobility   Left Shoulder   Scapular mobility: fair  Scapular Mobility with Shoulder to 90° FF   Scapular elevation: minimal  Upward rotation: inadequate    Strength/Myotome Testing     Left Shoulder     Planes of Motion   Flexion: 3-   Abduction: 3-   External rotation at 0°: 4-   Internal rotation at 0°: 4     Left Elbow   Flexion: 4+  Extension: 4+  Forearm supination: 4+  Forearm pronation: 4+    Left Wrist/Hand   Wrist extension: 4+  Wrist flexion: 4+  Radial deviation: 4+  Ulnar deviation: 4+    Tests     Left Shoulder   Negative scapular relocation and scapular assistance .      Additional Tests Details  12/06/23  TTP along posterolateral aspect of left upper arm, left rhomboids, medial epicondyle   Decreased posterior GHJ mobility  Hypertonicity of L pec myriam             Precautions: CAD, myotonic dystrophy, h/o DVT, DM II    Daily Treatment Diary    Date 12/6            FOTO IE            Re-Eval IE            Auth visit # 1               Manuals    Post & inf GHJ mobs JANIA gr 4            LUE LAD JANIA            Pec stretch JANIA                         Neuro Re-Ed     TB no monies              TB scap retract                                                                              Ther Ex    Supine pec stretch             Table slides - flex, scap             Table prayer stretch                          Stand wand ext             Stand wand scap             TPR at wall c active flex, abd                          Ther Activity    Pulleys                                        Gait Training                              Modalities

## 2023-12-12 ENCOUNTER — OFFICE VISIT (OUTPATIENT)
Dept: PHYSICAL THERAPY | Facility: REHABILITATION | Age: 63
End: 2023-12-12
Payer: COMMERCIAL

## 2023-12-12 DIAGNOSIS — G89.29 CHRONIC PAIN OF LEFT ELBOW: ICD-10-CM

## 2023-12-12 DIAGNOSIS — M25.512 LEFT SHOULDER PAIN, UNSPECIFIED CHRONICITY: Primary | ICD-10-CM

## 2023-12-12 DIAGNOSIS — M25.522 CHRONIC PAIN OF LEFT ELBOW: ICD-10-CM

## 2023-12-12 PROCEDURE — 97110 THERAPEUTIC EXERCISES: CPT

## 2023-12-12 PROCEDURE — 97140 MANUAL THERAPY 1/> REGIONS: CPT

## 2023-12-12 NOTE — PROGRESS NOTES
Daily Note     Today's date: 2023  Patient name: Jeet Owens  : 1960  MRN: [id number]  Referring provider: Piotr Valdez MD  Dx:   Encounter Diagnosis     ICD-10-CM    1. Left shoulder pain, unspecified chronicity  M25.512       2. Chronic pain of left elbow  M25.522     G89.29                      Subjective: Pt comes to therapy reporting his L shoulder is feeling better since IE and he is compliant with HEP. His elbow also continues to feel good, no complaints upon arrival to therapy. Objective: See treatment diary below      Assessment: Pt with good tolerance to treatment and manual techniques performed on this date. Favorable response to manual techniques performed with improved mobility afterward. Good recall of HEP that was reviewed. MC/VC provided for posture and appropriate technique with exercises. Pt denies adverse response to treatment. Will continue to progress as tolerated. Pt would benefit from continued skilled PT to improve current deficits and maximize function. Plan: Continue per plan of care. Progress treatment as tolerated.        Precautions: CAD, myotonic dystrophy, h/o DVT, DM II    Daily Treatment Diary    Date            FOTO IE            Re-Eval IE            Auth visit # 1               Manuals    Post & inf GHJ mobs JANIA gr 4 SE           LUE LAD JANIA SE           Pec stretch JANIA SE                        Neuro Re-Ed     TB no monies   nv           TB scap retract  Pink  5"  2x10                                                                            Ther Ex    Supine pec stretch  10"x10           Table slides - flex, scap  5"x10 ea           Table prayer stretch  10"x10                        Stand wand ext  5"x10           Stand wand scap  5"x10           TPR at wall c active flex, abd  nv                        Ther Activity    Pulleys   5'                                     Gait Training                              Modalities

## 2023-12-13 ENCOUNTER — OFFICE VISIT (OUTPATIENT)
Dept: NEUROLOGY | Facility: CLINIC | Age: 63
End: 2023-12-13
Payer: COMMERCIAL

## 2023-12-13 VITALS
OXYGEN SATURATION: 97 % | HEIGHT: 68 IN | WEIGHT: 158 LBS | RESPIRATION RATE: 16 BRPM | BODY MASS INDEX: 23.95 KG/M2 | TEMPERATURE: 97.6 F | HEART RATE: 64 BPM | SYSTOLIC BLOOD PRESSURE: 110 MMHG | DIASTOLIC BLOOD PRESSURE: 62 MMHG

## 2023-12-13 DIAGNOSIS — G71.11 MYOTONIC DYSTROPHY (HCC): ICD-10-CM

## 2023-12-13 DIAGNOSIS — R42 VERTIGO: Primary | ICD-10-CM

## 2023-12-13 PROCEDURE — 99213 OFFICE O/P EST LOW 20 MIN: CPT | Performed by: PSYCHIATRY & NEUROLOGY

## 2023-12-13 RX ORDER — INSULIN ASPART 100 [IU]/ML
INJECTION, SOLUTION INTRAVENOUS; SUBCUTANEOUS
COMMUNITY
Start: 2023-10-17

## 2023-12-13 NOTE — ASSESSMENT & PLAN NOTE
Domenic Ho complains of dizziness. we discussed his symptoms in detail. He reports the dizziness occurs when he is laying down. He reports a sense of vertigo. When he puts his head on pillows at an angle the vertigo improves. He also reports a sense of fogginess and lightheadedness upon standing. His blood pressure was checked. There is no evidence of orthostatic hypotension. I have encouraged him to utilize stockings compression stockings during the day. We have also asked him to increase the amount of water intake. I did order a vestibular therapy that could help. There is no indication of other type of neurological asymmetry and ask him to utilize compression stockings during the day. he also describes some difficulty with fogginess which affects his memory. Therefore I have provided him with some suggestions.

## 2023-12-13 NOTE — PROGRESS NOTES
Patient ID: Adriana Coe is a 61 y.o. male. Assessment/Plan:    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[address])  Dottie Winter is a 75-year-old gentleman with a history of myotonic dystrophy. Overall this is stable. Strict he continues to exercise and at this point does not utilize need to utilize either mexiletine or Valium. Vertigo  Oliverio complains of dizziness. we discussed his symptoms in detail. He reports the dizziness occurs when he is laying down. He reports a sense of vertigo. When he puts his head on pillows at an angle the vertigo improves. He also reports a sense of fogginess and lightheadedness upon standing. His blood pressure was checked. There is no evidence of orthostatic hypotension. I have encouraged him to utilize stockings compression stockings during the day. We have also asked him to increase the amount of water intake. I did order a vestibular therapy that could help. There is no indication of other type of neurological asymmetry and ask him to utilize compression stockings during the day. he also describes some difficulty with fogginess which affects his memory. Therefore I have provided him with some suggestions. He is to return to our offices in 6 months. We discussed that diabetes can be associated with autonomic dysfunction. Diagnoses and all orders for this visit:    Vertigo  -     Ambulatory Referral to Physical Therapy; Future    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[address])    Other orders  -     Insulin Aspart (NovoLOG) 100 units/mL injection       Subjective:    Mr. Pili Leon is a pleasant 62 yo male with history of myotonic dystrophy presents in a follow-up visit        He Does have a history of vertigo which occurs more in the supine position and when he moves his head to the right. In the past vestibular therapy was helpful. Recently has noted increase in dizziness. He describes it as lightheadedness upon standing and spinning sensation in the supine position.   When he puts his head up greater than 30 degrees the spinning sensation does improve. He has been going undergoing some minimal Crystal therapy which can provide some relief but not persistent relief. He reports the symptoms are intermittent but also can be more severe during activity and can affect his cognitive function. He denies any associated weakness numbness or double vision. He is now on a pump for his diabetes              He does have a history of myotonic dystrophy. He was diagnosed in 1988 when he noticed difficulty walking and spasms spasms of his muscles  . This is worse in cold weather. . He had a muscle biopsy.  he had trouble "getting started" in sports and thus had testing done including his brothers and dad, and his father  were all found to have a similar abnormality  but his two brothers are without symptoms . States most notable with gripping. Overall the symptoms are stable. We had discussed the use of medications in the past but he felt that these were unnecessary at that time. At this point his symptoms are stable.  history heart attack 2004, and has cardiac stent in.  follows with cardiology. he denies any problems with conduction.  has had three blood clots and thus on Xarelto-  has history of this in dad and likely inherited this from him.         emg in march of 2018 was consistent with myotonia  But no evidence of a neuropathy               He   was a  and retired in November of 2017.  he denies any falls and does reports some intermittent problem with his balance. Marilyn Gutierres He is  and has 2 grandchildren.         Pressure standing was 120/70 sitting was 115/70                                     The following portions of the patient's history were reviewed and updated as appropriate: He  has a past medical history of CAD (coronary artery disease), Congenital myotonia, Deep vein thrombosis (DVT) ([address]), Diabetes ([address]), 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[address]) (12/06/2017), Pancreatitis, Sleep apnea, and Superficial thrombophlebitis. He  has a past surgical history that includes Cholecystectomy; Coronary angioplasty with stent; Colonoscopy; Circumcision; Inguinal hernia repair; ORIF radial shaft fracture (Left); ORIF ulnar / radial shaft fracture (Left); and Tonsillectomy and adenoidectomy. His family history includes Brain cancer in his mother; Cancer in his mother; Clotting disorder in his mother; Coronary artery disease in his paternal uncle; Heart disease in his mother; Hyperlipidemia in his mother. He  reports that he has never smoked. He has never used smokeless tobacco. He reports current alcohol use. He reports that he does not use drugs. Current Outpatient Medications   Medication Sig Dispense Refill    amoxicillin (AMOXIL) 500 MG tablet TAKE 4 TABLETS BY MOUTH 1 HOUR BEFORE DENTIST      aspirin (ECOTRIN LOW STRENGTH) 81 mg EC tablet Take 81 mg by mouth daily      atorvastatin (LIPITOR) 40 mg tablet TAKE 1 TABLET BY MOUTH EVERY DAY 90 tablet 1    cholecalciferol (VITAMIN D3) 400 units tablet Take 1 tablet by mouth every morning      Continuous Blood Gluc Sensor (Dexcom G6 Sensor) MISC Change every 10 days. E10.65      Continuous Blood Gluc Transmit (Dexcom G6 Transmitter) MISC Change every 90 days. E10.65      Gvoke HypoPen 2-Pack 1 MG/0.2ML SOAJ INJECT 1 MG UNDER THE SKIN AS NEEDED (FOR SEVERE HYPOGLYCEMIA).  E10.65      Insulin Aspart (NovoLOG) 100 units/mL injection       Insulin Disposable Pump (Omnipod 5 G6 Intro, Gen 5,) KIT Inject 1 each under the skin every third day      Na Sulfate-K Sulfate-Mg Sulf 17.5-3.13-1.6 GM/177ML SOLN DRINK 177 MILLILITER BY MOUTH AS DIRECTED      pantoprazole (PROTONIX) 20 mg tablet TAKE 1 TABLET BY MOUTH EVERY DAY 90 tablet 1    rivaroxaban (Xarelto) 20 mg tablet Take 1 tablet (20 mg total) by mouth daily with breakfast 90 tablet 3    acyclovir (ZOVIRAX) 200 mg capsule Take 1 capsule (200 mg total) by mouth 5 (five) times a day for 5 days (Patient taking differently: Take 200 mg by mouth 5 (five) times a day Patient takes PRN) 25 capsule 0    sildenafil (VIAGRA) 100 mg tablet Take 1 tablet (100 mg total) by mouth daily as needed for erectile dysfunction 10 tablet 1     No current facility-administered medications for this visit. He has No Known Allergies. .         Objective:    Blood pressure 110/62, pulse 64, temperature 97.6 °F (36.4 °C), temperature source Temporal, resp. rate 16, height 5' 8" (1.727 m), weight 71.7 kg (158 lb), SpO2 97%. Physical Exam  Eyes:      General: Lids are normal.      Extraocular Movements: Extraocular movements intact. Pupils: Pupils are equal, round, and reactive to light. Neurological:      Motor: Motor strength is normal.     Deep Tendon Reflexes:      Reflex Scores:       Tricep reflexes are 1+ on the right side and 1+ on the left side. Bicep reflexes are 2+ on the right side and 2+ on the left side. Patellar reflexes are 1+ on the right side and 1+ on the left side. Neurological Exam  Mental Status   Oriented to person, place and time. Language is fluent with no aphasia. Cranial Nerves  CN II: Visual acuity is normal. Visual fields full to confrontation. CN III, IV, VI: Extraocular movements intact bilaterally. Normal lids and orbits bilaterally. Pupils equal round and reactive to light bilaterally. CN V: Facial sensation is normal.  CN VII: Full and symmetric facial movement. CN VIII: Hearing is normal.  CN IX, X: Palate elevates symmetrically. Normal gag reflex. CN XI: Shoulder shrug strength is normal.  CN XII: Tongue midline without atrophy or fasciculations. Motor  Normal muscle bulk throughout. No fasciculations present. Strength is 5/5 throughout all four extremities. There is no evidence of myotonia. Sensory  Light touch is normal in upper and lower extremities. Temperature is normal in upper and lower extremities.      Reflexes Right                      Left  Biceps                                 2+                         2+  Triceps                                1+                         1+  Patellar                                1+                         1+  Achilles                                Tr                         Tr  Right Plantar: downgoing  Left Plantar: downgoing    Right pathological reflexes: Polo's absent. Left pathological reflexes: Polo's absent. Coordination  Right: Finger-to-nose normal.Left: Finger-to-nose normal.    Gait   Able to rise from chair without using arms. He had a wide-based gait. .    Review of systems obtained from the medical assistant as below was reviewed with the patient at today's visit     ROS:    Review of Systems   Constitutional:  Negative for appetite change, fatigue and fever. HENT:  Positive for hearing loss. Negative for tinnitus, trouble swallowing and voice change. Eyes: Negative. Negative for photophobia, pain and visual disturbance. Respiratory: Negative. Negative for shortness of breath. Cardiovascular: Negative. Negative for palpitations. Gastrointestinal: Negative. Negative for nausea and vomiting. Endocrine: Negative. Negative for cold intolerance. Genitourinary: Negative. Negative for dysuria, frequency and urgency. Musculoskeletal:  Negative for back pain, gait problem, myalgias and neck pain. Skin: Negative. Negative for rash. Allergic/Immunologic: Negative. Neurological:  Positive for dizziness and light-headedness. Negative for tremors, seizures, syncope, facial asymmetry, speech difficulty, weakness, numbness and headaches. Hematological: Negative. Does not bruise/bleed easily. Psychiatric/Behavioral: Negative. Negative for confusion, hallucinations and sleep disturbance. All other systems reviewed and are negative.

## 2023-12-13 NOTE — ASSESSMENT & PLAN NOTE
Riley Cisneros is a 14-year-old gentleman with a history of myotonic dystrophy. Overall this is stable. Strict he continues to exercise and at this point does not utilize need to utilize either mexiletine or Valium.

## 2023-12-15 ENCOUNTER — OFFICE VISIT (OUTPATIENT)
Dept: PHYSICAL THERAPY | Facility: REHABILITATION | Age: 63
End: 2023-12-15
Payer: COMMERCIAL

## 2023-12-15 DIAGNOSIS — M25.522 CHRONIC PAIN OF LEFT ELBOW: ICD-10-CM

## 2023-12-15 DIAGNOSIS — M25.512 LEFT SHOULDER PAIN, UNSPECIFIED CHRONICITY: Primary | ICD-10-CM

## 2023-12-15 DIAGNOSIS — G89.29 CHRONIC PAIN OF LEFT ELBOW: ICD-10-CM

## 2023-12-15 PROCEDURE — 97112 NEUROMUSCULAR REEDUCATION: CPT

## 2023-12-15 PROCEDURE — 97110 THERAPEUTIC EXERCISES: CPT

## 2023-12-15 NOTE — PROGRESS NOTES
Daily Note     Today's date: 12/15/2023  Patient name: [facility]  : 1960  MRN: [id number]  Referring provider: Frank Rothman MD  Dx:   Encounter Diagnosis     ICD-10-CM    1. Left shoulder pain, unspecified chronicity  M25.512       2. Chronic pain of left elbow  M25.522     G89.29           Start Time: 0845  Stop Time: 0920  Total time in clinic (min): 35 minutes    Subjective: Patient denies pain upon arrival to today's session and notes performing HEP 2-3x/day. States shoulder and elbow were slightly sore after last visit however, resolved with rest.       Objective: See treatment diary below      Assessment: Patient tolerated treatment session well. Today's POC slightly modified 2* patient having to leave session early. Patient had good tolerability with all stretches and exercises this session without reports of pain/discomfort within L shoulder/elbow. Hypomobility noted within L pec that improved with manual stretches. Patient able to add no monies without difficulty, advised patient he may add to HEP as well. Patient left clinic in good condition with an appropriate amount of muscular fatigue. Patient would benefit from continued pec stretches and strengthening to restore function of LUE. Plan: Continue per POC. Increase reps/resistance as tolerated.       Precautions: CAD, myotonic dystrophy, h/o DVT, DM II    Daily Treatment Diary    Date 12/6 12/12 12/15          FOTO IE            Re-Eval IE            Auth visit # 1               Manuals    Post & inf GHJ mobs JANIA gr 4 SE           LUE LAD JANIA SE MS           Pec stretch JANIA SE MS                       Neuro Re-Ed     TB no monies   nv GTB 5" x 10          TB scap retract  Pink  5"  2x10 GTB 5" 2x10                                                                           Ther Ex    Supine pec stretch  10"x10 @ home          Table slides - flex, scap  5"x10 ea 5"x10 ea          Table prayer stretch  10"x10 10"x10                       Stand wand ext  5"x10 5" x 15          Stand wand scap  5"x10 5" x 15          TPR at wall c active flex, abd  nv nv                       Ther Activity    Pulleys   5' 5'                                    Gait Training                              Modalities

## 2023-12-19 ENCOUNTER — EVALUATION (OUTPATIENT)
Dept: PHYSICAL THERAPY | Facility: REHABILITATION | Age: 63
End: 2023-12-19
Payer: COMMERCIAL

## 2023-12-19 DIAGNOSIS — R42 VERTIGO: ICD-10-CM

## 2023-12-19 DIAGNOSIS — H81.12 BENIGN PAROXYSMAL VERTIGO OF LEFT EAR: Primary | ICD-10-CM

## 2023-12-19 PROCEDURE — 97164 PT RE-EVAL EST PLAN CARE: CPT | Performed by: PHYSICAL THERAPIST

## 2023-12-19 PROCEDURE — 97140 MANUAL THERAPY 1/> REGIONS: CPT | Performed by: PHYSICAL THERAPIST

## 2023-12-19 PROCEDURE — 97110 THERAPEUTIC EXERCISES: CPT | Performed by: PHYSICAL THERAPIST

## 2023-12-19 NOTE — PROGRESS NOTES
PT Evaluation     Today's date: 2023  Patient name: Otoniel Martinez  : 1960  MRN: [id number]  Referring provider: Chriss Tyson MD  Dx:   Encounter Diagnosis     ICD-10-CM    1. Vertigo  R42 Ambulatory Referral to Physical Therapy                     Assessment  Assessment details: Pt is a pleasant 63 y.o. male presenting to outpatient physical therapy with Left shoulder pain, unspecified chronicity  (primary encounter diagnosis).     Pt presents with pain, decreased active and passive left GHJ range of motion, decreased strength, decreased GHJ accessory mobility, and decreased tolerance to activity.     Pt is a good candidate for outpatient physical therapy and would benefit from skilled physical therapy to address limitations and to achieve goals. Thank you for this referral.   Impairments: abnormal coordination, abnormal or restricted ROM, activity intolerance, impaired physical strength and pain with function  Understanding of Dx/Px/POC: good   Prognosis: good    Goals  Short-Term Goals (4 weeks)   1. Patient will decrease worst rating of pain by 25% to improve quality of life.  2. Patient will increase strength by 1/2 MMT to improve quality of life with improved efficiency of daily activities.  3. Patient will improve ROM by 25% indicating improved mobility of affected area.    Long-Term Goals (8 weeks)   1. Patient will decrease pain by 50% at worst in comparison to IE indicating significant reduction in pain and improved quality of life.  2. Patient will demonstrate strength WFL compared to IE levels indicating ability to independently manage pain symptoms to accomplish daily activities.   3. Patient will be independent with HEP with good form accomplished.      Plan  Patient would benefit from: PT eval and skilled PT  Planned modality interventions: cryotherapy and thermotherapy: hydrocollator packs  Planned therapy interventions: IADL retraining, body mechanics training, flexibility,  functional ROM exercises, home exercise program, neuromuscular re-education, manual therapy, postural training, strengthening, stretching, therapeutic activities, therapeutic exercise and joint mobilization  Frequency: 2x week  Duration in visits: 12  Duration in weeks: 6  Treatment plan discussed with: patient    Subjective Evaluation    History of Present Illness  Mechanism of injury: 23  Pt reports several year history of episodic vertigo. Reports his symptoms may be present for a while, then might be absent for a while. However, notes symptoms are typically exacerbated by lying supine. Therefore, has been sleeping on either his right or left side. Notes symptoms will typically go away spontaneously, however, states this episode is not subsiding on its own.   GOALS: relieve symptoms with transfers out of bed and head movements     23  Pt comes to therapy reporting approx 6-month history of left shoulder and elbow pain, denying injury or trauma. Reports symptoms are typically exacerbated by activity (reaching, lifting). Denies pain or issues with playing golf.     Reports he consulted his orthopedic physician earlier this week, where he received a CSI in the medial left elbow (area of most pain), which seemed to help considerably. Denies night pain.    AGGS: reaching behind, lifting, reaching OH  LOC: posterolateral left shoulder, medial epicondyle of left arm; Occasional paresthesias.   EASES: avoiding agg motions; rest  GOALS: relieve pain with reaching and lifting  Patient Goals  Patient goals for therapy: decreased pain, increased motion, increased strength, independence with ADLs/IADLs and return to sport/leisure activities    Pain  Current pain ratin  At worst pain ratin        Objective     Active Range of Motion   Left Shoulder   Flexion: 100 degrees with pain  Abduction: 80 degrees with pain  External rotation BTH: C4 with pain  Internal rotation BTB: L1 with pain    Left Elbow    Flexion: WFL  Extension: WFL  Forearm supination: WFL  Forearm pronation: WFL    Left Wrist   Wrist flexion: WFL  Wrist extension: WFL  Radial deviation: WFL  Ulnar deviation: WFL      Passive Range of Motion   Left Shoulder   Flexion: 105 degrees with pain  Abduction: 85 degrees with pain  External rotation 90°: 45 degrees with pain  Internal rotation 90°: WFL    Left Wrist   Wrist flexion: WFL  Wrist extension: WFL  Radial deviation: WFL  Ulnar deviation: WFL    Scapular Mobility   Left Shoulder   Scapular mobility: fair  Scapular Mobility with Shoulder to 90° FF   Scapular elevation: minimal  Upward rotation: inadequate    Strength/Myotome Testing     Left Shoulder     Planes of Motion   Flexion: 3-   Abduction: 3-   External rotation at 0°: 4-   Internal rotation at 0°: 4     Left Elbow   Flexion: 4+  Extension: 4+  Forearm supination: 4+  Forearm pronation: 4+    Left Wrist/Hand   Wrist extension: 4+  Wrist flexion: 4+  Radial deviation: 4+  Ulnar deviation: 4+    Tests     Left Shoulder   Negative scapular relocation and scapular assistance .     Additional Tests Details  12/06/23  TTP along posterolateral aspect of left upper arm, left rhomboids, medial epicondyle   Decreased posterior GHJ mobility  Hypertonicity of L pec myriam  Neuro Exam:     Oculomotor exam   Oculomotor ROM: diminished  Resting nystagmus: present  Gaze holding nystagmus: not present left  and not present right  Smooth pursuits: saccadic smooth pursuit  Vertical saccades: normal and VOR WFL  Horizontal saccades: normal and VOR hypometric    Positional testing   [place]pike   Left posterior canal: symptomatic, torsional and upbeating             Precautions: CAD, myotonic dystrophy, h/o DVT, DM II    Daily Treatment Diary    Date 12/6 12/12 12/15 12/19         FOTO IE            Re-Eval IE   JANIA vertigo         Auth visit # 1               Manuals    Post & inf GHJ mobs JANIA gr 4 SE           LUE LAD JANIA SE MS           Pec stretch JANIA SE MS      "     CRT - Left    x2         [place]-Hallpike right    nv         Horizontal roll tests    Nv b/l                       Neuro Re-Ed     TB no monies   nv GTB 5\" x 10 Blue 5\" 2x10         TB scap retract  Pink  5\"  2x10 GTB 5\" 2x10 Blue 5\" 2x10                                                                          Ther Ex    Supine pec stretch  10\"x10 @ home          Table slides - flex, scap  5\"x10 ea 5\"x10 ea          Table prayer stretch  10\"x10 10\"x10                       Stand wand ext  5\"x10 5\" x 15          Stand wand scap  5\"x10 5\" x 15          TPR at wall c active flex, abd  nv nv                       Ther Activity    Pulleys   5' 5'                                    Gait Training                              Modalities                                       "

## 2023-12-22 ENCOUNTER — OFFICE VISIT (OUTPATIENT)
Dept: PHYSICAL THERAPY | Facility: REHABILITATION | Age: 63
End: 2023-12-22
Payer: COMMERCIAL

## 2023-12-22 DIAGNOSIS — M25.512 LEFT SHOULDER PAIN, UNSPECIFIED CHRONICITY: Primary | ICD-10-CM

## 2023-12-22 DIAGNOSIS — M25.522 CHRONIC PAIN OF LEFT ELBOW: ICD-10-CM

## 2023-12-22 DIAGNOSIS — G89.29 CHRONIC PAIN OF LEFT ELBOW: ICD-10-CM

## 2023-12-22 PROCEDURE — 97110 THERAPEUTIC EXERCISES: CPT

## 2023-12-22 PROCEDURE — 97140 MANUAL THERAPY 1/> REGIONS: CPT

## 2023-12-22 PROCEDURE — 97112 NEUROMUSCULAR REEDUCATION: CPT

## 2023-12-22 NOTE — PROGRESS NOTES
"Daily Note     Today's date: 2023  Patient name: Otoniel Martinez  : 1960  MRN: [id number]  Referring provider: Chriss Tyson MD  Dx:   Encounter Diagnosis     ICD-10-CM    1. Left shoulder pain, unspecified chronicity  M25.512       2. Chronic pain of left elbow  M25.522     G89.29           Start Time: 0900  Stop Time: 0945  Total time in clinic (min): 45 minutes    Subjective: Patient denies elbow pain since receiving CSI however, reports 3/10 postero-lateral shoulder pain at arrival.       Objective: See treatment diary below      Assessment: Patient tolerated treatment session well. Session focused on facilitating improved strength and flexibility to L shoulder without a reproduction of pain symptoms. Patient demonstrated good tolerability and able to add new exercises without any adverse effects. Favorable response to manual techniques performed with improved mobility afterward. Patient left clinic in good condition and would benefit from continued stretching and strengthening to restore functional deficits.       Plan: Continue per POC. Increase reps/resistance as tolerated.      Precautions: CAD, myotonic dystrophy, h/o DVT, DM II    Daily Treatment Diary    Date 12/6 12/12 12/15 12/19 12/22        FOTO IE    perf        Re-Eval IE   JANIA vertigo         Auth visit # 1               Manuals    Post & inf GHJ mobs JANIA gr 4 SE           LUE LAD JANIA SE MS   MS        Pec stretch JANIA SE MS  MS        CRT - Left    x2         [place]-Hallpike right    nv         Horizontal roll tests    Nv b/l                       Neuro Re-Ed     TB no monies   nv GTB 5\" x 10 Blue 5\" 2x10 Blue 5\" 2x10        TB scap retract  Pink  5\"  2x10 GTB 5\" 2x10 Blue 5\" 2x10 Blue 5\" 2x10        TB horizontal abduction     Blue 5\" x10                                                            Ther Ex    Supine pec stretch  10\"x10 @ home  10\" x10        Table slides - flex, scap  5\"x10 ea 5\"x10 ea  Wall slides 5\" x10 ea          Table " "prayer stretch  10\"x10 10\"x10  10\"x10                     Stand wand ext  5\"x10 5\" x 15  5\" x 20        Stand wand scap  5\"x10 5\" x 15  5\" x 20        TPR at wall c active flex, abd  nv nv                       Ther Activity    Pulleys   5' 5'  5'                                  Gait Training                              Modalities                                       "

## 2024-01-03 ENCOUNTER — OFFICE VISIT (OUTPATIENT)
Dept: PHYSICAL THERAPY | Facility: REHABILITATION | Age: 64
End: 2024-01-03
Payer: COMMERCIAL

## 2024-01-03 DIAGNOSIS — M25.522 CHRONIC PAIN OF LEFT ELBOW: ICD-10-CM

## 2024-01-03 DIAGNOSIS — G89.29 CHRONIC PAIN OF LEFT ELBOW: ICD-10-CM

## 2024-01-03 DIAGNOSIS — R42 VERTIGO: ICD-10-CM

## 2024-01-03 DIAGNOSIS — H81.12 BENIGN PAROXYSMAL VERTIGO OF LEFT EAR: ICD-10-CM

## 2024-01-03 DIAGNOSIS — M25.512 LEFT SHOULDER PAIN, UNSPECIFIED CHRONICITY: Primary | ICD-10-CM

## 2024-01-03 PROCEDURE — 97112 NEUROMUSCULAR REEDUCATION: CPT

## 2024-01-03 PROCEDURE — 97140 MANUAL THERAPY 1/> REGIONS: CPT | Performed by: PHYSICAL THERAPIST

## 2024-01-03 PROCEDURE — 97110 THERAPEUTIC EXERCISES: CPT

## 2024-01-03 PROCEDURE — 97140 MANUAL THERAPY 1/> REGIONS: CPT

## 2024-01-03 NOTE — PROGRESS NOTES
"Daily Note     Today's date: 1/3/2024  Patient name: Otoniel Martinez  : 1960  MRN: [id number]  Referring provider: Chriss Tyson MD  Dx:   Encounter Diagnosis     ICD-10-CM    1. Left shoulder pain, unspecified chronicity  M25.512       2. Chronic pain of left elbow  M25.522     G89.29       3. Benign paroxysmal vertigo of left ear  H81.12       4. Vertigo  R42                      Subjective:   Oliverio reports that his shoulder is feeling good.  He continues to have dizziness which is the most bothersome.  He reports that his symptoms improve after the treatment but it doesn't take his dizziness away permanently.       Objective: See treatment diary below      Assessment: Patient tolerated treatment well. Patient performed ex and received manual therapy as noted.  Oliverio demonstrates a good understanding of the exercises performed.   Patient performs all ex without reports of pain.   Patient would benefit from continued PT intervention to address deficits and attain set goals.       Plan: Continue per plan of care.      Precautions: CAD, myotonic dystrophy, h/o DVT, DM II    Daily Treatment Diary    Date 12/6 12/12 12/15 12/19 12/22 1/3/24       FOTO IE    perf        Re-Eval IE   JANIA vertigo         Auth visit # 1               Manuals    Post & inf GHJ mobs JANIA gr 4 SE           LUE LAD JANIA SE MS   MS CC       Pec stretch JANIA SE MS  MS CC       CRT - Left    x2         Sapphire-Hallpike right    nv         Horizontal roll tests    Nv b/l                       Neuro Re-Ed     TB no monies   nv GTB 5\" x 10 Blue 5\" 2x10 Blue 5\" 2x10 Blue 5\"  2x10       TB scap retract  Pink  5\"  2x10 GTB 5\" 2x10 Blue 5\" 2x10 Blue 5\" 2x10 Blue 5\"  2x10       TB horizontal abduction     Blue 5\" x10 Blue  5\"  1x10  1x5                                                           Ther Ex    Supine pec stretch  10\"x10 @ home  10\" x10 10\"x10       Table slides - flex, scap  5\"x10 ea 5\"x10 ea  Wall slides 5\" x10 ea Wall slides  5\"  x10       " "  Table prayer stretch  10\"x10 10\"x10  10\"x10 10\"x10                    Stand wand ext  5\"x10 5\" x 15  5\" x 20 5\"x20       Stand wand scap  5\"x10 5\" x 15  5\" x 20 5\"x20       TPR at wall c active flex, abd  nv nv                       Ther Activity    Pulleys   5' 5'  5' 5'                                 Gait Training                              Modalities                                         "

## 2024-01-03 NOTE — PROGRESS NOTES
Assessed for BPPV today via Sapphire-Hallpike test. Demonstrated positive R Sapphire-Hallpike, with torsional upbeating nystagmus, lasting <30 seconds. Treated via CRT x2 directed towards R posterior canal. Will continue treatments on PRN basis.

## 2024-01-05 ENCOUNTER — APPOINTMENT (OUTPATIENT)
Dept: PHYSICAL THERAPY | Facility: REHABILITATION | Age: 64
End: 2024-01-05
Payer: COMMERCIAL

## 2024-01-10 ENCOUNTER — OFFICE VISIT (OUTPATIENT)
Dept: PHYSICAL THERAPY | Facility: REHABILITATION | Age: 64
End: 2024-01-10
Payer: COMMERCIAL

## 2024-01-10 DIAGNOSIS — M25.522 CHRONIC PAIN OF LEFT ELBOW: ICD-10-CM

## 2024-01-10 DIAGNOSIS — G89.29 CHRONIC PAIN OF LEFT ELBOW: ICD-10-CM

## 2024-01-10 DIAGNOSIS — M25.512 LEFT SHOULDER PAIN, UNSPECIFIED CHRONICITY: Primary | ICD-10-CM

## 2024-01-10 DIAGNOSIS — R42 VERTIGO: ICD-10-CM

## 2024-01-10 DIAGNOSIS — H81.12 BENIGN PAROXYSMAL VERTIGO OF LEFT EAR: ICD-10-CM

## 2024-01-10 PROCEDURE — 97140 MANUAL THERAPY 1/> REGIONS: CPT | Performed by: PHYSICAL THERAPIST

## 2024-01-10 PROCEDURE — 97110 THERAPEUTIC EXERCISES: CPT | Performed by: PHYSICAL THERAPIST

## 2024-01-10 NOTE — PROGRESS NOTES
"Daily Note     Today's date: 1/10/2024  Patient name: Otoniel Martinez  : 1960  MRN: [id number]  Referring provider: Chriss Tyson MD  Dx:   Encounter Diagnosis     ICD-10-CM    1. Left shoulder pain, unspecified chronicity  M25.512       2. Chronic pain of left elbow  M25.522     G89.29       3. Benign paroxysmal vertigo of left ear  H81.12       4. Vertigo  R42                      Subjective: Pt comes to therapy stating his shoulder has been feeling better, however, notes he continues to have vertigo. States the intensity varies from day to day.       Objective: See treatment diary below      Assessment: Tolerated treatment well. Reported good challenge with resisted activities today. Demonstrates negative Horizontal Roll tests b/l. Positive right and left Sapphire-Hallpike tests, however, more symptomatic and strong nystagmus on left. Therefore, treated via Epley x2 directed towards left posterior canal. Discussed possible consult with Neuro PT, to investigate vertigo further, as he continues to demonstrate symptoms despite repeated Epley maneuvers. Patient exhibited good technique with therapeutic exercises and would benefit from continued PT      Plan: Progress treatment as tolerated.       Precautions: CAD, myotonic dystrophy, h/o DVT, DM II    Daily Treatment Diary    Date 12/6 12/12 12/15 12/19 12/22 1/3/24 1/10      FOTO IE    perf        Re-Eval IE   JANIA vertigo         Auth visit # 1               Manuals    Post & inf GHJ mobs JANIA gr 4 SE           LUE LAD JANIA SE MS   MS CC JANIA      Pec stretch JANIA SE MS  MS CC JANIA      CRT - Left    x2   x2      CRT - right      x2       Sapphire-Hallpike right    nv   JANIA      Horizontal roll tests    Nv b/l    JANIA                   Neuro Re-Ed     TB no monies   nv GTB 5\" x 10 Blue 5\" 2x10 Blue 5\" 2x10 Blue 5\"  2x10 Blue 5\"  2x10      TB scap retract  Pink  5\"  2x10 GTB 5\" 2x10 Blue 5\" 2x10 Blue 5\" 2x10 Blue 5\"  2x10 Blue 5\"  2x10      TB horizontal abduction     Blue 5\" x10 " "Blue  5\" 1x10,  1x5 Blue 5\" 2x10                                                          Ther Ex    Supine pec stretch  10\"x10 @ home  10\" x10 10\"x10       Table slides - flex, scap  5\"x10 ea 5\"x10 ea  Wall slides 5\" x10 ea Wall slides  5\"x10   Wall slides  5\"x10 ea      Table prayer stretch  10\"x10 10\"x10  10\"x10 10\"x10                    Stand wand ext  5\"x10 5\" x 15  5\" x 20 5\"x20 5# bar x20      Stand wand scap  5\"x10 5\" x 15  5\" x 20 5\"x20 5# bar x20      TPR at wall c active flex, abd  nv nv                       Ther Activity    Pulleys   5' 5'  5' 5' 5'                                Gait Training                              Modalities                                           "

## 2024-01-12 ENCOUNTER — OFFICE VISIT (OUTPATIENT)
Dept: PHYSICAL THERAPY | Facility: REHABILITATION | Age: 64
End: 2024-01-12
Payer: COMMERCIAL

## 2024-01-12 DIAGNOSIS — R42 VERTIGO: ICD-10-CM

## 2024-01-12 DIAGNOSIS — M25.512 LEFT SHOULDER PAIN, UNSPECIFIED CHRONICITY: Primary | ICD-10-CM

## 2024-01-12 DIAGNOSIS — G89.29 CHRONIC PAIN OF LEFT ELBOW: ICD-10-CM

## 2024-01-12 DIAGNOSIS — M25.522 CHRONIC PAIN OF LEFT ELBOW: ICD-10-CM

## 2024-01-12 DIAGNOSIS — H81.12 BENIGN PAROXYSMAL VERTIGO OF LEFT EAR: ICD-10-CM

## 2024-01-12 PROCEDURE — 97110 THERAPEUTIC EXERCISES: CPT

## 2024-01-12 PROCEDURE — 97140 MANUAL THERAPY 1/> REGIONS: CPT

## 2024-01-12 NOTE — PROGRESS NOTES
"Daily Note     Today's date: 2024  Patient name: Otoniel Martinez  : 1960  MRN: [id number]  Referring provider: Chriss Tyson MD  Dx:   Encounter Diagnosis     ICD-10-CM    1. Left shoulder pain, unspecified chronicity  M25.512       2. Chronic pain of left elbow  M25.522     G89.29       3. Benign paroxysmal vertigo of left ear  H81.12       4. Vertigo  R42                      Subjective: pt reports no complaints with shoulder prior to beginning today. He noted less difficulty and pain with reaching OH since starting PT.      Objective: See treatment diary below      Assessment: Tolerated treatment well and without complaints. Positive Sapphire-Hallpike tested on left side; will continue to assess each visit. Good response with soulder exercises with no adverse reactions. Patient demonstrated fatigue post treatment and exhibited good technique with therapeutic exercises      Plan: Continue per plan of care.  Progress treament per protocol.      Precautions: CAD, myotonic dystrophy, h/o DVT, DM II    Daily Treatment Diary    Date 12/6 12/12 12/15 12/19 12/22 1/3/24 1/10 1/12     FOTO IE    perf        Re-Eval IE   JANIA vertigo         Auth visit # 1               Manuals    Post & inf GHJ mobs JANIA gr 4 SE           LUE LAD JANIA SE MS   MS CC JANIA TE     Pec stretch JANIA SE MS  MS CC JANIA TE     CRT - Left    x2   x2 x2     CRT - right      x2       Sapphire-Hallpike right    nv   JANIA JANIA     Horizontal roll tests    Nv b/l    JANIA JANIA                  Neuro Re-Ed     TB no monies   nv GTB 5\" x 10 Blue 5\" 2x10 Blue 5\" 2x10 Blue 5\"  2x10 Blue 5\"  2x10 Blue 5\"  2x10     TB scap retract  Pink  5\"  2x10 GTB 5\" 2x10 Blue 5\" 2x10 Blue 5\" 2x10 Blue 5\"  2x10 Blue 5\"  2x10 Blue 5\"  2x10     TB horizontal abduction     Blue 5\" x10 Blue  5\" 1x10,  1x5 Blue 5\" 2x10 Blue 5\"  2x10                                                         Ther Ex    Supine pec stretch  10\"x10 @ home  10\" x10 10\"x10       Table slides - flex, scap  5\"x10 ea 5\"x10 " "ea  Wall slides 5\" x10 ea Wall slides  5\"x10   Wall slides  5\"x10 ea Wall slides  5\"x10 ea     Table prayer stretch  10\"x10 10\"x10  10\"x10 10\"x10                    Stand wand ext  5\"x10 5\" x 15  5\" x 20 5\"x20 5# bar x20 5# bar x20     Stand wand scap  5\"x10 5\" x 15  5\" x 20 5\"x20 5# bar x20 5# bar x20     TPR at wall c active flex, abd  nv nv                       Ther Activity    Pulleys   5' 5'  5' 5' 5' 5'                               Gait Training                              Modalities                                             "

## 2024-01-17 ENCOUNTER — OFFICE VISIT (OUTPATIENT)
Dept: PHYSICAL THERAPY | Facility: REHABILITATION | Age: 64
End: 2024-01-17
Payer: COMMERCIAL

## 2024-01-17 DIAGNOSIS — R42 VERTIGO: ICD-10-CM

## 2024-01-17 DIAGNOSIS — M25.522 CHRONIC PAIN OF LEFT ELBOW: ICD-10-CM

## 2024-01-17 DIAGNOSIS — G89.29 CHRONIC PAIN OF LEFT ELBOW: ICD-10-CM

## 2024-01-17 DIAGNOSIS — H81.12 BENIGN PAROXYSMAL VERTIGO OF LEFT EAR: ICD-10-CM

## 2024-01-17 DIAGNOSIS — M25.512 LEFT SHOULDER PAIN, UNSPECIFIED CHRONICITY: Primary | ICD-10-CM

## 2024-01-17 PROCEDURE — 97110 THERAPEUTIC EXERCISES: CPT | Performed by: PHYSICAL THERAPIST

## 2024-01-17 PROCEDURE — 97112 NEUROMUSCULAR REEDUCATION: CPT | Performed by: PHYSICAL THERAPIST

## 2024-01-17 NOTE — PROGRESS NOTES
"Daily Note     Today's date: 2024  Patient name: Otoniel Martinez  : 1960  MRN: [id number]  Referring provider: Chriss Tyson MD  Dx:   Encounter Diagnosis     ICD-10-CM    1. Left shoulder pain, unspecified chronicity  M25.512       2. Chronic pain of left elbow  M25.522     G89.29       3. Benign paroxysmal vertigo of left ear  H81.12       4. Vertigo  R42                      Subjective: Pt comes to therapy denying pain or discomfort. Denies discomfort following last treatment session.       Objective: See treatment diary below      Assessment: Tolerated treatment well. Challenged with newly added exercises, aimed to improve scap stability. Patient demonstrated fatigue post treatment, exhibited good technique with therapeutic exercises, and would benefit from continued PT      Plan: Progress treatment as tolerated.       Precautions: CAD, myotonic dystrophy, h/o DVT, DM II    Daily Treatment Diary    Date 12/6 12/12 12/15 12/19 12/22 1/3/24 1/10 1/12 1/17    FOTO IE    perf        Re-Eval IE   JANIA vertigo         Auth visit # 1               Manuals    Post & inf GHJ mobs JANIA gr 4 SE           LUE LAD JANIA SE MS   MS CC JANIA TE np    Pec stretch JANIA SE MS  MS CC JANIA TE np    CRT - Left    x2   x2 x2 x1    CRT - right      x2       Sapphire-Hallpike right    nv   JANIA JANIA JANIA left    Horizontal roll tests    Nv b/l    JANIA JANIA                  Neuro Re-Ed     TB no monies   nv GTB 5\" x 10 Blue 5\" 2x10 Blue 5\" 2x10 Blue 5\"  2x10 Blue 5\"  2x10 Blue 5\"  2x10 np    TB scap retract  Pink  5\"  2x10 GTB 5\" 2x10 Blue 5\" 2x10 Blue 5\" 2x10 Blue 5\"  2x10 Blue 5\"  2x10 Blue 5\"  2x10 K 15.0# 3x10    TB horizontal abduction     Blue 5\" x10 Blue  5\" 1x10,  1x5 Blue 5\" 2x10 Blue 5\"  2x10 K 3.2# 2x10    Ball circles on wall - CW, CCW; flex, abd         Red pball  X10 ea     Prone T, Y, W         5\"x20 ea                              Ther Ex    Supine pec stretch  10\"x10 @ home  10\" x10 10\"x10       Table slides - flex, scap  5\"x10 ea " "5\"x10 ea  Wall slides 5\" x10 ea Wall slides  5\"x10   Wall slides  5\"x10 ea Wall slides  5\"x10 ea D/C    Table prayer stretch  10\"x10 10\"x10  10\"x10 10\"x10                    Stand wand ext  5\"x10 5\" x 15  5\" x 20 5\"x20 5# bar x20 5# bar x20 5# bar x20    Stand wand scap  5\"x10 5\" x 15  5\" x 20 5\"x20 5# bar x20 5# bar x20 5# bar x20    TPR at wall c active flex, abd  nv nv                       Ther Activity    Pulleys   5' 5'  5' 5' 5' 5' 5'                              Gait Training                              Modalities                                               "

## 2024-01-19 ENCOUNTER — OFFICE VISIT (OUTPATIENT)
Dept: PHYSICAL THERAPY | Facility: REHABILITATION | Age: 64
End: 2024-01-19
Payer: COMMERCIAL

## 2024-01-19 DIAGNOSIS — M25.522 CHRONIC PAIN OF LEFT ELBOW: ICD-10-CM

## 2024-01-19 DIAGNOSIS — G89.29 CHRONIC PAIN OF LEFT ELBOW: ICD-10-CM

## 2024-01-19 DIAGNOSIS — R42 VERTIGO: ICD-10-CM

## 2024-01-19 DIAGNOSIS — M25.512 LEFT SHOULDER PAIN, UNSPECIFIED CHRONICITY: Primary | ICD-10-CM

## 2024-01-19 DIAGNOSIS — H81.12 BENIGN PAROXYSMAL VERTIGO OF LEFT EAR: ICD-10-CM

## 2024-01-19 PROCEDURE — 97110 THERAPEUTIC EXERCISES: CPT

## 2024-01-19 PROCEDURE — 97112 NEUROMUSCULAR REEDUCATION: CPT

## 2024-01-19 NOTE — PROGRESS NOTES
"Daily Note     Today's date: 2024  Patient name: Otoniel Martinez  : 1960  MRN: [id number]  Referring provider: Chriss Tyson MD  Dx:   Encounter Diagnosis     ICD-10-CM    1. Left shoulder pain, unspecified chronicity  M25.512       2. Benign paroxysmal vertigo of left ear  H81.12       3. Chronic pain of left elbow  M25.522     G89.29       4. Vertigo  R42           Start Time: 0900  Stop Time: 0945  Total time in clinic (min): 45 minutes    Subjective: Pt reports no new complaints regarding his L shoulder. Denies dizziness upon arrival to clinic this visit.       Objective: See treatment diary below      Assessment: Pt tolerated treatment well this visit with continued emphasis on improving scapular control. Pt demonstrated difficulty with rhomboid and lower trap activation requiring increased VVT cues to perform accurately and without shoulder elevation compensation. Pt denied pain or discomfort post-session with natali muscular fatigue. Pt would continue to benefit from skilled PT in order to restore remaining deficits and optimize UE function.       Plan: Continue per plan of care.      Precautions: CAD, myotonic dystrophy, h/o DVT, DM II    Daily Treatment Diary    Date 12/6 12/12 12/15 12/19 12/22 1/3/24 1/10 1/12 1/17 1/19   FOTO IE    perf        Re-Eval IE   JANIA vertigo         Auth visit # 1               Manuals    Post & inf GHJ mobs JANIA gr 4 SE           LUE LAD JANIA SE MS   MS CC JANIA TE np    Pec stretch JANIA SE MS  MS CC JANIA TE np    CRT - Left    x2   x2 x2 x1    CRT - right      x2       Sapphire-Hallpike right    nv   JANIA JANIA JANIA left    Horizontal roll tests    Nv b/l    JANIA JANIA                  Neuro Re-Ed     TB no monies   nv GTB 5\" x 10 Blue 5\" 2x10 Blue 5\" 2x10 Blue 5\"  2x10 Blue 5\"  2x10 Blue 5\"  2x10 np Blue 5\"  2x10   TB scap retract  Pink  5\"  2x10 GTB 5\" 2x10 Blue 5\" 2x10 Blue 5\" 2x10 Blue 5\"  2x10 Blue 5\"  2x10 Blue 5\"  2x10 K 15.0# 3x10 K 15.0# 3x10   TB horizontal abduction     Blue 5\" x10 " "Blue  5\" 1x10,  1x5 Blue 5\" 2x10 Blue 5\"  2x10 K 3.2# 2x10 K 3.2# 2x10   Ball circles on wall - CW, CCW; flex, abd         Red pball  X10 ea  Red pball  x10 ea   Prone T, Y, W         5\"x20 ea 5\"x20 ea                             Ther Ex    Supine pec stretch  10\"x10 @ home  10\" x10 10\"x10       Table slides - flex, scap  5\"x10 ea 5\"x10 ea  Wall slides 5\" x10 ea Wall slides  5\"x10   Wall slides  5\"x10 ea Wall slides  5\"x10 ea D/C    Table prayer stretch  10\"x10 10\"x10  10\"x10 10\"x10                    Stand wand ext  5\"x10 5\" x 15  5\" x 20 5\"x20 5# bar x20 5# bar x20 5# bar x20 5# bar x20   Stand wand scap  5\"x10 5\" x 15  5\" x 20 5\"x20 5# bar x20 5# bar x20 5# bar x20 5# bar x20   TPR at wall c active flex, abd  nv nv                       Ther Activity    Pulleys   5' 5'  5' 5' 5' 5' 5' 5'                             Gait Training                              Modalities                                                 "

## 2024-01-22 ENCOUNTER — OFFICE VISIT (OUTPATIENT)
Dept: CARDIOLOGY CLINIC | Facility: CLINIC | Age: 64
End: 2024-01-22
Payer: COMMERCIAL

## 2024-01-22 VITALS
DIASTOLIC BLOOD PRESSURE: 60 MMHG | WEIGHT: 160 LBS | HEART RATE: 59 BPM | BODY MASS INDEX: 24.33 KG/M2 | SYSTOLIC BLOOD PRESSURE: 104 MMHG

## 2024-01-22 DIAGNOSIS — E11.69 MIXED HYPERLIPIDEMIA DUE TO TYPE 2 DIABETES MELLITUS: ICD-10-CM

## 2024-01-22 DIAGNOSIS — E78.2 MIXED HYPERLIPIDEMIA DUE TO TYPE 2 DIABETES MELLITUS: ICD-10-CM

## 2024-01-22 DIAGNOSIS — I25.10 ARTERIOSCLEROSIS OF CORONARY ARTERY: Primary | ICD-10-CM

## 2024-01-22 DIAGNOSIS — R06.02 SHORTNESS OF BREATH: ICD-10-CM

## 2024-01-22 PROCEDURE — 99214 OFFICE O/P EST MOD 30 MIN: CPT | Performed by: INTERNAL MEDICINE

## 2024-01-22 PROCEDURE — 93000 ELECTROCARDIOGRAM COMPLETE: CPT | Performed by: INTERNAL MEDICINE

## 2024-01-22 NOTE — PROGRESS NOTES
Cardiology             Otoniel Martinez  1960  [id number]                   Assessment/Plan:     Remote CAD, status post myocardial infarction and RCA stenting in 2004  History of DVT, on Xarelto anticoagulation  Dyslipidemia        Patient without symptoms of angina, continue aspirin, atorvastatin.  He is on Xarelto anticoagulation for history of DVT  Patient was at [facility] in 2022 with mucous plugging and bilateral lower lobe opacities, possibly pneumonia.  He is requesting a pulmonary evaluation as he continues to have a productive cough and mucus.  Will schedule echocardiogram to reevaluate left ventricle systolic function, given mild exertional dyspnea  Lipid panel controlled, continue atorvastatin                Follow-up in 6 months after echocardiogram             Interval History:      This is a very pleasant 63-year-old male with CAD status post myocardial infarction and right coronary artery stenting in 2004.  The patient had followed Dr. Ladd in the past before he re he also has a history of DVT for which she is on Xarelto.       He was last seen 1/2022 at which time he was felt to be at low cardiac risk for total hip replacement.     He presents today for follow-up with no complaints.  He denies chest pain, dizziness, palpitations, lower extreme edema.  He does admit to mild exertional dyspnea although feels more of a productive cough.  He states his productive cough has been ongoing since he was at [facility] in 2022.  CT chest 4/2022 had revealed bilateral lower lobe and left lingular airspace opacities likely inflammatory infectious with mucous plugging.        Vitals:  Vitals:    01/22/24 0832   BP: 104/60   BP Location: Right arm   Patient Position: Sitting   Cuff Size: Adult   Pulse: 59   Weight: 72.6 kg (160 lb)         Past Medical History:   Diagnosis Date   • CAD (coronary artery disease)    • Congenital myotonia    • Deep vein thrombosis (DVT) (HCC)     after tear  of gastrocnemus muscle   • Diabetes (HCC)    • 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   • Pancreatitis     three times   • Sleep apnea     not using a C-PAP   • Superficial thrombophlebitis      Social History     Socioeconomic History   • Marital status: /Civil Union     Spouse name: Not on file   • Number of children: Not on file   • Years of education: Not on file   • Highest education level: Not on file   Occupational History   • Not on file   Tobacco Use   • Smoking status: Never   • Smokeless tobacco: Never   Vaping Use   • Vaping status: Never Used   Substance and Sexual Activity   • Alcohol use: Yes     Comment: social   • Drug use: No   • Sexual activity: Not on file   Other Topics Concern   • Not on file   Social History Narrative    Consumes 1 cup of tea  And 1 glass of tea per day        Weight loss     Social Determinants of Health     Financial Resource Strain: Low Risk  (12/4/2023)    Received from [facility]    Overall Financial Resource Strain (CARDIA)    • Difficulty of Paying Living Expenses: Not very hard   Food Insecurity: No Food Insecurity (12/4/2023)    Received from [facility]    Hunger Vital Sign    • Worried About Running Out of Food in the Last Year: Never true    • Ran Out of Food in the Last Year: Never true   Transportation Needs: No Transportation Needs (12/4/2023)    Received from [facility]    PRAPARE - Transportation    • Lack of Transportation (Medical): No    • Lack of Transportation (Non-Medical): No   Physical Activity: Sufficiently Active (12/4/2023)    Received from [facility]    Exercise Vital Sign    • Days of Exercise per Week: 1 day    • Minutes of Exercise per Session: 150+ min   Stress: No Stress Concern Present (12/4/2023)    Received from [facility]    [employer] - Occupational Stress Questionnaire    • Feeling of Stress : Not at all   Social  Connections: Socially Integrated (12/4/2023)    Received from [facility]    Social Connection and Isolation Panel [NHANES]    • Frequency of Communication with Friends and Family: Three times a week    • Frequency of Social Gatherings with Friends and Family: Never    • Attends Cheondo[employer] Services: More than 4 times per year    • Active Member of Clubs or Organizations: Yes    • Attends Club or Organization Meetings: More than 4 times per year    • Marital Status:    Intimate Partner Violence: Not At Risk (12/4/2023)    Received from [facility]    Humiliation, Afraid, Rape, and Kick questionnaire    • Fear of Current or Ex-Partner: No    • Emotionally Abused: No    • Physically Abused: No    • Sexually Abused: No   Housing Stability: Unknown (12/4/2023)    Received from [facility]    Housing Stability Vital Sign    • Unable to Pay for Housing in the Last Year: Patient refused    • Number of Places Lived in the Last Year: 1    • Unstable Housing in the Last Year: No      Family History   Problem Relation Age of Onset   • Brain cancer Mother    • Clotting disorder Mother    • Heart disease Mother    • Cancer Mother    • Hyperlipidemia Mother    • Coronary artery disease Paternal Uncle      Past Surgical History:   Procedure Laterality Date   • CHOLECYSTECTOMY     • CIRCUMCISION      Elective   • COLONOSCOPY      complete, polyps 2 years ago   • CORONARY ANGIOPLASTY WITH STENT PLACEMENT      stent to RCA 2004   • INGUINAL HERNIA REPAIR     • ORIF RADIAL SHAFT FRACTURE Left     Open Treatment of Multiple Fractures of the Radial Shaft   • ORIF ULNAR / RADIAL SHAFT FRACTURE Left     Open Treatment of Multiple Fractures of the Ulnar Shaft   • TONSILLECTOMY AND ADENOIDECTOMY         Current Outpatient Medications:   •  amoxicillin (AMOXIL) 500 MG tablet, TAKE 4 TABLETS BY MOUTH 1 HOUR BEFORE DENTIST, Disp: , Rfl:   •  aspirin (ECOTRIN LOW STRENGTH) 81 mg EC tablet,  Take 81 mg by mouth daily, Disp: , Rfl:   •  atorvastatin (LIPITOR) 40 mg tablet, TAKE 1 TABLET BY MOUTH EVERY DAY, Disp: 90 tablet, Rfl: 1  •  cholecalciferol (VITAMIN D3) 400 units tablet, Take 1 tablet by mouth every morning, Disp: , Rfl:   •  Continuous Blood Gluc Sensor (Dexcom G6 Sensor) MISC, Change every 10 days. E10.65, Disp: , Rfl:   •  Continuous Blood Gluc Transmit (Dexcom G6 Transmitter) MISC, Change every 90 days. E10.65, Disp: , Rfl:   •  Gvoke HypoPen 2-Pack 1 MG/0.2ML SOAJ, INJECT 1 MG UNDER THE SKIN AS NEEDED (FOR SEVERE HYPOGLYCEMIA). E10.65, Disp: , Rfl:   •  Insulin Aspart (NovoLOG) 100 units/mL injection, , Disp: , Rfl:   •  Insulin Disposable Pump (Omnipod 5 G6 Intro, Gen 5,) KIT, Inject 1 each under the skin every third day, Disp: , Rfl:   •  pantoprazole (PROTONIX) 20 mg tablet, TAKE 1 TABLET BY MOUTH EVERY DAY, Disp: 90 tablet, Rfl: 1  •  rivaroxaban (Xarelto) 20 mg tablet, Take 1 tablet (20 mg total) by mouth daily with breakfast, Disp: 90 tablet, Rfl: 3  •  sildenafil (VIAGRA) 100 mg tablet, Take 1 tablet (100 mg total) by mouth daily as needed for erectile dysfunction, Disp: 10 tablet, Rfl: 1  •  acyclovir (ZOVIRAX) 200 mg capsule, Take 1 capsule (200 mg total) by mouth 5 (five) times a day for 5 days (Patient taking differently: Take 200 mg by mouth 5 (five) times a day Patient takes PRN), Disp: 25 capsule, Rfl: 0  •  Na Sulfate-K Sulfate-Mg Sulf 17.5-3.13-1.6 GM/177ML SOLN, DRINK 177 MILLILITER BY MOUTH AS DIRECTED (Patient not taking: Reported on 1/22/2024), Disp: , Rfl:         Review of Systems:  Review of Systems   Respiratory:  Positive for cough and shortness of breath.    Cardiovascular: Negative.    All other systems reviewed and are negative.        Physical Exam:  Physical Exam  Constitutional:       General: He is not in acute distress.     Appearance: He is well-developed. He is not diaphoretic.   HENT:      Head: Normocephalic and atraumatic.   Eyes:      General: No  scleral icterus.        Right eye: No discharge.      Pupils: Pupils are equal, round, and reactive to light.   Neck:      Thyroid: No thyromegaly.   Cardiovascular:      Rate and Rhythm: Normal rate and regular rhythm.      Heart sounds: Normal heart sounds. No murmur heard.     No friction rub. No gallop.   Pulmonary:      Effort: Pulmonary effort is normal.      Breath sounds: Normal breath sounds.   Abdominal:      General: There is no distension.      Tenderness: There is no abdominal tenderness. There is no guarding or rebound.   Musculoskeletal:         General: Normal range of motion.      Cervical back: Normal range of motion and neck supple.   Skin:     General: Skin is warm and dry.      Coloration: Skin is not pale.      Findings: No erythema or rash.   Neurological:      Mental Status: He is alert and oriented to person, place, and time.      Coordination: Coordination normal.   Psychiatric:         Behavior: Behavior normal.         Thought Content: Thought content normal.         Judgment: Judgment normal.         This note was completed in part utilizing M-Modal Fluency Direct Software.  Grammatical errors, random word insertions, spelling mistakes, and incomplete sentences can be an occasional consequence of this system secondary to software limitations, ambient noise, and hardware issues.  If you have any questions or concerns about the content, text, or information contained within the body of this dictation, please contact the provider for clarification.

## 2024-01-24 ENCOUNTER — OFFICE VISIT (OUTPATIENT)
Dept: PHYSICAL THERAPY | Facility: REHABILITATION | Age: 64
End: 2024-01-24
Payer: COMMERCIAL

## 2024-01-24 DIAGNOSIS — R42 VERTIGO: ICD-10-CM

## 2024-01-24 DIAGNOSIS — G89.29 CHRONIC PAIN OF LEFT ELBOW: ICD-10-CM

## 2024-01-24 DIAGNOSIS — M25.522 CHRONIC PAIN OF LEFT ELBOW: ICD-10-CM

## 2024-01-24 DIAGNOSIS — H81.12 BENIGN PAROXYSMAL VERTIGO OF LEFT EAR: ICD-10-CM

## 2024-01-24 DIAGNOSIS — M25.512 LEFT SHOULDER PAIN, UNSPECIFIED CHRONICITY: Primary | ICD-10-CM

## 2024-01-24 PROCEDURE — 97110 THERAPEUTIC EXERCISES: CPT

## 2024-01-24 PROCEDURE — 97112 NEUROMUSCULAR REEDUCATION: CPT

## 2024-01-24 NOTE — PROGRESS NOTES
"Daily Note     Today's date: 2024  Patient name: Otoniel Martinez  : 1960  MRN: [id number]  Referring provider: Chriss Tyson MD  Dx:   Encounter Diagnosis     ICD-10-CM    1. Left shoulder pain, unspecified chronicity  M25.512       2. Benign paroxysmal vertigo of left ear  H81.12       3. Chronic pain of left elbow  M25.522     G89.29       4. Vertigo  R42                      Subjective: Pt reports soreness in shoulder after exercising and very little with reaching overhead.      Objective: See treatment diary below      Assessment: Pt tolerated treatment well this visit with continued emphasis on improving scapular control. Pt demonstrated difficulty with rhomboid and lower trap activation requiring increased VVT cues to perform accurately and without shoulder elevation compensation. Appropriate level pf fatigue upon completion of exercises. Pt would continue to benefit from skilled PT in order to restore remaining deficits and optimize UE function.       Plan: Continue per plan of care.      Precautions: CAD, myotonic dystrophy, h/o DVT, DM II    Daily Treatment Diary    Date 1/24  12/15 12/19 12/22 1/3/24 1/10 1/12 1/17 1/19   FOTO     perf        Re-Eval    JANIA vertigo         Auth visit #                Manuals    Post & inf GHJ mobs TE            LUE LAD TE  MS   MS CC JANIA TE np    Pec stretch   MS  MS CC JANIA TE np    CRT - Left    x2   x2 x2 x1    CRT - right      x2       [place]-Hallpike right nv   nv   JANIA JANIA JANIA left    Horizontal roll tests    Nv b/l    JANIA JANIA                  Neuro Re-Ed     TB no monies  Blue 5\" 3x10  GTB 5\" x 10 Blue 5\" 2x10 Blue 5\" 2x10 Blue 5\"  2x10 Blue 5\"  2x10 Blue 5\"  2x10 np Blue 5\"  2x10   TB scap retract K 15.0# 3x10  GTB 5\" 2x10 Blue 5\" 2x10 Blue 5\" 2x10 Blue 5\"  2x10 Blue 5\"  2x10 Blue 5\"  2x10 K 15.0# 3x10 K 15.0# 3x10   TB horizontal abduction K 3.2# 2x10    Blue 5\" x10 Blue  5\" 1x10,  1x5 Blue 5\" 2x10 Blue 5\"  2x10 K 3.2# 2x10 K 3.2# 2x10   Ball circles on wall - " "CW, CCW; flex, abd Red pball  X10 ea        Red pball  X10 ea  Red pball  x10 ea   Prone T, Y, W 5\"x20 ea        5\"x20 ea 5\"x20 ea                             Ther Ex    Supine pec stretch   @ home  10\" x10 10\"x10       Table slides - flex, scap   5\"x10 ea  Wall slides 5\" x10 ea Wall slides  5\"x10   Wall slides  5\"x10 ea Wall slides  5\"x10 ea D/C    Table prayer stretch   10\"x10  10\"x10 10\"x10                    Stand wand ext 5# bar x20  5\" x 15  5\" x 20 5\"x20 5# bar x20 5# bar x20 5# bar x20 5# bar x20   Stand wand scap 5# bar x20  5\" x 15  5\" x 20 5\"x20 5# bar x20 5# bar x20 5# bar x20 5# bar x20   TPR at wall c active flex, abd   nv                       Ther Activity    Pulleys  5'  5'  5' 5' 5' 5' 5' 5'                             Gait Training                              Modalities                                                 "

## 2024-01-26 ENCOUNTER — APPOINTMENT (OUTPATIENT)
Dept: PHYSICAL THERAPY | Facility: REHABILITATION | Age: 64
End: 2024-01-26
Payer: COMMERCIAL

## 2024-01-31 ENCOUNTER — OFFICE VISIT (OUTPATIENT)
Dept: PHYSICAL THERAPY | Facility: REHABILITATION | Age: 64
End: 2024-01-31
Payer: COMMERCIAL

## 2024-01-31 DIAGNOSIS — H81.12 BENIGN PAROXYSMAL VERTIGO OF LEFT EAR: ICD-10-CM

## 2024-01-31 DIAGNOSIS — M25.522 CHRONIC PAIN OF LEFT ELBOW: ICD-10-CM

## 2024-01-31 DIAGNOSIS — G89.29 CHRONIC PAIN OF LEFT ELBOW: ICD-10-CM

## 2024-01-31 DIAGNOSIS — R42 VERTIGO: ICD-10-CM

## 2024-01-31 DIAGNOSIS — M25.512 LEFT SHOULDER PAIN, UNSPECIFIED CHRONICITY: Primary | ICD-10-CM

## 2024-01-31 PROCEDURE — 97112 NEUROMUSCULAR REEDUCATION: CPT | Performed by: PHYSICAL THERAPIST

## 2024-01-31 PROCEDURE — 97140 MANUAL THERAPY 1/> REGIONS: CPT | Performed by: PHYSICAL THERAPIST

## 2024-01-31 PROCEDURE — 97110 THERAPEUTIC EXERCISES: CPT | Performed by: PHYSICAL THERAPIST

## 2024-01-31 NOTE — PROGRESS NOTES
"Daily Note     Today's date: 2024  Patient name: Otoniel Martinez  : 1960  MRN: [id number]  Referring provider: Chriss Tyson MD  Dx:   Encounter Diagnosis     ICD-10-CM    1. Left shoulder pain, unspecified chronicity  M25.512       2. Benign paroxysmal vertigo of left ear  H81.12       3. Chronic pain of left elbow  M25.522     G89.29       4. Vertigo  R42                      Subjective: Pt comes to therapy reporting minimal shoulder discomfort. Notes he continues to have some soreness/stretching when reaching high overhead, but notes symptoms are minimal compared to when he started.       Objective: See treatment diary below      Assessment: Tolerated treatment well. Good challenge with advancement of PREs. Positive R [place]-Hallpike and treated via Epley directed towards R ear. Patient demonstrated fatigue post treatment, exhibited good technique with therapeutic exercises, and would benefit from continued PT      Plan: Progress treatment as tolerated.       Precautions: CAD, myotonic dystrophy, h/o DVT, DM II    Daily Treatment Diary    Date 1/24 1/31   12/22 1/3/24 1/10 1/12 1/17 1/19   FOTO     perf        Re-Eval             Auth visit #                Manuals    Post & inf GHJ mobs TE JANIA           LUE LAD TE JANIA   MS CC JANIA TE np    Pec stretch     MS CC JANIA TE np    CRT - Left       x2 x2 x1    CRT - right      x2       [place]-Hallpike right nv JANIA x1     JANIA JANIA JANIA left    Horizontal roll tests       JANIA JANIA                  Neuro Re-Ed     TB no monies  Blue 5\" 3x10 Blue 5\" 3x10   Blue 5\" 2x10 Blue 5\"  2x10 Blue 5\"  2x10 Blue 5\"  2x10 np Blue 5\"  2x10   TB scap retract K 15.0# 3x10 K 16/2# 3x10   Blue 5\" 2x10 Blue 5\"  2x10 Blue 5\"  2x10 Blue 5\"  2x10 K 15.0# 3x10 K 15.0# 3x10   TB horizontal abduction K 3.2# 2x10 K 3.2# 2x10   Blue 5\" x10 Blue  5\" 1x10,  1x5 Blue 5\" 2x10 Blue 5\"  2x10 K 3.2# 2x10 K 3.2# 2x10   Ball circles on wall - CW, CCW; flex, abd Red pball  X10 ea Red pball  2x10 ea       Red " "pball  X10 ea  Red pball  x10 ea   Prone T, Y, W 5\"x20 ea 2# 5\" 2x10       5\"x20 ea 5\"x20 ea                             Ther Ex    Supine pec stretch     10\" x10 10\"x10       Table slides - flex, scap     Wall slides 5\" x10 ea Wall slides  5\"x10   Wall slides  5\"x10 ea Wall slides  5\"x10 ea D/C    Table prayer stretch     10\"x10 10\"x10                    Stand wand ext 5# bar x20 5# weight 2x10   5\" x 20 5\"x20 5# bar x20 5# bar x20 5# bar x20 5# bar x20   Stand wand scap 5# bar x20 5# weight 2x10   5\" x 20 5\"x20 5# bar x20 5# bar x20 5# bar x20 5# bar x20   TPR at wall c active flex, abd                          Ther Activity    Pulleys  5' 5'   5' 5' 5' 5' 5' 5'   UBE                          Gait Training                              Modalities                                                   "

## 2024-02-02 ENCOUNTER — OFFICE VISIT (OUTPATIENT)
Dept: PHYSICAL THERAPY | Facility: REHABILITATION | Age: 64
End: 2024-02-02
Payer: COMMERCIAL

## 2024-02-02 DIAGNOSIS — M25.522 CHRONIC PAIN OF LEFT ELBOW: ICD-10-CM

## 2024-02-02 DIAGNOSIS — H81.12 BENIGN PAROXYSMAL VERTIGO OF LEFT EAR: ICD-10-CM

## 2024-02-02 DIAGNOSIS — M25.512 LEFT SHOULDER PAIN, UNSPECIFIED CHRONICITY: Primary | ICD-10-CM

## 2024-02-02 DIAGNOSIS — G89.29 CHRONIC PAIN OF LEFT ELBOW: ICD-10-CM

## 2024-02-02 PROCEDURE — 97110 THERAPEUTIC EXERCISES: CPT | Performed by: PHYSICAL THERAPIST

## 2024-02-02 PROCEDURE — 97140 MANUAL THERAPY 1/> REGIONS: CPT | Performed by: PHYSICAL THERAPIST

## 2024-02-02 NOTE — PROGRESS NOTES
"Daily Note     Today's date: 2024  Patient name: Otoniel Martinez  : 1960  MRN: [id number]  Referring provider: Chriss Tyson MD  Dx:   Encounter Diagnosis     ICD-10-CM    1. Left shoulder pain, unspecified chronicity  M25.512       2. Benign paroxysmal vertigo of left ear  H81.12       3. Chronic pain of left elbow  M25.522     G89.29                      Subjective: Pt comes to therapy denying pain or discomfort. Denies discomfort following last treatment session.       Objective: See treatment diary below      Assessment: Tolerated treatment well. Demonstrated positive R [place]-Hallpike, and treated via CRT/Epley. Patient demonstrated fatigue post treatment, exhibited good technique with therapeutic exercises, and would benefit from continued PT      Plan: Progress treatment as tolerated.       Precautions: CAD, myotonic dystrophy, h/o DVT, DM II    Daily Treatment Diary    Date 1/24 1/31 2/2  12/22 1/3/24 1/10 1/12 1/17 1/19   FOTO     perf        Re-Eval             Auth visit #                Manuals    Post & inf GHJ mobs TE JANIA           LUE LAD TE JANIA   MS CC JANIA TE np    Pec stretch     MS CC JANIA TE np    CRT - Left       x2 x2 x1    CRT - right      x2       [place]-Hallpike right nv JANIA x1 JANIA x1    JANIA JANIA JANIA left    Horizontal roll tests       JANIA JANIA                  Neuro Re-Ed     TB no monies  Blue 5\" 3x10 Blue 5\" 3x10 Blue 5\" 3x10  Blue 5\" 2x10 Blue 5\"  2x10 Blue 5\"  2x10 Blue 5\"  2x10 np Blue 5\"  2x10   TB scap retract K 15.0# 3x10 K 16.2# 3x10 K 16.6# 3x10  Blue 5\" 2x10 Blue 5\"  2x10 Blue 5\"  2x10 Blue 5\"  2x10 K 15.0# 3x10 K 15.0# 3x10   TB horizontal abduction K 3.2# 2x10 K 3.2# 2x10 K 2.7# 3x10  Blue 5\" x10 Blue  5\" 1x10,  1x5 Blue 5\" 2x10 Blue 5\"  2x10 K 3.2# 2x10 K 3.2# 2x10   Ball circles on wall - CW, CCW; flex, abd Red pball  X10 ea Red pball  2x10 ea Red pball  2x10 ea      Red pball  X10 ea  Red pball  x10 ea   Prone T, Y, W 5\"x20 ea 2# 5\" 2x10 np      5\"x20 ea 5\"x20 ea                      " "       Ther Ex    Supine pec stretch     10\" x10 10\"x10       Table slides - flex, scap     Wall slides 5\" x10 ea Wall slides  5\"x10   Wall slides  5\"x10 ea Wall slides  5\"x10 ea D/C    Table prayer stretch     10\"x10 10\"x10                    Stand wand ext 5# bar x20 5# weight 2x10 5# weight 2x10  5\" x 20 5\"x20 5# bar x20 5# bar x20 5# bar x20 5# bar x20   Stand wand scap 5# bar x20 5# weight 2x10 5# weight 2x10  5\" x 20 5\"x20 5# bar x20 5# bar x20 5# bar x20 5# bar x20   TPR at wall c active flex, abd                          Ther Activity    Pulleys  5' 5' 5'  5' 5' 5' 5' 5' 5'   UBE                          Gait Training                              Modalities                                                     "

## 2024-02-06 ENCOUNTER — CONSULT (OUTPATIENT)
Dept: PULMONOLOGY | Facility: CLINIC | Age: 64
End: 2024-02-06
Payer: COMMERCIAL

## 2024-02-06 ENCOUNTER — APPOINTMENT (OUTPATIENT)
Dept: LAB | Facility: CLINIC | Age: 64
End: 2024-02-06
Payer: COMMERCIAL

## 2024-02-06 VITALS
TEMPERATURE: 98.3 F | BODY MASS INDEX: 24.55 KG/M2 | SYSTOLIC BLOOD PRESSURE: 134 MMHG | HEART RATE: 80 BPM | WEIGHT: 162 LBS | HEIGHT: 68 IN | DIASTOLIC BLOOD PRESSURE: 84 MMHG | OXYGEN SATURATION: 94 %

## 2024-02-06 DIAGNOSIS — R91.8 ABNORMAL CT SCAN OF LUNG: ICD-10-CM

## 2024-02-06 DIAGNOSIS — J42 CHRONIC BRONCHITIS, UNSPECIFIED CHRONIC BRONCHITIS TYPE (HCC): Primary | ICD-10-CM

## 2024-02-06 DIAGNOSIS — J32.9 CHRONIC SINUSITIS, UNSPECIFIED LOCATION: ICD-10-CM

## 2024-02-06 DIAGNOSIS — J42 CHRONIC BRONCHITIS, UNSPECIFIED CHRONIC BRONCHITIS TYPE (HCC): ICD-10-CM

## 2024-02-06 PROCEDURE — 82785 ASSAY OF IGE: CPT

## 2024-02-06 PROCEDURE — 99204 OFFICE O/P NEW MOD 45 MIN: CPT | Performed by: INTERNAL MEDICINE

## 2024-02-06 PROCEDURE — 86003 ALLG SPEC IGE CRUDE XTRC EA: CPT

## 2024-02-06 PROCEDURE — 36415 COLL VENOUS BLD VENIPUNCTURE: CPT

## 2024-02-06 RX ORDER — GUAIFENESIN 600 MG/1
1200 TABLET, EXTENDED RELEASE ORAL EVERY 12 HOURS SCHEDULED
Qty: 120 TABLET | Refills: 5 | Status: SHIPPED | OUTPATIENT
Start: 2024-02-06 | End: 2024-03-07

## 2024-02-06 RX ORDER — BUDESONIDE AND FORMOTEROL FUMARATE DIHYDRATE 80; 4.5 UG/1; UG/1
2 AEROSOL RESPIRATORY (INHALATION) 2 TIMES DAILY
Qty: 30.6 G | Refills: 5 | Status: SHIPPED | OUTPATIENT
Start: 2024-02-06

## 2024-02-06 NOTE — PROGRESS NOTES
Pulmonary Consultation   Otoniel Martinez 63 y.o. male MRN: [id number]  2/6/2024      Assessment:  Chronic productive cough  Possibly chronic bronchitis/reversible airway disease/or asthma given the allergies  Or could be related to chronic sinusitis/URI  Reports some seasonal variations, increased mucus production in the upper chest/with cough, also feels some PND    Plan:  Enhance mucus clearing/Mucinex 1200 twice daily  A trial of ICS/LABA Symbicort 80, educated about the proper inhaler use technique/rinse mouth following each use  Check complete pulmonary function test/BD response/[place] allergy panel  Sputum cultures/given a cup for collection to check for bacterial bronchitis  Advised to use PRN Flonase more regularly for the [facility]  Referral to ENT for evaluation of chronic bronchitis/CT sinus findings in 2023    Abnormal CT chest  Reported bibasilar/lingular opacities  Associated with some mucus plugging/suspect aspiration vs inflammatory/infectious process  Repeat CT chest to ensure resolution    Return in about 3 months (around 5/6/2024).        History of Present Illness     New Consult for: chronic bronchitis      Background  63 y.o. male with a h/o polyneuropathy, chronic gastritis, CAD/PCI 2004, CVA, osteoarthritis, DVT/Eliquis, chronic sinusitis    Presented today for initial evaluation by pulmonary for chronic cough/mucus production    First noted approximately 5 years ago, episodes of minimally productive cough/chest congestion, especially around the allergy season/cold.  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  Associated with production of yellow mucus/oftentimes sticky.  Had partial relief from his Mucinex.  Also associated with chronic nasal congestion, known to have recurrent sinusitis from before did not follow with ENT since 2020.    In 2022, hospitalized for pneumonia/chest congestion to [facility], chest CT at that time showed bibasilar opacities/lingular  opacities.    Currently, episodes are manageable, tries to cough so hard to produce the mucus after that feels relief.  Never been on inhalers before, no prior formal diagnosis of asthma or COPD, lifelong non-smoker.      Social/exposure history  Lived in [place] all his life  Lifelong non-smoker, nonalcoholic  Worked for the [employer] office for decades, retired in 2017  Lives in an old house built in 1993, had water leak several months ago but no exposure to mold  Pets: 3 dogs    Family history: Dad had breathing issues/was a smoker  Review of Systems  As per hpi, all other systems reviewed and were negative.        Studies:  Imaging and other studies: I have personally reviewed pertinent films in PACS  CT PE/20 3/2022-bibasilar/lingular airspace opacity suspect infectious/inflammatory/mucous plug concern for aspiration.    Pulmonary function testing:       EKG, Pathology, and Other Studies: I have personally reviewed pertinent reports.          Historical Information   Past Medical History:   Diagnosis Date    CAD (coronary artery disease)     Congenital myotonia     Deep vein thrombosis (DVT) ([facility])     after tear of gastrocnemus muscle    Diabetes (HCC)     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    Pancreatitis     three times    Sleep apnea     not using a C-PAP    Superficial thrombophlebitis      Past Surgical History:   Procedure Laterality Date    CHOLECYSTECTOMY      CIRCUMCISION      Elective    COLONOSCOPY      complete, polyps 2 years ago    CORONARY ANGIOPLASTY WITH STENT PLACEMENT      stent to RCA 2004    INGUINAL HERNIA REPAIR      ORIF RADIAL SHAFT FRACTURE Left     Open Treatment of Multiple Fractures of the Radial Shaft    ORIF ULNAR / RADIAL SHAFT FRACTURE Left     Open Treatment of Multiple Fractures of the Ulnar Shaft    TONSILLECTOMY AND ADENOIDECTOMY       Family History   Problem Relation Age of Onset    Brain cancer Mother     Clotting disorder Mother     Heart disease Mother      "Cancer Mother     Hyperlipidemia Mother     Coronary artery disease Paternal Uncle          Medications/Allergies: Reviewed    Vitals: Blood pressure 134/84, pulse 80, temperature 98.3 °F (36.8 °C), temperature source Tympanic, height 5' 8\" (1.727 m), weight 73.5 kg (162 lb), SpO2 94%. Body mass index is 24.63 kg/m². Oxygen Therapy  SpO2: 94 %  Oxygen Therapy: None (Room air)      Physical Exam  Body mass index is 24.63 kg/m².   Gen: not in acute distress,   Neck/Eyes: supple, PERRL  Ear: normal appearance, no significant hearing impairment  Nose: Mild erythematous nasal mucosa,+ clear drainage  Mouth:  unremarkable/normal appearance of lips, teeth and gums  Oropharynx: mucosa is moist, no focal lesions or erythema  Salivary glands: soft nontender  Chest: normal respiratory efforts, clear breath sounds bilaterally  CV: RRR, no murmurs appreciated, no edema  Abdomen: soft, non tender  Extremities:  No observed deformity   Skin: unremarkable  Neuro: AAO X3, no focal motor deficit         Labs:  Lab Results   Component Value Date    WBC 3.74 (L) 01/18/2022    HGB 13.7 01/18/2022    HCT 42.1 01/18/2022    MCV 86 01/18/2022     (L) 01/18/2022     Lab Results   Component Value Date    CALCIUM 9.7 12/22/2023     01/09/2018    K 4.1 12/22/2023    CO2 30 12/22/2023     12/22/2023    BUN 18 12/22/2023    CREATININE 0.63 12/22/2023     No results found for: \"IGE\"  Lab Results   Component Value Date    ALT 71 (H) 12/22/2023    AST 39 12/22/2023    ALKPHOS 96 12/22/2023    BILITOT 1.4 (H) 01/09/2018           Portions of the record may have been created with voice recognition software.  Occasional wrong word or \"sound a like\" substitutions may have occurred due to the inherent limitations of voice recognition software.  Read the chart carefully and recognize, using context, where substitutions have occurred    Tomas Jack M.D.  [facility]  "

## 2024-02-07 ENCOUNTER — NURSE TRIAGE (OUTPATIENT)
Age: 64
End: 2024-02-07

## 2024-02-07 ENCOUNTER — APPOINTMENT (OUTPATIENT)
Dept: PHYSICAL THERAPY | Facility: REHABILITATION | Age: 64
End: 2024-02-07
Payer: COMMERCIAL

## 2024-02-07 ENCOUNTER — APPOINTMENT (OUTPATIENT)
Dept: LAB | Facility: IMAGING CENTER | Age: 64
End: 2024-02-07
Payer: COMMERCIAL

## 2024-02-07 DIAGNOSIS — J42 CHRONIC BRONCHITIS, UNSPECIFIED CHRONIC BRONCHITIS TYPE (HCC): ICD-10-CM

## 2024-02-07 LAB
A ALTERNATA IGE QN: <0.1 KUA/I
A FUMIGATUS IGE QN: <0.1 KUA/I
BERMUDA GRASS IGE QN: 0.36 KUA/I
BOXELDER IGE QN: <0.1 KUA/I
C HERBARUM IGE QN: <0.1 KUA/I
CAT DANDER IGE QN: <0.1 KUA/I
CMN PIGWEED IGE QN: <0.1 KUA/I
COMMON RAGWEED IGE QN: <0.1 KUA/I
COTTONWOOD IGE QN: <0.1 KUA/I
D FARINAE IGE QN: <0.1 KUA/I
D PTERONYSS IGE QN: 0.12 KUA/I
DOG DANDER IGE QN: 2.3 KUA/I
LONDON PLANE IGE QN: <0.1 KUA/I
MOUSE URINE PROT IGE QN: <0.1 KUA/I
MT JUNIPER IGE QN: <0.1 KUA/I
MUGWORT IGE QN: <0.1 KUA/I
P NOTATUM IGE QN: <0.1 KUA/I
ROACH IGE QN: 0.41 KUA/I
SHEEP SORREL IGE QN: <0.1 KUA/I
SILVER BIRCH IGE QN: <0.1 KUA/I
TIMOTHY IGE QN: 1.89 KUA/I
TOTAL IGE SMQN RAST: 87.7 KU/L (ref 0–113)
WALNUT IGE QN: <0.1 KUA/I
WHITE ASH IGE QN: <0.1 KUA/I
WHITE ELM IGE QN: <0.1 KUA/I
WHITE MULBERRY IGE QN: <0.1 KUA/I
WHITE OAK IGE QN: <0.1 KUA/I

## 2024-02-07 PROCEDURE — 87070 CULTURE OTHR SPECIMN AEROBIC: CPT

## 2024-02-07 PROCEDURE — 87205 SMEAR GRAM STAIN: CPT

## 2024-02-07 NOTE — TELEPHONE ENCOUNTER
"Incoming call:     Oliva-spouse     Pt with hx of chronic bronchitis. Was recently seen in office. Symptoms worsened last night - cough, yellow sputum, \"gurgling\" sound in chest. Denies SOB at rest, mild SOB with exertion. O2 drops to 85, but recovers quickly with deep breaths.     Dispo:  Made urgent appt for tomorrow 2/9. Advised pt to go to urgent care /ED if symptoms become worse before then.     Requesting script for cough medicine in the meantime if appropriate. Please advise.  [phone]    Wants abx, cs   Reason for Disposition   MILD difficulty breathing (e.g., minimal/no SOB at rest, SOB with walking, pulse < 100) of new-onset or worse than normal    Answer Assessment - Initial Assessment Questions  1. RESPIRATORY STATUS: \"Describe your breathing?\" (e.g., wheezing, shortness of breath, unable to speak, severe coughing)       Sounds like \"gurgling\", SOB on exertion   2. ONSET: \"When did this breathing problem begin?\"       Last night   3. PATTERN \"Does the difficult breathing come and go, or has it been constant since it started?\"       Comes and goes   4. SEVERITY: \"How bad is your breathing?\" (e.g., mild, moderate, severe)     - MILD: No SOB at rest, mild SOB with walking, speaks normally in sentences, can lay down, no retractions, pulse < 100.     - MODERATE: SOB at rest, SOB with minimal exertion and prefers to sit, cannot lie down flat, speaks in phrases, mild retractions, audible wheezing, pulse 100-120.     - SEVERE: Very SOB at rest, speaks in single words, struggling to breathe, sitting hunched forward, retractions, pulse > 120       Mild-mod   5. RECURRENT SYMPTOM: \"Have you had difficulty breathing before?\" If Yes, ask: \"When was the last time?\" and \"What happened that time?\"       Hosp in ICU - feb 2022  6. CARDIAC HISTORY: \"Do you have any history of heart disease?\" (e.g., heart attack, angina, bypass surgery, angioplasty)       Hx stents, had  maker   7. LUNG HISTORY: \"Do you have any " "history of lung disease?\"  (e.g., pulmonary embolus, asthma, emphysema)      Chronic bronchitis   8. CAUSE: \"What do you think is causing the breathing problem?\"       Unsure   9. OTHER SYMPTOMS: \"Do you have any other symptoms? (e.g., dizziness, runny nose, cough, chest pain, fever)    Protocols used: Breathing Difficulty-ADULT-[place]    "

## 2024-02-08 ENCOUNTER — OFFICE VISIT (OUTPATIENT)
Dept: PULMONOLOGY | Facility: CLINIC | Age: 64
End: 2024-02-08
Payer: COMMERCIAL

## 2024-02-08 ENCOUNTER — APPOINTMENT (INPATIENT)
Dept: CT IMAGING | Facility: HOSPITAL | Age: 64
DRG: 193 | End: 2024-02-08
Payer: COMMERCIAL

## 2024-02-08 ENCOUNTER — APPOINTMENT (INPATIENT)
Dept: NON INVASIVE DIAGNOSTICS | Facility: HOSPITAL | Age: 64
DRG: 193 | End: 2024-02-08
Payer: COMMERCIAL

## 2024-02-08 ENCOUNTER — APPOINTMENT (OUTPATIENT)
Dept: ULTRASOUND IMAGING | Facility: HOSPITAL | Age: 64
DRG: 193 | End: 2024-02-08
Payer: COMMERCIAL

## 2024-02-08 ENCOUNTER — HOSPITAL ENCOUNTER (INPATIENT)
Facility: HOSPITAL | Age: 64
LOS: 2 days | Discharge: HOME | DRG: 193 | End: 2024-02-10
Attending: INTERNAL MEDICINE | Admitting: INTERNAL MEDICINE
Payer: COMMERCIAL

## 2024-02-08 VITALS
OXYGEN SATURATION: 94 % | DIASTOLIC BLOOD PRESSURE: 90 MMHG | WEIGHT: 157.6 LBS | HEIGHT: 69 IN | BODY MASS INDEX: 23.34 KG/M2 | SYSTOLIC BLOOD PRESSURE: 142 MMHG | HEART RATE: 86 BPM | TEMPERATURE: 98.3 F

## 2024-02-08 DIAGNOSIS — J32.9 CHRONIC SINUSITIS, UNSPECIFIED LOCATION: ICD-10-CM

## 2024-02-08 DIAGNOSIS — J42 CHRONIC BRONCHITIS, UNSPECIFIED CHRONIC BRONCHITIS TYPE (HCC): Primary | ICD-10-CM

## 2024-02-08 DIAGNOSIS — J96.01 ACUTE RESPIRATORY FAILURE WITH HYPOXIA (HCC): ICD-10-CM

## 2024-02-08 DIAGNOSIS — J32.9 CHRONIC SINUSITIS, UNSPECIFIED LOCATION: Primary | ICD-10-CM

## 2024-02-08 DIAGNOSIS — R16.1 SPLENOMEGALY: ICD-10-CM

## 2024-02-08 DIAGNOSIS — R91.8 ABNORMAL CT SCAN OF LUNG: ICD-10-CM

## 2024-02-08 DIAGNOSIS — K29.00 ACUTE GASTRITIS WITHOUT HEMORRHAGE, UNSPECIFIED GASTRITIS TYPE: ICD-10-CM

## 2024-02-08 PROBLEM — E11.69 MIXED HYPERLIPIDEMIA DUE TO TYPE 2 DIABETES MELLITUS: Status: RESOLVED | Noted: 2021-07-02 | Resolved: 2024-02-08

## 2024-02-08 PROBLEM — E78.2 MIXED HYPERLIPIDEMIA DUE TO TYPE 2 DIABETES MELLITUS: Status: RESOLVED | Noted: 2021-07-02 | Resolved: 2024-02-08

## 2024-02-08 PROBLEM — Z96.41 INSULIN PUMP STATUS: Status: ACTIVE | Noted: 2023-02-02

## 2024-02-08 LAB
ANION GAP SERPL CALCULATED.3IONS-SCNC: 5 MMOL/L
BASOPHILS # BLD AUTO: 0.01 THOUSANDS/ÂΜL (ref 0–0.1)
BASOPHILS NFR BLD AUTO: 0 % (ref 0–1)
BUN SERPL-MCNC: 19 MG/DL (ref 5–25)
CALCIUM SERPL-MCNC: 8.9 MG/DL (ref 8.4–10.2)
CHLORIDE SERPL-SCNC: 102 MMOL/L (ref 96–108)
CO2 SERPL-SCNC: 28 MMOL/L (ref 21–32)
CREAT SERPL-MCNC: 0.62 MG/DL (ref 0.6–1.3)
EOSINOPHIL # BLD AUTO: 0.14 THOUSAND/ÂΜL (ref 0–0.61)
EOSINOPHIL NFR BLD AUTO: 4 % (ref 0–6)
ERYTHROCYTE [DISTWIDTH] IN BLOOD BY AUTOMATED COUNT: 16.4 % (ref 11.6–15.1)
GFR SERPL CREATININE-BSD FRML MDRD: 105 ML/MIN/1.73SQ M
GLUCOSE SERPL-MCNC: 225 MG/DL (ref 65–140)
GLUCOSE SERPL-MCNC: 238 MG/DL (ref 65–140)
GLUCOSE SERPL-MCNC: 336 MG/DL (ref 65–140)
HCT VFR BLD AUTO: 36.8 % (ref 36.5–49.3)
HGB BLD-MCNC: 12.5 G/DL (ref 12–17)
IMM GRANULOCYTES # BLD AUTO: 0.01 THOUSAND/UL (ref 0–0.2)
IMM GRANULOCYTES NFR BLD AUTO: 0 % (ref 0–2)
LYMPHOCYTES # BLD AUTO: 0.49 THOUSANDS/ÂΜL (ref 0.6–4.47)
LYMPHOCYTES NFR BLD AUTO: 12 % (ref 14–44)
MCH RBC QN AUTO: 29.9 PG (ref 26.8–34.3)
MCHC RBC AUTO-ENTMCNC: 34 G/DL (ref 31.4–37.4)
MCV RBC AUTO: 88 FL (ref 82–98)
MONOCYTES # BLD AUTO: 0.42 THOUSAND/ÂΜL (ref 0.17–1.22)
MONOCYTES NFR BLD AUTO: 11 % (ref 4–12)
NEUTROPHILS # BLD AUTO: 2.88 THOUSANDS/ÂΜL (ref 1.85–7.62)
NEUTS SEG NFR BLD AUTO: 73 % (ref 43–75)
NRBC BLD AUTO-RTO: 0 /100 WBCS
PLATELET # BLD AUTO: 109 THOUSANDS/UL (ref 149–390)
PMV BLD AUTO: 9.5 FL (ref 8.9–12.7)
POTASSIUM SERPL-SCNC: 4.2 MMOL/L (ref 3.5–5.3)
PROCALCITONIN SERPL-MCNC: 0.13 NG/ML
RBC # BLD AUTO: 4.18 MILLION/UL (ref 3.88–5.62)
SODIUM SERPL-SCNC: 135 MMOL/L (ref 135–147)
WBC # BLD AUTO: 3.95 THOUSAND/UL (ref 4.31–10.16)

## 2024-02-08 PROCEDURE — 87147 CULTURE TYPE IMMUNOLOGIC: CPT | Performed by: INTERNAL MEDICINE

## 2024-02-08 PROCEDURE — 70486 CT MAXILLOFACIAL W/O DYE: CPT

## 2024-02-08 PROCEDURE — 87070 CULTURE OTHR SPECIMN AEROBIC: CPT | Performed by: INTERNAL MEDICINE

## 2024-02-08 PROCEDURE — 71250 CT THORAX DX C-: CPT

## 2024-02-08 PROCEDURE — 94664 DEMO&/EVAL PT USE INHALER: CPT

## 2024-02-08 PROCEDURE — 84145 PROCALCITONIN (PCT): CPT | Performed by: INTERNAL MEDICINE

## 2024-02-08 PROCEDURE — 94760 N-INVAS EAR/PLS OXIMETRY 1: CPT

## 2024-02-08 PROCEDURE — 94668 MNPJ CHEST WALL SBSQ: CPT

## 2024-02-08 PROCEDURE — 76705 ECHO EXAM OF ABDOMEN: CPT

## 2024-02-08 PROCEDURE — 94640 AIRWAY INHALATION TREATMENT: CPT

## 2024-02-08 PROCEDURE — 82948 REAGENT STRIP/BLOOD GLUCOSE: CPT

## 2024-02-08 PROCEDURE — 94669 MECHANICAL CHEST WALL OSCILL: CPT

## 2024-02-08 PROCEDURE — 99214 OFFICE O/P EST MOD 30 MIN: CPT | Performed by: INTERNAL MEDICINE

## 2024-02-08 PROCEDURE — G1004 CDSM NDSC: HCPCS

## 2024-02-08 PROCEDURE — 80048 BASIC METABOLIC PNL TOTAL CA: CPT | Performed by: INTERNAL MEDICINE

## 2024-02-08 PROCEDURE — 99223 1ST HOSP IP/OBS HIGH 75: CPT | Performed by: INTERNAL MEDICINE

## 2024-02-08 PROCEDURE — 85025 COMPLETE CBC W/AUTO DIFF WBC: CPT | Performed by: INTERNAL MEDICINE

## 2024-02-08 PROCEDURE — 94618 PULMONARY STRESS TESTING: CPT

## 2024-02-08 RX ORDER — BUDESONIDE 0.5 MG/2ML
0.5 INHALANT ORAL
Status: DISCONTINUED | OUTPATIENT
Start: 2024-02-08 | End: 2024-02-10 | Stop reason: HOSPADM

## 2024-02-08 RX ORDER — LEVALBUTEROL INHALATION SOLUTION 1.25 MG/3ML
1.25 SOLUTION RESPIRATORY (INHALATION)
Status: DISCONTINUED | OUTPATIENT
Start: 2024-02-08 | End: 2024-02-08

## 2024-02-08 RX ORDER — SODIUM CHLORIDE FOR INHALATION 3 %
4 VIAL, NEBULIZER (ML) INHALATION
Status: DISCONTINUED | OUTPATIENT
Start: 2024-02-08 | End: 2024-02-10

## 2024-02-08 RX ORDER — GUAIFENESIN 600 MG/1
1200 TABLET, EXTENDED RELEASE ORAL EVERY 12 HOURS SCHEDULED
Qty: 112 TABLET | Refills: 0 | Status: DISCONTINUED | OUTPATIENT
Start: 2024-02-08 | End: 2024-02-10 | Stop reason: HOSPADM

## 2024-02-08 RX ORDER — AZITHROMYCIN 250 MG/1
500 TABLET, FILM COATED ORAL EVERY 24 HOURS
Status: DISCONTINUED | OUTPATIENT
Start: 2024-02-08 | End: 2024-02-10 | Stop reason: HOSPADM

## 2024-02-08 RX ORDER — CEFTRIAXONE 1 G/50ML
1000 INJECTION, SOLUTION INTRAVENOUS EVERY 24 HOURS
Status: DISCONTINUED | OUTPATIENT
Start: 2024-02-08 | End: 2024-02-10 | Stop reason: HOSPADM

## 2024-02-08 RX ORDER — FLUTICASONE PROPIONATE 50 MCG
2 SPRAY, SUSPENSION (ML) NASAL DAILY
Status: DISCONTINUED | OUTPATIENT
Start: 2024-02-08 | End: 2024-02-10 | Stop reason: HOSPADM

## 2024-02-08 RX ORDER — PANTOPRAZOLE SODIUM 20 MG/1
20 TABLET, DELAYED RELEASE ORAL
Status: DISCONTINUED | OUTPATIENT
Start: 2024-02-08 | End: 2024-02-10 | Stop reason: HOSPADM

## 2024-02-08 RX ORDER — FLUTICASONE PROPIONATE 50 MCG
2 SPRAY, SUSPENSION (ML) NASAL DAILY
COMMUNITY

## 2024-02-08 RX ORDER — ACETAMINOPHEN 325 MG/1
650 TABLET ORAL EVERY 6 HOURS PRN
Status: DISCONTINUED | OUTPATIENT
Start: 2024-02-08 | End: 2024-02-10 | Stop reason: HOSPADM

## 2024-02-08 RX ORDER — ATORVASTATIN CALCIUM 40 MG/1
40 TABLET, FILM COATED ORAL
Status: DISCONTINUED | OUTPATIENT
Start: 2024-02-08 | End: 2024-02-10 | Stop reason: HOSPADM

## 2024-02-08 RX ORDER — LEVALBUTEROL INHALATION SOLUTION 1.25 MG/3ML
1.25 SOLUTION RESPIRATORY (INHALATION)
Status: DISCONTINUED | OUTPATIENT
Start: 2024-02-08 | End: 2024-02-10 | Stop reason: HOSPADM

## 2024-02-08 RX ORDER — OMEGA-3S/DHA/EPA/FISH OIL/D3 300MG-1000
400 CAPSULE ORAL EVERY MORNING
Status: DISCONTINUED | OUTPATIENT
Start: 2024-02-08 | End: 2024-02-10 | Stop reason: HOSPADM

## 2024-02-08 RX ORDER — BUDESONIDE AND FORMOTEROL FUMARATE DIHYDRATE 80; 4.5 UG/1; UG/1
2 AEROSOL RESPIRATORY (INHALATION) 2 TIMES DAILY
Status: DISCONTINUED | OUTPATIENT
Start: 2024-02-08 | End: 2024-02-08

## 2024-02-08 RX ADMIN — GUAIFENESIN 1200 MG: 600 TABLET, EXTENDED RELEASE ORAL at 21:47

## 2024-02-08 RX ADMIN — CEFTRIAXONE 1000 MG: 1 INJECTION, SOLUTION INTRAVENOUS at 12:11

## 2024-02-08 RX ADMIN — BUDESONIDE INHALATION 0.5 MG: 0.5 SUSPENSION RESPIRATORY (INHALATION) at 19:17

## 2024-02-08 RX ADMIN — LEVALBUTEROL HYDROCHLORIDE 1.25 MG: 1.25 SOLUTION RESPIRATORY (INHALATION) at 19:17

## 2024-02-08 RX ADMIN — AZITHROMYCIN DIHYDRATE 500 MG: 250 TABLET ORAL at 11:50

## 2024-02-08 RX ADMIN — SODIUM CHLORIDE SOLN NEBU 3% 4 ML: 3 NEBU SOLN at 13:53

## 2024-02-08 RX ADMIN — SODIUM CHLORIDE SOLN NEBU 3% 4 ML: 3 NEBU SOLN at 19:17

## 2024-02-08 RX ADMIN — LEVALBUTEROL HYDROCHLORIDE 1.25 MG: 1.25 SOLUTION RESPIRATORY (INHALATION) at 13:53

## 2024-02-08 RX ADMIN — ATORVASTATIN CALCIUM 40 MG: 40 TABLET, FILM COATED ORAL at 15:51

## 2024-02-08 NOTE — ASSESSMENT & PLAN NOTE
CT scan of the chest reviewed, bronchitis with right lower lung atelectasis versus possible developing pneumonia?  Please refer to principal plan

## 2024-02-08 NOTE — PROGRESS NOTES
"Pulmonary Follow Up Note   Otoniel Martinez 63 y.o. male MRN: [id number]  2/8/2024    Assessment:  Acute/on chronic bronchitis  Acute hypoxic respiratory failure  Seems to have background chronic bronchitis/myotonic dystrophy causing inability to clear secretions  Also noted chronic sinusitis/URI  Currently in acute exacerbation, with new exertional hypoxemia on 6-minute walk test today  Some improvement on treatment/mucus clearing started 2 days ago with Symbicort 80/Mucinex 1200 however no improvement yet  Suspect underlying reversible airway disease/significant allergies to dog dander/sleeps with his dog    Plan:  Given the above/new hypoxemia, I recommended direct admission for further treatment of acute on chronic bronchitis/pneumonia/pneumonitis  Will obtain CT sinus/chest  CBC/CMP/PCT, collect sputum cultures, COVID/RSV/flu PCR, check TTE  Empiric abx azithromycin/ceftriaxone  Mucus clearing: Xopenex/hypertonic saline nebs, flutter valve/incentive spirometer    Discussed with the admitting team Dr. Hilton    History of Present Illness     Follow up for: Productive cough/hypoxia    Background:  As per initial consult note    \"63 y.o. male with a h/o polyneuropathy, chronic gastritis, myotonic dystrophy, CAD/PCI 2004, CVA, osteoarthritis, DVT/Eliquis, chronic sinusitis     Presented today for initial evaluation by pulmonary for chronic cough/mucus production     First noted approximately 5 years ago, episodes of minimally productive cough/chest congestion, especially around the allergy season/cold.  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  Associated with production of yellow mucus/oftentimes sticky.  Had partial relief from his Mucinex.  Also associated with chronic nasal congestion, known to have recurrent sinusitis from before did not follow with ENT since 2020.     In 2022, hospitalized for pneumonia/chest congestion to [facility], chest CT at that time showed bibasilar opacities/lingular " "opacities.     Hx myotonic dystrophy: Diagnosed , noted difficulty walking/muscle spasms worse in the cold weather, underwent muscle biopsies.    Currently, episodes are manageable, tries to cough so hard to produce the mucus after that feels relief.  Never been on inhalers before, no prior formal diagnosis of asthma or COPD, lifelong non-smoker.        Social/exposure history  Lived in [place] [facility] [place] all his life  Lifelong non-smoker, nonalcoholic  Worked for the [employer] office for decades, retired in 2017  Lives in an old house built in , had water leak several months ago but no exposure to mold  Pets: 3 dogs     Family history: Dad had breathing issues/was a smoker\"    2024 visit-Mucinex, Symbicort 80.  PFT/[place] allergy panel which was positive.  Sputum cultures  NGTD.  Flonase    Review of Systems  As per hpi, all other systems reviewed and were negative.    Interval History  Returns today accompanied by his wife.  Since 2 days, started using Mucinex/and Symbicort 80.  Reports some improvement of using the Symbicort/and Mucinex with increasing mucus production/clearing his chest however went into a coughing spells/was frequent and heavy that his SpO2 dropped to 84%.  Wife advised to come to the for reevaluation.    Still with severe nasal congestion/congested chest and hoarse voice.  No fever or chills.      Review of Systems  As per hpi, all other systems reviewed and were negative    Studies:  Imaging and other studies: I have personally reviewed pertinent films in PACS  CT PE/20 3/2022-bibasilar/lingular airspace opacity suspect infectious/inflammatory/mucous plug concern for aspiration.     Pulmonary function testin-minute walk test 2024-baseline SpO2 93% on room air, heart rate 76.  Able to walk 266 m in 6 minutes, lowest SpO2 at 88% after 2 minutes, required 2 LPM to end exercise with SpO2 95%     EKG, Pathology, and Other Studies: I have personally reviewed pertinent " "reports.            Past medical, surgical, social and family histories reviewed.      Medications/Allergies: Reviewed      Vitals: Blood pressure 142/90, pulse 86, temperature 98.3 °F (36.8 °C), temperature source Tympanic, height 5' 8.5\" (1.74 m), weight 71.5 kg (157 lb 9.6 oz), SpO2 94%. Body mass index is 23.61 kg/m². Oxygen Therapy  SpO2: 94 %      Physical Exam  Body mass index is 23.61 kg/m².   Gen: not in acute distress,   Neck/Eyes: supple, no adenopathy, PERRL  Ear: normal appearance, no significant hearing impairment  Nose:  normal nasal mucosa, no drainage  Mouth:  unremarkable/normal appearance of lips, teeth and gums  Oropharynx: mucosa is moist, no focal lesions or erythema  Salivary glands: soft nontender  Chest: normal respiratory efforts, clear breath sounds bilaterally  CV: RRR, no murmurs appreciated, no edema  Abdomen: soft, non tender  Extremities:  No observed deformity   Skin: unremarkable  Neuro: AAO X3, no focal motor deficit        Labs:  Lab Results   Component Value Date    WBC 3.74 (L) 01/18/2022    HGB 13.7 01/18/2022    HCT 42.1 01/18/2022    MCV 86 01/18/2022     (L) 01/18/2022     Lab Results   Component Value Date    CALCIUM 9.7 12/22/2023     01/09/2018    K 4.1 12/22/2023    CO2 30 12/22/2023     12/22/2023    BUN 18 12/22/2023    CREATININE 0.63 12/22/2023     Lab Results   Component Value Date    IGE 87.7 02/06/2024     Lab Results   Component Value Date    ALT 71 (H) 12/22/2023    AST 39 12/22/2023    ALKPHOS 96 12/22/2023    BILITOT 1.4 (H) 01/09/2018           Portions of the record may have been created with voice recognition software.  Occasional wrong word or \"sound a like\" substitutions may have occurred due to the inherent limitations of voice recognition software.  Read the chart carefully and recognize, using context, where substitutions have occurred    Tomas Jack M.D.  [facility]  "

## 2024-02-08 NOTE — ASSESSMENT & PLAN NOTE
Acute Evoxac respiratory failure currently on 2 L oxygen, baseline room air  Suspected related to acute on chronic bronchitis  CT of the chest with findings as above  CT sinuses pending  Discussed with pulmonary, started Zithromax and Rocephin, started on nebulizer treatment  Further evaluation with echocardiogram pending  Pulmonary following, will await for further recommendations

## 2024-02-08 NOTE — H&P
[facility]  H&P  Name: Otoniel Martinez 63 y.o. male I MRN: [id number]  Unit/Bed#: E5 -01 I Date of Admission: 2/8/2024   Date of Service: 2/8/2024 I Hospital Day: 0      Assessment/Plan   Splenomegaly  Assessment & Plan  Incidentally noted on CT of the chest  Obtain right upper quadrant ultrasound    Insulin pump status  Assessment & Plan  Diabetes mellitus on insulin pump, continue management with insulin pump  Monitor Accu-Cheks    Chronic sinusitis  Assessment & Plan  Pending CT of sinuses    Abnormal CT scan of lung  Assessment & Plan  CT scan of the chest reviewed, bronchitis with right lower lung atelectasis versus possible developing pneumonia?  Please refer to principal plan    History of recurrent deep vein thrombosis (DVT)  Assessment & Plan  Continue Xarelto    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HCC)  Assessment & Plan  No specific treatment, outpatient follow-up with neurology    * Acute respiratory failure with hypoxia (HCC)  Assessment & Plan  Acute Evoxac respiratory failure currently on 2 L oxygen, baseline room air  Suspected related to acute on chronic bronchitis  CT of the chest with findings as above  CT sinuses pending  Discussed with pulmonary, started Zithromax and Rocephin, started on nebulizer treatment  Further evaluation with echocardiogram pending  Pulmonary following, will await for further recommendations       VTE Prophylaxis: Rivaroxaban (Xarelto)  / sequential compression device   Code Status: Full code  POLST: POLST is not applicable to this patient  Discussion with family: Patient directly    Anticipated Length of Stay:  Patient will be admitted on an Inpatient basis with an anticipated length of stay of  > 2 midnights.   Justification for Hospital Stay: Evaluation and treatment of respiratory failure    Total Time for Visit, including Counseling / Coordination of Care: 60 minutes.  Greater than 50% of this total time spent on direct patient counseling and  coordination of care.    Chief Complaint:   Cough    History of Present Illness:    Otoniel Martinez is a 63 y.o. male who presents with cough with new oxygen requirement.  This is a 63-year-old male with history of myotonic dystrophy and follow-up with neurology without any specific treatment for this condition, history of aspiration in 2021/bronchitis presented to pulmonary office on February 6 for evaluation of chronic cough worsened in the past week or so.  The patient was evaluated in the office and images of the chest were ordered.  Patient came back in follow-up at the pulmonary office today and found to not be hypoxic on a 6-minute walking test.  Patient was directed to our facility to obtain CT of the chest and sinus CT.  Other than continuous cough the patient denies any fever or chills, no chest pain or pressure and no other systemic symptoms    Review of Systems:    Review of Systems   Respiratory:  Positive for cough.    All other systems reviewed and are negative.      Past Medical and Surgical History:     Past Medical History:   Diagnosis Date    CAD (coronary artery disease)     Congenital myotonia     Deep vein thrombosis (DVT) ([facility])     after tear of gastrocnemus muscle    Diabetes (HCC)     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[facility]) 12/06/2017    Pancreatitis     three times    Sleep apnea     not using a C-PAP    Superficial thrombophlebitis        Past Surgical History:   Procedure Laterality Date    CHOLECYSTECTOMY      CIRCUMCISION      Elective    COLONOSCOPY      complete, polyps 2 years ago    CORONARY ANGIOPLASTY WITH STENT PLACEMENT      stent to RCA 2004    INGUINAL HERNIA REPAIR      ORIF RADIAL SHAFT FRACTURE Left     Open Treatment of Multiple Fractures of the Radial Shaft    ORIF ULNAR / RADIAL SHAFT FRACTURE Left     Open Treatment of Multiple Fractures of the Ulnar Shaft    TONSILLECTOMY AND ADENOIDECTOMY         Meds/Allergies:    Prior to Admission medications    Medication Sig Start Date End Date  Taking? Authorizing Provider   acyclovir (ZOVIRAX) 200 mg capsule Take 1 capsule (200 mg total) by mouth 5 (five) times a day for 5 days  Patient taking differently: Take 200 mg by mouth 5 (five) times a day Patient takes PRN 1/24/22 5/17/23  Jt Grey DO   amoxicillin (AMOXIL) 500 MG tablet TAKE 4 TABLETS BY MOUTH 1 HOUR BEFORE DENTIST  Patient not taking: Reported on 2/8/2024 3/11/22   Historical Provider, MD   aspirin (ECOTRIN LOW STRENGTH) 81 mg EC tablet Take 81 mg by mouth daily    Historical Provider, MD   atorvastatin (LIPITOR) 40 mg tablet TAKE 1 TABLET BY MOUTH EVERY DAY 8/14/23   Carlos Hollins,    budesonide-formoterol (Symbicort) 80-4.5 MCG/ACT inhaler Inhale 2 puffs 2 (two) times a day Rinse mouth after use.  Patient taking differently: Inhale 2 puffs 2 (two) times a day 1 puff four times a day Rinse mouth after use. 2/6/24   Tomas Solano MD   cholecalciferol (VITAMIN D3) 400 units tablet Take 1 tablet by mouth every morning    Historical Provider, MD   Continuous Blood Gluc Sensor (Dexcom G6 Sensor) MISC Change every 10 days. E10.65 10/3/22   Historical Provider, MD   Continuous Blood Gluc Transmit (Dexcom G6 Transmitter) MISC Change every 90 days. E10.65 10/3/22   Historical Provider, MD   fluticasone (FLONASE) 50 mcg/act nasal spray 2 sprays into each nostril daily    Historical Provider, MD   guaiFENesin (MUCINEX) 600 mg 12 hr tablet Take 2 tablets (1,200 mg total) by mouth every 12 (twelve) hours 2/6/24 3/7/24  Tomas Solano MD   Gvoke HypoPen 2-Pack 1 MG/0.2ML SOAJ INJECT 1 MG UNDER THE SKIN AS NEEDED (FOR SEVERE HYPOGLYCEMIA). E10.65 10/21/22   Historical Provider, MD   Insulin Aspart (NovoLOG) 100 units/mL injection  10/17/23   Historical Provider, MD   Insulin Disposable Pump (Omnipod 5 G6 Intro, Gen 5,) KIT Inject 1 each under the skin every third day 8/4/22   Historical Provider, MD   Na Sulfate-K Sulfate-Mg Sulf 17.5-3.13-1.6 GM/177ML SOLN DRINK  177 MILLILITER BY MOUTH AS DIRECTED  Patient not taking: Reported on 1/22/2024 3/3/23   Historical Provider, MD   pantoprazole (PROTONIX) 20 mg tablet TAKE 1 TABLET BY MOUTH EVERY DAY 4/7/22   Jt Grey DO   rivaroxaban (Xarelto) 20 mg tablet Take 1 tablet (20 mg total) by mouth daily with breakfast 6/6/23   Carlos Hollins DO   sildenafil (VIAGRA) 100 mg tablet Take 1 tablet (100 mg total) by mouth daily as needed for erectile dysfunction 1/24/22   Jt Grey DO     I have reviewed home medications using allscripts.    Allergies: No Known Allergies    Social History:     Marital Status: /Civil Union     Substance Use History:   Social History     Substance and Sexual Activity   Alcohol Use Yes    Comment: social     Social History     Tobacco Use   Smoking Status Never   Smokeless Tobacco Never     Social History     Substance and Sexual Activity   Drug Use No       Family History:    non-contributory    Physical Exam:     Vitals:   Blood Pressure: 155/95 (02/08/24 1058)  Pulse: 79 (02/08/24 1058)  Temperature: 98.1 °F (36.7 °C) (02/08/24 1058)  Temp Source: Oral (02/08/24 1058)  Respirations: 19 (02/08/24 1058)  SpO2: 96 % (02/08/24 1353)    Physical Exam  Vitals and nursing note reviewed.   Constitutional:       General: He is not in acute distress.     Appearance: He is well-developed.   HENT:      Head: Normocephalic and atraumatic.   Eyes:      Conjunctiva/sclera: Conjunctivae normal.   Cardiovascular:      Rate and Rhythm: Normal rate and regular rhythm.      Heart sounds: No murmur heard.  Pulmonary:      Effort: Pulmonary effort is normal. No respiratory distress.      Comments: Diffuse bilateral rales  Abdominal:      Palpations: Abdomen is soft.      Tenderness: There is no abdominal tenderness.   Musculoskeletal:         General: No swelling.      Cervical back: Neck supple.   Skin:     General: Skin is warm and dry.   Neurological:      Mental Status: He is alert and oriented  to person, place, and time.   Psychiatric:         Mood and Affect: Mood normal.           Additional Data:     Lab Results: I have personally reviewed pertinent reports.      Results from last 7 days   Lab Units 02/08/24  1203   WBC Thousand/uL 3.95*   HEMOGLOBIN g/dL 12.5   HEMATOCRIT % 36.8   PLATELETS Thousands/uL 109*   NEUTROS PCT % 73   LYMPHS PCT % 12*   MONOS PCT % 11   EOS PCT % 4     Results from last 7 days   Lab Units 02/08/24  1203   SODIUM mmol/L 135   POTASSIUM mmol/L 4.2   CHLORIDE mmol/L 102   CO2 mmol/L 28   BUN mg/dL 19   CREATININE mg/dL 0.62   ANION GAP mmol/L 5   CALCIUM mg/dL 8.9   GLUCOSE RANDOM mg/dL 336*                       Imaging: I have personally reviewed pertinent reports.      CT chest wo contrast   Final Result by Dayan Alegre MD (02/08 1408)      Mild diffuse bronchial wall thickening compatible with bronchitis.      Mild groundglass opacity in the dependent right lower lobe, at least in part due to atelectasis. Superimposed mild pneumonia not excluded.      Mild splenomegaly.         Workstation performed: [id number]         CT sinus wo contrast    (Results Pending)   US right upper quadrant    (Results Pending)       Allscripts / [facility] Records Reviewed: Yes     ** Please Note: This note has been constructed using a voice recognition system. **

## 2024-02-08 NOTE — PLAN OF CARE
Problem: PAIN - ADULT  Goal: Verbalizes/displays adequate comfort level or baseline comfort level  Description: Interventions:  - Encourage patient to monitor pain and request assistance  - Assess pain using appropriate pain scale  - Administer analgesics based on type and severity of pain and evaluate response  - Implement non-pharmacological measures as appropriate and evaluate response  - Consider cultural and social influences on pain and pain management  - Notify physician/advanced practitioner if interventions unsuccessful or patient reports new pain  Outcome: Progressing     Problem: INFECTION - ADULT  Goal: Absence or prevention of progression during hospitalization  Description: INTERVENTIONS:  - Assess and monitor for signs and symptoms of infection  - Monitor lab/diagnostic results  - Monitor all insertion sites, i.e. indwelling lines, tubes, and drains  - Monitor endotracheal if appropriate and nasal secretions for changes in amount and color  - [place] appropriate cooling/warming therapies per order  - Administer medications as ordered  - Instruct and encourage patient and family to use good hand hygiene technique  - Identify and instruct in appropriate isolation precautions for identified infection/condition  Outcome: Progressing     Problem: SAFETY ADULT  Goal: Patient will remain free of falls  Description: INTERVENTIONS:  - Educate patient/family on patient safety including physical limitations  - Instruct patient to call for assistance with activity   - Consult OT/PT to assist with strengthening/mobility   - Keep Call bell within reach  - Keep bed low and locked with side rails adjusted as appropriate  - Keep care items and personal belongings within reach  - Initiate and maintain comfort rounds  - Make Fall Risk Sign visible to staff  - Apply yellow socks and bracelet for high fall risk patients  - Consider moving patient to room near nurses station  Outcome: Progressing     Problem: DISCHARGE  PLANNING  Goal: Discharge to home or other facility with appropriate resources  Description: INTERVENTIONS:  - Identify barriers to discharge w/patient and caregiver  - Arrange for needed discharge resources and transportation as appropriate  - Identify discharge learning needs (meds, wound care, etc.)  - Arrange for interpretive services to assist at discharge as needed  - Refer to Case Management Department for coordinating discharge planning if the patient needs post-hospital services based on physician/advanced practitioner order or complex needs related to functional status, cognitive ability, or social support system  Outcome: Progressing     Problem: Knowledge Deficit  Goal: Patient/family/caregiver demonstrates understanding of disease process, treatment plan, medications, and discharge instructions  Description: Complete learning assessment and assess knowledge base.  Interventions:  - Provide teaching at level of understanding  - Provide teaching via preferred learning methods  Outcome: Progressing     Problem: RESPIRATORY - ADULT  Goal: Achieves optimal ventilation and oxygenation  Description: INTERVENTIONS:  - Assess for changes in respiratory status  - Assess for changes in mentation and behavior  - Position to facilitate oxygenation and minimize respiratory effort  - Oxygen administered by appropriate delivery if ordered  - Initiate smoking cessation education as indicated  - Encourage broncho-pulmonary hygiene including cough, deep breathe, Incentive Spirometry  - Assess the need for suctioning and aspirate as needed  - Assess and instruct to report SOB or any respiratory difficulty  - Respiratory Therapy support as indicated  Outcome: Progressing     Problem: METABOLIC, FLUID AND ELECTROLYTES - ADULT  Goal: Glucose maintained within target range  Description: INTERVENTIONS:  - Monitor Blood Glucose as ordered  - Assess for signs and symptoms of hyperglycemia and hypoglycemia  - Administer ordered  medications to maintain glucose within target range  - Assess nutritional intake and initiate nutrition service referral as needed  Outcome: Progressing

## 2024-02-09 ENCOUNTER — APPOINTMENT (OUTPATIENT)
Dept: PHYSICAL THERAPY | Facility: REHABILITATION | Age: 64
End: 2024-02-09
Payer: COMMERCIAL

## 2024-02-09 ENCOUNTER — APPOINTMENT (INPATIENT)
Dept: NON INVASIVE DIAGNOSTICS | Facility: HOSPITAL | Age: 64
DRG: 193 | End: 2024-02-09
Payer: COMMERCIAL

## 2024-02-09 LAB
ANION GAP SERPL CALCULATED.3IONS-SCNC: 5 MMOL/L
AORTIC ROOT: 4.1 CM
APICAL FOUR CHAMBER EJECTION FRACTION: 57 %
ASCENDING AORTA: 3.5 CM
BACTERIA SPT RESP CULT: ABNORMAL
BASOPHILS # BLD AUTO: 0.02 THOUSANDS/ÂΜL (ref 0–0.1)
BASOPHILS NFR BLD AUTO: 1 % (ref 0–1)
BSA FOR ECHO PROCEDURE: 1.85 M2
BUN SERPL-MCNC: 17 MG/DL (ref 5–25)
CALCIUM SERPL-MCNC: 8.4 MG/DL (ref 8.4–10.2)
CHLORIDE SERPL-SCNC: 104 MMOL/L (ref 96–108)
CO2 SERPL-SCNC: 29 MMOL/L (ref 21–32)
CREAT SERPL-MCNC: 0.64 MG/DL (ref 0.6–1.3)
E WAVE DECELERATION TIME: 252 MS
E/A RATIO: 1.03
EOSINOPHIL # BLD AUTO: 0.17 THOUSAND/ÂΜL (ref 0–0.61)
EOSINOPHIL NFR BLD AUTO: 6 % (ref 0–6)
ERYTHROCYTE [DISTWIDTH] IN BLOOD BY AUTOMATED COUNT: 16.4 % (ref 11.6–15.1)
FLUAV RNA RESP QL NAA+PROBE: NEGATIVE
FLUBV RNA RESP QL NAA+PROBE: NEGATIVE
FRACTIONAL SHORTENING: 38 (ref 28–44)
GFR SERPL CREATININE-BSD FRML MDRD: 104 ML/MIN/1.73SQ M
GLUCOSE SERPL-MCNC: 116 MG/DL (ref 65–140)
GLUCOSE SERPL-MCNC: 224 MG/DL (ref 65–140)
GLUCOSE SERPL-MCNC: 270 MG/DL (ref 65–140)
GLUCOSE SERPL-MCNC: 271 MG/DL (ref 65–140)
GLUCOSE SERPL-MCNC: 273 MG/DL (ref 65–140)
GLUCOSE SERPL-MCNC: 378 MG/DL (ref 65–140)
GRAM STN SPEC: ABNORMAL
HCT VFR BLD AUTO: 35.2 % (ref 36.5–49.3)
HGB BLD-MCNC: 11.5 G/DL (ref 12–17)
IMM GRANULOCYTES # BLD AUTO: 0.01 THOUSAND/UL (ref 0–0.2)
IMM GRANULOCYTES NFR BLD AUTO: 0 % (ref 0–2)
INTERVENTRICULAR SEPTUM IN DIASTOLE (PARASTERNAL SHORT AXIS VIEW): 0.9 CM
INTERVENTRICULAR SEPTUM: 0.9 CM (ref 0.6–1.1)
LAAS-AP4: 11 CM2
LEFT ATRIUM SIZE: 3.5 CM
LEFT INTERNAL DIMENSION IN SYSTOLE: 3.1 CM (ref 2.1–4)
LEFT VENTRICULAR INTERNAL DIMENSION IN DIASTOLE: 5 CM (ref 3.5–6)
LEFT VENTRICULAR POSTERIOR WALL IN END DIASTOLE: 0.8 CM
LEFT VENTRICULAR STROKE VOLUME: 78 ML
LVSV (TEICH): 78 ML
LYMPHOCYTES # BLD AUTO: 0.5 THOUSANDS/ÂΜL (ref 0.6–4.47)
LYMPHOCYTES NFR BLD AUTO: 18 % (ref 14–44)
MAGNESIUM SERPL-MCNC: 1.7 MG/DL (ref 1.9–2.7)
MCH RBC QN AUTO: 29.1 PG (ref 26.8–34.3)
MCHC RBC AUTO-ENTMCNC: 32.7 G/DL (ref 31.4–37.4)
MCV RBC AUTO: 89 FL (ref 82–98)
MONOCYTES # BLD AUTO: 0.4 THOUSAND/ÂΜL (ref 0.17–1.22)
MONOCYTES NFR BLD AUTO: 14 % (ref 4–12)
MV E'TISSUE VEL-LAT: 17 CM/S
MV E'TISSUE VEL-SEP: 13 CM/S
MV PEAK A VEL: 0.77 M/S
MV PEAK E VEL: 79 CM/S
MV STENOSIS PRESSURE HALF TIME: 73 MS
MV VALVE AREA P 1/2 METHOD: 3.01
NEUTROPHILS # BLD AUTO: 1.67 THOUSANDS/ÂΜL (ref 1.85–7.62)
NEUTS SEG NFR BLD AUTO: 61 % (ref 43–75)
NRBC BLD AUTO-RTO: 0 /100 WBCS
PHOSPHATE SERPL-MCNC: 4.3 MG/DL (ref 2.3–4.1)
PLATELET # BLD AUTO: 112 THOUSANDS/UL (ref 149–390)
PMV BLD AUTO: 9.7 FL (ref 8.9–12.7)
POTASSIUM SERPL-SCNC: 4.5 MMOL/L (ref 3.5–5.3)
RA PRESSURE ESTIMATED: 3 MMHG
RBC # BLD AUTO: 3.95 MILLION/UL (ref 3.88–5.62)
RIGHT ATRIUM AREA SYSTOLE A4C: 15.9 CM2
RIGHT VENTRICLE ID DIMENSION: 3.3 CM
RSV RNA RESP QL NAA+PROBE: NEGATIVE
SARS-COV-2 RNA RESP QL NAA+PROBE: NEGATIVE
SL CV LV EF: 55
SL CV PED ECHO LEFT VENTRICLE DIASTOLIC VOLUME (MOD BIPLANE) 2D: 116 ML
SL CV PED ECHO LEFT VENTRICLE SYSTOLIC VOLUME (MOD BIPLANE) 2D: 38 ML
SODIUM SERPL-SCNC: 138 MMOL/L (ref 135–147)
TRICUSPID ANNULAR PLANE SYSTOLIC EXCURSION: 2 CM
WBC # BLD AUTO: 2.77 THOUSAND/UL (ref 4.31–10.16)

## 2024-02-09 PROCEDURE — 93306 TTE W/DOPPLER COMPLETE: CPT

## 2024-02-09 PROCEDURE — 83735 ASSAY OF MAGNESIUM: CPT | Performed by: INTERNAL MEDICINE

## 2024-02-09 PROCEDURE — 80048 BASIC METABOLIC PNL TOTAL CA: CPT | Performed by: INTERNAL MEDICINE

## 2024-02-09 PROCEDURE — 84100 ASSAY OF PHOSPHORUS: CPT | Performed by: INTERNAL MEDICINE

## 2024-02-09 PROCEDURE — 94640 AIRWAY INHALATION TREATMENT: CPT

## 2024-02-09 PROCEDURE — 99232 SBSQ HOSP IP/OBS MODERATE 35: CPT | Performed by: PHYSICIAN ASSISTANT

## 2024-02-09 PROCEDURE — 85025 COMPLETE CBC W/AUTO DIFF WBC: CPT | Performed by: INTERNAL MEDICINE

## 2024-02-09 PROCEDURE — 0241U HB NFCT DS VIR RESP RNA 4 TRGT: CPT | Performed by: INTERNAL MEDICINE

## 2024-02-09 PROCEDURE — 99223 1ST HOSP IP/OBS HIGH 75: CPT | Performed by: INTERNAL MEDICINE

## 2024-02-09 PROCEDURE — 94760 N-INVAS EAR/PLS OXIMETRY 1: CPT

## 2024-02-09 PROCEDURE — 82948 REAGENT STRIP/BLOOD GLUCOSE: CPT

## 2024-02-09 RX ORDER — MAGNESIUM SULFATE HEPTAHYDRATE 40 MG/ML
2 INJECTION, SOLUTION INTRAVENOUS ONCE
Qty: 50 ML | Refills: 0 | Status: COMPLETED | OUTPATIENT
Start: 2024-02-09 | End: 2024-02-09

## 2024-02-09 RX ORDER — PREDNISONE 20 MG/1
40 TABLET ORAL DAILY
Status: DISCONTINUED | OUTPATIENT
Start: 2024-02-09 | End: 2024-02-10 | Stop reason: HOSPADM

## 2024-02-09 RX ADMIN — AZITHROMYCIN DIHYDRATE 500 MG: 250 TABLET ORAL at 10:58

## 2024-02-09 RX ADMIN — SODIUM CHLORIDE SOLN NEBU 3% 4 ML: 3 NEBU SOLN at 13:19

## 2024-02-09 RX ADMIN — SODIUM CHLORIDE SOLN NEBU 3% 4 ML: 3 NEBU SOLN at 07:00

## 2024-02-09 RX ADMIN — ATORVASTATIN CALCIUM 40 MG: 40 TABLET, FILM COATED ORAL at 15:54

## 2024-02-09 RX ADMIN — BUDESONIDE INHALATION 0.5 MG: 0.5 SUSPENSION RESPIRATORY (INHALATION) at 07:00

## 2024-02-09 RX ADMIN — PREDNISONE 40 MG: 20 TABLET ORAL at 15:54

## 2024-02-09 RX ADMIN — SODIUM CHLORIDE SOLN NEBU 3% 4 ML: 3 NEBU SOLN at 19:59

## 2024-02-09 RX ADMIN — LEVALBUTEROL HYDROCHLORIDE 1.25 MG: 1.25 SOLUTION RESPIRATORY (INHALATION) at 07:00

## 2024-02-09 RX ADMIN — RIVAROXABAN 20 MG: 20 TABLET, FILM COATED ORAL at 07:27

## 2024-02-09 RX ADMIN — GUAIFENESIN 1200 MG: 600 TABLET, EXTENDED RELEASE ORAL at 21:25

## 2024-02-09 RX ADMIN — MAGNESIUM SULFATE HEPTAHYDRATE 2 G: 40 INJECTION, SOLUTION INTRAVENOUS at 09:13

## 2024-02-09 RX ADMIN — GUAIFENESIN 1200 MG: 600 TABLET, EXTENDED RELEASE ORAL at 09:13

## 2024-02-09 RX ADMIN — ASPIRIN 81 MG: 81 TABLET, COATED ORAL at 09:13

## 2024-02-09 RX ADMIN — LEVALBUTEROL HYDROCHLORIDE 1.25 MG: 1.25 SOLUTION RESPIRATORY (INHALATION) at 13:19

## 2024-02-09 RX ADMIN — CEFTRIAXONE 1000 MG: 1 INJECTION, SOLUTION INTRAVENOUS at 10:58

## 2024-02-09 RX ADMIN — LEVALBUTEROL HYDROCHLORIDE 1.25 MG: 1.25 SOLUTION RESPIRATORY (INHALATION) at 19:59

## 2024-02-09 RX ADMIN — CHOLECALCIFEROL TAB 10 MCG (400 UNIT) 400 UNITS: 10 TAB at 09:13

## 2024-02-09 RX ADMIN — PANTOPRAZOLE SODIUM 20 MG: 20 TABLET, DELAYED RELEASE ORAL at 05:18

## 2024-02-09 RX ADMIN — BUDESONIDE INHALATION 0.5 MG: 0.5 SUSPENSION RESPIRATORY (INHALATION) at 19:59

## 2024-02-09 NOTE — ASSESSMENT & PLAN NOTE
Remote coronary artery disease status post RCA stenting in 2004   Follows with Dr. Hollins outpt   Continue statin and xarelto, aspirin   Echo pending

## 2024-02-09 NOTE — ASSESSMENT & PLAN NOTE
CT sinuses: Mild-to-moderate sinus disease (worse in bilateral ethmoid sinuses) with bilateral nasal polyps, as detailed above.  Near osseous dehiscence of bony walls of carotid canals at posterior superior aspect of sphenoid sinus walls bilaterally.    ENT consulted

## 2024-02-09 NOTE — RESTORATIVE TECHNICIAN NOTE
Restorative Technician Note      Patient Name: Otoniel Martinez     Restorative Tech Visit Date: 02/09/24  Note Type: Mobility  Patient Position Upon Consult: Supine  Activity Performed: Ambulated  Patient Position at End of Consult: Supine; All needs within reach

## 2024-02-09 NOTE — ASSESSMENT & PLAN NOTE
Diabetes mellitus on insulin pump, continue management with insulin pump  If sugars remain uncontrolled can switch to basal/bolus regimen   Monitor Accu-Cheks

## 2024-02-09 NOTE — CASE MANAGEMENT
Case Management Assessment & Discharge Planning Note    Patient name Otoniel Martinez  Location [facility] /E5 -* MRN [id number]  : 1960 Date 2024       Current Admission Date: 2024  Current Admission Diagnosis:Acute respiratory failure with hypoxia (HCC)   Patient Active Problem List    Diagnosis Date Noted    Acute respiratory failure with hypoxia (HCC) 2024    Splenomegaly 2024    Chronic bronchitis (HCC) 2024    Abnormal CT scan of lung 2024    Chronic sinusitis 2024    Insulin pump status 2023    Primary osteoarthritis of right hip 2022    Recurrent cold sores 2022    History of recurrent deep vein thrombosis (DVT) 2021    Dysfunction of right rotator cuff 2021    Vitamin D deficiency 2021    Old cerebrovascular accident (CVA) without late effect 10/28/2020    Chronic gastritis without bleeding 2020    Polyneuropathy 10/17/2018    Vertigo 2018    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[facility]) 2018    Thrombocytopenia ([facility]) 2018    Erectile dysfunction of non-organic origin 10/24/2014    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), 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[facility]) 10/04/2013    Mixed hyperlipidemia 2013    Arteriosclerosis of coronary artery 2012    Thromboembolism of deep veins of lower extremity ([facility]) 2011    Obstructive sleep apnea 2007      LOS (days): 1  Geometric Mean LOS (GMLOS) (days):   Days to GMLOS:     OBJECTIVE:    Risk of Unplanned Readmission Score: 10.33         Current admission status: Inpatient       Preferred Pharmacy:   [facility] - [place], [place] - [address]  [address]  [facility] [address]  Phone: [phone] Fax: [phone]    [id number] - [place], [place] - [address]  [address] [address]  Phone: [phone] Fax: [phone]    [facility] [id number] [facility] [employer] - [place] - [address]  [address]  [place]  [facility] [place] [address]  Phone: [phone] Fax: [phone]    [facility] [place] [facility] [address] [facility]  [address] [facility] [address]  Phone: [phone] Fax: [phone]    Primary Care Provider: Lev Baker MD    Primary Insurance: [facility]  Secondary Insurance:     ASSESSMENT:  Active Health Care Proxies    There are no active Health Care Proxies on file.       Advance Directives  Does patient have a Health Care POA?: No  Was patient offered paperwork?: Yes  Does patient currently have a Health Care decision maker?: Yes, please see Health Care Proxy section  Does patient have Advance Directives?: Yes  Advance Directives: Living will  Primary Contact: Oliva Martinez (Spouse)  [phone]         Readmission Root Cause  30 Day Readmission: No    Patient Information  Admitted from:: Home  Mental Status: Alert  During Assessment patient was accompanied by: Not accompanied during assessment  Assessment information provided by:: Patient  Primary Caregiver: Spouse  Caregiver's Name:: Oliva Martinez (Spouse)  [phone]  , Self  Caregiver's Telephone Number:: Oliva Martinez (Spouse)  [phone]  Support Systems: Self, Spouse/significant other, Son  County of Residence: [place]  What city do you live in?: [place]  Home entry access options. Select all that apply.: Stairs  Number of steps to enter home.: 1  Do the steps have railings?: No  Type of Current Residence: [facility]  Living Arrangements: Lives w/ Spouse/significant other, Lives w/ Son  Is patient a ?: No    Activities of Daily Living Prior to Admission  Functional Status: Independent  Completes ADLs independently?: Yes  Ambulates independently?: Yes  Does patient use assisted devices?: No  Does patient currently own DME?: Yes  What DME does the patient currently own?: Walker, Straight Cane  Does patient have a history of Outpatient Therapy (PT/OT)?: No  Does the patient have a history of Short-Term Rehab?:  No  Does patient have a history of HHC?: Yes (After hip replacement)  Does patient currently have HHC?: No         Patient Information Continued  Income Source: Pension/prison  Does patient have prescription coverage?: Yes  Does patient receive dialysis treatments?: No  Does patient have a history of Mental Health Diagnosis?: No    PHQ 2/9 Screening   Reviewed PHQ 2/9 Depression Screening Score?: No    Means of Transportation  Means of Transport to Appts:: Drives Self      Housing Stability: Unknown (2/9/2024)    Housing Stability Vital Sign     Unable to Pay for Housing in the Last Year: No     Number of Places Lived in the Last Year: Not on file     Unstable Housing in the Last Year: No   Food Insecurity: No Food Insecurity (2/9/2024)    Hunger Vital Sign     Worried About Running Out of Food in the Last Year: Never true     Ran Out of Food in the Last Year: Never true   Transportation Needs: No Transportation Needs (2/9/2024)    PRAPARE - Transportation     Lack of Transportation (Medical): No     Lack of Transportation (Non-Medical): No   Utilities: Not At Risk (2/9/2024)    [facility] Utilities     Threatened with loss of utilities: No       DISCHARGE DETAILS:    Discharge planning discussed with:: Patient  Freedom of Choice: Yes  Comments - Freedom of Choice: TBD  CM contacted family/caregiver?: Yes  Were Treatment Team discharge recommendations reviewed with patient/caregiver?: Yes  Did patient/caregiver verbalize understanding of patient care needs?: Yes  Were patient/caregiver advised of the risks associated with not following Treatment Team discharge recommendations?: Yes    Contacts  Patient Contacts: Oliva Martinez (Spouse)  [phone]  Relationship to Patient:: Family  Contact Method: Other (Comment) (Patient will contact)  Reason/Outcome: Discharge Planning, Emergency Contact    Requested Home Health Care         Is the patient interested in HHC at discharge?: No    DME Referral Provided  Referral made  for DME?: No (Possible O2)    Other Referral/Resources/Interventions Provided:  Referral Comments: TBD    Would you like to participate in our [facility] service program?  : Patient would like more information        Additional Comments: CM consulted to speak to patient about medication. Per patient he has no issues affording his meds as he has insurance. Patient confirmed all info on facesheet is correct. He lives in a one story home with his spouse and son. Reports being IND with ADLs and ambulation. He does have a walker and cane available if needed. He has an insulin pump that is being monitored. Denies any other DME, Home O2, HHC or HD. Patient may possible need home oxygen prior to d/c has he is currently on 2L in hospital. CM to follow up on anticipated d/c needs

## 2024-02-09 NOTE — PLAN OF CARE
Problem: PAIN - ADULT  Goal: Verbalizes/displays adequate comfort level or baseline comfort level  Description: Interventions:  - Encourage patient to monitor pain and request assistance  - Assess pain using appropriate pain scale  - Administer analgesics based on type and severity of pain and evaluate response  - Implement non-pharmacological measures as appropriate and evaluate response  - Consider cultural and social influences on pain and pain management  - Notify physician/advanced practitioner if interventions unsuccessful or patient reports new pain  Outcome: Progressing     Problem: INFECTION - ADULT  Goal: Absence or prevention of progression during hospitalization  Description: INTERVENTIONS:  - Assess and monitor for signs and symptoms of infection  - Monitor lab/diagnostic results  - Monitor all insertion sites, i.e. indwelling lines, tubes, and drains  - Monitor endotracheal if appropriate and nasal secretions for changes in amount and color  - [place] appropriate cooling/warming therapies per order  - Administer medications as ordered  - Instruct and encourage patient and family to use good hand hygiene technique  - Identify and instruct in appropriate isolation precautions for identified infection/condition  Outcome: Progressing     Problem: SAFETY ADULT  Goal: Patient will remain free of falls  Description: INTERVENTIONS:  - Educate patient/family on patient safety including physical limitations  - Instruct patient to call for assistance with activity   - Consult OT/PT to assist with strengthening/mobility   - Keep Call bell within reach  - Keep bed low and locked with side rails adjusted as appropriate  - Keep care items and personal belongings within reach  - Initiate and maintain comfort rounds  - Make Fall Risk Sign visible to staff  - Apply yellow socks and bracelet for high fall risk patients  - Consider moving patient to room near nurses station  Outcome: Progressing     Problem: DISCHARGE  PLANNING  Goal: Discharge to home or other facility with appropriate resources  Description: INTERVENTIONS:  - Identify barriers to discharge w/patient and caregiver  - Arrange for needed discharge resources and transportation as appropriate  - Identify discharge learning needs (meds, wound care, etc.)  - Arrange for interpretive services to assist at discharge as needed  - Refer to Case Management Department for coordinating discharge planning if the patient needs post-hospital services based on physician/advanced practitioner order or complex needs related to functional status, cognitive ability, or social support system  Outcome: Progressing     Problem: Knowledge Deficit  Goal: Patient/family/caregiver demonstrates understanding of disease process, treatment plan, medications, and discharge instructions  Description: Complete learning assessment and assess knowledge base.  Interventions:  - Provide teaching at level of understanding  - Provide teaching via preferred learning methods  Outcome: Progressing     Problem: RESPIRATORY - ADULT  Goal: Achieves optimal ventilation and oxygenation  Description: INTERVENTIONS:  - Assess for changes in respiratory status  - Assess for changes in mentation and behavior  - Position to facilitate oxygenation and minimize respiratory effort  - Oxygen administered by appropriate delivery if ordered  - Initiate smoking cessation education as indicated  - Encourage broncho-pulmonary hygiene including cough, deep breathe, Incentive Spirometry  - Assess the need for suctioning and aspirate as needed  - Assess and instruct to report SOB or any respiratory difficulty  - Respiratory Therapy support as indicated  Outcome: Progressing     Problem: METABOLIC, FLUID AND ELECTROLYTES - ADULT  Goal: Glucose maintained within target range  Description: INTERVENTIONS:  - Monitor Blood Glucose as ordered  - Assess for signs and symptoms of hyperglycemia and hypoglycemia  - Administer ordered  medications to maintain glucose within target range  - Assess nutritional intake and initiate nutrition service referral as needed  Outcome: Progressing

## 2024-02-09 NOTE — UTILIZATION REVIEW
Initial Clinical Review    Admission: Date/Time/Statement:   Admission Orders (From admission, onward)       Ordered        02/08/24 1114  Inpatient Admission  Once                          Orders Placed This Encounter   Procedures    Inpatient Admission     Standing Status:   Standing     Number of Occurrences:   1     Order Specific Question:   Level of Care     Answer:   Med Surg [16]     Order Specific Question:   Estimated length of stay     Answer:   More than 2 Midnights     Order Specific Question:   Certification     Answer:   I certify that inpatient services are medically necessary for this patient for a duration of greater than two midnights. See H&P and MD Progress Notes for additional information about the patient's course of treatment.     ED Arrival Information       Patient not seen in ED                       No chief complaint on file.      Initial Presentation: 63 y.o. male w/ PMH of myotonic dystrophy, DM, h/o aspiration in 2021/bronchitis presented to the ED forn OP Pulmonology office, direct admitted as Inpatient for acute resp failure for hypoxia.  Pt initially went to OP pulmonology on 02/06 for chronic cough worsened in the past week or so, chest imaging ordered. Retured for f/u on 02/08 and found to be hypoxic on a 6 min walking test.   Sent to the ED to obtain CT of the chest and sinus CT.   In the ED, CT scan of the chest showed bronchitis with right lower lung atelectasis versus possible developing pneumonia? Mild splenomegaly.  On exam, diffuse b/l rales present. On 2L NC.  Plan: f/u CT sinuses. started Zithromax and Rocephin, started on nebulizer treatment. Echocardiogram pending. Pulmonology consulted.    Date: 02/09   Day 2:   Pulmonology Consult: Pt on 1L NC, not on home O2.   CT of the sinuses showed Mild-to-moderate sinus disease (worse in bilateral ethmoid sinuses) with bilateral nasal polyps, as detailed above.   Near osseous dehiscence of bony walls of carotid canals at  posterior superior aspect of sphenoid sinus walls bilaterally.  Plan:Continue to maintain saturation greater than 89%. Pulmonary hygiene encouraged. Complete overnight pulse oximetry prior to discharge. Ambulatory pulse ox prior to discharge, cont to trend procal. Cont Azithromycin, ceftriaxone. Cont nebs. ENT consulted.     ED Triage Vitals   Temperature Pulse Respirations Blood Pressure SpO2   02/08/24 1058 02/08/24 1058 02/08/24 1058 02/08/24 1058 02/08/24 1058   98.1 °F (36.7 °C) 79 19 155/95 90 %      Temp Source Heart Rate Source Patient Position - Orthostatic VS BP Location FiO2 (%)   02/08/24 1058 -- 02/08/24 1058 02/08/24 1058 --   Oral  Lying Right arm       Pain Score       02/08/24 1125       No Pain          Wt Readings from Last 1 Encounters:   02/09/24 71.2 kg (157 lb)     Additional Vital Signs:   Date/Time Temp Pulse Resp BP MAP (mmHg) SpO2 Calculated FIO2 (%) - Nasal Cannula Nasal Cannula O2 Flow Rate (L/min) O2 Device Patient Position - Orthostatic VS   02/09/24 1025 -- 61 -- 117/72 -- -- -- -- -- --   02/09/24 07:25:12 98 °F (36.7 °C) 61 20 117/72 87 96 % -- -- -- --   02/09/24 0700 -- -- -- -- -- 98 % 28 2 L/min Nasal cannula --   02/09/24 00:10:25 98 °F (36.7 °C) 58 16 114/71 85 96 % 28 2 L/min Nasal cannula Lying   02/08/24 1945 -- -- -- -- -- 96 % 28 2 L/min Nasal cannula --   02/08/24 1917 -- -- -- -- -- 96 % 28 2 L/min Nasal cannula --   02/08/24 15:07:13 98.3 °F (36.8 °C) 58 18 124/84 97 90 % 28 2 L/min Nasal cannula Lying   02/08/24 1353 -- -- -- -- -- 96 % 28 2 L/min Nasal cannula --       Pertinent Labs/Diagnostic Test Results:   US right upper quadrant   Final Result by Beba Ruth MD (02/09 0836)   Unremarkable study.      Workstation performed: [id number]         CT sinus wo contrast   Final Result by Vitaliy Blackwell MD (02/08 1456)      Mild-to-moderate sinus disease (worse in bilateral ethmoid sinuses) with bilateral nasal polyps, as detailed above.      Near osseous  dehiscence of bony walls of carotid canals at posterior superior aspect of sphenoid sinus walls bilaterally.               Resident: LEXII PELAYO I, the attending radiologist, have reviewed the images and agree with the final report above.      Workstation performed: [id number]         CT chest wo contrast   Final Result by Dayan Alegre MD (02/08 1408)      Mild diffuse bronchial wall thickening compatible with bronchitis.      Mild groundglass opacity in the dependent right lower lobe, at least in part due to atelectasis. Superimposed mild pneumonia not excluded.      Mild splenomegaly.         Workstation performed: [id number]               Results from last 7 days   Lab Units 02/09/24  0506 02/08/24  1203   WBC Thousand/uL 2.77* 3.95*   HEMOGLOBIN g/dL 11.5* 12.5   HEMATOCRIT % 35.2* 36.8   PLATELETS Thousands/uL 112* 109*   NEUTROS ABS Thousands/µL 1.67* 2.88         Results from last 7 days   Lab Units 02/09/24  0506 02/08/24  1203   SODIUM mmol/L 138 135   POTASSIUM mmol/L 4.5 4.2   CHLORIDE mmol/L 104 102   CO2 mmol/L 29 28   ANION GAP mmol/L 5 5   BUN mg/dL 17 19   CREATININE mg/dL 0.64 0.62   EGFR ml/min/1.73sq m 104 105   CALCIUM mg/dL 8.4 8.9   MAGNESIUM mg/dL 1.7*  --    PHOSPHORUS mg/dL 4.3*  --          Results from last 7 days   Lab Units 02/09/24  1123 02/09/24  0725 02/09/24  0404 02/08/24  2021 02/08/24  1602   POC GLUCOSE mg/dl 273* 224* 116 225* 238*     Results from last 7 days   Lab Units 02/09/24  0506 02/08/24  1203   GLUCOSE RANDOM mg/dL 270* 336*             Results from last 7 days   Lab Units 02/08/24  1203   PROCALCITONIN ng/ml 0.13                     Results from last 7 days   Lab Units 02/07/24  0802   SPUTUM CULTURE  3+ Growth of   GRAM STAIN RESULT  1+ Epithelial cells per low power field*  2+ Polys*  3+ Gram positive cocci in pairs and chains*  3+ Gram positive rods*  1+ Gram negative rods*                   ED Treatment:   Medication Administration - No  Administrations Displayed (No Start Event Found)       None          Past Medical History:   Diagnosis Date    CAD (coronary artery disease)     Congenital myotonia     Deep vein thrombosis (DVT) ([facility])     after tear of gastrocnemus muscle    Diabetes (HCC)     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[facility]) 12/06/2017    Pancreatitis     three times    Sleep apnea     not using a C-PAP    Superficial thrombophlebitis      Present on Admission:   Abnormal CT scan of lung   Acute respiratory failure with hypoxia (HCC)   Chronic sinusitis   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[facility])   Splenomegaly   CAD (coronary artery disease)      Admitting Diagnosis: Chronic bronchitis, unspecified chronic bronchitis type ([facility])  Age/Sex: 63 y.o. male  Admission Orders:  SCD    Scheduled Medications:  aspirin, 81 mg, Oral, Daily  atorvastatin, 40 mg, Oral, Daily With Dinner  azithromycin, 500 mg, Oral, Q24H  budesonide, 0.5 mg, Nebulization, Q12H  cefTRIAXone, 1,000 mg, Intravenous, Q24H  cholecalciferol, 400 Units, Oral, QAM  fluticasone, 2 spray, Nasal, Daily  guaiFENesin, 1,200 mg, Oral, Q12H ENRRIQUE  levalbuterol, 1.25 mg, Nebulization, TID  pantoprazole, 20 mg, Oral, Early Morning  patient maintained insulin pump, 1 each, Subcutaneous, Q8H  rivaroxaban, 20 mg, Oral, Daily With Breakfast  sodium chloride, 4 mL, Nebulization, TID      Continuous IV Infusions: none     PRN Meds:  acetaminophen, 650 mg, Oral, Q6H PRN  insulin aspart, 1,000 Units, Subcutaneous Insulin Pump, Daily PRN        IP CONSULT TO PULMONOLOGY  IP CONSULT TO CASE MANAGEMENT  IP CONSULT TO ENT    Network Utilization Review Department  ATTENTION: Please call with any questions or concerns to [phone] and carefully listen to the prompts so that you are directed to the right person. All voicemails are confidential.   For Discharge needs, contact Care Management DC Support Team at [phone] opt. [phone]  Send all requests for admission clinical reviews, approved or denied determinations and any other  requests to dedicated fax number below belonging to the campus where the patient is receiving treatment. List of dedicated fax numbers for the Facilities:  FACILITY NAME UR FAX NUMBER   ADMISSION DENIALS (Administrative/Medical Necessity) [phone]   DISCHARGE SUPPORT TEAM (NETWORK) [phone]   PARENT CHILD HEALTH (Maternity/NICU/Pediatrics) [phone]   [facility] [phone]   [facility] [phone]   Critical access hospital [phone]   [facility] [phone]   [facility] [phone]   [facility] [phone]   [facility] [phone]   [facility] [phone]   [facility] [phone]   [facility] [phone]   [facility] [phone]   [facility] [phone]

## 2024-02-09 NOTE — PROGRESS NOTES
[facility]  Progress Note  Name: Otoniel Martinez I  MRN: [id number]  Unit/Bed#: E5 -01 I Date of Admission: 2/8/2024   Date of Service: 2/9/2024 I Hospital Day: 1    Assessment/Plan   * Acute respiratory failure with hypoxia (HCC)  Assessment & Plan  Presented with acute respiratory failure currently on 2 L oxygen, baseline room air, sent in from Pulm office   CT chest: mild diffuse bronchial wall thickening compatible with bronchitis, right lower lobe atelectasis, possible superimposed mild pneumonia not excluded   CT sinuses: mild to moderate sinus disease with nasal polyps , ENT consulted   Procal negative x 1, trend   Suspected related to acute on chronic bronchitis  Continue day 2 azithromycin and ceftriaxone   Follow cultures   Further evaluation with echocardiogram pending  Follow pulm recs   Recommending overnight pulse ox and eventually will need ambulatory pulse ox/home O2 eval   Will need repeat sleep study outpt     Splenomegaly  Assessment & Plan  Incidentally noted on CT of the chest  RUQ unremarkable     Insulin pump status  Assessment & Plan  Diabetes mellitus on insulin pump, continue management with insulin pump  If sugars remain uncontrolled can switch to basal/bolus regimen   Monitor Accu-Cheks    Chronic sinusitis  Assessment & Plan  CT sinuses: Mild-to-moderate sinus disease (worse in bilateral ethmoid sinuses) with bilateral nasal polyps, as detailed above.  Near osseous dehiscence of bony walls of carotid canals at posterior superior aspect of sphenoid sinus walls bilaterally.    ENT consulted     Abnormal CT scan of lung  Assessment & Plan  CT scan of the chest reviewed, bronchitis with right lower lung atelectasis versus possible developing pneumonia?  Please refer to principal plan  Appreciate pulm eval   Will need repeat chest imaging in 6-8 weeks     History of recurrent deep vein thrombosis (DVT)  Assessment & Plan  Continue Xarelto    Myotonic dystrophy  ([facility])  Assessment & Plan  No specific treatment, outpatient follow-up with neurology    CAD (coronary artery disease)  Assessment & Plan  Remote coronary artery disease status post RCA stenting in 2004   Follows with Dr. Hollins outpt   Continue statin and xarelto, aspirin   Echo pending              VTE Pharmacologic Prophylaxis:   High Risk (Score >/= 5) - Pharmacological DVT Prophylaxis Ordered: rivaroxaban (Xarelto). Sequential Compression Devices Ordered.    Mobility:   Basic Mobility Inpatient Raw Score: 24  JH-HLM Goal: 8: Walk 250 feet or more  JH-HLM Achieved: 8: Walk 250 feet ot more  HLM Goal achieved. Continue to encourage appropriate mobility.    Patient Centered Rounds: I performed bedside rounds with nursing staff today.   Discussions with Specialists or Other Care Team Provider: will review pulm recs     Education and Discussions with Family / Patient: Patient declined call to .     Total Time Spent on Date of Encounter in care of patient:  mins. This time was spent on one or more of the following: performing physical exam; counseling and coordination of care; obtaining or reviewing history; documenting in the medical record; reviewing/ordering tests, medications or procedures; communicating with other healthcare professionals and discussing with patient's family/caregivers.    Current Length of Stay: 1 day(s)  Current Patient Status: Inpatient   Certification Statement: The patient will continue to require additional inpatient hospital stay due to acute respiratory failure with hypoxia, abnormal CT scan, bronchitis  Discharge Plan: Anticipate discharge in 24-48 hrs to home.    Code Status: Level 1 - Full Code    Subjective:   Patient doing well today.  Feels much better than yesterday.  States he was walking in the [place]orges without oxygen and his oxygen saturations remained fine.  Does drop in the evening time.  Previously to CPAP but states his wife could not tolerate it.  Has not had a  repeat sleep study since he no longer uses CPAP.  I have placed order for overnight pulse ox per pulm recommendations.  Will also eventually need home O2 study.  Denies any chest pain palpitations lightheadedness dizziness or other acute complaints.    Objective:     Vitals:   Temp (24hrs), Av.1 °F (36.7 °C), Min:98 °F (36.7 °C), Max:98.3 °F (36.8 °C)    Temp:  [98 °F (36.7 °C)-98.3 °F (36.8 °C)] 98 °F (36.7 °C)  HR:  [58-61] 61  Resp:  [16-20] 20  BP: (114-124)/(71-84) 117/72  SpO2:  [90 %-98 %] 96 %  Body mass index is 23.52 kg/m².     Input and Output Summary (last 24 hours):   No intake or output data in the 24 hours ending 24 1250    Physical Exam:   Physical Exam  Vitals and nursing note reviewed.   Cardiovascular:      Rate and Rhythm: Normal rate and regular rhythm.   Pulmonary:      Effort: Pulmonary effort is normal. No respiratory distress.      Breath sounds: Normal breath sounds.      Comments: 1 lpm at 96%   Abdominal:      General: Bowel sounds are normal.      Palpations: Abdomen is soft.   Musculoskeletal:      Right lower leg: No edema.      Left lower leg: No edema.   Skin:     General: Skin is warm.   Neurological:      Mental Status: He is alert. Mental status is at baseline.   Psychiatric:         Mood and Affect: Mood normal.          Additional Data:     Labs:  Results from last 7 days   Lab Units 24  0506   WBC Thousand/uL 2.77*   HEMOGLOBIN g/dL 11.5*   HEMATOCRIT % 35.2*   PLATELETS Thousands/uL 112*   NEUTROS PCT % 61   LYMPHS PCT % 18   MONOS PCT % 14*   EOS PCT % 6     Results from last 7 days   Lab Units 24  0506   SODIUM mmol/L 138   POTASSIUM mmol/L 4.5   CHLORIDE mmol/L 104   CO2 mmol/L 29   BUN mg/dL 17   CREATININE mg/dL 0.64   ANION GAP mmol/L 5   CALCIUM mg/dL 8.4   GLUCOSE RANDOM mg/dL 270*         Results from last 7 days   Lab Units 24  1123 24  0725 24  0404 24  1602   POC GLUCOSE mg/dl 273* 224* 116 225* 238*          Results from last 7 days   Lab Units 02/08/24  1203   PROCALCITONIN ng/ml 0.13       Lines/Drains:  Invasive Devices       Peripheral Intravenous Line  Duration             Peripheral IV 02/08/24 Dorsal (posterior);Right Forearm 1 day                          Imaging: CT scan chest and sinuses, awaiting echo     Recent Cultures (last 7 days):   Results from last 7 days   Lab Units 02/07/24  0802   SPUTUM CULTURE  3+ Growth of   GRAM STAIN RESULT  1+ Epithelial cells per low power field*  2+ Polys*  3+ Gram positive cocci in pairs and chains*  3+ Gram positive rods*  1+ Gram negative rods*       Last 24 Hours Medication List:   Current Facility-Administered Medications   Medication Dose Route Frequency Provider Last Rate    acetaminophen  650 mg Oral Q6H PRN Daniela Hilton MD      aspirin  81 mg Oral Daily Daniela Hilton MD      atorvastatin  40 mg Oral Daily With Dinner Daniela Hilton MD      azithromycin  500 mg Oral Q24H aDniela Hilton MD      budesonide  0.5 mg Nebulization Q12H Tomas Solano MD      cefTRIAXone  1,000 mg Intravenous Q24H Daniela Hilton MD 1,000 mg (02/09/24 1058)    cholecalciferol  400 Units Oral QAM Daniela Hilton MD      fluticasone  2 spray Nasal Daily Daniela Hilton MD      guaiFENesin  1,200 mg Oral Q12H ENRRIQUE Daniela Hilton MD      insulin aspart  1,000 Units Subcutaneous Insulin Pump Daily PRN Daniela Hilton MD      levalbuterol  1.25 mg Nebulization TID Daniela Hilton MD      pantoprazole  20 mg Oral Early Morning Daniela Hilton MD      patient maintained insulin pump  1 each Subcutaneous Q8H Daniela Hilton MD      rivaroxaban  20 mg Oral Daily With Breakfast Daniela Hilton MD      sodium chloride  4 mL Nebulization TID Tomas Solano MD          Today, Patient Was Seen By: Kailey Alberts PA-C    **Please Note: This note may have been constructed using a voice recognition system.**

## 2024-02-09 NOTE — ASSESSMENT & PLAN NOTE
CT scan of the chest reviewed, bronchitis with right lower lung atelectasis versus possible developing pneumonia?  Please refer to principal plan  Appreciate pulm eval   Will need repeat chest imaging in 6-8 weeks

## 2024-02-09 NOTE — CONSULTS
Assessment/Plan:  Patient presented from pulmonary office with acute respiratory failure thought secondary to acute on chronic bronchitis.  Patient currently denies acute sinus symptoms.  Reports history of chronic, intermittent nasal congestion.  He has had no abnormal nasal discharge and he denies sinus pain/pressure.  Case/films reviewed with Dr. Gonzalez.  CT sinus reveals inflammatory changes with no evidence of acute disease.  Suspect component of polypoid sinus disease based on the CT findings, but no polyps visualized on exam/endo.  Endo somewhat limited due to patient's septal anatomy.  There is no evidence of sinobronchial syndrome.  Nasal culture performed yesterday and results are pending.  As stated, acute respiratory failure thought 2/2 acute on chronic bronchitis.  Antibiotics per primary.  Would recommend f/u with ENT as an outpatient.   Patient with bilateral cerumen impaction - follow up as outpatient for debridement.      Anterior nasal septum on the right is dry with some old blood noted.  Patient reports he had some bloody discharge when he blew his nose this AM.  He denies epistaxis.  No visible vessels on exam/endo.  Suggest use of saline spray and gel intermittently throughout the day.         Acute respiratory failure  Chronic sinusitis   CT findings inflammatory in nature with no evidence of acute disease or sinobronchial syndrome   Findings mild to moderate (most significant in ethmoid sinuses)  Deviated nasal septum  Bilateral cerumen impaction           Patient ID: Otoniel Martinez is a 63 y.o. male.    HPI  Patient presented to pulmonary office on February 6 for evaluation of chronic cough that worsened over the past week.  The patient was evaluated in the office and images of the chest were ordered.  Upon follow up at their office and was found to be hypoxic on a 6-minute walking test.  Patient was directed to our facility to obtain CT of the chest and sinus CT.  Other than continuous  cough the patient denies any fever or chills, no chest pain or pressure and no other systemic symptoms.  Denies sinus pain or pressure.  He denies any abnormal nasal discharge.  He reports chronic history of intermittent nasal congestion.  He treats this with allegra and flonase prn.  Not using these medications currently.  He does not feel congested currently.  [place] allergy panel completed recently revealed atopy to [place] grass, cockroach, dust mite, dog dander, and [place] grass.  He has never undergone skin endpoint allergy testing.  When I came into interview the patient he had removed the nasal cannula.  He is 89% on RA.   He was evaluated in our office in 2020 for dizziness and cerumen impaction.  The following portions of the patient's history were reviewed and updated as appropriate: allergies, current medications, past family history, past medical history, past social history, past surgical history and problem list.      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    Pancreatitis     three times    Sleep apnea     not using a C-PAP    Superficial thrombophlebitis        Past Surgical History:   Procedure Laterality Date    CHOLECYSTECTOMY      CIRCUMCISION      Elective    COLONOSCOPY      complete, polyps 2 years ago    CORONARY ANGIOPLASTY WITH STENT PLACEMENT      stent to RCA 2004    INGUINAL HERNIA REPAIR      ORIF RADIAL SHAFT FRACTURE Left     Open Treatment of Multiple Fractures of the Radial Shaft    ORIF ULNAR / RADIAL SHAFT FRACTURE Left     Open Treatment of Multiple Fractures of the Ulnar Shaft    TONSILLECTOMY AND ADENOIDECTOMY         Social History     Tobacco Use    Smoking status: Never    Smokeless tobacco: Never   Vaping Use    Vaping status: Never Used   Substance Use Topics    Alcohol use: Yes      Comment: social    Drug use: No       No current facility-administered medications on file prior to encounter.     Current Outpatient Medications on File Prior to Encounter   Medication Sig Dispense Refill    acyclovir (ZOVIRAX) 200 mg capsule Take 1 capsule (200 mg total) by mouth 5 (five) times a day for 5 days (Patient taking differently: Take 200 mg by mouth 5 (five) times a day Patient takes PRN) 25 capsule 0    amoxicillin (AMOXIL) 500 MG tablet TAKE 4 TABLETS BY MOUTH 1 HOUR BEFORE DENTIST (Patient not taking: Reported on 2/8/2024)      aspirin (ECOTRIN LOW STRENGTH) 81 mg EC tablet Take 81 mg by mouth daily      atorvastatin (LIPITOR) 40 mg tablet TAKE 1 TABLET BY MOUTH EVERY DAY 90 tablet 1    budesonide-formoterol (Symbicort) 80-4.5 MCG/ACT inhaler Inhale 2 puffs 2 (two) times a day Rinse mouth after use. (Patient taking differently: Inhale 2 puffs 2 (two) times a day 1 puff four times a day Rinse mouth after use.) 30.6 g 5    cholecalciferol (VITAMIN D3) 400 units tablet Take 1 tablet by mouth every morning      Continuous Blood Gluc Sensor (Dexcom G6 Sensor) MISC Change every 10 days. E10.65      Continuous Blood Gluc Transmit (Dexcom G6 Transmitter) MISC Change every 90 days. E10.65      fluticasone (FLONASE) 50 mcg/act nasal spray 2 sprays into each nostril daily      guaiFENesin (MUCINEX) 600 mg 12 hr tablet Take 2 tablets (1,200 mg total) by mouth every 12 (twelve) hours 120 tablet 5    Gvoke HypoPen 2-Pack 1 MG/0.2ML SOAJ INJECT 1 MG UNDER THE SKIN AS NEEDED (FOR SEVERE HYPOGLYCEMIA). E10.65      Insulin Aspart (NovoLOG) 100 units/mL injection  (Patient not taking: Reported on 2/8/2024)      Insulin Disposable Pump (Omnipod 5 G6 Intro, Gen 5,) KIT Inject 1 each under the skin every third day      Na Sulfate-K Sulfate-Mg Sulf 17.5-3.13-1.6 GM/177ML SOLN DRINK 177 MILLILITER BY MOUTH AS DIRECTED (Patient not taking: Reported on 1/22/2024)      pantoprazole (PROTONIX) 20 mg tablet TAKE 1 TABLET BY  "MOUTH EVERY DAY 90 tablet 1    rivaroxaban (Xarelto) 20 mg tablet Take 1 tablet (20 mg total) by mouth daily with breakfast 90 tablet 3    sildenafil (VIAGRA) 100 mg tablet Take 1 tablet (100 mg total) by mouth daily as needed for erectile dysfunction 10 tablet 1       No Known Allergies        Review of Systems   Constitutional:  Negative for diaphoresis and fatigue.   HENT:  Negative for congestion, ear discharge and sore throat.    Eyes:  Negative for discharge and visual disturbance.   Respiratory:  Positive for cough. Negative for shortness of breath.    Cardiovascular:  Negative for chest pain.   Gastrointestinal:  Negative for nausea.   Musculoskeletal: Negative.    Neurological:  Negative for dizziness and headaches.   Hematological:  Negative for adenopathy.   Psychiatric/Behavioral: Negative.           /63   Pulse 67   Temp 98 °F (36.7 °C)   Resp 20   Ht 5' 8.5\" (1.74 m)   Wt 71.2 kg (157 lb)   SpO2 92%   BMI 23.52 kg/m²       PHYSICAL  EXAMINATION    CONSTITUTION:    Appears appropriate for age.    No evidence of any acute distress.    Communicates normally.    Voice quality is clear.    Alert and oriented.    HEAD/FACE:    Atraumatic, normocephalic on inspection.    No scars present.    Salivary glands are normal in texture and size without any asymmetry.    Facial nerve function is symmetric and normal.    EARS:    External ears normal.    External canals are impacted with cerumen.    Tympanic membranes: not clearly visualized due to cerumen impaction   Post auricular area is normal    NOSE:    External nose without deformity.    Internal mucosa pink and moist.    Septum: large right anterior spur with dried blood noted.    Inferior nasal turbinates normal in color and size bilaterally    ORAL CAVITY:    Lips normal and healthy in appearance.    Dentition normal.    Gums healthy, pink and moist.    Tongue appears pink and moist with no lesions.    Floor of mouth pink, moist, and smooth.  "   Submandibular ducts patent with clear saliva.    Parotid ducts patent with clear saliva.    Oral mucosa pink and moist.    Hard palate normal in appearance without any lesions.    OROPHARYNX:    Soft palate pink and moist without any lesions.    Uvula midline without any lesions.    Tonsils absent    Posterior pharynx pink and moist without any lesions    NECK:    Supple and symmetric.    No masses noted.    Trachea midline.    No thyromegaly or nodules noted.    LYMPH:    No palpable adenopathy in left or right neck    SKIN:    No rashes. No lesions noted.      Nasal Endoscopy (non-operated)     Because only the anterior portion of the turbinates and anterior nasal septum could be visualized, it was elected to proceed with nasal endoscopy for visualization of the posterior nasal chamber, middle meatal area, sphenoid recess and nasopharynx.  Verbal consent was obtained from the patient / parent.  The nasal cavity was anesthetized and decongested with a solution of Phenylephrine:Lidocaine (1:1).  After waiting an appropriate amount of time for the medication to take effect the pledgets were removed and the procedure was completed with the flexible endoscope.  Findings are as follows:    Floor of Nose:  smooth mucosa without any mass or obstruction    Septum: tortuous with large right anterior spur; left posterior spur.  Right anterior septum dry with some old blood.    Inferior Meatus:  Normal mucosa without any pus, polyps    Inferior Turbinates:  Normal in size, pink, moist, no abnormalities    Middle Turbinates:  Normal in size, pink, moist, no abnormalities    Superior Turbinates:  Normal in size, pink, moist, no abnormalities    Middle Meatus with Ethmoid Recess:  Normal mucosa without any pus, polyps.  Thick, clear secretions noted b/l    Superior Meatus: Normal mucosa without any pus, polyps    Sphenoethmoid Recess:  No evidence of obstruction - normal mucosa without polypoid obstruction    Sphenoid Os:  Normal mucosa without mucopus or polyps    Nasopharynx:  No masses.  Smooth pink mucosa    Eustachian Tube Orifice:  Pink mucosa, patent, no obstruction.

## 2024-02-09 NOTE — ASSESSMENT & PLAN NOTE
Presented with acute respiratory failure currently on 2 L oxygen, baseline room air, sent in from Pulm office   CT chest: mild diffuse bronchial wall thickening compatible with bronchitis, right lower lobe atelectasis, possible superimposed mild pneumonia not excluded   CT sinuses: mild to moderate sinus disease with nasal polyps , ENT consulted   Procal negative x 1, trend   Suspected related to acute on chronic bronchitis  Continue day 2 azithromycin and ceftriaxone   Follow cultures   Further evaluation with echocardiogram pending  Follow pulm recs   Recommending overnight pulse ox and eventually will need ambulatory pulse ox/home O2 eval

## 2024-02-09 NOTE — CONSULTS
Consultation - Pulmonary Medicine   Otoniel Martinez 63 y.o. male MRN: [id number]  Unit/Bed#: E5 -01 Encounter: [id number]      Assessment/Plan:    Acute hypoxic respiratory failure  -95% on 1 L NC, patient does not wear any supplemental oxygen at home  -Continue to maintain saturation greater than 89%  -Pulmonary hygiene encouraged: Deep breathing with cough, OOB as tolerated, incentive spirometry and flutter valve q. Hourly  -Complete overnight pulse oximetry prior to discharge  -Ambulatory pulse ox prior to discharge  -Patient may benefit from pulmonary rehab outpatient   -PFTs ordered, to be completed outpatient      Acute on chronic bronchitis vs pneumonia  -CT chest 02/08/2024 shows mild diffuse bronchial wall thickening compatible with bronchitis, mild groundglass opacity in the dependent right lower lobe at least part due to atelectasis, superimposed mild pneumonia not excluded  -WBC 3.95-2.77  -Procalcitonin 0.13, continue to trend  -COVID RSV FLU  -Will continue azithromycin day #2/5  -Will continue ceftriaxone day #2/5  -Continue budesonide 0.5 Mg nebulized solution twice daily, Xopenex 1.5 Mg nebulized solution 3 times daily, hypertonic saline 3% nebulized solution 3 times daily  -Will start prednisone 40 mg for 5 days discharged with taper 10 mg every 3 days    Chronic sinusitis  -CT of the sinuses completed 2/8/2024  -IMPRESSION:   Mild-to-moderate sinus disease (worse in bilateral ethmoid sinuses) with bilateral nasal polyps, as detailed above.   Near osseous dehiscence of bony walls of carotid canals at posterior superior aspect of sphenoid sinus walls bilaterally.  -ENT consulted    History of Present Illness   Physician Requesting Consult: Daniela Hilton MD  Reason for Consult / Principal Problem: Abnormal CAT scan of lung  Hx and PE limited by: Nothing  Chief Complaint: Worsening cough  HPI: Otoniel Martinez is a 63 y.o.  male who presented to [facility] from [place]  pulmonary office as a direct admission by Dr. Rivas - Sayed for progressive cough and worsening hypoxia.    Patient was seen in the office Tuesday, February 6 for worsening productive cough.  Was treated with Mucinex, Symbicort and as needed Flonase.  PFTs were ordered and referral to ENT for possible sinusitis.  Patient returned on Thursday, February 8 with acute hypoxia,  Was encouraged to come to the hospital for further workup  From a pulmonary standpoint patient is a new patient to our office following with Dr. Kumar for chronic cough mucus production.  He has a 5-year history of episodes of productive cough and chest congestion, chronic nasal congestion with recurrent sinusitis, has not seen ENT since 2020.  He has history of seasonal allergies.  Is a lifelong non-smoker.  No occupational hazards.  He lives with 3 dogs.  Denies any exposure to dust mold or silica.  No active signs of acid reflux at this time  Inpatient consult to Pulmonology  Consult performed by: INDER Garcia  Consult ordered by: Daniela Hilton MD        Review of Systems   Constitutional:  Negative for activity change, appetite change, chills, fatigue and fever.   HENT:  Positive for congestion, postnasal drip and sinus pressure. Negative for sore throat.    Respiratory:  Positive for cough and shortness of breath. Negative for chest tightness.    Cardiovascular:  Negative for chest pain and leg swelling.   Gastrointestinal:  Negative for constipation, diarrhea, nausea and vomiting.   Musculoskeletal:  Negative for joint swelling and myalgias.   Skin:  Negative for color change and pallor.   Neurological:  Positive for dizziness and light-headedness. Negative for syncope and headaches.   Psychiatric/Behavioral:  Negative for agitation and confusion. The patient is not nervous/anxious.        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    Pancreatitis     three times    Sleep apnea     not using a C-PAP    Superficial thrombophlebitis      Past Surgical History:   Procedure Laterality Date    CHOLECYSTECTOMY      CIRCUMCISION      Elective    COLONOSCOPY      complete, polyps 2 years ago    CORONARY ANGIOPLASTY WITH STENT PLACEMENT      stent to RCA 2004    INGUINAL HERNIA REPAIR      ORIF RADIAL SHAFT FRACTURE Left     Open Treatment of Multiple Fractures of the Radial Shaft    ORIF ULNAR / RADIAL SHAFT FRACTURE Left     Open Treatment of Multiple Fractures of the Ulnar Shaft    TONSILLECTOMY AND ADENOIDECTOMY       Social History   Social History     Substance and Sexual Activity   Alcohol Use Yes    Comment: social     Social History     Substance and Sexual Activity   Drug Use No     Social History     Tobacco Use   Smoking Status Never   Smokeless Tobacco Never     E-Cigarette/Vaping    E-Cigarette Use Never User      E-Cigarette/Vaping Substances    Nicotine No     THC No     CBD No     Flavoring No     Other No     Unknown No      Occupational History: No occupational risk    Family History:   Family History   Problem Relation Age of Onset    Brain cancer Mother     Clotting disorder Mother     Heart disease Mother     Cancer Mother     Hyperlipidemia Mother     Coronary artery disease Paternal Uncle        Meds/Allergies   all current active meds have been reviewed and current meds:   Current Facility-Administered Medications   Medication Dose Route Frequency    acetaminophen (TYLENOL) tablet 650 mg  650 mg Oral Q6H PRN    aspirin (ECOTRIN LOW STRENGTH) EC tablet 81 mg  81 mg Oral Daily    atorvastatin (LIPITOR) tablet 40 mg  40 mg Oral Daily With Dinner    azithromycin (ZITHROMAX) tablet 500 mg  500 mg Oral Q24H    budesonide (PULMICORT) inhalation solution 0.5 mg  0.5 mg Nebulization Q12H    cefTRIAXone (ROCEPHIN)  IVPB (premix in dextrose) 1,000 mg 50 mL  1,000 mg Intravenous Q24H    cholecalciferol (VITAMIN D3) tablet 400 Units  400 Units Oral QAM    fluticasone (FLONASE) 50 mcg/act nasal spray 2 spray  2 spray Nasal Daily    guaiFENesin (MUCINEX) 12 hr tablet 1,200 mg  1,200 mg Oral Q12H ENRRIQUE    insulin aspart (NovoLOG) FOR PUMP REFILLS 1,000 Units  1,000 Units Subcutaneous Insulin Pump Daily PRN    levalbuterol (XOPENEX) inhalation solution 1.25 mg  1.25 mg Nebulization TID    magnesium sulfate 2 g/50 mL IVPB (premix) 2 g  2 g Intravenous Once    pantoprazole (PROTONIX) EC tablet 20 mg  20 mg Oral Early Morning    PATIENT MAINTAINED INSULIN PUMP 1 each  1 each Subcutaneous Q8H    rivaroxaban (XARELTO) tablet 20 mg  20 mg Oral Daily With Breakfast    sodium chloride 3 % inhalation solution 4 mL  4 mL Nebulization TID       No Known Allergies    Objective   Vitals: Blood pressure 117/72, pulse 61, temperature 98 °F (36.7 °C), resp. rate 20, SpO2 96%.1 L,There is no height or weight on file to calculate BMI.  No intake or output data in the 24 hours ending 02/09/24 0928  Invasive Devices       Peripheral Intravenous Line  Duration             Peripheral IV 02/08/24 Dorsal (posterior);Right Forearm <1 day                    Physical Exam  Constitutional:       General: He is not in acute distress.     Appearance: He is normal weight. He is not ill-appearing.   HENT:      Head: Normocephalic and atraumatic.      Right Ear: External ear normal.      Left Ear: External ear normal.      Nose: Nose normal.      Mouth/Throat:      Mouth: Mucous membranes are moist.      Pharynx: Oropharynx is clear.   Eyes:      Extraocular Movements: Extraocular movements intact.      Pupils: Pupils are equal, round, and reactive to light.   Cardiovascular:      Rate and Rhythm: Normal rate and regular rhythm.      Pulses: Normal pulses.      Heart sounds: Normal heart sounds. No murmur heard.  Pulmonary:      Effort: Pulmonary effort is normal.      " Breath sounds: No wheezing.      Comments: Coarse lung sounds heard in upper lobes  Diminished in the bases  Abdominal:      General: Bowel sounds are normal.      Palpations: Abdomen is soft.      Tenderness: There is no abdominal tenderness.   Musculoskeletal:         General: Normal range of motion.      Cervical back: Normal range of motion and neck supple.      Right lower leg: No edema.      Left lower leg: No edema.   Skin:     General: Skin is warm and dry.   Neurological:      Mental Status: He is alert and oriented to person, place, and time.      Motor: No weakness.      Coordination: Coordination normal.   Psychiatric:         Mood and Affect: Mood normal.         Behavior: Behavior normal.         Thought Content: Thought content normal.         Lab Results: I have personally reviewed pertinent lab results., ABG: No results found for: \"PHART\", \"WSP0UDP\", \"PO2ART\", \"KJI6ZCP\", \"B9BEDZEK\", \"BEART\", \"SOURCE\", BNP: No results found for: \"BNP\", CBC:   Lab Results   Component Value Date    WBC 2.77 (L) 02/09/2024    HGB 11.5 (L) 02/09/2024    HCT 35.2 (L) 02/09/2024    MCV 89 02/09/2024     (L) 02/09/2024    RBC 3.95 02/09/2024    MCH 29.1 02/09/2024    MCHC 32.7 02/09/2024    RDW 16.4 (H) 02/09/2024    MPV 9.7 02/09/2024    NRBC 0 02/09/2024   , CMP:   Lab Results   Component Value Date    SODIUM 138 02/09/2024    K 4.5 02/09/2024     02/09/2024    CO2 29 02/09/2024    BUN 17 02/09/2024    CREATININE 0.64 02/09/2024    CALCIUM 8.4 02/09/2024    EGFR 104 02/09/2024       Procalcitonin: 0.13      Imaging Studies: I have personally reviewed pertinent reports.     CT chest 02/08/2024 shows mild diffuse bronchial wall thickening compatible with bronchitis, mild groundglass opacity in the dependent right lower lobe, at least in part due to atelectasis.  Superimposed mild pneumonia not excluded    EKG, Pathology, and Other Studies: I have personally reviewed pertinent reports.         Pulmonary Results " "(PFTs, PSG): I have personally reviewed pertinent reports.     Pulmonary function testing ordered but not completed yet    Code Status: Level 1 - Full Code    Portions of the record may have been created with voice recognition software.  Occasional wrong word or \"sound a like\" substitutions may have occurred due to the inherent limitations of voice recognition software.  Read the chart carefully and recognize, using context, where substitutions have occurred.  "

## 2024-02-09 NOTE — PLAN OF CARE
Problem: PAIN - ADULT  Goal: Verbalizes/displays adequate comfort level or baseline comfort level  Description: Interventions:  - Encourage patient to monitor pain and request assistance  - Assess pain using appropriate pain scale  - Administer analgesics based on type and severity of pain and evaluate response  - Implement non-pharmacological measures as appropriate and evaluate response  - Consider cultural and social influences on pain and pain management  - Notify physician/advanced practitioner if interventions unsuccessful or patient reports new pain  Outcome: Progressing     Problem: INFECTION - ADULT  Goal: Absence or prevention of progression during hospitalization  Description: INTERVENTIONS:  - Assess and monitor for signs and symptoms of infection  - Monitor lab/diagnostic results  - Monitor all insertion sites, i.e. indwelling lines, tubes, and drains  - Monitor endotracheal if appropriate and nasal secretions for changes in amount and color  - [place] appropriate cooling/warming therapies per order  - Administer medications as ordered  - Instruct and encourage patient and family to use good hand hygiene technique  - Identify and instruct in appropriate isolation precautions for identified infection/condition  Outcome: Progressing     Problem: SAFETY ADULT  Goal: Patient will remain free of falls  Description: INTERVENTIONS:  - Educate patient/family on patient safety including physical limitations  - Instruct patient to call for assistance with activity   - Consult OT/PT to assist with strengthening/mobility   - Keep Call bell within reach  - Keep bed low and locked with side rails adjusted as appropriate  - Keep care items and personal belongings within reach  - Initiate and maintain comfort rounds  - Make Fall Risk Sign visible to staff  - Offer Toileting every 2 Hours, in advance of need  - Apply yellow socks and bracelet for high fall risk patients  - Consider moving patient to room near nurses  station  Outcome: Progressing  Goal: Maintain or return to baseline ADL function  Description: INTERVENTIONS:  -  Assess patient's ability to carry out ADLs; assess patient's baseline for ADL function and identify physical deficits which impact ability to perform ADLs (bathing, care of mouth/teeth, toileting, grooming, dressing, etc.)  - Assess/evaluate cause of self-care deficits   - Assess range of motion  - Assess patient's mobility; develop plan if impaired  - Assess patient's need for assistive devices and provide as appropriate  - Encourage maximum independence but intervene and supervise when necessary  - Involve family in performance of ADLs  - Assess for home care needs following discharge   - Consider OT consult to assist with ADL evaluation and planning for discharge  - Provide patient education as appropriate  Outcome: Progressing  Goal: Maintains/Returns to pre admission functional level  Description: INTERVENTIONS:  - Perform AM-PAC 6 Click Basic Mobility/ Daily Activity assessment daily.  - Set and communicate daily mobility goal to care team and patient/family/caregiver.   - Collaborate with rehabilitation services on mobility goals if consulted  - Perform Range of Motion 4 times a day.  - Reposition patient every 2 hours.  - Dangle patient 3 times a day  - Stand patient 3 times a day  - Ambulate patient 3 times a day  - Out of bed to chair 3 times a day   - Out of bed for meals 3 times a day  - Out of bed for toileting  - Record patient progress and toleration of activity level   Outcome: Progressing     Problem: DISCHARGE PLANNING  Goal: Discharge to home or other facility with appropriate resources  Description: INTERVENTIONS:  - Identify barriers to discharge w/patient and caregiver  - Arrange for needed discharge resources and transportation as appropriate  - Identify discharge learning needs (meds, wound care, etc.)  - Arrange for interpretive services to assist at discharge as needed  - Refer  to Case Management Department for coordinating discharge planning if the patient needs post-hospital services based on physician/advanced practitioner order or complex needs related to functional status, cognitive ability, or social support system  Outcome: Progressing     Problem: Knowledge Deficit  Goal: Patient/family/caregiver demonstrates understanding of disease process, treatment plan, medications, and discharge instructions  Description: Complete learning assessment and assess knowledge base.  Interventions:  - Provide teaching at level of understanding  - Provide teaching via preferred learning methods  Outcome: Progressing     Problem: RESPIRATORY - ADULT  Goal: Achieves optimal ventilation and oxygenation  Description: INTERVENTIONS:  - Assess for changes in respiratory status  - Assess for changes in mentation and behavior  - Position to facilitate oxygenation and minimize respiratory effort  - Oxygen administered by appropriate delivery if ordered  - Initiate smoking cessation education as indicated  - Encourage broncho-pulmonary hygiene including cough, deep breathe, Incentive Spirometry  - Assess the need for suctioning and aspirate as needed  - Assess and instruct to report SOB or any respiratory difficulty  - Respiratory Therapy support as indicated  Outcome: Progressing     Problem: METABOLIC, FLUID AND ELECTROLYTES - ADULT  Goal: Glucose maintained within target range  Description: INTERVENTIONS:  - Monitor Blood Glucose as ordered  - Assess for signs and symptoms of hyperglycemia and hypoglycemia  - Administer ordered medications to maintain glucose within target range  - Assess nutritional intake and initiate nutrition service referral as needed  Outcome: Progressing

## 2024-02-10 VITALS
HEART RATE: 55 BPM | TEMPERATURE: 98.1 F | SYSTOLIC BLOOD PRESSURE: 120 MMHG | RESPIRATION RATE: 16 BRPM | BODY MASS INDEX: 23.25 KG/M2 | WEIGHT: 157 LBS | HEIGHT: 69 IN | OXYGEN SATURATION: 95 % | DIASTOLIC BLOOD PRESSURE: 76 MMHG

## 2024-02-10 DIAGNOSIS — I25.10 ARTERIOSCLEROSIS OF CORONARY ARTERY: ICD-10-CM

## 2024-02-10 LAB
ALBUMIN SERPL BCP-MCNC: 3.2 G/DL (ref 3.5–5)
ALP SERPL-CCNC: 72 U/L (ref 34–104)
ALT SERPL W P-5'-P-CCNC: 44 U/L (ref 7–52)
ANION GAP SERPL CALCULATED.3IONS-SCNC: 5 MMOL/L
AST SERPL W P-5'-P-CCNC: 22 U/L (ref 13–39)
BACTERIA NOSE AEROBE CULT: NORMAL
BASOPHILS # BLD AUTO: 0.01 THOUSANDS/ÂΜL (ref 0–0.1)
BASOPHILS NFR BLD AUTO: 0 % (ref 0–1)
BILIRUB SERPL-MCNC: 0.43 MG/DL (ref 0.2–1)
BUN SERPL-MCNC: 15 MG/DL (ref 5–25)
CALCIUM ALBUM COR SERPL-MCNC: 9.4 MG/DL (ref 8.3–10.1)
CALCIUM SERPL-MCNC: 8.8 MG/DL (ref 8.4–10.2)
CHLORIDE SERPL-SCNC: 105 MMOL/L (ref 96–108)
CO2 SERPL-SCNC: 30 MMOL/L (ref 21–32)
CREAT SERPL-MCNC: 0.51 MG/DL (ref 0.6–1.3)
DME PARACHUTE DELIVERY DATE ACTUAL: NORMAL
DME PARACHUTE DELIVERY DATE EXPECTED: NORMAL
DME PARACHUTE DELIVERY DATE REQUESTED: NORMAL
DME PARACHUTE DELIVERY NOTE: NORMAL
DME PARACHUTE ITEM DESCRIPTION: NORMAL
DME PARACHUTE ORDER STATUS: NORMAL
DME PARACHUTE SUPPLIER NAME: NORMAL
DME PARACHUTE SUPPLIER PHONE: NORMAL
EOSINOPHIL # BLD AUTO: 0.01 THOUSAND/ÂΜL (ref 0–0.61)
EOSINOPHIL NFR BLD AUTO: 0 % (ref 0–6)
ERYTHROCYTE [DISTWIDTH] IN BLOOD BY AUTOMATED COUNT: 16.3 % (ref 11.6–15.1)
GFR SERPL CREATININE-BSD FRML MDRD: 114 ML/MIN/1.73SQ M
GLUCOSE SERPL-MCNC: 127 MG/DL (ref 65–140)
GLUCOSE SERPL-MCNC: 195 MG/DL (ref 65–140)
GLUCOSE SERPL-MCNC: 280 MG/DL (ref 65–140)
GLUCOSE SERPL-MCNC: 311 MG/DL (ref 65–140)
HCT VFR BLD AUTO: 36 % (ref 36.5–49.3)
HGB BLD-MCNC: 12 G/DL (ref 12–17)
IMM GRANULOCYTES # BLD AUTO: 0.01 THOUSAND/UL (ref 0–0.2)
IMM GRANULOCYTES NFR BLD AUTO: 0 % (ref 0–2)
LYMPHOCYTES # BLD AUTO: 0.5 THOUSANDS/ÂΜL (ref 0.6–4.47)
LYMPHOCYTES NFR BLD AUTO: 15 % (ref 14–44)
MCH RBC QN AUTO: 28.9 PG (ref 26.8–34.3)
MCHC RBC AUTO-ENTMCNC: 33.3 G/DL (ref 31.4–37.4)
MCV RBC AUTO: 87 FL (ref 82–98)
MONOCYTES # BLD AUTO: 0.36 THOUSAND/ÂΜL (ref 0.17–1.22)
MONOCYTES NFR BLD AUTO: 11 % (ref 4–12)
NEUTROPHILS # BLD AUTO: 2.45 THOUSANDS/ÂΜL (ref 1.85–7.62)
NEUTS SEG NFR BLD AUTO: 74 % (ref 43–75)
NRBC BLD AUTO-RTO: 0 /100 WBCS
PLATELET # BLD AUTO: 125 THOUSANDS/UL (ref 149–390)
PMV BLD AUTO: 9.4 FL (ref 8.9–12.7)
POTASSIUM SERPL-SCNC: 4.4 MMOL/L (ref 3.5–5.3)
PROCALCITONIN SERPL-MCNC: 0.1 NG/ML
PROT SERPL-MCNC: 5.7 G/DL (ref 6.4–8.4)
RBC # BLD AUTO: 4.15 MILLION/UL (ref 3.88–5.62)
SODIUM SERPL-SCNC: 140 MMOL/L (ref 135–147)
WBC # BLD AUTO: 3.34 THOUSAND/UL (ref 4.31–10.16)

## 2024-02-10 PROCEDURE — 94762 N-INVAS EAR/PLS OXIMTRY CONT: CPT

## 2024-02-10 PROCEDURE — 82948 REAGENT STRIP/BLOOD GLUCOSE: CPT

## 2024-02-10 PROCEDURE — 84145 PROCALCITONIN (PCT): CPT | Performed by: PHYSICIAN ASSISTANT

## 2024-02-10 PROCEDURE — 85025 COMPLETE CBC W/AUTO DIFF WBC: CPT | Performed by: PHYSICIAN ASSISTANT

## 2024-02-10 PROCEDURE — 94640 AIRWAY INHALATION TREATMENT: CPT

## 2024-02-10 PROCEDURE — 94761 N-INVAS EAR/PLS OXIMETRY MLT: CPT

## 2024-02-10 PROCEDURE — 99232 SBSQ HOSP IP/OBS MODERATE 35: CPT | Performed by: INTERNAL MEDICINE

## 2024-02-10 PROCEDURE — 94760 N-INVAS EAR/PLS OXIMETRY 1: CPT

## 2024-02-10 PROCEDURE — 80053 COMPREHEN METABOLIC PANEL: CPT | Performed by: PHYSICIAN ASSISTANT

## 2024-02-10 PROCEDURE — 99239 HOSP IP/OBS DSCHRG MGMT >30: CPT | Performed by: PHYSICIAN ASSISTANT

## 2024-02-10 RX ORDER — CEFDINIR 300 MG/1
300 CAPSULE ORAL EVERY 12 HOURS SCHEDULED
Qty: 4 CAPSULE | Refills: 0 | Status: SHIPPED | OUTPATIENT
Start: 2024-02-11 | End: 2024-02-13

## 2024-02-10 RX ORDER — SODIUM CHLORIDE FOR INHALATION 3 %
4 VIAL, NEBULIZER (ML) INHALATION 3 TIMES DAILY
Qty: 360 ML | Refills: 0 | Status: SHIPPED | OUTPATIENT
Start: 2024-02-10 | End: 2024-02-16

## 2024-02-10 RX ORDER — PREDNISONE 10 MG/1
TABLET ORAL DAILY
Qty: 30 TABLET | Refills: 0 | Status: SHIPPED | OUTPATIENT
Start: 2024-02-11 | End: 2024-02-23

## 2024-02-10 RX ORDER — AZITHROMYCIN 500 MG/1
500 TABLET, FILM COATED ORAL EVERY 24 HOURS
Qty: 2 TABLET | Refills: 0 | Status: SHIPPED | OUTPATIENT
Start: 2024-02-11 | End: 2024-02-13

## 2024-02-10 RX ORDER — PANTOPRAZOLE SODIUM 20 MG/1
40 TABLET, DELAYED RELEASE ORAL DAILY
Qty: 60 TABLET | Refills: 0 | Status: SHIPPED | OUTPATIENT
Start: 2024-02-10 | End: 2024-03-11

## 2024-02-10 RX ORDER — LEVALBUTEROL INHALATION SOLUTION 1.25 MG/3ML
1.25 SOLUTION RESPIRATORY (INHALATION) EVERY 8 HOURS PRN
Qty: 270 ML | Refills: 0 | Status: SHIPPED | OUTPATIENT
Start: 2024-02-10 | End: 2024-03-11

## 2024-02-10 RX ADMIN — GUAIFENESIN 1200 MG: 600 TABLET, EXTENDED RELEASE ORAL at 09:05

## 2024-02-10 RX ADMIN — CHOLECALCIFEROL TAB 10 MCG (400 UNIT) 400 UNITS: 10 TAB at 09:05

## 2024-02-10 RX ADMIN — ASPIRIN 81 MG: 81 TABLET, COATED ORAL at 09:05

## 2024-02-10 RX ADMIN — RIVAROXABAN 20 MG: 20 TABLET, FILM COATED ORAL at 09:05

## 2024-02-10 RX ADMIN — LEVALBUTEROL HYDROCHLORIDE 1.25 MG: 1.25 SOLUTION RESPIRATORY (INHALATION) at 07:54

## 2024-02-10 RX ADMIN — CEFTRIAXONE 1000 MG: 1 INJECTION, SOLUTION INTRAVENOUS at 11:57

## 2024-02-10 RX ADMIN — PANTOPRAZOLE SODIUM 20 MG: 20 TABLET, DELAYED RELEASE ORAL at 05:12

## 2024-02-10 RX ADMIN — AZITHROMYCIN DIHYDRATE 500 MG: 250 TABLET ORAL at 11:57

## 2024-02-10 RX ADMIN — BUDESONIDE INHALATION 0.5 MG: 0.5 SUSPENSION RESPIRATORY (INHALATION) at 07:54

## 2024-02-10 RX ADMIN — PREDNISONE 40 MG: 20 TABLET ORAL at 09:05

## 2024-02-10 NOTE — PLAN OF CARE
Problem: PAIN - ADULT  Goal: Verbalizes/displays adequate comfort level or baseline comfort level  Description: Interventions:  - Encourage patient to monitor pain and request assistance  - Assess pain using appropriate pain scale  - Administer analgesics based on type and severity of pain and evaluate response  - Implement non-pharmacological measures as appropriate and evaluate response  - Consider cultural and social influences on pain and pain management  - Notify physician/advanced practitioner if interventions unsuccessful or patient reports new pain  2/9/2024 2002 by Luba Stovall RN  Outcome: Progressing  2/9/2024 2002 by Luba Stovall RN  Outcome: Progressing     Problem: INFECTION - ADULT  Goal: Absence or prevention of progression during hospitalization  Description: INTERVENTIONS:  - Assess and monitor for signs and symptoms of infection  - Monitor lab/diagnostic results  - Monitor all insertion sites, i.e. indwelling lines, tubes, and drains  - Monitor endotracheal if appropriate and nasal secretions for changes in amount and color  - [place] appropriate cooling/warming therapies per order  - Administer medications as ordered  - Instruct and encourage patient and family to use good hand hygiene technique  - Identify and instruct in appropriate isolation precautions for identified infection/condition  2/9/2024 2002 by Luba Stovall RN  Outcome: Progressing  2/9/2024 2002 by Luba Stovall RN  Outcome: Progressing     Problem: SAFETY ADULT  Goal: Patient will remain free of falls  Description: INTERVENTIONS:  - Educate patient/family on patient safety including physical limitations  - Instruct patient to call for assistance with activity   - Consult OT/PT to assist with strengthening/mobility   - Keep Call bell within reach  - Keep bed low and locked with side rails adjusted as appropriate  - Keep care items and personal belongings within reach  - Initiate and maintain comfort rounds  - Make Fall  Risk Sign visible to staff  - - Apply yellow socks and bracelet for high fall risk patients  - Consider moving patient to room near nurses station  2/9/2024 2002 by Luba Stovall RN  Outcome: Progressing  2/9/2024 2002 by Luba Stovall RN  Outcome: Progressing  Goal: Maintain or return to baseline ADL function  Description: INTERVENTIONS:  -  Assess patient's ability to carry out ADLs; assess patient's baseline for ADL function and identify physical deficits which impact ability to perform ADLs (bathing, care of mouth/teeth, toileting, grooming, dressing, etc.)  - Assess/evaluate cause of self-care deficits   - Assess range of motion  - Assess patient's mobility; develop plan if impaired  - Assess patient's need for assistive devices and provide as appropriate  - Encourage maximum independence but intervene and supervise when necessary  - Involve family in performance of ADLs  - Assess for home care needs following discharge   - Consider OT consult to assist with ADL evaluation and planning for discharge  - Provide patient education as appropriate  2/9/2024 2002 by Luba Stovall RN  Outcome: Progressing  2/9/2024 2002 by Luba Stovall RN  Outcome: Progressing  Goal: Maintains/Returns to pre admission functional level  Description: INTERVENTIONS:  - Perform AM-PAC 6 Click Basic Mobility/ Daily Activity assessment daily.  - Set and communicate daily mobility goal to care team and patient/family/caregiver.   - Collaborate with rehabilitation services on mobility goals if consulted  - - Out of bed for toileting  - Record patient progress and toleration of activity level   2/9/2024 2002 by Luba Stovall RN  Outcome: Progressing  2/9/2024 2002 by Luba Stovall RN  Outcome: Progressing     Problem: DISCHARGE PLANNING  Goal: Discharge to home or other facility with appropriate resources  Description: INTERVENTIONS:  - Identify barriers to discharge w/patient and caregiver  - Arrange for needed discharge resources and  transportation as appropriate  - Identify discharge learning needs (meds, wound care, etc.)  - Arrange for interpretive services to assist at discharge as needed  - Refer to Case Management Department for coordinating discharge planning if the patient needs post-hospital services based on physician/advanced practitioner order or complex needs related to functional status, cognitive ability, or social support system  2/9/2024 2002 by Luba Stovall RN  Outcome: Progressing  2/9/2024 2002 by Luba Stovall RN  Outcome: Progressing     Problem: Knowledge Deficit  Goal: Patient/family/caregiver demonstrates understanding of disease process, treatment plan, medications, and discharge instructions  Description: Complete learning assessment and assess knowledge base.  Interventions:  - Provide teaching at level of understanding  - Provide teaching via preferred learning methods  2/9/2024 2002 by Luba Stovall RN  Outcome: Progressing  2/9/2024 2002 by Luba Stovall RN  Outcome: Progressing     Problem: RESPIRATORY - ADULT  Goal: Achieves optimal ventilation and oxygenation  Description: INTERVENTIONS:  - Assess for changes in respiratory status  - Assess for changes in mentation and behavior  - Position to facilitate oxygenation and minimize respiratory effort  - Oxygen administered by appropriate delivery if ordered  - Initiate smoking cessation education as indicated  - Encourage broncho-pulmonary hygiene including cough, deep breathe, Incentive Spirometry  - Assess the need for suctioning and aspirate as needed  - Assess and instruct to report SOB or any respiratory difficulty  - Respiratory Therapy support as indicated  2/9/2024 2002 by Luba Stovall RN  Outcome: Progressing  2/9/2024 2002 by Luba Stovall RN  Outcome: Progressing     Problem: METABOLIC, FLUID AND ELECTROLYTES - ADULT  Goal: Glucose maintained within target range  Description: INTERVENTIONS:  - Monitor Blood Glucose as ordered  - Assess for signs  and symptoms of hyperglycemia and hypoglycemia  - Administer ordered medications to maintain glucose within target range  - Assess nutritional intake and initiate nutrition service referral as needed  2/9/2024 2002 by Luba Stovall RN  Outcome: Progressing  2/9/2024 2002 by Luba Stovall, RN  Outcome: Progressing

## 2024-02-10 NOTE — ASSESSMENT & PLAN NOTE
Remote coronary artery disease status post RCA stenting in 2004   Follows with Dr. Hollins outpt   Continue statin and xarelto, aspirin   Echo: EF 55%, normal wall motion, normal diastolic function

## 2024-02-10 NOTE — DISCHARGE SUMMARY
[facility]  Discharge- Otoniel Martinez 1960, 63 y.o. male MRN: [id number]  Unit/Bed#: E5 -01 Encounter: [id number]  Primary Care Provider: Lev Baker MD   Date and time admitted to hospital: 2/8/2024 11:06 AM    * Acute respiratory failure with hypoxia (HCC)  Assessment & Plan  Presented with acute respiratory failure baseline room air, sent in from Pulm office   CT chest: mild diffuse bronchial wall thickening compatible with bronchitis, right lower lobe atelectasis, possible superimposed mild pneumonia not excluded   CT sinuses: mild to moderate sinus disease with nasal polyps , ENT consulted recommending outpatient follow-up  Procal negative x 2  Suspected related to acute on chronic bronchitis  Was seen in consultation with pulmonology recommending to continue 5-day antibiotic course with cefdinir/azithromycin at discharge  He did not qualify for home oxygen at rest or with exertion but did qualify for 4 L nasal cannula at night with overnight pulse ox  Recommend repeating outpatient sleep study  Given nebulizer and supplies with Xopenex and hypertonic saline nebs   Placed on prednisone 40 mg x 5 days and then continue to taper by 10 mg every 3 days  Follow cultures so far with mixed ryley-can continue to follow with pulmonology outpatient  Patient feels good and is ready for discharge, cleared from pulmonology for discharge today    Splenomegaly  Assessment & Plan  Incidentally noted on CT of the chest, no B symptoms   RUQ unremarkable   Will send referral for hematology evaluation outpt     Insulin pump status  Assessment & Plan  Diabetes mellitus on insulin pump, continue management with insulin pump  Has follow-up with endocrinology 2/12 on Monday    Chronic sinusitis  Assessment & Plan  CT sinuses: Mild-to-moderate sinus disease (worse in bilateral ethmoid sinuses) with bilateral nasal polyps, as detailed above.  Near osseous dehiscence of bony walls of carotid canals  at posterior superior aspect of sphenoid sinus walls bilaterally.    ENT consulted during hospitalization recommending outpatient follow-up    Abnormal CT scan of lung  Assessment & Plan  CT scan of the chest reviewed, bronchitis with right lower lung atelectasis versus possible developing pneumonia?  Please refer to principal plan  Appreciate pulm eval   Will need repeat chest imaging in 6-8 weeks     History of recurrent deep vein thrombosis (DVT)  Assessment & Plan  Continue Xarelto    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HCC)  Assessment & Plan  No specific treatment, outpatient follow-up with neurology    CAD (coronary artery disease)  Assessment & Plan  Remote coronary artery disease status post RCA stenting in 2004   Follows with Dr. Hollins outpt   Continue statin and xarelto, aspirin   Echo: EF 55%, normal wall motion, normal diastolic function      Medical Problems       Resolved Problems  Date Reviewed: 2/9/2024   None       Discharging Physician / Practitioner: Kailey Alberts PA-C  PCP: Lev Baker MD  Admission Date:   Admission Orders (From admission, onward)       Ordered        02/08/24 1114  Inpatient Admission  Once                          Discharge Date: 02/10/24    Consultations During Hospital Stay:  ENT and pulmonology    Procedures Performed:   Echo:  Interpretation Summary         Left Ventricle: Left ventricular cavity size is normal. Wall thickness is normal. The left ventricular ejection fraction is 55%. Systolic function is normal. Wall motion is normal. Diastolic function is normal.    Aorta: The aortic root is mildly dilated. The ascending aorta is normal in size. The aortic root is 4.10 cm.     Findings    Left Ventricle Left ventricular cavity size is normal. Wall thickness is normal. The left ventricular ejection fraction is 55%. Systolic function is normal. Wall motion is normal. Diastolic function is normal.   Right Ventricle Right ventricular cavity size is normal. Systolic function is normal.  Wall thickness is normal.   Left Atrium The atrium is normal in size.   Right Atrium The atrium is normal in size.   Aortic Valve The aortic valve is trileaflet. The leaflets are not thickened. The leaflets are not calcified. The leaflets exhibit normal mobility. There is trace regurgitation. The aortic valve has no significant stenosis.   Mitral Valve Mitral valve structure is normal.  There is trace regurgitation. There is no evidence of stenosis.   Tricuspid Valve Tricuspid valve structure is normal. There is no evidence of regurgitation. There is no evidence of stenosis.   Pulmonic Valve Pulmonic valve structure is normal. There is no evidence of regurgitation. There is no evidence of stenosis.   Ascending Aorta The aortic root is mildly dilated. The ascending aorta is normal in size. The aortic root is 4.10 cm.   IVC/SVC The right atrial pressure is estimated at 3.0 mmHg. The inferior vena cava is normal in size. Respirophasic changes were normal.   Pericardium There is no pericardial effusion. The pericardium is normal in appearance.         Significant Findings / Test Results:   Right upper quadrant ultrasound  FINDINGS:     PANCREAS: Visualized portions of the pancreas are within normal limits.     AORTA AND IVC: Visualized portions are normal for patient age.     LIVER:  Size: Within normal range. The liver measures 16.2 cm in the midclavicular line.  Contour: Surface contour is smooth.  Parenchyma: Echogenicity and echotexture are within normal limits.  No liver mass identified.  Limited imaging of the main portal vein shows it to be patent and hepatopetal.     BILIARY:  Patient has undergone cholecystectomy.  No intrahepatic biliary dilatation.  CBD measures 4.0 mm.  No choledocholithiasis.     KIDNEY:  Right kidney measures 10.0 x 4.1 x 4.7 cm. Volume 100.1 mL  Kidney within normal limits.     ASCITES:  None.     IMPRESSION:  Unremarkable study.  CT chest   FINDINGS:     LUNGS: Mild dependent groundglass  opacity in the right lower lobe. Mild atelectasis in the inferior lingula and dependent left lower lobe with minimally elevated left diaphragm.     AIRWAYS: No significant filling defects. Mild diffuse bronchial wall thickening.     PLEURA:  Unremarkable.     HEART/GREAT VESSELS: Normal heart size. Severe coronary artery calcification indicating atherosclerotic heart disease. Ascending aorta at upper limit of normal in size at 3.9 cm.     MEDIASTINUM AND ADITYA:  Unremarkable.     CHEST WALL AND LOWER NECK: Benign cyst in the posterior chest wall.     UPPER ABDOMEN: Mild splenomegaly. 2.1 cm partially rim calcified splenic artery aneurysm.     OSSEOUS STRUCTURES: Mild degenerative disease in the spine. Benign bone island right humeral head.     IMPRESSION:     Mild diffuse bronchial wall thickening compatible with bronchitis.     Mild groundglass opacity in the dependent right lower lobe, at least in part due to atelectasis. Superimposed mild pneumonia not excluded.     Mild splenomegaly.  Ct sinuses   FINDINGS:     FRONTAL SINUSES: Mild mucosal thickening of bilateral frontal sinuses with small amount of frothy secretions and occlusion of bilateral frontoethmoidal recesses.     ETHMOID AIR CELLS: Moderate mucosal thickening of the bilateral ethmoid air cells.     MAXILLARY SINUSES: Mild mucosal thickening of bilateral maxillary sinuses.     SPHENOID SINUSES: Mild mucosal thickening of bilateral sphenoid sinuses.     NASAL SEPTUM AND CAVITY: S-shaped nasal septal deviation with small left posterior osseous nasal spur. Small polypoid lesions in bilateral nasal cavity, likely nasal polyps. Mucosal opacification of left nasal caliber Savage.     ANTERIOR OSTEOMEATAL COMPLEX: Occluded bilaterally.     SPHENOETHMOIDAL RECESS: Occluded left. Patent right.     OSSEOUS STRUCTURES:  No bony erosion or destruction. Normal cribriform plate and planum sphenoidale.  Visualized mastoid temporal bones are clear. Near osseous  dehiscence of bony walls of carotid canals at posterior superior aspect of sphenoid sinus   walls bilaterally.     SOFT TISSUES: Normal.     OTHER: Unchanged chronic infarct in left posterior temporal lobe. Arterial calcifications of carotid siphons.        IMPRESSION:     Mild-to-moderate sinus disease (worse in bilateral ethmoid sinuses) with bilateral nasal polyps, as detailed above.     Near osseous dehiscence of bony walls of carotid canals at posterior superior aspect of sphenoid sinus walls bilaterally.    Incidental Findings:   Further workup of splenomegaly with hematology outpatient, repeat chest imaging in 6 to 8 weeks  Test Results Pending at Discharge (will require follow up):   Follow cultures outpatient with pulmonology     Outpatient Tests Requested:  Referral to hematology outpatient for splenomegaly  Continue outpatient follow-up with pulmonology and endocrinology  ENT    Complications: Abnormal CT chest, splenomegaly    Reason for Admission: Acute respiratory failure with hypoxia    Hospital Course:   Otoniel Martinez is a 63 y.o. male patient who originally presented to the hospital on 2/8/2024 due to shortness of breath and acute hypoxic respiratory failure.  Patient was sent in from the pulmonology office.  He was seen in consultation with pulmonology and placed on antibiotics and nebulizers.  Patient was able to be weaned off oxygen at rest and with exertion but did qualify for 4 L nasal cannula at night.  I do recommend repeating sleep study outpatient.  He will continue on cefdinir and azithromycin to complete 5-day antibiotic course.  He was given nebulizer and supplies to continue Xopenex/saline nebs.  He was started on prednisone 40 mg for 5 days and then will complete a taper decreasing by 10 mg every 3 days.  He will need close follow-up with pulmonology outpatient.  He was also evaluated by ENT for nasal and sputum with mixed ryley contaminants.  Patient should follow-up with ENT  "outpatient.  Patient was feeling well prior to discharge.  He will need close follow-up with pulmonology in the outpatient setting.  He is seen his endocrinologist 2/12/2024 and is maintained on insulin pump.  He is also noted to have mild spinal megaly on imaging and referral has been sent for further evaluation with hematology outpatient.  He will need repeat chest imaging in 6 to 8 weeks.    Please see above list of diagnoses and related plan for additional information.     Condition at Discharge: stable    Discharge Day Visit / Exam:   Subjective: Doing well today.  Discussed with his wife over the phone on speaker at the bedside regarding the plan.  He feels great and ready to go home.  Worked with respiratory therapy this morning.  No fevers chills chest pain.    Vitals: Blood Pressure: 120/76 (02/10/24 0705)  Pulse: 55 (02/10/24 0705)  Temperature: 98.1 °F (36.7 °C) (02/10/24 0008)  Temp Source: Oral (02/09/24 0010)  Respirations: 16 (02/10/24 0008)  Height: 5' 8.5\" (174 cm) (02/09/24 1025)  Weight - Scale: 71.2 kg (157 lb) (02/09/24 1025)  SpO2: 95 % (02/10/24 0823)  Exam:   Physical Exam  Vitals and nursing note reviewed.   Constitutional:       General: He is not in acute distress.     Appearance: He is not ill-appearing, toxic-appearing or diaphoretic.   Cardiovascular:      Rate and Rhythm: Normal rate and regular rhythm.   Pulmonary:      Effort: Pulmonary effort is normal.      Breath sounds: Normal breath sounds.   Abdominal:      General: Bowel sounds are normal. There is no distension.      Palpations: Abdomen is soft.      Tenderness: There is no abdominal tenderness. There is no guarding.   Musculoskeletal:      Right lower leg: No edema.      Left lower leg: No edema.   Skin:     General: Skin is warm.   Neurological:      Mental Status: He is alert. Mental status is at baseline.   Psychiatric:         Mood and Affect: Mood normal.          Discussion with Family: Updated  (wife) " via phone.    Discharge instructions/Information to patient and family:   See after visit summary for information provided to patient and family.      Provisions for Follow-Up Care:  See after visit summary for information related to follow-up care and any pertinent home health orders.      Mobility at time of Discharge:   Basic Mobility Inpatient Raw Score: 24  JH-HLM Goal: 8: Walk 250 feet or more  JH-HLM Achieved: 7: Walk 25 feet or more  HLM Goal achieved. Continue to encourage appropriate mobility.     Disposition:   Home    Planned Readmission: none     Discharge Statement:  I spent 40 minutes discharging the patient. This time was spent on the day of discharge. I had direct contact with the patient on the day of discharge. Greater than 50% of the total time was spent examining patient, answering all patient questions, arranging and discussing plan of care with patient as well as directly providing post-discharge instructions.  Additional time then spent on discharge activities.    Discharge Medications:  See after visit summary for reconciled discharge medications provided to patient and/or family.      **Please Note: This note may have been constructed using a voice recognition system**

## 2024-02-10 NOTE — ASSESSMENT & PLAN NOTE
Diabetes mellitus on insulin pump, continue management with insulin pump  Has follow-up with endocrinology 2/12 on Monday

## 2024-02-10 NOTE — ASSESSMENT & PLAN NOTE
CT sinuses: Mild-to-moderate sinus disease (worse in bilateral ethmoid sinuses) with bilateral nasal polyps, as detailed above.  Near osseous dehiscence of bony walls of carotid canals at posterior superior aspect of sphenoid sinus walls bilaterally.    ENT consulted during hospitalization recommending outpatient follow-up

## 2024-02-10 NOTE — PROGRESS NOTES
Pulmonary Progress Note  Otoniel Martinez 63 y.o. male MRN: [id number]  Unit/Bed#: E5 -01 Encounter: [id number]      Assessment/Recommendations:   Acute hypoxic respiratory failure-new oxygen requirement/1-2 LPM with exertion no oxygen needs at baseline - resolved  Acute on chronic bronchitis  Reactive airway disease/asthma:+ [place] allergy panel/+ dog dander/has exposure at home  Acute sinusitis  Deviated nasal septum/polyps  Myotonic dystrophy-mild    -Can complete 5 days of total antibiotics as an outpatient - Cefdinir/azithromycin   -Prednisone 40 mg for 5 days total and then taper by 10 mg every 3 days  -Does not qualify for home oxygen but did qualify for nocturnal oxygen, 4 L during sleep  -Discharged with nebulizer/supplies with albuterol nebs and hypertonic saline nebs as needed  -Outpatient follow-up with ENT and pulmonary      Subjective:  Patient feels much better from a respiratory standpoint.  He has some mild epistaxis this morning possibly from oxygen use.  He is on room air with SpO2 93% and able to tolerate p.o. intake.  He is anticipating discharge home today.    Objective:  Vitals: Vitals personally reviewed  Vitals:    02/10/24 0008 02/10/24 0141 02/10/24 0705 02/10/24 0823   BP: 115/66  120/76    BP Location:   Right arm    Pulse: 70  55    Resp: 16      Temp: 98.1 °F (36.7 °C)      TempSrc:       SpO2: 90% 94% 94% 95%   Weight:       Height:          Body mass index is 23.52 kg/m².  No intake or output data in the 24 hours ending 02/10/24 1246  Invasive Devices       Peripheral Intravenous Line  Duration             Peripheral IV 02/08/24 Dorsal (posterior);Right Forearm 2 days                    Physical Exam  General: Well-appearing male, sitting in bed conversant no distress  HEET: head is normocephalic, atraumatic.  EOMI.  No scleral icterus.    Neck: Supple, no JVD, not using accessory muscles  CV:  Regular rhythm  Lungs: Expiratory wheezing  Abdomen: Soft,  "non-distended  Extremities: No cyanosis.  No edema  Neuro: No focal deficits  Skin: Warm, dry  Lines/tubes: Peripheral IV  Oxygenation: Room air      Labs: I have personally reviewed pertinent lab results.  Laboratory and Diagnostics  Results from last 7 days   Lab Units 02/10/24  0510 02/09/24  0506 02/08/24  1203   WBC Thousand/uL 3.34* 2.77* 3.95*   HEMOGLOBIN g/dL 12.0 11.5* 12.5   HEMATOCRIT % 36.0* 35.2* 36.8   PLATELETS Thousands/uL 125* 112* 109*   NEUTROS PCT % 74 61 73   MONOS PCT % 11 14* 11   EOS PCT % 0 6 4     Results from last 7 days   Lab Units 02/10/24  0510 02/09/24  0506 02/08/24  1203   SODIUM mmol/L 140 138 135   POTASSIUM mmol/L 4.4 4.5 4.2   CHLORIDE mmol/L 105 104 102   CO2 mmol/L 30 29 28   ANION GAP mmol/L 5 5 5   BUN mg/dL 15 17 19   CREATININE mg/dL 0.51* 0.64 0.62   CALCIUM mg/dL 8.8 8.4 8.9   GLUCOSE RANDOM mg/dL 195* 270* 336*   ALT U/L 44  --   --    AST U/L 22  --   --    ALK PHOS U/L 72  --   --    ALBUMIN g/dL 3.2*  --   --    TOTAL BILIRUBIN mg/dL 0.43  --   --      Results from last 7 days   Lab Units 02/09/24  0506   MAGNESIUM mg/dL 1.7*   PHOSPHORUS mg/dL 4.3*                                   Results from last 7 days   Lab Units 02/10/24  0510 02/08/24  1203   PROCALCITONIN ng/ml 0.10 0.13     Results from last 7 days   Lab Units 02/06/24  0945   IGE kU/l 87.7         Imaging and other studies: I have personally reviewed pertinent films in PACS  2/8 CT chest  Mild groundglass opacities right lower lobe.  Atelectasis in the dependent regions.  Bronchial wall thickening due to bronchitis  Mild splenomegaly    Code Status: Level 1 - Full Code      Amos Le MD  Pulmonary, Critical Care and Sleep Medicine  [facility]     Portions of the record may have been created with voice recognition software. Occasional wrong word or \"sound a like\" substitutions may have occurred due to the inherent limitations of voice recognition software. Please " read the chart carefully and recognize, using context, where substitutions have occurred.

## 2024-02-10 NOTE — PLAN OF CARE
Problem: PAIN - ADULT  Goal: Verbalizes/displays adequate comfort level or baseline comfort level  Description: Interventions:  - Encourage patient to monitor pain and request assistance  - Assess pain using appropriate pain scale  - Administer analgesics based on type and severity of pain and evaluate response  - Implement non-pharmacological measures as appropriate and evaluate response  - Consider cultural and social influences on pain and pain management  - Notify physician/advanced practitioner if interventions unsuccessful or patient reports new pain  Outcome: Progressing     Problem: INFECTION - ADULT  Goal: Absence or prevention of progression during hospitalization  Description: INTERVENTIONS:  - Assess and monitor for signs and symptoms of infection  - Monitor lab/diagnostic results  - Monitor all insertion sites, i.e. indwelling lines, tubes, and drains  - Monitor endotracheal if appropriate and nasal secretions for changes in amount and color  - [place] appropriate cooling/warming therapies per order  - Administer medications as ordered  - Instruct and encourage patient and family to use good hand hygiene technique  - Identify and instruct in appropriate isolation precautions for identified infection/condition  Outcome: Progressing     Problem: SAFETY ADULT  Goal: Patient will remain free of falls  Description: INTERVENTIONS:  - Educate patient/family on patient safety including physical limitations  - Instruct patient to call for assistance with activity   - Consult OT/PT to assist with strengthening/mobility   - Keep Call bell within reach  - Keep bed low and locked with side rails adjusted as appropriate  - Keep care items and personal belongings within reach  - Initiate and maintain comfort rounds  - Make Fall Risk Sign visible to staff  - Apply yellow socks and bracelet for high fall risk patients  - Consider moving patient to room near nurses station  Outcome: Progressing  Goal: Maintain or  return to baseline ADL function  Description: INTERVENTIONS:  -  Assess patient's ability to carry out ADLs; assess patient's baseline for ADL function and identify physical deficits which impact ability to perform ADLs (bathing, care of mouth/teeth, toileting, grooming, dressing, etc.)  - Assess/evaluate cause of self-care deficits   - Assess range of motion  - Assess patient's mobility; develop plan if impaired  - Assess patient's need for assistive devices and provide as appropriate  - Encourage maximum independence but intervene and supervise when necessary  - Involve family in performance of ADLs  - Assess for home care needs following discharge   - Consider OT consult to assist with ADL evaluation and planning for discharge  - Provide patient education as appropriate  Outcome: Progressing  Goal: Maintains/Returns to pre admission functional level  Description: INTERVENTIONS:  - Perform AM-PAC 6 Click Basic Mobility/ Daily Activity assessment daily.  - Set and communicate daily mobility goal to care team and patient/family/caregiver.   - Collaborate with rehabilitation services on mobility goals if consulted    - Out of bed for toileting  - Record patient progress and toleration of activity level   Outcome: Progressing

## 2024-02-10 NOTE — ASSESSMENT & PLAN NOTE
Incidentally noted on CT of the chest, no B symptoms   RUQ unremarkable   Will send referral for hematology evaluation outpt

## 2024-02-10 NOTE — RESPIRATORY THERAPY NOTE
Home Oxygen Qualifying Test     Patient name: Otoniel Martinez        : 1960   Date of Test:  February 10, 2024  Diagnosis:    Home Oxygen Test:    **Medicare Guidelines require item(s) 1-5 on all ambulatory patients or 1 and 2 on non-ambulatory patients.    1. Baseline SPO2 on Room Air at rest 96 %   If <= 88% on Room Air add O2 via NC to obtain SpO2 >=88%. If LPM needed, document LPM needed to reach =>88%    SPO2 during exertion on Room Air 91  %  During exertion monitor SPO2. If SPO2 increases >=89%, do not add supplemental oxygen    SPO2 on Oxygen at Rest.  NA    SPO2 during exertion on Oxygen. NA    Test performed during exertion activity.  Ambulation w/o assist for approx. 200 feet X 4-6 min.     []  Supplemental Home Oxygen is indicated.    [x]  Client does not qualify for home oxygen.    Respiratory Additional Notes-  Pt. denies any respiratory difficulties at rest or ambulation.    Jc Scott, RT

## 2024-02-10 NOTE — ASSESSMENT & PLAN NOTE
Presented with acute respiratory failure baseline room air, sent in from Pulm office   CT chest: mild diffuse bronchial wall thickening compatible with bronchitis, right lower lobe atelectasis, possible superimposed mild pneumonia not excluded   CT sinuses: mild to moderate sinus disease with nasal polyps , ENT consulted recommending outpatient follow-up  Procal negative x 2  Suspected related to acute on chronic bronchitis  Was seen in consultation with pulmonology recommending to continue 5-day antibiotic course with cefdinir/azithromycin at discharge  He did not qualify for home oxygen at rest or with exertion but did qualify for 4 L nasal cannula at night with overnight pulse ox  Recommend repeating outpatient sleep study  Given nebulizer and supplies with Xopenex and hypertonic saline nebs   Placed on prednisone 40 mg x 5 days and then continue to taper by 10 mg every 3 days  Follow cultures so far with mixed ryley-can continue to follow with pulmonology outpatient  Patient feels good and is ready for discharge, cleared from pulmonology for discharge today

## 2024-02-10 NOTE — RESPIRATORY THERAPY NOTE
Started pt's overnight pulse ox study on 2L NC. Pt started to desat, increased O2 to 3L. RN aware and educated about overnight pulse ox.

## 2024-02-11 ENCOUNTER — NURSE TRIAGE (OUTPATIENT)
Dept: OTHER | Facility: OTHER | Age: 64
End: 2024-02-11

## 2024-02-11 DIAGNOSIS — J42 CHRONIC BRONCHITIS, UNSPECIFIED CHRONIC BRONCHITIS TYPE (HCC): Primary | ICD-10-CM

## 2024-02-11 RX ORDER — SODIUM CHLORIDE FOR INHALATION 0.9 %
3 VIAL, NEBULIZER (ML) INHALATION AS NEEDED
Qty: 90 ML | Refills: 3 | Status: SHIPPED | OUTPATIENT
Start: 2024-02-11

## 2024-02-11 NOTE — TELEPHONE ENCOUNTER
Pt was prescribed sodium chloride 3%, 4ml in neb 3 x daily.  Pharmacy does not have this and wife called several other pharmacies and it does not appear to be available anywhere. Pharmacy informed wife that it is not likely to be restocked.  Advice offered per protocol and per oncall provider who called a substitution in to [facility] in [place].

## 2024-02-11 NOTE — TELEPHONE ENCOUNTER
"Reason for Disposition  • [1] Caller has URGENT medicine question about med that PCP or specialist prescribed AND [2] triager unable to answer question    Answer Assessment - Initial Assessment Questions  1. NAME of MEDICATION: \"What medicine are you calling about?\"      Sodium chloride 3%  2. QUESTION: \"What is your question?\" (e.g., medication refill, side effect)      No pharmacies have this and they said they are not likely to ever get irt again.  They have 9%   3. PRESCRIBING HCP: \"Who prescribed it?\" Reason: if prescribed by specialist, call should be referred to that group.      Pulmonology  4. SYMPTOMS: \"Do you have any symptoms?\"      Was just discharged from hospital  5. SEVERITY: If symptoms are present, ask \"Are they mild, moderate or severe?\"      moderate    Protocols used: Medication Question Call-ADULT-AH    "

## 2024-02-11 NOTE — TELEPHONE ENCOUNTER
"Regarding: medication fill issue  ----- Message from Rekha Miller sent at 2/11/2024 10:51 AM EST -----  \"My  just got discharged from the hospital and they ordered him sodium chloride 3%, but the pharmacy doesn't have it and they can't get in touch with the doctor.\"    "

## 2024-02-12 LAB — HBA1C MFR BLD HPLC: 8.2 %

## 2024-02-12 NOTE — UTILIZATION REVIEW
NOTIFICATION OF ADMISSION DISCHARGE   This is a Notification of Discharge from [facility]. Please be advised that this patient has been discharge from our facility. Below you will find the admission and discharge date and time including the patient’s disposition.   UTILIZATION REVIEW CONTACT:  Zandra Agudelo  Utilization   Network Utilization Review Department  Phone: [phone] x carefully listen to the prompts. All voicemails are confidential.  Email: [email or web address]     ADMISSION INFORMATION  PRESENTATION DATE: 2/8/2024 11:06 AM  OBERVATION ADMISSION DATE:   INPATIENT ADMISSION DATE: 2/8/24 11:06 AM   DISCHARGE DATE: 2/10/2024  1:51 PM   DISPOSITION:Home/Self Care    Network Utilization Review Department  ATTENTION: Please call with any questions or concerns to [phone] and carefully listen to the prompts so that you are directed to the right person. All voicemails are confidential.   For Discharge needs, contact Care Management DC Support Team at [phone] opt. 2  Send all requests for admission clinical reviews, approved or denied determinations and any other requests to dedicated fax number below belonging to the campus where the patient is receiving treatment. List of dedicated fax numbers for the Facilities:  FACILITY NAME UR FAX NUMBER   ADMISSION DENIALS (Administrative/Medical Necessity) [phone]   DISCHARGE SUPPORT TEAM ([facility]) [phone]   PARENT CHILD HEALTH (Maternity/NICU/Pediatrics) [phone]   [facility] [phone]   [facility] [phone]   [facility] [phone]   [facility] [phone]   [facility] [phone]   [facility] [phone]   [facility] [phone]   [facility] [phone]   [facility]  [facility] [phone]   [facility] [phone]   [facility] [phone]   [facility] [phone]

## 2024-02-13 RX ORDER — ATORVASTATIN CALCIUM 40 MG/1
40 TABLET, FILM COATED ORAL DAILY
Qty: 90 TABLET | Refills: 1 | Status: SHIPPED | OUTPATIENT
Start: 2024-02-13

## 2024-02-14 ENCOUNTER — TELEPHONE (OUTPATIENT)
Dept: HEMATOLOGY ONCOLOGY | Facility: CLINIC | Age: 64
End: 2024-02-14

## 2024-02-14 NOTE — TELEPHONE ENCOUNTER
I called Otoniel in response to a referral that was received for patient to establish care with Hematology.     Outreach was made to schedule a consultation.    I left a voicemail explaining the reason for my call and advised patient to call [facility] at [phone].  Another attempt will be made to contact patient.

## 2024-02-16 ENCOUNTER — OFFICE VISIT (OUTPATIENT)
Dept: PHYSICAL THERAPY | Facility: REHABILITATION | Age: 64
End: 2024-02-16
Payer: COMMERCIAL

## 2024-02-16 ENCOUNTER — OFFICE VISIT (OUTPATIENT)
Dept: PULMONOLOGY | Facility: CLINIC | Age: 64
End: 2024-02-16
Payer: COMMERCIAL

## 2024-02-16 VITALS
HEART RATE: 60 BPM | OXYGEN SATURATION: 97 % | TEMPERATURE: 97.9 F | HEIGHT: 69 IN | BODY MASS INDEX: 23.7 KG/M2 | SYSTOLIC BLOOD PRESSURE: 116 MMHG | DIASTOLIC BLOOD PRESSURE: 80 MMHG | WEIGHT: 160 LBS

## 2024-02-16 DIAGNOSIS — M25.522 CHRONIC PAIN OF LEFT ELBOW: ICD-10-CM

## 2024-02-16 DIAGNOSIS — J32.9 CHRONIC SINUSITIS, UNSPECIFIED LOCATION: ICD-10-CM

## 2024-02-16 DIAGNOSIS — G71.11 MYOTONIC DYSTROPHY (HCC): ICD-10-CM

## 2024-02-16 DIAGNOSIS — M25.512 LEFT SHOULDER PAIN, UNSPECIFIED CHRONICITY: Primary | ICD-10-CM

## 2024-02-16 DIAGNOSIS — J42 CHRONIC BRONCHITIS, UNSPECIFIED CHRONIC BRONCHITIS TYPE (HCC): Primary | ICD-10-CM

## 2024-02-16 DIAGNOSIS — G89.29 CHRONIC PAIN OF LEFT ELBOW: ICD-10-CM

## 2024-02-16 DIAGNOSIS — G47.33 OSA (OBSTRUCTIVE SLEEP APNEA): ICD-10-CM

## 2024-02-16 PROCEDURE — 99214 OFFICE O/P EST MOD 30 MIN: CPT | Performed by: INTERNAL MEDICINE

## 2024-02-16 PROCEDURE — 97112 NEUROMUSCULAR REEDUCATION: CPT | Performed by: PHYSICAL THERAPIST

## 2024-02-16 PROCEDURE — 97110 THERAPEUTIC EXERCISES: CPT | Performed by: PHYSICAL THERAPIST

## 2024-02-16 NOTE — PROGRESS NOTES
"Pulmonary Follow Up Note   Otoniel Martinez 63 y.o. male MRN: [id number]  2/15/2024    Assessment:  HFU/acute on chronic bronchitis  Suspect underlying reversible airway disease/asthma given significant allergies, 2.3 RAST to dog dander was exposed to dog/sleeps next to him  Possible other etiologies for chronic bronchitis, immunoglobulin deficiency, given the recurrent sinusitis/or carrier to CF and contribution from neuromuscular dysfunction/myotonic dystrophy  Treated as inpatient as below: Mucus clearing, antibiotics/steroids  Negative cultures sputum/nasal    Plan:  PFT with MVV/MEP/MIP given the history of myotonic dystrophy  Check immunoglobulin levels/CF screen  Continue current aggressive mucus clearing therapy: Xopenex/normal saline nebs 3 times daily  Continue current measures to cleaning environment/sheets and vacuuming given his allergies  No exertional hypoxemia on home oxygen evaluation before discharge    Hx of KULDIP  Reported diagnosis more than 10 years ago  States that it was mild, could not tolerate the machine at that time because of loud noise  Currently still with sleep talking, witnessed apneas by his wife/snoring, excessive daytime sleepiness/score more than 10  Still requires 2-3 LPM oxygen   Ordered in lab sleep study    Return in about 6 weeks (around 3/29/2024).    History of Present Illness     Follow up for: HFU/acute on chronic bronchitis    Background:  As per initial consult note     \"63 y.o. male with a h/o polyneuropathy, chronic gastritis, myotonic dystrophy, CAD/PCI 2004, CVA, osteoarthritis, DVT/Eliquis, chronic sinusitis, IDDM/insulin pump     Presented today for initial evaluation by pulmonary for chronic cough/mucus production     First noted approximately 5 years ago, episodes of minimally productive cough/chest congestion, especially around the allergy season/cold.  Impact his ability to perform duties at [employer] such as singing.  Associated with production of yellow " "mucus/oftentimes sticky.  Had partial relief from his Mucinex.  Also associated with chronic nasal congestion, known to have recurrent sinusitis from before did not follow with ENT since 2020.     In 2022, hospitalized for pneumonia/chest congestion to [facility], chest CT at that time showed bibasilar opacities/lingular opacities.     Hx myotonic dystrophy: Diagnosed 1988, noted difficulty walking/muscle spasms worse in the cold weather, underwent muscle biopsies.     Currently, episodes are manageable, tries to cough so hard to produce the mucus after that feels relief.  Never been on inhalers before, no prior formal diagnosis of asthma or COPD, lifelong non-smoker.        Social/exposure history  Lived in [place] all his life  Lifelong non-smoker, nonalcoholic  Worked for the [employer] office for decades, retired in 2017  Lives in an old house built in 1993, had water leak several months ago but no exposure to mold  Pets: 3 dogs     Family history: Dad had breathing issues/was a smoker\"     2/6 /2024 visit-Mucinex, Symbicort 80.  PFT/[facility] allergy panel which was positive.  Sputum cultures 6/1 NGTD.  Flonase      2/8/2024 visit-worse cough/sinusitis symptoms and URI, new hypoxemia with exertion.  Direct admit.  Antibiotic azithromycin/ceftriaxone, mucus clearing therapy.  Normal procalcitonin COVID/flu/RSV.  Sputum cultures normal ryley.  CT chest showed dependent GGO's at the RLL diffuse bronchial wall thickening/bronchitis.  CT sinus with mild to moderate sinusitis worse in the ethmoid sinuses.  Evaluated by ENT no pus on endoscopy no gross polyps did not feel acute sinusitis.  Discharged on steroids/antibiotics    Interval History  Since discharge, feeling significantly better with the current therapy.  No more chest congestion, much easier to breathe.  No significant mucus production.    Wife states that she started to clean vigorously the sheets, vacuuming and washing the dog frequently.  Completed " antibiotic/cefdinir 3 days ago.  Still on the steroid taper 20 mg daily.  Other therapies: Xopenex nebs/0.9% sodium chloride nebs, and Symbicort.  Also noted improvement of his voice/nasal congestion, now able to sing without interruption/for cough.      Review of Systems  As per hpi, all other systems reviewed and were negative    Studies:  Imaging and other studies: I have personally reviewed pertinent films in PACS    CT PE/20 3/2022-bibasilar/lingular airspace opacity suspect infectious/inflammatory/mucous plug concern for aspiration.     Pulmonary function testin-minute walk test 2024-baseline SpO2 93% on room air, heart rate 76.  Able to walk 266 m in 6 minutes, lowest SpO2 at 88% after 2 minutes, required 2 LPM to end exercise with SpO2 95%     EKG, Pathology, and Other Studies: I have personally reviewed pertinent reports.       [facility] Allergy Panel, Adult 2024      Component  Ref Range & Units 24  9:45 AM   A.ALTERNATA  0.00 - 0.09 kUA/I <0.10   A.FUMIGATUS  0.00 - 0.09 kUA/I <0.10   [place] Grass  0.00 - 0.09 kUA/I 0.36 High    [place]  0.00 - 0.09 kUA/I <0.10   Cat Epithellium-Dander  0.00 - 0.09 kUA/I <0.10   C.HERBARUM  0.00 - 0.09 kUA/I <0.10   Cockroach  0.00 - 0.09 kUA/I 0.41 High    Common Silver Birch  0.00 - 0.09 kUA/I <0.10   [place]  0.00 - 0.09 kUA/I <0.10   D. farinae  0.00 - 0.09 kUA/I <0.10   D. pteronyssinus  0.00 - 0.09 kUA/I 0.12 High    Dog Dander  0.00 - 0.09 kUA/I 2.30 High    Elm IgE  0.00 - 0.09 kUA/I <0.10   Mountain [place]grass Tree  0.00 - 0.09 kUA/I <0.10   Mugwort  0.00 - 0.09 kUA/I <0.10   [place] Tree  0.00 - 0.09 kUA/I <0.10   Oak  0.00 - 0.09 kUA/I <0.10   P.CHRYSOGENUM  0.00 - 0.09 kUA/I <0.10   Rough Pigweed  IgE  0.00 - 0.09 kUA/I <0.10   Common Ragweed  0.00 - 0.09 kUA/I <0.10   Sheep Sorrel IgE  0.00 - 0.09 kUA/I <0.10   Elk Garden Tree  0.00 - 0.09 kUA/I <0.10   Osmin Grass  0.00 - 0.09 kUA/I 1.89 High    [place] Tree  0.00 - 0.09 kUA/I <0.10  "  White [place]ier Tree  0.00 - 0.09 kUA/I <0.10   IgE  0 - 113 kU/l 87.7   MOUSE URINE  0.00 - 0.09 kUA/I <0.10           Past medical, surgical, social and family histories reviewed.      Medications/Allergies: Reviewed      Vitals: There were no vitals taken for this visit. There is no height or weight on file to calculate BMI.        Physical Exam  There is no height or weight on file to calculate BMI.   Gen: not in acute distress,   Neck/Eyes: supple, no adenopathy, PERRL  Ear: normal appearance, no significant hearing impairment  Nose:  normal nasal mucosa, no drainage  Mouth:  unremarkable/normal appearance of lips, teeth and gums  Oropharynx: mucosa is moist, no focal lesions or erythema  Salivary glands: soft nontender  Chest: normal respiratory efforts, clear breath sounds bilaterally  CV: RRR, no murmurs appreciated, no edema  Abdomen: soft, non tender  Extremities:  No observed deformity   Skin: unremarkable  Neuro: AAO X3, no focal motor deficit        Labs:  Lab Results   Component Value Date    WBC 3.34 (L) 02/10/2024    HGB 12.0 02/10/2024    HCT 36.0 (L) 02/10/2024    MCV 87 02/10/2024     (L) 02/10/2024     Lab Results   Component Value Date    CALCIUM 8.8 02/10/2024     01/09/2018    K 4.4 02/10/2024    CO2 30 02/10/2024     02/10/2024    BUN 15 02/10/2024    CREATININE 0.51 (L) 02/10/2024     Lab Results   Component Value Date    IGE 87.7 02/06/2024     Lab Results   Component Value Date    ALT 44 02/10/2024    AST 22 02/10/2024    ALKPHOS 72 02/10/2024    BILITOT 1.4 (H) 01/09/2018           Portions of the record may have been created with voice recognition software.  Occasional wrong word or \"sound a like\" substitutions may have occurred due to the inherent limitations of voice recognition software.  Read the chart carefully and recognize, using context, where substitutions have occurred    Tomas Jack M.D.  [facility]  "

## 2024-02-16 NOTE — PROGRESS NOTES
"Daily Note     Today's date: 2024  Patient name: Otoniel Martinez  : 1960  MRN: [id number]  Referring provider: Chriss Tyson MD  Dx:   Encounter Diagnosis     ICD-10-CM    1. Left shoulder pain, unspecified chronicity  M25.512       2. Chronic pain of left elbow  M25.522     G89.29                      Subjective: Pt comes to therapy reporting he was in the hospital earlier this week, noting he had been admited for testing, as ordered by his pulmonologist. Since having oxygen treatments in the hospital and at home this week, patient reports he has \"felt great\" and has been able to wake without dizziness for the first time in approx 10 years.       Objective: See treatment diary below      Assessment: Tolerated treatment well. Patient demonstrated fatigue post treatment, exhibited good technique with therapeutic exercises, and would benefit from continued PT      Plan: Pt reports no limitations performing ADLS and no longer reports pain in shoulder. Therefore, patient to be discharged at present time due to meeting goals and maximal benefit from skilled care.      Precautions: CAD, myotonic dystrophy, h/o DVT, DM II    Daily Treatment Diary    Date 1/24 1/31 2/2 2/16   1/10 1/12 1/17 1/19   FOTO             Re-Eval             Auth visit #                Manuals    Post & inf GHJ mobs TE JANIA           LUE LAD TE JANIA     JANIA TE np    Pec stretch       JANIA TE np    CRT - Left       x2 x2 x1    CRT - right             Sapphire-Hallpike right nv JANIA x1 JANIA x1 np   JANIA JANIA JANIA left    Horizontal roll tests       JANIA JANIA                  Neuro Re-Ed     TB no monies  Blue 5\" 3x10 Blue 5\" 3x10 Blue 5\" 3x10 Blue 5\" 3x10   Blue 5\"  2x10 Blue 5\"  2x10 np Blue 5\"  2x10   TB scap retract K 15.0# 3x10 K 16.2# 3x10 K 16.6# 3x10 K 16.5# 3x10   Blue 5\"  2x10 Blue 5\"  2x10 K 15.0# 3x10 K 15.0# 3x10   TB horizontal abduction K 3.2# 2x10 K 3.2# 2x10 K 2.7# 3x10 K 3.8# x15 B   Blue 5\" 2x10 Blue 5\"  2x10 K 3.2# 2x10 K 3.2# 2x10   Ball " "circles on wall - CW, CCW; flex, abd Red pball  X10 ea Red pball  2x10 ea Red pball  2x10 ea Red pball  2x10 ea     Red pball  X10 ea  Red pball  x10 ea   Prone T, Y, W 5\"x20 ea 2# 5\" 2x10 np      5\"x20 ea 5\"x20 ea                             Ther Ex    Table slides - flex, scap       Wall slides  5\"x10 ea Wall slides  5\"x10 ea D/C                 Stand wand ext 5# bar x20 5# weight 2x10 5# weight 2x10 5# weight 2x10   5# bar x20 5# bar x20 5# bar x20 5# bar x20   Stand wand scap 5# bar x20 5# weight 2x10 5# weight 2x10 5# weight 2x10   5# bar x20 5# bar x20 5# bar x20 5# bar x20   TPR at wall c active flex, abd                          Ther Activity    Pulleys  5' 5' 5'    5' 5' 5' 5'   UBE    8'                      Gait Training                              Modalities                                       "

## 2024-02-20 ENCOUNTER — TELEPHONE (OUTPATIENT)
Dept: PULMONOLOGY | Facility: CLINIC | Age: 64
End: 2024-02-20

## 2024-02-22 DIAGNOSIS — G47.33 OSA (OBSTRUCTIVE SLEEP APNEA): Primary | ICD-10-CM

## 2024-02-23 ENCOUNTER — APPOINTMENT (OUTPATIENT)
Dept: LAB | Facility: HOSPITAL | Age: 64
End: 2024-02-23
Payer: COMMERCIAL

## 2024-02-23 DIAGNOSIS — J32.9 CHRONIC SINUSITIS, UNSPECIFIED LOCATION: ICD-10-CM

## 2024-02-23 DIAGNOSIS — J42 CHRONIC BRONCHITIS, UNSPECIFIED CHRONIC BRONCHITIS TYPE (HCC): ICD-10-CM

## 2024-02-23 LAB
IGA SERPL-MCNC: 58 MG/DL (ref 66–433)
IGG SERPL-MCNC: 316 MG/DL (ref 635–1741)
IGM SERPL-MCNC: 254 MG/DL (ref 45–281)

## 2024-02-23 PROCEDURE — 82787 IGG 1 2 3 OR 4 EACH: CPT

## 2024-02-23 PROCEDURE — 82784 ASSAY IGA/IGD/IGG/IGM EACH: CPT

## 2024-02-23 PROCEDURE — 36415 COLL VENOUS BLD VENIPUNCTURE: CPT

## 2024-02-26 ENCOUNTER — TELEPHONE (OUTPATIENT)
Age: 64
End: 2024-02-26

## 2024-02-26 LAB
IGG SERPL-MCNC: 366 MG/DL (ref 603–1613)
IGG1 SER-MCNC: 155 MG/DL (ref 248–810)
IGG2 SER-MCNC: 116 MG/DL (ref 130–555)
IGG3 SER-MCNC: 37 MG/DL (ref 15–102)
IGG4 SER-MCNC: 11 MG/DL (ref 2–96)

## 2024-02-26 NOTE — TELEPHONE ENCOUNTER
Pts wife Oliva calls for pt and is wondering if pt has to continue to do neb treatments of Levalbuterol. Informed Oliva that thsi was prescribed as a PRN so if pt does not feel he needs it that's fine he is to use it as needed. Oliva verbalizes understanding and gives thanks

## 2024-02-28 VITALS
WEIGHT: 157 LBS | SYSTOLIC BLOOD PRESSURE: 124 MMHG | TEMPERATURE: 97.8 F | OXYGEN SATURATION: 98 % | RESPIRATION RATE: 18 BRPM | HEART RATE: 66 BPM | DIASTOLIC BLOOD PRESSURE: 50 MMHG | HEIGHT: 69 IN | BODY MASS INDEX: 23.25 KG/M2

## 2024-03-09 NOTE — PROGRESS NOTES
Otoniel Martinez  1960  [address]  [facility] [place]  [address]  [facility] [id number]    Chief Complaint   Patient presents with    Consult     History of present illness:   Otoniel Martinez is a 63-year-old male with past medical history significant for CAD, diabetes, DVT after tear in gastrocnemius muscle on indefinite anticoagulation with Xarelto), recurrent pancreatitis, diabetes type 1 and sleep apnea.  Patient was admitted to [facility] from 2/8/2024 - 2/10/2024 with acute respiratory failure with hypoxia which was suspected related to acute on chronic bronchitis.  He was treated with antibiotics and prednisone.  He underwent a CT of the chest which incidentally noted splenomegaly.  Patient referred to hematology for further evaluation.  He presents for initial consultation.    Patient denies any fever, night sweats, weight loss. Denies any abd pain, n/v/d. Denies frequent or recurrent illness. ETOH use socially. Denies easy bruising or bleeding.     Review of systems:   Review of Systems   Constitutional:  Negative for chills and fever.   HENT:  Negative for ear pain and sore throat.    Eyes:  Negative for pain and visual disturbance.   Respiratory:  Negative for cough and shortness of breath.    Cardiovascular:  Negative for chest pain and palpitations.   Gastrointestinal:  Negative for abdominal pain and vomiting.   Genitourinary:  Negative for dysuria and hematuria.   Musculoskeletal:  Negative for arthralgias and back pain.   Skin:  Negative for color change and rash.   Neurological:  Negative for seizures and syncope.   Hematological:  Does not bruise/bleed easily.   All other systems reviewed and are negative.      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), 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HCC)    Myotonic  dystrophy (HCC)    Mixed hyperlipidemia    Vertigo    Polyneuropathy    Chronic gastritis without bleeding    Old cerebrovascular accident (CVA) without late effect    Vitamin D deficiency    History of recurrent deep vein thrombosis (DVT)    Dysfunction of right rotator cuff    Primary osteoarthritis of right hip    Recurrent cold sores    Chronic bronchitis (HCC)    Abnormal CT scan of lung    Chronic sinusitis    Insulin pump status    Obstructive sleep apnea    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)    Acute respiratory failure with hypoxia (HCC)    Splenomegaly     Past Medical History:   Diagnosis Date    CAD (coronary artery disease)     Congenital myotonia     Deep vein thrombosis (DVT) ([facility])     after tear of gastrocnemus muscle    Diabetes ([facility])     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[facility]) 12/06/2017    Pancreatitis     three times    Sleep apnea     not using a C-PAP    Superficial thrombophlebitis      Past Surgical History:   Procedure Laterality Date    CHOLECYSTECTOMY      CIRCUMCISION      Elective    COLONOSCOPY      complete, polyps 2 years ago    CORONARY ANGIOPLASTY WITH STENT PLACEMENT      stent to RCA 2004    INGUINAL HERNIA REPAIR      ORIF RADIAL SHAFT FRACTURE Left     Open Treatment of Multiple Fractures of the Radial Shaft    ORIF ULNAR / RADIAL SHAFT FRACTURE Left     Open Treatment of Multiple Fractures of the Ulnar Shaft    TONSILLECTOMY AND ADENOIDECTOMY       Family History   Problem Relation Age of Onset    Brain cancer Mother     Clotting disorder Mother     Heart disease Mother     Cancer Mother     Hyperlipidemia Mother     Coronary artery disease Paternal Uncle      Social History     Socioeconomic History    Marital status: /Civil Union     Spouse name: Not on file    Number of children: Not on file    Years of education: Not on file    Highest education level: Not on file   Occupational History    Not on file   Tobacco Use    Smoking status: Never    Smokeless tobacco:  Never   Vaping Use    Vaping status: Never Used   Substance and Sexual Activity    Alcohol use: Yes     Comment: social    Drug use: No    Sexual activity: Not on file   Other Topics Concern    Not on file   Social History Narrative    Consumes 1 cup of tea  And 1 glass of tea per day        Weight loss     Social Determinants of Health     Financial Resource Strain: Low Risk  (12/4/2023)    Received from [facility]    Overall Financial Resource Strain (CARDIA)     Difficulty of Paying Living Expenses: Not very hard   Food Insecurity: No Food Insecurity (2/10/2024)    Hunger Vital Sign     Worried About Running Out of Food in the Last Year: Never true     Ran Out of Food in the Last Year: Never true   Transportation Needs: No Transportation Needs (2/10/2024)    PRAPARE - Transportation     Lack of Transportation (Medical): No     Lack of Transportation (Non-Medical): No   Physical Activity: Sufficiently Active (12/4/2023)    Received from [facility]    Exercise Vital Sign     Days of Exercise per Week: 1 day     Minutes of Exercise per Session: 150+ min   Stress: No Stress Concern Present (12/4/2023)    Received from [facility]    [employer] [place] [employer] - Occupational Stress Questionnaire     Feeling of Stress : Not at all   Social Connections: Socially Integrated (12/4/2023)    Received from [facility]    Social Connection and Isolation Panel [NHANES]     Frequency of Communication with Friends and Family: Three times a week     Frequency of Social Gatherings with Friends and Family: Never     Attends [employer] Services: More than 4 times per year     Active Member of Clubs or Organizations: Yes     Attends Club or Organization Meetings: More than 4 times per year     Marital Status:    Intimate Partner Violence: Not At Risk (12/4/2023)    Received from [facility]    Humiliation, Afraid, Rape, and Kick  questionnaire     Fear of Current or Ex-Partner: No     Emotionally Abused: No     Physically Abused: No     Sexually Abused: No   Housing Stability: Low Risk  (2/10/2024)    Housing Stability Vital Sign     Unable to Pay for Housing in the Last Year: No     Number of Places Lived in the Last Year: 1     Unstable Housing in the Last Year: No       Current Outpatient Medications:     aspirin (ECOTRIN LOW STRENGTH) 81 mg EC tablet, Take 81 mg by mouth daily, Disp: , Rfl:     atorvastatin (LIPITOR) 40 mg tablet, TAKE 1 TABLET BY MOUTH EVERY DAY, Disp: 90 tablet, Rfl: 1    budesonide-formoterol (Symbicort) 80-4.5 MCG/ACT inhaler, Inhale 2 puffs 2 (two) times a day Rinse mouth after use., Disp: 30.6 g, Rfl: 5    cholecalciferol (VITAMIN D3) 400 units tablet, Take 1 tablet by mouth every morning, Disp: , Rfl:     Continuous Blood Gluc Sensor (Dexcom G6 Sensor) MISC, Change every 10 days. E10.65, Disp: , Rfl:     Continuous Blood Gluc Transmit (Dexcom G6 Transmitter) MISC, Change every 90 days. E10.65, Disp: , Rfl:     fluticasone (FLONASE) 50 mcg/act nasal spray, 2 sprays into each nostril daily, Disp: , Rfl:     Gvoke HypoPen 2-Pack 1 MG/0.2ML SOAJ, INJECT 1 MG UNDER THE SKIN AS NEEDED (FOR SEVERE HYPOGLYCEMIA). E10.65, Disp: , Rfl:     Insulin Disposable Pump (Omnipod 5 G6 Intro, Gen 5,) KIT, Inject 1 each under the skin every third day, Disp: , Rfl:     levalbuterol (XOPENEX) 1.25 mg/3 mL nebulizer solution, Take 3 mL (1.25 mg total) by nebulization every 8 (eight) hours as needed for wheezing or shortness of breath, Disp: 270 mL, Rfl: 0    pantoprazole (PROTONIX) 20 mg tablet, Take 2 tablets (40 mg total) by mouth daily, Disp: 60 tablet, Rfl: 0    rivaroxaban (Xarelto) 20 mg tablet, Take 1 tablet (20 mg total) by mouth daily with breakfast, Disp: 90 tablet, Rfl: 3    sodium chloride 0.9 % nebulizer solution, Take 3 mL by nebulization as needed for wheezing, Disp: 90 mL, Rfl: 3    acyclovir (ZOVIRAX) 200 mg capsule,  Take 1 capsule (200 mg total) by mouth 5 (five) times a day for 5 days (Patient taking differently: Take 200 mg by mouth 5 (five) times a day Patient takes PRN), Disp: 25 capsule, Rfl: 0    sildenafil (VIAGRA) 100 mg tablet, Take 1 tablet (100 mg total) by mouth daily as needed for erectile dysfunction (Patient not taking: Reported on 2024), Disp: 10 tablet, Rfl: 1  No Known Allergies    Vitals:    24 0906   BP: 120/74   Pulse: 71   Resp: 17   Temp: 98 °F (36.7 °C)   SpO2: 97%     Wt Readings from Last 3 Encounters:   24 73.5 kg (162 lb)   24 72.6 kg (160 lb)   24 71.2 kg (157 lb)     Physical Exam  Vitals reviewed.   Constitutional:       General: He is not in acute distress.     Appearance: Normal appearance.   HENT:      Head: Normocephalic and atraumatic.      Mouth/Throat:      Mouth: Mucous membranes are moist.   Eyes:      Extraocular Movements: Extraocular movements intact.      Conjunctiva/sclera: Conjunctivae normal.   Cardiovascular:      Rate and Rhythm: Normal rate.   Pulmonary:      Effort: Pulmonary effort is normal. No respiratory distress.      Breath sounds: No wheezing, rhonchi or rales.   Abdominal:      General: Abdomen is flat. There is no distension.      Palpations: Abdomen is soft.   Musculoskeletal:      Cervical back: Normal range of motion.      Right lower leg: No edema.      Left lower leg: No edema.   Skin:     General: Skin is warm.      Coloration: Skin is not jaundiced.   Neurological:      General: No focal deficit present.      Mental Status: He is alert and oriented to person, place, and time. Mental status is at baseline.      Gait: Gait normal.   Psychiatric:         Thought Content: Thought content normal.     Labs:  2018: WBC: 3.6, H/H: 15.8/44.9, MCV: 88.9, platelets: 114.  ANC: 2297  2/10/2024: WBC: 3.34, H/H: 12/36, MCV: 87, platelets: 125.  AST/ALT: 22/41, alk phos: 72.  Creatinine: 0.51    Imagin2024: CT chest with contrast: Mild  splenomegaly    Assessment/plan:   1.  Mild leukopenia without neutropenia  2.  Chronic thrombocytopenia  3.  Incidentally noted mild splenomegaly on CT chest    Otoniel Martinez is a 63-year-old male with past medical history significant for CAD, diabetes, DVT after tear in gastrocnemius muscle (on indefinite anticoagulation with Xarelto), recurrent pancreatitis, diabetes type 1 and sleep apnea.  Patient was admitted to [facility] from 2/8/2024 - 2/10/2024 with acute respiratory failure with hypoxia which was suspected related to acute on chronic bronchitis.  He was treated with antibiotics and prednisone.  He underwent a CT of the chest which incidentally noted splenomegaly.  Patient referred to hematology for further evaluation.  Patient denies any fever, night sweats, weight loss. Denies any abd pain, n/v/d. Denies frequent or recurrent illness. ETOH use socially. Denies easy bruising or bleeding.        I met with the patient and reviewed his history, chart, imaging and labs.  Given mild leukopenia without neutropenia and chronic thrombocytopenia recommend further blood work including vitamin B12/folate, DIANA, RF, hepatitis panel and HIV testing and flow cytometry.  Will order abdominal ultrasound.  Previously mentioned blood work is unremarkable, will continue to follow clinically with repeat labs and visit in 3 months.    4. Recurrent DVT  Currently on indefinite anticoagulation with xarelto.     Orders Placed This Encounter   Procedures    US abdomen complete    CBC and differential    Vitamin B12    Folate    Rheumatoid Factor    DIANA w/Reflex    HIV 1/2 AG/AB W REFLEX [facility] and [facility] only    Hepatitis C antibody    Hepatitis B surface antigen    Hepatitis B surface antibody    Hepatitis B core antibody, total    Leukemia/Lymphoma flow cytometry     Return in about 3 months (around 6/11/2024) for Office Visit, Labs - See Treatment Plan, Imaging - See orders.  1. Check labs now  2. Abd US  now  3. FU in 3 months

## 2024-03-11 ENCOUNTER — OFFICE VISIT (OUTPATIENT)
Dept: HEMATOLOGY ONCOLOGY | Facility: CLINIC | Age: 64
End: 2024-03-11
Payer: COMMERCIAL

## 2024-03-11 VITALS
WEIGHT: 162 LBS | OXYGEN SATURATION: 97 % | HEART RATE: 71 BPM | TEMPERATURE: 98 F | DIASTOLIC BLOOD PRESSURE: 74 MMHG | RESPIRATION RATE: 17 BRPM | BODY MASS INDEX: 23.99 KG/M2 | HEIGHT: 69 IN | SYSTOLIC BLOOD PRESSURE: 120 MMHG

## 2024-03-11 DIAGNOSIS — D69.6 THROMBOCYTOPENIA (HCC): Primary | ICD-10-CM

## 2024-03-11 DIAGNOSIS — D72.819 LEUKOPENIA, UNSPECIFIED TYPE: ICD-10-CM

## 2024-03-11 DIAGNOSIS — R16.1 SPLENOMEGALY: ICD-10-CM

## 2024-03-11 PROCEDURE — 99204 OFFICE O/P NEW MOD 45 MIN: CPT | Performed by: INTERNAL MEDICINE

## 2024-03-13 ENCOUNTER — APPOINTMENT (OUTPATIENT)
Dept: LAB | Age: 64
End: 2024-03-13
Payer: COMMERCIAL

## 2024-03-13 DIAGNOSIS — R16.1 SPLENOMEGALY: ICD-10-CM

## 2024-03-13 DIAGNOSIS — D69.6 THROMBOCYTOPENIA (HCC): ICD-10-CM

## 2024-03-13 DIAGNOSIS — D72.819 LEUKOPENIA, UNSPECIFIED TYPE: ICD-10-CM

## 2024-03-13 LAB
ANA SER QL IA: NEGATIVE
FOLATE SERPL-MCNC: >22.3 NG/ML
HBV CORE AB SER QL: NORMAL
HBV SURFACE AB SER-ACNC: <3 MIU/ML
HBV SURFACE AG SER QL: NORMAL
HCV AB SER QL: NORMAL
VIT B12 SERPL-MCNC: 302 PG/ML (ref 180–914)

## 2024-03-13 PROCEDURE — 87389 HIV-1 AG W/HIV-1&-2 AB AG IA: CPT

## 2024-03-13 PROCEDURE — 86038 ANTINUCLEAR ANTIBODIES: CPT

## 2024-03-13 PROCEDURE — 86704 HEP B CORE ANTIBODY TOTAL: CPT

## 2024-03-13 PROCEDURE — 88184 FLOWCYTOMETRY/ TC 1 MARKER: CPT

## 2024-03-13 PROCEDURE — 82607 VITAMIN B-12: CPT

## 2024-03-13 PROCEDURE — 86430 RHEUMATOID FACTOR TEST QUAL: CPT

## 2024-03-13 PROCEDURE — 82746 ASSAY OF FOLIC ACID SERUM: CPT

## 2024-03-13 PROCEDURE — 87340 HEPATITIS B SURFACE AG IA: CPT

## 2024-03-13 PROCEDURE — 86803 HEPATITIS C AB TEST: CPT

## 2024-03-13 PROCEDURE — 88185 FLOWCYTOMETRY/TC ADD-ON: CPT

## 2024-03-13 PROCEDURE — 86706 HEP B SURFACE ANTIBODY: CPT

## 2024-03-13 PROCEDURE — 36415 COLL VENOUS BLD VENIPUNCTURE: CPT

## 2024-03-14 ENCOUNTER — TELEPHONE (OUTPATIENT)
Age: 64
End: 2024-03-14

## 2024-03-14 DIAGNOSIS — J42 CHRONIC BRONCHITIS, UNSPECIFIED CHRONIC BRONCHITIS TYPE (HCC): Primary | ICD-10-CM

## 2024-03-14 LAB
HIV 1+2 AB+HIV1 P24 AG SERPL QL IA: NORMAL
HIV 2 AB SERPL QL IA: NORMAL
HIV1 AB SERPL QL IA: NORMAL
HIV1 P24 AG SERPL QL IA: NORMAL
RHEUMATOID FACT SER QL LA: NEGATIVE

## 2024-03-14 RX ORDER — LEVALBUTEROL INHALATION SOLUTION 1.25 MG/3ML
1.25 SOLUTION RESPIRATORY (INHALATION) 3 TIMES DAILY
Qty: 270 ML | Refills: 4 | Status: SHIPPED | OUTPATIENT
Start: 2024-03-14 | End: 2024-08-11

## 2024-03-14 NOTE — TELEPHONE ENCOUNTER
Patient called the RX Refill Line. Message is being forwarded to the office.     Patient is requesting a prescription for levalbuterol (XOPENEX) 1.25 mg/3 mL nebulizer solution     Please contact patient at  [phone] l

## 2024-03-18 ENCOUNTER — HOSPITAL ENCOUNTER (OUTPATIENT)
Dept: PULMONOLOGY | Facility: HOSPITAL | Age: 64
Discharge: HOME | End: 2024-03-18
Attending: INTERNAL MEDICINE
Payer: COMMERCIAL

## 2024-03-18 ENCOUNTER — TELEPHONE (OUTPATIENT)
Dept: HEMATOLOGY ONCOLOGY | Facility: CLINIC | Age: 64
End: 2024-03-18

## 2024-03-18 ENCOUNTER — HOSPITAL ENCOUNTER (OUTPATIENT)
Dept: NON INVASIVE DIAGNOSTICS | Facility: HOSPITAL | Age: 64
End: 2024-03-18
Attending: INTERNAL MEDICINE
Payer: COMMERCIAL

## 2024-03-18 ENCOUNTER — APPOINTMENT (OUTPATIENT)
Dept: ULTRASOUND IMAGING | Facility: HOSPITAL | Age: 64
End: 2024-03-18
Attending: INTERNAL MEDICINE
Payer: COMMERCIAL

## 2024-03-18 DIAGNOSIS — J42 CHRONIC BRONCHITIS, UNSPECIFIED CHRONIC BRONCHITIS TYPE (HCC): ICD-10-CM

## 2024-03-18 DIAGNOSIS — G71.11 MYOTONIC DYSTROPHY (HCC): ICD-10-CM

## 2024-03-18 DIAGNOSIS — E55.9 VITAMIN D DEFICIENCY: ICD-10-CM

## 2024-03-18 DIAGNOSIS — R16.1 SPLENOMEGALY: Primary | ICD-10-CM

## 2024-03-18 DIAGNOSIS — E53.9 VITAMIN B DEFICIENCY, UNSPECIFIED: ICD-10-CM

## 2024-03-18 PROCEDURE — 94726 PLETHYSMOGRAPHY LUNG VOLUMES: CPT | Performed by: INTERNAL MEDICINE

## 2024-03-18 PROCEDURE — 94726 PLETHYSMOGRAPHY LUNG VOLUMES: CPT

## 2024-03-18 PROCEDURE — 94729 DIFFUSING CAPACITY: CPT | Performed by: INTERNAL MEDICINE

## 2024-03-18 PROCEDURE — 94760 N-INVAS EAR/PLS OXIMETRY 1: CPT

## 2024-03-18 PROCEDURE — 94729 DIFFUSING CAPACITY: CPT

## 2024-03-18 PROCEDURE — 94060 EVALUATION OF WHEEZING: CPT | Performed by: INTERNAL MEDICINE

## 2024-03-18 PROCEDURE — 94060 EVALUATION OF WHEEZING: CPT

## 2024-03-18 RX ORDER — ALBUTEROL SULFATE 2.5 MG/3ML
2.5 SOLUTION RESPIRATORY (INHALATION) ONCE AS NEEDED
Status: COMPLETED | OUTPATIENT
Start: 2024-03-18 | End: 2024-03-18

## 2024-03-18 RX ADMIN — ALBUTEROL SULFATE 2.5 MG: 2.5 SOLUTION RESPIRATORY (INHALATION) at 11:38

## 2024-03-18 NOTE — TELEPHONE ENCOUNTER
Spoke with patient to let him know Dr. Bronson has reviewed his recent lab work and his vitamin B12 level was borderline low. She recommends he takes vitamin B12 500mcg daily which can be purchased OTC and repeat labs will be ordered to complete prior to his f/u appt. I also informed him his hepatitis panel and HIV workup was negative. Pt verbalized understanding and in agreement with plan

## 2024-03-20 LAB — SCAN RESULT: NORMAL

## 2024-03-20 NOTE — RESULT ENCOUNTER NOTE
Lung function test is normal, still does not exclude asthma/chronic bronchitis.  Will discuss further at next visit    Pls notify the pt

## 2024-03-25 ENCOUNTER — HOSPITAL ENCOUNTER (OUTPATIENT)
Dept: ULTRASOUND IMAGING | Facility: HOSPITAL | Age: 64
Discharge: HOME | End: 2024-03-25
Attending: INTERNAL MEDICINE
Payer: COMMERCIAL

## 2024-03-25 DIAGNOSIS — D72.819 LEUKOPENIA, UNSPECIFIED TYPE: ICD-10-CM

## 2024-03-25 DIAGNOSIS — D69.6 THROMBOCYTOPENIA (HCC): ICD-10-CM

## 2024-03-25 DIAGNOSIS — R16.1 SPLENOMEGALY: ICD-10-CM

## 2024-03-25 PROCEDURE — 76705 ECHO EXAM OF ABDOMEN: CPT

## 2024-04-09 ENCOUNTER — TELEPHONE (OUTPATIENT)
Dept: HEMATOLOGY ONCOLOGY | Facility: CLINIC | Age: 64
End: 2024-04-09

## 2024-04-09 DIAGNOSIS — D72.819 LEUKOPENIA, UNSPECIFIED TYPE: Primary | ICD-10-CM

## 2024-04-09 DIAGNOSIS — D69.6 THROMBOCYTOPENIA (HCC): ICD-10-CM

## 2024-04-09 NOTE — TELEPHONE ENCOUNTER
Spoke with patient to let him know his flow cytometry resulted and showed a mild increase in cells but it is nonspecific per Dr. Bronson. I let him know we will continue to monitor and have him get repeat CBC, CMP, and flow cytometry prior to f/u appt on 6/18. Pt verbalized understanding and in agreement with plan

## 2024-04-24 ENCOUNTER — TELEPHONE (OUTPATIENT)
Dept: ADMINISTRATIVE | Facility: OTHER | Age: 64
End: 2024-04-24

## 2024-04-24 ENCOUNTER — OFFICE VISIT (OUTPATIENT)
Age: 64
End: 2024-04-24
Payer: COMMERCIAL

## 2024-04-24 VITALS
HEIGHT: 70 IN | DIASTOLIC BLOOD PRESSURE: 74 MMHG | TEMPERATURE: 98.3 F | BODY MASS INDEX: 23.37 KG/M2 | SYSTOLIC BLOOD PRESSURE: 116 MMHG | OXYGEN SATURATION: 98 % | WEIGHT: 163.2 LBS | HEART RATE: 56 BPM

## 2024-04-24 DIAGNOSIS — H91.93 DECREASED HEARING OF BOTH EARS: ICD-10-CM

## 2024-04-24 DIAGNOSIS — J30.2 SEASONAL ALLERGIES: ICD-10-CM

## 2024-04-24 DIAGNOSIS — E78.2 MIXED HYPERLIPIDEMIA: ICD-10-CM

## 2024-04-24 DIAGNOSIS — E55.9 VITAMIN D DEFICIENCY: Primary | ICD-10-CM

## 2024-04-24 DIAGNOSIS — Z76.89 ESTABLISHING CARE WITH NEW DOCTOR, ENCOUNTER FOR: ICD-10-CM

## 2024-04-24 DIAGNOSIS — Z12.5 SCREENING PSA (PROSTATE SPECIFIC ANTIGEN): ICD-10-CM

## 2024-04-24 PROBLEM — J96.01 ACUTE RESPIRATORY FAILURE WITH HYPOXIA (HCC): Status: RESOLVED | Noted: 2024-02-08 | Resolved: 2024-04-24

## 2024-04-24 PROCEDURE — 69210 REMOVE IMPACTED EAR WAX UNI: CPT | Performed by: FAMILY MEDICINE

## 2024-04-24 PROCEDURE — 99203 OFFICE O/P NEW LOW 30 MIN: CPT | Performed by: FAMILY MEDICINE

## 2024-04-24 RX ORDER — INSULIN ASPART 100 [IU]/ML
INJECTION, SOLUTION INTRAVENOUS; SUBCUTANEOUS
COMMUNITY
Start: 2024-04-24

## 2024-04-24 RX ORDER — INSULIN PMP CART,AUT,G6/7,CNTR
EACH SUBCUTANEOUS
COMMUNITY
Start: 2024-04-09

## 2024-04-24 RX ORDER — MONTELUKAST SODIUM 10 MG/1
10 TABLET ORAL
Qty: 30 TABLET | Refills: 3 | Status: SHIPPED | OUTPATIENT
Start: 2024-04-24

## 2024-04-24 NOTE — TELEPHONE ENCOUNTER
Upon review of the In Basket request we were able to locate, review, and update the patient chart as requested for Hemoglobin A1c, Hepatitis C , and HIV.    Any additional questions or concerns should be emailed to the Practice Liaisons via the appropriate education email address, please do not reply via In Basket.    Thank you  BONITA TRINH

## 2024-04-24 NOTE — TELEPHONE ENCOUNTER
----- Message from Kathy Anderson sent at 4/24/2024  9:41 AM EDT -----  Regarding: hep C, HIV, A1C - [facility]pc  04/24/24 9:42 AM    Hello, our patient Otoniel Martinez has had Hepatitis C completed/performed. Please assist in updating the patient chart by pulling the Care Everywhere (CE) document. The date of service is 2/11/2017.     Thank you,  Kathy Anderson [facility]       04/24/24 9:42 AM    Hello, our patient Otoniel Martinez has had HIV completed/performed. Please assist in updating the patient chart by pulling the Care Everywhere (CE) document. The date of service is 6/30/17.     Thank you,  Kathy Anderson PG [facility]     04/24/24 9:45 AM    Hello, our patient Otoniel Martinez has had Hemoglobin A1c completed/performed. Please assist in updating the patient chart by pulling the Care Everywhere (CE) document. The date of service is 2/12/2024.     Thank you,  Kathy Anderson PG [facility]

## 2024-04-24 NOTE — PROGRESS NOTES
Assessment/Plan:    1. Vitamin D deficiency  -     Vitamin D 25 hydroxy; Future    2. Mixed hyperlipidemia  -     TSH, 3rd generation with Free T4 reflex; Future    3. Screening PSA (prostate specific antigen)  -     PSA, Total Screen; Future    4. Seasonal allergies  -     TSH, 3rd generation with Free T4 reflex; Future  -     montelukast (SINGULAIR) 10 mg tablet; Take 1 tablet (10 mg total) by mouth daily at bedtime    5. Establishing care with new doctor, encounter for    6. Decreased hearing of both ears            There are no Patient Instructions on file for this visit.    Return in about 4 months (around 8/24/2024).    Subjective:      Patient ID: Otoniel Martinez is a 64 y.o. male.    Chief Complaint   Patient presents with    Cerumen Impaction     Patient c/o bilateral ear cerumen impaction and would like ears cleaned.  He would like to establish care.       HPI      Cerumen impaction, not new, ongoing for several years and has his ears washed out pretty frequently every few months according to him, has been having significant decreased hearing in his previous PCP has left the area and he was not able to get an appointment at that office with anyone else.    Late onset type I diabetic follows with endocrinology has an insulin pump.    Mixed hyperlipidemia on medications tolerating well with no issues with atorvastatin.    Coronary artery disease status post stenting, stable with no issues.    The following portions of the patient's history were reviewed and updated as appropriate: allergies, current medications, past family history, past medical history, past social history, past surgical history and problem list.    Review of Systems      Constitutional:  Denies fever or chills   Eyes:  Denies double , blurry vision or eye pain  HENT:  Denies nasal congestion, sore throat or new hearing issues  Respiratory:  Denies cough or shortness of breath or wheezing  Cardiovascular:  Denies palpitations or chest  pain  GI:  Denies abdominal pain, nausea, or vomiting, no loose stools, no reflux  Integument:  Denies rash , no open areas  Neurologic:  Denies headache or focal weakness, no dizziness  : no dysuria, or hematuria      Current Outpatient Medications   Medication Sig Dispense Refill    acyclovir (ZOVIRAX) 200 mg capsule Take 1 capsule (200 mg total) by mouth 5 (five) times a day for 5 days (Patient taking differently: Take 200 mg by mouth 5 (five) times a day Patient takes PRN) 25 capsule 0    aspirin (ECOTRIN LOW STRENGTH) 81 mg EC tablet Take 81 mg by mouth daily      atorvastatin (LIPITOR) 40 mg tablet TAKE 1 TABLET BY MOUTH EVERY DAY 90 tablet 1    budesonide-formoterol (Symbicort) 80-4.5 MCG/ACT inhaler Inhale 2 puffs 2 (two) times a day Rinse mouth after use. 30.6 g 5    cholecalciferol (VITAMIN D3) 400 units tablet Take 1 tablet by mouth every morning      Continuous Blood Gluc Sensor (Dexcom G6 Sensor) MISC Change every 10 days. E10.65      Continuous Blood Gluc Transmit (Dexcom G6 Transmitter) MISC Change every 90 days. E10.65      fluticasone (FLONASE) 50 mcg/act nasal spray 2 sprays into each nostril daily      Gvoke HypoPen 2-Pack 1 MG/0.2ML SOAJ INJECT 1 MG UNDER THE SKIN AS NEEDED (FOR SEVERE HYPOGLYCEMIA). E10.65      Insulin Disposable Pump (Omnipod 5 G6 Pods, Gen 5,) MISC INJECT 1 UNDER THE SKIN EVERY OTHER DAY. CHANGING EVERY 2 DAYS DUE TO HIGH INSULIN REQUIREMENT.      levalbuterol (Xopenex) 1.25 mg/3 mL nebulizer solution Take 3 mL (1.25 mg total) by nebulization 3 (three) times a day 270 mL 4    montelukast (SINGULAIR) 10 mg tablet Take 1 tablet (10 mg total) by mouth daily at bedtime 30 tablet 3    NovoLOG 100 UNIT/ML injection Inject up to 110 units daily via insulin pump. E10.65      pantoprazole (PROTONIX) 20 mg tablet Take 2 tablets (40 mg total) by mouth daily 60 tablet 0    rivaroxaban (Xarelto) 20 mg tablet Take 1 tablet (20 mg total) by mouth daily with breakfast 90 tablet 3     "sildenafil (VIAGRA) 100 mg tablet Take 1 tablet (100 mg total) by mouth daily as needed for erectile dysfunction 10 tablet 1    sodium chloride 0.9 % nebulizer solution Take 3 mL by nebulization as needed for wheezing 90 mL 3    Insulin Disposable Pump (Omnipod 5 G6 Intro, Gen 5,) KIT Inject 1 each under the skin every third day       No current facility-administered medications for this visit.       Objective:    /74 (BP Location: Left arm, Patient Position: Sitting, Cuff Size: Standard)   Pulse 56   Temp 98.3 °F (36.8 °C) (Temporal)   Ht 5' 9.5\" (1.765 m) Comment: wearing shoes  Wt 74 kg (163 lb 3.2 oz) Comment: wearing shoes  SpO2 98%   BMI 23.75 kg/m²        Physical Exam         Constitutional:  Well developed, well nourished, no acute distress, non-toxic appearance   Eyes:  PERRL, conjunctiva normal , non icteric sclera  HENT:  Atraumatic, oropharynx moist. Neck-  supple , bilateral cerumen impaction  Respiratory:  CTA b/l, normal breath sounds, no rales, no wheezing   Cardiovascular:  RRR, no murmurs, no LE edema b/l  GI:  Soft, nondistended, normal bowel sounds x 4, nontender, no organomegaly, no mass, no rebound, no guarding   Neurologic:  no focal deficits noted   Psychiatric:  Speech and behavior appropriate , AAO x 3        Ear cerumen removal    Date/Time: 4/24/2024 10:15 AM    Performed by: Wandy Cheatham DO  Authorized by: Wandy Cheatham DO  Universal Protocol:  Consent: Verbal consent obtained.  Risks and benefits: risks, benefits and alternatives were discussed  Consent given by: patient  Patient understanding: patient states understanding of the procedure being performed  Patient consent: the patient's understanding of the procedure matches consent given  Procedure consent: procedure consent matches procedure scheduled  Patient identity confirmed: verbally with patient    Patient location:  Clinic  Procedure details:     Local anesthetic:  None    Location:  L ear and R ear    Procedure " type: irrigation with instrumentation      Instrumentation: curette      Approach:  Natural orifice  Post-procedure details:     Complication:  None    Hearing quality:  Normal    Patient tolerance of procedure:  Tolerated well, no immediate complications        Wandy Cheatham DO

## 2024-04-28 NOTE — PROGRESS NOTES
"Pulmonary Follow Up Note   Otoniel Martinez 64 y.o. 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 headache/mild dizziness and unrefreshing sleep  Previously diagnosed more than 10 years ago  Still required 2-3 LPM oxygen at night  Reschedule in lab sleep study    Return in about 3 months (around 7/29/2024).    History of Present Illness     Follow up for: chronic bronchitis     Background:  As per initial consult note     \"64 y.o. male with a h/o polyneuropathy, chronic gastritis, myotonic dystrophy, CAD/PCI 2004, CVA, osteoarthritis, DVT/Eliquis, chronic sinusitis, IDDM/insulin pump     Presented today for initial evaluation by pulmonary for chronic cough/mucus production     First noted approximately 5 years ago, episodes of minimally productive cough/chest congestion, especially around the allergy season/cold.  Impact his ability to perform duties at [employer] such as singing.  Associated with production of yellow mucus/oftentimes sticky.  Had partial relief from his Mucinex.  Also associated with chronic nasal congestion, known to have recurrent sinusitis from before did not follow with ENT since 2020.     In 2022, hospitalized for pneumonia/chest congestion to [facility], chest CT at that " "time showed bibasilar opacities/lingular opacities.     Hx myotonic dystrophy: Diagnosed 1988, noted difficulty walking/muscle spasms worse in the cold weather, underwent muscle biopsies.     Currently, episodes are manageable, tries to cough so hard to produce the mucus after that feels relief.  Never been on inhalers before, no prior formal diagnosis of asthma or COPD, lifelong non-smoker.        Social/exposure history  Lived in [facility] all his life  Lifelong non-smoker, nonalcoholic  Worked for the [employer] office for decades, retired in 2017  Lives in an old house built in 1993, had water leak several months ago but no exposure to mold  Pets: 3 dogs     Family history: Dad had breathing issues/was a smoker\"     2/6 /2024 visit-Mucinex, Symbicort 80.  PFT/[facility] allergy panel which was positive.  Sputum cultures 6/1 NGTD.  Flonase        2/8/2024 visit-worse cough/sinusitis symptoms and URI, new hypoxemia with exertion.  Direct admit.  Antibiotic azithromycin/ceftriaxone, mucus clearing therapy.  Normal procalcitonin COVID/flu/RSV.  Sputum cultures normal ryley.  CT chest showed dependent GGO's at the RLL diffuse bronchial wall thickening/bronchitis.  CT sinus with mild to moderate sinusitis worse in the ethmoid sinuses.  Evaluated by ENT no pus on endoscopy no gross polyps did not feel acute sinusitis.  Discharged on steroids/antibiotics    2/2024 visit-feeling better symptomatically,, PFT with MVV/MEP/MIP, no exertional hypoxemia.  Continue aggressive mucus clearing.  Still required 2-3 LPM at night/sleep study ordered        Interval History  Since last seen, no major changes.  Continues to be active/independent at baseline.  Still doing the normal saline nebulizer treatment once or twice a day, no significant mucus production or recurrence of chest congestion.  Intermittently feels nasal congestion but not significant as before.  No cough, wheezing, or new dyspnea.  Still using Symbicort 82 " puffs every 12      Review of Systems  As per hpi, all other systems reviewed and were negative    Studies:  Imaging and other studies: I have personally reviewed pertinent films in PACS     CT PE/20 3/2022-bibasilar/lingular airspace opacity suspect infectious/inflammatory/mucous plug concern for aspiration.     Pulmonary function testing:     PFT 3/18/2024-ratio 76%, FEV1 2.59 L / 83%, FVC 3.4 L / 85%.  TLC 81%, RV 89%, DLCO 79% MVV 55%, MAP 49%, MIP 37%     6-minute walk test 2/8/2024-baseline SpO2 93% on room air, heart rate 76.  Able to walk 266 m in 6 minutes, lowest SpO2 at 88% after 2 minutes, required 2 LPM to end exercise with SpO2 95%     EKG, Pathology, and Other Studies: I have personally reviewed pertinent reports.       Immunoglobulin levels 2/20/2024 and 2/20/2018  IGA  66 - 433 mg/dL 58 Low  40.0 Low  R   IGG  635 - 1,741 mg/dL 316 Low  361.0 Low  R   IGM  45 - 281 mg/dL 254 208.0 R         Component  Ref Range & Units 2/23/24  9:05 AM   IgG 1  248 - 810 mg/dL 155 Low    IgG 2  130 - 555 mg/dL 116 Low    IgG 3  15 - 102 mg/dL 37   IgG 4  2 - 96 mg/dL 11   IgG  603 - 1613 mg/dL 366 Low         [facility] Panel, Adult 2/6/2024        Component  Ref Range & Units 2/6/24  9:45 AM   A.ALTERNATA  0.00 - 0.09 kUA/I <0.10   A.FUMIGATUS  0.00 - 0.09 kUA/I <0.10   [place] Grass  0.00 - 0.09 kUA/I 0.36 High    Love  0.00 - 0.09 kUA/I <0.10   Cat Epithellium-Dander  0.00 - 0.09 kUA/I <0.10   C.HERBARUM  0.00 - 0.09 kUA/I <0.10   Cockroach  0.00 - 0.09 kUA/I 0.41 High    Common Silver Birch  0.00 - 0.09 kUA/I <0.10   [place]  0.00 - 0.09 kUA/I <0.10   D. farinae  0.00 - 0.09 kUA/I <0.10   D. pteronyssinus  0.00 - 0.09 kUA/I 0.12 High    Dog Dander  0.00 - 0.09 kUA/I 2.30 High    Elm IgE  0.00 - 0.09 kUA/I <0.10   [place] Tree  0.00 - 0.09 kUA/I <0.10   Mugwort  0.00 - 0.09 kUA/I <0.10   [place] Tree  0.00 - 0.09 kUA/I <0.10   Oak  0.00 - 0.09 kUA/I <0.10   P.CHRYSOGENUM  0.00 - 0.09 kUA/I  "<0.10   Rough Pigweed  IgE  0.00 - 0.09 kUA/I <0.10   Common Ragweed  0.00 - 0.09 kUA/I <0.10   Sheep Sorrel IgE  0.00 - 0.09 kUA/I <0.10   [place] Tree  0.00 - 0.09 kUA/I <0.10   [place]min Grass  0.00 - 0.09 kUA/I 1.89 High    [place] Tree  0.00 - 0.09 kUA/I <0.10   [place] Tree  0.00 - 0.09 kUA/I <0.10   IgE  0 - 113 kU/l 87.7   MOUSE URINE  0.00 - 0.09 kUA/I <0.10           Past medical, surgical, social and family histories reviewed.      Medications/Allergies: Reviewed      Vitals: Blood pressure 108/62, pulse 58, temperature 98.7 °F (37.1 °C), temperature source Temporal, height 5' 9\" (1.753 m), weight 73.5 kg (162 lb), SpO2 95%. Body mass index is 23.92 kg/m². Oxygen Therapy  SpO2: 95 %      Physical Exam  Body mass index is 23.92 kg/m².   Gen: not in acute distress,   Neck/Eyes: supple, no adenopathy, PERRL  Ear: normal appearance, no significant hearing impairment  Nose:  normal nasal mucosa, no drainage  Mouth:  unremarkable/normal appearance of lips, teeth and gums  Oropharynx: mucosa is moist, no focal lesions or erythema  Salivary glands: soft nontender  Chest: normal respiratory efforts, clear breath sounds bilaterally  CV: RRR, no murmurs appreciated, no edema  Abdomen: soft, non tender  Extremities:  No observed deformity   Skin: unremarkable  Neuro: AAO X3, no focal motor deficit        Labs:  Lab Results   Component Value Date    WBC 3.34 (L) 02/10/2024    HGB 12.0 02/10/2024    HCT 36.0 (L) 02/10/2024    MCV 87 02/10/2024     (L) 02/10/2024     Lab Results   Component Value Date    CALCIUM 8.8 02/10/2024     01/09/2018    K 4.4 02/10/2024    CO2 30 02/10/2024     02/10/2024    BUN 15 02/10/2024    CREATININE 0.51 (L) 02/10/2024     Lab Results   Component Value Date    IGE 87.7 02/06/2024     Lab Results   Component Value Date    ALT 44 02/10/2024    AST 22 02/10/2024    ALKPHOS 72 02/10/2024    BILITOT 1.4 (H) 01/09/2018           Portions of the record may have been created " "with voice recognition software.  Occasional wrong word or \"sound a like\" substitutions may have occurred due to the inherent limitations of voice recognition software.  Read the chart carefully and recognize, using context, where substitutions have occurred    Tomas Jack M.D.  [facility]  "

## 2024-04-29 ENCOUNTER — OFFICE VISIT (OUTPATIENT)
Dept: PULMONOLOGY | Facility: CLINIC | Age: 64
End: 2024-04-29
Payer: COMMERCIAL

## 2024-04-29 VITALS
TEMPERATURE: 98.7 F | BODY MASS INDEX: 23.99 KG/M2 | DIASTOLIC BLOOD PRESSURE: 62 MMHG | SYSTOLIC BLOOD PRESSURE: 108 MMHG | HEART RATE: 58 BPM | WEIGHT: 162 LBS | OXYGEN SATURATION: 95 % | HEIGHT: 69 IN

## 2024-04-29 DIAGNOSIS — D80.2 IMMUNOGLOBULIN A DEFICIENCY (HCC): ICD-10-CM

## 2024-04-29 DIAGNOSIS — J42 CHRONIC BRONCHITIS, UNSPECIFIED CHRONIC BRONCHITIS TYPE (HCC): Primary | ICD-10-CM

## 2024-04-29 DIAGNOSIS — G47.33 OSA (OBSTRUCTIVE SLEEP APNEA): ICD-10-CM

## 2024-04-29 PROCEDURE — 99214 OFFICE O/P EST MOD 30 MIN: CPT | Performed by: INTERNAL MEDICINE

## 2024-04-30 ENCOUNTER — APPOINTMENT (OUTPATIENT)
Dept: LAB | Age: 64
End: 2024-04-30
Payer: COMMERCIAL

## 2024-04-30 DIAGNOSIS — E78.2 MIXED HYPERLIPIDEMIA: ICD-10-CM

## 2024-04-30 DIAGNOSIS — E87.0 TYPE I DIABETES MELLITUS WITH HYPEROSMOLAR COMA  (HCC): ICD-10-CM

## 2024-04-30 DIAGNOSIS — J30.2 SEASONAL ALLERGIES: ICD-10-CM

## 2024-04-30 DIAGNOSIS — E10.65 TYPE I DIABETES MELLITUS WITH HYPEROSMOLAR COMA  (HCC): ICD-10-CM

## 2024-04-30 DIAGNOSIS — E10.69 TYPE I DIABETES MELLITUS WITH HYPEROSMOLAR COMA  (HCC): ICD-10-CM

## 2024-04-30 DIAGNOSIS — E55.9 VITAMIN D DEFICIENCY: ICD-10-CM

## 2024-04-30 DIAGNOSIS — Z12.5 SCREENING PSA (PROSTATE SPECIFIC ANTIGEN): ICD-10-CM

## 2024-04-30 LAB
25(OH)D3 SERPL-MCNC: 45.1 NG/ML (ref 30–100)
CHOLEST SERPL-MCNC: 128 MG/DL
CREAT UR-MCNC: 99.4 MG/DL
EST. AVERAGE GLUCOSE BLD GHB EST-MCNC: 171 MG/DL
HBA1C MFR BLD: 7.6 %
HDLC SERPL-MCNC: 45 MG/DL
LDLC SERPL CALC-MCNC: 59 MG/DL (ref 0–100)
MICROALBUMIN UR-MCNC: <7 MG/L
MICROALBUMIN/CREAT 24H UR: <7 MG/G CREATININE (ref 0–30)
NONHDLC SERPL-MCNC: 83 MG/DL
PSA SERPL-MCNC: 0.43 NG/ML (ref 0–4)
TRIGL SERPL-MCNC: 122 MG/DL
TSH SERPL DL<=0.05 MIU/L-ACNC: 0.54 UIU/ML (ref 0.45–4.5)

## 2024-04-30 PROCEDURE — 82570 ASSAY OF URINE CREATININE: CPT

## 2024-04-30 PROCEDURE — 36415 COLL VENOUS BLD VENIPUNCTURE: CPT

## 2024-04-30 PROCEDURE — 80061 LIPID PANEL: CPT

## 2024-04-30 PROCEDURE — 82306 VITAMIN D 25 HYDROXY: CPT

## 2024-04-30 PROCEDURE — 82043 UR ALBUMIN QUANTITATIVE: CPT

## 2024-04-30 PROCEDURE — 3051F HG A1C>EQUAL 7.0%<8.0%: CPT | Performed by: INTERNAL MEDICINE

## 2024-04-30 PROCEDURE — G0103 PSA SCREENING: HCPCS

## 2024-04-30 PROCEDURE — 84443 ASSAY THYROID STIM HORMONE: CPT

## 2024-04-30 PROCEDURE — 83036 HEMOGLOBIN GLYCOSYLATED A1C: CPT

## 2024-05-14 ENCOUNTER — TELEPHONE (OUTPATIENT)
Age: 64
End: 2024-05-14

## 2024-05-14 NOTE — TELEPHONE ENCOUNTER
Incoming call by patient's spouse:    Requesting nebulizer medication reservoir as his current one cracked.

## 2024-05-16 LAB
DME PARACHUTE DELIVERY DATE ACTUAL: NORMAL
DME PARACHUTE DELIVERY DATE REQUESTED: NORMAL
DME PARACHUTE ITEM DESCRIPTION: NORMAL
DME PARACHUTE ORDER STATUS: NORMAL
DME PARACHUTE SUPPLIER NAME: NORMAL
DME PARACHUTE SUPPLIER PHONE: NORMAL

## 2024-05-25 DIAGNOSIS — J30.2 SEASONAL ALLERGIES: ICD-10-CM

## 2024-05-25 RX ORDER — MONTELUKAST SODIUM 10 MG/1
10 TABLET ORAL
Qty: 90 TABLET | Refills: 0 | Status: SHIPPED | OUTPATIENT
Start: 2024-05-25

## 2024-06-07 ENCOUNTER — TELEPHONE (OUTPATIENT)
Dept: NEUROLOGY | Facility: CLINIC | Age: 64
End: 2024-06-07

## 2024-06-11 ENCOUNTER — TELEPHONE (OUTPATIENT)
Dept: HEMATOLOGY ONCOLOGY | Facility: CLINIC | Age: 64
End: 2024-06-11

## 2024-06-11 ENCOUNTER — TELEPHONE (OUTPATIENT)
Age: 64
End: 2024-06-11

## 2024-06-11 DIAGNOSIS — G47.33 OSA (OBSTRUCTIVE SLEEP APNEA): Primary | ICD-10-CM

## 2024-06-11 NOTE — TELEPHONE ENCOUNTER
Appointment Change  Cancel, Reschedule, Change to Virtual      Who are you speaking with? Spouse   If it is not the patient, is the caller listed on the communication consent form? Yes   Which provider is the appointment scheduled with? Dr. Bronson   When was the original appointment scheduled?    Please list date and time 6/18/24 10:40 AM   At which location is the appointment scheduled to take place? [place]   Was the appointment rescheduled?     Was the appointment changed from an in person visit to a virtual visit?    If so, please list the details of the change. 7/11/24 2:40 PM   What is the reason for the appointment change? Pt requested change due to scheduling conflict.        Was STAR transport scheduled? No   Does STAR transport need to be scheduled for the new visit (if applicable) No   Does the patient need an infusion appointment rescheduled? No   Does the patient have an upcoming infusion appointment scheduled? If so, when? No   Is the patient undergoing chemotherapy? No   For appointments cancelled with less than 24 hours:  Was the no-show policy reviewed? Yes

## 2024-06-11 NOTE — TELEPHONE ENCOUNTER
Pt and his wife are calling to have an order for a sleep study done. They are frustrated because they feel he does not need a sleep study consult since they know the problem is that he needs a new script for a cpap, and cancelled that appointment. Pt wife would like a call back once the order has been placed

## 2024-06-12 ENCOUNTER — OFFICE VISIT (OUTPATIENT)
Dept: NEUROLOGY | Facility: CLINIC | Age: 64
End: 2024-06-12
Payer: COMMERCIAL

## 2024-06-12 VITALS
RESPIRATION RATE: 16 BRPM | HEART RATE: 45 BPM | BODY MASS INDEX: 23.64 KG/M2 | OXYGEN SATURATION: 98 % | HEIGHT: 69 IN | SYSTOLIC BLOOD PRESSURE: 110 MMHG | TEMPERATURE: 97.8 F | WEIGHT: 159.6 LBS | DIASTOLIC BLOOD PRESSURE: 60 MMHG

## 2024-06-12 DIAGNOSIS — G71.11 MYOTONIC DYSTROPHY (HCC): ICD-10-CM

## 2024-06-12 DIAGNOSIS — R56.9 SEIZURE (HCC): ICD-10-CM

## 2024-06-12 DIAGNOSIS — R41.3 MEMORY LOSS: Primary | ICD-10-CM

## 2024-06-12 PROCEDURE — 99214 OFFICE O/P EST MOD 30 MIN: CPT | Performed by: PSYCHIATRY & NEUROLOGY

## 2024-06-12 NOTE — ASSESSMENT & PLAN NOTE
The patient does have a history of myotonic dystrophy and he is on no medications.  Today he is complaining of a raspy voice and he has had hospitalizations for pneumonia.  We discussed that this can certainly lead to these issues.  He does have an appoint with ENT later on this week.  We have in the past discussed various medications such as Tegretol and mexiletine which she has avoided.  This may need to be considered in the future.    He did complain of some difficulty swallowing dry items and with overall swallowing.  I did place an order for a speech and swallow consultation

## 2024-06-12 NOTE — PROGRESS NOTES
Patient ID: Otoniel Martinez is a 64 y.o. male.    Assessment/Plan:    Memory loss  Today Oliverio and his wife report some issues with his memory.    Informs me of difficulty focusing and finishing tasks.  He has a long history of long-term memory problems and this has not changed several months ago we had spoken about some memory issues and he has been utilizing some vitamins which did help for short period of time but this has been less effective we did review his MRI of the brain performed in 2020 and a recent CAT scan of the head performed in 2023.  It did demonstrate did demonstrate some abnormalities in the left temporal region.  He has no problems driving does not get lost in familiar situations but does have difficulty in understanding and compound concepts.    A MoCA was performed today was 18 out of 30 however the patient did have difficulty in understanding questions answers.    I have also ordered a MRI of the brain  with neuroquant  and an EEG determine if there is any type of seizure activity the patient did have difficulty with staring focusing with abnormalities noted in the left temporal region on imaging studies.    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HCC)  The patient does have a history of myotonic dystrophy and he is on no medications.  Today he is complaining of a raspy voice and he has had hospitalizations for pneumonia.  We discussed that this can certainly lead to these issues.  He does have an appoint with ENT later on this week.  We have in the past discussed various medications such as Tegretol and mexiletine which she has avoided.  This may need to be considered in the future.    He did complain of some difficulty swallowing dry items and with overall swallowing.  I did place an order for a speech and swallow consultation       Diagnoses and all orders for this visit:    Memory loss  -     MRI brain NeuroQuant wo contrast; Future  -     Comprehensive metabolic panel; Future    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HCC)  -      "Ambulatory Referral to Speech Therapy; Future    Seizure ([facility])  -     EEG Routine and awake; Future       Subjective:       Mr. Martinez is a pleasant 63 yo male with history of myotonic dystrophy presents in a follow-up visit.     He was last evaluated here several months ago.  In the interim he does report progressive issues with concentration focusing and misplacing items.  He denies any difficulty with numbers and denies any problem with his bills.  He denies any difficulty getting lost in familiar places.  He reports the symptoms have been progressive over the last few months.  He did have MRI imaging in 2020 which showed a abnormality in the left temporoparietal region.  He in December 2023 had a CAT scan of the head which showed an ischemic lesion in the left temporal region.  He does report some difficulty with [place] focusing and spells of decreased attention.  The history was obtained by Oliverio himself and his wife Oliva over the phone.        He Does have a history of vertigo which occurs more in the supine position and when he moves his head to the right.  Overall he reports the vertigo is stable.    He is now on a pump for his diabetes              He does have a history of myotonic dystrophy.  He was diagnosed in 1988 when he noticed difficulty walking and spasms spasms of his muscles  .  This is worse in cold weather.. He had a muscle biopsy.  States he had trouble \"getting started\" in sports and thus had testing done including his brothers and dad, and his father  were all found to have a similar abnormality  but his two brothers are without symptoms .  States most notable with gripping.  Overall the symptoms are stable.  We had discussed the use of medications in the past but he felt that these were unnecessary at that time.  At this point his symptoms are stable.  His wife informs me that he did have side effects on Tegretol with change in behavior.  We discussed that I am not many medications out " there and the most common medications utilized are mexiletine, Tegretol and Valium.  Other complaints include difficulty with swallowing and vocal cord abnormalities.  He does not have appointment with ENT upcoming.  Swallowing include dry items.            history heart attack 2004, and has cardiac stent in.  follows with cardiology.he denies any problems with conduction.    has had three blood clots and thus on Xarelto-  has history of this in dad and likely inherited this from him.                  He   was a  and retired in November of 2017.  he denies any falls and does reports some intermittent problem with his balance..  He is  and has 2 grandchildren.        Laboratory studies studies on 313 his B12 was 302 and his folate was normal.        IMPRESSION, ct scan of head   Mild chronic microangiopathic white matter disease.  Probable 1.6 cm chronic infarction posterolateral left temporoparietal junction     No acute ischemic disease     Normal posterior fossa and IACs      CT examination of the brain and maxillofacial bones was performed using standard   departmental technique.     Reference is made to prior study done 11/21/2022   Findings:     The ventricles, sulci and cisterns are prominent consistent with age-appropriate   volume loss.     There is intracranial vascular calcification. There is an old left posterior   temporal infarct.     There is low attenuation in the periventricular white matter, nonspecific but   most suggestive of small vessel ischemic disease.     There is no evidence of intracranial hemorrhage, mass, mass-effect or acute   focal territorial infarction.     There is no depressed skull fracture.     There is soft tissue swelling perhaps a small amount of hemorrhage over the   right frontal bone without underlying fracture.     Imaging of the maxillofacial bones redemonstrates soft tissue swelling and   possible small hemorrhage over the right  frontal bone. There is nasal septal   deviation to the right. Mucosal thickening of the paranasal sinuses. Is no acute   fracture.       He was accompanied by his granddaughter Maribel                                                      The following portions of the patient's history were reviewed and updated as appropriate: He  has a past medical history of Acute respiratory failure with hypoxia ([facility]) (02/08/2024), CAD (coronary artery disease), Congenital myotonia, Deep vein thrombosis (DVT) ([facility]), Diabetes ([facility]), Diabetes mellitus ([facility]), Difficulty walking, HL (hearing loss), 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[facility]) (12/06/2017), Pancreatitis, Sleep apnea, Superficial thrombophlebitis, and Vision loss.  He  has a past surgical history that includes Cholecystectomy; Coronary angioplasty with stent; Colonoscopy; Circumcision; Inguinal hernia repair; ORIF radial shaft fracture (Left); ORIF ulnar / radial shaft fracture (Left); Tonsillectomy and adenoidectomy; and Carotid stent.  His family history includes Brain cancer in his mother; Cancer in his mother; Clotting disorder in his mother; Coronary artery disease in his paternal uncle; Deep vein thrombosis in his father; Emphysema in his father; Heart disease in his mother; Hyperlipidemia in his mother; Stroke in his father.  He  reports that he has never smoked. He has never used smokeless tobacco. He reports that he does not currently use alcohol. He reports that he does not use drugs.  Current Outpatient Medications   Medication Sig Dispense Refill    acyclovir (ZOVIRAX) 200 mg capsule Take 1 capsule (200 mg total) by mouth 5 (five) times a day for 5 days (Patient taking differently: Take 200 mg by mouth 5 (five) times a day Patient takes PRN) 25 capsule 0    aspirin (ECOTRIN LOW STRENGTH) 81 mg EC tablet Take 81 mg by mouth daily      atorvastatin (LIPITOR) 40 mg tablet TAKE 1 TABLET BY MOUTH EVERY DAY 90 tablet 1    budesonide-formoterol (Symbicort) 80-4.5 MCG/ACT inhaler  "Inhale 2 puffs 2 (two) times a day Rinse mouth after use. 30.6 g 5    cholecalciferol (VITAMIN D3) 400 units tablet Take 1 tablet by mouth every morning      Continuous Blood Gluc Sensor (Dexcom G6 Sensor) MISC Change every 10 days. E10.65      Continuous Blood Gluc Transmit (Dexcom G6 Transmitter) MISC Change every 90 days. E10.65      fluticasone (FLONASE) 50 mcg/act nasal spray 2 sprays into each nostril daily      Gvoke HypoPen 2-Pack 1 MG/0.2ML SOAJ INJECT 1 MG UNDER THE SKIN AS NEEDED (FOR SEVERE HYPOGLYCEMIA). E10.65      Insulin Disposable Pump (Omnipod 5 G6 Intro, Gen 5,) KIT Inject 1 each under the skin every third day      Insulin Disposable Pump (Omnipod 5 G6 Pods, Gen 5,) MISC INJECT 1 UNDER THE SKIN EVERY OTHER DAY. CHANGING EVERY 2 DAYS DUE TO HIGH INSULIN REQUIREMENT.      levalbuterol (Xopenex) 1.25 mg/3 mL nebulizer solution Take 3 mL (1.25 mg total) by nebulization 3 (three) times a day 270 mL 4    montelukast (SINGULAIR) 10 mg tablet TAKE 1 TABLET BY MOUTH DAILY AT BEDTIME 90 tablet 0    NovoLOG 100 UNIT/ML injection Inject up to 110 units daily via insulin pump. E10.65      pantoprazole (PROTONIX) 20 mg tablet Take 2 tablets (40 mg total) by mouth daily 60 tablet 0    rivaroxaban (Xarelto) 20 mg tablet Take 1 tablet (20 mg total) by mouth daily with breakfast 90 tablet 3    sildenafil (VIAGRA) 100 mg tablet Take 1 tablet (100 mg total) by mouth daily as needed for erectile dysfunction 10 tablet 1    sodium chloride 0.9 % nebulizer solution Take 3 mL by nebulization as needed for wheezing 90 mL 3     No current facility-administered medications for this visit.     He is allergic to other..         Objective:    Blood pressure 110/60, pulse (!) 45, temperature 97.8 °F (36.6 °C), temperature source Temporal, resp. rate 16, height 5' 9\" (1.753 m), weight 72.4 kg (159 lb 9.6 oz), SpO2 98%.    Physical Exam  Eyes:      General: Lids are normal.      Extraocular Movements: Extraocular movements intact. "      Pupils: Pupils are equal, round, and reactive to light.   Neurological:      Cranial Nerves: Dysarthria present.      Deep Tendon Reflexes:      Reflex Scores:       Patellar reflexes are 2+ on the right side and 2+ on the left side.       Achilles reflexes are 1+ on the right side and 1+ on the left side.        Neurological Exam  Mental Status   Oriented to person, place and time. Mild dysarthria present. Language is fluent with no aphasia.  Today's MoCA is a 18 out of 30 however on 2 occasions he was unable to understand the examiner.  Therefore we will change it to 20 out of 30.  He had difficulty with placing the numbers a and missed certain letters he had no difficulty with subtraction of serial sevens..    Cranial Nerves  CN II: Visual acuity is normal. Visual fields full to confrontation.  CN III, IV, VI: Extraocular movements intact bilaterally. Normal lids and orbits bilaterally. Pupils equal round and reactive to light bilaterally.  CN V: Facial sensation is normal.  CN VII: Full and symmetric facial movement.  CN VIII: Hearing is normal.  CN IX, X: Palate elevates symmetrically. Normal gag reflex.  CN XI: Shoulder shrug strength is normal.  CN XII: Tongue midline without atrophy or fasciculations.    Motor   Strength is 5/5 in all four extremities except as noted.  There is no myotonia.  He had normal strength..    Sensory  Light touch is normal in upper and lower extremities. Temperature is normal in upper and lower extremities.   Sensory was light light touch..    Reflexes                                            Right                      Left  Patellar                                2+                         2+  Achilles                                1+                         1+    Right pathological reflexes: Polo's absent.  Left pathological reflexes: Polo's absent.  Reflexes are +2..    Coordination  Right: Finger-to-nose normal.Left: Finger-to-nose normal.    Gait   Able to rise  from chair without using arms.    Review  of systems obtained from the medical assistant as below was reviewed with the patient at today's visit    ROS:    Review of Systems   Constitutional:  Negative for appetite change, fatigue and fever.   HENT:  Positive for hearing loss, tinnitus and trouble swallowing. Negative for voice change.    Eyes:  Positive for pain and visual disturbance. Negative for photophobia.   Respiratory:  Positive for shortness of breath.    Cardiovascular:  Positive for palpitations.   Gastrointestinal: Negative.  Negative for nausea and vomiting.   Endocrine: Negative.  Negative for cold intolerance.   Genitourinary: Negative.  Negative for dysuria, frequency and urgency.   Musculoskeletal:  Negative for back pain, gait problem, myalgias, neck pain and neck stiffness.   Skin: Negative.  Negative for rash.   Allergic/Immunologic: Negative.    Neurological:  Positive for dizziness, speech difficulty and light-headedness. Negative for tremors, seizures, syncope, facial asymmetry, weakness, numbness and headaches.   Hematological: Negative.  Does not bruise/bleed easily.   Psychiatric/Behavioral:  Positive for confusion, hallucinations and sleep disturbance.    All other systems reviewed and are negative.

## 2024-06-12 NOTE — ASSESSMENT & PLAN NOTE
Today Oliverio and his wife report some issues with his memory.    Informs me of difficulty focusing and finishing tasks.  He has a long history of long-term memory problems and this has not changed several months ago we had spoken about some memory issues and he has been utilizing some vitamins which did help for short period of time but this has been less effective we did review his MRI of the brain performed in 2020 and a recent CAT scan of the head performed in 2023.  It did demonstrate did demonstrate some abnormalities in the left temporal region.  He has no problems driving does not get lost in familiar situations but does have difficulty in understanding and compound concepts.    A MoCA was performed today was 18 out of 30 however the patient did have difficulty in understanding questions answers.    I have also ordered a MRI of the brain  with neuroquant  and an EEG determine if there is any type of seizure activity the patient did have difficulty with staring focusing with abnormalities noted in the left temporal region on imaging studies.

## 2024-06-13 ENCOUNTER — APPOINTMENT (OUTPATIENT)
Dept: LAB | Age: 64
End: 2024-06-13
Payer: COMMERCIAL

## 2024-06-13 DIAGNOSIS — D72.819 LEUKOPENIA, UNSPECIFIED TYPE: ICD-10-CM

## 2024-06-13 DIAGNOSIS — R41.3 MEMORY LOSS: ICD-10-CM

## 2024-06-13 DIAGNOSIS — E53.9 VITAMIN B DEFICIENCY, UNSPECIFIED: ICD-10-CM

## 2024-06-13 DIAGNOSIS — R16.1 SPLENOMEGALY: ICD-10-CM

## 2024-06-13 DIAGNOSIS — D69.6 THROMBOCYTOPENIA (HCC): ICD-10-CM

## 2024-06-13 LAB
ALBUMIN SERPL BCP-MCNC: 3.7 G/DL (ref 3.5–5)
ALP SERPL-CCNC: 76 U/L (ref 34–104)
ALT SERPL W P-5'-P-CCNC: 118 U/L (ref 7–52)
ANION GAP SERPL CALCULATED.3IONS-SCNC: 4 MMOL/L (ref 4–13)
AST SERPL W P-5'-P-CCNC: 53 U/L (ref 13–39)
BASOPHILS # BLD AUTO: 0.03 THOUSANDS/ÂΜL (ref 0–0.1)
BASOPHILS NFR BLD AUTO: 1 % (ref 0–1)
BILIRUB SERPL-MCNC: 0.57 MG/DL (ref 0.2–1)
BUN SERPL-MCNC: 16 MG/DL (ref 5–25)
CALCIUM SERPL-MCNC: 8.7 MG/DL (ref 8.4–10.2)
CHLORIDE SERPL-SCNC: 107 MMOL/L (ref 96–108)
CO2 SERPL-SCNC: 31 MMOL/L (ref 21–32)
CREAT SERPL-MCNC: 0.6 MG/DL (ref 0.6–1.3)
EOSINOPHIL # BLD AUTO: 0.33 THOUSAND/ÂΜL (ref 0–0.61)
EOSINOPHIL NFR BLD AUTO: 8 % (ref 0–6)
ERYTHROCYTE [DISTWIDTH] IN BLOOD BY AUTOMATED COUNT: 13.9 % (ref 11.6–15.1)
GFR SERPL CREATININE-BSD FRML MDRD: 106 ML/MIN/1.73SQ M
GLUCOSE P FAST SERPL-MCNC: 80 MG/DL (ref 65–99)
HCT VFR BLD AUTO: 44.2 % (ref 36.5–49.3)
HGB BLD-MCNC: 14.1 G/DL (ref 12–17)
IMM GRANULOCYTES # BLD AUTO: 0.02 THOUSAND/UL (ref 0–0.2)
IMM GRANULOCYTES NFR BLD AUTO: 1 % (ref 0–2)
LYMPHOCYTES # BLD AUTO: 0.76 THOUSANDS/ÂΜL (ref 0.6–4.47)
LYMPHOCYTES NFR BLD AUTO: 18 % (ref 14–44)
MCH RBC QN AUTO: 29.1 PG (ref 26.8–34.3)
MCHC RBC AUTO-ENTMCNC: 31.9 G/DL (ref 31.4–37.4)
MCV RBC AUTO: 91 FL (ref 82–98)
MONOCYTES # BLD AUTO: 0.56 THOUSAND/ÂΜL (ref 0.17–1.22)
MONOCYTES NFR BLD AUTO: 13 % (ref 4–12)
NEUTROPHILS # BLD AUTO: 2.59 THOUSANDS/ÂΜL (ref 1.85–7.62)
NEUTS SEG NFR BLD AUTO: 59 % (ref 43–75)
NRBC BLD AUTO-RTO: 0 /100 WBCS
PLATELET # BLD AUTO: 137 THOUSANDS/UL (ref 149–390)
PMV BLD AUTO: 10.7 FL (ref 8.9–12.7)
POTASSIUM SERPL-SCNC: 4.1 MMOL/L (ref 3.5–5.3)
PROT SERPL-MCNC: 5.7 G/DL (ref 6.4–8.4)
RBC # BLD AUTO: 4.84 MILLION/UL (ref 3.88–5.62)
SODIUM SERPL-SCNC: 142 MMOL/L (ref 135–147)
VIT B12 SERPL-MCNC: 400 PG/ML (ref 180–914)
WBC # BLD AUTO: 4.29 THOUSAND/UL (ref 4.31–10.16)

## 2024-06-13 PROCEDURE — 85007 BL SMEAR W/DIFF WBC COUNT: CPT

## 2024-06-13 PROCEDURE — 85025 COMPLETE CBC W/AUTO DIFF WBC: CPT

## 2024-06-13 PROCEDURE — 88184 FLOWCYTOMETRY/ TC 1 MARKER: CPT

## 2024-06-13 PROCEDURE — 80053 COMPREHEN METABOLIC PANEL: CPT

## 2024-06-13 PROCEDURE — 36415 COLL VENOUS BLD VENIPUNCTURE: CPT

## 2024-06-13 PROCEDURE — 81305 MYD88 GENE P.LEU265PRO VRNT: CPT

## 2024-06-13 PROCEDURE — 82607 VITAMIN B-12: CPT

## 2024-06-13 PROCEDURE — 88185 FLOWCYTOMETRY/TC ADD-ON: CPT | Performed by: INTERNAL MEDICINE

## 2024-06-14 NOTE — TELEPHONE ENCOUNTER
Pts wife called again/ stating pt doesn't not want a consult / she feels it is a waste of time being that they know the problem. She is requesting an order for a sleep study to be done.    Pts wife states they would like it scheduled and done before Nov. Because they are leaving for [place] . And will not be returning until March.    Pt wife , Oliva requesting a call back.     [phone]

## 2024-06-17 ENCOUNTER — HOSPITAL ENCOUNTER (OUTPATIENT)
Dept: NEUROLOGY | Facility: CLINIC | Age: 64
Discharge: HOME | End: 2024-06-17
Payer: COMMERCIAL

## 2024-06-17 DIAGNOSIS — R56.9 SEIZURE (HCC): ICD-10-CM

## 2024-06-17 PROCEDURE — 95816 EEG AWAKE AND DROWSY: CPT

## 2024-06-17 PROCEDURE — 95816 EEG AWAKE AND DROWSY: CPT | Performed by: STUDENT IN AN ORGANIZED HEALTH CARE EDUCATION/TRAINING PROGRAM

## 2024-06-18 LAB
BASOPHILS # BLD AUTO: 0.04 THOUSAND/UL (ref 0–0.1)
BASOPHILS NFR MAR MANUAL: 1 % (ref 0–1)
BURR CELLS BLD QL SMEAR: PRESENT
EOSINOPHIL # BLD AUTO: 0.26 THOUSAND/UL (ref 0–0.61)
EOSINOPHIL NFR BLD MANUAL: 6 % (ref 0–6)
LYMPHOCYTES # BLD AUTO: 0.6 THOUSAND/UL (ref 0.6–4.47)
LYMPHOCYTES # BLD AUTO: 9 %
MISCELLANEOUS LAB TEST RESULT: NORMAL
MONOCYTES # BLD AUTO: 0.34 THOUSAND/UL (ref 0–1.22)
MONOCYTES NFR BLD AUTO: 8 % (ref 4–12)
NEUTS BAND NFR BLD MANUAL: 4 % (ref 0–8)
NEUTS SEG # BLD: 3.05 THOUSAND/UL (ref 1.81–6.82)
NEUTS SEG NFR BLD AUTO: 67 %
PATHOLOGY REVIEW: YES
PLATELET BLD QL SMEAR: ABNORMAL
POIKILOCYTOSIS BLD QL SMEAR: PRESENT
TOTAL CELLS COUNTED SPEC: 100
VARIANT LYMPHS # BLD AUTO: 5 % (ref 0–0)

## 2024-06-19 LAB — SCAN RESULT: NORMAL

## 2024-06-20 DIAGNOSIS — D72.819 LEUKOPENIA, UNSPECIFIED TYPE: Primary | ICD-10-CM

## 2024-06-20 DIAGNOSIS — D69.6 THROMBOCYTOPENIA (HCC): ICD-10-CM

## 2024-06-20 DIAGNOSIS — R16.1 SPLENOMEGALY: ICD-10-CM

## 2024-06-23 PROCEDURE — 85060 BLOOD SMEAR INTERPRETATION: CPT | Performed by: STUDENT IN AN ORGANIZED HEALTH CARE EDUCATION/TRAINING PROGRAM

## 2024-06-24 ENCOUNTER — HOSPITAL ENCOUNTER (OUTPATIENT)
Dept: RADIOLOGY | Facility: HOSPITAL | Age: 64
Discharge: HOME | End: 2024-06-24
Payer: COMMERCIAL

## 2024-06-24 DIAGNOSIS — R13.14 PHARYNGOESOPHAGEAL DYSPHAGIA: ICD-10-CM

## 2024-06-24 PROCEDURE — 92611 MOTION FLUOROSCOPY/SWALLOW: CPT

## 2024-06-24 PROCEDURE — 74230 X-RAY XM SWLNG FUNCJ C+: CPT

## 2024-06-24 NOTE — PROCEDURES
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    Pancreatitis     three times    Sleep apnea     not using a C-PAP    Superficial thrombophlebitis     Vision loss     reading glasses over the counter        Past Surgical History  Past Surgical History:   Procedure Laterality Date    CAROTID STENT      CHOLECYSTECTOMY      CIRCUMCISION      Elective    COLONOSCOPY      complete, polyps 2 years ago    CORONARY ANGIOPLASTY WITH STENT PLACEMENT      stent to RCA 2004    INGUINAL HERNIA REPAIR      ORIF RADIAL SHAFT FRACTURE Left     Open Treatment of Multiple Fractures of the Radial Shaft    ORIF ULNAR / RADIAL SHAFT FRACTURE Left     Open Treatment of Multiple Fractures of the Ulnar Shaft    TONSILLECTOMY AND ADENOIDECTOMY       Modified (Video) Barium Swallow Study    Summary:  Images are on PACS for review.     Oral stage: Mild. No anterior leakage. Mastication, bolus control, and formation were WNL. Transfer was mildly weak. Posterior lingual residue.     Pharyngeal stage: Mild/mod Swallows were prompt. Reduced pharyngeal constriction/wide column of contrast. Weak tongue base retraction. Epiglottic inversion was incomplete for most swallows. Anterior hyoid excursion was mildly reduced. Prominent cricopharyngeus w/ intermittent small collection of material in that area after the swallow. Eventually cleared. Laryngeal elevation WNL. Reduced passage through UES w/ bread.  The 13 mm pill passed adequately. Full to near-full  retention in the valleculae with all trials of p.o. Pt sensate to retention. No gross penetration or aspiration.  Small cervical osteophytes.      Per gross esophageal screen:  Stasis in the upper thoracic esophagus following solids. Most of this cleared w/ additional dry swallow.  The 13 mm tablet passed through the stomach.    Strategies:  Chin down and head rotation not effective.   Additional swallows cleared partial residue.   Cues for hard swallow did not appear effective.    Recommendations:  Diet: Regular. Avoid dense items or modify w/ condiments/moisture.   Liquids: thin  Meds: Break or crush if large (as allowable)  Strategies: chew well, multiple swallows  Frequent oral care  Upright position  F/u ST tx: YES  Aspiration Precautions  Reflux Precautions  Consider consult with: GI. Consider EGD. Noted pt follows w/ neuro.   Results reviewed with: pt  Repeat MBS as necessary  If a dedicated assessment of the esophagus is desired: Consider EGD      Functional Oral Intake Scale (FOIS)  Jc Lara PhD, F-ISHMAEL    6. Total oral diet with multiple consistencies without special preparations, but with specific food limitations.    Pt is a 64yom referred by ENT PA for VBS. Pt's primary complaints of solid food and pill dysphagia. Has difficulty w/ bread. Stated pills get stuck. (Unsure if this is in the valleculae or at the CP prominence). Noted L labial droop at rest and reduced retraction. Pt unaware if this is new. Also noted slight tongue tip deviation to the R on protrusion. Speech was clear.       H&P/pertinent provider notes: (PMH noted above)  Per visit w/ ENT PRIYA Roper 6/14/24  Assessment/Plan:  Patient presents with several month history of dysphonia associated with reports of difficulty swallowing and SOB.  He has chronic SOB, lightheadedness and LOJA and I have encouraged continued follow up with Pulmonology.  With regards to dysphonia, I have reassured him that there are no abnormal growths  or lesions on exam or endoscopy and true vocal cord function is normal. He does have significant PND and laryngeal findings consistent with LPR.  With regards to postnasal drainage I have recommended sinus irrigation and nasal steroid therapy. Proper use of the nasal spray was reviewed.  I also advised that he use topical saline gel to bilateral nasal cavity to prevent nasal dryness and bleeding.  I have also recommended video swallow study for his complaints of swallowing difficulty.  We will call him when the results are available.    With regards to reflux I have recommended pantoprazole 40 mg BID. I have also reviewed the importance of dietary modifications. Dietary guidelines were reviewed verbally and written instructions were provided to the patient. Our office will call him/her in 3-4 weeks to assess symptoms. Future medication or workup will be based upon his/her response.   With regards to nasal bleeding, no suspicious vessels were identified on examination or endoscopy. Right anterior septum is raw and friable and Surgicel was placed over the anterior septal tissue. I have encouraged daily sinus irrigation and use of topical saline gel intermittently throughout the day.  1. Dysphonia  R49.0         2. Pharyngoesophageal dysphagia  R13.14 FL barium swallow video w speech       3. PND (post-nasal drip)  R09.82         4. Laryngopharyngeal reflux (LPR)  K21.9 pantoprazole (PROTONIX) 40 mg tablet       5. Nasal bleeding  R04.0         6. Deviated septum  J34.2         Special Studies:  NPL exam at ENT: 6/14/24  Eustachian Tube Orifice:  Patent bilaterally without edema or obstruction.  Nasopharynx:  Smooth mucosa without adenoid bed or mass.  Oropharynx:  No masses or lesions present; + thick PND.  Vallecula:  No abnormalities, pink moist mucosa; some pooling of secretions.  Tongue Base:  Normal without masses or asymmetry.  Epiglottis:  Normal mucosa on vallecular and laryngeal surfaces.  Pyriform Aperture:   Mucosa appears pink and moist without masses.  Arytenoid Mucosa/Aryepiglottic Folds:  there is moderate edema and hyperemia, but no mass or ulcer. Mucous surrounding the mucosa.    False Vocal cords:  No evidence of mass or lesion, but there is erythema and edema of the false cords (this is symmetric and likely 2/2 LPR)  True Vocal cords:  both cords are inflamed and erythematous.  No masses, nodules, polyps, paresis or paralysis of either cord.  Webbing of mucous b/t the cords  Subglottic Space:  The airway is patent and there are no lesions.      2/8/24 CT chest:  Mild diffuse bronchial wall thickening compatible with bronchitis.  Mild groundglass opacity in the dependent right lower lobe, at least in part due to atelectasis. Superimposed mild pneumonia not excluded.  Mild splenomegaly.  12/22/23 CT C spine:  There is normal alignment of the cervical spine. Is no acute fracture.   At C3-4 there is mild uncovertebral spurring on the left.     Previous MBS:  [facility] 4/26/22:  ASSESSMENT: Mild Oral and Pharyngeal Dysphagia  **Suspect reduced tongue base retraction resulting in some level of vallecular retention post swallow with consistencies trialed.  **Mild pyriform sinus residue also noted during trials.  **No laryngeal penetration or tracheal aspiration seen on study.     Food allergies:  none   Current diet:  regular   Premorbid diet:  regular   Dentition:  Wnl, natural   O2 requirement:  none   Oral mech: Left labial droop/weakness, mild R tongue tip deviation   Vocal quality/speech:  WNL   Cognitive status:  Alert     Consistencies administered: Puree, nutrigrain bar, hard cookie, turkey sandwich bite,  barium pill w/ thin, thin, nectar.    Pt was viewed standing laterally and AP

## 2024-06-26 ENCOUNTER — HOSPITAL ENCOUNTER (OUTPATIENT)
Facility: MEDICAL CENTER | Age: 64
Discharge: HOME | End: 2024-06-26
Attending: PSYCHIATRY & NEUROLOGY
Payer: COMMERCIAL

## 2024-06-26 DIAGNOSIS — R41.3 MEMORY LOSS: ICD-10-CM

## 2024-06-26 PROCEDURE — 70551 MRI BRAIN STEM W/O DYE: CPT

## 2024-06-27 ENCOUNTER — EVALUATION (OUTPATIENT)
Dept: SPEECH THERAPY | Facility: CLINIC | Age: 64
End: 2024-06-27
Payer: COMMERCIAL

## 2024-06-27 DIAGNOSIS — R13.12 DYSPHAGIA, OROPHARYNGEAL PHASE: Primary | ICD-10-CM

## 2024-06-27 DIAGNOSIS — G71.11 MYOTONIC DYSTROPHY (HCC): ICD-10-CM

## 2024-06-27 PROCEDURE — 92526 ORAL FUNCTION THERAPY: CPT

## 2024-06-27 PROCEDURE — 92610 EVALUATE SWALLOWING FUNCTION: CPT

## 2024-06-27 NOTE — PROGRESS NOTES
Speech-Language Pathology Initial Evaluation    Today's date: 2024   Patient’s name: Otoniel Martinez  : 1960  MRN: [id number]  Safety measures: myotonic dystrophy, chronic SOB, DM Type 1  Referring provider: Meyl Bacon,*    Encounter Diagnosis     ICD-10-CM    1. Dysphagia, oropharyngeal phase  R13.12       2. 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HCC)  G71.11 Ambulatory Referral to Speech Therapy          Assessment:  Patient presents with mild oropharyngeal dysphagia characterized by reduced tongue base retraction and pharyngeal constriction. Valleculer residue with P.O. trials. 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 Pt was seen by ENT, although voice assessments were not completed today due to time constraints. Voice will be assessed during the next session. It is recommended that patient receive skilled swallowing therapy to strengthen oral and pharyngeal musculature, improve safety of swallow during P.O. trials, and improve quality of life.    -Factors affecting performance: None    -Safety concerns: Risk for aspiration and Risk for choking    -Risk factors: GERD and Other chronic SOB and myotonic dystrophy      SWALLOWING SAFETY PRECAUTIONS:  -Recommended solids: Regular    -Recommended liquids: Thin    -Recommended medication form: crushed or broken (if allowable), mix with puree if needed    -Frequent/thorough oral care     -Aspiration precautions  *Monitor for signs/symptoms concerning for aspiration (e.g., low grade fever, increase in WBC, change in chest x-ray, increased congestion, increased coughing with P.O. intake)    -Reflux precautions    -Strategies: Small sips and bites when eating, Slow rate, swallow between bites, Alternate liquids and solids, and Other multiple swallows    -Positioning: Upright position during meals and Upright position at least 30 minutes after P.O. intake    -Compensatory strategies: Effortful swallow and Multiple  "swallows    -Supervision: Independent     -Referrals: None       Short-term goals:    Patient will consume (regular consistency) PO trials during therapy session, without overt s/sx of aspiration given minimal verbal cues for use of swallowing strategies in order to safely consume the least restrictive diet, to be achieved in 4-6 weeks.     Patient will complete swallowing exercises (i.e., Mendelsohn, Eliza, Effortful) to increase pharyngeal strength and coordination with min cues to 90% effectiveness to improve bolus formation and strengthen pharyngeal musculature, to be achieved in 4-6 weeks.    Patient will perform compensatory strategies (e.g., upright positioning, small bites/sips, slow rate, alternation of consistencies, multiple swallows, effortful swallow, chin tuck, head turn, etc.) with minimal verbal cues to prevent overt s/sx penetration/aspiration of least restrictive food/liquid consistencies, to be achieved in 4-6 weeks.    Long Term Goals    Patient will maintain adequate hydration and nutrition with optimum safety and efficacy of swallowing function on P.O. intake without overt s/sx of penetration or aspiration by discharge.     Patient will utilize compensatory strategies with optimum safety and efficacy of swallowing function on P.O. intake without overt s/sx of penetration or aspiration by discharge.    Plan:  Patient would benefit from outpatient skilled Speech Therapy services: Dysphagia therapy    Frequency: 1-2x weekly  Duration: 4-6 weeks    Intervention certification from: 6/27/2024  Intervention certification to: 8/8/24      Subjective:  History of present illness: Patient is a 64 y.o. male who was referred to outpatient skilled Speech Therapy services by Neurology for a dysphagia evaluation. Pt completed MBSS on 6/24/24 which showed the following \"Oral stage: Mild. No anterior leakage. Mastication, bolus control, and formation were WNL. Transfer was mildly weak. Posterior lingual " "residue.      Pharyngeal stage: Mild/mod Swallows were prompt. Reduced pharyngeal constriction/wide column of contrast. Weak tongue base retraction. Epiglottic inversion was incomplete for most swallows. Anterior hyoid excursion was mildly reduced. Prominent cricopharyngeus w/ intermittent small collection of material in that area after the swallow. Eventually cleared. Laryngeal elevation WNL. Reduced passage through UES w/ bread.  The 13 mm pill passed adequately. Full to near-full retention in the valleculae with all trials of p.o. Pt sensate to retention. No gross penetration or aspiration.  Small cervical osteophytes.       Per gross esophageal screen:  Stasis in the upper thoracic esophagus following solids. Most of this cleared w/ additional dry swallow.  The 13 mm tablet passed through the stomach.     Strategies:  Chin down and head rotation not effective.   Additional swallows cleared partial residue.   Cues for hard swallow did not appear effective.\"    Pt also seen by ENT on 6/14/24. Per ENT report \"Patient presents with several month history of dysphonia associated with reports of difficulty swallowing and SOB.  He has chronic SOB, lightheadedness and LOJA and I have encouraged continued follow up with Pulmonology.  With regards to dysphonia, I have reassured him that there are no abnormal growths or lesions on exam or endoscopy and true vocal cord function is normal. He does have significant PND and laryngeal findings consistent with LPR.  With regards to postnasal drainage I have recommended sinus irrigation and nasal steroid therapy. Proper use of the nasal spray was reviewed.  I also advised that he use topical saline gel to bilateral nasal cavity to prevent nasal dryness and bleeding.  I have also recommended video swallow study for his complaints of swallowing difficulty.  We will call him when the results are available.    With regards to reflux I have recommended pantoprazole 40 mg BID. I have " "also reviewed the importance of dietary modifications. Dietary guidelines were reviewed verbally and written instructions were provided to the patient. Our office will call him/her in 3-4 weeks to assess symptoms. Future medication or workup will be based upon his/her response.   With regards to nasal bleeding, no suspicious vessels were identified on examination or endoscopy. Right anterior septum is raw and friable and Surgicel was placed over the anterior septal tissue. I have encouraged daily sinus irrigation and use of topical saline gel intermittently throughout the day.\"        Patient's goal(s): doesn't want mucous to build up in throat    Hearing: WFL  Vision: WFL with reading glasses    Home environment/lifestyle: lives with wife and son - takes care of grandGlaxstards, golfs  Highest level of education: High school  Vocational status: retired       Objective (testing):  Dysphagia Evaluation:    -Reason for referral: Signs/symptoms of dysphagia    -Subjective report of swallowing difficulty: Coughing, Choking, and Globus sensation     -Difficulty swallowing: Pills    -Current diet (solids): Regular  -Current diet (liquids): Thin  -Current pill intake method: with water  -Alternative Feeding Method?: No    -Facial appearance Symmetrical   -Mandible function Adequate ROM   -Dentition Adequate   -Labial function WFL   -Lingual function WFL   -Velar function Symmetrical   -Oral apraxia? Absent   -Vocal quality Hoarse and Rough   -Volitional cough Strong/productive   -Respiration WFL during assessment   -Drooling? No   -Tremor/involuntary movement? Not present     LIQUID CONSISTENCY TESTING:   Item(s) tested: (Thin) - water    Administered by: Cup    Clinical findings:  -Oral phase impairments: N/A, patient WFL  -Pharyngeal phase impairments: N/A, patient WFL    Other notes: Other: pt swallowed sips of water without difficulty    Strategies, attempts, and responses: small bites, small sips, liquid wash, and multiple " swallow      SOLID CONSISTENCY TESTING:  Item(s) tested: (Regular, Mechanical Soft, and Puree) - cheese crackers, soft fruit bar, and applesauce    Administered by: Spoon and Self-fed    Clinical findings:  -Oral phase impairments: N/A, patient WFL  -Pharyngeal phase impairments: N/A, patient WFL    Other notes: Other: Pt swallowed solids without difficulty. Pt demonstrated talking with food in mouth and was instructed to wait to talk until he swallows    Strategies, attempts, and responses: small bites, small sips, liquid wash, and multiple swallow      -Factors affecting performance: None    -Safety concerns: Risk for aspiration and Risk for choking    -Risk factors: GERD and Other chronic SOB and myotonic dystrophy      SWALLOWING SAFETY PRECAUTIONS:  -Recommended solids: Regular    -Recommended liquids: Thin    -Recommended medication form: crushed or broken (if allowable), mix with puree if needed    -Frequent/thorough oral care     -Aspiration precautions  *Monitor for signs/symptoms concerning for aspiration (e.g., low grade fever, increase in WBC, change in chest x-ray, increased congestion, increased coughing with P.O. intake)    -Reflux precautions    -Strategies: Small sips and bites when eating, Slow rate, swallow between bites, Alternate liquids and solids, and Other multiple swallows    -Positioning: Upright position during meals and Upright position at least 30 minutes after P.O. intake    -Compensatory strategies: Effortful swallow and Multiple swallows    -Supervision: Independent     -Referrals: None       Treatment:  Pt educated on dysphagia, aspiration precautions, and compensatory strategies with P.O.      Visit Tracking:  POC   Expires Auth Expiration Date ST Visit Limit   8/8/2024 30 visits or 12/31/24 30 visits hard max          Visit/Unit Tracking:  Auth Status Date 6/27/24   Auth Required Used 1    Remaining 29       Intervention Comments:  45 min dysphagia testing, 15 min pt education

## 2024-07-01 ENCOUNTER — APPOINTMENT (OUTPATIENT)
Dept: LAB | Age: 64
End: 2024-07-01
Payer: COMMERCIAL

## 2024-07-01 DIAGNOSIS — D72.819 LEUKOPENIA, UNSPECIFIED TYPE: ICD-10-CM

## 2024-07-01 DIAGNOSIS — E53.9 VITAMIN B DEFICIENCY, UNSPECIFIED: ICD-10-CM

## 2024-07-01 DIAGNOSIS — I25.10 ARTERIOSCLEROSIS OF CORONARY ARTERY: ICD-10-CM

## 2024-07-01 DIAGNOSIS — D69.6 THROMBOCYTOPENIA (HCC): ICD-10-CM

## 2024-07-01 DIAGNOSIS — R16.1 SPLENOMEGALY: ICD-10-CM

## 2024-07-01 LAB
IGA SERPL-MCNC: 55 MG/DL (ref 66–433)
IGG SERPL-MCNC: 375 MG/DL (ref 635–1741)
IGM SERPL-MCNC: 234 MG/DL (ref 45–281)
LDH SERPL-CCNC: 338 U/L (ref 140–271)

## 2024-07-01 PROCEDURE — 36415 COLL VENOUS BLD VENIPUNCTURE: CPT

## 2024-07-01 PROCEDURE — 84166 PROTEIN E-PHORESIS/URINE/CSF: CPT

## 2024-07-01 PROCEDURE — 83521 IG LIGHT CHAINS FREE EACH: CPT

## 2024-07-01 PROCEDURE — 83615 LACTATE (LD) (LDH) ENZYME: CPT

## 2024-07-01 PROCEDURE — 83918 ORGANIC ACIDS TOTAL QUANT: CPT

## 2024-07-01 PROCEDURE — 82784 ASSAY IGA/IGD/IGG/IGM EACH: CPT

## 2024-07-01 PROCEDURE — 82232 ASSAY OF BETA-2 PROTEIN: CPT

## 2024-07-01 PROCEDURE — 84165 PROTEIN E-PHORESIS SERUM: CPT

## 2024-07-01 PROCEDURE — 86334 IMMUNOFIX E-PHORESIS SERUM: CPT

## 2024-07-02 LAB
KAPPA LC FREE SER-MCNC: 21.5 MG/L (ref 3.3–19.4)
KAPPA LC FREE/LAMBDA FREE SER: 0.37 {RATIO} (ref 0.26–1.65)
LAMBDA LC FREE SERPL-MCNC: 57.5 MG/L (ref 5.7–26.3)

## 2024-07-02 RX ORDER — RIVAROXABAN 20 MG/1
20 TABLET, FILM COATED ORAL
Qty: 100 TABLET | Refills: 1 | Status: SHIPPED | OUTPATIENT
Start: 2024-07-02

## 2024-07-03 LAB
ALBUMIN SERPL ELPH-MCNC: 3.51 G/DL (ref 3.2–5.1)
ALBUMIN SERPL ELPH-MCNC: 60.6 % (ref 48–70)
ALPHA1 GLOB SERPL ELPH-MCNC: 0.31 G/DL (ref 0.15–0.47)
ALPHA1 GLOB SERPL ELPH-MCNC: 5.3 % (ref 1.8–7)
ALPHA2 GLOB SERPL ELPH-MCNC: 0.73 G/DL (ref 0.42–1.04)
ALPHA2 GLOB SERPL ELPH-MCNC: 12.6 % (ref 5.9–14.9)
B2 MICROGLOB SERPL-MCNC: 1.8 MG/L (ref 0.6–2.4)
BETA GLOB ABNORMAL SERPL ELPH-MCNC: 0.46 G/DL (ref 0.31–0.57)
BETA1 GLOB SERPL ELPH-MCNC: 7.9 % (ref 4.7–7.7)
BETA2 GLOB SERPL ELPH-MCNC: 5 % (ref 3.1–7.9)
BETA2+GAMMA GLOB SERPL ELPH-MCNC: 0.29 G/DL (ref 0.2–0.58)
GAMMA GLOB ABNORMAL SERPL ELPH-MCNC: 0.5 G/DL (ref 0.4–1.66)
GAMMA GLOB SERPL ELPH-MCNC: 8.6 % (ref 6.9–22.3)
IGG/ALB SER: 1.54 {RATIO} (ref 1.1–1.8)
INTERPRETATION UR IFE-IMP: NORMAL
M PEAK ID 2: 1.9 %
M PROTEIN 2 SERPL ELPH-MCNC: 0.11 G/DL
PROT PATTERN SERPL ELPH-IMP: ABNORMAL
PROT SERPL-MCNC: 5.8 G/DL (ref 6.4–8.2)

## 2024-07-03 PROCEDURE — 86334 IMMUNOFIX E-PHORESIS SERUM: CPT | Performed by: STUDENT IN AN ORGANIZED HEALTH CARE EDUCATION/TRAINING PROGRAM

## 2024-07-03 PROCEDURE — 84166 PROTEIN E-PHORESIS/URINE/CSF: CPT | Performed by: STUDENT IN AN ORGANIZED HEALTH CARE EDUCATION/TRAINING PROGRAM

## 2024-07-03 PROCEDURE — 84165 PROTEIN E-PHORESIS SERUM: CPT | Performed by: STUDENT IN AN ORGANIZED HEALTH CARE EDUCATION/TRAINING PROGRAM

## 2024-07-04 LAB
ALBUMIN UR ELPH-MCNC: 100 %
ALPHA1 GLOB MFR UR ELPH: 0 %
ALPHA2 GLOB MFR UR ELPH: 0 %
B-GLOBULIN MFR UR ELPH: 0 %
GAMMA GLOB MFR UR ELPH: 0 %
PROT PATTERN UR ELPH-IMP: NORMAL
PROT UR-MCNC: 15.6 MG/DL

## 2024-07-04 PROCEDURE — 84166 PROTEIN E-PHORESIS/URINE/CSF: CPT | Performed by: STUDENT IN AN ORGANIZED HEALTH CARE EDUCATION/TRAINING PROGRAM

## 2024-07-04 PROCEDURE — 86335 IMMUNFIX E-PHORSIS/URINE/CSF: CPT | Performed by: STUDENT IN AN ORGANIZED HEALTH CARE EDUCATION/TRAINING PROGRAM

## 2024-07-05 LAB — METHYLMALONATE SERPL-SCNC: 170 NMOL/L (ref 0–378)

## 2024-07-09 ENCOUNTER — TELEPHONE (OUTPATIENT)
Age: 64
End: 2024-07-09

## 2024-07-09 NOTE — TELEPHONE ENCOUNTER
Called wife, and discussed the MRI results.      Patient has had memory issues for a few years, gradually getting worse.  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  Recommended will repeat imaging in 6 months, they have a follow-up scheduled with Dr. Bacon in August.

## 2024-07-09 NOTE — TELEPHONE ENCOUNTER
Pt's wife Oliva (on consent form) calling in to inform that they received notification of MRI results available. Asking for provider's review and response.    Wife asking that she be called back at [phone]. Says pt rarely answers phone calls.    Dr. Bacon - MRI brain Neuroquant results available for your review. Please advise. Thank you!

## 2024-07-11 ENCOUNTER — OFFICE VISIT (OUTPATIENT)
Dept: HEMATOLOGY ONCOLOGY | Facility: CLINIC | Age: 64
End: 2024-07-11
Payer: COMMERCIAL

## 2024-07-11 ENCOUNTER — OFFICE VISIT (OUTPATIENT)
Dept: SPEECH THERAPY | Facility: CLINIC | Age: 64
End: 2024-07-11
Payer: COMMERCIAL

## 2024-07-11 VITALS
BODY MASS INDEX: 23.92 KG/M2 | SYSTOLIC BLOOD PRESSURE: 104 MMHG | HEIGHT: 69 IN | TEMPERATURE: 98 F | OXYGEN SATURATION: 97 % | HEART RATE: 60 BPM | DIASTOLIC BLOOD PRESSURE: 78 MMHG | WEIGHT: 161.5 LBS

## 2024-07-11 DIAGNOSIS — R16.1 SPLENOMEGALY: ICD-10-CM

## 2024-07-11 DIAGNOSIS — D69.6 THROMBOCYTOPENIA (HCC): ICD-10-CM

## 2024-07-11 DIAGNOSIS — R49.0 DYSPHONIA: Primary | ICD-10-CM

## 2024-07-11 DIAGNOSIS — D47.2 IGM KAPPA MONOCLONAL GAMMOPATHY: ICD-10-CM

## 2024-07-11 DIAGNOSIS — R13.12 DYSPHAGIA, OROPHARYNGEAL PHASE: ICD-10-CM

## 2024-07-11 DIAGNOSIS — D72.819 LEUKOPENIA, UNSPECIFIED TYPE: Primary | ICD-10-CM

## 2024-07-11 DIAGNOSIS — G71.11 MYOTONIC DYSTROPHY (HCC): ICD-10-CM

## 2024-07-11 PROCEDURE — 92507 TX SP LANG VOICE COMM INDIV: CPT

## 2024-07-11 PROCEDURE — 99215 OFFICE O/P EST HI 40 MIN: CPT | Performed by: INTERNAL MEDICINE

## 2024-07-11 NOTE — PROGRESS NOTES
Daily Speech Treatment Note    Today's date: 2024   Patient’s name: Otoniel Martinez  : 1960  MRN: [id number]  Safety measures: myotonic dystrophy, chronic SOB, DM Type 1   Referring provider: Mely Bacon,*    Encounter Diagnosis     ICD-10-CM    1. Dysphonia  R49.0       2. 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 Dysphagia, oropharyngeal phase  R13.12         Visit Tracking:  POC   Expires Auth Expiration Date ST Visit Limit   2024 30 visits or 24 30 visits hard max              Visit/Unit Tracking:  Auth Status Date 24   Auth Required Used 1 2              Subjective/Behavioral:  -Pt reported that he was stuck in traffic but made it on time.    Objective/Assessment:  -Reviewed testing results and goals in plan care with patient. Patient is in agreement at this time.    VOICE EVALUATION:    Interview:   Chief complaint: pt feels like he gets mucous in his throat that blocks his voice from coming out. Voice sounds raspy and hoarse - comes and goes  -Onset: sudden  -Symptom progression since onset has been: improving  -Associated symptoms: none  -Voice is better: no known pattern  -Voice is worse: no known pattern    -Occupation: retired - watches [employer]  -Vocal demand: moderate (voice use includes: watching [employer])  -Amplification: Occasional Use - while singing at [employer]  -Past training or therapy: none  -Past problems: none    -Singing: yes  Singing voice symptoms: hoarseness, loss of low range, voice breaks, and tension  Singing style: choral   Voice part: tenor/saranyae  Professional status: avocational  Years trained: 30 yrs  /release permission: n/a  Singing activities: singing in choir and decanter at [employer]  Hours/wk singin hours  Regular warm-up: sometimes  Instrument: voice  Amplification: Occasional Use  Upcoming performances: every     -Voice surgery: no    Vocal hygiene:  -Smoking: no  -Water intake daily in oz: 80  oz  -Alcohol intake servings weekly: none  -Caffeine intake daily in oz: 12 oz  -Caffeine-free beverages daily in oz: n/a  -Throat clearing: frequent  -Lozenges: non-mentholated  -Exposure to chemicals, dry, or shahrzad environments: none    -Cough:  not from voice - but from swallowing    -Dyspnea:  yes on oxygen every night.    -Dysphagia:  yes - see eval    -Allergy testing: yes - grasses and dogs  -Allergies: environmental  -Nasal symptoms: post-nasal drip and blockage  -GERD/LPR symptoms: regurgitation, globus sensation, and increased mucus in throat  -Recurrent URI: sinus infections  -Other major medical conditions: DM1, myotonic dystrophy       Subjective Observations:      Patient presents with hoarseness characterized by:    Phonation: gravelly and glottal ashraf    Resonance: inadequate oral resonance    Articulation: within normal limits                Rate/Inflection: within normal limits    Respiration (conversational breathing patterns): Diaphragmatic    Other visible/palpable muscle tension: n/a      Patient Self-Ratings:  -The Voice Handicap Index (VHI) is a self-administered, 30-item outcomes instrument to quantify patients' perception of their voice handicap. It is a list of statements that many people have used to describe their voices and the effects of their voices on their lives. Patient indicated how frequently she has the same experience using a rating point scale (“never” = 0, “almost never” = 1, “sometimes” = 2, “almost always” = 3, and “always” = 4). Patient obtained a score of 32/120, indicating a self-perceived impairment level of WNL. (see scanned document)    Subscale: Score: Self-Perceived Impairment Level:   Physical 16/40 Mild   Functional 9/40 WNL   Emotional 7/40 WNL        TOTAL 32/120 WNL       Objective Measurements/Voice Parameters:  RESPIRATORY EFFICIENCY:   -S:Z Ratio Task: Patient was instructed to sustain the sounds /s/ and /z/ to examine the coordination and efficiency of  respiration and voice production. Normative data suggests that adults can prolong these sounds for 20-25 seconds. Ratios of 1.4 and above are consistent with laryngeal inefficiency, and ratios of 2.0 and above are suggestive of vocal fold pathology. Patient's S:Z ratio was 1.9 (34 sec : 18 sec).     -Maximum Phonation Time: Patient was instructed to sustain /a/ to measure respiratory and laryngeal coordination and efficiency. Adults are typically able to prolong vowels sound for 15-20 seconds. Reduced MPT may suggest poor respiratory support, or poor medial glottal closure. Patient's MPT was 23 seconds.    Patient was educated on various vocal abuse behaviors and vocal hygiene throughout assessment. Vocal abuses included decreased water and increased caffeine intake, coughing and throat-clearing, thoracic/clavicular breathing, straining voice, whispering. Vocal hygiene strategies included increased water intake, decreased caffeine intake, throat-clearing awareness, increased breath support/diaphragmatic breathing, compensatory strategies to minimize vocal abuses during work day, confidential voice. Patient was agreeable.     Pt presents with mild dysphonia characterized by hoarse, gravelly vocal quality, elevated s:z ratio possibly secondary to GERD. Pt rated the physical impact as mild - scored 31/120 on VHI. Pt sings in choir at [employer] and reports his voice problem keeps him from doing that. Voice goals have been added. Acoustic measures were not completed due to time constraints. They will be completed next session.    Short-term goals:    VOICE:  Patient will be educated on vocal hygiene and demonstrate understanding of recommendations to facilitate increased quality of voice, to be achieved in 2-4 weeks.    Patient will be educated on diaphragmatic breathing (versus clavicular breathing) to establish controlled, rhythmic breathing, to be achieved in 2-4 weeks.    Patient will utilize diaphragmatic breathing  during oral sentence/paragraph reading in 80% of opportunities to facilitate increased breath support for voice/speaking, decrease laryngeal effort, and improve glottic closure to be achieved in 4-6 weeks.    Patient will utilize semi-occluded vocal tract exercises without laryngeal tension to promote healthy vocal function with 80% accuracy, to be achieved in 4-6 weeks.     Patient will utilize a forward tone focused/resonant voice to decrease vocal hyperfunction and improve voice quality and vocal projection, to be achieved in 4-6 weeks.      Patient will self-report a decrease in muscle tension of the larynx and cervical area after independently completing relaxation/stretching exercise to be achieved in 6-8 weeks.     Patient will learn and apply resonance techniques at the word, sentence, and paragraph levels with min verbal cues to facilitate reduced laryngeal tension when voicing, to be achieved in 4-6 weeks.    DYSPHAGIA:  Patient will consume (regular consistency) PO trials during therapy session, without overt s/sx of aspiration given minimal verbal cues for use of swallowing strategies in order to safely consume the least restrictive diet, to be achieved in 4-6 weeks.      Patient will complete swallowing exercises (i.e., Mendelsohn, Eliza, Effortful) to increase pharyngeal strength and coordination with min cues to 90% effectiveness to improve bolus formation and strengthen pharyngeal musculature, to be achieved in 4-6 weeks.     Patient will perform compensatory strategies (e.g., upright positioning, small bites/sips, slow rate, alternation of consistencies, multiple swallows, effortful swallow, chin tuck, head turn, etc.) with minimal verbal cues to prevent overt s/sx penetration/aspiration of least restrictive food/liquid consistencies, to be achieved in 4-6 weeks.    Plan:  -Continue with current plan of care.

## 2024-07-11 NOTE — PROGRESS NOTES
Hematology/Oncology Outpatient Office Note  Date of Service: 7/11/2024    [facility]EMATOLOG[facility] [place]  [address]  [facility] [address]  [phone]    Reason for Consultation:   Chief Complaint   Patient presents with    Follow-up     Referral Physician: No ref. provider found  Primary Care Physician:  Wandy Cheatham DO     Assessment/plan:     1.  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  2.  Mild leukopenia without neutropenia  3.  Chronic thrombocytopenia  4.  Incidentally noted mild splenomegaly on CT chest     Otoniel Martinez is a 64 y.o. male with past medical history significant for CAD, diabetes, DVT after tear in gastrocnemius muscle (on indefinite anticoagulation with Xarelto), recurrent pancreatitis, diabetes type 1 and sleep apnea.  Patient was admitted to [facility] from 2/8/2024 - 2/10/2024 with acute respiratory failure with hypoxia which was suspected related to acute on chronic bronchitis.  He was treated with antibiotics and prednisone.  He underwent a CT of the chest which incidentally noted splenomegaly.  Patient referred to hematology for further evaluation.  Patient denies any fever, night sweats, weight loss. Denies any abd pain, n/v/d. Denies frequent or recurrent illness. ETOH use socially. Denies easy bruising or bleeding.         I met with the patient and reviewed his history, chart, imaging and labs.  Workup concerning for an IgM kappa monoclonal gammopathy, MYD 88 mutation positive.  Recommend bone marrow biopsy and PET scan to evaluate further for an underlying lymphoplasmacytic lymphomatous process.  He will follow-up in the office after bone marrow biopsy and PET scan.  Will check serum viscosity.     4. Recurrent DVT  Currently on indefinite anticoagulation with xarelto        Orders Placed This Encounter   Procedures    NM PET CT skull base to mid thigh    Viscosity, serum    Ambulatory Referral  to Interventional Radiology      Diagnosis ICD-10-CM Associated Orders   1. Leukopenia, unspecified type  D72.819 Viscosity, serum     Ambulatory Referral to Interventional Radiology     NM PET CT skull base to mid thigh      2. Thrombocytopenia (HCC)  D69.6 Viscosity, serum     Ambulatory Referral to Interventional Radiology     NM PET CT skull base to mid thigh      3. Splenomegaly  R16.1 Viscosity, serum     Ambulatory Referral to Interventional Radiology     NM PET CT skull base to mid thigh      4. IgM kappa monoclonal gammopathy  D47.2 Viscosity, serum     Ambulatory Referral to Interventional Radiology     NM PET CT skull base to mid thigh          Return in about 5 weeks (around 8/15/2024) for Office Visit, Labs.    Juli Bronson, DO 7/11/2024   Hematology & Medical Oncology Physician    History of present illness:   Otoniel Martinez is a 63-year-old male with past medical history significant for CAD, diabetes, DVT after tear in gastrocnemius muscle on indefinite anticoagulation with Xarelto), recurrent pancreatitis, diabetes type 1 and sleep apnea.  Patient was admitted to [facility] from 2/8/2024 - 2/10/2024 with acute respiratory failure with hypoxia which was suspected related to acute on chronic bronchitis.  He was treated with antibiotics and prednisone.  He underwent a CT of the chest which incidentally noted splenomegaly.  Patient referred to hematology for further evaluation.  He presents for initial consultation.     Patient denies any fever, night sweats, weight loss. Denies any abd pain, n/v/d. Denies frequent or recurrent illness. ETOH use socially. Denies easy bruising or bleeding.     Interval History:   Subsequent workup in the interim shows IgM kappa monoclonal gammopathy on SPEP.  Peripheral smear with involvement by a monotypic kappa B-cell population with MYD88 mutation.  Patient denies any fever, night sweats or unexpected weight loss.    Review of systems:   Review of  Systems   Constitutional: Negative. Negative for fever and malaise/fatigue.   HENT: Negative.     Cardiovascular: Negative.  Negative for chest pain, dyspnea on exertion and palpitations.   Respiratory: Negative.  Negative for shortness of breath.    Hematologic/Lymphatic: Negative.  Does not bruise/bleed easily.   Skin: Negative.    Musculoskeletal: Negative.    Gastrointestinal: Negative.  Negative for abdominal pain.   Neurological: Negative.    Psychiatric/Behavioral: Negative.          A 10-point review of system was performed, pertinent positive and negative were detailed as above. Otherwise, the 10-point review of system was negative.    Past Medical History:   Diagnosis Date    Acute respiratory failure with hypoxia ([facility]) 02/08/2024    CAD (coronary artery disease)     Congenital myotonia     Deep vein thrombosis (DVT) ([facility])     after tear of gastrocnemus muscle    Diabetes ([facility])     Diabetes mellitus ([facility])     Difficulty walking     hip replacement    HL (hearing loss)     decreased both  ears    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[facility]) 12/06/2017    Pancreatitis     three times    Sleep apnea     not using a C-PAP    Superficial thrombophlebitis     Vision loss     reading glasses over the counter       Past Surgical History:   Procedure Laterality Date    CAROTID STENT      CHOLECYSTECTOMY      CIRCUMCISION      Elective    COLONOSCOPY      complete, polyps 2 years ago    CORONARY ANGIOPLASTY WITH STENT PLACEMENT      stent to RCA 2004    INGUINAL HERNIA REPAIR      ORIF RADIAL SHAFT FRACTURE Left     Open Treatment of Multiple Fractures of the Radial Shaft    ORIF ULNAR / RADIAL SHAFT FRACTURE Left     Open Treatment of Multiple Fractures of the Ulnar Shaft    TONSILLECTOMY AND ADENOIDECTOMY         Family History   Problem Relation Age of Onset    Brain cancer Mother     Clotting disorder Mother     Heart disease Mother     Cancer Mother     Hyperlipidemia Mother     Stroke Father     Deep vein thrombosis Father      Emphysema Father     Coronary artery disease Paternal Uncle     Diabetes Maternal Uncle        Social History     Socioeconomic History    Marital status: /Civil Union     Spouse name: Not on file    Number of children: Not on file    Years of education: Not on file    Highest education level: Not on file   Occupational History    Not on file   Tobacco Use    Smoking status: Never    Smokeless tobacco: Never   Vaping Use    Vaping status: Never Used   Substance and Sexual Activity    Alcohol use: Not Currently     Comment: 2 beers on a social occasion    Drug use: No    Sexual activity: Yes     Partners: Female     Birth control/protection: None   Other Topics Concern    Not on file   Social History Narrative    Consumes 1 cup of tea  And 1 glass of tea per day        Weight loss     Social Determinants of Health     Financial Resource Strain: Low Risk  (12/4/2023)    [facility]    Overall Financial Resource Strain (CARDIA)     Difficulty of Paying Living Expenses: Not very hard   Food Insecurity: No Food Insecurity (2/10/2024)    Hunger Vital Sign     Worried About Running Out of Food in the Last Year: Never true     Ran Out of Food in the Last Year: Never true   Transportation Needs: No Transportation Needs (2/10/2024)    PRAPARE - Transportation     Lack of Transportation (Medical): No     Lack of Transportation (Non-Medical): No   Physical Activity: Sufficiently Active (12/4/2023)    Received from [facility]    Exercise Vital Sign     Days of Exercise per Week: 1 day     Minutes of Exercise per Session: 150+ min   Stress: No Stress Concern Present (12/4/2023)    [facility] from [facility]    [employer] - Occupational Stress Questionnaire     Feeling of Stress : Not at all   Social Connections: Socially Integrated (12/4/2023)    Received from [place]  [facility], [facility]    Social Connection and Isolation Panel [NHANES]     Frequency of Communication with Friends and Family: Three times a week     Frequency of Social Gatherings with Friends and Family: Never     Attends Oriental orthodox Services: More than 4 times per year     Active Member of Clubs or Organizations: Yes     Attends Club or Organization Meetings: More than 4 times per year     Marital Status:    Intimate Partner Violence: Not At Risk (12/4/2023)    Received from [facility], [facility]    Humiliation, Afraid, Rape, and Kick questionnaire     Fear of Current or Ex-Partner: No     Emotionally Abused: No     Physically Abused: No     Sexually Abused: No   Housing Stability: Low Risk  (2/10/2024)    Housing Stability Vital Sign     Unable to Pay for Housing in the Last Year: No     Number of Times Moved in the Last Year: 1     Homeless in the Last Year: No       Allergies   Allergen Reactions    Other Cough     Grass / dogs hair        Current Outpatient Medications   Medication Sig Dispense Refill    aspirin (ECOTRIN LOW STRENGTH) 81 mg EC tablet Take 81 mg by mouth daily      atorvastatin (LIPITOR) 40 mg tablet TAKE 1 TABLET BY MOUTH EVERY DAY 90 tablet 1    budesonide-formoterol (Symbicort) 80-4.5 MCG/ACT inhaler Inhale 2 puffs 2 (two) times a day Rinse mouth after use. 30.6 g 5    cholecalciferol (VITAMIN D3) 400 units tablet Take 1 tablet by mouth every morning      Continuous Blood Gluc Sensor (Dexcom G6 Sensor) MISC Change every 10 days. E10.65      Continuous Blood Gluc Transmit (Dexcom G6 Transmitter) MISC Change every 90 days. E10.65      fluticasone (FLONASE) 50 mcg/act nasal spray 2 sprays into each nostril daily      Gvoke HypoPen 2-Pack 1 MG/0.2ML SOAJ INJECT 1 MG UNDER THE SKIN AS NEEDED (FOR SEVERE HYPOGLYCEMIA). E10.65      Insulin Disposable Pump (Omnipod 5 G6 Intro, Gen 5,) KIT Inject 1 each under the skin every  third day      Insulin Disposable Pump (Omnipod 5 G6 Pods, Gen 5,) MISC INJECT 1 UNDER THE SKIN EVERY OTHER DAY. CHANGING EVERY 2 DAYS DUE TO HIGH INSULIN REQUIREMENT.      levalbuterol (Xopenex) 1.25 mg/3 mL nebulizer solution Take 3 mL (1.25 mg total) by nebulization 3 (three) times a day 270 mL 4    montelukast (SINGULAIR) 10 mg tablet TAKE 1 TABLET BY MOUTH DAILY AT BEDTIME 90 tablet 0    NovoLOG 100 UNIT/ML injection Inject up to 110 units daily via insulin pump. E10.65      pantoprazole (PROTONIX) 40 mg tablet Take 1 tablet (40 mg total) by mouth 2 (two) times a day 30 mins before breakfast and 30 mins before supper 120 tablet 3    rivaroxaban (Xarelto) 20 mg tablet TAKE 1 TABLET BY MOUTH DAILY WITH BREAKFAST 100 tablet 1    sildenafil (VIAGRA) 100 mg tablet Take 1 tablet (100 mg total) by mouth daily as needed for erectile dysfunction 10 tablet 1    sodium chloride 0.9 % nebulizer solution Take 3 mL by nebulization as needed for wheezing 90 mL 3    acyclovir (ZOVIRAX) 200 mg capsule Take 1 capsule (200 mg total) by mouth 5 (five) times a day for 5 days (Patient taking differently: Take 200 mg by mouth 5 (five) times a day Patient takes PRN) 25 capsule 0     No current facility-administered medications for this visit.     I have reviewed the relevant past medical, surgical, social and family history. I have also reviewed allergies and medications for this patient.    Objective:      Vitals:    07/11/24 1439   BP: 104/78   Pulse: 60   Temp: 98 °F (36.7 °C)   SpO2: 97%       Wt Readings from Last 3 Encounters:   07/11/24 73.3 kg (161 lb 8 oz)   06/14/24 72.1 kg (159 lb)   06/12/24 72.4 kg (159 lb 9.6 oz)       Performance status: ECOG PS: 0  Physical Exam    Data Review:  Pathology Result:  PERIPHERAL BLOOD SMEAR: INVOLVED BY A MONOTYPIC KAPPA B-CELL POPULATION WITH MYD88 MUTATION; MILD EOSINOPHILIA AND MONOCYTOSIS, WITH BACKGROUND MILD THROMBOCYTOPENIA; SEE IMPRESSION     IMPRESSION:  The peripheral blood smear  shows mild leukopenia (WBC 4.29 K/uL) with atypical lymphocytes and mild eosinophilia and monocytosis, as well as background mild thrombocytopenia (plt 137 K/uL) with normal platelet morphology, without evidence of platelet clumping or satellitism. Erythrocytes are adequate in number, normocytic and normochromic, without significant morphologic abnormalities. There is no circulating blast population, significant dysplasia, rouleaux formation, infectious organisms or evidence of hemolysis.     Flow cytometry reportedly shows that the atypical lymphocytes are monotypic kappa B-cells. Additional ancillary testing is positive for a MYD88 L265P mutation, and is negative for CXCR4 mutations or 7/7q aberrations. The history of an IgM-kappa monoclonal immunoglobulin is noted. The overall findings are consistent with the patient's known, persistent circulating monoclonal B-cell population; the differential diagnosis includes a monoclonal B-cell lymphocytosis (MBL) and circulating B-cell lymphoma.     If there is extramedullary involvement (lymph node, tissue, etc.), this represents circulating lymphoma. In the absence of extramedullary involvement, this likely represents an MBL-non-CLL type. The morphology and immunophenotype are non-specific but in the setting of circulating lymphoma, the IgM paraprotein and MYD88 mutation, would favor lymphoplasmacytic lymphoma (LPL) / Waldenström macroglobulin (WM). Correlation with systemic imaging to evaluate for lymphadenopathy, hepatosplenomegaly and other potential sites of involvement for further characterization, is recommended to differentiate MBLs from circulating lymphoma, as clinically indicated. The World Health Organization (WHO) and various international scoring systems recommend all new diagnoses of LPL correlate with patient age, hemoglobin, platelet counts, serum cold agglutinin studies, serum cryoglobulin testing, hyperviscosity studies, hepatitis C testing, serum  lactate dehydrogenase (LDH) and beta-2 microglobulin levels, given some of their associations, therapeutic and prognostic implications and to classify the disease by low, intermediate and high-risk.     FLOW CYTOMETRY STUDIES: [facility] #[id number]; see separate report        CYTOGENETIC FISH STUDIES:   Cytogenetic FISH studies are performed (7q-/-7 tri) and are reportedly negative (normal); ([facility] #[id number]; see separate report):           MOLECULAR STUDIES:   MYD88 Mutation Analysis is performed and is reportedly DETECTED ([facility] #[id number]; see separate report):           CXCR4 Mutation Analysis is performed and is reportedly NOT DETECTED ([facility] #[id number]; see separate report):       Image Results:  Image result are reviewed and documented in Hematology/Oncology history    2/8/2024: CT chest with contrast: Mild splenomegaly     Labs:  Lab data are reviewed and documented in HemOnc history.     1/9/2018: WBC: 3.6, H/H: 15.8/44.9, MCV: 88.9, platelets: 114.  ANC: 2297  2/10/2024: WBC: 3.34, H/H: 12/36, MCV: 87, platelets: 125.  AST/ALT: 22/41, alk phos: 72.  Creatinine: 0.51    Lab Results   Component Value Date    HGB 14.1 06/13/2024    HCT 44.2 06/13/2024    MCV 91 06/13/2024     (L) 06/13/2024    WBC 4.29 (L) 06/13/2024    NRBC 0 06/13/2024    BANDSPCT 4 06/13/2024    ATYLMPCT 5 (H) 06/13/2024     Lab Results   Component Value Date     01/09/2018    K 4.1 06/13/2024     06/13/2024    CO2 31 06/13/2024    BUN 16 06/13/2024    CREATININE 0.60 06/13/2024    GLUF 80 06/13/2024    CALCIUM 8.7 06/13/2024    CORRECTEDCA 9.4 02/10/2024    AST 53 (H) 06/13/2024     (H) 06/13/2024    ALKPHOS 76 06/13/2024    PROT 5.8 (L) 01/09/2018    BILITOT 1.4 (H) 01/09/2018    EGFR 106 06/13/2024       Lab Results   Component Value Date    FERRITIN 16 (L) 11/16/2022    FERRITIN 16 (L) 05/17/2022       Lab Results   Component Value Date    FVLNJDLP56 400 06/13/2024    LCKEHPOO16  302 2024    [id number] 417 2018: Flow cytometry: Small kappa monoclonal B-cell population identified in peripheral blood    2024:  SPEP with DALY: Serum immunofixation shows a monoclonal gammopathy identified as IgM-kappa. The history of splenomegaly and circulating monoclonal-kappa B-cells with MYD88 mutation is noted.  M-Fco: 0.11 g/deciliter  UPEP: Show selective proteinuria.  Urine immunofixation shows a faint monoclonal band identified as free kappa light chains  FK.5, FL.5, K/L ratio: 0.37  Ig, IgM: 234, IgA: 55.  B2 M: 1.8  Methylmalonic acid: 170  LDH: 338    Disclaimer: This document was prepared using [employer] technology. If a word or phrase is confusing, or does not make sense, this is likely due to recognition error which was not discovered during this clinician's review. If you believe an error has occurred, please contact me through [employer] service line for april?cation.

## 2024-07-12 ENCOUNTER — APPOINTMENT (OUTPATIENT)
Dept: LAB | Facility: IMAGING CENTER | Age: 64
End: 2024-07-12
Payer: COMMERCIAL

## 2024-07-12 DIAGNOSIS — D69.6 THROMBOCYTOPENIA (HCC): ICD-10-CM

## 2024-07-12 DIAGNOSIS — D47.2 IGM KAPPA MONOCLONAL GAMMOPATHY: ICD-10-CM

## 2024-07-12 DIAGNOSIS — R16.1 SPLENOMEGALY: ICD-10-CM

## 2024-07-12 DIAGNOSIS — D72.819 LEUKOPENIA, UNSPECIFIED TYPE: ICD-10-CM

## 2024-07-12 PROCEDURE — 36415 COLL VENOUS BLD VENIPUNCTURE: CPT

## 2024-07-12 PROCEDURE — 85810 BLOOD VISCOSITY EXAMINATION: CPT

## 2024-07-17 ENCOUNTER — APPOINTMENT (OUTPATIENT)
Dept: SPEECH THERAPY | Facility: CLINIC | Age: 64
End: 2024-07-17
Payer: COMMERCIAL

## 2024-07-17 DIAGNOSIS — R13.12 DYSPHAGIA, OROPHARYNGEAL PHASE: ICD-10-CM

## 2024-07-17 DIAGNOSIS — G71.11 MYOTONIC DYSTROPHY (HCC): ICD-10-CM

## 2024-07-17 DIAGNOSIS — R49.0 DYSPHONIA: Primary | ICD-10-CM

## 2024-07-17 LAB — VISC SER: 1.5 REL.SALINE (ref 1.4–2.1)

## 2024-07-17 NOTE — PROGRESS NOTES
Daily Speech Treatment Note    Today's date: 2024   Patient’s name: Otoniel Martinez  : 1960  MRN: [id number]  Safety measures: myotonic dystrophy, chronic SOB, DM Type 1   Referring provider: Mely Bacon,*    Encounter Diagnosis     ICD-10-CM    1. Dysphonia  R49.0       2. 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 Dysphagia, oropharyngeal phase  R13.12           Visit Tracking:  POC   Expires Auth Expiration Date ST Visit Limit   2024 30 visits or 24 30 visits hard max              Visit/Unit Tracking:  Auth Status Date 24   Auth Required Used 1 2 3     Remaining 29 28 27         Subjective/Behavioral:  -Pt reported that he was stuck in traffic but made it on time.    Objective/Assessment:  -Reviewed testing results and goals in plan care with patient. Patient is in agreement at this time.      MEASURES OF PITCH:  The Visi-Pitch IV, a computer-driven voice analysis program, was used to collect objective voice data using the Visi-Pitch Multidimensional Voice Program (MDVP) & Real Time Pitch Program (MTPP).     -Multi-Dimensional Voice Profile (MDVP): This is obtained on the phonation of the sound /a/, in which dimensions of the voice, including frequncy perturbations, amplitude perturbations, variations in F0, noise to harmonic ratio, voice turbulence Index, soft phonation Index, tremours in frequency and amplitude, degree of subharmonics, and degree of voicing apart from the frequency and amplitude, are reflected.      Normative Data:   Mean Fundamental Frequency (F0) *** Hz 145.22 Hz   Jitter (RAP) ***% 0.345%   Shimmer ***% 2.523%   Efjps-vd-Qdmjcfjny Ratio (NHR) *** 0.122       -Habitual Pitch: Patient was instructed to engage in two different speaking tasks (counting and reading) to calculate the pitch he uses most frequently.     Task: Mean F0 (Hz) Min-Max Range (Hz) Normative Data:   Speaking (counting 1-10) *** ***-*** 128 Hz (100 Hz -150 Hz)   Reading  "(\"Ennice Passage”) *** ***-*** 128 Hz (100 Hz -150 Hz)       -Maximal Phonational Frequency Range: Patient was instructed to voice from lowest to highest notes.     Task Min-Max Range (Hz) Normative Data:   Gliding ***-*** 77 Hz-576 Hz         ***Patient was educated on various vocal abuse behaviors and vocal hygiene throughout assessment. Vocal abuses included decreased water and increased caffeine intake, coughing and throat-clearing, thoracic/clavicular breathing, straining voice, whispering. Vocal hygiene strategies included increased water intake, decreased caffeine intake, throat-clearing awareness, increased breath support/diaphragmatic breathing, compensatory strategies to minimize vocal abuses during work day, confidential voice. Patient was agreeable.   Short-term goals:    VOICE:  Patient will be educated on vocal hygiene and demonstrate understanding of recommendations to facilitate increased quality of voice, to be achieved in 2-4 weeks.    Patient will be educated on diaphragmatic breathing (versus clavicular breathing) to establish controlled, rhythmic breathing, to be achieved in 2-4 weeks.    Patient will utilize diaphragmatic breathing during oral sentence/paragraph reading in 80% of opportunities to facilitate increased breath support for voice/speaking, decrease laryngeal effort, and improve glottic closure to be achieved in 4-6 weeks.    Patient will utilize semi-occluded vocal tract exercises without laryngeal tension to promote healthy vocal function with 80% accuracy, to be achieved in 4-6 weeks.     Patient will utilize a forward tone focused/resonant voice to decrease vocal hyperfunction and improve voice quality and vocal projection, to be achieved in 4-6 weeks.      Patient will self-report a decrease in muscle tension of the larynx and cervical area after independently completing relaxation/stretching exercise to be achieved in 6-8 weeks.     Patient will learn and apply resonance " techniques at the word, sentence, and paragraph levels with min verbal cues to facilitate reduced laryngeal tension when voicing, to be achieved in 4-6 weeks.    DYSPHAGIA:  Patient will consume (regular consistency) PO trials during therapy session, without overt s/sx of aspiration given minimal verbal cues for use of swallowing strategies in order to safely consume the least restrictive diet, to be achieved in 4-6 weeks.      Patient will complete swallowing exercises (i.e., Mendelsohn, Eliza, Effortful) to increase pharyngeal strength and coordination with min cues to 90% effectiveness to improve bolus formation and strengthen pharyngeal musculature, to be achieved in 4-6 weeks.     Patient will perform compensatory strategies (e.g., upright positioning, small bites/sips, slow rate, alternation of consistencies, multiple swallows, effortful swallow, chin tuck, head turn, etc.) with minimal verbal cues to prevent overt s/sx penetration/aspiration of least restrictive food/liquid consistencies, to be achieved in 4-6 weeks.    Plan:  -Continue with current plan of care.

## 2024-07-22 ENCOUNTER — TELEPHONE (OUTPATIENT)
Age: 64
End: 2024-07-22

## 2024-07-22 NOTE — TELEPHONE ENCOUNTER
Called and spoke with patient's wife, Oliva.  Informed Oliva that an appeal was made and PET/CT scan was approved.

## 2024-07-22 NOTE — TELEPHONE ENCOUNTER
Patient calling to inform Dr. Bronson, his insurance denied the scheduled PET Scan that was ordered.  He would like a callback to discuss what he should do. [phone].

## 2024-07-24 ENCOUNTER — HOSPITAL ENCOUNTER (OUTPATIENT)
Dept: RADIOLOGY | Age: 64
Discharge: HOME | End: 2024-07-24

## 2024-07-25 ENCOUNTER — OFFICE VISIT (OUTPATIENT)
Dept: SPEECH THERAPY | Facility: CLINIC | Age: 64
End: 2024-07-25
Payer: COMMERCIAL

## 2024-07-25 DIAGNOSIS — R49.0 DYSPHONIA: Primary | ICD-10-CM

## 2024-07-25 DIAGNOSIS — G71.11 MYOTONIC DYSTROPHY (HCC): ICD-10-CM

## 2024-07-25 DIAGNOSIS — R13.12 DYSPHAGIA, OROPHARYNGEAL PHASE: ICD-10-CM

## 2024-07-25 PROCEDURE — 92507 TX SP LANG VOICE COMM INDIV: CPT

## 2024-07-25 NOTE — PROGRESS NOTES
Daily Speech Treatment Note    Today's date: 2024   Patient’s name: Otoniel Martinez  : 1960  MRN: [id number]  Safety measures: myotonic dystrophy, chronic SOB, DM Type 1   Referring provider: Mely Bacon,*    Encounter Diagnosis     ICD-10-CM    1. Dysphonia  R49.0       2. 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 Dysphagia, oropharyngeal phase  R13.12             Visit Tracking:  POC   Expires Auth Expiration Date ST Visit Limit   2024 30 visits or 24 30 visits hard max              Visit/Unit Tracking:  Auth Status Date 24   Auth Required Used 1 2 3     Remaining 29 28 27         Subjective/Behavioral:  -Pt reported that his voice has been in and out all day. Pt reported swallowing has been good.    Objective/Assessment:  -Reviewed testing results and goals in plan care with patient. Patient is in agreement at this time.      MEASURES OF PITCH:  The EnteGreat-Pitch IV, a computer-driven voice analysis program, was used to collect objective voice data using the Visi-Pitch Multidimensional Voice Program (MDVP) & Real Time Pitch Program (MTPP).     -Multi-Dimensional Voice Profile (MDVP): This is obtained on the phonation of the sound /a/, in which dimensions of the voice, including frequncy perturbations, amplitude perturbations, variations in F0, noise to harmonic ratio, voice turbulence Index, soft phonation Index, tremours in frequency and amplitude, degree of subharmonics, and degree of voicing apart from the frequency and amplitude, are reflected.      Normative Data:   Mean Fundamental Frequency (F0) 154.715 Hz 145.22 Hz   Jitter (RAP) 0.266% 0.345%   Shimmer 2.915% 2.523%   Dhhsn-rd-Pjzbzppmo Ratio (NHR) 0.135 0.122       -Habitual Pitch: Patient was instructed to engage in two different speaking tasks (counting and reading) to calculate the pitch he uses most frequently.     Task: Mean F0 (Hz) Min-Max Range (Hz) Normative Data:   Speaking (counting 1-10) 97.65  "77..64 128 Hz (100 Hz -150 Hz)   Reading (\"[place]”) 101.50 74..50 128 Hz (100 Hz -150 Hz)       -Maximal Phonational Frequency Range: Patient was instructed to voice from lowest to highest notes.     Task Min-Max Range (Hz) Normative Data:   Gliding 79..34 77 Hz-576 Hz       Short-term goals:    VOICE:  Patient will be educated on vocal hygiene and demonstrate understanding of recommendations to facilitate increased quality of voice, to be achieved in 2-4 weeks.    Patient will be educated on diaphragmatic breathing (versus clavicular breathing) to establish controlled, rhythmic breathing, to be achieved in 2-4 weeks.    Patient will utilize diaphragmatic breathing during oral sentence/paragraph reading in 80% of opportunities to facilitate increased breath support for voice/speaking, decrease laryngeal effort, and improve glottic closure to be achieved in 4-6 weeks.    Patient will utilize semi-occluded vocal tract exercises without laryngeal tension to promote healthy vocal function with 80% accuracy, to be achieved in 4-6 weeks.     Semi-Occluded Vocal Tract Exercises  To target relaxed laryngeal posture and coordination between phonation and respiration, the following activities were completed using principles of SOVTE.     Cup Bubbling:   Steady blowing - no voice. Completed 3 reps, avg 20 secs.  Patient was able to demonstrate steady bubbling/airflow.   Steady blowing - voice ON. Completed 3 reps, avg 16 secs. Patient was able to demonstrate steady bubbling/airflow. Patient cued to use front focus, feeling buzzing on lips.   Steady blowing - voice ON and OFF. Completed 3 reps. Patient was able to keep continuous airflow.  Steady blowing - voice ON, short/small pitch glides, approx. 3-5 notes. Completed over 3 reps.   Steady blowing - voice ON, long/full pitch glides, lowest to highest pitches. Completed over 3 reps.     Lip Trills  Sustained voiced lip trill, turning voice on/off. " Completed over 1 reps.   Short/small pitch glides, approx. 3-5 notes. Completed over 1 reps.   Long/full pitch glides, lowest to highest pitches. Completed over 1 reps.     Straw only vibration/singing  Sustained voicing through straw. Completed over 1 reps.   Short/small pitch glides, approx. 3-5 notes. Completed over 1 reps.   Long/full pitch glides, lowest to highest pitches. Completed over 1 reps.   Humming/Voicing familiar song through straw.     Patient will utilize a forward tone focused/resonant voice to decrease vocal hyperfunction and improve voice quality and vocal projection, to be achieved in 4-6 weeks.      Patient will self-report a decrease in muscle tension of the larynx and cervical area after independently completing relaxation/stretching exercise to be achieved in 6-8 weeks.     Patient will learn and apply resonance techniques at the word, sentence, and paragraph levels with min verbal cues to facilitate reduced laryngeal tension when voicing, to be achieved in 4-6 weeks.    DYSPHAGIA:  Patient will consume (regular consistency) PO trials during therapy session, without overt s/sx of aspiration given minimal verbal cues for use of swallowing strategies in order to safely consume the least restrictive diet, to be achieved in 4-6 weeks.      Patient will complete swallowing exercises (i.e., Mendelsohn, Eliza, Effortful) to increase pharyngeal strength and coordination with min cues to 90% effectiveness to improve bolus formation and strengthen pharyngeal musculature, to be achieved in 4-6 weeks.     Patient will perform compensatory strategies (e.g., upright positioning, small bites/sips, slow rate, alternation of consistencies, multiple swallows, effortful swallow, chin tuck, head turn, etc.) with minimal verbal cues to prevent overt s/sx penetration/aspiration of least restrictive food/liquid consistencies, to be achieved in 4-6 weeks.    Plan:  -Patient was provided with home  exercises/activities to target goals in plan of care at the end of today's session.  -Continue with current plan of care.

## 2024-07-29 ENCOUNTER — TELEPHONE (OUTPATIENT)
Dept: INTERVENTIONAL RADIOLOGY/VASCULAR | Facility: HOSPITAL | Age: 64
End: 2024-07-29

## 2024-07-29 RX ORDER — SODIUM CHLORIDE 9 MG/ML
75 INJECTION, SOLUTION INTRAVENOUS CONTINUOUS
Status: CANCELLED | OUTPATIENT
Start: 2024-07-29

## 2024-07-31 LAB
LEFT EYE DIABETIC RETINOPATHY: NORMAL
RIGHT EYE DIABETIC RETINOPATHY: POSITIVE
SEVERITY (EYE EXAM): NORMAL

## 2024-08-01 ENCOUNTER — HOSPITAL ENCOUNTER (OUTPATIENT)
Dept: RADIOLOGY | Age: 64
Discharge: HOME | End: 2024-08-01
Payer: COMMERCIAL

## 2024-08-01 ENCOUNTER — OFFICE VISIT (OUTPATIENT)
Dept: SPEECH THERAPY | Facility: CLINIC | Age: 64
End: 2024-08-01
Payer: COMMERCIAL

## 2024-08-01 DIAGNOSIS — D69.6 THROMBOCYTOPENIA (HCC): ICD-10-CM

## 2024-08-01 DIAGNOSIS — D72.819 LEUKOPENIA, UNSPECIFIED TYPE: ICD-10-CM

## 2024-08-01 DIAGNOSIS — D47.2 IGM KAPPA MONOCLONAL GAMMOPATHY: ICD-10-CM

## 2024-08-01 DIAGNOSIS — R16.1 SPLENOMEGALY: ICD-10-CM

## 2024-08-01 DIAGNOSIS — R13.12 DYSPHAGIA, OROPHARYNGEAL PHASE: ICD-10-CM

## 2024-08-01 DIAGNOSIS — R49.0 DYSPHONIA: Primary | ICD-10-CM

## 2024-08-01 DIAGNOSIS — G71.11 MYOTONIC DYSTROPHY (HCC): ICD-10-CM

## 2024-08-01 LAB — GLUCOSE SERPL-MCNC: 170 MG/DL (ref 65–140)

## 2024-08-01 PROCEDURE — 92507 TX SP LANG VOICE COMM INDIV: CPT

## 2024-08-01 PROCEDURE — A9552 F18 FDG: HCPCS

## 2024-08-01 PROCEDURE — 78815 PET IMAGE W/CT SKULL-THIGH: CPT

## 2024-08-01 PROCEDURE — 82948 REAGENT STRIP/BLOOD GLUCOSE: CPT

## 2024-08-01 NOTE — PROGRESS NOTES
Daily Speech Treatment Note    Today's date: 2024   Patient’s name: Otoniel Martinez  : 1960  MRN: [id number]  Safety measures: myotonic dystrophy, chronic SOB, DM Type 1   Referring provider: Mely Bacon,*    Encounter Diagnosis     ICD-10-CM    1. Dysphonia  R49.0       2. 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 Dysphagia, oropharyngeal phase  R13.12               Visit Tracking:  POC   Expires Auth Expiration Date ST Visit Limit   2024 30 visits or 24 30 visits hard max              Visit/Unit Tracking:  Auth Status Date 24   Auth Required Used 1 2 3 4     Remaining 29 28 27 26         Subjective/Behavioral:  -Pt reported that he had a CAT scan this morning and is worrying about what the outcome will be.    Objective/Assessment:  -Reviewed testing results and goals in plan care with patient. Patient is in agreement at this time.    Short-term goals:    VOICE:  Patient will be educated on vocal hygiene and demonstrate understanding of recommendations to facilitate increased quality of voice, to be achieved in 2-4 weeks.    Patient will be educated on diaphragmatic breathing (versus clavicular breathing) to establish controlled, rhythmic breathing, to be achieved in 2-4 weeks.    Patient will utilize diaphragmatic breathing during oral sentence/paragraph reading in 80% of opportunities to facilitate increased breath support for voice/speaking, decrease laryngeal effort, and improve glottic closure to be achieved in 4-6 weeks.    Patient will utilize semi-occluded vocal tract exercises without laryngeal tension to promote healthy vocal function with 80% accuracy, to be achieved in 4-6 weeks.     Semi-Occluded Vocal Tract Exercises  To target relaxed laryngeal posture and coordination between phonation and respiration, the following activities were completed using principles of SOVTE.     Cup Bubbling:   Steady blowing - no voice. Completed 3 reps, avg 15+  "seconds.  Patient was able to demonstrate steady bubbling/airflow.   Steady blowing - voice ON. Completed 3 reps, avg 15+ seconds. Patient was able to demonstrate steady bubbling/airflow. Patient cued to use front focus, feeling buzzing on lips.   Steady blowing - voice ON and OFF. Completed 3 reps. Patient was able to keep continuous airflow.  Steady blowing - voice ON, short/small pitch glides, approx. 3-5 notes. Completed over 3 reps.   Steady blowing - voice ON, long/full pitch glides, lowest to highest pitches. Completed over 3 reps.     Lip Trills  Sustained voiced lip trill, turning voice on/off. Completed over 3 reps.   Short/small pitch glides, approx. 3-5 notes. Completed over 3 reps.   Long/full pitch glides, lowest to highest pitches. Completed over 3 reps.     Straw only vibration/singing  Sustained voicing through straw. Completed over 1 reps.   Short/small pitch glides, approx. 3-5 notes. Completed over 3 reps.   Long/full pitch glides, lowest to highest pitches. Completed over 3 reps.   Humming/Voicing familiar song through straw.     Patient will utilize a forward tone focused/resonant voice to decrease vocal hyperfunction and improve voice quality and vocal projection, to be achieved in 4-6 weeks.     The patient completed the following vocal function exercises without laryngeal tension (based on Dr. Javan Morales's protocol), designed to balance laryngeal musculature, breath flow for phonation, and supraglottic placement of the tone.     Warm Up: Patient sustained /i/ for 20 seconds, using forward focus.  Stretching: Patient was able to demonstrate pitch glide from lowest note to highest on the word knoll. GOAL = no voice breaks. Completed over 2 trials.   Christa: Patient was able to demonstrate pitch glide from highest note to lowest note on the word “knoll\". GOAL = no voice breaks. Completed over 2 trials.  Voice Exercises: Patient started with /OL/ sound , open throat, and tight lips, " feel buzzing in cheeks and lips using forward focus. The following notes were elicited using a pitch pipe (C-D-E-F-G). Completed each note over 2 trials. Minimal cues needed.       Patient will self-report a decrease in muscle tension of the larynx and cervical area after independently completing relaxation/stretching exercise to be achieved in 6-8 weeks.     Patient will learn and apply resonance techniques at the word, sentence, and paragraph levels with min verbal cues to facilitate reduced laryngeal tension when voicing, to be achieved in 4-6 weeks.    DYSPHAGIA:  Patient will consume (regular consistency) PO trials during therapy session, without overt s/sx of aspiration given minimal verbal cues for use of swallowing strategies in order to safely consume the least restrictive diet, to be achieved in 4-6 weeks.      Patient will complete swallowing exercises (i.e., Mendelsohn, Eliza, Effortful) to increase pharyngeal strength and coordination with min cues to 90% effectiveness to improve bolus formation and strengthen pharyngeal musculature, to be achieved in 4-6 weeks.     Patient will perform compensatory strategies (e.g., upright positioning, small bites/sips, slow rate, alternation of consistencies, multiple swallows, effortful swallow, chin tuck, head turn, etc.) with minimal verbal cues to prevent overt s/sx penetration/aspiration of least restrictive food/liquid consistencies, to be achieved in 4-6 weeks.    Plan:  -Patient was provided with home exercises/activities to target goals in plan of care at the end of today's session.  -Continue with current plan of care.

## 2024-08-02 ENCOUNTER — OFFICE VISIT (OUTPATIENT)
Dept: PULMONOLOGY | Facility: CLINIC | Age: 64
End: 2024-08-02
Payer: COMMERCIAL

## 2024-08-02 ENCOUNTER — TELEPHONE (OUTPATIENT)
Dept: HEMATOLOGY ONCOLOGY | Facility: CLINIC | Age: 64
End: 2024-08-02

## 2024-08-02 VITALS
HEIGHT: 69 IN | TEMPERATURE: 97 F | SYSTOLIC BLOOD PRESSURE: 126 MMHG | WEIGHT: 161 LBS | BODY MASS INDEX: 23.85 KG/M2 | DIASTOLIC BLOOD PRESSURE: 78 MMHG | HEART RATE: 56 BPM | OXYGEN SATURATION: 95 %

## 2024-08-02 DIAGNOSIS — G47.19 EXCESSIVE DAYTIME SLEEPINESS: ICD-10-CM

## 2024-08-02 DIAGNOSIS — R91.1 PULMONARY NODULE 1 CM OR GREATER IN DIAMETER: Primary | ICD-10-CM

## 2024-08-02 DIAGNOSIS — G71.11 MYOTONIC DYSTROPHY (HCC): ICD-10-CM

## 2024-08-02 DIAGNOSIS — J42 CHRONIC BRONCHITIS, UNSPECIFIED CHRONIC BRONCHITIS TYPE (HCC): ICD-10-CM

## 2024-08-02 DIAGNOSIS — R91.8 ABNORMAL CT SCAN OF LUNG: ICD-10-CM

## 2024-08-02 PROCEDURE — 99214 OFFICE O/P EST MOD 30 MIN: CPT | Performed by: INTERNAL MEDICINE

## 2024-08-02 NOTE — PROGRESS NOTES
"Pulmonary Follow Up Note   Otoniel Martinez 64 y.o. male MRN: [id number]  8/2/2024    Assessment:  Chronic bronchitis  DDx reversible airway disease given the positive forced as below vs immunoglobulin deficiency (under workup for myeloma) causing the recurrent sinusitis  Likely aggravated by retained secretion from neuromuscular weakness/myotonic dystrophy unable to clear secretions properly  Hospitalized in 2/2024 for acute exacerbation treated with antibiotics/steroids with good outcomes  Since last exacerbation, on mucus clearing therapy was normal saline nebs/Symbicort 80 with good outcomes  PFT was a suboptimal for muscular effort including MVV/MEP/MIP see below    Plan:  Given the stability, will continue current treatment without change  Symbicort 80 q12, normal saline nebs bid  Hold off referral to allergy/immunology, immunoglobulin deficiency could be secondary to myeloma/blood dyscrasias    Pulmonary nodule  Since 2/2024, noted on PET/CT done for monoclonal gammopathy  1.9 x 1.5 cm at KALEB, mild SUV uptake 2.8  This was not present on CT chest during last admission in February  Does not appear to be a high risk, lifelong non-smoker, no significant environmental/occupational hazard exposure  Possibly inflammatory related, vs infiltration from myeloma/monoclonal gammopathy  Given the above, we will follow-up with a CT chest in 6 weeks from last, and return appointment after that    Obstructive sleep apnea  Reports first Dx more than 10 years ago with sleep study however no records available  Over the past few years, noticed more excessive daytime sleepiness, a.m. headache and unrefreshing sleep, wife states that he snores a lot  Will reorder the sleep study    Return in about 2 months (around 10/2/2024).    History of Present Illness     Follow up for: chronic bronchitis     Background:  As per initial consult note     \"64 y.o. male with a h/o polyneuropathy, chronic gastritis, myotonic dystrophy, CAD/PCI 2004, " "CVA, osteoarthritis, DVT/Eliquis, chronic sinusitis, IDDM/insulin pump     Presented today for initial evaluation by pulmonary for chronic cough/mucus production     First noted approximately 5 years ago, episodes of minimally productive cough/chest congestion, especially around the allergy season/cold.  Impact his ability to perform duties at [employer] such as singing.  Associated with production of yellow mucus/oftentimes sticky.  Had partial relief from his Mucinex.  Also associated with chronic nasal congestion, known to have recurrent sinusitis from before did not follow with ENT since 2020.     In 2022, hospitalized for pneumonia/chest congestion to [facility], chest CT at that time showed bibasilar opacities/lingular opacities.     Hx myotonic dystrophy: Diagnosed 1988, noted difficulty walking/muscle spasms worse in the cold weather, underwent muscle biopsies.     Currently, episodes are manageable, tries to cough so hard to produce the mucus after that feels relief.  Never been on inhalers before, no prior formal diagnosis of asthma or COPD, lifelong non-smoker.        Social/exposure history  Lived in [place] all his life  Lifelong non-smoker, nonalcoholic  Worked for the [employer] office for decades, retired in 2017  Lives in an old house built in 1993, had water leak several months ago but no exposure to mold  Pets: 3 dogs     Family history: Dad had breathing issues/was a smoker\"     2/6 /2024 visit-Mucinex, Symbicort 80.  PFT/[facility] allergy panel which was positive.  Sputum cultures 6/1 NGTD.  Flonase        2/8/2024 visit-worse cough/sinusitis symptoms and URI, new hypoxemia with exertion.  Direct admit.  Antibiotic azithromycin/ceftriaxone, mucus clearing therapy.  Normal procalcitonin COVID/flu/RSV.  Sputum cultures normal ryley.  CT chest showed dependent GGO's at the RLL diffuse bronchial wall thickening/bronchitis.  CT sinus with mild to moderate sinusitis worse in the ethmoid sinuses.  " Evaluated by ENT no pus on endoscopy no gross polyps did not feel acute sinusitis.  Discharged on steroids/antibiotics     2/2024 visit-feeling better symptomatically,, PFT with MVV/MEP/MIP, no exertional hypoxemia.  Continue aggressive mucus clearing.  Still required 2-3 LPM at night/sleep study ordered     4/2024 visit - continue Symbicort, referral to allergy/immunology.  Please schedule sleep study    Interval History  Since last seen, no major changes.  Continued with the speech therapy sessions, able to sing intermittently at [employer].  Using the normal saline nebs in the a.m. with production of a lot of mucus then feels clear the rest of the day.  Still on Symbicort 80.  No exacerbation, antibiotics or steroid use.    Currently under workup for multiple myeloma, plan for IR biopsy next week.  A PET/CT showed a new KALEB nodule mild SUV uptake 2.8, size of 19 x 15 mm, this was not present on CT chest during last admission in 2/2024.      Review of Systems  As per hpi, all other systems reviewed and were negative    Studies:  Imaging and other studies: I have personally reviewed pertinent films in PACS     CT PE/20 3/2022-bibasilar/lingular airspace opacity suspect infectious/inflammatory/mucous plug concern for aspiration.    PET scan 8/1/2024-KALEB new nodule 1.9 x 1.5 SUV 2.8, mild halo of GGO, this was not present on CT in 2/2024.  Suspect inflammatory process/malignancy not excluded low metabolic activity, recommended at least 6 to 8 weeks follow-up vs biopsies     Pulmonary function testing:      PFT 3/18/2024-ratio 76%, FEV1 2.59 L / 83%, FVC 3.4 L / 85%.  TLC 81%, RV 89%, DLCO 79% MVV 55%, MAP 49%, MIP 37%     6-minute walk test 2/8/2024-baseline SpO2 93% on room air, heart rate 76.  Able to walk 266 m in 6 minutes, lowest SpO2 at 88% after 2 minutes, required 2 LPM to end exercise with SpO2 95%     EKG, Pathology, and Other Studies: I have personally reviewed pertinent reports.       Immunoglobulin levels  "2/20/2024 and 2/20/2018  IGA  66 - 433 mg/dL 58 Low  40.0 Low  R   IGG  635 - 1,741 mg/dL 316 Low  361.0 Low  R   IGM  45 - 281 mg/dL 254 208.0 R            Component  Ref Range & Units 2/23/24  9:05 AM   IgG 1  248 - 810 mg/dL 155 Low    IgG 2  130 - 555 mg/dL 116 Low    IgG 3  15 - 102 mg/dL 37   IgG 4  2 - 96 mg/dL 11   IgG  603 - 1613 mg/dL 366 Low          [facility] Panel, Adult 2/6/2024        Component  Ref Range & Units 2/6/24  9:45 AM   A.ALTERNATA  0.00 - 0.09 kUA/I <0.10   A.FUMIGATUS  0.00 - 0.09 kUA/I <0.10   [place] Grass  0.00 - 0.09 kUA/I 0.36 High    [place]  0.00 - 0.09 kUA/I <0.10   Cat Epithellium-Dander  0.00 - 0.09 kUA/I <0.10   C.HERBARUM  0.00 - 0.09 kUA/I <0.10   Cockroach  0.00 - 0.09 kUA/I 0.41 High    Common Silver Birch  0.00 - 0.09 kUA/I <0.10   Graham  0.00 - 0.09 kUA/I <0.10   D. farinae  0.00 - 0.09 kUA/I <0.10   D. pteronyssinus  0.00 - 0.09 kUA/I 0.12 High    Dog Dander  0.00 - 0.09 kUA/I 2.30 High    Elm IgE  0.00 - 0.09 kUA/I <0.10   [place] Tree  0.00 - 0.09 kUA/I <0.10   Mugwort  0.00 - 0.09 kUA/I <0.10   [place] Tree  0.00 - 0.09 kUA/I <0.10   Oak  0.00 - 0.09 kUA/I <0.10   P.CHRYSOGENUM  0.00 - 0.09 kUA/I <0.10   Rough Pigweed  IgE  0.00 - 0.09 kUA/I <0.10   Common Ragweed  0.00 - 0.09 kUA/I <0.10   Sheep Sorrel IgE  0.00 - 0.09 kUA/I <0.10   [place] Tree  0.00 - 0.09 kUA/I <0.10   [place]min Grass  0.00 - 0.09 kUA/I 1.89 High    [place] Tree  0.00 - 0.09 kUA/I <0.10   [place] Tree  0.00 - 0.09 kUA/I <0.10   IgE  0 - 113 kU/l 87.7   MOUSE URINE  0.00 - 0.09 kUA/I <0.10         Past medical, surgical, social and family histories reviewed.      Medications/Allergies: Reviewed      Vitals: Blood pressure 126/78, pulse 56, temperature (!) 97 °F (36.1 °C), temperature source Tympanic, height 5' 9\" (1.753 m), weight 73 kg (161 lb), SpO2 95%. Body mass index is 23.78 kg/m². Oxygen Therapy  SpO2: 95 %  Oxygen Therapy: None (Room air)      Physical Exam  Body mass " "index is 23.78 kg/m².   Gen: not in acute distress,   Neck/Eyes: supple, no adenopathy, PERRL  Ear: normal appearance, no significant hearing impairment  Nose:  normal nasal mucosa, no drainage  Mouth:  unremarkable/normal appearance of lips, teeth and gums  Oropharynx: mucosa is moist, no focal lesions or erythema  Salivary glands: soft nontender  Chest: normal respiratory efforts, clear breath sounds bilaterally  CV: RRR, no murmurs appreciated, no edema  Abdomen: soft, non tender  Extremities:  No observed deformity   Skin: unremarkable  Neuro: AAO X3, no focal motor deficit        Labs:  Lab Results   Component Value Date    WBC 4.29 (L) 06/13/2024    HGB 14.1 06/13/2024    HCT 44.2 06/13/2024    MCV 91 06/13/2024     (L) 06/13/2024     Lab Results   Component Value Date    CALCIUM 8.7 06/13/2024     01/09/2018    K 4.1 06/13/2024    CO2 31 06/13/2024     06/13/2024    BUN 16 06/13/2024    CREATININE 0.60 06/13/2024     Lab Results   Component Value Date    IGE 87.7 02/06/2024     Lab Results   Component Value Date     (H) 06/13/2024    AST 53 (H) 06/13/2024    ALKPHOS 76 06/13/2024    BILITOT 1.4 (H) 01/09/2018           Portions of the record may have been created with voice recognition software.  Occasional wrong word or \"sound a like\" substitutions may have occurred due to the inherent limitations of voice recognition software.  Read the chart carefully and recognize, using context, where substitutions have occurred    Tmoas Jack M.D.  [facility]  "

## 2024-08-02 NOTE — TELEPHONE ENCOUNTER
Telephone call spoke with Pt's wife and Pt was driving.  Dr Bronson reviewed scan results, reviewed her recommendation note.  Pt was seen by Dr [facility] and he reviewed the results with them.  He is recommending waiting for the BM bx and repeating CT scan in 6 to 8 weeks.  Pt is aware of fu appt with Dr Bronson 8/12 at 220 pm and they will discuss findings in detail.

## 2024-08-02 NOTE — TELEPHONE ENCOUNTER
----- Message from Juli Bronson DO sent at 8/2/2024  6:38 AM EDT -----  Please notify pt that his pet scan shows an area of concern in his lungs. Recommend evaluation by CT surgery. If pt agreeable, refer to CT surgery. Further recommendations to be reviewed at his f/u on 8/12.

## 2024-08-05 ENCOUNTER — OFFICE VISIT (OUTPATIENT)
Dept: SPEECH THERAPY | Facility: CLINIC | Age: 64
End: 2024-08-05
Payer: COMMERCIAL

## 2024-08-05 DIAGNOSIS — R49.0 DYSPHONIA: Primary | ICD-10-CM

## 2024-08-05 DIAGNOSIS — G71.11 MYOTONIC DYSTROPHY (HCC): ICD-10-CM

## 2024-08-05 DIAGNOSIS — R13.12 DYSPHAGIA, OROPHARYNGEAL PHASE: ICD-10-CM

## 2024-08-05 PROCEDURE — 92507 TX SP LANG VOICE COMM INDIV: CPT

## 2024-08-05 NOTE — PROGRESS NOTES
Daily Speech Treatment Note    Today's date: 2024   Patient’s name: Otoniel Martinez  : 1960  MRN: [id number]  Safety measures: myotonic dystrophy, chronic SOB, DM Type 1   Referring provider: Mely Bacon,*    Encounter Diagnosis     ICD-10-CM    1. Dysphonia  R49.0       2. Dysphagia, oropharyngeal phase  R13.12       3. 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HCC)  G71.11               Visit Tracking:  POC   Expires Auth Expiration Date ST Visit Limit   2024 30 visits or 24 30 visits hard max              Visit/Unit Tracking:  Auth Status Date 24   Auth Required Used 1 2 3 4 5     Remaining 29 28 27 26 25         Subjective/Behavioral:  Patient reports he has been finding voice therapy very helpful! He sang in the choir this weekend.     Objective/Assessment: He was unable to review VFE (Dr. Morales) exercises since last session - we reviewed again today    Short-term goals:    VOICE:  Patient will be educated on vocal hygiene and demonstrate understanding of recommendations to facilitate increased quality of voice, to be achieved in 2-4 weeks.    Patient will be educated on diaphragmatic breathing (versus clavicular breathing) to establish controlled, rhythmic breathing, to be achieved in 2-4 weeks.    Patient will utilize diaphragmatic breathing during oral sentence/paragraph reading in 80% of opportunities to facilitate increased breath support for voice/speaking, decrease laryngeal effort, and improve glottic closure to be achieved in 4-6 weeks.    Patient will utilize semi-occluded vocal tract exercises without laryngeal tension to promote healthy vocal function with 80% accuracy, to be achieved in 4-6 weeks.     Cup bubbling - increased difficulty today with pitch glides; attempted with wider straw with increased success noted.   Straw only - wider straw provided; patient demonstrated excellent forward focus and tone through the straw. He completed sustained pitch,  glides and singing a song  Lip/tongue trills - difficulty with sustaining trills and noted increased tension (face/neck)    Patient will utilize a forward tone focused/resonant voice to decrease vocal hyperfunction and improve voice quality and vocal projection, to be achieved in 4-6 weeks.      Patient will self-report a decrease in muscle tension of the larynx and cervical area after independently completing relaxation/stretching exercise to be achieved in 6-8 weeks.     Patient will learn and apply resonance techniques at the word, sentence, and paragraph levels with min verbal cues to facilitate reduced laryngeal tension when voicing, to be achieved in 4-6 weeks.    DYSPHAGIA:  Patient will consume (regular consistency) PO trials during therapy session, without overt s/sx of aspiration given minimal verbal cues for use of swallowing strategies in order to safely consume the least restrictive diet, to be achieved in 4-6 weeks.     Patient reports he still has difficulty with tougher foods/breads - where it gets stuck in his throat     Patient will complete swallowing exercises (i.e., Mendelsohn, Eliza, Effortful) to increase pharyngeal strength and coordination with min cues to 90% effectiveness to improve bolus formation and strengthen pharyngeal musculature, to be achieved in 4-6 weeks.     Patient will perform compensatory strategies (e.g., upright positioning, small bites/sips, slow rate, alternation of consistencies, multiple swallows, effortful swallow, chin tuck, head turn, etc.) with minimal verbal cues to prevent overt s/sx penetration/aspiration of least restrictive food/liquid consistencies, to be achieved in 4-6 weeks.    Plan:  -Patient was provided with home exercises/activities to target goals in plan of care at the end of today's session.  -Continue with current plan of care.

## 2024-08-07 ENCOUNTER — HOSPITAL ENCOUNTER (OUTPATIENT)
Dept: INTERVENTIONAL RADIOLOGY/VASCULAR | Facility: HOSPITAL | Age: 64
Discharge: HOME | End: 2024-08-07
Attending: INTERNAL MEDICINE
Payer: COMMERCIAL

## 2024-08-07 ENCOUNTER — OFFICE VISIT (OUTPATIENT)
Dept: NEUROLOGY | Facility: CLINIC | Age: 64
End: 2024-08-07
Payer: COMMERCIAL

## 2024-08-07 ENCOUNTER — TELEPHONE (OUTPATIENT)
Dept: NEUROLOGY | Facility: CLINIC | Age: 64
End: 2024-08-07

## 2024-08-07 VITALS
OXYGEN SATURATION: 94 % | HEART RATE: 54 BPM | SYSTOLIC BLOOD PRESSURE: 93 MMHG | DIASTOLIC BLOOD PRESSURE: 65 MMHG | TEMPERATURE: 97.5 F | RESPIRATION RATE: 18 BRPM

## 2024-08-07 VITALS
HEART RATE: 52 BPM | DIASTOLIC BLOOD PRESSURE: 70 MMHG | TEMPERATURE: 98 F | WEIGHT: 161.4 LBS | SYSTOLIC BLOOD PRESSURE: 110 MMHG | HEIGHT: 69 IN | BODY MASS INDEX: 23.91 KG/M2 | OXYGEN SATURATION: 97 %

## 2024-08-07 DIAGNOSIS — D69.6 THROMBOCYTOPENIA (HCC): ICD-10-CM

## 2024-08-07 DIAGNOSIS — D47.2 IGM KAPPA MONOCLONAL GAMMOPATHY: ICD-10-CM

## 2024-08-07 DIAGNOSIS — G71.11 MYOTONIC DYSTROPHY (HCC): ICD-10-CM

## 2024-08-07 DIAGNOSIS — R41.3 MEMORY LOSS: Primary | ICD-10-CM

## 2024-08-07 DIAGNOSIS — R16.1 SPLENOMEGALY: ICD-10-CM

## 2024-08-07 DIAGNOSIS — D72.819 LEUKOPENIA, UNSPECIFIED TYPE: ICD-10-CM

## 2024-08-07 LAB
ERYTHROCYTE [DISTWIDTH] IN BLOOD BY AUTOMATED COUNT: 14.7 % (ref 11.6–15.1)
GLUCOSE SERPL-MCNC: 124 MG/DL (ref 65–140)
GLUCOSE SERPL-MCNC: 83 MG/DL (ref 65–140)
HCT VFR BLD AUTO: 40.7 % (ref 36.5–49.3)
HGB BLD-MCNC: 13.8 G/DL (ref 12–17)
MCH RBC QN AUTO: 28.6 PG (ref 26.8–34.3)
MCHC RBC AUTO-ENTMCNC: 33.9 G/DL (ref 31.4–37.4)
MCV RBC AUTO: 84 FL (ref 82–98)
NRBC BLD AUTO-RTO: 0 /100 WBCS
PLATELET # BLD AUTO: 133 THOUSANDS/UL (ref 149–390)
PMV BLD AUTO: 9.9 FL (ref 8.9–12.7)
RBC # BLD AUTO: 4.82 MILLION/UL (ref 3.88–5.62)
WBC # BLD AUTO: 3.59 THOUSAND/UL (ref 4.31–10.16)

## 2024-08-07 PROCEDURE — 82948 REAGENT STRIP/BLOOD GLUCOSE: CPT

## 2024-08-07 PROCEDURE — 88184 FLOWCYTOMETRY/ TC 1 MARKER: CPT | Performed by: INTERNAL MEDICINE

## 2024-08-07 PROCEDURE — 85007 BL SMEAR W/DIFF WBC COUNT: CPT | Performed by: STUDENT IN AN ORGANIZED HEALTH CARE EDUCATION/TRAINING PROGRAM

## 2024-08-07 PROCEDURE — 99152 MOD SED SAME PHYS/QHP 5/>YRS: CPT

## 2024-08-07 PROCEDURE — 88264 CHROMOSOME ANALYSIS 20-25: CPT | Performed by: INTERNAL MEDICINE

## 2024-08-07 PROCEDURE — C1830 POWER BONE MARROW BX NEEDLE: HCPCS

## 2024-08-07 PROCEDURE — 88185 FLOWCYTOMETRY/TC ADD-ON: CPT

## 2024-08-07 PROCEDURE — 88237 TISSUE CULTURE BONE MARROW: CPT | Performed by: INTERNAL MEDICINE

## 2024-08-07 PROCEDURE — 38222 DX BONE MARROW BX & ASPIR: CPT

## 2024-08-07 PROCEDURE — 99153 MOD SED SAME PHYS/QHP EA: CPT

## 2024-08-07 PROCEDURE — 88374 M/PHMTRC ALYS ISHQUANT/SEMIQ: CPT | Performed by: INTERNAL MEDICINE

## 2024-08-07 PROCEDURE — 88189 FLOWCYTOMETRY/READ 16 & >: CPT

## 2024-08-07 PROCEDURE — 99214 OFFICE O/P EST MOD 30 MIN: CPT | Performed by: PSYCHIATRY & NEUROLOGY

## 2024-08-07 RX ORDER — LIDOCAINE WITH 8.4% SOD BICARB 0.9%(10ML)
SYRINGE (ML) INJECTION AS NEEDED
Status: COMPLETED | OUTPATIENT
Start: 2024-08-07 | End: 2024-08-07

## 2024-08-07 RX ORDER — FENTANYL CITRATE 50 UG/ML
INJECTION, SOLUTION INTRAMUSCULAR; INTRAVENOUS AS NEEDED
Status: COMPLETED | OUTPATIENT
Start: 2024-08-07 | End: 2024-08-07

## 2024-08-07 RX ORDER — SODIUM CHLORIDE 9 MG/ML
75 INJECTION, SOLUTION INTRAVENOUS CONTINUOUS
Status: DISCONTINUED | OUTPATIENT
Start: 2024-08-07 | End: 2024-08-08 | Stop reason: HOSPADM

## 2024-08-07 RX ORDER — MIDAZOLAM HYDROCHLORIDE 2 MG/2ML
INJECTION, SOLUTION INTRAMUSCULAR; INTRAVENOUS AS NEEDED
Status: COMPLETED | OUTPATIENT
Start: 2024-08-07 | End: 2024-08-07

## 2024-08-07 RX ADMIN — MIDAZOLAM 0.5 MG: 1 INJECTION INTRAMUSCULAR; INTRAVENOUS at 09:03

## 2024-08-07 RX ADMIN — FENTANYL CITRATE 25 MCG: 50 INJECTION, SOLUTION INTRAMUSCULAR; INTRAVENOUS at 09:03

## 2024-08-07 RX ADMIN — FENTANYL CITRATE 25 MCG: 50 INJECTION, SOLUTION INTRAMUSCULAR; INTRAVENOUS at 08:58

## 2024-08-07 RX ADMIN — MIDAZOLAM 1 MG: 1 INJECTION INTRAMUSCULAR; INTRAVENOUS at 08:52

## 2024-08-07 RX ADMIN — FENTANYL CITRATE 50 MCG: 50 INJECTION, SOLUTION INTRAMUSCULAR; INTRAVENOUS at 08:52

## 2024-08-07 RX ADMIN — Medication 10 ML: at 09:02

## 2024-08-07 RX ADMIN — MIDAZOLAM 0.5 MG: 1 INJECTION INTRAMUSCULAR; INTRAVENOUS at 08:58

## 2024-08-07 NOTE — H&P
Interventional Radiology Preprocedure Note    History/Indication for procedure:   Otoniel Martinez is a 64 y.o. male with a PMH of thrombocytopenia who presets for BMB/asp.    Relevant past medical history:    Past Medical History:   Diagnosis Date    Acute respiratory failure with hypoxia ([facility]) 02/08/2024    CAD (coronary artery disease)     Congenital myotonia     Deep vein thrombosis (DVT) ([facility])     after tear of gastrocnemus muscle    Diabetes (HCC)     Diabetes mellitus (HCC)     Difficulty walking     hip replacement    HL (hearing loss)     decreased both  ears    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[facility]) 12/06/2017    Pancreatitis     three times    Sleep apnea     not using a C-PAP    Superficial thrombophlebitis     Vision loss     reading glasses over the counter     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), 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HCC)    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HCC)    Mixed hyperlipidemia    Vertigo    Polyneuropathy    Chronic gastritis without bleeding    Old cerebrovascular accident (CVA) without late effect    Vitamin D deficiency    History of recurrent deep vein thrombosis (DVT)    Dysfunction of right rotator cuff    Primary osteoarthritis of right hip    Recurrent cold sores    Chronic bronchitis (HCC)    Abnormal CT scan of lung    Chronic sinusitis    Insulin pump status    Obstructive sleep apnea    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)    Splenomegaly    Establishing care with new doctor, encounter for    Decreased hearing of both ears    Memory loss    Seizure (HCC)       /78 (BP Location: Right arm)   Pulse 56   Temp 97.5 °F (36.4 °C) (Temporal)   Resp 18   SpO2 96%     Medications:    Inpatient Medications:     Scheduled Medications:  Current Facility-Administered Medications   Medication Dose Route Frequency Provider Last Rate    sodium  chloride  75 mL/hr Intravenous Continuous Jc Abraham MD         Infusions:  sodium chloride, 75 mL/hr        PRN:      Outpatient Medications:  Current Outpatient Medications on File Prior to Encounter   Medication Sig Dispense Refill    aspirin (ECOTRIN LOW STRENGTH) 81 mg EC tablet Take 81 mg by mouth daily      atorvastatin (LIPITOR) 40 mg tablet TAKE 1 TABLET BY MOUTH EVERY DAY 90 tablet 1    budesonide-formoterol (Symbicort) 80-4.5 MCG/ACT inhaler Inhale 2 puffs 2 (two) times a day Rinse mouth after use. 30.6 g 5    cholecalciferol (VITAMIN D3) 400 units tablet Take 1 tablet by mouth every morning      fluticasone (FLONASE) 50 mcg/act nasal spray 2 sprays into each nostril daily      levalbuterol (Xopenex) 1.25 mg/3 mL nebulizer solution Take 3 mL (1.25 mg total) by nebulization 3 (three) times a day 270 mL 4    montelukast (SINGULAIR) 10 mg tablet TAKE 1 TABLET BY MOUTH DAILY AT BEDTIME 90 tablet 0    pantoprazole (PROTONIX) 40 mg tablet TAKE 1 TABLET TWICE DAILY 30 MINS PRIOR TO BREAKFAST AND SUPPER. 180 tablet 3    rivaroxaban (Xarelto) 20 mg tablet TAKE 1 TABLET BY MOUTH DAILY WITH BREAKFAST 100 tablet 1    sodium chloride 0.9 % nebulizer solution Take 3 mL by nebulization as needed for wheezing 90 mL 3    acyclovir (ZOVIRAX) 200 mg capsule Take 1 capsule (200 mg total) by mouth 5 (five) times a day for 5 days (Patient taking differently: Take 200 mg by mouth 5 (five) times a day Patient takes PRN) 25 capsule 0    Continuous Blood Gluc Sensor (Dexcom G6 Sensor) MISC Change every 10 days. E10.65      Continuous Blood Gluc Transmit (Dexcom G6 Transmitter) MISC Change every 90 days. E10.65      Gvoke HypoPen 2-Pack 1 MG/0.2ML SOAJ INJECT 1 MG UNDER THE SKIN AS NEEDED (FOR SEVERE HYPOGLYCEMIA). E10.65      Insulin Disposable Pump (Omnipod 5 G6 Intro, Gen 5,) KIT Inject 1 each under the skin every third day      Insulin Disposable Pump (Omnipod 5 G6 Pods, Gen 5,) MISC INJECT 1 UNDER THE SKIN EVERY OTHER  DAY. CHANGING EVERY 2 DAYS DUE TO HIGH INSULIN REQUIREMENT.      NovoLOG 100 UNIT/ML injection Inject up to 110 units daily via insulin pump. E10.65      sildenafil (VIAGRA) 100 mg tablet Take 1 tablet (100 mg total) by mouth daily as needed for erectile dysfunction 10 tablet 1     No current facility-administered medications on file prior to encounter.       Allergies   Allergen Reactions    Other Cough     Grass / dogs hair        Anticoagulants: ASA    ASA classification: ASA 3 - Patient with moderate systemic disease with functional limitations    Airway Assessment: II (hard and soft palate, upper portion of tonsils anduvula visible)    Relevant family history: None    Relevant review of systems: None    Prior sedation/anesthesia: yes    Can the patient lie flat? Yes     NPO Status: yes    Labs:   CBC with diff:   Lab Results   Component Value Date    WBC 3.59 (L) 08/07/2024    HGB 13.8 08/07/2024    HCT 40.7 08/07/2024    MCV 84 08/07/2024     (L) 08/07/2024    RBC 4.82 08/07/2024    MCH 28.6 08/07/2024    MCHC 33.9 08/07/2024    RDW 14.7 08/07/2024    MPV 9.9 08/07/2024    NRBC 0 08/07/2024     BMP/CMP:  Lab Results   Component Value Date     01/09/2018    K 4.1 06/13/2024    K 4.1 12/22/2023     06/13/2024     12/22/2023    CO2 31 06/13/2024    CO2 30 12/22/2023    BUN 16 06/13/2024    BUN 18 12/22/2023    CREATININE 0.60 06/13/2024    CREATININE 0.63 12/22/2023    CALCIUM 8.7 06/13/2024    CALCIUM 9.7 12/22/2023    AST 53 (H) 06/13/2024    AST 39 12/22/2023     (H) 06/13/2024    ALT 71 (H) 12/22/2023    ALKPHOS 76 06/13/2024    ALKPHOS 96 12/22/2023    PROT 5.8 (L) 01/09/2018    BILITOT 1.4 (H) 01/09/2018    EGFR 106 06/13/2024    EGFR 106 12/22/2023    EGFR 105 03/22/2020   ,     Coags:   Lab Results   Component Value Date    PT 16.7 (H) 01/26/2022    INR 1.4 01/26/2022   ,          Relevant imaging studies:   Reviewed.    Directed physical examination:  I agree with the  physical exam performed on 8/2/24 and there are no additional changes.    Assessment/Plan:   BMB/asp.    Sedation/Anesthesia plan:  Moderate sedation will be used as needed for procedure.    Consent with alternatives to the procedure, risks and benefits have been explained and discussed with the patient/patient's family: yes.

## 2024-08-07 NOTE — ASSESSMENT & PLAN NOTE
Today Oliverio and his wife Oliva present in the follow-up visit.  He reports that his symptoms are otherwise stable.  He does have intermittent staring spells which are easily resolved with his name.  He has no associated loss of consciousness or alteration of consciousness.  His family has complained of some short-term memory issues however today's MoCA has improved from the past 2 months ago.  He did undergo EEG that was normal and MRI imaging of the brain which showed prior left chronic temporal infarct and some decreased size of the medial temporal lobes.  However clinically he improved on his MoCA score today.  He denies any difficulty doing his ADLs driving doing his bills etc.  He admits a great deal of stress at home regarding his family member.  I have asked him to proceed with neuropsychological testing.  We will check with administration to determine the best referral given the access issues.    Oliverio is undergoing medical workup for numerous other medical conditions.    If however in the next few weeks he has noticed more problems with staring progression of his memory symptoms and may need to consider the use of Aricept low-dose and a referral to a memory disorder center.      The MRI with neuro quant was reviewed personally via the PACS system today.

## 2024-08-07 NOTE — TELEPHONE ENCOUNTER
Called pt for Dr Bacon. Pt was having a procedure this morning, Dr Bacon was checking to see if he was up to coming to his appt at 2:30. Or if we had to reschedule his appt. Spoke to pts wife, Oliva, she said pt will be here for his appt.

## 2024-08-07 NOTE — NURSING NOTE
Pt is awake,alert,tolerated diet,written and verbal instructions given to pt and his wife, who verbalize an understanding. Pt offers no complaints.

## 2024-08-07 NOTE — ASSESSMENT & PLAN NOTE
Has a known history of myotonic dystrophy.  He reports his symptoms are stable.  At this point we will hold off on initiation of Tegretol or mexiletine.  He does stretch on a regular basis.  He did undergo a speech evaluation which did help.  He is on Protonix.  He has modified his diet which has helped.  He has been following up with ENT.

## 2024-08-07 NOTE — PROGRESS NOTES
Patient ID: Otoniel Martinez is a 64 y.o. male.    Assessment/Plan:    Memory loss  Today Oliverio and his wife Oliva present in the follow-up visit.  He reports that his symptoms are otherwise stable.  He does have intermittent staring spells which are easily resolved with his name.  He has no associated loss of consciousness or alteration of consciousness.  His family has complained of some short-term memory issues however today's MoCA has improved from the past 2 months ago.  He did undergo EEG that was normal and MRI imaging of the brain which showed prior left chronic temporal infarct and some decreased size of the medial temporal lobes.  However clinically he improved on his MoCA score today.  He denies any difficulty doing his ADLs driving doing his bills etc.  He admits a great deal of stress at home regarding his family member.  I have asked him to proceed with neuropsychological testing.  We will check with administration to determine the best referral given the access issues.    Oliverio is undergoing medical workup for numerous other medical conditions.    If however in the next few weeks he has noticed more problems with staring progression of his memory symptoms and may need to consider the use of Aricept low-dose and a referral to a memory disorder center.      The MRI with neuro quant was reviewed personally via the PACS system today.    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HCC)  Has a known history of myotonic dystrophy.  He reports his symptoms are stable.  At this point we will hold off on initiation of Tegretol or mexiletine.  He does stretch on a regular basis.  He did undergo a speech evaluation which did help.  He is on Protonix.  He has modified his diet which has helped.  He has been following up with ENT.       Diagnoses and all orders for this visit:    Memory loss    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HCC)       Subjective:      This is a 64-year-old patient with a history of myotonic dystrophy.  At the last visit he informed me  of difficulty focusing finishing task and short-term memory problems.  He admitted to staring episodes.  Today Oliva his wife is available at the visit.  She says this can happen intermittently but with a notch he does return to his baseline.  He denies any involuntary movements or cognitive slowing with these episodes.  Review of the chart did demonstrate CAT scan of the brain had shown abnormalities in the left temporal region.  He then underwent MRI of the brain with neuro quant.  It demonstrated enlarged ventricles with a chronic left temporal infarct and small hippocampal size bilaterally.  The EEG was normal.    In the interim he is undergoing a workup for leukopenia, gM kappa monoclonal gammopathy. . He underwent a bone marrow biopsy today and is awaiting results.  He is also seeing pulmonology about lung nodule.      He has no problems driving , does not get lost in familiar situations but does have difficulty in understanding concepts.  He has some intermittent short-term memory issues but easily compensates.     A MoCA was performed today was 23  out of 30 with deficiencies in short-term memory less with calculations.     I did order speech evaluation for the dysphonia which has helped.    He does complain of dizziness with head movements.  This is worse with activity and has increased since he has become older.  The dizziness is characterized by vertiginous symptoms.  He is now utilizing oxygen at night but does help.    MPRESSION:, Mri of brain 06/26/24      White matter changes suggestive of chronic microangiopathy.  No acute intracranial pathology. Chronic infarct left posterior temporal lobe.     NeuroQuant analysis was performed: Low hippocampal volume and enlarged inferior lateral and overall ventricular system, suggestive of global ex-vacuo dilatation.  The additional finding of an abnormal Hippocampal Occupancy Score, (HOC), is concerning for   a mesial temporal lobe focused Neurodegenerative  process.  Recommend reevaluation with Neuroquant imaging in 6 months.   Tpartial     Eeg  6/12/24      This was a normal awake, drowsy and sleep routine EEG study. No electrographic seizures, interictal epileptiform discharges (seizure tendency) or focal slowing were seen.      : Oliverio  and Oliva inform me of a of stressors at home.  he does have a 35-year-old son who lives at home but has addiction issues .  Oliverio is also in the middle of a workup for numerous medical issues                          The following portions of the patient's history were reviewed and updated as appropriate: He  has a past medical history of Acute respiratory failure with hypoxia ([facility]) (02/08/2024), CAD (coronary artery disease), Congenital myotonia, Deep vein thrombosis (DVT) ([facility]), Diabetes ([facility]), Diabetes mellitus ([facility]), Difficulty walking, HL (hearing loss), 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[facility]) (12/06/2017), Pancreatitis, Sleep apnea, Superficial thrombophlebitis, and Vision loss.  He  has a past surgical history that includes Cholecystectomy; Coronary angioplasty with stent; Colonoscopy; Circumcision; Inguinal hernia repair; ORIF radial shaft fracture (Left); ORIF ulnar / radial shaft fracture (Left); Tonsillectomy and adenoidectomy; Carotid stent; and IR biopsy bone marrow (8/7/2024).  His family history includes Brain cancer in his mother; Cancer in his mother; Clotting disorder in his mother; Coronary artery disease in his paternal uncle; Deep vein thrombosis in his father; Diabetes in his maternal uncle; Emphysema in his father; Heart disease in his mother; Hyperlipidemia in his mother; Stroke in his father.  He  reports that he has never smoked. He has never used smokeless tobacco. He reports that he does not currently use alcohol. He reports that he does not use drugs.  Current Outpatient Medications   Medication Sig Dispense Refill    aspirin (ECOTRIN LOW STRENGTH) 81 mg EC tablet Take 81 mg by mouth daily      atorvastatin (LIPITOR) 40  mg tablet TAKE 1 TABLET BY MOUTH EVERY DAY 90 tablet 1    budesonide-formoterol (Symbicort) 80-4.5 MCG/ACT inhaler Inhale 2 puffs 2 (two) times a day Rinse mouth after use. 30.6 g 5    cholecalciferol (VITAMIN D3) 400 units tablet Take 1 tablet by mouth every morning      Continuous Blood Gluc Sensor (Dexcom G6 Sensor) MISC Change every 10 days. E10.65      Continuous Blood Gluc Transmit (Dexcom G6 Transmitter) MISC Change every 90 days. E10.65      fluticasone (FLONASE) 50 mcg/act nasal spray 2 sprays into each nostril daily      Gvoke HypoPen 2-Pack 1 MG/0.2ML SOAJ INJECT 1 MG UNDER THE SKIN AS NEEDED (FOR SEVERE HYPOGLYCEMIA). E10.65      Insulin Disposable Pump (Omnipod 5 G6 Intro, Gen 5,) KIT Inject 1 each under the skin every third day      Insulin Disposable Pump (Omnipod 5 G6 Pods, Gen 5,) MISC INJECT 1 UNDER THE SKIN EVERY OTHER DAY. CHANGING EVERY 2 DAYS DUE TO HIGH INSULIN REQUIREMENT.      levalbuterol (Xopenex) 1.25 mg/3 mL nebulizer solution Take 3 mL (1.25 mg total) by nebulization 3 (three) times a day 270 mL 4    montelukast (SINGULAIR) 10 mg tablet TAKE 1 TABLET BY MOUTH DAILY AT BEDTIME 90 tablet 0    NovoLOG 100 UNIT/ML injection Inject up to 110 units daily via insulin pump. E10.65      pantoprazole (PROTONIX) 40 mg tablet TAKE 1 TABLET TWICE DAILY 30 MINS PRIOR TO BREAKFAST AND SUPPER. 180 tablet 3    rivaroxaban (Xarelto) 20 mg tablet TAKE 1 TABLET BY MOUTH DAILY WITH BREAKFAST 100 tablet 1    sildenafil (VIAGRA) 100 mg tablet Take 1 tablet (100 mg total) by mouth daily as needed for erectile dysfunction 10 tablet 1    sodium chloride 0.9 % nebulizer solution Take 3 mL by nebulization as needed for wheezing 90 mL 3    acyclovir (ZOVIRAX) 200 mg capsule Take 1 capsule (200 mg total) by mouth 5 (five) times a day for 5 days (Patient taking differently: Take 200 mg by mouth 5 (five) times a day Patient takes PRN) 25 capsule 0     No current facility-administered medications for this visit.  "    Facility-Administered Medications Ordered in Other Visits   Medication Dose Route Frequency Provider Last Rate Last Admin    sodium chloride 0.9 % infusion  75 mL/hr Intravenous Continuous Jc Abraham MD   Stopped at 08/07/24 1033     He is allergic to other..         Objective:    Blood pressure 110/70, pulse (!) 52, temperature 98 °F (36.7 °C), temperature source Temporal, height 5' 9\" (1.753 m), weight 73.2 kg (161 lb 6.4 oz), SpO2 97%.    Physical Exam  Eyes:      General: Lids are normal.      Extraocular Movements: Extraocular movements intact.      Pupils: Pupils are equal, round, and reactive to light.   Neurological:      Deep Tendon Reflexes:      Reflex Scores:       Patellar reflexes are 2+ on the right side and 2+ on the left side.       Achilles reflexes are 1+ on the right side and 1+ on the left side.  Psychiatric:         Speech: Speech normal.         Neurological Exam  Mental Status  Awake, alert and oriented to person, place and time. Recalls 0 of 3 objects immediately. Able to copy figure. Clock drawing is normal. Speech is normal. Language is fluent with no aphasia. Able to perform serial calculations. Able to spell words backwards.    Cranial Nerves  CN II: Visual acuity is normal. Visual fields full to confrontation.  CN III, IV, VI: Extraocular movements intact bilaterally. Normal lids and orbits bilaterally. Pupils equal round and reactive to light bilaterally.  CN V: Facial sensation is normal.  CN VII: Full and symmetric facial movement.  CN VIII: Hearing is normal.  CN IX, X: Palate elevates symmetrically. Normal gag reflex.  CN XI: Shoulder shrug strength is normal.  CN XII: Tongue midline without atrophy or fasciculations.    Motor    Normal strength and there is no evidence of myotonic features..    Sensory  Light touch is normal in upper and lower extremities. Temperature is normal in upper and lower extremities.   Sensory was intact to light touch..    Reflexes               "                              Right                      Left  Patellar                                2+                         2+  Achilles                                1+                         1+  Right Plantar: downgoing  Left Plantar: downgoing    Right pathological reflexes: Polo's absent.  Left pathological reflexes: Polo's absent.    Coordination  Right: Finger-to-nose normal.Left: Finger-to-nose normal.    Gait  Normal casual, toe, heel and tandem gait. Able to rise from chair without using arms.    Review of systems obtained from the medical assistant as below was reviewed with the patient at today's visit    ROS:    Review of Systems   Constitutional: Negative.    HENT: Negative.     Eyes: Negative.    Respiratory: Negative.     Cardiovascular: Negative.    Gastrointestinal: Negative.    Endocrine: Negative.    Genitourinary: Negative.    Musculoskeletal: Negative.    Skin: Negative.    Allergic/Immunologic: Negative.    Neurological: Negative.    Hematological: Negative.    Psychiatric/Behavioral: Negative.           I have spent a total time of 35 minutes in caring for this patient on the day of the visit/encounter including Risks and benefits of tx options, Counseling / Coordination of care, Documenting in the medical record, Reviewing / ordering tests, medicine, procedures  , and Obtaining or reviewing history  .      He Can return to office in several months but keep us informed if there is any other new questions or problems in the interim

## 2024-08-07 NOTE — BRIEF OP NOTE (RAD/CATH)
INTERVENTIONAL RADIOLOGY PROCEDURE NOTE    Date: 8/7/2024    Procedure:   Procedure Summary       Date: 08/07/24 Room / Location: [facility] Interventional Radiology    Anesthesia Start:  Anesthesia Stop:     Procedure: IR BIOPSY BONE MARROW Diagnosis:       Leukopenia, unspecified type      Thrombocytopenia (HCC)      Splenomegaly      IgM kappa monoclonal gammopathy      (Thrombocytopenia (HCC) Splenomegaly IgM kappa monoclonal gammopathy)    Scheduled Providers:  Responsible Provider:     Anesthesia Type: Not recorded ASA Status: Not recorded            Preoperative diagnosis:   1. Leukopenia, unspecified type    2. Thrombocytopenia (HCC)    3. Splenomegaly    4. IgM kappa monoclonal gammopathy         Postoperative diagnosis: Same.    Surgeon: Jc Abraham MD     Assistant: None. No qualified resident was available.    Blood loss: 5 ml    Specimens: sent to path     Findings:   BMB/asp L iliac crest.    Complications: None immediate.    Anesthesia: conscious sedation

## 2024-08-07 NOTE — DISCHARGE INSTRUCTIONS
Bone Marrow Biopsy     WHAT YOU NEED TO KNOW:   A bone marrow biopsy is a procedure to remove a small amount of bone marrow from your bone. Bone marrow is the soft tissue inside your bone that helps to make blood cells. The sample is tested for disease or infection.    DISCHARGE INSTRUCTIONS:     1. Limit your activities day of biopsy as directed by your doctor.    2. Use medication as ordered.    3. Return to your normal diet.Small sips of flat soda will help with nausea.    4. Remove band-aid or dressing 24 hours after procedure.    Contact Interventional Radiology at [phone] if:    1. Difficulty breathing, nausea or vomiting.    2. Chills or fever above 101 F.    3. Pain at biopsy site not relieved by medication    4. Develop any redness, swelling, heat, unusual drainage, heavy bruising or bleeding from biopsy site.    Your skin is itchy, swollen, or you have a rash.     You have nausea or are vomiting for more than 8 hours after the procedure.      You have questions or concerns about your condition or care.Bone Marrow Biopsy     WHAT YOU NEED TO KNOW:   A bone marrow biopsy is a procedure to remove a small amount of bone marrow from your bone. Bone marrow is the soft tissue inside your bone that helps to make blood cells. The sample is tested for disease or infection.    DISCHARGE INSTRUCTIONS:     1. Limit your activities day of biopsy as directed by your doctor.    2. Use medication as ordered.    3. Return to your normal diet.Small sips of flat soda will help with nausea.    4. Remove band-aid or dressing 24 hours after procedure.    Contact Interventional Radiology at [phone] if:    1. Difficulty breathing, nausea or vomiting.    2. Chills or fever above 101 F.    3. Pain at biopsy site not relieved by medication    4. Develop any redness, swelling, heat, unusual drainage, heavy bruising or bleeding from biopsy site.    Your skin is itchy, swollen, or you have a rash.     You have nausea or are  vomiting for more than 8 hours after the procedure.      You have questions or concerns about your condition or care.Procedural Sedation   WHAT YOU NEED TO KNOW:   Procedural sedation is medicine used during procedures to help you feel relaxed and calm. You will remember little to none of the procedure. After sedation you may feel tired, weak, or unsteady on your feet. You may also have trouble concentrating or short-term memory loss. These symptoms should go away in 24 hours or less.   DISCHARGE INSTRUCTIONS:   Call 911 or have someone else call for any of the following:   You have sudden trouble breathing.     You cannot be woken.     Contact Interventional Radiology at [phone]   [place] PATIENTS: Contact Interventional Radiology at [phone] [place] PATIENTS: Contact Interventional Radiology at [phone]) if any of the following occur:      You have a severe headache or dizziness.     Your heart is beating faster than usual.    You have a fever or chills.     Your skin is itchy, swollen, or you have a rash.     You have nausea or are vomiting for more than 8 hours after the procedure.      You have questions or concerns about your condition or care.  Self-care:   Have someone stay with you for 24 hours. This person can drive you to errands and help you do things around the house. This person can also watch for problems.      Rest and do quiet activities for 24 hours. Do not exercise, ride a bike, or play sports. Stand up slowly to prevent dizziness and falls. Take short walks around the house with another person. Slowly return to your usual activities the next day.      Do not drive or use dangerous machines or tools for 24 hours. You may injure yourself or others. Examples include a lawnmower, saw, or drill. Do not return to work for 24 hours if you use dangerous machines or tools for work.      Do not make important decisions for 24 hours. For example, do not sign important papers or invest money.       Drink liquids as directed. Liquids help flush the sedation medicine out of your body. Ask how much liquid to drink each day and which liquids are best for you.      Eat small, frequent meals to prevent nausea and vomiting. Start with clear liquids such as juice or broth. If you do not vomit after clear liquids, you can eat your usual foods.      Do not drink alcohol or take medicines that make you drowsy. This includes medicines that help you sleep and anxiety medicines. Ask your healthcare provider if it is safe for you to take pain medicine.  Follow up with your healthcare provider as directed: Write down your questions so you remember to ask them during your visits.

## 2024-08-08 PROCEDURE — 85060 BLOOD SMEAR INTERPRETATION: CPT | Performed by: PATHOLOGY

## 2024-08-09 LAB — SCAN RESULT: NORMAL

## 2024-08-11 DIAGNOSIS — R41.3 MEMORY LOSS: Primary | ICD-10-CM

## 2024-08-11 LAB
BASOPHILS # BLD AUTO: 0.04 THOUSAND/UL (ref 0–0.1)
BASOPHILS NFR MAR MANUAL: 1 % (ref 0–1)
EOSINOPHIL # BLD AUTO: 0.07 THOUSAND/UL (ref 0–0.61)
EOSINOPHIL NFR BLD MANUAL: 2 % (ref 0–6)
LYMPHOCYTES # BLD AUTO: 0.75 THOUSAND/UL (ref 0.6–4.47)
LYMPHOCYTES # BLD AUTO: 17 %
MONOCYTES # BLD AUTO: 0.25 THOUSAND/UL (ref 0–1.22)
MONOCYTES NFR BLD AUTO: 7 % (ref 4–12)
NEUTS BAND NFR BLD MANUAL: 1 % (ref 0–8)
NEUTS SEG # BLD: 2.48 THOUSAND/UL (ref 1.81–6.82)
NEUTS SEG NFR BLD AUTO: 68 %
PATHOLOGY REVIEW: YES
RBC MORPH BLD: NORMAL
TOTAL CELLS COUNTED SPEC: 100
VARIANT LYMPHS # BLD AUTO: 4 % (ref 0–0)

## 2024-08-12 ENCOUNTER — TELEPHONE (OUTPATIENT)
Age: 64
End: 2024-08-12

## 2024-08-12 ENCOUNTER — OFFICE VISIT (OUTPATIENT)
Dept: HEMATOLOGY ONCOLOGY | Facility: CLINIC | Age: 64
End: 2024-08-12
Payer: COMMERCIAL

## 2024-08-12 VITALS
DIASTOLIC BLOOD PRESSURE: 72 MMHG | OXYGEN SATURATION: 97 % | HEART RATE: 57 BPM | BODY MASS INDEX: 24.14 KG/M2 | TEMPERATURE: 98.2 F | RESPIRATION RATE: 18 BRPM | WEIGHT: 163 LBS | SYSTOLIC BLOOD PRESSURE: 122 MMHG | HEIGHT: 69 IN

## 2024-08-12 DIAGNOSIS — R91.8 ABNORMAL CT SCAN OF LUNG: ICD-10-CM

## 2024-08-12 DIAGNOSIS — D47.2 MONOCLONAL GAMMOPATHY: Primary | ICD-10-CM

## 2024-08-12 DIAGNOSIS — D69.6 THROMBOCYTOPENIA (HCC): ICD-10-CM

## 2024-08-12 DIAGNOSIS — R16.1 SPLENOMEGALY: ICD-10-CM

## 2024-08-12 PROCEDURE — 99214 OFFICE O/P EST MOD 30 MIN: CPT | Performed by: INTERNAL MEDICINE

## 2024-08-12 NOTE — TELEPHONE ENCOUNTER
Incoming call:    Requesting that  Sayed and Dr. Bronson speak. Expressing frustrations as the Hematology/Oncology appointment was just completed and the peer to peer has not taken place.

## 2024-08-12 NOTE — PROGRESS NOTES
Hematology/Oncology Outpatient Office Note  Date of Service: 8/12/2024    [facility]ATOLOGY [facility] [place]  [address]  [facility] [id number]  [phone]    Reason for Consultation:   Chief Complaint   Patient presents with    Follow-up     Referral Physician: No ref. provider found  Primary Care Physician:  Wandy Cheatham DO     Assessment/plan:     1.  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  2.  Mild leukopenia without neutropenia  3.  Chronic thrombocytopenia  4.  Incidentally noted mild splenomegaly on CT chest     Otoniel Martinez is a 64 y.o. male with past medical history significant for CAD, diabetes, DVT after tear in gastrocnemius muscle (on indefinite anticoagulation with Xarelto), recurrent pancreatitis, diabetes type 1 and sleep apnea.  Patient was admitted to [facility] from 2/8/2024 - 2/10/2024 with acute respiratory failure with hypoxia which was suspected related to acute on chronic bronchitis.  He was treated with antibiotics and prednisone.  He underwent a CT of the chest which incidentally noted splenomegaly.  Patient referred to hematology for further evaluation.  Patient denies any fever, night sweats, weight loss. Denies any abd pain, n/v/d. Denies frequent or recurrent illness. ETOH use socially. Denies easy bruising or bleeding.         I met with the patient and reviewed his history, chart, imaging and labs.  Workup concerning for an IgM kappa monoclonal gammopathy, MYD88 mutation positive.  Serum immunofixation shows a monoclonal gammopathy identified as IgM kappa, M-Fco: 0.11 g/deciliter.  Will await final bone marrow biopsy results prior to subsequent oncologic recommendations.    4. Recurrent DVT  Currently on indefinite anticoagulation with xarelto    5.  Left upper lobe lung nodule  Patient was incidentally noted to have a 1.9 x 1.5 cm mildly FDG avid left upper lobe lung nodule.  Findings could reflect  an inflammatory process versus an underlying malignant process.  Agree with pulmonary eval to follow-up with a CT scan in 6 weeks to assess further.  Will need biopsy if persists.  Low suspicion that this is related to his monoclonal gammopathy.    6.  Splenic artery aneurysm  Incidentally noted to have a 2.4 cm splenic artery aneurysm on imaging.  Recommend vascular surgery consultation.    No orders of the defined types were placed in this encounter.     Diagnosis ICD-10-CM Associated Orders   1. Monoclonal gammopathy  D47.2       2. Abnormal CT scan of lung  R91.8       3. Splenomegaly  R16.1       4. Thrombocytopenia (HCC)  D69.6           Return in about 3 weeks (around 9/2/2024) for Office Visit.    Juli Bronson, DO 8/12/2024   Hematology & Medical Oncology Physician    History of present illness:   Otoniel Martinez is a 63-year-old male with past medical history significant for CAD, diabetes, DVT after tear in gastrocnemius muscle on indefinite anticoagulation with Xarelto), recurrent pancreatitis, diabetes type 1 and sleep apnea.  Patient was admitted to [facility] from 2/8/2024 - 2/10/2024 with acute respiratory failure with hypoxia which was suspected related to acute on chronic bronchitis.  He was treated with antibiotics and prednisone.  He underwent a CT of the chest which incidentally noted splenomegaly.  Patient referred to hematology for further evaluation.  He presents for initial consultation.     Patient denies any fever, night sweats, weight loss. Denies any abd pain, n/v/d. Denies frequent or recurrent illness. ETOH use socially. Denies easy bruising or bleeding.     Interval History:   Subsequent workup in the interim shows IgM kappa monoclonal gammopathy on SPEP.  Peripheral smear with involvement by a monotypic kappa B-cell population with MYD88 mutation.  Patient denies any fever, night sweats or unexpected weight loss.    Interval history 8/12/2024:  In the interim, patient  underwent bone marrow biopsy on 8/7/2024 which remains pending.  PET CT scan noted a hypermetabolic masslike consolidation in the left upper lobe measuring 1.9 x 1.5 cm.  Mild splenomegaly.  Incidentally noted a 2.4 cm splenic artery aneurysm for which vascular surgical consultation recommended.    Review of systems:   Review of Systems   Constitutional: Negative. Negative for fever and malaise/fatigue.   HENT: Negative.     Cardiovascular: Negative.  Negative for chest pain, dyspnea on exertion and palpitations.   Respiratory: Negative.  Negative for shortness of breath.    Hematologic/Lymphatic: Negative.  Does not bruise/bleed easily.   Skin: Negative.    Musculoskeletal: Negative.    Gastrointestinal: Negative.  Negative for abdominal pain.   Neurological: Negative.    Psychiatric/Behavioral: Negative.          A 10-point review of system was performed, pertinent positive and negative were detailed as above. Otherwise, the 10-point review of system was negative.    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    Pancreatitis     three times    Sleep apnea     not using a C-PAP    Superficial thrombophlebitis     Vision loss     reading glasses over the counter       Past Surgical History:   Procedure Laterality Date    CAROTID STENT      CHOLECYSTECTOMY      CIRCUMCISION      Elective    COLONOSCOPY      complete, polyps 2 years ago    CORONARY ANGIOPLASTY WITH STENT PLACEMENT      stent to RCA 2004    INGUINAL HERNIA REPAIR      IR BIOPSY BONE MARROW  8/7/2024    ORIF RADIAL SHAFT FRACTURE Left     Open Treatment of Multiple Fractures of the Radial Shaft     ORIF ULNAR / RADIAL SHAFT FRACTURE Left     Open Treatment of Multiple Fractures of the Ulnar Shaft    TONSILLECTOMY AND ADENOIDECTOMY         Family History   Problem Relation Age of Onset    Brain cancer Mother     Clotting disorder Mother     Heart disease Mother     Cancer Mother     Hyperlipidemia Mother     Stroke Father     Deep vein thrombosis Father     Emphysema Father     Coronary artery disease Paternal Uncle     Diabetes Maternal Uncle        Social History     Socioeconomic History    Marital status: /Civil Union     Spouse name: Not on file    Number of children: Not on file    Years of education: Not on file    Highest education level: Not on file   Occupational History    Not on file   Tobacco Use    Smoking status: Never    Smokeless tobacco: Never   Vaping Use    Vaping status: Never Used   Substance and Sexual Activity    Alcohol use: Not Currently     Comment: 2 beers on a social occasion    Drug use: No    Sexual activity: Yes     Partners: Female     Birth control/protection: None   Other Topics Concern    Not on file   Social History Narrative    Consumes 1 cup of tea  And 1 glass of tea per day        Weight loss     Social Determinants of Health     Financial Resource Strain: Low Risk  (12/4/2023)    Received from [facility], [facility]    Overall Financial Resource Strain (CARDIA)     Difficulty of Paying Living Expenses: Not very hard   Food Insecurity: No Food Insecurity (2/10/2024)    Hunger Vital Sign     Worried About Running Out of Food in the Last Year: Never true     Ran Out of Food in the Last Year: Never true   Transportation Needs: No Transportation Needs (2/10/2024)    PRAPARE - Transportation     Lack of Transportation (Medical): No     Lack of Transportation (Non-Medical): No   Physical Activity: Sufficiently Active (12/4/2023)    Received from [facility]    Exercise Vital Sign     Days of Exercise per Week: 1 day      Minutes of Exercise per Session: 150+ min   Stress: No Stress Concern Present (12/4/2023)    [facility], [facility]    [employer] [place] [employer] - Occupational Stress Questionnaire     Feeling of Stress : Not at all   Social Connections: Socially Integrated (12/4/2023)    [facility] from [facility], [facility]    Social Connection and Isolation Panel [NHANES]     Frequency of Communication with Friends and Family: Three times a week     Frequency of Social Gatherings with Friends and Family: Never     Attends Congregational Services: More than 4 times per year     Active Member of Clubs or Organizations: Yes     Attends Club or Organization Meetings: More than 4 times per year     Marital Status:    Intimate Partner Violence: Not At Risk (12/4/2023)    [facility] from [facility], [facility]    Humiliation, Afraid, Rape, and Kick questionnaire     Fear of Current or Ex-Partner: No     Emotionally Abused: No     Physically Abused: No     Sexually Abused: No   Housing Stability: Low Risk  (2/10/2024)    Housing Stability Vital Sign     Unable to Pay for Housing in the Last Year: No     Number of Times Moved in the Last Year: 1     Homeless in the Last Year: No       Allergies   Allergen Reactions    Other Cough     Grass / dogs hair        Current Outpatient Medications   Medication Sig Dispense Refill    aspirin (ECOTRIN LOW STRENGTH) 81 mg EC tablet Take 81 mg by mouth daily      atorvastatin (LIPITOR) 40 mg tablet TAKE 1 TABLET BY MOUTH EVERY DAY 90 tablet 1    budesonide-formoterol (Symbicort) 80-4.5 MCG/ACT inhaler Inhale 2 puffs 2 (two) times a day Rinse mouth after use. 30.6 g 5    cholecalciferol (VITAMIN D3) 400 units tablet Take 1 tablet by mouth every morning      Continuous Blood Gluc Sensor (Dexcom G6 Sensor) MISC Change every 10 days. E10.65      Continuous Blood Gluc  Transmit (Dexcom G6 Transmitter) MISC Change every 90 days. E10.65      fluticasone (FLONASE) 50 mcg/act nasal spray 2 sprays into each nostril daily      Insulin Disposable Pump (Omnipod 5 G6 Intro, Gen 5,) KIT Inject 1 each under the skin every third day      Insulin Disposable Pump (Omnipod 5 G6 Pods, Gen 5,) MISC INJECT 1 UNDER THE SKIN EVERY OTHER DAY. CHANGING EVERY 2 DAYS DUE TO HIGH INSULIN REQUIREMENT.      montelukast (SINGULAIR) 10 mg tablet TAKE 1 TABLET BY MOUTH DAILY AT BEDTIME 90 tablet 0    NovoLOG 100 UNIT/ML injection Inject up to 110 units daily via insulin pump. E10.65      pantoprazole (PROTONIX) 40 mg tablet TAKE 1 TABLET TWICE DAILY 30 MINS PRIOR TO BREAKFAST AND SUPPER. 180 tablet 3    rivaroxaban (Xarelto) 20 mg tablet TAKE 1 TABLET BY MOUTH DAILY WITH BREAKFAST 100 tablet 1    sildenafil (VIAGRA) 100 mg tablet Take 1 tablet (100 mg total) by mouth daily as needed for erectile dysfunction 10 tablet 1    sodium chloride 0.9 % nebulizer solution Take 3 mL by nebulization as needed for wheezing 90 mL 3    acyclovir (ZOVIRAX) 200 mg capsule Take 1 capsule (200 mg total) by mouth 5 (five) times a day for 5 days (Patient taking differently: Take 200 mg by mouth 5 (five) times a day Patient takes PRN) 25 capsule 0    Gvoke HypoPen 2-Pack 1 MG/0.2ML SOAJ INJECT 1 MG UNDER THE SKIN AS NEEDED (FOR SEVERE HYPOGLYCEMIA). E10.65 (Patient not taking: Reported on 8/12/2024)       No current facility-administered medications for this visit.     I have reviewed the relevant past medical, surgical, social and family history. I have also reviewed allergies and medications for this patient.    Objective:      Vitals:    08/12/24 1409   BP: 122/72   Pulse: 57   Resp: 18   Temp: 98.2 °F (36.8 °C)   SpO2: 97%       Wt Readings from Last 3 Encounters:   08/12/24 73.9 kg (163 lb)   08/07/24 73.2 kg (161 lb 6.4 oz)   08/02/24 73 kg (161 lb)       Performance status: ECOG PS: 0  Physical Exam    Data  Review:  Pathology Result:  PERIPHERAL BLOOD SMEAR: INVOLVED BY A MONOTYPIC KAPPA B-CELL POPULATION WITH MYD88 MUTATION; MILD EOSINOPHILIA AND MONOCYTOSIS, WITH BACKGROUND MILD THROMBOCYTOPENIA; SEE IMPRESSION     IMPRESSION:  The peripheral blood smear shows mild leukopenia (WBC 4.29 K/uL) with atypical lymphocytes and mild eosinophilia and monocytosis, as well as background mild thrombocytopenia (plt 137 K/uL) with normal platelet morphology, without evidence of platelet clumping or satellitism. Erythrocytes are adequate in number, normocytic and normochromic, without significant morphologic abnormalities. There is no circulating blast population, significant dysplasia, rouleaux formation, infectious organisms or evidence of hemolysis.     Flow cytometry reportedly shows that the atypical lymphocytes are monotypic kappa B-cells. Additional ancillary testing is positive for a MYD88 L265P mutation, and is negative for CXCR4 mutations or 7/7q aberrations. The history of an IgM-kappa monoclonal immunoglobulin is noted. The overall findings are consistent with the patient's known, persistent circulating monoclonal B-cell population; the differential diagnosis includes a monoclonal B-cell lymphocytosis (MBL) and circulating B-cell lymphoma.     If there is extramedullary involvement (lymph node, tissue, etc.), this represents circulating lymphoma. In the absence of extramedullary involvement, this likely represents an MBL-non-CLL type. The morphology and immunophenotype are non-specific but in the setting of circulating lymphoma, the IgM paraprotein and MYD88 mutation, would favor lymphoplasmacytic lymphoma (LPL) / Waldenström macroglobulin (WM). Correlation with systemic imaging to evaluate for lymphadenopathy, hepatosplenomegaly and other potential sites of involvement for further characterization, is recommended to differentiate MBLs from circulating lymphoma, as clinically indicated. The World Health Organization  (WHO) and various international scoring systems recommend all new diagnoses of LPL correlate with patient age, hemoglobin, platelet counts, serum cold agglutinin studies, serum cryoglobulin testing, hyperviscosity studies, hepatitis C testing, serum lactate dehydrogenase (LDH) and beta-2 microglobulin levels, given some of their associations, therapeutic and prognostic implications and to classify the disease by low, intermediate and high-risk.     FLOW CYTOMETRY STUDIES: [employer] #[id number]; see separate report        CYTOGENETIC FISH STUDIES:   Cytogenetic FISH studies are performed (7q-/-7 tri) and are reportedly negative (normal); ([employer] #[id number]; see separate report):           MOLECULAR STUDIES:   MYD88 Mutation Analysis is performed and is reportedly DETECTED ([employer] #[id number]; see separate report):           CXCR4 Mutation Analysis is performed and is reportedly NOT DETECTED ([employer] #[id number]; see separate report):       Image Results:  Image result are reviewed and documented in Hematology/Oncology history    2/8/2024: CT chest with contrast: Mild splenomegaly     8/1/2024: PET CT scan:  1. 1.9 x 1.5 cm mildly FDG avid masslike consolidation/nodule involving the left upper lobe with mild halo of groundglass opacity, new since 2/8/2024. While findings could reflect an inflammatory process, underlying malignancy of low metabolic activity is the diagnosis of exclusion. Consider further evaluation with tissue sampling as clinically indicated. If an inflammatory process is favored, short interval follow-up with CT imaging in 6 to 8 weeks is recommended to ensure stability/document resolution.     2. There is otherwise no additional focal FDG activity to suggest hypermetabolic malignancy. Patient's known mild splenomegaly is non-FDG avid.     3. 2.4 cm rim calcified splenic artery aneurysm versus less likely calcified adrenal nodule. Consider vascular surgical consultation.      Please see above for details and additional findings.    Labs:  Lab data are reviewed and documented in [facility] history.     2018: WBC: 3.6, H/H: 15.8/44.9, MCV: 88.9, platelets: 114.  ANC: 2297  2/10/2024: WBC: 3.34, H/H: 12/36, MCV: 87, platelets: 125.  AST/ALT: 22/41, alk phos: 72.  Creatinine: 0.51    Lab Results   Component Value Date    HGB 13.8 2024    HCT 40.7 2024    MCV 84 2024     (L) 2024    WBC 3.59 (L) 2024    NRBC 0 2024    BANDSPCT 1 2024    ATYLMPCT 4 (H) 2024     Lab Results   Component Value Date     2018    K 4.1 2024     2024    CO2 31 2024    BUN 16 2024    CREATININE 0.60 2024    GLUF 80 2024    CALCIUM 8.7 2024    CORRECTEDCA 9.4 02/10/2024    AST 53 (H) 2024     (H) 2024    ALKPHOS 76 2024    PROT 5.8 (L) 2018    BILITOT 1.4 (H) 2018    EGFR 106 2024       Lab Results   Component Value Date    FERRITIN 16 (L) 2022    FERRITIN 16 (L) 2022       Lab Results   Component Value Date    ZDGAUSMO32 400 2024    CFUXVXWV41 302 2024    GIVLMQZY29 417 2018: Flow cytometry: Small kappa monoclonal B-cell population identified in peripheral blood    2024:  SPEP with DALY: Serum immunofixation shows a monoclonal gammopathy identified as IgM-kappa. The history of splenomegaly and circulating monoclonal-kappa B-cells with MYD88 mutation is noted.  M-Fco: 0.11 g/deciliter  UPEP: Show selective proteinuria.  Urine immunofixation shows a faint monoclonal band identified as free kappa light chains  FK.5, FL.5, K/L ratio: 0.37  Ig, IgM: 234, IgA: 55.  B2 M: 1.8  Methylmalonic acid: 170  LDH: 338    Disclaimer: This document was prepared using M Mod[employer] Fluency Direct technology. If a word or phrase is confusing, or does not make sense, this is likely due to recognition error which was not  discovered during this clinician's review. If you believe an error has occurred, please contact me through [employer] service line for april?cation.

## 2024-08-13 ENCOUNTER — TELEPHONE (OUTPATIENT)
Age: 64
End: 2024-08-13

## 2024-08-13 NOTE — TELEPHONE ENCOUNTER
"Recommend she discuss SOB and \"spot on lung\" with pt's pulmonologist, as she is already seeing pulmonary.  If cardiac concerns, then yes, suggest making an appt, but should get pulm eval first as they are following pt for chronic bronchitis.  Thx    RP  "

## 2024-08-13 NOTE — TELEPHONE ENCOUNTER
"Pt's wife stated PET Scan was done recently along with other testing, States pt has \"spot\" on lung and c/o sob. Is unsure if Dr. Hollins would like to order further studies or if Dr. Hollins would like pt to come in to be seen, Please call Pt's wife Oliva [phone]  "

## 2024-08-14 PROBLEM — D47.2 MONOCLONAL GAMMOPATHY: Status: ACTIVE | Noted: 2024-08-14

## 2024-08-14 LAB
MISCELLANEOUS LAB TEST RESULT: NORMAL
SCAN RESULT: NORMAL

## 2024-08-14 NOTE — PROGRESS NOTES
Daily Speech Treatment Note    Today's date: 2024 ***  Patient’s name: Otoniel Martinez  : 1960  MRN: [id number]  Safety measures: myotonic dystrophy, chronic SOB, DM Type 1   Referring provider: Mely Bacon,*    Encounter Diagnosis     ICD-10-CM    1. Dysphonia  R49.0       2. Dysphagia, oropharyngeal phase  R13.12       3. 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HCC)  G71.11         Visit Tracking:  ***    Subjective/Behavioral:  -***    Objective/Assessment:  -Reviewed testing results and goals in plan care with patient. Patient is in agreement at this time.    Short-term goals:    VOICE:  Patient will be educated on vocal hygiene and demonstrate understanding of recommendations to facilitate increased quality of voice, to be achieved in 2-4 weeks.    Patient will be educated on diaphragmatic breathing (versus clavicular breathing) to establish controlled, rhythmic breathing, to be achieved in 2-4 weeks.    Patient will utilize diaphragmatic breathing during oral sentence/paragraph reading in 80% of opportunities to facilitate increased breath support for voice/speaking, decrease laryngeal effort, and improve glottic closure to be achieved in 4-6 weeks.    Patient will utilize semi-occluded vocal tract exercises without laryngeal tension to promote healthy vocal function with 80% accuracy, to be achieved in 4-6 weeks.     Patient will utilize a forward tone focused/resonant voice to decrease vocal hyperfunction and improve voice quality and vocal projection, to be achieved in 4-6 weeks.      Patient will self-report a decrease in muscle tension of the larynx and cervical area after independently completing relaxation/stretching exercise to be achieved in 6-8 weeks.     Patient will learn and apply resonance techniques at the word, sentence, and paragraph levels with min verbal cues to facilitate reduced laryngeal tension when voicing, to be achieved in 4-6 weeks.    DYSPHAGIA:  Patient will consume (regular  consistency) PO trials during therapy session, without overt s/sx of aspiration given minimal verbal cues for use of swallowing strategies in order to safely consume the least restrictive diet, to be achieved in 4-6 weeks.      Patient will complete swallowing exercises (i.e., Mendelsohn, Eliza, Effortful) to increase pharyngeal strength and coordination with min cues to 90% effectiveness to improve bolus formation and strengthen pharyngeal musculature, to be achieved in 4-6 weeks.     Patient will perform compensatory strategies (e.g., upright positioning, small bites/sips, slow rate, alternation of consistencies, multiple swallows, effortful swallow, chin tuck, head turn, etc.) with minimal verbal cues to prevent overt s/sx penetration/aspiration of least restrictive food/liquid consistencies, to be achieved in 4-6 weeks.    Plan:  -Patient was provided with home exercises/activities to target goals in plan of care at the end of today's session.  -Continue with current plan of care.

## 2024-08-15 ENCOUNTER — APPOINTMENT (OUTPATIENT)
Dept: SPEECH THERAPY | Facility: CLINIC | Age: 64
End: 2024-08-15
Payer: COMMERCIAL

## 2024-08-15 ENCOUNTER — OFFICE VISIT (OUTPATIENT)
Dept: SPEECH THERAPY | Facility: CLINIC | Age: 64
End: 2024-08-15
Payer: COMMERCIAL

## 2024-08-15 DIAGNOSIS — R49.0 DYSPHONIA: Primary | ICD-10-CM

## 2024-08-15 DIAGNOSIS — G71.11 MYOTONIC DYSTROPHY (HCC): ICD-10-CM

## 2024-08-15 DIAGNOSIS — R13.12 DYSPHAGIA, OROPHARYNGEAL PHASE: ICD-10-CM

## 2024-08-15 PROCEDURE — 92507 TX SP LANG VOICE COMM INDIV: CPT

## 2024-08-15 NOTE — TELEPHONE ENCOUNTER
Contact was made with Mrs. Martinez and during that time she got a call also from the clinical pod I believe. They were able to go over Dr. Hollins's response with the wife arabella. She verbally states that she understands and looks to have no questions or concerns at this time.     Clerical staff - Can you please reach out to wife of patient. They would like to make an appointment with Dr. Hollins. They would also like to cancel the 09/03 appointment with ronaldo grant. The wife states that you can reach her through [employer]. Thanks!

## 2024-08-15 NOTE — PROGRESS NOTES
Daily Speech Treatment Note    Today's date: 8/15/2024   Patient’s name: Otoniel Martinez  : 1960  MRN: [id number]  Safety measures: myotonic dystrophy, chronic SOB, DM Type 1   Referring provider: Mely Bacon,*    Encounter Diagnosis     ICD-10-CM    1. Dysphonia  R49.0       2. Dysphagia, oropharyngeal phase  R13.12       3. 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   He reports he still does not feel totally confident about his exercises; and would like one more visit after today. We took out next week and scheduled  to be a re-eval/discharge.     Objective/Assessment:     Short-term goals:    VOICE:  Patient will be educated on vocal hygiene and demonstrate understanding of recommendations to facilitate increased quality of voice, to be achieved in 2-4 weeks.    Patient will be educated on diaphragmatic breathing (versus clavicular breathing) to establish controlled, rhythmic breathing, to be achieved in 2-4 weeks.    Patient will utilize diaphragmatic breathing during oral sentence/paragraph reading in 80% of opportunities to facilitate increased breath support for voice/speaking, decrease laryngeal effort, and improve glottic closure to be achieved in 4-6 weeks.    Patient will utilize semi-occluded vocal tract exercises without laryngeal tension to promote healthy vocal function with 80% accuracy, to be achieved in 4-6 weeks.     Patient will utilize a forward tone focused/resonant voice to decrease vocal hyperfunction and improve voice quality  and vocal projection, to be achieved in 4-6 weeks.     Introduced resonant exercises using /m/'s today - handouts provided     To establish a more forward voice focus and improve vibratory sensations in the face to reduce laryngeal focus; patient completed the following tasks:   Tactile vibration:   M - Single Syllables:   M - One-syllable words:   M - Two-syllable words:   M - Phrases and sentences    Patient independently completed tasks with forward focus       Patient will self-report a decrease in muscle tension of the larynx and cervical area after independently completing relaxation/stretching exercise to be achieved in 6-8 weeks.     Patient will learn and apply resonance techniques at the word, sentence, and paragraph levels with min verbal cues to facilitate reduced laryngeal tension when voicing, to be achieved in 4-6 weeks.    DYSPHAGIA:  Patient will consume (regular consistency) PO trials during therapy session, without overt s/sx of aspiration given minimal verbal cues for use of swallowing strategies in order to safely consume the least restrictive diet, to be achieved in 4-6 weeks.      Patient will complete swallowing exercises (i.e., Mendelsohn, Eliza, Effortful) to increase pharyngeal strength and coordination with min cues to 90% effectiveness to improve bolus formation and strengthen pharyngeal musculature, to be achieved in 4-6 weeks.     Patient will perform compensatory strategies (e.g., upright positioning, small bites/sips, slow rate, alternation of consistencies, multiple swallows, effortful swallow, chin tuck, head turn, etc.) with minimal verbal cues to prevent overt s/sx penetration/aspiration of least restrictive food/liquid consistencies, to be achieved in 4-6 weeks.    Plan:  -Patient was provided with home exercises/activities to target goals in plan of care at the end of today's session.  -Continue with current plan of care.

## 2024-08-15 NOTE — TELEPHONE ENCOUNTER
Spoke to wife and went over the message from provider, and verbally understood, they requesting any appt. Office had called when on the phohe trying to set up, will set up and call her back and let them know when appt is scheduled for. Pt verbally understood

## 2024-08-19 LAB — MISCELLANEOUS LAB TEST RESULT: NORMAL

## 2024-08-20 LAB
MISCELLANEOUS LAB TEST RESULT: NORMAL
MISCELLANEOUS LAB TEST RESULT: NORMAL

## 2024-08-22 ENCOUNTER — APPOINTMENT (OUTPATIENT)
Dept: SPEECH THERAPY | Facility: CLINIC | Age: 64
End: 2024-08-22
Payer: COMMERCIAL

## 2024-08-22 DIAGNOSIS — R49.0 DYSPHONIA: Primary | ICD-10-CM

## 2024-08-22 DIAGNOSIS — R13.12 DYSPHAGIA, OROPHARYNGEAL PHASE: ICD-10-CM

## 2024-08-22 DIAGNOSIS — G71.11 MYOTONIC DYSTROPHY (HCC): ICD-10-CM

## 2024-08-22 NOTE — TELEPHONE ENCOUNTER
Patients wife calling in again regarding this matter. She is upset that she has not received a call and is asking for someone to call her either today or tomorrow. Please advise.

## 2024-08-22 NOTE — TELEPHONE ENCOUNTER
Pt stated Pulm Provider: Dr. Jack    Actionable item:   Call patient's Spouse, Oliva [phone]    What is the reason for the call/chief complaint?  Requesting call ASAP. Testing coming back, concerns that CT Chest needs to be moved up.    Priority: HIGH     no pedal edema

## 2024-08-22 NOTE — PROGRESS NOTES
"Speech-Language Pathology Re-Evaluation/Discharge    Today's date: 2024  Patient’s name: Otoniel Martinez  : 1960  MRN: [id number]  Safety measures: myotonic dystrophy, chronic SOB, DM Type 1  Referring provider: Mely Bacon,*    Encounter Diagnosis     ICD-10-CM    1. Dysphonia  R49.0       2. Dysphagia, oropharyngeal phase  R13.12       3. 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HCC)  G71.11           Assessment:  -As indicated in his last ST treatment note, \"...Patient continues to sing in the choir without any issues; he has his warm up routine and is doing his voice exercises daily. He reports he still does not feel totally confident about his exercises; and would like one more visit after today. We took out next week and scheduled  to be a re-eval/discharge...\" During today's re-evaluation, patient continues to present with mild dysphonia c/b hoarse/gravely vocal quality. Evaluating therapist noted that h/o reflux may be contributing to patient's dysphonia. Patient demonstrated an improvement in s:z ratio (1:1, WFL). Maximum phonation time remains WFL. Acoustic measures of fundamental frequency, jitter, and NHR are relatively WFL. Shimmer was outside of normal limits. Patient has been set-up with an independent HEP, which he demonstrates comprehension. Reviewed HEP with patient during session, including SOVT exercises (cup bubbles, straw phonation), resonance voice exercises, and vocal function exercises. Reviewed vocal hygiene techniques and reflux precautions. Patient has demonstrated good progress toward the goals in his plan of care and is ready for discharge to an independent HEP on this date of service.    -Although not formally tested today, per review of treatment notes, patient presents with mild oropharyngeal dysphagia. Patient subjectively reported on 2024 that he \"...still has difficulty with tougher foods/breads - where it gets stuck in his throat...\". However, no c/o overt s/sx of " penetration/aspiration reported. Patient has been instructed on safe swallowing strategies and diet recommendations. Patient had a VFSS completed on 06/24/2024.      Short-term goals:  VOICE:  Patient will be educated on vocal hygiene and demonstrate understanding of recommendations to facilitate increased quality of voice, to be achieved in 2-4 weeks. -- MET    Patient will be educated on diaphragmatic breathing (versus clavicular breathing) to establish controlled, rhythmic breathing, to be achieved in 2-4 weeks. -- NOT ADDRESSED/DISCHARGE 08/29/2024    Patient will utilize diaphragmatic breathing during oral sentence/paragraph reading in 80% of opportunities to facilitate increased breath support for voice/speaking, decrease laryngeal effort, and improve glottic closure to be achieved in 4-6 weeks. -- NOT ADDRESSED/DISCHARGE 08/29/2024    Patient will utilize semi-occluded vocal tract exercises without laryngeal tension to promote healthy vocal function with 80% accuracy, to be achieved in 4-6 weeks. -- MET    Patient will utilize a forward tone focused/resonant voice to decrease vocal hyperfunction and improve voice quality and vocal projection, to be achieved in 4-6 weeks. -- MET     Patient will self-report a decrease in muscle tension of the larynx and cervical area after independently completing relaxation/stretching exercise to be achieved in 6-8 weeks. -- NOT ADDRESSED/DISCHARGE 08/29/2024    Patient will learn and apply resonance techniques at the word, sentence, and paragraph levels with min verbal cues to facilitate reduced laryngeal tension when voicing, to be achieved in 4-6 weeks. -- MET    DYSPHAGIA:  Patient will consume (regular consistency) PO trials during therapy session, without overt s/sx of aspiration given minimal verbal cues for use of swallowing strategies in order to safely consume the least restrictive diet, to be achieved in 4-6 weeks. -- MET    Patient will complete swallowing  exercises (i.e., Mendelsohn, Eliza, Effortful) to increase pharyngeal strength and coordination with min cues to 90% effectiveness to improve bolus formation and strengthen pharyngeal musculature, to be achieved in 4-6 weeks. -- NOT ADDRESSED/DISCHARGE 08/29/2024    Patient will perform compensatory strategies (e.g., upright positioning, small bites/sips, slow rate, alternation of consistencies, multiple swallows, effortful swallow, chin tuck, head turn, etc.) with minimal verbal cues to prevent overt s/sx penetration/aspiration of least restrictive food/liquid consistencies, to be achieved in 4-6 weeks. -- MET    Long-term goals:  Patient will maintain adequate hydration and nutrition with optimum safety and efficacy of swallowing function on P.O. intake without overt s/sx of penetration or aspiration by discharge. -- MET    Patient will utilize compensatory strategies with optimum safety and efficacy of swallowing function on P.O. intake without overt s/sx of penetration or aspiration by discharge. -- MET    Plan:   Recommendations:  -Patient to be discharged from outpatient skilled Speech Therapy services: Patient has demonstrated good progress toward the goals in his plan of care and is ready for discharge to an independent HEP on this date of service.    -Frequency: No treatment is warranted at this time.  -Duration: N/A    Intervention certification from: 8/29/2024  Intervention certification to: 08/29/2024      Subjective:  -Patient was accompanied to today's session by his two grandchildren. Patient reported that his voice tends to be better in the morning. Patient noted that he has only been able to practice his HEP 1x (evenings) due to other conflicts during the day. He plans on increasing his compliance with HEP completion next week.      Patient's goal(s): doesn't want mucous to build up in throat      Objective (testing):    Patient Self-Ratings:  -The Voice Handicap Index (VHI) is a self-administered,  30-item outcomes instrument to quantify patients' perception of their voice handicap. It is a list of statements that many people have used to describe their voices and the effects of their voices on their lives. Patient indicated how frequently she has the same experience using a rating point scale (“never” = 0, “almost never” = 1, “sometimes” = 2, “almost always” = 3, and “always” = 4). Patient obtained a score of 46/120, indicating a self-perceived impairment level of Moderate. (see scanned document) (IE: 32/120) -- DECLINE    Subscale: Score: Prior score: Self-Perceived Impairment Level:   Physical 17/40 16 Mild   Functional 19/40 9 Severe   Emotional 10/40 7 Mild         TOTAL 46/120 32 Moderate       Objective Measurements/Voice Parameters:  RESPIRATORY EFFICIENCY:   -S:Z Ratio Task: Patient was instructed to sustain the sounds /s/ and /z/ to examine the coordination and efficiency of respiration and voice production. Normative data suggests that adults can prolong these sounds for 20-25 seconds. Ratios of 1.4 and above are consistent with laryngeal inefficiency, and ratios of 2.0 and above are suggestive of vocal fold pathology. Patient's S:Z ratio was 1.0 (17.12 sec : 17.12 sec) (IE: 1.9 (34 sec : 18 sec) -- IMPROVEMENT    -Maximum Phonation Time: Patient was instructed to sustain /a/ to measure respiratory and laryngeal coordination and efficiency. Adults are typically able to prolong vowels sound for 15-20 seconds. Reduced MPT may suggest poor respiratory support, or poor medial glottal closure. Patient's MPT was 21.0 sec (IE: 23 sec) -- 2 SECOND DECLINE, BUT STILL WFL      MEASURES OF PITCH:  The Visi-Pitch IV, a computer-driven voice analysis program, was used to collect objective voice data using the Visi-Pitch Multidimensional Voice Program (MDVP) & Real Time Pitch Program (MTPP).     -Multi-Dimensional Voice Profile (MDVP): This is obtained on the phonation of the sound /a/, in which dimensions of the  "voice, including frequncy perturbations, amplitude perturbations, variations in F0, noise to harmonic ratio, voice turbulence Index, soft phonation Index, tremours in frequency and amplitude, degree of subharmonics, and degree of voicing apart from the frequency and amplitude, are reflected.      Normative Data:   Mean Fundamental Frequency (F0) 131.336  (IE: 154.715 Hz) 145.22 Hz   Jitter (RAP) 0.378  (IE: 0.266%) 0.345%   Shimmer 8.396  (IE: 2.915%) 2.523%   Phugk-pi-Ewhhxfphx Ratio (NHR) 0.187  (IE: 0.135) 0.122       -Habitual Pitch: Patient was instructed to engage in two different speaking tasks (counting and reading) to calculate the pitch he uses most frequently.     Task: Mean F0 (Hz) Min-Max Range (Hz) Normative Data:   Speaking (counting 1-10) 151.40  (IE: 97.65) 96..77  (IE: 77..64) 128 Hz (100 Hz -150 Hz)   Reading (\"[place]”) 151.56  (IE: 101.50) 91..11  (IE: 74..50) 128 Hz (100 Hz -150 Hz)       -Maximal Phonational Frequency Range: Patient was instructed to voice from lowest to highest notes.     Task Min-Max Range (Hz) Normative Data:   Gliding 119..34  79..34 77 Hz-576 Hz       Treatment:  -Reviewed HEP with patient during session, including SOVT exercises (cup bubbles, straw phonation), resonance voice exercises, and vocal function exercises. Reviewed vocal hygiene techniques and reflux precautions. Reciprocal comprehension verbally expressed. Supplemental handouts provided to patient for carryover of recommendations.       Visit Tracking:  POC   Expires Auth Expiration Date ST Visit Limit   8/8/2024 30 visits or 12/31/24 30 visits hard max              Visit/Unit Tracking:  Auth Status Date 6/27/24 7/11/24 7/25/24 8/1/24 8/5/24 8/15/24 08/29/24   Auth Required - 8 authorized Used 1 2 3 4 5 6 7     Remaining 7 6 5 4 3 2 1       Intervention Comments:  -Voice re-evaluation with patient (20 minutes)  -Voice treatment, including discussion about HEP review " (30 minutes)

## 2024-08-22 NOTE — TELEPHONE ENCOUNTER
Pt's wife Oliva called stating that she received a message that pt has an appointment on 12/30 with Dr. Hollins. However Oliva feels patient needs to be seen sooner as she feels his SOB could be related to his stent and she feels he should have a stress test.     Spoke with Christina from office who agreed pt needs appointment. Scheduled with Binta POWERS for tomorrow at 1pm, to discuss concerns

## 2024-08-23 ENCOUNTER — OFFICE VISIT (OUTPATIENT)
Dept: CARDIOLOGY CLINIC | Facility: CLINIC | Age: 64
End: 2024-08-23

## 2024-08-23 VITALS
HEIGHT: 69 IN | HEART RATE: 54 BPM | SYSTOLIC BLOOD PRESSURE: 92 MMHG | BODY MASS INDEX: 23.4 KG/M2 | DIASTOLIC BLOOD PRESSURE: 60 MMHG | OXYGEN SATURATION: 96 % | WEIGHT: 158 LBS

## 2024-08-23 DIAGNOSIS — R06.09 DOE (DYSPNEA ON EXERTION): Primary | ICD-10-CM

## 2024-08-23 DIAGNOSIS — I25.118 CORONARY ARTERY DISEASE OF NATIVE ARTERY OF NATIVE HEART WITH STABLE ANGINA PECTORIS (HCC): ICD-10-CM

## 2024-08-23 DIAGNOSIS — J42 CHRONIC BRONCHITIS, UNSPECIFIED CHRONIC BRONCHITIS TYPE (HCC): ICD-10-CM

## 2024-08-23 DIAGNOSIS — D47.2 MONOCLONAL GAMMOPATHY: ICD-10-CM

## 2024-08-23 DIAGNOSIS — I82.409 THROMBOEMBOLISM OF DEEP VEINS OF LOWER EXTREMITY, UNSPECIFIED LATERALITY (HCC): ICD-10-CM

## 2024-08-23 DIAGNOSIS — R91.8 ABNORMAL CT SCAN OF LUNG: ICD-10-CM

## 2024-08-23 NOTE — ASSESSMENT & PLAN NOTE
- follows with pulmonology   - hospitalized 02/2024 with an exacerbation and treated with antibiotics and steroids

## 2024-08-23 NOTE — ASSESSMENT & PLAN NOTE
- remote s/p MI and RCA stenting in 2004  - antiplatelet: ASA 81 mg daily   - statin: atorvastatin 40 mg daily     Denies anginal pain. Admits to LOJA. Will plan for a NM stress test for further ischemic workup.  - continue ASA and atorvastatin

## 2024-08-23 NOTE — TELEPHONE ENCOUNTER
I spoke to the patient's wife.  She states that he has been having some dyspnea on exertion, mild not significant.  No new cough, chest congestion or exacerbation symptoms as he had before last admission.  He still could get the CT chest as scheduled 9/16, this was already approved by the insurance and he does not need a peer to peer.

## 2024-08-23 NOTE — ASSESSMENT & PLAN NOTE
- CT wo contrast 02/2024 showed a ground glass opacity in the lower left lobe which is new compared to previous studies.

## 2024-08-23 NOTE — ASSESSMENT & PLAN NOTE
- following with heme/onc with recent bone marrow biopsy to rule out underlying malignant process

## 2024-08-23 NOTE — PROGRESS NOTES
Otoniel Juan  1960  [id number]  [address] [id number] [phone]    1. LOJA (dyspnea on exertion)        2. Coronary artery disease of native artery of native heart with stable angina pectoris (HCC)  POCT ECG    NM myocardial perfusion spect (rx stress and/or rest)      3. Monoclonal gammopathy        4. Chronic bronchitis, unspecified chronic bronchitis type (HCC)        5. Abnormal CT scan of lung        6. Thromboembolism of deep veins of lower extremity, unspecified laterality (HCC)            1. LOJA (dyspnea on exertion)  Assessment & Plan:  - states his has worsening LOJA since January, worse with hills and mowing the lawn   - denies any anginal chest pain     With patient's history of CAD will order a NM stress test to assess for any perfusion issues to the heart.  2. Coronary artery disease of native artery of native heart with stable angina pectoris (HCC)  Assessment & Plan:  - remote s/p MI and RCA stenting in 2004  - antiplatelet: ASA 81 mg daily   - statin: atorvastatin 40 mg daily     Denies anginal pain. Admits to LOJA. Will plan for a NM stress test for further ischemic workup.  - continue ASA and atorvastatin    Orders:  -     POCT ECG  -     NM myocardial perfusion spect (rx stress and/or rest); Future; Expected date: 09/23/2024  3. Monoclonal gammopathy  Assessment & Plan:  - following with heme/onc with recent bone marrow biopsy to rule out underlying malignant process   4. Chronic bronchitis, unspecified chronic bronchitis type (HCC)  Assessment & Plan:  - follows with pulmonology   - hospitalized 02/2024 with an exacerbation and treated with antibiotics and steroids  5. Abnormal CT scan of lung  Assessment & Plan:  - CT wo contrast 02/2024 showed a ground glass opacity in the lower left lobe which is new compared to previous studies.  6. Thromboembolism of deep veins of lower  extremity, unspecified laterality (HCC)  Assessment & Plan:  - history of DVT  - on xarelto 20 mg daily     RTO 10/01/24 with me to review the results    Most recent cardiac testing:  TTE 02/09/24: LVEF 55%, wall motion is normal, aortic root is mildly dilated at 4.10 cm  NM stress test 03/02/21: Normal study after maximal exercise with reproduction of symptoms. Myocardial perfusion imaging was normal at rest and with stress without evidence of ischemia. Left ventricular systolic function was normal.   Echo stress test 05/13/20: there was no diagnostic evidence for stress-induced ischemia. The image quality was poor.   Echo stress test 10/29/16: The stress ECG was negative for ischemia. Arrhythmia during stress: isolated premature ventricular beats.    Interval History:   This is a 63 y/o male with a PMH of CAD s/p remote stenting of the RCA in 2004, DVT s/p gastrocnemius repair, and chronic bronchitis who is presenting today for . He was last seen in office 01/22/24 by Dr. Hollins. He continued with mild LOJA and productive cough that has been ongoing since his hospitalization at [facility] in 2022. Most recent CT scan of his chest revealed an opacity in his lower lung which is new compared to previous studies. He is also currently following with heme/onc and got a bone marrow biopsy to rule out any underlying malignant process.     Pt is present with his wife and grand kids today. He states he has had SOB since January and feels like it has got worse since he last saw Dr. Hollins. His wife states he gets SOB going up hills and feels he looks very tired and fatigued after mowing the lawn. He denies any anginal chest pain. He is able to walk on flat surfaces, at least one mile without SOB. He states his symptoms regarding his MI back in 2004 consisted of jaw and arm pain. 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 His wife discussed with pulmonology regarding the lung opacity found on CT and have a follow up CT  scan scheduled. He underwent bone marrow biopsy to further investigate thrombocytopenia and splenomegaly. He is waiting on those results and has follow up scheduled in September. He denies chest pain, palpitations, flutters in his chest, dizziness, lightheadedness, syncope, and edema.    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[facility]) 12/06/2017    Pancreatitis     three times    Sleep apnea     not using a C-PAP    Superficial thrombophlebitis     Vision loss     reading glasses over the counter     Social History     Socioeconomic History    Marital status: /Civil Union     Spouse name: Not on file    Number of children: Not on file    Years of education: Not on file    Highest education level: Not on file   Occupational History    Not on file   Tobacco Use    Smoking status: Never    Smokeless tobacco: Never   Vaping Use    Vaping status: Never Used   Substance and Sexual Activity    Alcohol use: Not Currently     Comment: 2 beers on a social occasion    Drug use: No    Sexual activity: Yes     Partners: Female     Birth control/protection: None   Other Topics Concern    Not on file   Social History Narrative    Consumes 1 cup of tea  And 1 glass of tea per day        Weight loss     Social Determinants of Health     Financial Resource Strain: Low Risk  (12/4/2023)    Received [facility], [facility]    Overall Financial Resource Strain (CARDIA)     Difficulty of Paying Living Expenses: Not very hard   Food Insecurity: No Food Insecurity (2/10/2024)    Hunger Vital Sign     Worried About Running Out of Food in the Last Year:  Never true     Ran Out of Food in the Last Year: Never true   Transportation Needs: No Transportation Needs (2/10/2024)    PRAPARE - Transportation     Lack of Transportation (Medical): No     Lack of Transportation (Non-Medical): No   Physical Activity: Sufficiently Active (12/4/2023)    Received from [facility]    Exercise Vital Sign     Days of Exercise per Week: 1 day     Minutes of Exercise per Session: 150+ min   Stress: No Stress Concern Present (12/4/2023)    [facility], [facility]    [employer] - Occupational Stress Questionnaire     Feeling of Stress : Not at all   Social Connections: Socially Integrated (12/4/2023)    [facility]    Social Connection and Isolation Panel [NHANES]     Frequency of Communication with Friends and Family: Three times a week     Frequency of Social Gatherings with Friends and Family: Never     Attends Hinduism Services: More than 4 times per year     Active Member of Clubs or Organizations: Yes     Attends Club or Organization Meetings: More than 4 times per year     Marital Status:    Intimate Partner Violence: Not At Risk (12/4/2023)    [facility]    Humiliation, Afraid, Rape, and Kick questionnaire     Fear of Current or Ex-Partner: No     Emotionally Abused: No     Physically Abused: No     Sexually Abused: No   Housing Stability: Low Risk  (2/10/2024)    Housing Stability Vital Sign     Unable to Pay for Housing in the Last Year: No     Number of Times Moved in the Last Year: 1     Homeless in the Last Year: No      Family History   Problem Relation Age of Onset    Brain cancer Mother     Clotting disorder Mother     Heart disease Mother     Cancer Mother     Hyperlipidemia Mother     Stroke Father     Deep vein thrombosis Father     Emphysema  Father     Coronary artery disease Paternal Uncle     Diabetes Maternal Uncle      Past Surgical History:   Procedure Laterality Date    CAROTID STENT      CHOLECYSTECTOMY      CIRCUMCISION      Elective    COLONOSCOPY      complete, polyps 2 years ago    CORONARY ANGIOPLASTY WITH STENT PLACEMENT      stent to RCA 2004    INGUINAL HERNIA REPAIR      IR BIOPSY BONE MARROW  8/7/2024    ORIF RADIAL SHAFT FRACTURE Left     Open Treatment of Multiple Fractures of the Radial Shaft    ORIF ULNAR / RADIAL SHAFT FRACTURE Left     Open Treatment of Multiple Fractures of the Ulnar Shaft    TONSILLECTOMY AND ADENOIDECTOMY         Current Outpatient Medications:     acyclovir (ZOVIRAX) 200 mg capsule, Take 1 capsule (200 mg total) by mouth 5 (five) times a day for 5 days (Patient taking differently: Take 200 mg by mouth if needed Patient takes PRN), Disp: 25 capsule, Rfl: 0    aspirin (ECOTRIN LOW STRENGTH) 81 mg EC tablet, Take 81 mg by mouth daily, Disp: , Rfl:     atorvastatin (LIPITOR) 40 mg tablet, TAKE 1 TABLET BY MOUTH EVERY DAY, Disp: 90 tablet, Rfl: 1    budesonide-formoterol (Symbicort) 80-4.5 MCG/ACT inhaler, Inhale 2 puffs 2 (two) times a day Rinse mouth after use., Disp: 30.6 g, Rfl: 5    cholecalciferol (VITAMIN D3) 400 units tablet, Take 1 tablet by mouth every morning, Disp: , Rfl:     Continuous Blood Gluc Sensor (Dexcom G6 Sensor) MISC, Change every 10 days. E10.65, Disp: , Rfl:     Continuous Blood Gluc Transmit (Dexcom G6 Transmitter) MISC, Change every 90 days. E10.65, Disp: , Rfl:     fluticasone (FLONASE) 50 mcg/act nasal spray, 2 sprays into each nostril daily, Disp: , Rfl:     Gvoke HypoPen 2-Pack 1 MG/0.2ML SOAJ, , Disp: , Rfl:     Insulin Disposable Pump (Omnipod 5 G6 Intro, Gen 5,) KIT, Inject 1 each under the skin every third day, Disp: , Rfl:     Insulin Disposable Pump (Omnipod 5 G6 Pods, Gen 5,) MISC, INJECT 1 UNDER THE SKIN EVERY OTHER DAY. CHANGING EVERY 2 DAYS DUE TO HIGH INSULIN REQUIREMENT.,  Disp: , Rfl:     montelukast (SINGULAIR) 10 mg tablet, TAKE 1 TABLET BY MOUTH DAILY AT BEDTIME, Disp: 90 tablet, Rfl: 0    NovoLOG 100 UNIT/ML injection, Inject up to 110 units daily via insulin pump. E10.65, Disp: , Rfl:     pantoprazole (PROTONIX) 40 mg tablet, TAKE 1 TABLET TWICE DAILY 30 MINS PRIOR TO BREAKFAST AND SUPPER., Disp: 180 tablet, Rfl: 3    rivaroxaban (Xarelto) 20 mg tablet, TAKE 1 TABLET BY MOUTH DAILY WITH BREAKFAST, Disp: 100 tablet, Rfl: 1    sildenafil (VIAGRA) 100 mg tablet, Take 1 tablet (100 mg total) by mouth daily as needed for erectile dysfunction, Disp: 10 tablet, Rfl: 1    sodium chloride 0.9 % nebulizer solution, Take 3 mL by nebulization as needed for wheezing, Disp: 90 mL, Rfl: 3  Allergies   Allergen Reactions    Other Cough     Grass / dogs hair      Labs:     Chemistry        Component Value Date/Time     01/09/2018 0702    K 4.1 06/13/2024 0706    K 4.1 12/22/2023 1348     06/13/2024 0706     12/22/2023 1348    CO2 31 06/13/2024 0706    CO2 30 12/22/2023 1348    BUN 16 06/13/2024 0706    BUN 18 12/22/2023 1348    CREATININE 0.60 06/13/2024 0706    CREATININE 0.63 12/22/2023 1348        Component Value Date/Time    CALCIUM 8.7 06/13/2024 0706    CALCIUM 9.7 12/22/2023 1348    ALKPHOS 76 06/13/2024 0706    ALKPHOS 96 12/22/2023 1348    AST 53 (H) 06/13/2024 0706    AST 39 12/22/2023 1348     (H) 06/13/2024 0706    ALT 71 (H) 12/22/2023 1348    BILITOT 1.4 (H) 01/09/2018 0702        Lab Results   Component Value Date    CHOL 133 01/09/2018     Lab Results   Component Value Date    HDL 45 04/30/2024    HDL 32 (L) 11/01/2022    HDL 41 01/18/2022     Lab Results   Component Value Date    LDLCALC 59 04/30/2024    LDLCALC 59 11/01/2022    LDLCALC 91 01/18/2022     Lab Results   Component Value Date    TRIG 122 04/30/2024    TRIG 67 11/01/2022    TRIG 109 01/18/2022     Imaging: IR biopsy bone marrow    Result Date: 8/7/2024  Narrative: PROCEDURE:  Fluoroscopic-guided bone marrow biopsy and bone marrow aspiration STAFF: Jc Abraham M.D. FLUOROSCOPY TIME: 0.4 minutes. NUMBER OF IMAGES: Multiple. COMPLICATIONS: None. MEDICATIONS: Moderate sedation was monitored by radiology nursing staff.  Monitoring of conscious sedation totaled 15 minutes. See nursing records. INDICATION: Thrombocytopenia. PROCEDURE: Using fluoroscopic guidance, the BD Trek system was used to perform bone marrow aspiration via the left posterior superior iliac spine. Approximately 5 mL of marrow was removed and given to the cytotechnologist.  Next, bone marrow biopsy was performed once. The biopsy specimen was given to the in-room cytotechnologist. The needle was then removed and hemostasis was achieved using manual compression.     Impression: Bone marrow aspiration and biopsy. Workstation performed: [id number]     NM PET CT skull base to mid thigh    Result Date: 8/1/2024  Narrative: PET/CT SCAN INDICATION: D72.819: Decreased white blood cell count, unspecified D69.6: Thrombocytopenia, unspecified R16.1: Splenomegaly, not elsewhere classified D47.2: Monoclonal gammopathy MODIFIER: PI COMPARISON: Left upper quadrant abdominal ultrasound dated 3/25/2024, CT of chest dated 2/8/2024, and CT of the sinuses dated 2/8/2024. 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 Multiplanar attenuation corrected and non attenuation corrected PET images are available for interpretation, and contiguous, low dose, axial CT sections were obtained from the skull vertex through the femurs. Intravenous contrast material was not utilized. This examination, like all CT scans performed in the [facility], was performed utilizing techniques to minimize radiation dose exposure, including the use of iterative reconstruction and automated exposure control.  Secondary to motion artifact, repeat imaging of the head/neck was performed. Fasting serum glucose: 170 mg/dl FINDINGS: VISUALIZED BRAIN: Regional photopenia involving the posterior left temporal/parietal region corresponding to encephalomalacia seen on prior MRI. HEAD/NECK: There is a physiologic distribution of FDG. No FDG avid cervical adenopathy is seen. CT images: Unremarkable. CHEST: On image 109 of series 3 there is hypermetabolic masslike consolidation/nodule involving the anterior medial left upper lobe measuring 1.9 x 1.5 cm with max SUV of 2.8. This finding is associated with a mild halo of groundglass opacity. While findings could reflect an inflammatory process, underlying malignancy of low metabolic activity is the diagnosis of exclusion. This finding is new since 2/8/2024. CT images: Atherosclerotic vascular calcifications including those of the coronary arteries are noted. ABDOMEN: No FDG avid soft tissue lesions are seen. Specifically, there is no significant FDG activity associate with patient's known splenomegaly. CT images: Streak artifact related to patient's arms being down limits evaluation on low-dose unenhanced CT. Cholecystectomy. Atherosclerotic vascular calcifications are noted. Mild splenomegaly with the AP dimension of the spleen measuring 13.6 cm, similar to prior CT of chest dated 2/8/2024. On image 163 of series 3 there is a stable there is a 2.4 cm rim calcified splenic artery aneurysm versus less likely but not excluded adrenal nodule, similar in size to 2/8/2024 when utilizing similar measurement technique. PELVIS: Mild nonspecific low-level heterogeneous activity involving the prostate gland, possibly inflammatory in etiology. CT images: Streak artifact related to right hip prosthesis limits evaluation on low-dose unenhanced CT. Prominent prostate gland. OSSEOUS STRUCTURES: No FDG avid lesions are seen. CT images: Right hip prosthesis. Degenerative changes are noted involving the  "spine.     Impression: 1. 1.9 x 1.5 cm mildly FDG avid masslike consolidation/nodule involving the left upper lobe with mild halo of groundglass opacity, new since 2/8/2024. While findings could reflect an inflammatory process, underlying malignancy of low metabolic activity is the diagnosis of exclusion. Consider further evaluation with tissue sampling as clinically indicated. If an inflammatory process is favored, short interval follow-up with CT imaging in 6 to 8 weeks is recommended to ensure stability/document resolution. 2. There is otherwise no additional focal FDG activity to suggest hypermetabolic malignancy. Patient's known mild splenomegaly is non-FDG avid. 3. 2.4 cm rim calcified splenic artery aneurysm versus less likely calcified adrenal nodule. Consider vascular surgical consultation. Please see above for details and additional findings. The study was marked in EPIC for significant notification. The study was marked in Epic for follow-up. Workstation performed: [id number]       ECG:    Sinus bradycardia, HR 51  Nonspecific T wave abnormality   No significant change from last EKG in 01/2024       Review of Systems   Constitutional: Negative for chills, diaphoresis, fever, malaise/fatigue and weight gain.   Cardiovascular:  Positive for dyspnea on exertion. Negative for chest pain, irregular heartbeat, leg swelling, near-syncope, orthopnea, palpitations, paroxysmal nocturnal dyspnea and syncope.   Respiratory:  Positive for shortness of breath. Negative for cough, sleep disturbances due to breathing, snoring and wheezing.    Skin:  Negative for rash.   Gastrointestinal:  Negative for bloating, abdominal pain and nausea.   Neurological:  Negative for dizziness and light-headedness.       Vitals:    08/23/24 1253   BP: 92/60   Pulse: (!) 54   SpO2: 96%     Vitals:    08/23/24 1253   Weight: 71.7 kg (158 lb)     Height: 5' 9\" (175.3 cm)   Body mass index is 23.33 kg/m².    Physical Exam  Vitals and nursing " note reviewed.   Constitutional:       General: He is not in acute distress.     Appearance: Normal appearance. He is not ill-appearing.   HENT:      Head: Normocephalic and atraumatic.      Nose: Nose normal.      Mouth/Throat:      Mouth: Mucous membranes are moist.   Eyes:      Conjunctiva/sclera: Conjunctivae normal.   Cardiovascular:      Rate and Rhythm: Regular rhythm. Bradycardia present.      Pulses:           Radial pulses are 2+ on the right side and 2+ on the left side.      Heart sounds: S1 normal and S2 normal. No murmur heard.  Pulmonary:      Effort: Pulmonary effort is normal. No respiratory distress.      Breath sounds: Normal breath sounds. No stridor. No decreased breath sounds, wheezing, rhonchi or rales.   Abdominal:      General: There is no distension.   Musculoskeletal:      Cervical back: Neck supple.      Right lower leg: No edema.      Left lower leg: No edema.   Skin:     General: Skin is warm.      Capillary Refill: Capillary refill takes less than 2 seconds.   Neurological:      General: No focal deficit present.      Mental Status: He is alert.   Psychiatric:         Thought Content: Thought content normal.

## 2024-08-23 NOTE — ASSESSMENT & PLAN NOTE
- states his has worsening LOJA since January, worse with hills and mowing the lawn   - denies any anginal chest pain     With patient's history of CAD will order a NM stress test to assess for any perfusion issues to the heart.

## 2024-08-24 DIAGNOSIS — J30.2 SEASONAL ALLERGIES: ICD-10-CM

## 2024-08-25 RX ORDER — MONTELUKAST SODIUM 10 MG/1
10 TABLET ORAL
Qty: 90 TABLET | Refills: 1 | Status: SHIPPED | OUTPATIENT
Start: 2024-08-25

## 2024-08-27 ENCOUNTER — TELEPHONE (OUTPATIENT)
Age: 64
End: 2024-08-27

## 2024-08-27 NOTE — TELEPHONE ENCOUNTER
Telephone call left voice message on Wife's voice mail.  Dr Bronson will review the results at his fu appt on 9/3 the results do not warrant sooner review.

## 2024-08-27 NOTE — TELEPHONE ENCOUNTER
Patient's wife is requesting a call back to review bmbx results from 8/7/24 biopsy. Please call [phone]

## 2024-08-29 ENCOUNTER — EVALUATION (OUTPATIENT)
Dept: SPEECH THERAPY | Facility: CLINIC | Age: 64
End: 2024-08-29
Payer: COMMERCIAL

## 2024-08-29 ENCOUNTER — TELEPHONE (OUTPATIENT)
Age: 64
End: 2024-08-29

## 2024-08-29 DIAGNOSIS — R13.12 DYSPHAGIA, OROPHARYNGEAL PHASE: ICD-10-CM

## 2024-08-29 DIAGNOSIS — R49.0 DYSPHONIA: Primary | ICD-10-CM

## 2024-08-29 DIAGNOSIS — G71.11 MYOTONIC DYSTROPHY (HCC): ICD-10-CM

## 2024-08-29 PROCEDURE — 92524 BEHAVRAL QUALIT ANALYS VOICE: CPT | Performed by: SPEECH-LANGUAGE PATHOLOGIST

## 2024-08-29 PROCEDURE — 92507 TX SP LANG VOICE COMM INDIV: CPT | Performed by: SPEECH-LANGUAGE PATHOLOGIST

## 2024-08-29 NOTE — TELEPHONE ENCOUNTER
Myeloma on Left wrist currently being treated by [facility]. She will have records faxed over. She wanted to let Dr Bronson know.

## 2024-09-03 ENCOUNTER — OFFICE VISIT (OUTPATIENT)
Dept: HEMATOLOGY ONCOLOGY | Facility: CLINIC | Age: 64
End: 2024-09-03
Payer: COMMERCIAL

## 2024-09-03 VITALS
OXYGEN SATURATION: 99 % | HEIGHT: 69 IN | HEART RATE: 57 BPM | SYSTOLIC BLOOD PRESSURE: 106 MMHG | TEMPERATURE: 97.6 F | DIASTOLIC BLOOD PRESSURE: 66 MMHG | WEIGHT: 160 LBS | RESPIRATION RATE: 16 BRPM | BODY MASS INDEX: 23.7 KG/M2

## 2024-09-03 DIAGNOSIS — D47.2 IGM KAPPA MONOCLONAL GAMMOPATHY: ICD-10-CM

## 2024-09-03 DIAGNOSIS — D69.6 THROMBOCYTOPENIA (HCC): ICD-10-CM

## 2024-09-03 DIAGNOSIS — C83.00 LYMPHOPLASMACYTIC LYMPHOMA (HCC): Primary | ICD-10-CM

## 2024-09-03 DIAGNOSIS — D47.2 MONOCLONAL GAMMOPATHY: ICD-10-CM

## 2024-09-03 DIAGNOSIS — D72.819 LEUKOPENIA, UNSPECIFIED TYPE: ICD-10-CM

## 2024-09-03 PROCEDURE — 99215 OFFICE O/P EST HI 40 MIN: CPT | Performed by: INTERNAL MEDICINE

## 2024-09-03 NOTE — PROGRESS NOTES
Hematology/Oncology Outpatient Office Note  Date of Service: 9/3/2024    [facility] HEMATOLOGY ONCOLOGY SPECIALISTS [place]  [address]  [facility] [address]  [phone]    Reason for Consultation:   Chief Complaint   Patient presents with    Follow-up     Referral Physician: No ref. provider found  Primary Care Physician:  Wandy Cheatham DO     Assessment/plan:     1.  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  CXCR4 mutation not detected. Low risk  >> Low risk: Bone marrow infiltration: 20%, IgM: 234, beta-2 microglobulin: 1.8, albumin: 3.7  2.  Mild leukopenia without neutropenia  3.  Chronic thrombocytopenia  4.  Incidentally noted mild splenomegaly on CT chest     Otoniel Martinez is a 64 y.o. male with past medical history significant for CAD, diabetes, DVT after tear in gastrocnemius muscle (on indefinite anticoagulation with Xarelto), recurrent pancreatitis, diabetes type 1 and sleep apnea.  Patient was admitted to [facility] from 2/8/2024 - 2/10/2024 with acute respiratory failure with hypoxia which was suspected related to acute on chronic bronchitis.  He was treated with antibiotics and prednisone.  He underwent a CT of the chest which incidentally noted splenomegaly.  Patient referred to hematology for further evaluation.  Patient denies any fever, night sweats, weight loss. Denies any abd pain, n/v/d. Denies frequent or recurrent illness. ETOH use socially. Denies easy bruising or bleeding.        He underwent a bone marrow biopsy on 8/7/2024 which is consistent with a low-grade CD5 positive B-cell lymphoma accompanied by clonal kappa restricted plasma cells detected by flow cytometry normocellular marrow, together with IgM kappa gammopathy and MYD 88 mutation detected in peripheral blood flow suggestive of lymphoplasmacytic lymphoma.  No apple green birefringent material is identified on Congo red stain, normal  male karyotype, no evidence of abnormalities by FISH and no mutation detected any of the genes on the NGS panel. Serum immunofixation shows a monoclonal gammopathy identified as IgM kappa, M-Fco: 0.11 g/deciliter.  Quantitative immunoglobulins with no elevation in IgM.  Beta-2 microglobulin 1.8.  Patient with overall low risk disease which equates to median time to progression of 9.2 years.  Recommendation reviewed per NCCN guidelines.  Patient currently asymptomatic. Will continue to monitor. Office f/u in 3 months with labs prior.    We discussed lymphoplasmacytic lymphoma is a mature B-cell lymphoma usually involving the bone marrow, spleen or lymph nodes. Waldenström macroglobulinemia is a distinct clinicopathologic entity demonstrating LPL in the bone marrow with an IgM monoclonal gammopathy in the blood. The clinical presentation of LPL is varied and can include symptoms related to tumor infiltration (lymphadenopathy, organomegaly, cytopenias) or monoclonal protein production (hyperviscosity, neuropathy).  Patient currently remains asymptomatic.    4. Recurrent DVT  Currently on indefinite anticoagulation with xarelto    5.  Left upper lobe lung nodule  Patient was incidentally noted to have a 1.9 x 1.5 cm mildly FDG avid left upper lobe lung nodule.  Findings could reflect an inflammatory process versus an underlying malignant process.  Agree with pulmonary eval to follow-up with a CT scan in 6 weeks to assess further.  Will need biopsy if persists.  Low suspicion that this is related to his monoclonal gammopathy.    6.  Splenic artery aneurysm  Incidentally noted to have a 2.4 cm splenic artery aneurysm on imaging.  Recommend vascular surgery consultation.    Orders Placed This Encounter   Procedures    CBC and differential    Comprehensive metabolic panel    LD,Blood    Protein electrophoresis, serum    Kappa/Lambda Light Chains Free With Ratio, Serum    Beta 2 microglobulin, serum      Diagnosis  ICD-10-CM Associated Orders   1. Lymphoplasmacytic lymphoma (HCC)  C83.00       2. IgM kappa monoclonal gammopathy  D47.2 CBC and differential     Comprehensive metabolic panel     LD,Blood     Protein electrophoresis, serum     Kappa/Lambda Light Chains Free With Ratio, Serum     Beta 2 microglobulin, serum     IgG, IgA, IgM      3. Monoclonal gammopathy  D47.2 CBC and differential     Comprehensive metabolic panel     LD,Blood     Protein electrophoresis, serum     Kappa/Lambda Light Chains Free With Ratio, Serum     Beta 2 microglobulin, serum     IgG, IgA, IgM      4. Leukopenia, unspecified type  D72.819 CBC and differential     Comprehensive metabolic panel     LD,Blood     Protein electrophoresis, serum     Kappa/Lambda Light Chains Free With Ratio, Serum     Beta 2 microglobulin, serum      5. Thrombocytopenia (HCC)  D69.6 CBC and differential     Comprehensive metabolic panel     LD,Blood     Protein electrophoresis, serum     Kappa/Lambda Light Chains Free With Ratio, Serum     Beta 2 microglobulin, serum        Return in about 3 months (around 12/3/2024) for Office Visit.    Juli Bronson DO 9/3/2024   Hematology & Medical Oncology Physician    History of present illness:   Otoniel Martinez is a 63-year-old male with past medical history significant for CAD, diabetes, DVT after tear in gastrocnemius muscle on indefinite anticoagulation with Xarelto), recurrent pancreatitis, diabetes type 1 and sleep apnea.  Patient was admitted to [facility] from 2/8/2024 - 2/10/2024 with acute respiratory failure with hypoxia which was suspected related to acute on chronic bronchitis.  He was treated with antibiotics and prednisone.  He underwent a CT of the chest which incidentally noted splenomegaly.  Patient referred to hematology for further evaluation.  He presents for initial consultation.     Patient denies any fever, night sweats, weight loss. Denies any abd pain, n/v/d. Denies frequent or recurrent  illness. ETOH use socially. Denies easy bruising or bleeding.     Interval History:   Subsequent workup in the interim shows IgM kappa monoclonal gammopathy on SPEP.  Peripheral smear with involvement by a monotypic kappa B-cell population with MYD88 mutation.  Patient denies any fever, night sweats or unexpected weight loss.    Interval history 8/12/2024:  In the interim, patient underwent bone marrow biopsy on 8/7/2024 which remains pending.  PET CT scan noted a hypermetabolic masslike consolidation in the left upper lobe measuring 1.9 x 1.5 cm.  Mild splenomegaly.  Incidentally noted a 2.4 cm splenic artery aneurysm for which vascular surgical consultation recommended.    Interval history 9/3/2024:  Patient presents for follow-up visit accompanied by his wife.  Patient denies any fever, night sweats, respiratory symptoms or bone pain.  In the interim he underwent bone marrow biopsy which was consistent with lymphoplasmacytic lymphoma.    Review of systems:   Review of Systems   Constitutional: Negative. Negative for fever and malaise/fatigue.   HENT: Negative.     Cardiovascular: Negative.  Negative for chest pain, dyspnea on exertion and palpitations.   Respiratory: Negative.  Negative for shortness of breath.    Hematologic/Lymphatic: Negative.  Does not bruise/bleed easily.   Skin: Negative.    Musculoskeletal: Negative.    Gastrointestinal: Negative.  Negative for abdominal pain.   Neurological: Negative.    Psychiatric/Behavioral: Negative.          A 10-point review of system was performed, pertinent positive and negative were detailed as above. Otherwise, the 10-point review of system was negative.    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    Pancreatitis     three times    Sleep apnea     not using a C-PAP    Superficial thrombophlebitis     Vision loss     reading glasses over the counter       Past Surgical History:   Procedure Laterality Date    CAROTID STENT      CHOLECYSTECTOMY      CIRCUMCISION      Elective    COLONOSCOPY      complete, polyps 2 years ago    CORONARY ANGIOPLASTY WITH STENT PLACEMENT      stent to RCA 2004    INGUINAL HERNIA REPAIR      IR BIOPSY BONE MARROW  8/7/2024    ORIF RADIAL SHAFT FRACTURE Left     Open Treatment of Multiple Fractures of the Radial Shaft    ORIF ULNAR / RADIAL SHAFT FRACTURE Left     Open Treatment of Multiple Fractures of the Ulnar Shaft    TONSILLECTOMY AND ADENOIDECTOMY         Family History   Problem Relation Age of Onset    Brain cancer Mother     Clotting disorder Mother     Heart disease Mother     Cancer Mother     Hyperlipidemia Mother     Stroke Father     Deep vein thrombosis Father     Emphysema Father     Coronary artery disease Paternal Uncle     Diabetes Maternal Uncle        Social History     Socioeconomic History    Marital status: /Civil Union     Spouse name: Not on file    Number of children: Not on file    Years of education: Not on file    Highest education level: Not on file   Occupational History    Not on file   Tobacco Use    Smoking status: Never    Smokeless tobacco: Never   Vaping Use    Vaping status: Never Used   Substance and Sexual Activity    Alcohol use: Not Currently     Comment: 2 beers on a social occasion    Drug use: No    Sexual activity: Yes     Partners: Female     Birth control/protection: None   Other Topics Concern    Not on file   Social History Narrative    Consumes 1 cup of tea  And 1 glass of tea per day        Weight loss     Social Determinants of Health     Financial Resource Strain: Low Risk  (12/4/2023)    Received from [facility]  [facility], [facility]    Overall Financial Resource Strain (CARDIA)     Difficulty of Paying Living Expenses: Not very hard   Food Insecurity: No Food Insecurity (2/10/2024)    Hunger Vital Sign     Worried About Running Out of Food in the Last Year: Never true     Ran Out of Food in the Last Year: Never true   Transportation Needs: No Transportation Needs (2/10/2024)    PRAPARE - Transportation     Lack of Transportation (Medical): No     Lack of Transportation (Non-Medical): No   Physical Activity: Sufficiently Active (12/4/2023)    Received from [facility]    Exercise Vital Sign     Days of Exercise per Week: 1 day     Minutes of Exercise per Session: 150+ min   Stress: No Stress Concern Present (12/4/2023)    Received from [facility], [facility]    [employer] - Occupational Stress Questionnaire     Feeling of Stress : Not at all   Social Connections: Socially Integrated (12/4/2023)    Received from [facility], [facility]    Social Connection and Isolation Panel [NHANES]     Frequency of Communication with Friends and Family: Three times a week     Frequency of Social Gatherings with Friends and Family: Never     Attends Tenriism Services: More than 4 times per year     Active Member of Clubs or Organizations: Yes     Attends Club or Organization Meetings: More than 4 times per year     Marital Status:    Intimate Partner Violence: Not At Risk (12/4/2023)    Received from [facility], [facility]    Humiliation, Afraid, Rape, and Kick questionnaire     Fear of Current or Ex-Partner: No     Emotionally Abused: No     Physically Abused: No     Sexually Abused: No   Housing Stability: Low Risk  (2/10/2024)    Housing Stability Vital Sign     Unable to Pay for Housing in the Last Year: No     Number of Times Moved in the Last Year: 1      Homeless in the Last Year: No       Allergies   Allergen Reactions    Other Cough     Grass / dogs hair        Current Outpatient Medications   Medication Sig Dispense Refill    acyclovir (ZOVIRAX) 200 mg capsule Take 1 capsule (200 mg total) by mouth 5 (five) times a day for 5 days (Patient taking differently: Take 200 mg by mouth if needed Patient takes PRN) 25 capsule 0    aspirin (ECOTRIN LOW STRENGTH) 81 mg EC tablet Take 81 mg by mouth daily      atorvastatin (LIPITOR) 40 mg tablet TAKE 1 TABLET BY MOUTH EVERY DAY 90 tablet 1    budesonide-formoterol (Symbicort) 80-4.5 MCG/ACT inhaler Inhale 2 puffs 2 (two) times a day Rinse mouth after use. 30.6 g 5    cholecalciferol (VITAMIN D3) 400 units tablet Take 1 tablet by mouth every morning      Continuous Blood Gluc Sensor (Dexcom G6 Sensor) MISC Change every 10 days. E10.65      Continuous Blood Gluc Transmit (Dexcom G6 Transmitter) MISC Change every 90 days. E10.65      fluticasone (FLONASE) 50 mcg/act nasal spray 2 sprays into each nostril daily      Gvoke HypoPen 2-Pack 1 MG/0.2ML SOAJ       Insulin Disposable Pump (Omnipod 5 G6 Intro, Gen 5,) KIT Inject 1 each under the skin every third day      Insulin Disposable Pump (Omnipod 5 G6 Pods, Gen 5,) MISC INJECT 1 UNDER THE SKIN EVERY OTHER DAY. CHANGING EVERY 2 DAYS DUE TO HIGH INSULIN REQUIREMENT.      montelukast (SINGULAIR) 10 mg tablet TAKE 1 TABLET BY MOUTH EVERYDAY AT BEDTIME 90 tablet 1    NovoLOG 100 UNIT/ML injection Inject up to 110 units daily via insulin pump. E10.65      pantoprazole (PROTONIX) 40 mg tablet TAKE 1 TABLET TWICE DAILY 30 MINS PRIOR TO BREAKFAST AND SUPPER. 180 tablet 3    rivaroxaban (Xarelto) 20 mg tablet TAKE 1 TABLET BY MOUTH DAILY WITH BREAKFAST 100 tablet 1    sildenafil (VIAGRA) 100 mg tablet Take 1 tablet (100 mg total) by mouth daily as needed for erectile dysfunction 10 tablet 1    sodium chloride 0.9 % nebulizer solution Take 3 mL by nebulization as needed for wheezing 90 mL  3     No current facility-administered medications for this visit.     I have reviewed the relevant past medical, surgical, social and family history. I have also reviewed allergies and medications for this patient.    Objective:      Vitals:    09/03/24 1107   BP: 106/66   Pulse: 57   Resp: 16   Temp: 97.6 °F (36.4 °C)   SpO2: 99%     Wt Readings from Last 3 Encounters:   09/03/24 72.6 kg (160 lb)   08/23/24 71.7 kg (158 lb)   08/12/24 73.9 kg (163 lb)     Performance status: ECOG PS: 0  Physical Exam  Vitals and nursing note reviewed.   Constitutional:       General: He is not in acute distress.     Appearance: He is well-developed.   HENT:      Head: Normocephalic and atraumatic.   Eyes:      Conjunctiva/sclera: Conjunctivae normal.   Cardiovascular:      Rate and Rhythm: Normal rate.   Pulmonary:      Effort: Pulmonary effort is normal. No respiratory distress.   Musculoskeletal:         General: No swelling.      Cervical back: Neck supple.   Skin:     General: Skin is warm and dry.   Neurological:      Mental Status: He is alert and oriented to person, place, and time.      Gait: Gait normal.   Psychiatric:         Mood and Affect: Mood normal.         Data Review:  Pathology Result:    @LASTLAB(FINALDX:*)@      PERIPHERAL BLOOD SMEAR: INVOLVED BY A MONOTYPIC KAPPA B-CELL POPULATION WITH MYD88 MUTATION; MILD EOSINOPHILIA AND MONOCYTOSIS, WITH BACKGROUND MILD THROMBOCYTOPENIA; SEE IMPRESSION     IMPRESSION:  The peripheral blood smear shows mild leukopenia (WBC 4.29 K/uL) with atypical lymphocytes and mild eosinophilia and monocytosis, as well as background mild thrombocytopenia (plt 137 K/uL) with normal platelet morphology, without evidence of platelet clumping or satellitism. Erythrocytes are adequate in number, normocytic and normochromic, without significant morphologic abnormalities. There is no circulating blast population, significant dysplasia, rouleaux formation, infectious organisms or evidence of  hemolysis.     Flow cytometry reportedly shows that the atypical lymphocytes are monotypic kappa B-cells. Additional ancillary testing is positive for a MYD88 L265P mutation, and is negative for CXCR4 mutations or 7/7q aberrations. The history of an IgM-kappa monoclonal immunoglobulin is noted. The overall findings are consistent with the patient's known, persistent circulating monoclonal B-cell population; the differential diagnosis includes a monoclonal B-cell lymphocytosis (MBL) and circulating B-cell lymphoma.     If there is extramedullary involvement (lymph node, tissue, etc.), this represents circulating lymphoma. In the absence of extramedullary involvement, this likely represents an MBL-non-CLL type. The morphology and immunophenotype are non-specific but in the setting of circulating lymphoma, the IgM paraprotein and MYD88 mutation, would favor lymphoplasmacytic lymphoma (LPL) / Waldenström macroglobulin (WM). Correlation with systemic imaging to evaluate for lymphadenopathy, hepatosplenomegaly and other potential sites of involvement for further characterization, is recommended to differentiate MBLs from circulating lymphoma, as clinically indicated. The World Health Organization (WHO) and various international scoring systems recommend all new diagnoses of LPL correlate with patient age, hemoglobin, platelet counts, serum cold agglutinin studies, serum cryoglobulin testing, hyperviscosity studies, hepatitis C testing, serum lactate dehydrogenase (LDH) and beta-2 microglobulin levels, given some of their associations, therapeutic and prognostic implications and to classify the disease by low, intermediate and high-risk.     FLOW CYTOMETRY STUDIES: [employer] #[id number]; see separate report        CYTOGENETIC FISH STUDIES:   Cytogenetic FISH studies are performed (7q-/-7 tri) and are reportedly negative (normal); ([employer] #[id number]; see separate report):           MOLECULAR STUDIES:   MYD88  Mutation Analysis is performed and is reportedly DETECTED ([employer] #[id number]; see separate report):           CXCR4 Mutation Analysis is performed and is reportedly NOT DETECTED ([employer] #[id number]; see separate report):       Image Results:  Image result are reviewed and documented in Hematology/Oncology history    2/8/2024: CT chest with contrast: Mild splenomegaly     8/1/2024: PET CT scan:  1. 1.9 x 1.5 cm mildly FDG avid masslike consolidation/nodule involving the left upper lobe with mild halo of groundglass opacity, new since 2/8/2024. While findings could reflect an inflammatory process, underlying malignancy of low metabolic activity is the diagnosis of exclusion. Consider further evaluation with tissue sampling as clinically indicated. If an inflammatory process is favored, short interval follow-up with CT imaging in 6 to 8 weeks is recommended to ensure stability/document resolution.     2. There is otherwise no additional focal FDG activity to suggest hypermetabolic malignancy. Patient's known mild splenomegaly is non-FDG avid.     3. 2.4 cm rim calcified splenic artery aneurysm versus less likely calcified adrenal nodule. Consider vascular surgical consultation.     Please see above for details and additional findings.    Labs:  Lab data are reviewed and documented in HemOnc history.     1/9/2018: WBC: 3.6, H/H: 15.8/44.9, MCV: 88.9, platelets: 114.  ANC: 2297  2/10/2024: WBC: 3.34, H/H: 12/36, MCV: 87, platelets: 125.  AST/ALT: 22/41, alk phos: 72.  Creatinine: 0.51    Lab Results   Component Value Date    HGB 13.8 08/07/2024    HCT 40.7 08/07/2024    MCV 84 08/07/2024     (L) 08/07/2024    WBC 3.59 (L) 08/07/2024    NRBC 0 08/07/2024    BANDSPCT 1 08/07/2024    ATYLMPCT 4 (H) 08/07/2024     Lab Results   Component Value Date     01/09/2018    K 4.1 06/13/2024     06/13/2024    CO2 31 06/13/2024    BUN 16 06/13/2024    CREATININE 0.60 06/13/2024    GLUF 80 06/13/2024     CALCIUM 8.7 2024    CORRECTEDCA 9.4 02/10/2024    AST 53 (H) 2024     (H) 2024    ALKPHOS 76 2024    PROT 5.8 (L) 2018    BILITOT 1.4 (H) 2018    EGFR 106 2024       Lab Results   Component Value Date    FERRITIN 16 (L) 2022    FERRITIN 16 (L) 2022       Lab Results   Component Value Date    WQAHVGIU80 400 2024    JKVBRQYV86 302 2024    [id number] 417 2018: Flow cytometry: Small kappa monoclonal B-cell population identified in peripheral blood    2024:  SPEP with DALY: Serum immunofixation shows a monoclonal gammopathy identified as IgM-kappa. The history of splenomegaly and circulating monoclonal-kappa B-cells with MYD88 mutation is noted.  M-Fco: 0.11 g/deciliter  UPEP: Show selective proteinuria.  Urine immunofixation shows a faint monoclonal band identified as free kappa light chains  FK.5, FL.5, K/L ratio: 0.37  Ig, IgM: 234, IgA: 55.  B2 M: 1.8  Methylmalonic acid: 170  LDH: 338    Disclaimer: This document was prepared using [employer] technology. If a word or phrase is confusing, or does not make sense, this is likely due to recognition error which was not discovered during this clinician's review. If you believe an error has occurred, please contact me through [facility] service line for april?cation.

## 2024-09-04 ENCOUNTER — HOSPITAL ENCOUNTER (OUTPATIENT)
Dept: NUCLEAR MEDICINE | Facility: HOSPITAL | Age: 64
Discharge: HOME | End: 2024-09-04
Payer: COMMERCIAL

## 2024-09-04 ENCOUNTER — HOSPITAL ENCOUNTER (OUTPATIENT)
Dept: NON INVASIVE DIAGNOSTICS | Facility: HOSPITAL | Age: 64
Discharge: HOME | End: 2024-09-04
Payer: COMMERCIAL

## 2024-09-04 VITALS — BODY MASS INDEX: 23.7 KG/M2 | WEIGHT: 160 LBS | HEIGHT: 69 IN

## 2024-09-04 DIAGNOSIS — I25.118 CORONARY ARTERY DISEASE OF NATIVE ARTERY OF NATIVE HEART WITH STABLE ANGINA PECTORIS (HCC): ICD-10-CM

## 2024-09-04 LAB
CHEST PAIN STATEMENT: NORMAL
MAX DIASTOLIC BP: 78 MMHG
MAX HR PERCENT: 73 %
MAX HR: 114 BPM
MAX PREDICTED HEART RATE: 156 BPM
NUC STRESS EJECTION FRACTION: 51 %
PROTOCOL NAME: NORMAL
RATE PRESSURE PRODUCT: NORMAL
REASON FOR TERMINATION: NORMAL
SL CV REST NUCLEAR ISOTOPE DOSE: 10.5 MCI
SL CV STRESS NUCLEAR ISOTOPE DOSE: 30.9 MCI
SL CV STRESS RECOVERY BP: NORMAL MMHG
SL CV STRESS RECOVERY HR: 63 BPM
SL CV STRESS RECOVERY O2 SAT: 99 %
STRESS ANGINA INDEX: 0
STRESS BASELINE BP: NORMAL MMHG
STRESS BASELINE HR: 50 BPM
STRESS O2 SAT REST: 98 %
STRESS PEAK HR: 88 BPM
STRESS POST ESTIMATED WORKLOAD: 7.9 METS
STRESS POST EXERCISE DUR MIN: 6 MIN
STRESS POST EXERCISE DUR MIN: 6 MIN
STRESS POST EXERCISE DUR SEC: 37 SEC
STRESS POST EXERCISE DUR SEC: 37 SEC
STRESS POST O2 SAT PEAK: 98 %
STRESS POST PEAK BP: 144 MMHG
STRESS POST PEAK HR: 114 BPM
STRESS POST PEAK SYSTOLIC BP: 172 MMHG
STRESS/REST PERFUSION RATIO: 0.76
TARGET HR FORMULA: NORMAL
TEST INDICATION: NORMAL

## 2024-09-04 PROCEDURE — 93018 CV STRESS TEST I&R ONLY: CPT | Performed by: INTERNAL MEDICINE

## 2024-09-04 PROCEDURE — 93017 CV STRESS TEST TRACING ONLY: CPT

## 2024-09-04 PROCEDURE — 78452 HT MUSCLE IMAGE SPECT MULT: CPT | Performed by: INTERNAL MEDICINE

## 2024-09-04 PROCEDURE — 78452 HT MUSCLE IMAGE SPECT MULT: CPT

## 2024-09-04 PROCEDURE — 93016 CV STRESS TEST SUPVJ ONLY: CPT | Performed by: INTERNAL MEDICINE

## 2024-09-04 PROCEDURE — A9502 TC99M TETROFOSMIN: HCPCS

## 2024-09-04 RX ORDER — REGADENOSON 0.08 MG/ML
0.4 INJECTION, SOLUTION INTRAVENOUS ONCE
Status: COMPLETED | OUTPATIENT
Start: 2024-09-04 | End: 2024-09-04

## 2024-09-04 RX ADMIN — REGADENOSON 0.4 MG: 0.08 INJECTION, SOLUTION INTRAVENOUS at 11:23

## 2024-09-05 ENCOUNTER — TELEPHONE (OUTPATIENT)
Age: 64
End: 2024-09-05

## 2024-09-05 NOTE — TELEPHONE ENCOUNTER
Contact was made with Aleah Juan and she is now aware of nancie's response in regards to the testing. She verbally states that she understands and no questions or concerns at this time.

## 2024-09-09 ENCOUNTER — OFFICE VISIT (OUTPATIENT)
Dept: INTERNAL MEDICINE CLINIC | Facility: OTHER | Age: 64
End: 2024-09-09
Payer: COMMERCIAL

## 2024-09-09 VITALS
TEMPERATURE: 98.5 F | OXYGEN SATURATION: 97 % | HEART RATE: 58 BPM | SYSTOLIC BLOOD PRESSURE: 116 MMHG | BODY MASS INDEX: 23.95 KG/M2 | WEIGHT: 162.2 LBS | DIASTOLIC BLOOD PRESSURE: 66 MMHG

## 2024-09-09 DIAGNOSIS — J30.81 ALLERGY TO DOG DANDER: Primary | ICD-10-CM

## 2024-09-09 DIAGNOSIS — Z01.84 IMMUNITY TO VARICELLA DETERMINED BY SEROLOGIC TEST: ICD-10-CM

## 2024-09-09 DIAGNOSIS — H91.93 DECREASED HEARING OF BOTH EARS: ICD-10-CM

## 2024-09-09 DIAGNOSIS — R91.8 ABNORMAL CT SCAN OF LUNG: ICD-10-CM

## 2024-09-09 DIAGNOSIS — Z87.19 HX OF PANCREATITIS: ICD-10-CM

## 2024-09-09 DIAGNOSIS — R16.1 SPLENOMEGALY: ICD-10-CM

## 2024-09-09 DIAGNOSIS — Z12.5 SCREENING PSA (PROSTATE SPECIFIC ANTIGEN): ICD-10-CM

## 2024-09-09 DIAGNOSIS — I25.118 CORONARY ARTERY DISEASE OF NATIVE ARTERY OF NATIVE HEART WITH STABLE ANGINA PECTORIS (HCC): ICD-10-CM

## 2024-09-09 DIAGNOSIS — E10.65 TYPE I DIABETES MELLITUS WITH HYPEROSMOLAR COMA  (HCC): ICD-10-CM

## 2024-09-09 DIAGNOSIS — R06.09 DOE (DYSPNEA ON EXERTION): ICD-10-CM

## 2024-09-09 DIAGNOSIS — E10.69 TYPE I DIABETES MELLITUS WITH HYPEROSMOLAR COMA  (HCC): ICD-10-CM

## 2024-09-09 DIAGNOSIS — Z99.81 ON HOME OXYGEN THERAPY: ICD-10-CM

## 2024-09-09 DIAGNOSIS — J42 CHRONIC BRONCHITIS, UNSPECIFIED CHRONIC BRONCHITIS TYPE (HCC): ICD-10-CM

## 2024-09-09 DIAGNOSIS — Z23 ENCOUNTER FOR IMMUNIZATION: ICD-10-CM

## 2024-09-09 DIAGNOSIS — E87.0 TYPE I DIABETES MELLITUS WITH HYPEROSMOLAR COMA  (HCC): ICD-10-CM

## 2024-09-09 DIAGNOSIS — E78.2 MIXED HYPERLIPIDEMIA: ICD-10-CM

## 2024-09-09 DIAGNOSIS — G62.9 POLYNEUROPATHY: ICD-10-CM

## 2024-09-09 PROCEDURE — 90677 PCV20 VACCINE IM: CPT

## 2024-09-09 PROCEDURE — 90471 IMMUNIZATION ADMIN: CPT

## 2024-09-09 PROCEDURE — 99214 OFFICE O/P EST MOD 30 MIN: CPT | Performed by: FAMILY MEDICINE

## 2024-09-09 RX ORDER — MECOBAL/LEVOMEFOLAT CA/B6 PHOS 2-3-35 MG
1 TABLET ORAL DAILY
Qty: 90 TABLET | Refills: 3 | Status: SHIPPED | OUTPATIENT
Start: 2024-09-09

## 2024-09-09 RX ORDER — LEVALBUTEROL INHALATION SOLUTION 1.25 MG/3ML
1.25 SOLUTION RESPIRATORY (INHALATION) 3 TIMES DAILY PRN
COMMUNITY
Start: 2024-08-25

## 2024-09-09 NOTE — PROGRESS NOTES
Assessment/Plan:    1. Allergy to dog dander  -     Ambulatory Referral to Allergy; Future  2. Type I diabetes mellitus with hyperosmolar coma  (HCC)  -     Lipid Panel with Direct LDL reflex; Future; Expected date: 09/09/2024  -     Hemoglobin A1C; Future; Expected date: 09/09/2024  -     L-Methylfolate-B6-B12 (Foltanx) 3-35-2 MG TABS; Take 1 tablet by mouth in the morning  3. Abnormal CT scan of lung  4. LOJA (dyspnea on exertion)  5. Mixed hyperlipidemia  6. Splenomegaly  7. Screening PSA (prostate specific antigen)  -     PSA, Total Screen; Future; Expected date: 05/01/2025  8. Immunity to varicella determined by serologic test  -     Varicella zoster antibody, IgG; Future; Expected date: 09/09/2024  9. Hx of pancreatitis  10. Decreased hearing of both ears  11. Chronic bronchitis, unspecified chronic bronchitis type (HCC)  12. Coronary artery disease of native artery of native heart with stable angina pectoris (HCC)  13. On home oxygen therapy  14. Polyneuropathy  -     L-Methylfolate-B6-B12 (Foltanx) 3-35-2 MG TABS; Take 1 tablet by mouth in the morning  15. Encounter for immunization  -     Pneumococcal Conjugate Vaccine 20-valent (Pcv20)          There are no Patient Instructions on file for this visit.    Return for AWV- welcome may 2025.    Subjective:      Patient ID: Otoniel Martinez is a 64 y.o. male.    Chief Complaint   Patient presents with    Follow-up     4 m f/u visit for diabetes, review labs done 4/30/24.  Diabetic foot exam was done at [facility] 4/28/24.   Diabetic eye exam was done August 2024 at [facility], [address] [place] [address] .  Colonoscopy done 11/30/23 by Dr Win [facility].  Messages sent to [facility].       HPI      Late onset type I diabetic follows with endocrinology has an insulin pump.  September 2024, no new issues, has appointment with endocrinology but no new lab work, last lab work was in the mid 7 range for A1c.  No hypoglycemia     Mixed hyperlipidemia on medications  tolerating well with no issues with atorvastatin.  September thousand 24, well-controlled lipid levels, no issues remains very active     Coronary artery disease status post stenting, stable with no issues.    Monoclonal gammopathy: following with heme/onc, completed tx, doing well, no ADR, feels good, appetite and energy are normal     The following portions of the patient's history were reviewed and updated as appropriate: allergies, current medications, past family history, past medical history, past social history, past surgical history and problem list.    Review of Systems      Constitutional:  Denies fever or chills   Eyes:  Denies double , blurry vision or eye pain  HENT:  Denies nasal congestion, sore throat or new hearing issues  Respiratory:  Denies cough or shortness of breath or wheezing  Cardiovascular:  Denies palpitations or chest pain  GI:  Denies abdominal pain, nausea, or vomiting, no loose stools, no reflux  Integument:  Denies rash , no open areas  Neurologic:  Denies headache or focal weakness, no dizziness  : no dysuria, or hematuria        Current Outpatient Medications   Medication Sig Dispense Refill    acyclovir (ZOVIRAX) 200 mg capsule Take 1 capsule (200 mg total) by mouth 5 (five) times a day for 5 days (Patient taking differently: Take 200 mg by mouth if needed Patient takes PRN) 25 capsule 0    aspirin (ECOTRIN LOW STRENGTH) 81 mg EC tablet Take 81 mg by mouth daily      atorvastatin (LIPITOR) 40 mg tablet TAKE 1 TABLET BY MOUTH EVERY DAY 90 tablet 1    budesonide-formoterol (Symbicort) 80-4.5 MCG/ACT inhaler Inhale 2 puffs 2 (two) times a day Rinse mouth after use. 30.6 g 5    cholecalciferol (VITAMIN D3) 400 units tablet Take 1 tablet by mouth every morning      Continuous Blood Gluc Sensor (Dexcom G6 Sensor) MISC Change every 10 days. E10.65      Continuous Blood Gluc Transmit (Dexcom G6 Transmitter) MISC Change every 90 days. E10.65      fluticasone (FLONASE) 50 mcg/act nasal  spray 2 sprays into each nostril daily      Gvoke HypoPen 2-Pack 1 MG/0.2ML SOAJ       Insulin Disposable Pump (Omnipod 5 G6 Pods, Gen 5,) MISC INJECT 1 UNDER THE SKIN EVERY OTHER DAY. CHANGING EVERY 2 DAYS DUE TO HIGH INSULIN REQUIREMENT.      L-Methylfolate-B6-B12 (Foltanx) 3-35-2 MG TABS Take 1 tablet by mouth in the morning 90 tablet 3    levalbuterol (XOPENEX) 1.25 mg/3 mL nebulizer solution Take 1.25 mg by nebulization 3 (three) times a day as needed for wheezing      montelukast (SINGULAIR) 10 mg tablet TAKE 1 TABLET BY MOUTH EVERYDAY AT BEDTIME 90 tablet 1    NovoLOG 100 UNIT/ML injection Inject up to 110 units daily via insulin pump. E10.65      pantoprazole (PROTONIX) 40 mg tablet TAKE 1 TABLET TWICE DAILY 30 MINS PRIOR TO BREAKFAST AND SUPPER. 180 tablet 3    rivaroxaban (Xarelto) 20 mg tablet TAKE 1 TABLET BY MOUTH DAILY WITH BREAKFAST 100 tablet 1    sildenafil (VIAGRA) 100 mg tablet Take 1 tablet (100 mg total) by mouth daily as needed for erectile dysfunction 10 tablet 1    sodium chloride 0.9 % nebulizer solution Take 3 mL by nebulization as needed for wheezing 90 mL 3    Insulin Disposable Pump (Omnipod 5 G6 Intro, Gen 5,) KIT Inject 1 each under the skin every third day       No current facility-administered medications for this visit.       Objective:    /66 (BP Location: Left arm, Patient Position: Sitting, Cuff Size: Standard)   Pulse 58   Temp 98.5 °F (36.9 °C) (Temporal)   Wt 73.6 kg (162 lb 3.2 oz)   SpO2 97%   BMI 23.95 kg/m²        Physical Exam         Constitutional:  Well developed, well nourished, no acute distress, non-toxic appearance   Eyes:  PERRL, conjunctiva normal , non icteric sclera  HENT:  Atraumatic, oropharynx moist. Neck-  supple   Respiratory:  CTA b/l, normal breath sounds, no rales, no wheezing   Cardiovascular:  RRR, no murmurs, no LE edema b/l  GI:  Soft, nondistended, normal bowel sounds x 4, nontender, no organomegaly, no mass, no rebound, no guarding    Neurologic:  no focal deficits noted   Psychiatric:  Speech and behavior appropriate , AAO x 3      Wandy Cheatham DO

## 2024-09-10 ENCOUNTER — APPOINTMENT (OUTPATIENT)
Dept: LAB | Facility: IMAGING CENTER | Age: 64
End: 2024-09-10
Payer: COMMERCIAL

## 2024-09-10 DIAGNOSIS — E10.65 TYPE I DIABETES MELLITUS WITH HYPEROSMOLAR COMA  (HCC): ICD-10-CM

## 2024-09-10 DIAGNOSIS — Z01.84 IMMUNITY TO VARICELLA DETERMINED BY SEROLOGIC TEST: ICD-10-CM

## 2024-09-10 DIAGNOSIS — E10.69 TYPE I DIABETES MELLITUS WITH HYPEROSMOLAR COMA  (HCC): ICD-10-CM

## 2024-09-10 DIAGNOSIS — E87.0 TYPE I DIABETES MELLITUS WITH HYPEROSMOLAR COMA  (HCC): ICD-10-CM

## 2024-09-10 LAB
CHOLEST SERPL-MCNC: 124 MG/DL
HDLC SERPL-MCNC: 42 MG/DL
LDLC SERPL CALC-MCNC: 70 MG/DL (ref 0–100)
TRIGL SERPL-MCNC: 60 MG/DL
VZV IGG SER QL IA: ABNORMAL

## 2024-09-10 PROCEDURE — 86787 VARICELLA-ZOSTER ANTIBODY: CPT

## 2024-09-10 PROCEDURE — 83036 HEMOGLOBIN GLYCOSYLATED A1C: CPT

## 2024-09-10 PROCEDURE — 80061 LIPID PANEL: CPT

## 2024-09-10 PROCEDURE — 36415 COLL VENOUS BLD VENIPUNCTURE: CPT

## 2024-09-11 ENCOUNTER — TELEPHONE (OUTPATIENT)
Dept: ADMINISTRATIVE | Facility: OTHER | Age: 64
End: 2024-09-11

## 2024-09-11 LAB
EST. AVERAGE GLUCOSE BLD GHB EST-MCNC: 194 MG/DL
HBA1C MFR BLD: 8.4 %

## 2024-09-11 NOTE — LETTER
Procedure Request Form: Colonoscopy      Date Requested: 24  Patient: Otoniel Fedak  Patient : 1960   Referring Provider: Wandy Cheatham, DO        Date of Procedure ______________________________       The above patient has informed us that they have completed their   most recent Colonoscopy at your facility. Please complete   this form and attach all corresponding procedure reports/results.    Comments __________________________________________________________  ____________________________________________________________________  ____________________________________________________________________  ____________________________________________________________________    Facility Completing Procedure _________________________________________    Form Completed By (print name) _______________________________________      Signature __________________________________________________________      These reports are needed for  compliance.    Please fax this completed form and a copy of the procedure report to our office located at [address] [place] [address] as soon as possible to Fax [phone] attention Lizzeth: Phone [phone]    We thank you for your assistance in treating our mutual patient.

## 2024-09-11 NOTE — TELEPHONE ENCOUNTER
Upon review of the In Basket request and the patient's chart, initial outreach has been made via fax to facility. Please see Contacts section for details.     FOR EYE EXAM AND COLONOSCOPY    Thank you  Lizzeth Kay MA

## 2024-09-11 NOTE — TELEPHONE ENCOUNTER
----- Message from Kathy STERN sent at 9/9/2024 10:54 AM EDT -----  Regarding: colonoscopy, diabetic eye exam , diabetic foot exam  09/09/24 10:55 AM    Hello, our patient Otoniel Martinez has had CRC: Colonoscopy completed/performed. Please assist in updating the patient chart by making an External outreach to Dr Audelia ALARCON facility located in [place]. The date of service is 11/30/24.    Thank you,  Kathy Anderson MA [facility]       09/09/24 10:56 AM    Hello, our patient Otoniel Martinez has had Diabetic Eye Exam completed/performed. Please assist in updating the patient chart by making an External outreach to [facility] facility located in [place]. The date of service is 08/2024.    Thank you,  Kathy Anderson MA [facility]       09/09/24 10:57 AM    Hello, our patient Otoniel Martinez has had Diabetic Foot Exam completed/performed. Please assist in updating the patient chart by pulling the Care Everywhere (CE) document. The date of service is 04/28/2024.     Thank you,  Kathy Anderson MA [facility]

## 2024-09-11 NOTE — TELEPHONE ENCOUNTER
Upon review of the In Basket request we were able to locate, review, and update the patient chart as requested for Diabetic Foot Exam.    Any additional questions or concerns should be emailed to the Practice Liaisons via the appropriate education email address, please do not reply via In Basket.    Thank you  Lizzeth Kay MA   [facility]

## 2024-09-11 NOTE — LETTER
Diabetic Eye Exam Form    Date Requested: 24  Patient: Otoniel Martinez  Patient : 1960   Referring Provider: Wandy Cheatham DO      DIABETIC Eye Exam Date _______________________________      Type of Exam MUST be documented for Diabetic Eye Exams. Please CHECK ONE.     Retinal Exam       Dilated Retinal Exam       OCT       Optomap-Iris Exam      Fundus Photography       Left Eye - Please check Retinopathy or No Retinopathy        Exam did show retinopathy    Exam did not show retinopathy       Right Eye - Please check Retinopathy or No Retinopathy       Exam did show retinopathy    Exam did not show retinopathy       Comments __________________________________________________________    Practice Providing Exam ______________________________________________    Exam Performed By (print name) _______________________________________      Provider Signature ___________________________________________________      These reports are needed for  compliance.  Please fax this completed form and a copy of the Diabetic Eye Exam report to our office located at [address] [place] [address] as soon as possible via Fax [phone] attention Lizzeth: Phone [phone]  We thank you for your assistance in treating our mutual patient.

## 2024-09-13 NOTE — TELEPHONE ENCOUNTER
Upon review of the In Basket request we were able to locate, review, and update the patient chart as requested for Diabetic Eye Exam.    Any additional questions or concerns should be emailed to the Practice Liaisons via the appropriate education email address, please do not reply via In Basket.    Thank you  Lizzeth Kay MA   [facility]

## 2024-09-13 NOTE — TELEPHONE ENCOUNTER
Upon review of the In Basket request we were able to locate, review, and update the patient chart as requested for CRC: Colonoscopy.    Any additional questions or concerns should be emailed to the Practice Liaisons via the appropriate education email address, please do not reply via In Basket.    Thank you  Lizzeth Kay MA   [facility]

## 2024-09-16 ENCOUNTER — HOSPITAL ENCOUNTER (OUTPATIENT)
Dept: CT IMAGING | Facility: HOSPITAL | Age: 64
Discharge: HOME | End: 2024-09-16
Attending: INTERNAL MEDICINE
Payer: COMMERCIAL

## 2024-09-16 DIAGNOSIS — R91.1 PULMONARY NODULE 1 CM OR GREATER IN DIAMETER: ICD-10-CM

## 2024-09-16 PROCEDURE — 71250 CT THORAX DX C-: CPT

## 2024-09-17 ENCOUNTER — TELEPHONE (OUTPATIENT)
Age: 64
End: 2024-09-17

## 2024-09-17 DIAGNOSIS — R06.00 DYSPNEA: ICD-10-CM

## 2024-09-17 DIAGNOSIS — G71.11 MYOTONIC DYSTROPHY (HCC): Primary | ICD-10-CM

## 2024-09-17 NOTE — TELEPHONE ENCOUNTER
Last visit Dr. Bacon, 8/7    Patient's wife, arabella (on consent) called to request repeat carotid US per radiologist recommendation on result. Patient's wife said patient reporting SOB and dizziness;  He is seeing cardiology, pulmonary and oncology however however would like to rule out carotid blockage as cause of symptoms also.    Last VAS carotid complete was done 11/12/2020:   Mely Bacon DO  11/12/2020  3:32 PM EST       Carotid dopplers , stable , no significant change     Compared to previous study on 10/29/19, there is no significant interval change  in the disease process.Recommend repeat duplex in 2 years to follow up on plaque  Progression    Dr. Bacon, Please place order for repeat carotid study if appropriate, thank you.

## 2024-09-18 ENCOUNTER — TELEPHONE (OUTPATIENT)
Age: 64
End: 2024-09-18

## 2024-09-18 ENCOUNTER — OFFICE VISIT (OUTPATIENT)
Dept: VASCULAR SURGERY | Facility: CLINIC | Age: 64
End: 2024-09-18
Payer: COMMERCIAL

## 2024-09-18 VITALS
DIASTOLIC BLOOD PRESSURE: 72 MMHG | WEIGHT: 159 LBS | HEART RATE: 56 BPM | HEIGHT: 69 IN | BODY MASS INDEX: 23.55 KG/M2 | OXYGEN SATURATION: 96 % | SYSTOLIC BLOOD PRESSURE: 142 MMHG

## 2024-09-18 DIAGNOSIS — Z86.73 OLD CEREBROVASCULAR ACCIDENT (CVA) WITHOUT LATE EFFECT: ICD-10-CM

## 2024-09-18 DIAGNOSIS — I72.8 SPLENIC ARTERY ANEURYSM (HCC): Primary | ICD-10-CM

## 2024-09-18 DIAGNOSIS — I65.23 CAROTID STENOSIS, ASYMPTOMATIC, BILATERAL: ICD-10-CM

## 2024-09-18 DIAGNOSIS — I82.409 THROMBOEMBOLISM OF DEEP VEINS OF LOWER EXTREMITY (HCC): ICD-10-CM

## 2024-09-18 PROCEDURE — 99204 OFFICE O/P NEW MOD 45 MIN: CPT | Performed by: SURGERY

## 2024-09-18 NOTE — ASSESSMENT & PLAN NOTE
Incidental finding of splenic artery aneurysm 2x2.3cm noted on 3 imaging studies this year. Aneurysm has remained stable through all 3 noncontrast studies. Denies abdominal or back pain. Reports history of recurrent pancreatitis 10 years ago.    -We discussed the pathophysiology of splenic aneurysm disease, available treatment options and indications for treatment. Discussed criteria for intervention including size>=3cm, symptomatic aneurysm or rupture.     -We discussed the pathophysiology of aneurysmal disease, available treatment options and indications for treatment. Discussed criteria for intervention including size>=3.0cm, growth >0.5cm in 6 months, symptomatic aneurysm or rupture.   -Discussed risk factors including atherosclerosis, medial degeneration, collagen vascular diseases and fibromuscular dysplasia, multiparity, previous transplant. Splenic artery aneurysms in particular can also affect patients with known portal HTN, cirrhosis, blunt trauma, infection, and pancreatitis.  -Discussed signs and symptoms of symptomatic aneurysm/rupture and advised to go to the ED  -Recommend repeat surveillance study with repeat mesenteric duplex in 6 months. No intervention recommended at this time. Aneurysm appears stable over the past year with no prior studies for comparison. If has alternative imaging study (CT or PET within this time frame, ok to hold off duplex study.  -Follow-up in 6 months    Orders:    VAS CELIAC AND/OR MESENTERIC DUPLEX; Future

## 2024-09-18 NOTE — LETTER
September 18, 2024     Wandy Cheatham DO  [address] [place] [address]    Patient: Otoniel Martinez   YOB: 1960   Date of Visit: 9/18/2024       Dear Dr. Cheatham:    Thank you for referring Otoniel Martinez to me for evaluation. Below are the relevant portions of my assessment and plan of care.     Splenic artery aneurysm (HCC)  Incidental finding of splenic artery aneurysm 2x2.3cm noted on 3 imaging studies this year. Aneurysm has remained stable through all 3 noncontrast studies. Denies abdominal or back pain. Reports history of recurrent pancreatitis 10 years ago.     -We discussed the pathophysiology of splenic aneurysm disease, available treatment options and indications for treatment. Discussed criteria for intervention including size>=3cm, symptomatic aneurysm or rupture.      -We discussed the pathophysiology of aneurysmal disease, available treatment options and indications for treatment. Discussed criteria for intervention including size>=3.0cm, growth >0.5cm in 6 months, symptomatic aneurysm or rupture.   -Discussed risk factors including atherosclerosis, medial degeneration, collagen vascular diseases and fibromuscular dysplasia, multiparity, previous transplant. Splenic artery aneurysms in particular can also affect patients with known portal HTN, cirrhosis, blunt trauma, infection, and pancreatitis.  -Discussed signs and symptoms of symptomatic aneurysm/rupture and advised to go to the ED  -Recommend repeat surveillance study with repeat mesenteric duplex in 6 months. No intervention recommended at this time. Aneurysm appears stable over the past year with no prior studies for comparison. If has alternative imaging study (CT or PET within this time frame, ok to hold off duplex study.  -Follow-up in 6 months     Orders:  •  VAS CELIAC AND/OR MESENTERIC DUPLEX; Future     Old cerebrovascular accident (CVA) without late effect        Carotid stenosis, asymptomatic, bilateral  History  of chronic left temporal infarct and mild bilateral carotid stenosis <50%, last evaluated 2020. Following neurology for memory loss, staring spells. Repeat carotid duplex pending next week.     -We discussed the pathophysiology of carotid disease, available treatment options and indications for treatment.  -Symptoms are not consistent with TIA/CVA. No new lateralizing symptoms, speech or visual disturbances.  -Continue optimization of medical management. Currently on ASA and statin.   -Advised to return sooner or go to the ED if he develops any TIA/CVA symptoms.  -Will follow-up and review carotid duplex in 6 months.                If you have questions, please do not hesitate to call me. I look forward to following Otoniel along with you.         Sincerely,        Melissa Dsouza MD        CC: No Recipients

## 2024-09-18 NOTE — ASSESSMENT & PLAN NOTE
History of chronic left temporal infarct and mild bilateral carotid stenosis <50%, last evaluated 2020. Following neurology for memory loss, staring spells. Repeat carotid duplex pending next week.    -We discussed the pathophysiology of carotid disease, available treatment options and indications for treatment.  -Symptoms are not consistent with TIA/CVA. No new lateralizing symptoms, speech or visual disturbances.  -Continue optimization of medical management. Currently on ASA and statin.   -Advised to return sooner or go to the ED if he develops any TIA/CVA symptoms.  -Will follow-up and review carotid duplex in 6 months.

## 2024-09-18 NOTE — PROGRESS NOTES
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 Aneurysm has remained stable through all 3 noncontrast studies. Denies abdominal or back pain. Reports history of recurrent pancreatitis 10 years ago.    -We discussed the pathophysiology of splenic aneurysm disease, available treatment options and indications for treatment. Discussed criteria for intervention including size>=3cm, symptomatic aneurysm or rupture.     -We discussed the pathophysiology of aneurysmal disease, available treatment options and indications for treatment. Discussed criteria for intervention including size>=3.0cm, growth >0.5cm in 6 months, symptomatic aneurysm or rupture.   -Discussed risk factors including atherosclerosis, medial degeneration, collagen vascular diseases and fibromuscular dysplasia, multiparity, previous transplant. Splenic artery aneurysms in particular can also affect patients with known portal HTN, cirrhosis, blunt trauma, infection, and pancreatitis.  -Discussed signs and symptoms of symptomatic aneurysm/rupture and advised to go to the ED  -Recommend repeat surveillance study with repeat mesenteric duplex in 6 months. No intervention recommended at this time. Aneurysm appears stable over the past year with no prior studies for comparison. If has alternative imaging study (CT or PET within this time frame, ok to hold off duplex study.  -Follow-up in 6 months    Orders:    VAS CELIAC AND/OR MESENTERIC DUPLEX; Future    Old cerebrovascular accident (CVA) without late effect         Carotid stenosis, asymptomatic,  "bilateral  History of chronic left temporal infarct and mild bilateral carotid stenosis <50%, last evaluated 2020. Following neurology for memory loss, staring spells. Repeat carotid duplex pending next week.    -We discussed the pathophysiology of carotid disease, available treatment options and indications for treatment.  -Symptoms are not consistent with TIA/CVA. No new lateralizing symptoms, speech or visual disturbances.  -Continue optimization of medical management. Currently on ASA and statin.   -Advised to return sooner or go to the ED if he develops any TIA/CVA symptoms.  -Will follow-up and review carotid duplex in 6 months.             History of Present Illness     Patient is new to our office. Patient was referred here by Juli Bronson for a splenic artery aneurysm.  Patient is here today to review results of a PET scan done 8/1/2024. Patient denies any abd pain. He is ASA 81 mg, Xarelto and Atorvastatin. Patient is a non-smoker.         Otoniel Martinez is a 64 y.o. 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 He has chronic shortness of breath. He denies abdominal or back pain but has a history of recurrent pancreatitis 10 years ago and was ultimately diagnosed with diabetes.     I have reviewed and made appropriate changes to the review of systems input by the medical assistant.    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), 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HCC)    " 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HCC)    Mixed hyperlipidemia    Vertigo    Polyneuropathy    Chronic gastritis without bleeding    Old cerebrovascular accident (CVA) without late effect    LOJA (dyspnea on exertion)    Vitamin D deficiency    History of recurrent deep vein thrombosis (DVT)    Dysfunction of right rotator cuff    Primary osteoarthritis of right hip    Recurrent cold sores    Chronic bronchitis (HCC)    Abnormal CT scan of lung    Chronic sinusitis    Insulin pump status    Obstructive sleep apnea    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)    Splenomegaly    Establishing care with new doctor, encounter for    Decreased hearing of both ears    Memory loss    Seizure (HCC)    Monoclonal gammopathy    Hx of pancreatitis    On home oxygen therapy    Splenic artery aneurysm (HCC)    Carotid stenosis, asymptomatic, bilateral       Past Surgical History:   Procedure Laterality Date    CAROTID STENT      CHOLECYSTECTOMY      CIRCUMCISION      Elective    COLONOSCOPY      complete, polyps 2 years ago    CORONARY ANGIOPLASTY WITH STENT PLACEMENT      stent to RCA 2004    INGUINAL HERNIA REPAIR      IR BIOPSY BONE MARROW  8/7/2024    ORIF RADIAL SHAFT FRACTURE Left     Open Treatment of Multiple Fractures of the Radial Shaft    ORIF ULNAR / RADIAL SHAFT FRACTURE Left     Open Treatment of Multiple Fractures of the Ulnar Shaft    TONSILLECTOMY AND ADENOIDECTOMY         Family History   Problem Relation Age of Onset    Brain cancer Mother     Clotting disorder Mother     Heart disease Mother     Cancer Mother     Hyperlipidemia Mother     Stroke Father     Deep vein thrombosis Father     Emphysema Father     Coronary artery disease Paternal Uncle     Diabetes Maternal Uncle        Social History     Socioeconomic History    Marital status: /Civil Union     Spouse name: Not on file    Number of children: Not on file    Years of education: Not on file    Highest education level: Not on file   Occupational  History    Not on file   Tobacco Use    Smoking status: Never    Smokeless tobacco: Never   Vaping Use    Vaping status: Never Used   Substance and Sexual Activity    Alcohol use: Yes     Comment: 2 beers on a social occasion    Drug use: No    Sexual activity: Yes     Partners: Female     Birth control/protection: None   Other Topics Concern    Not on file   Social History Narrative    Consumes 1 cup of tea  And 1 glass of tea per day        Weight loss     Social Determinants of Health     Financial Resource Strain: Low Risk  (12/4/2023)    Received from [facility], [facility]    Overall Financial Resource Strain (CARDIA)     Difficulty of Paying Living Expenses: Not very hard   Food Insecurity: No Food Insecurity (2/10/2024)    Hunger Vital Sign     Worried About Running Out of Food in the Last Year: Never true     Ran Out of Food in the Last Year: Never true   Transportation Needs: No Transportation Needs (2/10/2024)    PRAPARE - Transportation     Lack of Transportation (Medical): No     Lack of Transportation (Non-Medical): No   Physical Activity: Sufficiently Active (12/4/2023)    Received from [facility]    Exercise Vital Sign     Days of Exercise per Week: 1 day     Minutes of Exercise per Session: 150+ min   Stress: No Stress Concern Present (12/4/2023)    Received from [facility], [facility]    [employer] [employer] - Occupational Stress Questionnaire     Feeling of Stress : Not at all   Social Connections: Socially Integrated (12/4/2023)    Received from [facility], [facility]    Social Connection and Isolation Panel [NHANES]     Frequency of Communication with Friends and Family: Three times a week     Frequency of Social Gatherings with Friends and Family: Never     Attends [employer] Services: More than 4 times per year     Active Member of Clubs or  Organizations: Yes     Attends Club or Organization Meetings: More than 4 times per year     Marital Status:    Intimate Partner Violence: Not At Risk (12/4/2023)    Received from [facility], [facility]    Humiliation, Afraid, Rape, and Kick questionnaire     Fear of Current or Ex-Partner: No     Emotionally Abused: No     Physically Abused: No     Sexually Abused: No   Housing Stability: Low Risk  (2/10/2024)    Housing Stability Vital Sign     Unable to Pay for Housing in the Last Year: No     Number of Times Moved in the Last Year: 1     Homeless in the Last Year: No       Allergies   Allergen Reactions    Other Cough     Grass / dogs hair          Current Outpatient Medications:     acyclovir (ZOVIRAX) 200 mg capsule, Take 1 capsule (200 mg total) by mouth 5 (five) times a day for 5 days (Patient taking differently: Take 200 mg by mouth if needed Patient takes PRN), Disp: 25 capsule, Rfl: 0    aspirin (ECOTRIN LOW STRENGTH) 81 mg EC tablet, Take 81 mg by mouth daily, Disp: , Rfl:     atorvastatin (LIPITOR) 40 mg tablet, TAKE 1 TABLET BY MOUTH EVERY DAY, Disp: 90 tablet, Rfl: 1    budesonide-formoterol (Symbicort) 80-4.5 MCG/ACT inhaler, Inhale 2 puffs 2 (two) times a day Rinse mouth after use., Disp: 30.6 g, Rfl: 5    cholecalciferol (VITAMIN D3) 400 units tablet, Take 1 tablet by mouth every morning, Disp: , Rfl:     Continuous Blood Gluc Sensor (Dexcom G6 Sensor) MISC, Change every 10 days. E10.65, Disp: , Rfl:     Continuous Blood Gluc Transmit (Dexcom G6 Transmitter) MISC, Change every 90 days. E10.65, Disp: , Rfl:     fluticasone (FLONASE) 50 mcg/act nasal spray, 2 sprays into each nostril daily, Disp: , Rfl:     Insulin Disposable Pump (Omnipod 5 G6 Intro, Gen 5,) KIT, Inject 1 each under the skin every third day, Disp: , Rfl:     Insulin Disposable Pump (Omnipod 5 G6 Pods, Gen 5,) MISC, INJECT 1 UNDER THE SKIN EVERY OTHER DAY. CHANGING EVERY 2 DAYS DUE TO HIGH  "INSULIN REQUIREMENT., Disp: , Rfl:     L-Methylfolate-B6-B12 (Foltanx) 3-35-2 MG TABS, Take 1 tablet by mouth in the morning, Disp: 90 tablet, Rfl: 3    levalbuterol (XOPENEX) 1.25 mg/3 mL nebulizer solution, Take 1.25 mg by nebulization 3 (three) times a day as needed for wheezing, Disp: , Rfl:     montelukast (SINGULAIR) 10 mg tablet, TAKE 1 TABLET BY MOUTH EVERYDAY AT BEDTIME, Disp: 90 tablet, Rfl: 1    NovoLOG 100 UNIT/ML injection, Inject up to 110 units daily via insulin pump. E10.65, Disp: , Rfl:     pantoprazole (PROTONIX) 40 mg tablet, TAKE 1 TABLET TWICE DAILY 30 MINS PRIOR TO BREAKFAST AND SUPPER., Disp: 180 tablet, Rfl: 3    rivaroxaban (Xarelto) 20 mg tablet, TAKE 1 TABLET BY MOUTH DAILY WITH BREAKFAST, Disp: 100 tablet, Rfl: 1    sildenafil (VIAGRA) 100 mg tablet, Take 1 tablet (100 mg total) by mouth daily as needed for erectile dysfunction, Disp: 10 tablet, Rfl: 1    sodium chloride 0.9 % nebulizer solution, Take 3 mL by nebulization as needed for wheezing, Disp: 90 mL, Rfl: 3    Gvoke HypoPen 2-Pack 1 MG/0.2ML SOAJ, , Disp: , Rfl:     History obtained from : patient  Review of Systems   Constitutional: Negative.    HENT:  Positive for voice change.    Eyes: Negative.    Respiratory:  Positive for shortness of breath.    Cardiovascular: Negative.    Gastrointestinal: Negative.    Endocrine: Negative.    Genitourinary: Negative.    Musculoskeletal: Negative.    Skin:         Squamous cell carcinoma left forearm.   Allergic/Immunologic: Positive for environmental allergies.   Neurological: Negative.    Hematological: Negative.    Psychiatric/Behavioral: Negative.       I have personally reviewed the ROS entered by MA and agree as documented.      Objective     /72 (BP Location: Right arm, Patient Position: Sitting, Cuff Size: Standard)   Pulse 56   Ht 5' 9\" (1.753 m)   Wt 72.1 kg (159 lb)   SpO2 96%   BMI 23.48 kg/m²     Physical Exam  Constitutional:       Appearance: Normal appearance. "   HENT:      Head: Normocephalic and atraumatic.   Cardiovascular:      Rate and Rhythm: Normal rate.      Pulses: Normal pulses.   Pulmonary:      Effort: Pulmonary effort is normal.   Abdominal:      General: There is no distension.      Palpations: Abdomen is soft.      Tenderness: There is no abdominal tenderness.   Musculoskeletal:         General: Normal range of motion.      Cervical back: Normal range of motion and neck supple.   Skin:     General: Skin is warm and dry.      Capillary Refill: Capillary refill takes less than 2 seconds.   Neurological:      General: No focal deficit present.      Mental Status: He is alert and oriented to person, place, and time.   Psychiatric:         Mood and Affect: Mood normal.         Behavior: Behavior normal.         Thought Content: Thought content normal.         Judgment: Judgment normal.       Administrative Statements   I have spent a total time of 45 minutes in caring for this patient on the day of the visit/encounter including Diagnostic results, Prognosis, Risks and benefits of tx options, Instructions for management, Patient and family education, Importance of tx compliance, Risk factor reductions, Impressions, Counseling / Coordination of care, Documenting in the medical record, Reviewing / ordering tests, medicine, procedures  , and Obtaining or reviewing history  .

## 2024-09-18 NOTE — PATIENT INSTRUCTIONS
Splenic artery aneurysm (HCC)  Incidental finding of splenic artery aneurysm 2x2.3cm noted on 3 imaging studies this year. Aneurysm has remained stable through all 3 noncontrast studies. Denies abdominal or back pain. Reports history of recurrent pancreatitis 10 years ago.     -We discussed the pathophysiology of splenic aneurysm disease, available treatment options and indications for treatment. Discussed criteria for intervention including size>=3cm, symptomatic aneurysm or rupture.      -We discussed the pathophysiology of aneurysmal disease, available treatment options and indications for treatment. Discussed criteria for intervention including size>=3.0cm, growth >0.5cm in 6 months, symptomatic aneurysm or rupture.   -Discussed risk factors including atherosclerosis, medial degeneration, collagen vascular diseases and fibromuscular dysplasia, multiparity, previous transplant. Splenic artery aneurysms in particular can also affect patients with known portal HTN, cirrhosis, blunt trauma, infection, and pancreatitis.  -Discussed signs and symptoms of symptomatic aneurysm/rupture and advised to go to the ED  -Recommend repeat surveillance study with repeat mesenteric duplex in 6 months. No intervention recommended at this time. Aneurysm appears stable over the past year with no prior studies for comparison. If has alternative imaging study (CT or PET within this time frame, ok to hold off duplex study.  -Follow-up in 6 months     Orders:    VAS CELIAC AND/OR MESENTERIC DUPLEX; Future     Old cerebrovascular accident (CVA) without late effect        Carotid stenosis, asymptomatic, bilateral  History of chronic left temporal infarct and mild bilateral carotid stenosis <50%, last evaluated 2020. Following neurology for memory loss, staring spells. Repeat carotid duplex pending next week.     -We discussed the pathophysiology of carotid disease, available treatment options and indications for treatment.  -Symptoms are  not consistent with TIA/CVA. No new lateralizing symptoms, speech or visual disturbances.  -Continue optimization of medical management. Currently on ASA and statin.   -Advised to return sooner or go to the ED if he develops any TIA/CVA symptoms.  -Will follow-up and review carotid duplex in 6 months.

## 2024-09-26 ENCOUNTER — HOSPITAL ENCOUNTER (OUTPATIENT)
Dept: NON INVASIVE DIAGNOSTICS | Facility: CLINIC | Age: 64
Discharge: HOME | End: 2024-09-26
Payer: COMMERCIAL

## 2024-09-26 DIAGNOSIS — G71.11 MYOTONIC DYSTROPHY (HCC): ICD-10-CM

## 2024-09-26 DIAGNOSIS — R06.00 DYSPNEA: ICD-10-CM

## 2024-09-26 PROCEDURE — 93880 EXTRACRANIAL BILAT STUDY: CPT | Performed by: SURGERY

## 2024-09-26 PROCEDURE — 93880 EXTRACRANIAL BILAT STUDY: CPT

## 2024-09-30 NOTE — ASSESSMENT & PLAN NOTE
- CT 09/16/24: aorta is 4 cm and severe coronary calcifications seen   - NM stress test 09/04/24: There is a left ventricular perfusion defect that is small in size present in the mid inferior location(s) that is paradoxical and resolves with proning. The defect appears to be an artifact caused by subdiaphragmatic activity.  Stress ejection fraction is 51%.    - remote s/p MI and RCA stenting in 2004  - antiplatelet: ASA 81 mg daily   - statin: atorvastatin 40 mg daily     Plan:  Discussed my concern with scheduling a cardiac catheterization and DAPT therapy after if he has stents and needs further pulmonary workup. Discussed that his SOB may not necessarily get 100% better. Continues to deny anginal chest pain, chest tightness, and chest pressure.  - pt and wife would like to discuss with pulmonologist prior to deciding about the cath   - I will have him follow up with Dr. Hollins to discuss next steps  - continue ASA 81 and atorvastatin

## 2024-09-30 NOTE — ASSESSMENT & PLAN NOTE
- CT 09/16/24: There is decrease in the size of the previously noted masslike consolidation in the left lingular region. This area demonstrates an irregular density measuring 1 cm x 0.6 cm, Right upper lobe lung nodule seen image 32 series 602, measuring about 3 mm, stableLeft upper lobe granuloma seen  - CT wo contrast 02/2024 showed a ground glass opacity in the lower left lobe which is new compared to previous studies.  - following with pulmonolgy

## 2024-09-30 NOTE — PROGRESS NOTES
Otoniel Juan  1960  [id number]  [facility] CARDIOLOGY [address] [id number] [phone]    1. Coronary artery disease of native artery of native heart with stable angina pectoris (HCC)        2. Thromboembolism of deep veins of lower extremity, unspecified laterality (HCC)        3. Monoclonal gammopathy        4. Abnormal CT scan of lung        5. Splenic artery aneurysm (HCC)              1. Coronary artery disease of native artery of native heart with stable angina pectoris (HCC)  Assessment & Plan:  - CT 09/16/24: aorta is 4 cm and severe coronary calcifications seen   - NM stress test 09/04/24: There is a left ventricular perfusion defect that is small in size present in the mid inferior location(s) that is paradoxical and resolves with proning. The defect appears to be an artifact caused by subdiaphragmatic activity.  Stress ejection fraction is 51%.    - remote s/p MI and RCA stenting in 2004  - antiplatelet: ASA 81 mg daily   - statin: atorvastatin 40 mg daily     Plan:  Discussed my concern with scheduling a cardiac catheterization and DAPT therapy after if he has stents and needs further pulmonary workup. Discussed that his SOB may not necessarily get 100% better. Continues to deny anginal chest pain, chest tightness, and chest pressure.  - pt and wife would like to discuss with pulmonologist prior to deciding about the cath   - I will have him follow up with Dr. Hollins to discuss next steps  - continue ASA 81 and atorvastatin  2. 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 Monoclonal gammopathy  Assessment & Plan:  - following with heme/onc  - currently getting blood work every 3 months   4. Abnormal CT scan of lung  Assessment & Plan:  - CT 09/16/24: There is decrease in the size of the previously noted masslike  consolidation in the left lingular region. This area demonstrates an irregular density measuring 1 cm x 0.6 cm, Right upper lobe lung nodule seen image 32 series 602, measuring about 3 mm, stableLeft upper lobe granuloma seen  - CT wo contrast 02/2024 showed a ground glass opacity in the lower left lobe which is new compared to previous studies.  - following with pulmonolgy  5. Splenic artery aneurysm (HCC)  Assessment & Plan:  - incidental finding of splenic artery aneurysm noted on 3 studies in 2024  - following with vascular who recommend repeat mesenteric duplex in 6 months (March 2025)      RTO 10/23/24 with Dr. Hollins    Most recent cardiac testing:  NM stress test 09/04/24: Left ventricular function post-stress is abnormal. Global function is mildly reduced. There were no regional wall motion abnormalities during stress. Stress ejection fraction is 51%.    TTE 02/09/24: LVEF 55%, wall motion is normal, aortic root is mildly dilated at 4.10 cm  NM stress test 03/02/21: Normal study after maximal exercise with reproduction of symptoms. Myocardial perfusion imaging was normal at rest and with stress without evidence of ischemia. Left ventricular systolic function was normal.   Echo stress test 05/13/20: there was no diagnostic evidence for stress-induced ischemia. The image quality was poor.   Echo stress test 10/29/16: The stress ECG was negative for ischemia. Arrhythmia during stress: isolated premature ventricular beats.    Interval History:   This is a 65 y/o male with a PMH of CAD s/p remote stenting of the RCA in 2004, DVT s/p gastrocnemius repair, and chronic bronchitis who is presenting today for . He was last seen in office 08/23/24 by me. Pt was having LOJA and SOB with exertion at this visit. I ordered a NM stress test which did showed a paradoxical defect and showed LVEF 51%.    Pt presents with his wife today. 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  Pt states he believes it was more anxiety of the game not moving fast enough. He has followed up with heme/onc who is just monitoring his blood for now every 3 months. No invasive tests are planned. He also followed with pulmonology who continue to monitor the nodules found in his lung. Wife states at their last visit he was not concerned for a bronchoscopy at this time. She would like to know what is going on in the lungs though see definitively what is is going on with a bronchoscopy/biopsy. They have follow up scheduled on October 15. Pt has a CT scan performed on 09/16/24 which showed severe coronary calcifications. Myself and Dr. Valencia discussed that these calcifications have probably been there since his last stent in 2004. We discussed that cardiac catheterization may not cure his SOB and he may still feel SOB after the procedure. In addition, I discussed my concerns about a minimum of 3-6 months of antiplatelet therapy if stents would need to be placed and we would be in a hard position if invasive testing were needed for the other issues in his lungs. He continues to deny chest pain, chest pressure, chest tightness, edema, and syncope. Admits to chronic dizziness which is why he follows with neurology.       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    Pancreatitis     three times    Sleep apnea     not using a C-PAP    Superficial thrombophlebitis     Vision loss     reading glasses over the counter     Social History     Socioeconomic History    Marital status: /Civil Union     Spouse name: Not on file     Number of children: Not on file    Years of education: Not on file    Highest education level: Not on file   Occupational History    Not on file   Tobacco Use    Smoking status: Never    Smokeless tobacco: Never   Vaping Use    Vaping status: Never Used   Substance and Sexual Activity    Alcohol use: Yes     Comment: 2 beers on a social occasion    Drug use: No    Sexual activity: Yes     Partners: Female     Birth control/protection: None   Other Topics Concern    Not on file   Social History Narrative    Consumes 1 cup of tea  And 1 glass of tea per day        Weight loss     Social Determinants of Health     Financial Resource Strain: Low Risk  (12/4/2023)    [facility]    Overall Financial Resource Strain (CARDIA)     Difficulty of Paying Living Expenses: Not very hard   Food Insecurity: No Food Insecurity (2/10/2024)    Hunger Vital Sign     Worried About Running Out of Food in the Last Year: Never true     Ran Out of Food in the Last Year: Never true   Transportation Needs: No Transportation Needs (2/10/2024)    PRAPARE - Transportation     Lack of Transportation (Medical): No     Lack of Transportation (Non-Medical): No   Physical Activity: Sufficiently Active (12/4/2023)    Received from [facility]    Exercise Vital Sign     Days of Exercise per Week: 1 day     Minutes of Exercise per Session: 150+ min   Stress: No Stress Concern Present (12/4/2023)    [facility]    [employer] [place] of Occupational [employer] - Occupational Stress Questionnaire     Feeling of Stress : Not at all   Social Connections: Socially Integrated (12/4/2023)    [facility]    Social Connection and Isolation Panel [NHANES]     Frequency of Communication with Friends and Family: Three times a week     Frequency of Social Gatherings with  Friends and Family: Never     Attends Sabianist Services: More than 4 times per year     Active Member of Clubs or Organizations: Yes     Attends Club or Organization Meetings: More than 4 times per year     Marital Status:    Intimate Partner Violence: Not At Risk (12/4/2023)    [facility]    Humiliation, Afraid, Rape, and Kick questionnaire     Fear of Current or Ex-Partner: No     Emotionally Abused: No     Physically Abused: No     Sexually Abused: No   Housing Stability: Low Risk  (2/10/2024)    Housing Stability Vital Sign     Unable to Pay for Housing in the Last Year: No     Number of Times Moved in the Last Year: 1     Homeless in the Last Year: No      Family History   Problem Relation Age of Onset    Brain cancer Mother     Clotting disorder Mother     Heart disease Mother     Cancer Mother     Hyperlipidemia Mother     Stroke Father     Deep vein thrombosis Father     Emphysema Father     Coronary artery disease Paternal Uncle     Diabetes Maternal Uncle      Past Surgical History:   Procedure Laterality Date    CAROTID STENT      CHOLECYSTECTOMY      CIRCUMCISION      Elective    COLONOSCOPY      complete, polyps 2 years ago    CORONARY ANGIOPLASTY WITH STENT PLACEMENT      stent to RCA 2004    INGUINAL HERNIA REPAIR      IR BIOPSY BONE MARROW  8/7/2024    ORIF RADIAL SHAFT FRACTURE Left     Open Treatment of Multiple Fractures of the Radial Shaft    ORIF ULNAR / RADIAL SHAFT FRACTURE Left     Open Treatment of Multiple Fractures of the Ulnar Shaft    TONSILLECTOMY AND ADENOIDECTOMY         Current Outpatient Medications:     acyclovir (ZOVIRAX) 200 mg capsule, Take 1 capsule (200 mg total) by mouth 5 (five) times a day for 5 days (Patient taking differently: Take 200 mg by mouth if needed Patient takes PRN), Disp: 25 capsule, Rfl: 0    aspirin (ECOTRIN LOW STRENGTH) 81 mg EC tablet, Take 81 mg by mouth daily, Disp: , Rfl:      atorvastatin (LIPITOR) 40 mg tablet, TAKE 1 TABLET BY MOUTH EVERY DAY, Disp: 90 tablet, Rfl: 1    budesonide-formoterol (Symbicort) 80-4.5 MCG/ACT inhaler, Inhale 2 puffs 2 (two) times a day Rinse mouth after use., Disp: 30.6 g, Rfl: 5    cholecalciferol (VITAMIN D3) 400 units tablet, Take 1 tablet by mouth every morning, Disp: , Rfl:     Continuous Blood Gluc Sensor (Dexcom G6 Sensor) MISC, Change every 10 days. E10.65, Disp: , Rfl:     Continuous Blood Gluc Transmit (Dexcom G6 Transmitter) MISC, Change every 90 days. E10.65, Disp: , Rfl:     fluticasone (FLONASE) 50 mcg/act nasal spray, 2 sprays into each nostril daily, Disp: , Rfl:     Gvoke HypoPen 2-Pack 1 MG/0.2ML SOAJ, , Disp: , Rfl:     Insulin Disposable Pump (Omnipod 5 G6 Pods, Gen 5,) MISC, INJECT 1 UNDER THE SKIN EVERY OTHER DAY. CHANGING EVERY 2 DAYS DUE TO HIGH INSULIN REQUIREMENT., Disp: , Rfl:     insulin glargine (LANTUS) 100 units/mL subcutaneous injection, Inject under the skin PRN for when pod doesn't work, Disp: , Rfl:     L-Methylfolate-B6-B12 (Foltanx) 3-35-2 MG TABS, Take 1 tablet by mouth in the morning, Disp: 90 tablet, Rfl: 3    levalbuterol (XOPENEX) 1.25 mg/3 mL nebulizer solution, Take 1.25 mg by nebulization 3 (three) times a day as needed for wheezing, Disp: , Rfl:     montelukast (SINGULAIR) 10 mg tablet, TAKE 1 TABLET BY MOUTH EVERYDAY AT BEDTIME, Disp: 90 tablet, Rfl: 1    mupirocin (BACTROBAN) 2 % ointment, APPLY TWICE DAILY TO AFFECTED AREA LEFT ARM, Disp: , Rfl:     NovoLOG 100 UNIT/ML injection, Inject up to 110 units daily via insulin pump. E10.65, Disp: , Rfl:     pantoprazole (PROTONIX) 40 mg tablet, TAKE 1 TABLET TWICE DAILY 30 MINS PRIOR TO BREAKFAST AND SUPPER., Disp: 180 tablet, Rfl: 3    rivaroxaban (Xarelto) 20 mg tablet, TAKE 1 TABLET BY MOUTH DAILY WITH BREAKFAST, Disp: 100 tablet, Rfl: 1    sildenafil (VIAGRA) 100 mg tablet, Take 1 tablet (100 mg total) by mouth daily as needed for erectile dysfunction, Disp: 10  tablet, Rfl: 1    sodium chloride 0.9 % nebulizer solution, Take 3 mL by nebulization as needed for wheezing, Disp: 90 mL, Rfl: 3  Allergies   Allergen Reactions    Other Cough     Grass / dogs hair      Labs:     Chemistry        Component Value Date/Time     01/09/2018 0702    K 4.1 06/13/2024 0706    K 4.1 12/22/2023 1348     06/13/2024 0706     12/22/2023 1348    CO2 31 06/13/2024 0706    CO2 30 12/22/2023 1348    BUN 16 06/13/2024 0706    BUN 18 12/22/2023 1348    CREATININE 0.60 06/13/2024 0706    CREATININE 0.63 12/22/2023 1348        Component Value Date/Time    CALCIUM 8.7 06/13/2024 0706    CALCIUM 9.7 12/22/2023 1348    ALKPHOS 76 06/13/2024 0706    ALKPHOS 96 12/22/2023 1348    AST 53 (H) 06/13/2024 0706    AST 39 12/22/2023 1348     (H) 06/13/2024 0706    ALT 71 (H) 12/22/2023 1348    BILITOT 1.4 (H) 01/09/2018 0702        Lab Results   Component Value Date    CHOL 133 01/09/2018     Lab Results   Component Value Date    HDL 42 09/10/2024    HDL 45 04/30/2024    HDL 32 (L) 11/01/2022     Lab Results   Component Value Date    LDLCALC 70 09/10/2024    LDLCALC 59 04/30/2024    LDLCALC 59 11/01/2022     Lab Results   Component Value Date    TRIG 60 09/10/2024    TRIG 122 04/30/2024    TRIG 67 11/01/2022     Imaging: IR biopsy bone marrow    Result Date: 8/7/2024  Narrative: PROCEDURE: Fluoroscopic-guided bone marrow biopsy and bone marrow aspiration STAFF: Jc Abraham M.D. FLUOROSCOPY TIME: 0.4 minutes. NUMBER OF IMAGES: Multiple. COMPLICATIONS: None. MEDICATIONS: Moderate sedation was monitored by radiology nursing staff.  Monitoring of conscious sedation totaled 15 minutes. See nursing records. INDICATION: Thrombocytopenia. PROCEDURE: Using fluoroscopic guidance, the [employer]-ITk system was used to perform bone marrow aspiration via the left posterior superior iliac spine. Approximately 5 mL of marrow was removed and given to the cytotechnologist.  Next, bone marrow biopsy was  performed once. The biopsy specimen was given to the in-room cytotechnologist. The needle was then removed and hemostasis was achieved using manual compression.     Impression: Bone marrow aspiration and biopsy. Workstation performed: [id number]     NM PET CT skull base to mid thigh    Result Date: 8/1/2024  Narrative: PET/CT SCAN INDICATION: D72.819: Decreased white blood cell count, unspecified D69.6: Thrombocytopenia, unspecified R16.1: Splenomegaly, not elsewhere classified D47.2: Monoclonal gammopathy MODIFIER: PI COMPARISON: Left upper quadrant abdominal ultrasound dated 3/25/2024, CT of chest dated 2/8/2024, and CT of the sinuses dated 2/8/2024. 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 Multiplanar attenuation corrected and non attenuation corrected PET images are available for interpretation, and contiguous, low dose, axial CT sections were obtained from the skull vertex through the femurs. Intravenous contrast material was not utilized. This examination, like all CT scans performed in the [facility], was performed utilizing techniques to minimize radiation dose exposure, including the use of iterative reconstruction and automated exposure control. Secondary to motion artifact, repeat imaging of the head/neck was performed. Fasting serum glucose: 170 mg/dl FINDINGS: VISUALIZED BRAIN: Regional photopenia involving the posterior left temporal/parietal region corresponding to encephalomalacia seen on prior MRI. HEAD/NECK: There is a physiologic distribution of FDG. No FDG avid cervical adenopathy is seen. CT images: Unremarkable. CHEST: On image 109 of series 3 there is hypermetabolic masslike consolidation/nodule involving the anterior medial left upper lobe measuring 1.9 x 1.5 cm with max SUV of 2.8. This finding is associated with a mild halo of  groundglass opacity. While findings could reflect an inflammatory process, underlying malignancy of low metabolic activity is the diagnosis of exclusion. This finding is new since 2/8/2024. CT images: Atherosclerotic vascular calcifications including those of the coronary arteries are noted. ABDOMEN: No FDG avid soft tissue lesions are seen. Specifically, there is no significant FDG activity associate with patient's known splenomegaly. CT images: Streak artifact related to patient's arms being down limits evaluation on low-dose unenhanced CT. Cholecystectomy. Atherosclerotic vascular calcifications are noted. Mild splenomegaly with the AP dimension of the spleen measuring 13.6 cm, similar to prior CT of chest dated 2/8/2024. On image 163 of series 3 there is a stable there is a 2.4 cm rim calcified splenic artery aneurysm versus less likely but not excluded adrenal nodule, similar in size to 2/8/2024 when utilizing similar measurement technique. PELVIS: Mild nonspecific low-level heterogeneous activity involving the prostate gland, possibly inflammatory in etiology. CT images: Streak artifact related to right hip prosthesis limits evaluation on low-dose unenhanced CT. Prominent prostate gland. OSSEOUS STRUCTURES: No FDG avid lesions are seen. CT images: Right hip prosthesis. Degenerative changes are noted involving the spine.     Impression: 1. 1.9 x 1.5 cm mildly FDG avid masslike consolidation/nodule involving the left upper lobe with mild halo of groundglass opacity, new since 2/8/2024. While findings could reflect an inflammatory process, underlying malignancy of low metabolic activity is the diagnosis of exclusion. Consider further evaluation with tissue sampling as clinically indicated. If an inflammatory process is favored, short interval follow-up with CT imaging in 6 to 8 weeks is recommended to ensure stability/document resolution. 2. There is otherwise no additional focal FDG activity to suggest  "hypermetabolic malignancy. Patient's known mild splenomegaly is non-FDG avid. 3. 2.4 cm rim calcified splenic artery aneurysm versus less likely calcified adrenal nodule. Consider vascular surgical consultation. Please see above for details and additional findings. The study was marked in EPIC for significant notification. The study was marked in Epic for follow-up. Workstation performed: [id number]       Review of Systems   Constitutional: Negative for chills, diaphoresis, fever, malaise/fatigue and weight gain.   Cardiovascular:  Positive for dyspnea on exertion. Negative for chest pain, irregular heartbeat, leg swelling, near-syncope, orthopnea, palpitations, paroxysmal nocturnal dyspnea and syncope.   Respiratory:  Positive for shortness of breath. Negative for cough, sleep disturbances due to breathing, snoring and wheezing.    Skin:  Negative for rash.   Gastrointestinal:  Negative for bloating, abdominal pain and nausea.   Neurological:  Negative for dizziness and light-headedness.       Vitals:    10/01/24 0852   BP: 124/70   Pulse: 60   SpO2: 96%       Vitals:    10/01/24 0852   Weight: 70.8 kg (156 lb)       Height: 5' 9\" (175.3 cm)   Body mass index is 23.04 kg/m².    Physical Exam  Vitals and nursing note reviewed.   Constitutional:       General: He is not in acute distress.     Appearance: Normal appearance. He is not ill-appearing.   HENT:      Head: Normocephalic and atraumatic.      Nose: Nose normal.      Mouth/Throat:      Mouth: Mucous membranes are moist.   Eyes:      Conjunctiva/sclera: Conjunctivae normal.   Cardiovascular:      Rate and Rhythm: Regular rhythm. Bradycardia present.      Pulses:           Radial pulses are 2+ on the right side and 2+ on the left side.      Heart sounds: S1 normal and S2 normal. No murmur heard.  Pulmonary:      Effort: Pulmonary effort is normal. No respiratory distress.      Breath sounds: Normal breath sounds. No stridor. No decreased breath sounds, wheezing, " rhonchi or rales.   Abdominal:      General: There is no distension.   Musculoskeletal:      Cervical back: Neck supple.      Right lower leg: No edema.      Left lower leg: No edema.   Skin:     General: Skin is warm.      Capillary Refill: Capillary refill takes less than 2 seconds.   Neurological:      General: No focal deficit present.      Mental Status: He is alert.   Psychiatric:         Thought Content: Thought content normal.

## 2024-09-30 NOTE — ASSESSMENT & PLAN NOTE
- incidental finding of splenic artery aneurysm noted on 3 studies in 2024  - following with vascular who recommend repeat mesenteric duplex in 6 months (March 2025)

## 2024-10-01 ENCOUNTER — OFFICE VISIT (OUTPATIENT)
Dept: CARDIOLOGY CLINIC | Facility: CLINIC | Age: 64
End: 2024-10-01
Payer: COMMERCIAL

## 2024-10-01 ENCOUNTER — TELEPHONE (OUTPATIENT)
Age: 64
End: 2024-10-01

## 2024-10-01 VITALS
BODY MASS INDEX: 23.11 KG/M2 | HEIGHT: 69 IN | DIASTOLIC BLOOD PRESSURE: 70 MMHG | WEIGHT: 156 LBS | HEART RATE: 60 BPM | SYSTOLIC BLOOD PRESSURE: 124 MMHG | OXYGEN SATURATION: 96 %

## 2024-10-01 DIAGNOSIS — I72.8 SPLENIC ARTERY ANEURYSM (HCC): ICD-10-CM

## 2024-10-01 DIAGNOSIS — I82.409 THROMBOEMBOLISM OF DEEP VEINS OF LOWER EXTREMITY, UNSPECIFIED LATERALITY (HCC): ICD-10-CM

## 2024-10-01 DIAGNOSIS — D47.2 MONOCLONAL GAMMOPATHY: ICD-10-CM

## 2024-10-01 DIAGNOSIS — R91.8 ABNORMAL CT SCAN OF LUNG: ICD-10-CM

## 2024-10-01 DIAGNOSIS — I25.118 CORONARY ARTERY DISEASE OF NATIVE ARTERY OF NATIVE HEART WITH STABLE ANGINA PECTORIS (HCC): Primary | ICD-10-CM

## 2024-10-01 PROCEDURE — 99214 OFFICE O/P EST MOD 30 MIN: CPT

## 2024-10-01 RX ORDER — MUPIROCIN 20 MG/G
OINTMENT TOPICAL
COMMUNITY
Start: 2024-09-25

## 2024-10-01 RX ORDER — INSULIN GLARGINE 100 [IU]/ML
INJECTION, SOLUTION SUBCUTANEOUS
COMMUNITY

## 2024-10-01 NOTE — TELEPHONE ENCOUNTER
Patient's spouse called wanting to speak with Dr. Tucker directly about her 's cat scan results.     Please advise.

## 2024-10-04 NOTE — TELEPHONE ENCOUNTER
Pt's wife, Oliva, calling. She would like to discuss results of CT with provider ASAP. Pt is currently having some cardiac issues and cardiology is not willing to proceed with stent until they know whether or not pt needs a bronchoscopy. Pt has f/u with Dr. Jack on 10/15 but she would like to discuss this with someone today as she does not want to wait that long to move forward with process with cardiology.     Oliva callback number: [phone]

## 2024-10-15 ENCOUNTER — OFFICE VISIT (OUTPATIENT)
Dept: PULMONOLOGY | Facility: CLINIC | Age: 64
End: 2024-10-15
Payer: COMMERCIAL

## 2024-10-15 ENCOUNTER — TELEPHONE (OUTPATIENT)
Dept: PULMONOLOGY | Facility: CLINIC | Age: 64
End: 2024-10-15

## 2024-10-15 VITALS
WEIGHT: 162.4 LBS | OXYGEN SATURATION: 98 % | HEART RATE: 57 BPM | SYSTOLIC BLOOD PRESSURE: 100 MMHG | TEMPERATURE: 97.8 F | DIASTOLIC BLOOD PRESSURE: 80 MMHG | BODY MASS INDEX: 24.05 KG/M2 | HEIGHT: 69 IN

## 2024-10-15 DIAGNOSIS — D80.2 IMMUNOGLOBULIN A DEFICIENCY (HCC): ICD-10-CM

## 2024-10-15 DIAGNOSIS — J32.9 CHRONIC SINUSITIS, UNSPECIFIED LOCATION: ICD-10-CM

## 2024-10-15 DIAGNOSIS — G47.33 OSA (OBSTRUCTIVE SLEEP APNEA): ICD-10-CM

## 2024-10-15 DIAGNOSIS — G71.11 MYOTONIC DYSTROPHY (HCC): ICD-10-CM

## 2024-10-15 DIAGNOSIS — R91.1 PULMONARY NODULE 1 CM OR GREATER IN DIAMETER: Primary | ICD-10-CM

## 2024-10-15 PROCEDURE — 99214 OFFICE O/P EST MOD 30 MIN: CPT | Performed by: INTERNAL MEDICINE

## 2024-10-15 RX ORDER — AZELASTINE 1 MG/ML
2 SPRAY, METERED NASAL 2 TIMES DAILY
Qty: 30 ML | Refills: 5 | Status: SHIPPED | OUTPATIENT
Start: 2024-10-15

## 2024-10-15 NOTE — PROGRESS NOTES
"Pulmonary Follow Up Note   Otoniel Martinez 64 y.o. male MRN: [id number]  10/15/2024    Assessment:  Chronic bronchitis  Stable symptoms since initial treatment in 2/2024  DDx reversible airway disease given the positive [place] allergy panel vs immunoglobulin deficiency causing recurrent sinusitis  Likely aggravated by retained secretion from neuromuscular weakness/myotonic dystrophy unable to clear secretions properly  Hospitalized in 2/2024 for acute exacerbation treated with antibiotics/steroids with good outcomes  Currently on Symbicort 80 q12, normal saline via nebulizer with good outcomes    Plan:  Continue Symbicort/normal saline via nebulizer as PRN  Astelin nasal spray given the ongoing rhinitis  Will consider referral to allergy/immunology for possible desensitization at next visit    Pulmonary nodule  New since 2/2024 noted on PET/CT that was done for monoclonal gammopathy/myeloma workup  1.9 x 1.5 cm at the KALEB/mild SUV uptake  Follow-up CT chest 9/16 showed decrease in size to 1.0 x 0.6 cm  Likely inflammatory/infectious process vs infiltrative process from myeloma/monoclonal gammopathy  Pt does not appear to be a high risk, lifelong non-smoker, no significant environmental/occupational hazard exposure  CT chest in 3 months unless another CAT scan needed by medical oncology  No current indication for biopsies given the above suspected etiologies, will continue follow-up until resolution    Obstructive sleep apnea  Reports first Dx more than 10 years ago with sleep study however no records available  Over the past few years, noticed more excessive daytime sleepiness, a.m. headache and unrefreshing sleep, wife states that he snores a lot  Will attempt to reschedule the sleep study    Return in about 3 months (around 1/15/2025).    History of Present Illness     Follow up for: chronic bronchitis     Background:  As per initial consult note     \"64 y.o. male with a h/o polyneuropathy, chronic gastritis, " "myotonic dystrophy, CAD/PCI 2004, CVA, osteoarthritis, DVT/Eliquis, chronic sinusitis, IDDM/insulin pump     Presented today for initial evaluation by pulmonary for chronic cough/mucus production     First noted approximately 5 years ago, episodes of minimally productive cough/chest congestion, especially around the allergy season/cold.  Impact his ability to perform duties at [employer] such as singing.  Associated with production of yellow mucus/oftentimes sticky.  Had partial relief from his Mucinex.  Also associated with chronic nasal congestion, known to have recurrent sinusitis from before did not follow with ENT since 2020.     In 2022, hospitalized for pneumonia/chest congestion to [facility], chest CT at that time showed bibasilar opacities/lingular opacities.     Hx myotonic dystrophy: Diagnosed 1988, noted difficulty walking/muscle spasms worse in the cold weather, underwent muscle biopsies.     Currently, episodes are manageable, tries to cough so hard to produce the mucus after that feels relief.  Never been on inhalers before, no prior formal diagnosis of asthma or COPD, lifelong non-smoker.        Social/exposure history  Lived in [facility] all his life  Lifelong non-smoker, nonalcoholic  Worked for the [place] office for decades, retired in 2017  Lives in an old house built in 1993, had water leak several months ago but no exposure to mold  Pets: 3 dogs     Family history: Dad had breathing issues/was a smoker\"     2/6 /2024 visit-Mucinex, Symbicort 80.  PFT/[facility] allergy panel which was positive.  Sputum cultures 6/1 NGTD.  Flonase        2/8/2024 visit-worse cough/sinusitis symptoms and URI, new hypoxemia with exertion.  Direct admit.  Antibiotic azithromycin/ceftriaxone, mucus clearing therapy.  Normal procalcitonin COVID/flu/RSV.  Sputum cultures normal ryley.  CT chest showed dependent GGO's at the RLL diffuse bronchial wall thickening/bronchitis.  CT sinus with mild to moderate sinusitis " worse in the ethmoid sinuses.  Evaluated by ENT no pus on endoscopy no gross polyps did not feel acute sinusitis.  Discharged on steroids/antibiotics     2/2024 visit-feeling better symptomatically,, PFT with MVV/MEP/MIP, no exertional hypoxemia.  Continue aggressive mucus clearing.  Still required 2-3 LPM at night/sleep study ordered     4/2024 visit - continue Symbicort, referral to allergy/immunology.     8/2024 visit-continue normal saline nebs/Symbicort 80, follow-up CT chest showed decrease in size of the KALEB nodule/now 1.0 x 0.6 cm    Interval History  Since last seen, no major changes.  Reports persistent chronic rhinitis/nasal congestion symptoms.  Recently given Singulair by PCP with partial relief.  Evaluated by cardiology, plan for cardiac cath however needed clearance from pulmonary in case if he needs a biopsy for the lung nodules before starting antiplatelet therapy for possible stent.  Otherwise no recurrence of chest congestion, wheezing, or productive cough.  Still on Symbicort 80, normal saline nebs once or twice a day.      Review of Systems  As per hpi, all other systems reviewed and were negative    Studies:  Imaging and other studies: I have personally reviewed pertinent films in PACS     CT PE/20 3/2022-bibasilar/lingular airspace opacity suspect infectious/inflammatory/mucous plug concern for aspiration.     PET scan 8/1/2024-KALEB new nodule 1.9 x 1.5 SUV 2.8, mild halo of GGO, this was not present on CT in 2/2024.  Suspect inflammatory process/malignancy not excluded low metabolic activity, recommended at least 6 to 8 weeks follow-up vs biopsies    CT chest 9/16/2024-decrease in size of the lingular consolidation now 1.0 x 0.6 cm stable RUL nodule 3 mm.  Splenomegaly.    Pulmonary function testing:      PFT 3/18/2024-ratio 76%, FEV1 2.59 L / 83%, FVC 3.4 L / 85%.  TLC 81%, RV 89%, DLCO 79% MVV 55%, MAP 49%, MIP 37%     6-minute walk test 2/8/2024-baseline SpO2 93% on room air, heart rate 76.   Able to walk 266 m in 6 minutes, lowest SpO2 at 88% after 2 minutes, required 2 LPM to end exercise with SpO2 95%     EKG, Pathology, and Other Studies: I have personally reviewed pertinent reports.       Immunoglobulin levels 2/20/2024 and 2/20/2018  IGA  66 - 433 mg/dL 58 Low  40.0 Low  R   IGG  635 - 1,741 mg/dL 316 Low  361.0 Low  R   IGM  45 - 281 mg/dL 254 208.0 R            Component  Ref Range & Units 2/23/24  9:05 AM   IgG 1  248 - 810 mg/dL 155 Low    IgG 2  130 - 555 mg/dL 116 Low    IgG 3  15 - 102 mg/dL 37   IgG 4  2 - 96 mg/dL 11   IgG  603 - 1613 mg/dL 366 Low          [facility] Allergy Panel, Adult 2/6/2024        Component  Ref Range & Units 2/6/24  9:45 AM   A.ALTERNATA  0.00 - 0.09 kUA/I <0.10   A.FUMIGATUS  0.00 - 0.09 kUA/I <0.10   [place] Grass  0.00 - 0.09 kUA/I 0.36 High    [place]  0.00 - 0.09 kUA/I <0.10   Cat Epithellium-Dander  0.00 - 0.09 kUA/I <0.10   C.HERBARUM  0.00 - 0.09 kUA/I <0.10   Cockroach  0.00 - 0.09 kUA/I 0.41 High    Common Silver Birch  0.00 - 0.09 kUA/I <0.10   [place]  0.00 - 0.09 kUA/I <0.10   D. farinae  0.00 - 0.09 kUA/I <0.10   D. pteronyssinus  0.00 - 0.09 kUA/I 0.12 High    Dog Dander  0.00 - 0.09 kUA/I 2.30 High    Elm IgE  0.00 - 0.09 kUA/I <0.10   Mountain [place] Tree  0.00 - 0.09 kUA/I <0.10   Mugwort  0.00 - 0.09 kUA/I <0.10   [place] Tree  0.00 - 0.09 kUA/I <0.10   Oak  0.00 - 0.09 kUA/I <0.10   P.CHRYSOGENUM  0.00 - 0.09 kUA/I <0.10   Rough Pigweed  IgE  0.00 - 0.09 kUA/I <0.10   Common Ragweed  0.00 - 0.09 kUA/I <0.10   Sheep Sorrel IgE  0.00 - 0.09 kUA/I <0.10   [place] Tree  0.00 - 0.09 kUA/I <0.10   Osmin Grass  0.00 - 0.09 kUA/I 1.89 High    [place] Tree  0.00 - 0.09 kUA/I <0.10   [place] Tree  0.00 - 0.09 kUA/I <0.10   IgE  0 - 113 kU/l 87.7   MOUSE URINE  0.00 - 0.09 kUA/I <0.10          Past medical, surgical, social and family histories reviewed.      Medications/Allergies: Reviewed      Vitals: Blood pressure 100/80, pulse 57, temperature  "97.8 °F (36.6 °C), temperature source Tympanic, height 5' 9\" (1.753 m), weight 73.7 kg (162 lb 6.4 oz), SpO2 98%. Body mass index is 23.98 kg/m². Oxygen Therapy  SpO2: 98 %  Oxygen Therapy: None (Room air)      Physical Exam  Body mass index is 23.98 kg/m².   Gen: not in acute distress   Neck/Eyes: supple, PERRL  Ear: normal appearance, no significant hearing impairment  Nose:  normal nasal mucosa, mild clear drainage  Mouth:  unremarkable/normal appearance of lips, teeth and gums  Oropharynx: mucosa is moist, no focal lesions or erythema  Salivary glands: soft nontender  Chest: normal respiratory efforts, clear breath sounds bilaterally  CV: RRR, no murmurs appreciated, no edema  Abdomen: soft, non tender  Extremities:  No observed deformity   Skin: unremarkable  Neuro: AAO X3, no focal motor deficit        Labs:  Lab Results   Component Value Date    WBC 3.59 (L) 08/07/2024    HGB 13.8 08/07/2024    HCT 40.7 08/07/2024    MCV 84 08/07/2024     (L) 08/07/2024     Lab Results   Component Value Date    CALCIUM 8.7 06/13/2024     01/09/2018    K 4.1 06/13/2024    CO2 31 06/13/2024     06/13/2024    BUN 16 06/13/2024    CREATININE 0.60 06/13/2024     Lab Results   Component Value Date    IGE 87.7 02/06/2024     Lab Results   Component Value Date     (H) 06/13/2024    AST 53 (H) 06/13/2024    ALKPHOS 76 06/13/2024    BILITOT 1.4 (H) 01/09/2018           Portions of the record may have been created with voice recognition software.  Occasional wrong word or \"sound a like\" substitutions may have occurred due to the inherent limitations of voice recognition software.  Read the chart carefully and recognize, using context, where substitutions have occurred    Tomas Jack M.D.  [facility]  "

## 2024-10-17 ENCOUNTER — TELEPHONE (OUTPATIENT)
Dept: CARDIOLOGY CLINIC | Facility: CLINIC | Age: 64
End: 2024-10-17

## 2024-10-17 ENCOUNTER — TELEPHONE (OUTPATIENT)
Dept: PULMONOLOGY | Facility: CLINIC | Age: 64
End: 2024-10-17

## 2024-10-17 NOTE — TELEPHONE ENCOUNTER
Called Sleep Medicine and they stated Otoniel needs to schedule it via central scheduling, I called Otoniel and let him Know.

## 2024-10-17 NOTE — TELEPHONE ENCOUNTER
Spouse returned my call. She confirmed her  saw Dr. Del Real w/ pulmonology on 10/15 who was supposed to forward the visit note to Dr. Hollins for review. Pt is now in agreement to cardiac cath after discussing w/ pulmonology and they would like to move forward w/ scheduling. They would like to know if it's ok to cancel 10/23/24 w/ Dr. Hollins since they want to move forward w/ cath. She mentioned they saw Binta on 10/1 and this was already discussed they were just waiting on pulmonary's input.     Please advise if 10/23 appt can be canceled and cath can be scheduled.

## 2024-10-17 NOTE — TELEPHONE ENCOUNTER
Spoke to spouse. Notified that pt is to keep appt per Dr. Hollins. She understood and they will keep the appt.

## 2024-10-17 NOTE — TELEPHONE ENCOUNTER
----- Message from Keke PIERCE sent at 10/17/2024  9:52 AM EDT -----  L/m for spouse requesting call back. Please let me know if she calls.    Thank you!      ----- Message -----  From: Blaze Palacios  Sent: 10/17/2024   9:37 AM EDT  To: [facility]    Good Morning Everyone,    The above patient's spouse Oliva contacted the [facility] requesting to speak with the  regarding the appointment scheduled on 10/23/2024 @ 11 AM.    Attempted to transfer to office, advised Oliva someone will contact her back.    Thank you,  Blaze

## 2024-10-23 ENCOUNTER — TELEPHONE (OUTPATIENT)
Dept: CARDIOLOGY CLINIC | Facility: CLINIC | Age: 64
End: 2024-10-23

## 2024-10-23 ENCOUNTER — PREP FOR PROCEDURE (OUTPATIENT)
Dept: CARDIOLOGY CLINIC | Facility: CLINIC | Age: 64
End: 2024-10-23

## 2024-10-23 ENCOUNTER — OFFICE VISIT (OUTPATIENT)
Dept: CARDIOLOGY CLINIC | Facility: CLINIC | Age: 64
End: 2024-10-23
Payer: COMMERCIAL

## 2024-10-23 VITALS
BODY MASS INDEX: 23.96 KG/M2 | HEART RATE: 54 BPM | DIASTOLIC BLOOD PRESSURE: 70 MMHG | SYSTOLIC BLOOD PRESSURE: 117 MMHG | WEIGHT: 161.8 LBS | HEIGHT: 69 IN

## 2024-10-23 DIAGNOSIS — R06.09 DOE (DYSPNEA ON EXERTION): Primary | ICD-10-CM

## 2024-10-23 PROCEDURE — 99214 OFFICE O/P EST MOD 30 MIN: CPT | Performed by: INTERNAL MEDICINE

## 2024-10-23 NOTE — PROGRESS NOTES
"      Cardiology             Otoniel Martinez  1960  [id number]                   Assessment/Plan:     Exertional dyspnea  Remote CAD, status post myocardial infarction and RCA stenting in 2004  History of DVT, on Xarelto anticoagulation  Splenic artery aneurysm  Carotid artery disease  Diabetes  Hypertension  Dyslipidemia  Lymphoplasmacytic lymphoma          Nuclear stress test unremarkable on 9/4/2024.  Patient's wife states who is a retired nurse states she is quite concerned about multivessel disease, as she was \"told in 2004 that he will likely need bypass surgery at some point.\"  She is strongly requesting a cardiac catheterization.  Indications, risks, and benefits reviewed with patient and his wife and they are both agreeable to proceed for definitive valuation of obstructive CAD as a cause of his exertional dyspnea.  No plans for lung nodule biopsy per pulmonary medicine.  If coronary angiography reveals no evidence of obstructive CAD, she will continue following up with pulmonary medicine.  His shortness of breath may be multifactorial given his underlying lung disease and hematologic issues  Blood pressure controlled           Patient will follow-up over the phone after completion of cardiac catheterization.  If he gets coronary stenting, he will follow-up in 1 month.  If no obstructive CAD, he will follow-up in 6 months, continue following up pulmonary medicine and PCP.             Interval History:      This is a very pleasant 64-year-old male with CAD status post myocardial infarction and right coronary artery stenting in 2004.  The patient had followed Dr. Ladd in the past before he re he also has a history of DVT for which she is on Xarelto.       He was last seen 1/2022 at which time he was felt to be at low cardiac risk for total hip replacement.     Otoniel was hospitalized 2/2024 with acute hypoxic respiratory failure and diagnosed with pneumonia.  He had splenomegaly and was sent to " "hematology for outpatient evaluation.  He has also been following pulmonary medicine for a pulmonary nodule.  He remains on Xarelto for history of DVT.  Echocardiogram 2/9/2024 demonstrate left ejection fraction 55% with no significant valvular abnormality.    He was evaluated by our advanced practitioner on 8/23/2024 at which time he had complaints of worsening exertional dyspnea.  Subsequent nuclear stress test on 9/4/2024 was unremarkable revealing subdiaphragmatic activity artifact.  Ejection fraction was 51%.    He presents today for follow-up.  His wife is a retired nurse, and they both complain that he is significantly short of breath with physical exertion.  She expresses concern of residual coronary disease that may be causing his symptoms and if concern for multivessel disease which can potentially cause false negative stress test results.              Vitals:  Vitals:    10/23/24 1055   BP: 117/70   Pulse: (!) 54   Weight: 73.4 kg (161 lb 12.8 oz)   Height: 5' 8.5\" (1.74 m)         Past Medical History:   Diagnosis Date    Acute respiratory failure with hypoxia ([facility]) 02/08/2024    CAD (coronary artery disease)     Congenital myotonia     Deep vein thrombosis (DVT) ([facility])     after tear of gastrocnemus muscle    Diabetes ([facility]CC)     Diabetes mellitus ([facility]CC)     Difficulty walking     hip replacement    HL (hearing loss)     decreased both  ears    Myocardial infarction ([facility])     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[facility]) 12/06/2017    Pancreatitis     three times    Sleep apnea     not using a C-PAP    Superficial thrombophlebitis     Vision loss     reading glasses over the counter     Social History     Socioeconomic History    Marital status: /Civil Union     Spouse name: Not on file    Number of children: Not on file    Years of education: Not on file    Highest education level: Not on file   Occupational History    Not on file   Tobacco Use    Smoking status: Never    Smokeless tobacco: Never   Vaping Use    Vaping " status: Never Used   Substance and Sexual Activity    Alcohol use: Yes     Comment: 2 beers on a social occasion    Drug use: No    Sexual activity: Yes     Partners: Female     Birth control/protection: None   Other Topics Concern    Not on file   Social History Narrative    Consumes 1 cup of tea  And 1 glass of tea per day        Weight loss     Social Determinants of Health     Financial Resource Strain: Low Risk  (12/4/2023)    [facility]    Overall Financial Resource Strain (CARDIA)     Difficulty of Paying Living Expenses: Not very hard   Food Insecurity: No Food Insecurity (2/10/2024)    Nursing - Inadequate Food Risk Classification     Worried About Running Out of Food in the Last Year: Never true     Ran Out of Food in the Last Year: Never true     Ran Out of Food in the Last Year: Not on file   Transportation Needs: No Transportation Needs (2/10/2024)    PRAPARE - Transportation     Lack of Transportation (Medical): No     Lack of Transportation (Non-Medical): No   Physical Activity: Sufficiently Active (12/4/2023)    Received from [facility]    Exercise Vital Sign     Days of Exercise per Week: 1 day     Minutes of Exercise per Session: 150+ min   Stress: No Stress Concern Present (12/4/2023)    [facility]    [place] [employer] - Occupational Stress Questionnaire     Feeling of Stress : Not at all   Social Connections: Socially Integrated (12/4/2023)    [facility]    Social Connection and Isolation Panel [NHANES]     Frequency of Communication with Friends and Family: Three times a week     Frequency of Social Gatherings with Friends and Family: Never     Attends [employer] Services: More than 4 times per year     Active Member of Clubs or Organizations: Yes     Attends Club or  Organization Meetings: More than 4 times per year     Marital Status:    Intimate Partner Violence: Not At Risk (12/4/2023)    [facility]    Humiliation, Afraid, Rape, and Kick questionnaire     Fear of Current or Ex-Partner: No     Emotionally Abused: No     Physically Abused: No     Sexually Abused: No   Housing Stability: Low Risk  (2/10/2024)    Housing Stability Vital Sign     Unable to Pay for Housing in the Last Year: No     Number of Times Moved in the Last Year: 1     Homeless in the Last Year: No      Family History   Problem Relation Age of Onset    Brain cancer Mother     Clotting disorder Mother     Heart disease Mother     Cancer Mother     Hyperlipidemia Mother     Stroke Father     Deep vein thrombosis Father     Emphysema Father     Coronary artery disease Paternal Uncle     Diabetes Maternal Uncle      Past Surgical History:   Procedure Laterality Date    CAROTID STENT      CHOLECYSTECTOMY      CIRCUMCISION      Elective    COLONOSCOPY      complete, polyps 2 years ago    CORONARY ANGIOPLASTY WITH STENT PLACEMENT      stent to RCA 2004    INGUINAL HERNIA REPAIR      IR BIOPSY BONE MARROW  8/7/2024    ORIF RADIAL SHAFT FRACTURE Left     Open Treatment of Multiple Fractures of the Radial Shaft    ORIF ULNAR / RADIAL SHAFT FRACTURE Left     Open Treatment of Multiple Fractures of the Ulnar Shaft    TONSILLECTOMY AND ADENOIDECTOMY         Current Outpatient Medications:     aspirin (ECOTRIN LOW STRENGTH) 81 mg EC tablet, Take 81 mg by mouth daily, Disp: , Rfl:     atorvastatin (LIPITOR) 40 mg tablet, TAKE 1 TABLET BY MOUTH EVERY DAY, Disp: 90 tablet, Rfl: 1    azelastine (ASTELIN) 0.1 % nasal spray, 2 sprays into each nostril 2 (two) times a day Use in each nostril as directed, Disp: 30 mL, Rfl: 5    budesonide-formoterol (Symbicort) 80-4.5 MCG/ACT inhaler, Inhale 2 puffs 2 (two) times a day Rinse mouth after use., Disp: 30.6 g, Rfl:  5    cholecalciferol (VITAMIN D3) 400 units tablet, Take 1 tablet by mouth every morning, Disp: , Rfl:     Continuous Blood Gluc Sensor (Dexcom G6 Sensor) MISC, Change every 10 days. E10.65, Disp: , Rfl:     Continuous Blood Gluc Transmit (Dexcom G6 Transmitter) MISC, Change every 90 days. E10.65, Disp: , Rfl:     fluticasone (FLONASE) 50 mcg/act nasal spray, 2 sprays into each nostril daily, Disp: , Rfl:     Insulin Disposable Pump (Omnipod 5 G6 Pods, Gen 5,) MISC, INJECT 1 UNDER THE SKIN EVERY OTHER DAY. CHANGING EVERY 2 DAYS DUE TO HIGH INSULIN REQUIREMENT., Disp: , Rfl:     insulin glargine (LANTUS) 100 units/mL subcutaneous injection, Inject under the skin PRN for when pod doesn't work, Disp: , Rfl:     L-Methylfolate-B6-B12 (Foltanx) 3-35-2 MG TABS, Take 1 tablet by mouth in the morning, Disp: 90 tablet, Rfl: 3    levalbuterol (XOPENEX) 1.25 mg/3 mL nebulizer solution, Take 1.25 mg by nebulization 3 (three) times a day as needed for wheezing, Disp: , Rfl:     montelukast (SINGULAIR) 10 mg tablet, TAKE 1 TABLET BY MOUTH EVERYDAY AT BEDTIME, Disp: 90 tablet, Rfl: 1    NovoLOG 100 UNIT/ML injection, if needed, Disp: , Rfl:     pantoprazole (PROTONIX) 40 mg tablet, TAKE 1 TABLET TWICE DAILY 30 MINS PRIOR TO BREAKFAST AND SUPPER., Disp: 180 tablet, Rfl: 3    rivaroxaban (Xarelto) 20 mg tablet, TAKE 1 TABLET BY MOUTH DAILY WITH BREAKFAST, Disp: 100 tablet, Rfl: 1    sildenafil (VIAGRA) 100 mg tablet, Take 1 tablet (100 mg total) by mouth daily as needed for erectile dysfunction, Disp: 10 tablet, Rfl: 1    sodium chloride 0.9 % nebulizer solution, Take 3 mL by nebulization as needed for wheezing, Disp: 90 mL, Rfl: 3    acyclovir (ZOVIRAX) 200 mg capsule, Take 1 capsule (200 mg total) by mouth 5 (five) times a day for 5 days (Patient taking differently: Take 200 mg by mouth if needed Patient takes PRN), Disp: 25 capsule, Rfl: 0    Gvoke HypoPen 2-Pack 1 MG/0.2ML SOAJ, , Disp: , Rfl:     mupirocin (BACTROBAN) 2 %  ointment, APPLY TWICE DAILY TO AFFECTED AREA LEFT ARM (Patient not taking: Reported on 10/23/2024), Disp: , Rfl:         Review of Systems:  Review of Systems   Respiratory:  Positive for cough and shortness of breath.    Cardiovascular: Negative.    All other systems reviewed and are negative.        Physical Exam:  Physical Exam  Constitutional:       General: He is not in acute distress.     Appearance: He is well-developed. He is not diaphoretic.   HENT:      Head: Normocephalic and atraumatic.   Eyes:      General: No scleral icterus.        Right eye: No discharge.      Pupils: Pupils are equal, round, and reactive to light.   Neck:      Thyroid: No thyromegaly.   Cardiovascular:      Rate and Rhythm: Normal rate and regular rhythm.      Heart sounds: Normal heart sounds. No murmur heard.     No friction rub. No gallop.   Pulmonary:      Effort: Pulmonary effort is normal.      Breath sounds: Normal breath sounds.   Abdominal:      General: There is no distension.      Tenderness: There is no abdominal tenderness. There is no guarding or rebound.   Musculoskeletal:         General: Normal range of motion.      Cervical back: Normal range of motion and neck supple.   Skin:     General: Skin is warm and dry.      Coloration: Skin is not pale.      Findings: No erythema or rash.   Neurological:      Mental Status: He is alert and oriented to person, place, and time.      Coordination: Coordination normal.   Psychiatric:         Behavior: Behavior normal.         Thought Content: Thought content normal.         Judgment: Judgment normal.         This note was completed in part utilizing M-Modal Fluency Direct Software.  Grammatical errors, random word insertions, spelling mistakes, and incomplete sentences can be an occasional consequence of this system secondary to software limitations, ambient noise, and hardware issues.  If you have any questions or concerns about the content, text, or information contained  within the body of this dictation, please contact the provider for clarification.

## 2024-10-23 NOTE — TELEPHONE ENCOUNTER
Patient scheduled for LHC at [facility] on  10/31/24  with Dr Sandoval.    Patient aware of general instructions, labs order.     Meds holds:   Xarelto to hold day prior to the procedure and morning of the procedure.    Dr Hollins, just to be sure for insurance purpose, is this procedure is for LHC only?    Patient wife mentioned his request should be wilmer as urgent when submit to his insurance company, otherwise can take up to 15 day's. This as an information related to him from one representative from the  insurance company.  I related this information to our PEC team.

## 2024-10-23 NOTE — H&P (VIEW-ONLY)
"      Cardiology             Otoniel Martinez  1960  [id number]                   Assessment/Plan:     Exertional dyspnea  Remote CAD, status post myocardial infarction and RCA stenting in 2004  History of DVT, on Xarelto anticoagulation  Splenic artery aneurysm  Carotid artery disease  Diabetes  Hypertension  Dyslipidemia  Lymphoplasmacytic lymphoma          Nuclear stress test unremarkable on 9/4/2024.  Patient's wife states who is a retired nurse states she is quite concerned about multivessel disease, as she was \"told in 2004 that he will likely need bypass surgery at some point.\"  She is strongly requesting a cardiac catheterization.  Indications, risks, and benefits reviewed with patient and his wife and they are both agreeable to proceed for definitive valuation of obstructive CAD as a cause of his exertional dyspnea.  No plans for lung nodule biopsy per pulmonary medicine.  If coronary angiography reveals no evidence of obstructive CAD, she will continue following up with pulmonary medicine.  His shortness of breath may be multifactorial given his underlying lung disease and hematologic issues  Blood pressure controlled           Patient will follow-up over the phone after completion of cardiac catheterization.  If he gets coronary stenting, he will follow-up in 1 month.  If no obstructive CAD, he will follow-up in 6 months, continue following up pulmonary medicine and PCP.             Interval History:      This is a very pleasant 64-year-old male with CAD status post myocardial infarction and right coronary artery stenting in 2004.  The patient had followed Dr. Ladd in the past before he re he also has a history of DVT for which she is on Xarelto.       He was last seen 1/2022 at which time he was felt to be at low cardiac risk for total hip replacement.     Otoniel was hospitalized 2/2024 with acute hypoxic respiratory failure and diagnosed with pneumonia.  He had splenomegaly and was sent to " "hematology for outpatient evaluation.  He has also been following pulmonary medicine for a pulmonary nodule.  He remains on Xarelto for history of DVT.  Echocardiogram 2/9/2024 demonstrate left ejection fraction 55% with no significant valvular abnormality.    He was evaluated by our advanced practitioner on 8/23/2024 at which time he had complaints of worsening exertional dyspnea.  Subsequent nuclear stress test on 9/4/2024 was unremarkable revealing subdiaphragmatic activity artifact.  Ejection fraction was 51%.    He presents today for follow-up.  His wife is a retired nurse, and they both complain that he is significantly short of breath with physical exertion.  She expresses concern of residual coronary disease that may be causing his symptoms and if concern for multivessel disease which can potentially cause false negative stress test results.              Vitals:  Vitals:    10/23/24 1055   BP: 117/70   Pulse: (!) 54   Weight: 73.4 kg (161 lb 12.8 oz)   Height: 5' 8.5\" (1.74 m)         Past Medical History:   Diagnosis Date    Acute respiratory failure with hypoxia ([facility]) 02/08/2024    CAD (coronary artery disease)     Congenital myotonia     Deep vein thrombosis (DVT) ([facility])     after tear of gastrocnemus muscle    Diabetes (HCC)     Diabetes mellitus (HCC)     Difficulty walking     hip replacement    HL (hearing loss)     decreased both  ears    Myocardial infarction ([facility])     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    Pancreatitis     three times    Sleep apnea     not using a C-PAP    Superficial thrombophlebitis     Vision loss     reading glasses over the counter     Social History     Socioeconomic History    Marital status: /Civil Union     Spouse name: Not on file    Number of children: Not on file    Years of education: Not on file    Highest education level: Not on file   Occupational History    Not on file   Tobacco Use    Smoking status: Never    Smokeless tobacco: Never   Vaping Use    Vaping " status: Never Used   Substance and Sexual Activity    Alcohol use: Yes     Comment: 2 beers on a social occasion    Drug use: No    Sexual activity: Yes     Partners: Female     Birth control/protection: None   Other Topics Concern    Not on file   Social History Narrative    Consumes 1 cup of tea  And 1 glass of tea per day        Weight loss     Social Determinants of Health     Financial Resource Strain: Low Risk  (12/4/2023)    [facility]    Overall Financial Resource Strain (CARDIA)     Difficulty of Paying Living Expenses: Not very hard   Food Insecurity: No Food Insecurity (2/10/2024)    Nursing - Inadequate Food Risk Classification     Worried About Running Out of Food in the Last Year: Never true     Ran Out of Food in the Last Year: Never true     Ran Out of Food in the Last Year: Not on file   Transportation Needs: No Transportation Needs (2/10/2024)    PRAPARE - Transportation     Lack of Transportation (Medical): No     Lack of Transportation (Non-Medical): No   Physical Activity: Sufficiently Active (12/4/2023)    Received from [facility]    Exercise Vital Sign     Days of Exercise per Week: 1 day     Minutes of Exercise per Session: 150+ min   Stress: No Stress Concern Present (12/4/2023)    [facility]    [place] [employer] - Occupational Stress Questionnaire     Feeling of Stress : Not at all   Social Connections: Socially Integrated (12/4/2023)    [facility]    Social Connection and Isolation Panel [NHANES]     Frequency of Communication with Friends and Family: Three times a week     Frequency of Social Gatherings with Friends and Family: Never     Attends [employer] Services: More than 4 times per year     Active Member of Clubs or Organizations: Yes     Attends Club or  Organization Meetings: More than 4 times per year     Marital Status:    Intimate Partner Violence: Not At Risk (12/4/2023)    [facility]    Humiliation, Afraid, Rape, and Kick questionnaire     Fear of Current or Ex-Partner: No     Emotionally Abused: No     Physically Abused: No     Sexually Abused: No   Housing Stability: Low Risk  (2/10/2024)    Housing Stability Vital Sign     Unable to Pay for Housing in the Last Year: No     Number of Times Moved in the Last Year: 1     Homeless in the Last Year: No      Family History   Problem Relation Age of Onset    Brain cancer Mother     Clotting disorder Mother     Heart disease Mother     Cancer Mother     Hyperlipidemia Mother     Stroke Father     Deep vein thrombosis Father     Emphysema Father     Coronary artery disease Paternal Uncle     Diabetes Maternal Uncle      Past Surgical History:   Procedure Laterality Date    CAROTID STENT      CHOLECYSTECTOMY      CIRCUMCISION      Elective    COLONOSCOPY      complete, polyps 2 years ago    CORONARY ANGIOPLASTY WITH STENT PLACEMENT      stent to RCA 2004    INGUINAL HERNIA REPAIR      IR BIOPSY BONE MARROW  8/7/2024    ORIF RADIAL SHAFT FRACTURE Left     Open Treatment of Multiple Fractures of the Radial Shaft    ORIF ULNAR / RADIAL SHAFT FRACTURE Left     Open Treatment of Multiple Fractures of the Ulnar Shaft    TONSILLECTOMY AND ADENOIDECTOMY         Current Outpatient Medications:     aspirin (ECOTRIN LOW STRENGTH) 81 mg EC tablet, Take 81 mg by mouth daily, Disp: , Rfl:     atorvastatin (LIPITOR) 40 mg tablet, TAKE 1 TABLET BY MOUTH EVERY DAY, Disp: 90 tablet, Rfl: 1    azelastine (ASTELIN) 0.1 % nasal spray, 2 sprays into each nostril 2 (two) times a day Use in each nostril as directed, Disp: 30 mL, Rfl: 5    budesonide-formoterol (Symbicort) 80-4.5 MCG/ACT inhaler, Inhale 2 puffs 2 (two) times a day Rinse mouth after use., Disp: 30.6 g, Rfl:  5    cholecalciferol (VITAMIN D3) 400 units tablet, Take 1 tablet by mouth every morning, Disp: , Rfl:     Continuous Blood Gluc Sensor (Dexcom G6 Sensor) MISC, Change every 10 days. E10.65, Disp: , Rfl:     Continuous Blood Gluc Transmit (Dexcom G6 Transmitter) MISC, Change every 90 days. E10.65, Disp: , Rfl:     fluticasone (FLONASE) 50 mcg/act nasal spray, 2 sprays into each nostril daily, Disp: , Rfl:     Insulin Disposable Pump (Omnipod 5 G6 Pods, Gen 5,) MISC, INJECT 1 UNDER THE SKIN EVERY OTHER DAY. CHANGING EVERY 2 DAYS DUE TO HIGH INSULIN REQUIREMENT., Disp: , Rfl:     insulin glargine (LANTUS) 100 units/mL subcutaneous injection, Inject under the skin PRN for when pod doesn't work, Disp: , Rfl:     L-Methylfolate-B6-B12 (Foltanx) 3-35-2 MG TABS, Take 1 tablet by mouth in the morning, Disp: 90 tablet, Rfl: 3    levalbuterol (XOPENEX) 1.25 mg/3 mL nebulizer solution, Take 1.25 mg by nebulization 3 (three) times a day as needed for wheezing, Disp: , Rfl:     montelukast (SINGULAIR) 10 mg tablet, TAKE 1 TABLET BY MOUTH EVERYDAY AT BEDTIME, Disp: 90 tablet, Rfl: 1    NovoLOG 100 UNIT/ML injection, if needed, Disp: , Rfl:     pantoprazole (PROTONIX) 40 mg tablet, TAKE 1 TABLET TWICE DAILY 30 MINS PRIOR TO BREAKFAST AND SUPPER., Disp: 180 tablet, Rfl: 3    rivaroxaban (Xarelto) 20 mg tablet, TAKE 1 TABLET BY MOUTH DAILY WITH BREAKFAST, Disp: 100 tablet, Rfl: 1    sildenafil (VIAGRA) 100 mg tablet, Take 1 tablet (100 mg total) by mouth daily as needed for erectile dysfunction, Disp: 10 tablet, Rfl: 1    sodium chloride 0.9 % nebulizer solution, Take 3 mL by nebulization as needed for wheezing, Disp: 90 mL, Rfl: 3    acyclovir (ZOVIRAX) 200 mg capsule, Take 1 capsule (200 mg total) by mouth 5 (five) times a day for 5 days (Patient taking differently: Take 200 mg by mouth if needed Patient takes PRN), Disp: 25 capsule, Rfl: 0    Gvoke HypoPen 2-Pack 1 MG/0.2ML SOAJ, , Disp: , Rfl:     mupirocin (BACTROBAN) 2 %  ointment, APPLY TWICE DAILY TO AFFECTED AREA LEFT ARM (Patient not taking: Reported on 10/23/2024), Disp: , Rfl:         Review of Systems:  Review of Systems   Respiratory:  Positive for cough and shortness of breath.    Cardiovascular: Negative.    All other systems reviewed and are negative.        Physical Exam:  Physical Exam  Constitutional:       General: He is not in acute distress.     Appearance: He is well-developed. He is not diaphoretic.   HENT:      Head: Normocephalic and atraumatic.   Eyes:      General: No scleral icterus.        Right eye: No discharge.      Pupils: Pupils are equal, round, and reactive to light.   Neck:      Thyroid: No thyromegaly.   Cardiovascular:      Rate and Rhythm: Normal rate and regular rhythm.      Heart sounds: Normal heart sounds. No murmur heard.     No friction rub. No gallop.   Pulmonary:      Effort: Pulmonary effort is normal.      Breath sounds: Normal breath sounds.   Abdominal:      General: There is no distension.      Tenderness: There is no abdominal tenderness. There is no guarding or rebound.   Musculoskeletal:         General: Normal range of motion.      Cervical back: Normal range of motion and neck supple.   Skin:     General: Skin is warm and dry.      Coloration: Skin is not pale.      Findings: No erythema or rash.   Neurological:      Mental Status: He is alert and oriented to person, place, and time.      Coordination: Coordination normal.   Psychiatric:         Behavior: Behavior normal.         Thought Content: Thought content normal.         Judgment: Judgment normal.         This note was completed in part utilizing M-Modal Fluency Direct Software.  Grammatical errors, random word insertions, spelling mistakes, and incomplete sentences can be an occasional consequence of this system secondary to software limitations, ambient noise, and hardware issues.  If you have any questions or concerns about the content, text, or information contained  within the body of this dictation, please contact the provider for clarification.

## 2024-10-25 ENCOUNTER — APPOINTMENT (OUTPATIENT)
Dept: LAB | Age: 64
End: 2024-10-25
Payer: COMMERCIAL

## 2024-10-25 DIAGNOSIS — R06.09 DOE (DYSPNEA ON EXERTION): ICD-10-CM

## 2024-10-25 LAB
ANION GAP SERPL CALCULATED.3IONS-SCNC: 7 MMOL/L (ref 4–13)
BASOPHILS # BLD AUTO: 0.01 THOUSANDS/ΜL (ref 0–0.1)
BASOPHILS NFR BLD AUTO: 0 % (ref 0–1)
BUN SERPL-MCNC: 15 MG/DL (ref 5–25)
CALCIUM SERPL-MCNC: 8.6 MG/DL (ref 8.4–10.2)
CHLORIDE SERPL-SCNC: 106 MMOL/L (ref 96–108)
CO2 SERPL-SCNC: 31 MMOL/L (ref 21–32)
CREAT SERPL-MCNC: 0.54 MG/DL (ref 0.6–1.3)
EOSINOPHIL # BLD AUTO: 0.21 THOUSAND/ΜL (ref 0–0.61)
EOSINOPHIL NFR BLD AUTO: 7 % (ref 0–6)
ERYTHROCYTE [DISTWIDTH] IN BLOOD BY AUTOMATED COUNT: 14.7 % (ref 11.6–15.1)
GFR SERPL CREATININE-BSD FRML MDRD: 110 ML/MIN/1.73SQ M
GLUCOSE P FAST SERPL-MCNC: 116 MG/DL (ref 65–99)
HCT VFR BLD AUTO: 40.9 % (ref 36.5–49.3)
HGB BLD-MCNC: 13.1 G/DL (ref 12–17)
IMM GRANULOCYTES # BLD AUTO: 0.01 THOUSAND/UL (ref 0–0.2)
IMM GRANULOCYTES NFR BLD AUTO: 0 % (ref 0–2)
LYMPHOCYTES # BLD AUTO: 0.5 THOUSANDS/ΜL (ref 0.6–4.47)
LYMPHOCYTES NFR BLD AUTO: 16 % (ref 14–44)
MCH RBC QN AUTO: 28.7 PG (ref 26.8–34.3)
MCHC RBC AUTO-ENTMCNC: 32 G/DL (ref 31.4–37.4)
MCV RBC AUTO: 90 FL (ref 82–98)
MONOCYTES # BLD AUTO: 0.42 THOUSAND/ΜL (ref 0.17–1.22)
MONOCYTES NFR BLD AUTO: 13 % (ref 4–12)
NEUTROPHILS # BLD AUTO: 2.01 THOUSANDS/ΜL (ref 1.85–7.62)
NEUTS SEG NFR BLD AUTO: 64 % (ref 43–75)
NRBC BLD AUTO-RTO: 0 /100 WBCS
PLATELET # BLD AUTO: 120 THOUSANDS/UL (ref 149–390)
PMV BLD AUTO: 10.9 FL (ref 8.9–12.7)
POTASSIUM SERPL-SCNC: 4.5 MMOL/L (ref 3.5–5.3)
RBC # BLD AUTO: 4.56 MILLION/UL (ref 3.88–5.62)
SODIUM SERPL-SCNC: 144 MMOL/L (ref 135–147)
WBC # BLD AUTO: 3.16 THOUSAND/UL (ref 4.31–10.16)

## 2024-10-25 PROCEDURE — 80048 BASIC METABOLIC PNL TOTAL CA: CPT

## 2024-10-25 PROCEDURE — 85025 COMPLETE CBC W/AUTO DIFF WBC: CPT

## 2024-10-25 PROCEDURE — 36415 COLL VENOUS BLD VENIPUNCTURE: CPT

## 2024-10-29 NOTE — TELEPHONE ENCOUNTER
Patricia roy [facility] phoned on 10/29/2024 requesting that pre-authorization for upcoming procedure on 10/31/2024 be expidited via [place] @ [phone].    Call back should be to patient @ [phone]

## 2024-10-30 ENCOUNTER — TELEPHONE (OUTPATIENT)
Dept: NEUROLOGY | Facility: CLINIC | Age: 64
End: 2024-10-30

## 2024-10-30 ENCOUNTER — OFFICE VISIT (OUTPATIENT)
Dept: NEUROLOGY | Facility: CLINIC | Age: 64
End: 2024-10-30
Payer: COMMERCIAL

## 2024-10-30 VITALS
TEMPERATURE: 98.3 F | HEIGHT: 68 IN | OXYGEN SATURATION: 98 % | SYSTOLIC BLOOD PRESSURE: 120 MMHG | HEART RATE: 70 BPM | DIASTOLIC BLOOD PRESSURE: 78 MMHG | RESPIRATION RATE: 14 BRPM | WEIGHT: 161.8 LBS | BODY MASS INDEX: 24.52 KG/M2

## 2024-10-30 DIAGNOSIS — R41.3 MEMORY LOSS: ICD-10-CM

## 2024-10-30 DIAGNOSIS — G71.11 MYOTONIC DYSTROPHY (HCC): Primary | ICD-10-CM

## 2024-10-30 PROCEDURE — 99213 OFFICE O/P EST LOW 20 MIN: CPT | Performed by: PSYCHIATRY & NEUROLOGY

## 2024-10-30 RX ORDER — PANTOPRAZOLE SODIUM 20 MG/1
TABLET, DELAYED RELEASE ORAL
Status: ON HOLD | COMMUNITY
Start: 2024-09-27

## 2024-10-30 NOTE — PROGRESS NOTES
Ambulatory Visit  Name: Otoniel Martinez      : 1960      MRN: [id number]  Encounter Provider: Mely Bacon DO  Encounter Date: 10/30/2024   Encounter department: [facility] [place]    Assessment & Plan  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[facility])  Oliverio is a 64-year-old patient with myotonic dystrophy.  He reports that he has become older he has less stiffness in his hands and improved ability to recoil.  Unfortunately he has noticed more weakness in his legs after ambulating for periods of time.  He denies any cramping numbness.  We discussed that there are medications available for cramping tingling and myotonic dystrophy patients at this point we will hold off due to his numerous other medical issues.    Discussed that this can also be one of the etiology of hoarseness difficulty swallowing.  He did undergo speech evaluation which helped for short period of time.      He continues to have shortness of breath and he is for cardiac cath tomorrow.         Memory loss  Today Oliverio presents in a follow-up visit regarding his memory.  This is stable he continues to have short-term and long-term memory problems.  Today's MoCA was 28 out of 30 with abnormalities only in short-term recall.  His MRI of the brain was reviewed.  He denies any difficulty doing his bills driving.  He does have a neuropsychological appointment next week.    He denies any staring spells.             History of Present Illness        This is a 64-year-old patient with a history of myotonic dystrophy.  At the last visit he informed me of difficulty focusing finishing task and short-term memory problems.  He admitted to staring episodes.  The EEG and MRI was reviewed at the last visit.  His staring spells have improved but he continues to have intermittent problems short-term memory long-term memory.  [place] today was a 28 out of 30.       In the interim he is undergoing a workup for leukopenia, gM kappa monoclonal gammopathy.  He  did undergo bone marrow biopsy which is now followed by hematology oncology.    He continues to have shortness of breath with activity and he underwent carotid ultrasounds less than 50% stenosis and irregular plaque.  He did have a prior stent i and MI in 2004.     A MoCA was performed today was 28 out of 30 with deficiencies in short-term memory less with calculations.      Continues to have issues with difficulty speaking and hoarseness.  This occurs as he is walking talking for long period of time he does describe weakness in his legs occurring when he is walking for periods of time.  Speech therapy provided some short-term relief.         MPRESSION:, Mri of brain 06/26/24      White matter changes suggestive of chronic microangiopathy.  No acute intracranial pathology. Chronic infarct left posterior temporal lobe.     NeuroQuant analysis was performed: Low hippocampal volume and enlarged inferior lateral and overall ventricular system, suggestive of global ex-vacuo dilatation.  The additional finding of an abnormal Hippocampal Occupancy Score, (HOC), is concerning for   a mesial temporal lobe focused Neurodegenerative process.  Recommend reevaluation with Neuroquant imaging in 6 months.   Tpartial                 Review of Systems   Constitutional:  Negative for appetite change, fatigue and fever.   HENT:  Positive for hearing loss and trouble swallowing. Negative for tinnitus and voice change.    Eyes: Negative.  Negative for photophobia, pain and visual disturbance.   Respiratory:  Positive for shortness of breath.    Cardiovascular: Negative.  Negative for palpitations.   Gastrointestinal: Negative.  Negative for nausea and vomiting.   Endocrine: Negative.  Negative for cold intolerance.   Genitourinary: Negative.  Negative for dysuria, frequency and urgency.   Musculoskeletal:  Negative for back pain, gait problem, myalgias, neck pain and neck stiffness.   Skin: Negative.  Negative for rash.    Allergic/Immunologic: Negative.    Neurological:  Positive for dizziness, speech difficulty and light-headedness. Negative for tremors, seizures, syncope, facial asymmetry, weakness, numbness and headaches.   Hematological: Negative.  Does not bruise/bleed easily.   Psychiatric/Behavioral:  Positive for confusion. Negative for hallucinations and sleep disturbance.    All other systems reviewed and are negative.    I have personally reviewed the MA's review of systems and made changes as necessary.    Medical History Reviewed by provider this encounter:  Meds       Current Outpatient Medications on File Prior to Visit   Medication Sig Dispense Refill    acyclovir (ZOVIRAX) 200 mg capsule Take 1 capsule (200 mg total) by mouth 5 (five) times a day for 5 days (Patient taking differently: Take 200 mg by mouth if needed Patient takes PRN) 25 capsule 0    aspirin (ECOTRIN LOW STRENGTH) 81 mg EC tablet Take 81 mg by mouth daily      atorvastatin (LIPITOR) 40 mg tablet TAKE 1 TABLET BY MOUTH EVERY DAY 90 tablet 1    azelastine (ASTELIN) 0.1 % nasal spray 2 sprays into each nostril 2 (two) times a day Use in each nostril as directed 30 mL 5    budesonide-formoterol (Symbicort) 80-4.5 MCG/ACT inhaler Inhale 2 puffs 2 (two) times a day Rinse mouth after use. 30.6 g 5    cholecalciferol (VITAMIN D3) 400 units tablet Take 1 tablet by mouth every morning      Continuous Blood Gluc Sensor (Dexcom G6 Sensor) MISC Change every 10 days. E10.65      Continuous Blood Gluc Transmit (Dexcom G6 Transmitter) MISC Change every 90 days. E10.65      fluticasone (FLONASE) 50 mcg/act nasal spray 2 sprays into each nostril daily      Insulin Disposable Pump (Omnipod 5 G6 Pods, Gen 5,) MISC INJECT 1 UNDER THE SKIN EVERY OTHER DAY. CHANGING EVERY 2 DAYS DUE TO HIGH INSULIN REQUIREMENT.      insulin glargine (LANTUS) 100 units/mL subcutaneous injection Inject under the skin PRN for when pod doesn't work      L-Methylfolate-B6-B12 (Foltanx)  "3-35-2 MG TABS Take 1 tablet by mouth in the morning 90 tablet 3    levalbuterol (XOPENEX) 1.25 mg/3 mL nebulizer solution Take 1.25 mg by nebulization 3 (three) times a day as needed for wheezing      montelukast (SINGULAIR) 10 mg tablet TAKE 1 TABLET BY MOUTH EVERYDAY AT BEDTIME 90 tablet 1    NovoLOG 100 UNIT/ML injection if needed      pantoprazole (PROTONIX) 20 mg tablet TAKE 1 TABLET (20 MG TOTAL) BY MOUTH DAILY AS NEEDED FOR HEARTBURN OR INDIGESTION.      pantoprazole (PROTONIX) 40 mg tablet TAKE 1 TABLET TWICE DAILY 30 MINS PRIOR TO BREAKFAST AND SUPPER. 180 tablet 3    rivaroxaban (Xarelto) 20 mg tablet TAKE 1 TABLET BY MOUTH DAILY WITH BREAKFAST 100 tablet 1    sildenafil (VIAGRA) 100 mg tablet Take 1 tablet (100 mg total) by mouth daily as needed for erectile dysfunction 10 tablet 1    sodium chloride 0.9 % nebulizer solution Take 3 mL by nebulization as needed for wheezing 90 mL 3    Gvoke HypoPen 2-Pack 1 MG/0.2ML SOAJ  (Patient not taking: Reported on 10/23/2024)      mupirocin (BACTROBAN) 2 % ointment APPLY TWICE DAILY TO AFFECTED AREA LEFT ARM (Patient not taking: Reported on 10/23/2024)       No current facility-administered medications on file prior to visit.      Social History     Tobacco Use    Smoking status: Never    Smokeless tobacco: Never   Vaping Use    Vaping status: Never Used   Substance and Sexual Activity    Alcohol use: Yes     Comment: 2 beers on a social occasion    Drug use: No    Sexual activity: Yes     Partners: Female     Birth control/protection: None     Objective   /78   Pulse 70   Temp 98.3 °F (36.8 °C) (Temporal)   Resp 14   Ht 5' 8\" (1.727 m)   Wt 73.4 kg (161 lb 12.8 oz)   SpO2 98%   BMI 24.60 kg/m²     Physical Exam  Neurological:      Mental Status: He is oriented to person, place, and time.      Coordination: Romberg Test normal.      Deep Tendon Reflexes:      Reflex Scores:       Bicep reflexes are 2+ on the right side and 2+ on the left side.       " Patellar reflexes are 2+ on the right side and 2+ on the left side.       Achilles reflexes are 1+ on the right side and 1+ on the left side.      Neurologic Exam     Mental Status   Oriented to person, place, and time.   Registration: recalls 2 of 3 objects.   Level of consciousness: alert    Cranial Nerves     CN II   Visual fields full to confrontation.     Motor Exam   Overall muscle tone: normalHe had slowness of relaxing his muscles but no percussion myotonia.  He has normal strength in the upper and lower extremities.     Sensory Exam   Light touch normal.     Gait, Coordination, and Reflexes     Coordination   Romberg: negative    Reflexes   Right biceps: 2+  Left biceps: 2+  Right patellar: 2+  Left patellar: 2+  Right achilles: 1+  Left achilles: 1+    May need to consider the use of Aricept or other medications depending on the emerging neuropsychological database.  I have also asked him to contact us if he continues to have weakness in his arms and legs after activity we may need to consider the use Tegretol or carbamazepine.      I have spent a total time of 26 minutes in caring for this patient on the day of the visit/encounter including Risks and benefits of tx options, Counseling / Coordination of care, Documenting in the medical record, Reviewing / ordering tests, medicine, procedures  , and Obtaining or reviewing history  .

## 2024-10-30 NOTE — ASSESSMENT & PLAN NOTE
Today Oliverio presents in a follow-up visit regarding his memory.  This is stable he continues to have short-term and long-term memory problems.  Today's MoCA was 28 out of 30 with abnormalities only in short-term recall.  His MRI of the brain was reviewed.  He denies any difficulty doing his bills driving.  He does have a neuropsychological appointment next week.    He denies any staring spells.

## 2024-10-30 NOTE — ASSESSMENT & PLAN NOTE
Oliverio is a 64-year-old patient with myotonic dystrophy.  He reports that he has become older he has less stiffness in his hands and improved ability to recoil.  Unfortunately he has noticed more weakness in his legs after ambulating for periods of time.  He denies any cramping numbness.  We discussed that there are medications available for cramping tingling and myotonic dystrophy patients at this point we will hold off due to his numerous other medical issues.    Discussed that this can also be one of the etiology of hoarseness difficulty swallowing.  He did undergo speech evaluation which helped for short period of time.      He continues to have shortness of breath and he is for cardiac cath tomorrow.

## 2024-10-30 NOTE — TELEPHONE ENCOUNTER
Team    Please send the patient's MoCA from today and my recent note to Dr. العراقي    [address] [place] [address] John.   Caitlin POWERS [id number]  Phone: [phone]

## 2024-10-31 ENCOUNTER — APPOINTMENT (INPATIENT)
Dept: NON INVASIVE DIAGNOSTICS | Facility: HOSPITAL | Age: 64
DRG: 233 | End: 2024-10-31
Payer: COMMERCIAL

## 2024-10-31 ENCOUNTER — HOSPITAL ENCOUNTER (INPATIENT)
Facility: HOSPITAL | Age: 64
Discharge: HOME | DRG: 233 | End: 2024-10-31
Attending: INTERNAL MEDICINE | Admitting: INTERNAL MEDICINE
Payer: COMMERCIAL

## 2024-10-31 ENCOUNTER — PREP FOR PROCEDURE (OUTPATIENT)
Dept: CARDIAC SURGERY | Facility: CLINIC | Age: 64
End: 2024-10-31

## 2024-10-31 ENCOUNTER — APPOINTMENT (INPATIENT)
Dept: RADIOLOGY | Facility: HOSPITAL | Age: 64
DRG: 233 | End: 2024-10-31
Payer: COMMERCIAL

## 2024-10-31 DIAGNOSIS — R06.09 DOE (DYSPNEA ON EXERTION): ICD-10-CM

## 2024-10-31 DIAGNOSIS — I25.10 CAD, MULTIPLE VESSEL: ICD-10-CM

## 2024-10-31 DIAGNOSIS — R42 LIGHTHEADED: ICD-10-CM

## 2024-10-31 DIAGNOSIS — R00.1 BRADYCARDIA: ICD-10-CM

## 2024-10-31 DIAGNOSIS — E11.9 DIABETES MELLITUS (HCC): ICD-10-CM

## 2024-10-31 DIAGNOSIS — I25.118 CORONARY ARTERY DISEASE OF NATIVE ARTERY OF NATIVE HEART WITH STABLE ANGINA PECTORIS (HCC): Primary | ICD-10-CM

## 2024-10-31 DIAGNOSIS — R16.1 SPLENOMEGALY: ICD-10-CM

## 2024-10-31 DIAGNOSIS — D69.6 THROMBOCYTOPENIA (HCC): ICD-10-CM

## 2024-10-31 DIAGNOSIS — C83.00 LYMPHOPLASMACYTIC LYMPHOMA (HCC): ICD-10-CM

## 2024-10-31 DIAGNOSIS — E13.9 LADA (LATENT AUTOIMMUNE DIABETES IN ADULTS), MANAGED AS TYPE 1 (HCC): ICD-10-CM

## 2024-10-31 DIAGNOSIS — D47.2 MONOCLONAL GAMMOPATHY: ICD-10-CM

## 2024-10-31 DIAGNOSIS — I25.118 CORONARY ARTERY DISEASE OF NATIVE ARTERY OF NATIVE HEART WITH STABLE ANGINA PECTORIS (HCC): ICD-10-CM

## 2024-10-31 DIAGNOSIS — Z95.1 S/P CABG (CORONARY ARTERY BYPASS GRAFT): Primary | ICD-10-CM

## 2024-10-31 DIAGNOSIS — E10.319 DIABETIC RETINOPATHY OF RIGHT EYE ASSOCIATED WITH TYPE 1 DIABETES MELLITUS, MACULAR EDEMA PRESENCE UNSPECIFIED, UNSPECIFIED RETINOPATHY SEVERITY (HCC): ICD-10-CM

## 2024-10-31 DIAGNOSIS — Z86.718 HISTORY OF RECURRENT DEEP VEIN THROMBOSIS (DVT): ICD-10-CM

## 2024-10-31 PROBLEM — E11.319 DIABETIC RETINOPATHY OF RIGHT EYE (HCC): Status: ACTIVE | Noted: 2024-10-31

## 2024-10-31 LAB
ABO GROUP BLD: NORMAL
ABO GROUP BLD: NORMAL
AORTIC ROOT: 3.1 CM
APICAL FOUR CHAMBER EJECTION FRACTION: 63 %
APTT PPP: 25 SECONDS (ref 23–34)
APTT PPP: 44 SECONDS (ref 23–34)
ASCENDING AORTA: 3.8 CM
ATRIAL RATE: 53 BPM
BLD GP AB SCN SERPL QL: NEGATIVE
BSA FOR ECHO PROCEDURE: 1.86 M2
CHOLEST SERPL-MCNC: 133 MG/DL
E WAVE DECELERATION TIME: 232 MS
E/A RATIO: 0.91
EST. AVERAGE GLUCOSE BLD GHB EST-MCNC: 194 MG/DL
FRACTIONAL SHORTENING: 33 (ref 28–44)
GLUCOSE SERPL-MCNC: 137 MG/DL (ref 65–140)
GLUCOSE SERPL-MCNC: 214 MG/DL (ref 65–140)
GLUCOSE SERPL-MCNC: 233 MG/DL (ref 65–140)
HBA1C MFR BLD: 8.4 %
HDLC SERPL-MCNC: 40 MG/DL
INR PPP: 1.11 (ref 0.85–1.19)
INTERVENTRICULAR SEPTUM IN DIASTOLE (PARASTERNAL SHORT AXIS VIEW): 1 CM
INTERVENTRICULAR SEPTUM: 1 CM (ref 0.6–1.1)
LA/AORTA RATIO 2D: 1.19
LAAS-AP2: 15.5 CM2
LAAS-AP4: 15 CM2
LDLC SERPL CALC-MCNC: 51 MG/DL (ref 0–100)
LEFT ATRIUM SIZE: 3.7 CM
LEFT ATRIUM VOLUME (MOD BIPLANE): 42 ML
LEFT ATRIUM VOLUME INDEX (MOD BIPLANE): 22.6 ML/M2
LEFT INTERNAL DIMENSION IN SYSTOLE: 2.7 CM (ref 2.1–4)
LEFT VENTRICULAR INTERNAL DIMENSION IN DIASTOLE: 4 CM (ref 3.5–6)
LEFT VENTRICULAR POSTERIOR WALL IN END DIASTOLE: 1.1 CM
LEFT VENTRICULAR STROKE VOLUME: 44 ML
LVSV (TEICH): 44 ML
MV E'TISSUE VEL-SEP: 10 CM/S
MV PEAK A VEL: 0.79 M/S
MV PEAK E VEL: 72 CM/S
MV STENOSIS PRESSURE HALF TIME: 68 MS
MV VALVE AREA P 1/2 METHOD: 3.24
NONHDLC SERPL-MCNC: 93 MG/DL
P AXIS: -6 DEGREES
PR INTERVAL: 150 MS
PROTHROMBIN TIME: 14.6 SECONDS (ref 12.3–15)
QRS AXIS: -7 DEGREES
QRSD INTERVAL: 92 MS
QT INTERVAL: 462 MS
QTC INTERVAL: 433 MS
RA PRESSURE ESTIMATED: 3 MMHG
RH BLD: POSITIVE
RH BLD: POSITIVE
SL CV LV EF: 55
SL CV PED ECHO LEFT VENTRICLE DIASTOLIC VOLUME (MOD BIPLANE) 2D: 71 ML
SL CV PED ECHO LEFT VENTRICLE SYSTOLIC VOLUME (MOD BIPLANE) 2D: 28 ML
SPECIMEN EXPIRATION DATE: NORMAL
T WAVE AXIS: 26 DEGREES
TRICUSPID ANNULAR PLANE SYSTOLIC EXCURSION: 2.1 CM
TRIGL SERPL-MCNC: 209 MG/DL
VENTRICULAR RATE: 53 BPM

## 2024-10-31 PROCEDURE — 3052F HG A1C>EQUAL 8.0%<EQUAL 9.0%: CPT | Performed by: PSYCHIATRY & NEUROLOGY

## 2024-10-31 PROCEDURE — C1894 INTRO/SHEATH, NON-LASER: HCPCS | Performed by: INTERNAL MEDICINE

## 2024-10-31 PROCEDURE — 82948 REAGENT STRIP/BLOOD GLUCOSE: CPT

## 2024-10-31 PROCEDURE — 93010 ELECTROCARDIOGRAM REPORT: CPT | Performed by: INTERNAL MEDICINE

## 2024-10-31 PROCEDURE — 93306 TTE W/DOPPLER COMPLETE: CPT

## 2024-10-31 PROCEDURE — 93005 ELECTROCARDIOGRAM TRACING: CPT

## 2024-10-31 PROCEDURE — 86901 BLOOD TYPING SEROLOGIC RH(D): CPT | Performed by: PHYSICIAN ASSISTANT

## 2024-10-31 PROCEDURE — C1769 GUIDE WIRE: HCPCS | Performed by: INTERNAL MEDICINE

## 2024-10-31 PROCEDURE — 99223 1ST HOSP IP/OBS HIGH 75: CPT | Performed by: INTERNAL MEDICINE

## 2024-10-31 PROCEDURE — 71046 X-RAY EXAM CHEST 2 VIEWS: CPT

## 2024-10-31 PROCEDURE — 87081 CULTURE SCREEN ONLY: CPT | Performed by: PHYSICIAN ASSISTANT

## 2024-10-31 PROCEDURE — 93454 CORONARY ARTERY ANGIO S&I: CPT | Performed by: INTERNAL MEDICINE

## 2024-10-31 PROCEDURE — 99223 1ST HOSP IP/OBS HIGH 75: CPT | Performed by: THORACIC SURGERY (CARDIOTHORACIC VASCULAR SURGERY)

## 2024-10-31 PROCEDURE — 93971 EXTREMITY STUDY: CPT | Performed by: SURGERY

## 2024-10-31 PROCEDURE — 83036 HEMOGLOBIN GLYCOSYLATED A1C: CPT | Performed by: PHYSICIAN ASSISTANT

## 2024-10-31 PROCEDURE — 86900 BLOOD TYPING SEROLOGIC ABO: CPT | Performed by: PHYSICIAN ASSISTANT

## 2024-10-31 PROCEDURE — B2111ZZ FLUOROSCOPY OF MULTIPLE CORONARY ARTERIES USING LOW OSMOLAR CONTRAST: ICD-10-PCS | Performed by: INTERNAL MEDICINE

## 2024-10-31 PROCEDURE — 80061 LIPID PANEL: CPT | Performed by: PHYSICIAN ASSISTANT

## 2024-10-31 PROCEDURE — 85610 PROTHROMBIN TIME: CPT

## 2024-10-31 PROCEDURE — 85730 THROMBOPLASTIN TIME PARTIAL: CPT

## 2024-10-31 PROCEDURE — 99222 1ST HOSP IP/OBS MODERATE 55: CPT | Performed by: INTERNAL MEDICINE

## 2024-10-31 PROCEDURE — 93971 EXTREMITY STUDY: CPT

## 2024-10-31 PROCEDURE — 93306 TTE W/DOPPLER COMPLETE: CPT | Performed by: INTERNAL MEDICINE

## 2024-10-31 PROCEDURE — 85730 THROMBOPLASTIN TIME PARTIAL: CPT | Performed by: INTERNAL MEDICINE

## 2024-10-31 PROCEDURE — 99152 MOD SED SAME PHYS/QHP 5/>YRS: CPT | Performed by: INTERNAL MEDICINE

## 2024-10-31 PROCEDURE — 99153 MOD SED SAME PHYS/QHP EA: CPT | Performed by: INTERNAL MEDICINE

## 2024-10-31 PROCEDURE — 86850 RBC ANTIBODY SCREEN: CPT | Performed by: PHYSICIAN ASSISTANT

## 2024-10-31 RX ORDER — ASPIRIN 81 MG/1
81 TABLET ORAL DAILY
Status: DISCONTINUED | OUTPATIENT
Start: 2024-10-31 | End: 2024-10-31

## 2024-10-31 RX ORDER — FENTANYL CITRATE 50 UG/ML
INJECTION, SOLUTION INTRAMUSCULAR; INTRAVENOUS CODE/TRAUMA/SEDATION MEDICATION
Status: DISCONTINUED | OUTPATIENT
Start: 2024-10-31 | End: 2024-10-31 | Stop reason: HOSPADM

## 2024-10-31 RX ORDER — ASPIRIN 81 MG/1
324 TABLET, CHEWABLE ORAL ONCE
Status: COMPLETED | OUTPATIENT
Start: 2024-10-31 | End: 2024-10-31

## 2024-10-31 RX ORDER — HEPARIN SODIUM 1000 [USP'U]/ML
INJECTION, SOLUTION INTRAVENOUS; SUBCUTANEOUS CODE/TRAUMA/SEDATION MEDICATION
Status: DISCONTINUED | OUTPATIENT
Start: 2024-10-31 | End: 2024-10-31 | Stop reason: HOSPADM

## 2024-10-31 RX ORDER — SODIUM CHLORIDE 9 MG/ML
75 INJECTION, SOLUTION INTRAVENOUS CONTINUOUS
Status: DISCONTINUED | OUTPATIENT
Start: 2024-10-31 | End: 2024-10-31

## 2024-10-31 RX ORDER — CHLORHEXIDINE GLUCONATE ORAL RINSE 1.2 MG/ML
15 SOLUTION DENTAL EVERY 12 HOURS SCHEDULED
Status: DISCONTINUED | OUTPATIENT
Start: 2024-10-31 | End: 2024-10-31 | Stop reason: HOSPADM

## 2024-10-31 RX ORDER — ACETAMINOPHEN 325 MG/1
650 TABLET ORAL EVERY 4 HOURS PRN
Status: DISCONTINUED | OUTPATIENT
Start: 2024-10-31 | End: 2024-11-06

## 2024-10-31 RX ORDER — HEPARIN SODIUM 1000 [USP'U]/ML
2800 INJECTION, SOLUTION INTRAVENOUS; SUBCUTANEOUS EVERY 6 HOURS PRN
Status: DISCONTINUED | OUTPATIENT
Start: 2024-10-31 | End: 2024-11-06

## 2024-10-31 RX ORDER — BUDESONIDE AND FORMOTEROL FUMARATE DIHYDRATE 80; 4.5 UG/1; UG/1
2 AEROSOL RESPIRATORY (INHALATION) 2 TIMES DAILY
Status: DISCONTINUED | OUTPATIENT
Start: 2024-10-31 | End: 2024-11-06

## 2024-10-31 RX ORDER — VERAPAMIL HCL 2.5 MG/ML
AMPUL (ML) INTRAVENOUS CODE/TRAUMA/SEDATION MEDICATION
Status: DISCONTINUED | OUTPATIENT
Start: 2024-10-31 | End: 2024-10-31 | Stop reason: HOSPADM

## 2024-10-31 RX ORDER — NITROGLYCERIN 20 MG/100ML
INJECTION INTRAVENOUS CODE/TRAUMA/SEDATION MEDICATION
Status: DISCONTINUED | OUTPATIENT
Start: 2024-10-31 | End: 2024-10-31 | Stop reason: HOSPADM

## 2024-10-31 RX ORDER — FLUTICASONE PROPIONATE 50 MCG
2 SPRAY, SUSPENSION (ML) NASAL DAILY
Status: DISCONTINUED | OUTPATIENT
Start: 2024-10-31 | End: 2024-11-11 | Stop reason: HOSPADM

## 2024-10-31 RX ORDER — LEVALBUTEROL INHALATION SOLUTION 1.25 MG/3ML
1.25 SOLUTION RESPIRATORY (INHALATION) 3 TIMES DAILY PRN
Status: DISCONTINUED | OUTPATIENT
Start: 2024-10-31 | End: 2024-11-01

## 2024-10-31 RX ORDER — MONTELUKAST SODIUM 10 MG/1
10 TABLET ORAL
Status: DISCONTINUED | OUTPATIENT
Start: 2024-10-31 | End: 2024-11-11 | Stop reason: HOSPADM

## 2024-10-31 RX ORDER — CLOPIDOGREL BISULFATE 75 MG/1
TABLET ORAL CODE/TRAUMA/SEDATION MEDICATION
Status: DISCONTINUED | OUTPATIENT
Start: 2024-10-31 | End: 2024-10-31 | Stop reason: HOSPADM

## 2024-10-31 RX ORDER — HEPARIN SODIUM 1000 [USP'U]/ML
5600 INJECTION, SOLUTION INTRAVENOUS; SUBCUTANEOUS EVERY 6 HOURS PRN
Status: DISCONTINUED | OUTPATIENT
Start: 2024-10-31 | End: 2024-11-06

## 2024-10-31 RX ORDER — PANTOPRAZOLE SODIUM 40 MG/1
40 TABLET, DELAYED RELEASE ORAL
Status: DISCONTINUED | OUTPATIENT
Start: 2024-10-31 | End: 2024-11-06

## 2024-10-31 RX ORDER — ATORVASTATIN CALCIUM 40 MG/1
40 TABLET, FILM COATED ORAL
Status: DISCONTINUED | OUTPATIENT
Start: 2024-10-31 | End: 2024-11-06

## 2024-10-31 RX ORDER — MIDAZOLAM HYDROCHLORIDE 2 MG/2ML
INJECTION, SOLUTION INTRAMUSCULAR; INTRAVENOUS CODE/TRAUMA/SEDATION MEDICATION
Status: DISCONTINUED | OUTPATIENT
Start: 2024-10-31 | End: 2024-10-31 | Stop reason: HOSPADM

## 2024-10-31 RX ORDER — SODIUM CHLORIDE 9 MG/ML
125 INJECTION, SOLUTION INTRAVENOUS CONTINUOUS
Status: DISCONTINUED | OUTPATIENT
Start: 2024-10-31 | End: 2024-10-31

## 2024-10-31 RX ORDER — HEPARIN SODIUM 10000 [USP'U]/100ML
3-30 INJECTION, SOLUTION INTRAVENOUS
Status: DISCONTINUED | OUTPATIENT
Start: 2024-10-31 | End: 2024-11-06

## 2024-10-31 RX ORDER — ASPIRIN 81 MG/1
81 TABLET ORAL DAILY
Status: DISCONTINUED | OUTPATIENT
Start: 2024-11-01 | End: 2024-11-06

## 2024-10-31 RX ORDER — NITROGLYCERIN 0.4 MG/1
0.4 TABLET SUBLINGUAL
Status: DISCONTINUED | OUTPATIENT
Start: 2024-10-31 | End: 2024-11-06

## 2024-10-31 RX ORDER — LIDOCAINE HYDROCHLORIDE 10 MG/ML
INJECTION, SOLUTION EPIDURAL; INFILTRATION; INTRACAUDAL; PERINEURAL CODE/TRAUMA/SEDATION MEDICATION
Status: DISCONTINUED | OUTPATIENT
Start: 2024-10-31 | End: 2024-10-31 | Stop reason: HOSPADM

## 2024-10-31 RX ADMIN — MONTELUKAST 10 MG: 10 TABLET, FILM COATED ORAL at 21:09

## 2024-10-31 RX ADMIN — HEPARIN SODIUM 2800 UNITS: 1000 INJECTION INTRAVENOUS; SUBCUTANEOUS at 21:50

## 2024-10-31 RX ADMIN — PANTOPRAZOLE SODIUM 40 MG: 40 TABLET, DELAYED RELEASE ORAL at 13:26

## 2024-10-31 RX ADMIN — BUDESONIDE AND FORMOTEROL FUMARATE DIHYDRATE 2 PUFF: 80; 4.5 AEROSOL RESPIRATORY (INHALATION) at 23:05

## 2024-10-31 RX ADMIN — HEPARIN SODIUM 18 UNITS/KG/HR: 10000 INJECTION, SOLUTION INTRAVENOUS at 13:54

## 2024-10-31 RX ADMIN — SODIUM CHLORIDE 125 ML/HR: 0.9 INJECTION, SOLUTION INTRAVENOUS at 07:28

## 2024-10-31 RX ADMIN — ACETAMINOPHEN 650 MG: 325 TABLET, FILM COATED ORAL at 21:09

## 2024-10-31 RX ADMIN — FLUTICASONE PROPIONATE 2 SPRAY: 50 SPRAY, METERED NASAL at 21:10

## 2024-10-31 RX ADMIN — ASPIRIN 81 MG CHEWABLE TABLET 324 MG: 81 TABLET CHEWABLE at 07:27

## 2024-10-31 RX ADMIN — ACETAMINOPHEN 650 MG: 325 TABLET, FILM COATED ORAL at 16:47

## 2024-10-31 RX ADMIN — ATORVASTATIN CALCIUM 40 MG: 40 TABLET, FILM COATED ORAL at 16:47

## 2024-10-31 NOTE — ASSESSMENT & PLAN NOTE
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  Follows with heme-onc as an outpatient.  Bone marrow biopsy in August 2024 showing low-grade CD5 positive B-cell lymphoma as well.  Per most recent heme-onc note, no urgent intervention and follow-up in 3 months    Plan:  - Continue to follow-up outpatient

## 2024-10-31 NOTE — ASSESSMENT & PLAN NOTE
Insulin pump Omnipod 5 - Settings:   In automated mode  basal rate 1.7 u/hr, Max basal of 3.2 u/hr  I:C ratio 5:10  Target glc 110-120 mg/dL   Correction factor 20 mg/dL   Active insulin time 3 h

## 2024-10-31 NOTE — ASSESSMENT & PLAN NOTE
Lab Results   Component Value Date    HGBA1C 8.4 (H) 10/31/2024       Recent Labs     10/31/24  1131 10/31/24  1708 10/31/24  2022 11/01/24  0818   POCGLU 214* 233* 137 124       Blood Sugar Average: Last 72 hrs:  (P) 177    Patient with a history of L ADA first diagnosed 7-8 years ago currently maintained on insulin pump    Per Endo, discontinue insulin pump and  initiate insulin gtt on the day of CABG    Plan:  - Endocrinology consulted.  Appreciate recommendations  - Per Endo, continue insulin pump at this time as patient can self administer insulin via insulin pump  - POCT 4 times daily before meals and at bedtime  - Hypoglycemia protocol  - Diabetic diet

## 2024-10-31 NOTE — ASSESSMENT & PLAN NOTE
History of thrombocytopenia baseline in the low 100s.  Likely in the setting of low grade CD5 positive B-cell lymphoma    Platelet stable at 118 11/1/24    Plan:  - Continue to monitor with CBC daily

## 2024-10-31 NOTE — ASSESSMENT & PLAN NOTE
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  Discussed with nursing.  On the morning prior to cardiac surgery, plan to start insulin infusion at 6 AM and stop insulin via pump at that time  Patient to continue monitoring sugars via Dexcom G6  Continue fingerstick glucose 4 times a day prior to meals and at bedtime  Diabetic diet  Hypoglycemia protocol  Endocrine will continue to follow and make recommendations

## 2024-10-31 NOTE — ASSESSMENT & PLAN NOTE
Patient with a history of CAD s/p PCI to RCA in 2004 who presented to the hospital for an outpatient LHC due to progressive LOJA over the last 2-3 years.  Found to have multivessel CAD as mentioned below.  Patient admitted for CABG evaluation    LHC findings:    Dist LM lesion is 80% stenosed.    Prox Cx to Mid Cx lesion is 90% stenosed.    Mid RCA lesion is 90% stenosed.    Echo 10/31/24:  EF 55% with G1DD. Mild concentric hypertrophy. Hypokinetic in basal inferolateral and mid inferolateral segments. Trace TR and mildly dilated ascending aorta 3.8 cm.     VAS vein mapping: patent great saphenous veins in lower extremities.    Plan:  - Cardiothoracic surgery consulted with CABG planned on 11/6/24 (Wed)   - Continue to monitor on telemetry  - Continue to hold Xarelto (for DVT)   - Continue Heparin gtt   - Continue Lipitor 40 mg daily  - Low threshold for serial EKG/troponins if necessary  - PRN nitro sublingual for chest pain  - Avoid AV salvador blocking agents given symptomatic bradycardia

## 2024-10-31 NOTE — ASSESSMENT & PLAN NOTE
Patient with an incidental finding of splenic artery aneurysm earlier this year.  Follows with vascular who recommended repeat mesenteric duplex in March 2025

## 2024-10-31 NOTE — H&P
INTERNAL MEDICINE RESIDENCY ADMISSION H&P     Name: Otoniel Martinez   Age & Sex: 64 y.o. 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  Found to have multivessel CAD as mentioned below.  Patient admitted for CABG evaluation    LHC findings:    Dist LM lesion is 80% stenosed.    Prox Cx to Mid Cx lesion is 90% stenosed.    Mid RCA lesion is 90% stenosed.    Plan:  - Cardiothoracic surgery consulted.  Appreciate recommendations  - Monitor on telemetry  - Xarelto (for DVT) held and patient started on heparin gtt. 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  Appreciate recommendations  - Continue insulin pump until evaluated by endocrinology      History of recurrent deep vein thrombosis (DVT)  Assessment & Plan  Patient with a history of recurrent DVTs maintained on Xarelto indefinitely    Plan:  - Holding Xarelto prior to CTS evaluation.  Most recent dose on 10/29  - Start heparin gtt. for anticoagulation    Monoclonal gammopathy  Assessment & Plan  Patient with a history of 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  Follows with heme-onc as an outpatient.  Bone marrow biopsy in August 2024 showing low-grade CD5 positive B-cell lymphoma as well.  Per most recent heme-onc note, no urgent intervention and follow-up in 3 months    Plan:  - Continue to follow-up outpatient    Left main coronary artery disease  Assessment & Plan  See plan under multivessel CAD    Splenic artery aneurysm (HCC)  Assessment & Plan  Patient with an incidental finding of splenic artery aneurysm earlier this year.  Follows with vascular who recommended repeat mesenteric duplex in March 2025      Obstructive sleep apnea  Assessment & Plan  Patient with a history of KULDIP had been on CPAP in the past but now only uses oxygen nightly    Plan:  - Continue oxygen as needed nightly  - Outpatient sleep study ordered by sleep medicine    Insulin pump status  Assessment & Plan  See plan under KAMI    Chronic bronchitis (HCC)  Assessment & Plan  Patient with a history of chronic bronchitis maintained on Symbicort    Plan:  - Continue PTA Symbicort    Mixed hyperlipidemia  Assessment & Plan  History of hyperlipidemia maintained on Lipitor 40 mg daily    Plan:  - Lipid panel ordered as part of CABG eval  - Continue PTA Lipitor 40 mg daily    Thrombocytopenia (HCC)  Assessment & Plan  History of thrombocytopenia baseline in the low 100s.  Likely in the setting of low grade CD5 positive B-cell lymphoma    Plan:  - Trend CBC daily        VTE Pharmacologic Prophylaxis:  Heparin  VTE Mechanical Prophylaxis: sequential compression device    CHIEF COMPLAINT   No chief complaint on file.     HISTORY OF PRESENT ILLNESS     Patient is 65 y/o male with a PMHx of CAD s/p PCI to RCA in 2004, KAMI (A1c 8.4%), recurrent DVT on Xarelto, IgM kappa gammopathy, low-grade CD5 positive B-cell lymphoma, chronic bronchitis, KULDIP not on CPAP and on 3LNC at night, and myotonic dystrophy who presented to the hospital for an outpatient LHC due to progressive dyspnea on exertion over the last 2-3 years.  LHC was performed this morning, which showed multivessel CAD as mentioned above.  Cardiology requested direct admission for CABG evaluation.    Patient seen and evaluated at the bedside.  He reports dyspnea on exertion over the last few years, but does not think that it has progressed recently.  He notes shortness of breath while walking up hills but otherwise does not notice it.  He is still able to golf and play with his grandchildren without symptoms.  With that said, his wife feels that it has gotten worse over the last few months, which is what prompted the LHC.  He currently denies lightheadedness, dizziness, chest pain, shortness of breath, palpitations, nausea, vomiting, constipation or diarrhea.  He is being admitted to [facility] for further management.     REVIEW OF SYSTEMS     Review of Systems   Constitutional:  Negative for activity change, appetite change, chills, diaphoresis, fatigue, fever and unexpected weight change.   HENT:  Negative for hearing loss.    Respiratory:  Negative for chest tightness, shortness of breath and wheezing.    Cardiovascular:  Negative for chest pain, palpitations and leg swelling.   Gastrointestinal:  Negative for abdominal pain.   Genitourinary:  Negative for dysuria, frequency and hematuria.   Neurological:  Positive for dizziness. Negative for syncope, light-headedness, numbness and headaches.     OBJECTIVE     Vitals:    10/31/24 1200 10/31/24 1300 10/31/24 1507  "10/31/24 1527   BP: 118/69 125/75 112/67    BP Location:   Left arm    Pulse: 57 (!) 54 (!) 49 (!) 48   Resp:   16    Temp:    98.6 °F (37 °C)   TempSrc:       SpO2:   97% 97%   Weight:  73 kg (161 lb)     Height:  5' 8\" (1.727 m)        Temperature:   Temp (24hrs), Av.2 °F (36.8 °C), Min:97.7 °F (36.5 °C), Max:98.6 °F (37 °C)    Temperature: 98.6 °F (37 °C)  Intake & Output:  I/O       None          Weights:   IBW (Ideal Body Weight): 68.4 kg    Body mass index is 24.48 kg/m².  Weight (last 2 days)       Date/Time Weight    10/31/24 1300 73 (161)    10/31/24 1020 --    Comment rows:    OBSERV: Transferred by stretcher with nurse to [facility].  Received by FAUSTO Zazueta and care assumed.  OP site assessed and remains stable.  Safety addressed before leaving bedside. at 10/31/24 1020    10/31/24 0700 73 (161)          Physical Exam  Constitutional:       Appearance: Normal appearance.   HENT:      Head: Normocephalic and atraumatic.   Eyes:      Conjunctiva/sclera: Conjunctivae normal.   Cardiovascular:      Rate and Rhythm: Normal rate and regular rhythm.      Pulses: Normal pulses.      Heart sounds: Normal heart sounds.   Pulmonary:      Effort: Pulmonary effort is normal.      Breath sounds: Normal breath sounds.   Abdominal:      General: There is no distension.      Palpations: Abdomen is soft. There is no mass.      Tenderness: There is no abdominal tenderness. There is no guarding.   Musculoskeletal:         General: No swelling or tenderness.      Right lower leg: No edema.      Left lower leg: No edema.   Skin:     General: Skin is warm and dry.      Capillary Refill: Capillary refill takes less than 2 seconds.      Coloration: Skin is not jaundiced.   Neurological:      General: No focal deficit present.      Mental Status: He is alert and oriented to person, place, and time.   Psychiatric:         Mood and Affect: Mood normal.         Behavior: Behavior normal.         Thought Content: Thought content normal. "       PAST MEDICAL HISTORY     Past Medical History:   Diagnosis Date    Acute respiratory failure with hypoxia (HCC) 02/08/2024    CAD (coronary artery disease)     Congenital myotonia     Deep vein thrombosis (DVT) (HCC)     after tear of gastrocnemus muscle    Diabetes (HCC)     Diabetes mellitus (HCC)     Difficulty walking     hip replacement    HL (hearing loss)     decreased both  ears    Myocardial infarction (HCC)     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    Pancreatitis     three times    Sleep apnea     not using a C-PAP    Superficial thrombophlebitis     Vision loss     reading glasses over the counter     PAST SURGICAL HISTORY     Past Surgical History:   Procedure Laterality Date    CAROTID STENT      CHOLECYSTECTOMY      CIRCUMCISION      Elective    COLONOSCOPY      complete, polyps 2 years ago    CORONARY ANGIOPLASTY WITH STENT PLACEMENT      stent to RCA 2004    INGUINAL HERNIA REPAIR      IR BIOPSY BONE MARROW  8/7/2024    ORIF RADIAL SHAFT FRACTURE Left     Open Treatment of Multiple Fractures of the Radial Shaft    ORIF ULNAR / RADIAL SHAFT FRACTURE Left     Open Treatment of Multiple Fractures of the Ulnar Shaft    TONSILLECTOMY AND ADENOIDECTOMY       SOCIAL & FAMILY HISTORY     Social History     Substance and Sexual Activity   Alcohol Use Yes    Comment: 2 beers on a social occasion       Social History     Substance and Sexual Activity   Drug Use No     Social History     Tobacco Use   Smoking Status Never   Smokeless Tobacco Never     Family History   Problem Relation Age of Onset    Brain cancer Mother     Clotting disorder Mother     Heart disease Mother     Cancer Mother     Hyperlipidemia Mother     Stroke Father     Deep vein thrombosis Father     Emphysema Father     Coronary artery disease Paternal Uncle     Diabetes Maternal Uncle      LABORATORY DATA     Labs: I have personally reviewed pertinent reports.    Results from last 7 days   Lab Units 10/25/24  0712   WBC Thousand/uL  3.16*   HEMOGLOBIN g/dL 13.1   HEMATOCRIT % 40.9   PLATELETS Thousands/uL 120*   SEGS PCT % 64   MONO PCT % 13*   EOS PCT % 7*      Results from last 7 days   Lab Units 10/25/24  0712   POTASSIUM mmol/L 4.5   CHLORIDE mmol/L 106   CO2 mmol/L 31   BUN mg/dL 15   CREATININE mg/dL 0.54*   CALCIUM mg/dL 8.6              Results from last 7 days   Lab Units 10/31/24  1315   INR  1.11   PTT seconds 25             Micro:  Lab Results   Component Value Date    SPUTUMCULTUR 3+ Growth of 02/07/2024     IMAGING & DIAGNOSTIC TESTS     Imaging: I have personally reviewed pertinent reports.    No results found.  EKG, Pathology, and Other Studies: I have personally reviewed pertinent reports.     ALLERGIES     Allergies   Allergen Reactions    Other Cough     Grass / dogs hair      MEDICATIONS PRIOR TO ARRIVAL     Prior to Admission medications    Medication Sig Start Date End Date Taking? Authorizing Provider   aspirin (ECOTRIN LOW STRENGTH) 81 mg EC tablet Take 81 mg by mouth daily   Yes Historical Provider, MD   atorvastatin (LIPITOR) 40 mg tablet TAKE 1 TABLET BY MOUTH EVERY DAY 2/13/24  Yes Carlos Hollins,    azelastine (ASTELIN) 0.1 % nasal spray 2 sprays into each nostril 2 (two) times a day Use in each nostril as directed 10/15/24  Yes Tomas Solano MD   budesonide-formoterol (Symbicort) 80-4.5 MCG/ACT inhaler Inhale 2 puffs 2 (two) times a day Rinse mouth after use. 2/6/24  Yes Tomas Solano MD   cholecalciferol (VITAMIN D3) 400 units tablet Take 1 tablet by mouth every morning   Yes Historical Provider, MD   Continuous Blood Gluc Sensor (Dexcom G6 Sensor) MISC Change every 10 days. E10.65 10/3/22  Yes Historical Provider, MD   Continuous Blood Gluc Transmit (Dexcom G6 Transmitter) MISC Change every 90 days. E10.65 10/3/22  Yes Historical Provider, MD   fluticasone (FLONASE) 50 mcg/act nasal spray 2 sprays into each nostril daily   Yes Historical Provider, MD Garcia HypoPen 2-Pack 1  MG/0.2ML SOAJ  10/21/22  Yes Historical Provider, MD   Insulin Disposable Pump (Omnipod 5 G6 Pods, Gen 5,) MISC INJECT 1 UNDER THE SKIN EVERY OTHER DAY. CHANGING EVERY 2 DAYS DUE TO HIGH INSULIN REQUIREMENT. 4/9/24  Yes Historical Provider, MD   insulin glargine (LANTUS) 100 units/mL subcutaneous injection Inject under the skin PRN for when pod doesn't work   Yes Historical Provider, MD   L-Methylfolate-B6-B12 (Foltanx) 3-35-2 MG TABS Take 1 tablet by mouth in the morning 9/9/24  Yes Wandy Cheatham, DO   levalbuterol (XOPENEX) 1.25 mg/3 mL nebulizer solution Take 1.25 mg by nebulization 3 (three) times a day as needed for wheezing 8/25/24  Yes Historical Provider, MD   montelukast (SINGULAIR) 10 mg tablet TAKE 1 TABLET BY MOUTH EVERYDAY AT BEDTIME 8/25/24  Yes Wandy Cheatham, DO   mupirocin (BACTROBAN) 2 % ointment  9/25/24  Yes Historical Provider, MD   NovoLOG 100 UNIT/ML injection if needed 4/24/24  Yes Historical Provider, MD   pantoprazole (PROTONIX) 20 mg tablet TAKE 1 TABLET (20 MG TOTAL) BY MOUTH DAILY AS NEEDED FOR HEARTBURN OR INDIGESTION. 9/27/24  Yes Historical Provider, MD   pantoprazole (PROTONIX) 40 mg tablet TAKE 1 TABLET TWICE DAILY 30 MINS PRIOR TO BREAKFAST AND SUPPER. 8/5/24  Yes Keke Howard PA-C   sildenafil (VIAGRA) 100 mg tablet Take 1 tablet (100 mg total) by mouth daily as needed for erectile dysfunction 1/24/22  Yes Jt Grey, DO   sodium chloride 0.9 % nebulizer solution Take 3 mL by nebulization as needed for wheezing 2/11/24  Yes Amos Le MD   acyclovir (ZOVIRAX) 200 mg capsule Take 1 capsule (200 mg total) by mouth 5 (five) times a day for 5 days  Patient taking differently: Take 200 mg by mouth if needed Patient takes PRN 1/24/22 10/30/24  Jt Grey, DO   rivaroxaban (Xarelto) 20 mg tablet TAKE 1 TABLET BY MOUTH DAILY WITH BREAKFAST 7/2/24   Rujul Hollins, DO     MEDICATIONS ADMINISTERED IN LAST 24 HOURS     Medication Administration - last 24 hours  from 10/30/2024 1643 to 10/31/2024 1643         Date/Time Order Dose Route Action Action by     10/31/2024 1014 EDT sodium chloride 0.9 % infusion 0 mL/hr Intravenous Stopped Jesus Zazueta RN     10/31/2024 0728 EDT sodium chloride 0.9 % infusion 125 mL/hr Intravenous New Bag Isreal Ross RN     10/31/2024 0727 EDT aspirin chewable tablet 324 mg 324 mg Oral Given Isreal Rsos RN     10/31/2024 1513 EDT sodium chloride 0.9 % infusion 0 mL/hr Intravenous Stopped Marline Rdz RN     10/31/2024 1014 EDT sodium chloride 0.9 % infusion 75 mL/hr Intravenous Continued from OR Jesus Zazueta RN     10/31/2024 1335 EDT budesonide-formoterol (SYMBICORT) 80-4.5 MCG/ACT inhaler 2 puff 2 puff Inhalation Not Given Jesus Zazueta RN     10/31/2024 1326 EDT pantoprazole (PROTONIX) EC tablet 40 mg 40 mg Oral Given Jesus Zazueta RN     10/31/2024 1323 EDT heparin (VTE/PE) high 0 Units Intravenous Hold Jesus Zazueta RN     10/31/2024 1516 EDT heparin (porcine) 25,000 units in 0.45% NaCl 250 mL infusion (premix) 18 Units/kg/hr Intravenous Rate/Dose Verify Marline Rdz RN     10/31/2024 1354 EDT heparin (porcine) 25,000 units in 0.45% NaCl 250 mL infusion (premix) 18 Units/kg/hr Intravenous New Bag Jesus Zazueta RN          CURRENT MEDICATIONS     Current Facility-Administered Medications   Medication Dose Route Frequency Provider Last Rate    acetaminophen  650 mg Oral Q4H PRN Faustino Jefferson MD      [START ON 11/1/2024] aspirin  81 mg Oral Daily Jamyesenia Mendez DO      atorvastatin  40 mg Oral Daily With Dinner Faustino Jefferson MD      budesonide-formoterol  2 puff Inhalation BID Faustino Jefferson MD      fluticasone  2 spray Nasal Daily Faustino Jefferson MD      heparin (porcine)  3-30 Units/kg/hr (Order-Specific) Intravenous Titrated Jameal Hadeed, DO 18 Units/kg/hr (10/31/24 8256)    heparin (porcine)  2,800 Units Intravenous Q6H PRN Jameal Hadeed, DO      heparin (porcine)  5,600 Units Intravenous Q6H PRN Jameal Hadeed, DO       "levalbuterol  1.25 mg Nebulization TID PRN Faustino Jefferson MD      montelukast  10 mg Oral HS Faustino Jefferson MD      nitroglycerin  0.4 mg Sublingual Q5 Min PRN Faustino Jefferson MD      pantoprazole  40 mg Oral Early Morning Faustino Jefferson MD       heparin (porcine), 3-30 Units/kg/hr (Order-Specific), Last Rate: 18 Units/kg/hr (10/31/24 1516)      acetaminophen, 650 mg, Q4H PRN  heparin (porcine), 2,800 Units, Q6H PRN  heparin (porcine), 5,600 Units, Q6H PRN  levalbuterol, 1.25 mg, TID PRN  nitroglycerin, 0.4 mg, Q5 Min PRN        Admission Time  I spent 30 minutes admitting the patient.  This involved direct patient contact where I performed a full history and physical, reviewing previous records, and reviewing laboratory and other diagnostic studies.    Portions of the record may have been created with voice recognition software.  Occasional wrong word or \"sound a like\" substitutions may have occurred due to the inherent limitations of voice recognition software.  Read the chart carefully and recognize, using context, where substitutions have occurred.    ==  Charanjit Mendez DO  [facility]  Internal Medicine Residency PGY-2   "

## 2024-10-31 NOTE — ASSESSMENT & PLAN NOTE
Patient with a history of recurrent DVTs maintained on Xarelto indefinitely    Plan:  - Hold Xarelto   - Continue heparin gtt for anticoagulation

## 2024-10-31 NOTE — CONSULTS
Consultation - Endocrinology   Name: Otoniel Martinez 64 y.o. 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), 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HCC)  Lab Results   Component Value Date    HGBA1C 8.4 (H) 10/31/2024     Recent Labs     10/31/24  1131 10/31/24  1708   POCGLU 214* 233*   Blood Sugar Average: Last 72 hrs:  (P) 223.5    Uncontrolled type 1 diabetes mellitus, with complication of 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  Discussed with nursing.  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    History of Present Illness   Otoniel Martinez is a 64 y.o. male Hx CAD s/p PCI to RCA (2004), T1DM on insulin pump w/ R eye diabetes-related retinopathy, HLD and chronic bronchitis.  Patient was undergoing outpatient left heart cath which showed MV-CAD (involving L main, Cx, and mid RCA dz). Pt is a direct admission for CABG. Endocrinology consulted for management of insulin pump/diabetes.     Diagnosed > 10 years ago. Managed with Insulin pump Omnipod 5 - Settings:   basal rate 1.7 u/hr, Max basal of 3.2 u/hr  I:C ratio 5:10  Target glc 110-120 mg/dL   Correction factor 20 mg/dL   Active insulin time 3 h     Follows with [facility] ENDO       Review of Systems   Constitutional:  Negative for chills, diaphoresis, fatigue and unexpected weight change.   Eyes:  Negative for visual disturbance.   Respiratory:  Negative for chest tightness and shortness of breath.    Cardiovascular:  Negative for chest pain and palpitations.   Gastrointestinal:  Negative for abdominal pain, constipation, diarrhea, nausea and vomiting.   Endocrine: Negative for polydipsia, polyphagia and polyuria.   Skin:  Negative for color change and wound.   Neurological:  Negative for tremors, weakness, numbness and headaches.     I have reviewed the patient's PMH, PSH, Social History, Family History, Meds, and Allergies    Objective :  Temp:  [97.7 °F (36.5 °C)-98.8 °F (37.1 °C)] 98.8 °F (37.1 °C)  HR:  [48-58] 57  BP: (107-151)/(59-94) 114/70  Resp:  [16-18] 16  SpO2:  [94 %-97 %] 97 %  O2 Device: None (Room air)  Nasal Cannula O2 Flow Rate (L/min):  [2 L/min] 2 L/min    Physical Exam  Vitals reviewed.   Constitutional:       General: He is not in acute distress.     Appearance: Normal appearance. He is not ill-appearing.   HENT:      Head: Normocephalic and atraumatic.   Eyes:      General: No scleral icterus.     Conjunctiva/sclera: Conjunctivae normal.   Cardiovascular:      Rate and Rhythm: Normal rate and regular rhythm.      Pulses: Normal  pulses.      Heart sounds: No murmur heard.     No gallop.   Pulmonary:      Effort: Pulmonary effort is normal. No respiratory distress.      Breath sounds: Normal breath sounds. No wheezing or rales.   Abdominal:      General: Bowel sounds are normal. There is no distension.      Palpations: Abdomen is soft.   Musculoskeletal:         General: Normal range of motion.      Cervical back: Normal range of motion and neck supple.      Right lower leg: No edema.      Left lower leg: No edema.      Comments: Insulin pump at L lateral thigh    Skin:     General: Skin is warm and dry.      Capillary Refill: Capillary refill takes less than 2 seconds.      Coloration: Skin is not jaundiced or pale.   Neurological:      Mental Status: He is alert and oriented to person, place, and time. Mental status is at baseline.      Sensory: No sensory deficit.   Psychiatric:         Mood and Affect: Mood normal.         Behavior: Behavior normal.         Lab Results: I have reviewed the following results:CBC/BMP: No new results in last 24 hours. , LFTs: No new results in last 24 hours. , Lipid Profile:   Results from last 7 days   Lab Units 10/31/24  1315   HDL mg/dL 40   LDL CALC mg/dL 51   TRIGLYCERIDES mg/dL 209*   , TSH:        Latest Reference Range & Units 10/25/24 07:12 10/31/24 13:15   Sodium 135 - 147 mmol/L 144    Potassium 3.5 - 5.3 mmol/L 4.5    Chloride 96 - 108 mmol/L 106    Carbon Dioxide 21 - 32 mmol/L 31    ANION GAP 4 - 13 mmol/L 7    BUN 5 - 25 mg/dL 15    Creatinine 0.60 - 1.30 mg/dL 0.54 (L)    GLUCOSE, FASTING 65 - 99 mg/dL 116 (H)    Calcium 8.4 - 10.2 mg/dL 8.6    GFR, Calculated ml/min/1.73sq m 110    Cholesterol See Comment mg/dL  133   Triglycerides See Comment mg/dL  209 (H)   HDL >=40 mg/dL  40   Non-HDL Cholesterol mg/dl  93   LDL Calculated 0 - 100 mg/dL  51   Hemoglobin A1C Normal 4.0-5.6%; PreDiabetic 5.7-6.4%; Diabetic >=6.5%; Glycemic control for adults with diabetes <7.0% %  8.4 (H)   eAG, EST AVG  Glucose mg/dl  194   (L): Data is abnormally low  (H): Data is abnormally high     Latest Reference Range & Units 01/18/22 07:04 04/30/24 10:49   Albumin Creat Ratio 0 - 30 mg/g creatinine <6 <7       Imaging Results Review: No pertinent imaging studies reviewed.    10/31/2024 cardiac catheterization reviewed-noting left main, circumflex and mid RCA vessel disease, suggestive of multivessel coronary artery disease.    Other Study Results Review: No additional pertinent studies reviewed.      Harpreet Campos MD  Endocrinology fellow, PGY-4

## 2024-10-31 NOTE — ASSESSMENT & PLAN NOTE
Patient with a history of chronic bronchitis maintained on Symbicort    Plan:  - Continue home med Symbicort

## 2024-10-31 NOTE — CONSULTS
Consultation - Cardiac Surgery   Otoniel Martinez 64 y.o. male MRN: [id number]  Unit/Bed#: [facility] CATH LAB ROOM Encounter: [id number]    Physician Requesting Consult: Alex Zambrano MD    Reason for Consult / Principal Problem: severe LM/MVCAD, CABG eval    Inpatient consult to Cardiac Surgery  Consult performed by: Tylor Ojeda PA-C  Consult ordered by: Faustino Jefferson MD      History of Present Illness: Otoniel Martinez is a 64 y.o. male with significant PMH for CAD/old MI s/p PCI/RCA stenting in 2004, History of recurrent DVT (and superficial VT of LE) on chronic Xarelto, splenic artery aneurysm, DM1 on insulin A1c 8.4, polyneuropathy, myotonic dystrophy,  chronic gastritis, mild short term memory loss, dysphagia/dysphonia, GERD, hypertension, hyperlipidemia, lymphoplasmacytic lymphoma, monoclonal gammopathy, splenomegaly, chronic mild thrombocytopenia (plts 112k-137k), chronic mild leukopenia, hospitalized 2/2024 with acute hypoxic respiratory failure with pneumonia, chronic bronchitis (normal PFTs), pulmonary nodule KALEB FDG avid (under surveillance by pulm, may need biopsy), OA/DJD s/p right hip replacement 2022, pancreatitis x 3, KULDIP not on CPAP (on 3LNC oxygen at night), old CVA (found incidentally on CT head), hx of mireille, inguinal hernia repair, ORIF left forearm fracture, bone marrow biopsy 8/27/24, hx tonsils/adenoids, hs squamous cell skin CA removal of left forearm.     65 yo M with significant PMH as above. Has had chronic LOJA/SOB worsened since his hospital admission 2/2024. Has chronic bronchitis on inhalers and cough. Follows with cardiology and pulmonary.  Echo 2/2024 with normal LV/RV function, no valve disease. Has had ongoing LOJA/SOB. Denies CP/angina. Also had hematological workup for splenomegaly and thrombocytopenia and monoclonal gammopathy which lead to bone marrow biopsy and diagnosis of lymphoplasmacytic lymphoma. Was considered asymptomatic, no treatment needed per heme/onc.      Stress test done  9/4/24 without significant findings except paradoxical perfusion defect. Cardiac cath scheduled electively given hx of CAD and ongoing symptoms of LOJA/SOB, but without CP/angina. Cardiac cath 10/31 shows severe LV/MVCAD. Patient admitted after cath for cardiac surgery consult for CABG eval.     Patient currently sitting up in bed, wife present. Good historians, able to relay his medical history and presentation well. Denies CP/SOB at this time. He is functional, ambulates normally, performs all ADLs, drives. Lives in house with wife. 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 Wife is retired RN. Never smoker, rare alcohol use, no drug use.       Past Medical History:  Past Medical History:   Diagnosis Date    Acute respiratory failure with hypoxia (HCC) 02/08/2024    CAD (coronary artery disease)     Congenital myotonia     Deep vein thrombosis (DVT) ([facility])     after tear of gastrocnemus muscle    Diabetes (HCC)     Diabetes mellitus (HCC)     Difficulty walking     hip replacement    HL (hearing loss)     decreased both  ears    Myocardial infarction ([facility])     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[facility]) 12/06/2017    Pancreatitis     three times    Sleep apnea     not using a C-PAP    Superficial thrombophlebitis     Vision loss     reading glasses over the counter       Past Surgical History:   Past Surgical History:   Procedure Laterality Date    CAROTID STENT      CHOLECYSTECTOMY      CIRCUMCISION      Elective    COLONOSCOPY      complete, polyps 2 years ago    CORONARY ANGIOPLASTY WITH STENT PLACEMENT      stent to RCA 2004    INGUINAL HERNIA REPAIR      IR BIOPSY BONE MARROW  8/7/2024    ORIF RADIAL SHAFT FRACTURE Left     Open Treatment of Multiple Fractures of the Radial Shaft    ORIF ULNAR / RADIAL SHAFT FRACTURE Left     Open Treatment of Multiple Fractures of the Ulnar Shaft    TONSILLECTOMY AND ADENOIDECTOMY         Family History:  Family History   Problem Relation Age of Onset    Brain  cancer Mother     Clotting disorder Mother     Heart disease Mother     Cancer Mother     Hyperlipidemia Mother     Stroke Father     Deep vein thrombosis Father     Emphysema Father     Coronary artery disease Paternal Uncle     Diabetes Maternal Uncle        Social History:  Social History     Substance and Sexual Activity   Alcohol Use Yes    Comment: 2 beers on a social occasion     Social History     Substance and Sexual Activity   Drug Use No     Social History     Tobacco Use   Smoking Status Never   Smokeless Tobacco Never     Marital Status: /Civil Union      Home Medications:   Prior to Admission medications    Medication Sig Start Date End Date Taking? Authorizing Provider   aspirin (ECOTRIN LOW STRENGTH) 81 mg EC tablet Take 81 mg by mouth daily   Yes Historical Provider, MD   atorvastatin (LIPITOR) 40 mg tablet TAKE 1 TABLET BY MOUTH EVERY DAY 2/13/24  Yes Carlos Hollins,    azelastine (ASTELIN) 0.1 % nasal spray 2 sprays into each nostril 2 (two) times a day Use in each nostril as directed 10/15/24  Yes Tomas Solano MD   budesonide-formoterol (Symbicort) 80-4.5 MCG/ACT inhaler Inhale 2 puffs 2 (two) times a day Rinse mouth after use. 2/6/24  Yes Tomas Solano MD   cholecalciferol (VITAMIN D3) 400 units tablet Take 1 tablet by mouth every morning   Yes Historical Provider, MD   Continuous Blood Gluc Sensor (Dexcom G6 Sensor) MISC Change every 10 days. E10.65 10/3/22  Yes Historical Provider, MD   Continuous Blood Gluc Transmit (Dexcom G6 Transmitter) MISC Change every 90 days. E10.65 10/3/22  Yes Historical Provider, MD   fluticasone (FLONASE) 50 mcg/act nasal spray 2 sprays into each nostril daily   Yes Historical Provider, MD Garcia HypoPen 2-Pack 1 MG/0.2ML SOAJ  10/21/22  Yes Historical Provider, MD   Insulin Disposable Pump (Omnipod 5 G6 Pods, Gen 5,) MISC INJECT 1 UNDER THE SKIN EVERY OTHER DAY. CHANGING EVERY 2 DAYS DUE TO HIGH INSULIN REQUIREMENT. 4/9/24   Yes Historical Provider, MD   insulin glargine (LANTUS) 100 units/mL subcutaneous injection Inject under the skin PRN for when pod doesn't work   Yes Historical Provider, MD   L-Methylfolate-B6-B12 (Foltanx) 3-35-2 MG TABS Take 1 tablet by mouth in the morning 9/9/24  Yes Wandy Cheatham,    levalbuterol (XOPENEX) 1.25 mg/3 mL nebulizer solution Take 1.25 mg by nebulization 3 (three) times a day as needed for wheezing 8/25/24  Yes Historical Provider, MD   montelukast (SINGULAIR) 10 mg tablet TAKE 1 TABLET BY MOUTH EVERYDAY AT BEDTIME 8/25/24  Yes Wandy Cheatham DO   mupirocin (BACTROBAN) 2 % ointment  9/25/24  Yes Historical Provider, MD   NovoLOG 100 UNIT/ML injection if needed 4/24/24  Yes Historical Provider, MD   pantoprazole (PROTONIX) 20 mg tablet TAKE 1 TABLET (20 MG TOTAL) BY MOUTH DAILY AS NEEDED FOR HEARTBURN OR INDIGESTION. 9/27/24  Yes Historical Provider, MD   pantoprazole (PROTONIX) 40 mg tablet TAKE 1 TABLET TWICE DAILY 30 MINS PRIOR TO BREAKFAST AND SUPPER. 8/5/24  Yes Keke Howard PA-C   sildenafil (VIAGRA) 100 mg tablet Take 1 tablet (100 mg total) by mouth daily as needed for erectile dysfunction 1/24/22  Yes Jt Grey,    sodium chloride 0.9 % nebulizer solution Take 3 mL by nebulization as needed for wheezing 2/11/24  Yes Amos Le MD   acyclovir (ZOVIRAX) 200 mg capsule Take 1 capsule (200 mg total) by mouth 5 (five) times a day for 5 days  Patient taking differently: Take 200 mg by mouth if needed Patient takes PRN 1/24/22 10/30/24  Jt Grey DO   rivaroxaban (Xarelto) 20 mg tablet TAKE 1 TABLET BY MOUTH DAILY WITH BREAKFAST 7/2/24   Carlos Hollins DO       Inpatient Medications:  Scheduled Meds:   Current Facility-Administered Medications   Medication Dose Route Frequency Provider Last Rate    acetaminophen  650 mg Oral Q4H PRN Faustino Jefferson MD      [START ON 11/1/2024] aspirin  81 mg Oral Daily Charanjit Mendez DO      atorvastatin  40 mg Oral Daily  Faustino Jefferson MD      budesonide-formoterol  2 puff Inhalation BID Faustino Jefferson MD      fluticasone  2 spray Nasal Daily Faustino Jefferson MD      heparin   Intravenous Once Jameal Hadeed, DO      levalbuterol  1.25 mg Nebulization TID PRN Faustino Jefferson MD      montelukast  10 mg Oral HS Faustino Jefferson MD      nitroglycerin  0.4 mg Sublingual Q5 Min PRN Faustino Jefferson MD      pantoprazole  40 mg Oral Early Morning Faustino Jefferson MD      sodium chloride  125 mL/hr Intravenous Continuous INDER Dye Stopped (10/31/24 1014)    sodium chloride  75 mL/hr Intravenous Continuous Faustino Jefferson MD 75 mL/hr (10/31/24 1014)     Continuous Infusions: sodium chloride, 125 mL/hr, Last Rate: Stopped (10/31/24 1014)  sodium chloride, 75 mL/hr, Last Rate: 75 mL/hr (10/31/24 1014)      PRN Meds:  acetaminophen, 650 mg, Q4H PRN  levalbuterol, 1.25 mg, TID PRN  nitroglycerin, 0.4 mg, Q5 Min PRN        Allergies:  Allergies   Allergen Reactions    Other Cough     Grass / dogs hair        Review of Systems:  Review of Systems   Constitutional:  Negative for chills and fever.   HENT:  Positive for trouble swallowing.    Eyes:  Negative for visual disturbance.   Respiratory:  Positive for shortness of breath. Negative for chest tightness.    Cardiovascular:  Negative for chest pain, palpitations and leg swelling.   Gastrointestinal:  Negative for abdominal distention, abdominal pain, anal bleeding, blood in stool, diarrhea, nausea and vomiting.   Genitourinary:  Negative for difficulty urinating and dysuria.   Musculoskeletal:  Negative for arthralgias, back pain and gait problem.   Skin:  Negative for rash and wound.   Neurological:  Negative for dizziness, tremors, seizures, speech difficulty, light-headedness and headaches.   Psychiatric/Behavioral:  Negative for agitation and behavioral problems.        Vital Signs:     Vitals:    10/31/24 1030 10/31/24 1045 10/31/24 1100 10/31/24 1130   BP: 133/70 119/69 107/59 111/62   BP  Location:       Pulse: (!) 53 (!) 53 (!) 51 58   Resp:       Temp:       TempSrc:       SpO2: 96% 94% 95% 95%   Weight:       Height:         Invasive Devices       Peripheral Intravenous Line  Duration             Peripheral IV 10/31/24 Left;Proximal;Ventral (anterior) Forearm <1 day                    Physical Exam:  Physical Exam  Constitutional:       General: He is not in acute distress.     Appearance: He is normal weight. He is not toxic-appearing.   HENT:      Head: Normocephalic and atraumatic.      Mouth/Throat:      Mouth: Mucous membranes are moist.      Pharynx: Oropharynx is clear.   Eyes:      Extraocular Movements: Extraocular movements intact.   Cardiovascular:      Rate and Rhythm: Normal rate and regular rhythm.      Pulses: Normal pulses.      Heart sounds: Normal heart sounds. No murmur heard.     No gallop.   Pulmonary:      Effort: Pulmonary effort is normal. No respiratory distress.      Breath sounds: Normal breath sounds. No stridor. No wheezing or rales.   Abdominal:      General: Abdomen is flat. Bowel sounds are normal. There is no distension.      Tenderness: There is no abdominal tenderness. There is no guarding.   Musculoskeletal:      Right lower leg: No edema.      Left lower leg: No edema.   Skin:     General: Skin is warm and dry.      Coloration: Skin is not jaundiced.      Findings: No bruising.   Neurological:      General: No focal deficit present.      Mental Status: He is alert and oriented to person, place, and time. Mental status is at baseline.      Cranial Nerves: No cranial nerve deficit.      Motor: No weakness.   Psychiatric:         Mood and Affect: Mood normal.         Behavior: Behavior normal.       Lab Results:     Results from last 7 days   Lab Units 10/25/24  0712   WBC Thousand/uL 3.16*   HEMOGLOBIN g/dL 13.1   HEMATOCRIT % 40.9   PLATELETS Thousands/uL 120*     Results from last 7 days   Lab Units 10/25/24  0712   POTASSIUM mmol/L 4.5   CHLORIDE mmol/L 106    CO2 mmol/L 31   BUN mg/dL 15   CREATININE mg/dL 0.54*   CALCIUM mg/dL 8.6         Lab Results   Component Value Date    HGBA1C 8.4 (H) 09/10/2024     Lab Results   Component Value Date    CKTOTAL 344 (H) 01/09/2018    TROPONINI <0.02 03/22/2020       Imaging Studies:     Cardiac Cath 10/31/24: distal LM 80%, prox-mid LCX 90%, mid RCA 90%     Echo 2/9/24: normal LV size/fnx, LVEF 55%, RV size/fnx normal, no valve disease     Stress test 9/4/24: Stress ECG: No ST deviation is noted. Arrhythmias during recovery: rare PVCs. The ECG was not diagnostic due to pharmacological (vasodilator) stress. Perfusion: There is a left ventricular perfusion defect that is small in size present in the mid inferior location(s) that is paradoxical and resolves with proning. The defect appears to be an artifact caused by subdiaphragmatic activity. Stress Function: Left ventricular function post-stress is abnormal. Global function is mildly reduced. There were no regional wall motion abnormalities during stress. Stress ejection fraction is 51%. Stress Combined Conclusion: There is image artifact, without diagnostic evidence for perfusion abnormality.     Carotid US 9/26/24: < 50% ICAs bilaterally, VAFA, no subclavian disease       CT Chest 9/16/24: The previously noted hypermetabolic masslike consolidation in the left upper lobe is smaller in size and demonstrating irregularity due to evolving scarring. This was likely inflammatory or infectious in etiology. No new suspicious lung nodule or mass. Splenomegaly. Heart is unremarkable for patient's age. Ascending aorta measures 4 cm. severe coronary artery calcifications seen.     PET scan 8/1/24: 1. 1.9 x 1.5 cm mildly FDG avid masslike consolidation/nodule involving the left upper lobe with mild halo of groundglass opacity, new since 2/8/2024. While findings could reflect an inflammatory process, underlying malignancy of low metabolic activity is the diagnosis of exclusion. Consider  further evaluation with tissue sampling as clinically indicated. If an inflammatory process is favored, short interval follow-up with CT imaging in 6 to 8 weeks is recommended to ensure stability/document Resolution. There is otherwise no additional focal FDG activity to suggest hypermetabolic malignancy. Patient's known mild splenomegaly is non-FDG avid. 2.4 cm rim calcified splenic artery aneurysm versus less likely calcified adrenal nodule. Consider vascular surgical consultation.     EKG 8/23/24: sinus adela, nonspecific t wave abnormality     PFT 2/2024: Normal Spirometry, No significant response to the administration of bronchodilator per ATS Standards, Normal Lung volumes, Normal diffusion capacity, Flow volume loop is normal     Results Review Statement: I reviewed radiology reports from this admission including: cardiac cath, PET scan, EKG, CT chest, Ultrasound(s), and Echocardiogram.    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), 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HCC)    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HCC)    Mixed hyperlipidemia    Polyneuropathy    Chronic gastritis without bleeding    Old cerebrovascular accident (CVA) without late effect    LOJA (dyspnea on exertion)    History of recurrent deep vein thrombosis (DVT)    Chronic bronchitis (HCC)    Abnormal CT scan of lung    Insulin pump status    Obstructive sleep apnea    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)    Splenomegaly    Memory loss    Monoclonal gammopathy    Hx of pancreatitis    Splenic artery aneurysm (HCC)    Pulmonary nodule 1 cm or greater in diameter    Impression:  - severe LM/MVCAD  - chronic LOJA/SOB  - preserved LV function  - DM1 on insulin  - multiple medical issues listed above    Plan:  - CABG eval underway  - repeat echo, check vein mapping  - Surgery, surgical recovery, risks and benefits of coronary artery bypass grafting were discussed in  detail today with the patient.  They understand and wish to proceed with further workup  - will review with attending. Final recommendations after case reviewed with attending, attending reviews cath, discusses with patient/wife, cardiology, and workup completed.     Otoniel Martinez was comfortable with our recommendations, and their questions were answered to their satisfaction.  We will continue to evaluate the patient daily with further recommendations as work up is completed.  Thank you for allowing us to participate in the care of this patient.     SIGNATURE: Tylor Ojeda PA-C  DATE: October 31, 2024  TIME: 12:25 PM    * This note was completed in part utilizing [employer] direct voice recognition software.   Grammatical errors, random word insertion, spelling mistakes, and incomplete sentences may be an occasional consequence of the system secondary to software limitations, ambient noise and hardware issues. At the time of dictation, efforts were made to edit, clarify and /or correct errors. Please read the chart carefully and recognize, using context, where substitutions have occurred.  If you have any questions or concerns about the context, text or information contained within the body of this dictation, please contact myself, the provider, for further clarification.

## 2024-10-31 NOTE — H&P (VIEW-ONLY)
Consultation - Cardiac Surgery   Otoniel Martinez 64 y.o. male MRN: [id number]  Unit/Bed#: BE CATH LAB ROOM Encounter: [id number]    Physician Requesting Consult: Alex Zambrano MD    Reason for Consult / Principal Problem: severe LM/MVCAD, CABG eval    Inpatient consult to Cardiac Surgery  Consult performed by: Tylor Ojeda PA-C  Consult ordered by: Faustino Jefferson MD      History of Present Illness: Otoniel Martinez is a 64 y.o. male with significant PMH for CAD/old MI s/p PCI/RCA stenting in 2004, History of recurrent DVT (and superficial VT of LE) on chronic Xarelto, splenic artery aneurysm, DM1 on insulin A1c 8.4, polyneuropathy, myotonic dystrophy,  chronic gastritis, mild short term memory loss, dysphagia/dysphonia, GERD, hypertension, hyperlipidemia, lymphoplasmacytic lymphoma, monoclonal gammopathy, splenomegaly, chronic mild thrombocytopenia (plts 112k-137k), chronic mild leukopenia, hospitalized 2/2024 with acute hypoxic respiratory failure with pneumonia, chronic bronchitis (normal PFTs), pulmonary nodule KALEB FDG avid (under surveillance by pulm, may need biopsy), OA/DJD s/p right hip replacement 2022, pancreatitis x 3, KULDIP not on CPAP (on 3LNC oxygen at night), old CVA (found incidentally on CT head), hx of mireille, inguinal hernia repair, ORIF left forearm fracture, bone marrow biopsy 8/27/24, hx tonsils/adenoids, hs squamous cell skin CA removal of left forearm.     65 yo M with significant PMH as above. Has had chronic LOJA/SOB worsened since his hospital admission 2/2024. Has chronic bronchitis on inhalers and cough. Follows with cardiology and pulmonary.  Echo 2/2024 with normal LV/RV function, no valve disease. Has had ongoing LOJA/SOB. Denies CP/angina. Also had hematological workup for splenomegaly and thrombocytopenia and monoclonal gammopathy which lead to bone marrow biopsy and diagnosis of lymphoplasmacytic lymphoma. Was considered asymptomatic, no treatment needed per heme/onc.      Stress test done  9/4/24 without significant findings except paradoxical perfusion defect. Cardiac cath scheduled electively given hx of CAD and ongoing symptoms of LOJA/SOB, but without CP/angina. Cardiac cath 10/31 shows severe LV/MVCAD. Patient admitted after cath for cardiac surgery consult for CABG eval.     Patient currently sitting up in bed, wife present. Good historians, able to relay his medical history and presentation well. Denies CP/SOB at this time. He is functional, ambulates normally, performs all ADLs, drives. Lives in house with wife. 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 Wife is retired RN. Never smoker, rare alcohol use, no drug use.       Past Medical History:  Past Medical History:   Diagnosis Date    Acute respiratory failure with hypoxia (HCC) 02/08/2024    CAD (coronary artery disease)     Congenital myotonia     Deep vein thrombosis (DVT) ([facility])     after tear of gastrocnemus muscle    Diabetes (HCC)     Diabetes mellitus (HCC)     Difficulty walking     hip replacement    HL (hearing loss)     decreased both  ears    Myocardial infarction ([facility])     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    Pancreatitis     three times    Sleep apnea     not using a C-PAP    Superficial thrombophlebitis     Vision loss     reading glasses over the counter       Past Surgical History:   Past Surgical History:   Procedure Laterality Date    CAROTID STENT      CHOLECYSTECTOMY      CIRCUMCISION      Elective    COLONOSCOPY      complete, polyps 2 years ago    CORONARY ANGIOPLASTY WITH STENT PLACEMENT      stent to RCA 2004    INGUINAL HERNIA REPAIR      IR BIOPSY BONE MARROW  8/7/2024    ORIF RADIAL SHAFT FRACTURE Left     Open Treatment of Multiple Fractures of the Radial Shaft    ORIF ULNAR / RADIAL SHAFT FRACTURE Left     Open Treatment of Multiple Fractures of the Ulnar Shaft    TONSILLECTOMY AND ADENOIDECTOMY         Family History:  Family History   Problem Relation Age of Onset    Brain  cancer Mother     Clotting disorder Mother     Heart disease Mother     Cancer Mother     Hyperlipidemia Mother     Stroke Father     Deep vein thrombosis Father     Emphysema Father     Coronary artery disease Paternal Uncle     Diabetes Maternal Uncle        Social History:  Social History     Substance and Sexual Activity   Alcohol Use Yes    Comment: 2 beers on a social occasion     Social History     Substance and Sexual Activity   Drug Use No     Social History     Tobacco Use   Smoking Status Never   Smokeless Tobacco Never     Marital Status: /Civil Union      Home Medications:   Prior to Admission medications    Medication Sig Start Date End Date Taking? Authorizing Provider   aspirin (ECOTRIN LOW STRENGTH) 81 mg EC tablet Take 81 mg by mouth daily   Yes Historical Provider, MD   atorvastatin (LIPITOR) 40 mg tablet TAKE 1 TABLET BY MOUTH EVERY DAY 2/13/24  Yes Carlos Hollins,    azelastine (ASTELIN) 0.1 % nasal spray 2 sprays into each nostril 2 (two) times a day Use in each nostril as directed 10/15/24  Yes Tomas Solano MD   budesonide-formoterol (Symbicort) 80-4.5 MCG/ACT inhaler Inhale 2 puffs 2 (two) times a day Rinse mouth after use. 2/6/24  Yes Tomas Solano MD   cholecalciferol (VITAMIN D3) 400 units tablet Take 1 tablet by mouth every morning   Yes Historical Provider, MD   Continuous Blood Gluc Sensor (Dexcom G6 Sensor) MISC Change every 10 days. E10.65 10/3/22  Yes Historical Provider, MD   Continuous Blood Gluc Transmit (Dexcom G6 Transmitter) MISC Change every 90 days. E10.65 10/3/22  Yes Historical Provider, MD   fluticasone (FLONASE) 50 mcg/act nasal spray 2 sprays into each nostril daily   Yes Historical Provider, MD Garcia HypoPen 2-Pack 1 MG/0.2ML SOAJ  10/21/22  Yes Historical Provider, MD   Insulin Disposable Pump (Omnipod 5 G6 Pods, Gen 5,) MISC INJECT 1 UNDER THE SKIN EVERY OTHER DAY. CHANGING EVERY 2 DAYS DUE TO HIGH INSULIN REQUIREMENT. 4/9/24   Yes Historical Provider, MD   insulin glargine (LANTUS) 100 units/mL subcutaneous injection Inject under the skin PRN for when pod doesn't work   Yes Historical Provider, MD   L-Methylfolate-B6-B12 (Foltanx) 3-35-2 MG TABS Take 1 tablet by mouth in the morning 9/9/24  Yes Wandy Cheatham,    levalbuterol (XOPENEX) 1.25 mg/3 mL nebulizer solution Take 1.25 mg by nebulization 3 (three) times a day as needed for wheezing 8/25/24  Yes Historical Provider, MD   montelukast (SINGULAIR) 10 mg tablet TAKE 1 TABLET BY MOUTH EVERYDAY AT BEDTIME 8/25/24  Yes Wandy Cheatham DO   mupirocin (BACTROBAN) 2 % ointment  9/25/24  Yes Historical Provider, MD   NovoLOG 100 UNIT/ML injection if needed 4/24/24  Yes Historical Provider, MD   pantoprazole (PROTONIX) 20 mg tablet TAKE 1 TABLET (20 MG TOTAL) BY MOUTH DAILY AS NEEDED FOR HEARTBURN OR INDIGESTION. 9/27/24  Yes Historical Provider, MD   pantoprazole (PROTONIX) 40 mg tablet TAKE 1 TABLET TWICE DAILY 30 MINS PRIOR TO BREAKFAST AND SUPPER. 8/5/24  Yes Keke Howard PA-C   sildenafil (VIAGRA) 100 mg tablet Take 1 tablet (100 mg total) by mouth daily as needed for erectile dysfunction 1/24/22  Yes Jt Grey,    sodium chloride 0.9 % nebulizer solution Take 3 mL by nebulization as needed for wheezing 2/11/24  Yes Amos Le MD   acyclovir (ZOVIRAX) 200 mg capsule Take 1 capsule (200 mg total) by mouth 5 (five) times a day for 5 days  Patient taking differently: Take 200 mg by mouth if needed Patient takes PRN 1/24/22 10/30/24  Jt Grey DO   rivaroxaban (Xarelto) 20 mg tablet TAKE 1 TABLET BY MOUTH DAILY WITH BREAKFAST 7/2/24   Carlos Hollins DO       Inpatient Medications:  Scheduled Meds:   Current Facility-Administered Medications   Medication Dose Route Frequency Provider Last Rate    acetaminophen  650 mg Oral Q4H PRN Faustino Jefferson MD      [START ON 11/1/2024] aspirin  81 mg Oral Daily Charanjit Mendez DO      atorvastatin  40 mg Oral Daily  Faustino Jefferson MD      budesonide-formoterol  2 puff Inhalation BID Faustino Jefferson MD      fluticasone  2 spray Nasal Daily Faustino Jefferson MD      heparin   Intravenous Once Jameal Hadeed, DO      levalbuterol  1.25 mg Nebulization TID PRN Faustino Jefferson MD      montelukast  10 mg Oral HS Faustino Jefferson MD      nitroglycerin  0.4 mg Sublingual Q5 Min PRN Faustino Jefferson MD      pantoprazole  40 mg Oral Early Morning Faustino Jefferson MD      sodium chloride  125 mL/hr Intravenous Continuous INDER Dye Stopped (10/31/24 1014)    sodium chloride  75 mL/hr Intravenous Continuous Faustino Jefferson MD 75 mL/hr (10/31/24 1014)     Continuous Infusions: sodium chloride, 125 mL/hr, Last Rate: Stopped (10/31/24 1014)  sodium chloride, 75 mL/hr, Last Rate: 75 mL/hr (10/31/24 1014)      PRN Meds:  acetaminophen, 650 mg, Q4H PRN  levalbuterol, 1.25 mg, TID PRN  nitroglycerin, 0.4 mg, Q5 Min PRN        Allergies:  Allergies   Allergen Reactions    Other Cough     Grass / dogs hair        Review of Systems:  Review of Systems   Constitutional:  Negative for chills and fever.   HENT:  Positive for trouble swallowing.    Eyes:  Negative for visual disturbance.   Respiratory:  Positive for shortness of breath. Negative for chest tightness.    Cardiovascular:  Negative for chest pain, palpitations and leg swelling.   Gastrointestinal:  Negative for abdominal distention, abdominal pain, anal bleeding, blood in stool, diarrhea, nausea and vomiting.   Genitourinary:  Negative for difficulty urinating and dysuria.   Musculoskeletal:  Negative for arthralgias, back pain and gait problem.   Skin:  Negative for rash and wound.   Neurological:  Negative for dizziness, tremors, seizures, speech difficulty, light-headedness and headaches.   Psychiatric/Behavioral:  Negative for agitation and behavioral problems.        Vital Signs:     Vitals:    10/31/24 1030 10/31/24 1045 10/31/24 1100 10/31/24 1130   BP: 133/70 119/69 107/59 111/62   BP  Location:       Pulse: (!) 53 (!) 53 (!) 51 58   Resp:       Temp:       TempSrc:       SpO2: 96% 94% 95% 95%   Weight:       Height:         Invasive Devices       Peripheral Intravenous Line  Duration             Peripheral IV 10/31/24 Left;Proximal;Ventral (anterior) Forearm <1 day                    Physical Exam:  Physical Exam  Constitutional:       General: He is not in acute distress.     Appearance: He is normal weight. He is not toxic-appearing.   HENT:      Head: Normocephalic and atraumatic.      Mouth/Throat:      Mouth: Mucous membranes are moist.      Pharynx: Oropharynx is clear.   Eyes:      Extraocular Movements: Extraocular movements intact.   Cardiovascular:      Rate and Rhythm: Normal rate and regular rhythm.      Pulses: Normal pulses.      Heart sounds: Normal heart sounds. No murmur heard.     No gallop.   Pulmonary:      Effort: Pulmonary effort is normal. No respiratory distress.      Breath sounds: Normal breath sounds. No stridor. No wheezing or rales.   Abdominal:      General: Abdomen is flat. Bowel sounds are normal. There is no distension.      Tenderness: There is no abdominal tenderness. There is no guarding.   Musculoskeletal:      Right lower leg: No edema.      Left lower leg: No edema.   Skin:     General: Skin is warm and dry.      Coloration: Skin is not jaundiced.      Findings: No bruising.   Neurological:      General: No focal deficit present.      Mental Status: He is alert and oriented to person, place, and time. Mental status is at baseline.      Cranial Nerves: No cranial nerve deficit.      Motor: No weakness.   Psychiatric:         Mood and Affect: Mood normal.         Behavior: Behavior normal.       Lab Results:     Results from last 7 days   Lab Units 10/25/24  0712   WBC Thousand/uL 3.16*   HEMOGLOBIN g/dL 13.1   HEMATOCRIT % 40.9   PLATELETS Thousands/uL 120*     Results from last 7 days   Lab Units 10/25/24  0712   POTASSIUM mmol/L 4.5   CHLORIDE mmol/L 106    CO2 mmol/L 31   BUN mg/dL 15   CREATININE mg/dL 0.54*   CALCIUM mg/dL 8.6         Lab Results   Component Value Date    HGBA1C 8.4 (H) 09/10/2024     Lab Results   Component Value Date    CKTOTAL 344 (H) 01/09/2018    TROPONINI <0.02 03/22/2020       Imaging Studies:     Cardiac Cath 10/31/24: distal LM 80%, prox-mid LCX 90%, mid RCA 90%     Echo 2/9/24: normal LV size/fnx, LVEF 55%, RV size/fnx normal, no valve disease     Stress test 9/4/24: Stress ECG: No ST deviation is noted. Arrhythmias during recovery: rare PVCs. The ECG was not diagnostic due to pharmacological (vasodilator) stress. Perfusion: There is a left ventricular perfusion defect that is small in size present in the mid inferior location(s) that is paradoxical and resolves with proning. The defect appears to be an artifact caused by subdiaphragmatic activity. Stress Function: Left ventricular function post-stress is abnormal. Global function is mildly reduced. There were no regional wall motion abnormalities during stress. Stress ejection fraction is 51%. Stress Combined Conclusion: There is image artifact, without diagnostic evidence for perfusion abnormality.     Carotid US 9/26/24: < 50% ICAs bilaterally, VAFA, no subclavian disease       CT Chest 9/16/24: The previously noted hypermetabolic masslike consolidation in the left upper lobe is smaller in size and demonstrating irregularity due to evolving scarring. This was likely inflammatory or infectious in etiology. No new suspicious lung nodule or mass. Splenomegaly. Heart is unremarkable for patient's age. Ascending aorta measures 4 cm. severe coronary artery calcifications seen.     PET scan 8/1/24: 1. 1.9 x 1.5 cm mildly FDG avid masslike consolidation/nodule involving the left upper lobe with mild halo of groundglass opacity, new since 2/8/2024. While findings could reflect an inflammatory process, underlying malignancy of low metabolic activity is the diagnosis of exclusion. Consider  further evaluation with tissue sampling as clinically indicated. If an inflammatory process is favored, short interval follow-up with CT imaging in 6 to 8 weeks is recommended to ensure stability/document Resolution. There is otherwise no additional focal FDG activity to suggest hypermetabolic malignancy. Patient's known mild splenomegaly is non-FDG avid. 2.4 cm rim calcified splenic artery aneurysm versus less likely calcified adrenal nodule. Consider vascular surgical consultation.     EKG 8/23/24: sinus adela, nonspecific t wave abnormality     PFT 2/2024: Normal Spirometry, No significant response to the administration of bronchodilator per ATS Standards, Normal Lung volumes, Normal diffusion capacity, Flow volume loop is normal     Results Review Statement: I reviewed radiology reports from this admission including: cardiac cath, PET scan, EKG, CT chest, Ultrasound(s), and Echocardiogram.    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), 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HCC)    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HCC)    Mixed hyperlipidemia    Polyneuropathy    Chronic gastritis without bleeding    Old cerebrovascular accident (CVA) without late effect    LOJA (dyspnea on exertion)    History of recurrent deep vein thrombosis (DVT)    Chronic bronchitis (HCC)    Abnormal CT scan of lung    Insulin pump status    Obstructive sleep apnea    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)    Splenomegaly    Memory loss    Monoclonal gammopathy    Hx of pancreatitis    Splenic artery aneurysm (HCC)    Pulmonary nodule 1 cm or greater in diameter    Impression:  - severe LM/MVCAD  - chronic LOJA/SOB  - preserved LV function  - DM1 on insulin  - multiple medical issues listed above    Plan:  - CABG eval underway  - repeat echo, check vein mapping  - Surgery, surgical recovery, risks and benefits of coronary artery bypass grafting were discussed in  detail today with the patient.  They understand and wish to proceed with further workup  - will review with attending. Final recommendations after case reviewed with attending, attending reviews cath, discusses with patient/wife, cardiology, and workup completed.     Otoniel Martinez was comfortable with our recommendations, and their questions were answered to their satisfaction.  We will continue to evaluate the patient daily with further recommendations as work up is completed.  Thank you for allowing us to participate in the care of this patient.     SIGNATURE: Tylor Ojeda PA-C  DATE: October 31, 2024  TIME: 12:25 PM    * This note was completed in part utilizing [employer] direct voice recognition software.   Grammatical errors, random word insertion, spelling mistakes, and incomplete sentences may be an occasional consequence of the system secondary to software limitations, ambient noise and hardware issues. At the time of dictation, efforts were made to edit, clarify and /or correct errors. Please read the chart carefully and recognize, using context, where substitutions have occurred.  If you have any questions or concerns about the context, text or information contained within the body of this dictation, please contact myself, the provider, for further clarification.

## 2024-10-31 NOTE — Clinical Note
The PACER GENERATOR FRANCY XT DR TONO MARR - [id number] device was inserted. The leads were placed into the connector and visually verified to be in correct position. Injury current obtained.

## 2024-10-31 NOTE — INTERVAL H&P NOTE
Vitals:    10/31/24 0700   BP: 151/94   Pulse: (!) 54   Resp: 18   Temp: 97.7 °F (36.5 °C)   SpO2: 96%     Patient seen and examined at bedside.    Brief overview of procedure discussed with patient.  Indication, risk, benefits and alternatives discussed with patient who verbalized understanding.  All questions addressed fully.  Patient elected to proceed with planned procedure, consent completed.    Patient remains clinically stable.  Renal function, coagulation profile, and hemoglobin all within normal limits.  Patient has no planned upcoming surgical procedures at this time.    We will proceed with planned procedure.    Faustino Jefferson

## 2024-10-31 NOTE — Clinical Note
Received to CCL holding area for planned procedure.  Pleasant cooperative and appropriate.  Communicating freely.   Aware of planned procedure and asking questions freely.  Without current complaints.  Spoke with cardiology fellow and informed consent obtained.

## 2024-10-31 NOTE — ASSESSMENT & PLAN NOTE
History of hyperlipidemia maintained on Lipitor 40 mg daily    Lipid panel 10/31/24: , , HDL 40, LDL 51    Plan:  - Continue Lipitor 40 mg daily

## 2024-10-31 NOTE — PLAN OF CARE
Problem: PAIN - ADULT  Goal: Verbalizes/displays adequate comfort level or baseline comfort level  Description: Interventions:  - Encourage patient to monitor pain and request assistance  - Assess pain using appropriate pain scale  - Administer analgesics based on type and severity of pain and evaluate response  - Implement non-pharmacological measures as appropriate and evaluate response  - Consider cultural and social influences on pain and pain management  - Notify physician/advanced practitioner if interventions unsuccessful or patient reports new pain  Outcome: Progressing     Problem: INFECTION - ADULT  Goal: Absence or prevention of progression during hospitalization  Description: INTERVENTIONS:  - Assess and monitor for signs and symptoms of infection  - Monitor lab/diagnostic results  - Monitor all insertion sites, i.e. indwelling lines, tubes, and drains  - Monitor endotracheal if appropriate and nasal secretions for changes in amount and color  - [place] appropriate cooling/warming therapies per order  - Administer medications as ordered  - Instruct and encourage patient and family to use good hand hygiene technique  - Identify and instruct in appropriate isolation precautions for identified infection/condition  Outcome: Progressing  Goal: Absence of fever/infection during neutropenic period  Description: INTERVENTIONS:  - Monitor WBC    Outcome: Progressing     Problem: SAFETY ADULT  Goal: Patient will remain free of falls  Description: INTERVENTIONS:  - Educate patient/family on patient safety including physical limitations  - Instruct patient to call for assistance with activity   - Consult OT/PT to assist with strengthening/mobility   - Keep Call bell within reach  - Keep bed low and locked with side rails adjusted as appropriate  - Keep care items and personal belongings within reach  - Initiate and maintain comfort rounds  - Make Fall Risk Sign visible to staff  - Offer Toileting every 2 Hours,  in advance of need  - Initiate/Maintain bed alarm  - Apply yellow socks and bracelet for high fall risk patients  - Consider moving patient to room near nurses station  Outcome: Progressing  Goal: Maintain or return to baseline ADL function  Description: INTERVENTIONS:  -  Assess patient's ability to carry out ADLs; assess patient's baseline for ADL function and identify physical deficits which impact ability to perform ADLs (bathing, care of mouth/teeth, toileting, grooming, dressing, etc.)  - Assess/evaluate cause of self-care deficits   - Assess range of motion  - Assess patient's mobility; develop plan if impaired  - Assess patient's need for assistive devices and provide as appropriate  - Encourage maximum independence but intervene and supervise when necessary  - Involve family in performance of ADLs  - Assess for home care needs following discharge   - Consider OT consult to assist with ADL evaluation and planning for discharge  - Provide patient education as appropriate  Outcome: Progressing  Goal: Maintains/Returns to pre admission functional level  Description: INTERVENTIONS:  - Perform AM-PAC 6 Click Basic Mobility/ Daily Activity assessment daily.  - Set and communicate daily mobility goal to care team and patient/family/caregiver.   - Collaborate with rehabilitation services on mobility goals if consulted  - Perform Range of Motion 4 times a day.  - Reposition patient every 2 hours.  - Dangle patient 4 times a day  - Stand patient 4 times a day  - Ambulate patient 4 times a day  - Out of bed to chair 4 times a day   - Out of bed for meals 4 times a day  - Out of bed for toileting  - Record patient progress and toleration of activity level   Outcome: Progressing     Problem: DISCHARGE PLANNING  Goal: Discharge to home or other facility with appropriate resources  Description: INTERVENTIONS:  - Identify barriers to discharge w/patient and caregiver  - Arrange for needed discharge resources and  transportation as appropriate  - Identify discharge learning needs (meds, wound care, etc.)  - Arrange for interpretive services to assist at discharge as needed  - Refer to Case Management Department for coordinating discharge planning if the patient needs post-hospital services based on physician/advanced practitioner order or complex needs related to functional status, cognitive ability, or social support system  Outcome: Progressing     Problem: Knowledge Deficit  Goal: Patient/family/caregiver demonstrates understanding of disease process, treatment plan, medications, and discharge instructions  Description: Complete learning assessment and assess knowledge base.  Interventions:  - Provide teaching at level of understanding  - Provide teaching via preferred learning methods  Outcome: Progressing

## 2024-10-31 NOTE — ASSESSMENT & PLAN NOTE
Patient with a history of KULDIP had been on CPAP in the past but now only uses oxygen nightly    Plan:  - Continue oxygen as needed nightly  - Outpatient sleep study ordered by sleep medicine

## 2024-10-31 NOTE — Clinical Note
Defib pad site: right upper scapula.Defib pad site: left apex.Defib pad site assessment: skin integrity intact.

## 2024-11-01 PROBLEM — R00.1 SYMPTOMATIC SINUS BRADYCARDIA: Status: ACTIVE | Noted: 2024-11-01

## 2024-11-01 LAB
ANION GAP SERPL CALCULATED.3IONS-SCNC: 4 MMOL/L (ref 4–13)
APTT PPP: 61 SECONDS (ref 23–34)
APTT PPP: 83 SECONDS (ref 23–34)
ATRIAL RATE: 58 BPM
BUN SERPL-MCNC: 18 MG/DL (ref 5–25)
CALCIUM SERPL-MCNC: 8 MG/DL (ref 8.4–10.2)
CHLORIDE SERPL-SCNC: 106 MMOL/L (ref 96–108)
CO2 SERPL-SCNC: 28 MMOL/L (ref 21–32)
CREAT SERPL-MCNC: 0.6 MG/DL (ref 0.6–1.3)
ERYTHROCYTE [DISTWIDTH] IN BLOOD BY AUTOMATED COUNT: 14.6 % (ref 11.6–15.1)
GFR SERPL CREATININE-BSD FRML MDRD: 106 ML/MIN/1.73SQ M
GLUCOSE SERPL-MCNC: 124 MG/DL (ref 65–140)
GLUCOSE SERPL-MCNC: 156 MG/DL (ref 65–140)
GLUCOSE SERPL-MCNC: 166 MG/DL (ref 65–140)
GLUCOSE SERPL-MCNC: 236 MG/DL (ref 65–140)
GLUCOSE SERPL-MCNC: 345 MG/DL (ref 65–140)
GLUCOSE SERPL-MCNC: 351 MG/DL (ref 65–140)
HCT VFR BLD AUTO: 35.3 % (ref 36.5–49.3)
HGB BLD-MCNC: 11.5 G/DL (ref 12–17)
MAGNESIUM SERPL-MCNC: 1.7 MG/DL (ref 1.9–2.7)
MCH RBC QN AUTO: 28.5 PG (ref 26.8–34.3)
MCHC RBC AUTO-ENTMCNC: 32.6 G/DL (ref 31.4–37.4)
MCV RBC AUTO: 87 FL (ref 82–98)
MRSA NOSE QL CULT: NORMAL
P AXIS: 1 DEGREES
PLATELET # BLD AUTO: 118 THOUSANDS/UL (ref 149–390)
PMV BLD AUTO: 9.9 FL (ref 8.9–12.7)
POTASSIUM SERPL-SCNC: 4.1 MMOL/L (ref 3.5–5.3)
PR INTERVAL: 150 MS
QRS AXIS: -5 DEGREES
QRSD INTERVAL: 88 MS
QT INTERVAL: 454 MS
QTC INTERVAL: 445 MS
RBC # BLD AUTO: 4.04 MILLION/UL (ref 3.88–5.62)
SODIUM SERPL-SCNC: 138 MMOL/L (ref 135–147)
T WAVE AXIS: 41 DEGREES
VENTRICULAR RATE: 58 BPM
WBC # BLD AUTO: 4.04 THOUSAND/UL (ref 4.31–10.16)

## 2024-11-01 PROCEDURE — 93005 ELECTROCARDIOGRAM TRACING: CPT

## 2024-11-01 PROCEDURE — 99223 1ST HOSP IP/OBS HIGH 75: CPT | Performed by: INTERNAL MEDICINE

## 2024-11-01 PROCEDURE — 80048 BASIC METABOLIC PNL TOTAL CA: CPT | Performed by: INTERNAL MEDICINE

## 2024-11-01 PROCEDURE — 93010 ELECTROCARDIOGRAM REPORT: CPT | Performed by: INTERNAL MEDICINE

## 2024-11-01 PROCEDURE — 83735 ASSAY OF MAGNESIUM: CPT | Performed by: INTERNAL MEDICINE

## 2024-11-01 PROCEDURE — 85027 COMPLETE CBC AUTOMATED: CPT | Performed by: INTERNAL MEDICINE

## 2024-11-01 PROCEDURE — 99232 SBSQ HOSP IP/OBS MODERATE 35: CPT | Performed by: INTERNAL MEDICINE

## 2024-11-01 PROCEDURE — 99233 SBSQ HOSP IP/OBS HIGH 50: CPT | Performed by: INTERNAL MEDICINE

## 2024-11-01 PROCEDURE — 85730 THROMBOPLASTIN TIME PARTIAL: CPT | Performed by: INTERNAL MEDICINE

## 2024-11-01 PROCEDURE — NC001 PR NO CHARGE: Performed by: PHYSICIAN ASSISTANT

## 2024-11-01 PROCEDURE — 82948 REAGENT STRIP/BLOOD GLUCOSE: CPT

## 2024-11-01 RX ORDER — MAGNESIUM SULFATE HEPTAHYDRATE 40 MG/ML
2 INJECTION, SOLUTION INTRAVENOUS ONCE
Status: COMPLETED | OUTPATIENT
Start: 2024-11-01 | End: 2024-11-01

## 2024-11-01 RX ORDER — POLYETHYLENE GLYCOL 3350 17 G/17G
17 POWDER, FOR SOLUTION ORAL DAILY PRN
Status: DISCONTINUED | OUTPATIENT
Start: 2024-11-01 | End: 2024-11-02

## 2024-11-01 RX ORDER — ALBUTEROL SULFATE 90 UG/1
2 INHALANT RESPIRATORY (INHALATION) EVERY 4 HOURS PRN
Status: DISCONTINUED | OUTPATIENT
Start: 2024-11-01 | End: 2024-11-06

## 2024-11-01 RX ORDER — CEFAZOLIN SODIUM 2 G/50ML
2000 SOLUTION INTRAVENOUS ONCE
Status: CANCELLED | OUTPATIENT
Start: 2024-11-01 | End: 2024-11-01

## 2024-11-01 RX ADMIN — HEPARIN SODIUM 20 UNITS/KG/HR: 10000 INJECTION, SOLUTION INTRAVENOUS at 04:27

## 2024-11-01 RX ADMIN — POLYETHYLENE GLYCOL 3350 17 G: 17 POWDER, FOR SOLUTION ORAL at 08:48

## 2024-11-01 RX ADMIN — BUDESONIDE AND FORMOTEROL FUMARATE DIHYDRATE 2 PUFF: 80; 4.5 AEROSOL RESPIRATORY (INHALATION) at 17:52

## 2024-11-01 RX ADMIN — BUDESONIDE AND FORMOTEROL FUMARATE DIHYDRATE 2 PUFF: 80; 4.5 AEROSOL RESPIRATORY (INHALATION) at 08:42

## 2024-11-01 RX ADMIN — ASPIRIN 81 MG: 81 TABLET, COATED ORAL at 08:42

## 2024-11-01 RX ADMIN — PANTOPRAZOLE SODIUM 40 MG: 40 TABLET, DELAYED RELEASE ORAL at 05:08

## 2024-11-01 RX ADMIN — MONTELUKAST 10 MG: 10 TABLET, FILM COATED ORAL at 21:24

## 2024-11-01 RX ADMIN — MAGNESIUM SULFATE HEPTAHYDRATE 2 G: 40 INJECTION, SOLUTION INTRAVENOUS at 08:42

## 2024-11-01 RX ADMIN — HEPARIN SODIUM 20 UNITS/KG/HR: 10000 INJECTION, SOLUTION INTRAVENOUS at 20:09

## 2024-11-01 RX ADMIN — ATORVASTATIN CALCIUM 40 MG: 40 TABLET, FILM COATED ORAL at 17:52

## 2024-11-01 NOTE — UTILIZATION REVIEW
Initial Clinical Review    Admission: Date/Time/Statement:   Admission Orders (From admission, onward)       Ordered        10/31/24 1221  Inpatient Admission  Once                          Orders Placed This Encounter   Procedures    Inpatient Admission     Standing Status:   Standing     Number of Occurrences:   1     Order Specific Question:   Level of Care     Answer:   Med Surg [16]     Comments:   with telemetry     Order Specific Question:   Estimated length of stay     Answer:   More than 2 Midnights     Order Specific Question:   Certification     Answer:   I certify that inpatient services are medically necessary for this patient for a duration of greater than two midnights. See H&P and MD Progress Notes for additional information about the patient's course of treatment.     Initial Presentation: 64 y.o. male presents to [facility] Admitted Inpatient to MS/Tele unit with MVCAD.  PMHx: CAD s/p PCI to RCA in 2004, KAMI (A1c 8.4%), recurrent DVT on Xarelto, IgM kappa gammopathy, low-grade CD5 positive B-cell lymphoma, chronic bronchitis, KULDIP not on CPAP and on 3LNC @ HS, DM, HTN, HLD, and myotonic dystrophy who presented to the hospital for an outpatient LHC d/t progressive dyspnea on exertion over the last 2-3 yrs. Cardiac cath 10/31 showed severe LV/MVCAD and admitted after cath for cardiac surgery consult for CABG eval. WBC 3.16, Cr 0.54.   Plan: telemetry.  Lipid panel and A1c, VAS vein mapping, TTE ordered. Continue po meds, O2 @ hs, holding Xarelto prior to CTSx eval. CTSx and cardiology consulted. Start hep gtt for AC. CBC daily. SCDs. OOB as noemí. Accu-checks w/ ssi. Consult endocrine.     CTSx consult -- Stress test done 9/4/24 without significant findings except paradoxical perfusion defect. Cardiac cath scheduled electively given hx of CAD and ongoing symptoms of LOJA/SOB, but without CP/angina. Cardiac cath 10/31 shows severe LV/MVCAD.   Impression:  - severe LM/MVCAD   - chronic LOJA/SOB  - preserved LV  function - LVEF 55%  - DM1 on insulin  - multiple medical issues listed above   Plan:  CABG eval underway. Repeat echo, check vein mapping. Surgery, surgical recovery, risks and benefits of CABG were discussed in detail today with the pt.  They understand and wish to proceed with further w/u. Final recs pending completed w/u and discussion with patient/wife.       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  At 6 AM on the day of surgery, d/c insulin pump and start IV insulin.  Continue to monitor blood sugar over time and make adjustments to the regimen if necessary.        Date: 11/1   Day 2: Bradycardia early this morning rates in the 20s with some presyncope and overall nausea complaints. Pt agreeable to proceed with surgery. For CABGx 3 on Wednesday (11/6)     Cardiology note: no issue overnight. He has no chest pain or significant dyspnea. Has mild right wrist discomfort at catheterization site. Sinus bradycardia on telemetry. Continues on intravenous heparin. Cardiac surgery note appreciated. Vital signs stable. BMP with potassium of 4.1 and creatinine of 0.6. Hgb 11.5.     EP consult -- telemetry reviewed with SB with rates into the 30s.  Had an episode of presyncope just before my eval a/w low HR. Not on any rate control agents. Plan for pacemaker insertion next week, timing TBD. Continue to avoid AV salvador blocking agents. Continue to monitor on telemetry      Scheduled Medications:  aspirin, 81 mg, Oral, Daily  atorvastatin, 40 mg, Oral, Daily With Dinner  budesonide-formoterol, 2 puff, Inhalation, BID  fluticasone, 2 spray, Nasal, Daily  magnesium sulfate, 2 g, Intravenous, Once  montelukast, 10 mg, Oral, HS  pantoprazole, 40 mg, Oral, Early Morning  patient maintained insulin pump, 1 each, Subcutaneous, Q8H    Continuous IV Infusions:  heparin (porcine), 3-30 Units/kg/hr (Order-Specific),  Intravenous, Titrated    PRN Meds:  acetaminophen, 650 mg, Oral, Q4H PRN  heparin (porcine), 2,800 Units, Intravenous, Q6H PRN  heparin (porcine), 5,600 Units, Intravenous, Q6H PRN  insulin aspart, 300 Units, Subcutaneous Insulin Pump, Daily PRN  levalbuterol, 1.25 mg, Nebulization, TID PRN  nitroglycerin, 0.4 mg, Sublingual, Q5 Min PRN  polyethylene glycol, 17 g, Oral, Daily PRN        Weight (last 2 days)       Date/Time Weight    11/01/24 0500 72.9 (160.7)    10/31/24 1300 73 (161)    10/31/24 1020 --    Comment rows:    OBSERV: [place] with [place] [facility]  Received by FAUSTO Zazueta and care assumed.  OP site assessed and remains stable.  Safety addressed before leaving bedside. at 10/31/24 1020    10/31/24 0700 73 (161)            Vital Signs (last 3 days)       Date/Time Temp Pulse Resp BP MAP (mmHg) SpO2 Calculated FIO2 (%) - Nasal Cannula Nasal Cannula O2 Flow Rate (L/min) O2 Device Patient Position - Orthostatic VS Pain    11/01/24 09:07:29 -- 42 19 118/73 88 96 % -- -- -- -- --    11/01/24 07:34:15 98.1 °F (36.7 °C) 54 18 122/74 90 96 % -- -- None (Room air) Lying --    11/01/24 0310 -- 58 18 117/65 82 95 % -- -- None (Room air) Lying --    10/31/24 23:38:33 98.1 °F (36.7 °C) 47 -- 129/66 87 94 % -- -- -- -- --    10/31/24 23:38:18 98.1 °F (36.7 °C) 46 17 129/66 87 95 % -- -- -- -- --    10/31/24 2109 -- -- -- -- -- -- -- -- -- -- 4    10/31/24 2000 -- -- -- -- -- -- -- -- None (Room air) -- No Pain    10/31/24 19:16:31 98.8 °F (37.1 °C) 57 -- 114/70 85 97 % -- -- -- -- --    10/31/24 1647 -- -- -- -- -- -- -- -- -- -- 5    10/31/24 15:27:54 98.6 °F (37 °C) 48 -- -- -- 97 % -- -- -- -- --    10/31/24 15:07:46 -- 49 16 112/67 82 97 % -- -- None (Room air) Sitting --    10/31/24 1437 -- -- -- -- -- -- -- -- -- -- No Pain    10/31/24 1300 -- 54 -- 125/75 -- -- -- -- -- -- --    10/31/24 1200 -- 57 -- 118/69 -- -- -- -- -- -- --    10/31/24 1130 -- 58 -- 111/62 -- 95 % -- -- None (Room air) -- --     10/31/24 1100 -- 51 -- 107/59 -- 95 % -- -- None (Room air) -- No Pain    10/31/24 1045 -- 53 -- 119/69 -- 94 % -- -- None (Room air) -- --    10/31/24 1030 -- 53 -- 133/70 -- 96 % -- -- None (Room air) -- --    10/31/24 1020 -- -- -- -- -- -- -- -- -- -- No Pain    OBSERV: Transferred by stretcher with nurse to [facility].  Received by FAUSTO Zazueta and care assumed.  OP site assessed and remains stable.  Safety addressed before leaving bedside. at 10/31/24 1020    10/31/24 1015 -- 49 16 146/78 106 94 % -- -- None (Room air) -- No Pain    10/31/24 09:29:38 -- -- -- -- -- -- -- -- -- -- No Pain    10/31/24 09:29:30 -- -- -- -- -- 97 % -- -- None (Room air) -- --    10/31/24 09:07:47 -- -- -- -- -- -- -- -- -- -- No Pain    10/31/24 09:07:35 -- -- -- -- -- -- 28 2 L/min Nasal cannula -- --    10/31/24 0826 -- -- -- -- -- -- -- -- -- -- No Pain    10/31/24 0700 97.7 °F (36.5 °C) 54 18 151/94 -- 96 % -- -- None (Room air) -- No Pain         Pertinent Labs/Diagnostic Test Results:   Radiology:  VAS VEIN MAPPING- LOWER EXTREMITY   Final Interpretation by Wyatt Oneil DO (10/31 2133)   Impression:  RIGHT LOWER LIMB:  The great saphenous vein is patent  from the groin to the ankle.  The intraluminal diameter measurements range from 2.9mm to 5.9mm throughout.  LEFT LOWER LIMB:  The great saphenous vein is patent  from the groin to the ankle.  The intraluminal diameter measurements range from 2.6mm to 8.9mm throughout.   XR chest pa & lateral    (Results Pending)     Cardiology:  Echo complete w/ contrast if indicated   Final Result by Andrade Mart MD (10/31 2562)        Left Ventricle: Left ventricular cavity size is normal. Wall thickness    is mildly increased. There is mild concentric hypertrophy. The left    ventricular ejection fraction is 55%. Systolic function is normal.    Diastolic function is mildly abnormal, consistent with grade I (abnormal)    relaxation.     The following segments are hypokinetic: basal  inferolateral and mid    inferolateral.     All other segments are normal.     Right Ventricle: Right ventricular cavity size is normal. Systolic    function is normal.     Aorta: The aortic root is normal in size. The ascending aorta is mildly    dilated. Ao root is 3.10 cm. Asc Ao is 3.8 cm.         Cardiac catheterization   Final Result by Ky Sandoval MD (10/31 7520)        Dist LM lesion is 80% stenosed.     Prox Cx to Mid Cx lesion is 90% stenosed.     Mid RCA lesion is 90% stenosed.      Significant left main, circumflex and mid RCA disease in a diabetic    patient.         ECG 12 lead   Final Result by Jamar Mosley MD (10/31 5446)   Sinus bradycardia   Minimal voltage criteria for LVH, may be normal variant   Borderline ECG   No previous ECGs available   Confirmed by Jamar Mosley ([id number]) on 10/31/2024 10:49:10 PM            Results from last 7 days   Lab Units 11/01/24  0351   WBC Thousand/uL 4.04*   HEMOGLOBIN g/dL 11.5*   HEMATOCRIT % 35.3*   PLATELETS Thousands/uL 118*       Results from last 7 days   Lab Units 11/01/24  0351   SODIUM mmol/L 138   POTASSIUM mmol/L 4.1   CHLORIDE mmol/L 106   CO2 mmol/L 28   ANION GAP mmol/L 4   BUN mg/dL 18   CREATININE mg/dL 0.60   EGFR ml/min/1.73sq m 106   CALCIUM mg/dL 8.0*   MAGNESIUM mg/dL 1.7*     Results from last 7 days   Lab Units 11/01/24  0906 11/01/24  0818 10/31/24  2022 10/31/24  1708 10/31/24  1131   POC GLUCOSE mg/dl 156* 124 137 233* 214*     Results from last 7 days   Lab Units 11/01/24  0351   GLUCOSE RANDOM mg/dL 166*     Results from last 7 days   Lab Units 10/31/24  1315   HEMOGLOBIN A1C % 8.4*   EAG mg/dl 194     Results from last 7 days   Lab Units 11/01/24  0351 10/31/24  2037 10/31/24  1315   PROTIME seconds  --   --  14.6   INR   --   --  1.11   PTT seconds 83* 44* 25       Past Medical History:   Diagnosis Date    Acute respiratory failure with hypoxia ([facility]) 02/08/2024    CAD (coronary artery disease)     Congenital myotonia      Deep vein thrombosis (DVT) (HCC)     after tear of gastrocnemus muscle    Diabetes (HCC)     Diabetes mellitus (HCC)     Difficulty walking     hip replacement    HL (hearing loss)     decreased both  ears    Myocardial infarction (HCC)     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    Pancreatitis     three times    Sleep apnea     not using a C-PAP    Superficial thrombophlebitis     Vision loss     reading glasses over the counter     Present on Admission:   Obstructive sleep apnea   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HCC)   Monoclonal gammopathy   Mixed hyperlipidemia   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 male    Network Utilization Review Department  ATTENTION: Please call with any questions or concerns to [phone] and carefully listen to the prompts so that you are directed to the right person. All voicemails are confidential.   For Discharge needs, contact Care Management DC Support Team at [phone] opt. [phone]  Send all requests for admission clinical reviews, approved or denied determinations and any other requests to dedicated fax number below belonging to the campus where the patient is receiving treatment. List of dedicated fax numbers for the Facilities:  FACILITY NAME UR FAX NUMBER   ADMISSION DENIALS (Administrative/Medical Necessity) [phone]   DISCHARGE SUPPORT TEAM (NETWORK) [phone]   PARENT CHILD HEALTH (Maternity/NICU/Pediatrics) [phone]   [facility] [phone]   [facility] [phone]   Critical access hospital [phone]   [facility] [phone]   [facility] [phone]   [facility] [phone]   [facility]  [phone]   [facility] [phone]   [facility] [phone]   [facility] [phone]   [facility] [phone]   [facility] [phone]

## 2024-11-01 NOTE — ASSESSMENT & PLAN NOTE
Lab Results   Component Value Date    HGBA1C 8.4 (H) 10/31/2024       Recent Labs     10/31/24  1131 10/31/24  1708 10/31/24  2022 11/01/24  0818   POCGLU 214* 233* 137 124       Blood Sugar Average: Last 72 hrs:  (P) 177    Plan:  - Outpatient f/u with ophthalmology

## 2024-11-01 NOTE — PROGRESS NOTES
"Cardiology Progress Note - Otoniel Martinez 64 y.o. 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  He has no chest pain or significant dyspnea.  Has mild right wrist discomfort at catheterization site.  Sinus bradycardia on telemetry.  Continues on intravenous heparin.  Cardiac surgery note appreciated.  Vital signs stable.  BMP with potassium of 4.1 and creatinine of 0.6.  Hemoglobin 11.5.  For CABG x 3 on Wednesday.    Subjective:   Patient seen and examined.  No significant events overnight.  negative.    Objective:     Vitals: Blood pressure 122/74, pulse (!) 54, temperature 98.1 °F (36.7 °C), temperature source Oral, resp. rate 18, height 5' 8\" (1.727 m), weight 72.9 kg (160 lb 11.2 oz), SpO2 96%., Body mass index is 24.43 kg/m².,   Orthostatic Blood Pressures      Flowsheet Row Most Recent Value   Blood Pressure 122/74 filed at 11/01/2024 0734   Patient Position - Orthostatic VS Lying filed at 11/01/2024 0734        ,      Intake/Output Summary (Last 24 hours) at 11/1/2024 0836  Last data filed at 11/1/2024 0601  Gross per 24 hour   Intake 1465.2 ml   Output 0 ml   Net 1465.2 ml       No significant arrhythmias seen on telemetry review.       Physical Exam:    GEN: Otoniel Martinez appears well, alert and oriented x 3, pleasant and cooperative   NECK: supple, no carotid bruits, no JVD or HJR  HEART: normal rate, regular rhythm, normal S1 and S2, no murmurs, " clicks, gallops or rubs   LUNGS: clear to auscultation bilaterally; no wheezes, rales, or rhonchi   ABDOMEN: normal bowel sounds, soft, no tenderness, no distention  EXTREMITIES: peripheral pulses normal; no clubbing, cyanosis, or edema  SKIN: warm and well perfused, no suspicious lesions on exposed skin    Labs & Results:    Admission on 10/31/2024   Component Date Value    POC Glucose 10/31/2024 214 (H)     PTT 10/31/2024 25     Protime 10/31/2024 14.6     INR 10/31/2024 1.11     Hemoglobin A1C 10/31/2024 8.4 (H)     EAG 10/31/2024 194     ABO Grouping 10/31/2024 A     Rh Factor 10/31/2024 Positive     Antibody Screen 10/31/2024 Negative     Specimen Expiration Date 10/31/2024 [id number]     Cholesterol 10/31/2024 133     Triglycerides 10/31/2024 209 (H)     HDL, Direct 10/31/2024 40     LDL Calculated 10/31/2024 51     Non-HDL-Chol (CHOL-HDL) 10/31/2024 93     LA/Ao Ratio 2D 10/31/2024 1.19     LA Volume Index (BP) 10/31/2024 22.6     MV Peak A Sam 10/31/2024 0.79     MV stenosis pressure 1/2* 10/31/2024 68     MV Peak E Sam 10/31/2024 72     E wave deceleration time 10/31/2024 232     E/A ratio 10/31/2024 0.91     MV valve area p 1/2 meth* 10/31/2024 3.24     A4C EF 10/31/2024 63     Left ventricular stroke * 10/31/2024 44.00     IVSd 10/31/2024 1.00     Tricuspid annular plane * 10/31/2024 2.10     Ao root 10/31/2024 3.10     LVPWd 10/31/2024 1.10     LA size 10/31/2024 3.7     Asc Ao 10/31/2024 3.8     LA volume (BP) 10/31/2024 42     FS 10/31/2024 33     LVIDS 10/31/2024 2.70     IVS 10/31/2024 1     LVIDd 10/31/2024 4.00     LEFT VENTRICLE SYSTOLIC * 10/31/2024 28     LV DIASTOLIC VOLUME (MOD* 10/31/2024 71     Left Atrium Area-systoli* 10/31/2024 15     Left Atrium Area-systoli* 10/31/2024 15.5     MV E' Tissue Velocity Se* 10/31/2024 10     LVSV, 2D 10/31/2024 44     BSA 10/31/2024 1.86     LV EF 10/31/2024 55     Est. RA pres 10/31/2024 3.0     PTT 10/31/2024 44 (H)     ABO Grouping 10/31/2024 A     Rh  Factor 10/31/2024 Positive     POC Glucose 10/31/2024 233 (H)     POC Glucose 10/31/2024 137     Ventricular Rate 10/31/2024 53     Atrial Rate 10/31/2024 53     VA Interval 10/31/2024 150     QRSD Interval 10/31/2024 92     QT Interval 10/31/2024 462     QTC Interval 10/31/2024 433     P Axis 10/31/2024 -6     QRS [place] 10/31/2024 -7     T Wave [place] 10/31/2024 26     PTT 11/01/2024 83 (H)     Sodium 11/01/2024 138     Potassium 11/01/2024 4.1     Chloride 11/01/2024 106     CO2 11/01/2024 28     ANION GAP 11/01/2024 4     BUN 11/01/2024 18     Creatinine 11/01/2024 0.60     Glucose 11/01/2024 166 (H)     Calcium 11/01/2024 8.0 (L)     eGFR 11/01/2024 106     WBC 11/01/2024 4.04 (L)     RBC 11/01/2024 4.04     Hemoglobin 11/01/2024 11.5 (L)     Hematocrit 11/01/2024 35.3 (L)     MCV 11/01/2024 87     MCH 11/01/2024 28.5     MCHC 11/01/2024 32.6     RDW 11/01/2024 14.6     Platelets 11/01/2024 118 (L)     MPV 11/01/2024 9.9     Magnesium 11/01/2024 1.7 (L)     POC Glucose 11/01/2024 124        VAS VEIN MAPPING- LOWER EXTREMITY    Result Date: 10/31/2024  Narrative:  [facility] REPORT CLINICAL: Indications: Patient presents for surgical clearance and general health evaluation secondary to future open heart surgery.  Patient is asymptomatic at this time. Operative History: Cardiac stent, 2004 Risk Factors The patient has history of Diabetes (IDDM), Hyperlipidemia and CAD.  FINDINGS:  Right           AP (mm)  GSV Inguinal        5.9  GSV Prox Thigh      3.6  GSV Mid Thigh       3.0  GSV Dist Thigh      3.4  GSV Knee            3.5  GSV Prox Calf       2.9  GSV Mid Calf        3.5  GSV Dist Calf       3.5  GSV Ankle           3.0   Left            AP (mm)  GSV Inguinal        8.9  GSV Prox Thigh      3.6  GSV Mid Thigh       4.7  GSV Dist Thigh      3.3  GSV Knee            3.0  GSV Prox Calf       2.6  GSV Mid Calf        3.0  GSV Dist Calf       3.2  GSV Ankle           3.2     CONCLUSION: Impression: RIGHT  LOWER LIMB: The great saphenous vein is patent  from the groin to the ankle. The intraluminal diameter measurements range from 2.9mm to 5.9mm throughout. LEFT LOWER LIMB: The great saphenous vein is patent  from the groin to the ankle. The intraluminal diameter measurements range from 2.6mm to 8.9mm throughout.  SIGNATURE: Electronically Signed by: MISSY CAVANAUGH DO on 2024-10-31 09:33:29 PM    Echo complete w/ contrast if indicated    Result Date: 10/31/2024  Narrative:   Left Ventricle: Left ventricular cavity size is normal. Wall thickness is mildly increased. There is mild concentric hypertrophy. The left ventricular ejection fraction is 55%. Systolic function is normal. Diastolic function is mildly abnormal, consistent with grade I (abnormal) relaxation.   The following segments are hypokinetic: basal inferolateral and mid inferolateral.   All other segments are normal.   Right Ventricle: Right ventricular cavity size is normal. Systolic function is normal.   Aorta: The aortic root is normal in size. The ascending aorta is mildly dilated. Ao root is 3.10 cm. Asc Ao is 3.8 cm.     Cardiac catheterization    Result Date: 10/31/2024  Narrative:   Dist LM lesion is 80% stenosed.   Prox Cx to Mid Cx lesion is 90% stenosed.   Mid RCA lesion is 90% stenosed. Significant left main, circumflex and mid RCA disease in a diabetic patient.       EKG personally reviewed by Hilario Matson MD.     Counseling / Coordination of Care  Total floor / unit time spent today 30 minutes.  Greater than 50% of total time was spent with the patient and / or family counseling and / or coordination of care.

## 2024-11-01 NOTE — PROGRESS NOTES
Progress Note - Internal Medicine   Name: Otoniel Martinez 64 y.o. male I MRN: [id number]  Unit/Bed#: [facility] 423-01 I Date of Admission: 10/31/2024   Date of Service: 11/1/2024 I Hospital Day: 1    Assessment & Plan  Multivessel CAD  Patient with a history of CAD s/p PCI to RCA in 2004 who presented to the hospital for an outpatient C due to progressive LOJA over the last 2-3 years.  Found to have multivessel CAD as mentioned below.  Patient admitted for CABG evaluation    LHC findings:    Dist LM lesion is 80% stenosed.    Prox Cx to Mid Cx lesion is 90% stenosed.    Mid RCA lesion is 90% stenosed.    Echo 10/31/24:  EF 55% with G1DD. Mild concentric hypertrophy. Hypokinetic in basal inferolateral and mid inferolateral segments. Trace TR and mildly dilated ascending aorta 3.8 cm.     VAS vein mapping: patent great saphenous veins in lower extremities.    Plan:  - Cardiothoracic surgery consulted with CABG planned on 11/6/24 (Wed)   - Continue to monitor on telemetry  - Continue to hold Xarelto (for DVT)   - Continue Heparin gtt   - Continue Lipitor 40 mg daily  - Low threshold for serial EKG/troponins if necessary  - PRN nitro sublingual for chest pain  - Avoid AV salvador blocking agents given symptomatic bradycardia   Thrombocytopenia (HCC)  History of thrombocytopenia baseline in the low 100s.  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  Appreciate recommendations  - Per Endo, continue insulin pump at this time as patient can self administer insulin via insulin pump  - POCT 4 times daily before meals and at bedtime  - Hypoglycemia protocol  - Diabetic diet    Mixed hyperlipidemia  History of hyperlipidemia maintained on Lipitor 40 mg daily    Lipid panel 10/31/24: , , HDL 40, LDL 51    Plan:  - Continue Lipitor 40 mg daily    History of recurrent deep vein thrombosis (DVT)  Patient with a history of recurrent DVTs maintained on Xarelto indefinitely    Plan:  - Hold Xarelto   - Continue heparin gtt for anticoagulation  Chronic bronchitis (HCC)  Patient with a history of chronic bronchitis maintained on Symbicort    Plan:  - Continue home med Symbicort  Insulin pump status  See plan under KAMI  Obstructive sleep apnea  Patient with a history of KULDIP had been on CPAP in the past but now only uses oxygen nightly    Plan:  - Continue oxygen as needed nightly  - Outpatient sleep study ordered by sleep medicine  Monoclonal gammopathy  Patient with a history of 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  Follows with heme-onc as an outpatient.  Bone marrow biopsy in August 2024 showing low-grade CD5 positive B-cell lymphoma as well.  Per most recent heme-onc note, no urgent intervention and follow-up in 3 months    Plan:  - Continue to follow-up outpatient  Splenic artery aneurysm (HCC)  Patient with an incidental finding of splenic artery aneurysm earlier this year.  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 Tele this morning showed down to 30s. Examined at bedside. Patient endorsed lightheaded when he moved from chair to bed. Patient has been having lightheadedness and dizziness for years. EKG at bedside showed sinus bradycardia with HR 58.    Plan  - EP consulted, appreciate recs  - Tentative plan for pacemaker insertion next week  - Continue to monitor on Tele  - Avoid AV salvador blocking agents       Disposition: Remains inpatient for medical care and CABG on 11/6/24     Team: SOD TEAM C    Subjective   Patient seen and examined. No acute events overnight. Earlier this morning, patient was doing well and was seen while eating his breakfast where he denied any lightheadedness, dizziness, chest pain, SOB and N/V but did report right wrist pain/discomfort at catheterization site. However later in the morning after breakfast, patient reported some dizziness/lightheadedness but denied other symptoms.      Objective :  Temp:  [98.1 °F (36.7 °C)-98.8 °F (37.1 °C)] 98.1 °F (36.7 °C)  HR:  [46-58] 54  BP: (107-146)/(59-78) 122/74  Resp:  [16-18] 18  SpO2:  [94 %-97 %] 96 %  O2 Device: None (Room air)  Nasal Cannula O2 Flow Rate (L/min):  [2 L/min] 2 L/min    I/O         10/30 0701  10/31 0700 10/31 0701  11/01 0700    P.O.  1440    I.V. (mL/kg)  25.2 (0.3)    Total Intake(mL/kg)  1465.2 (20.1)    Urine (mL/kg/hr)  0    Stool  0    Total Output  0    Net  +1465.2          Unmeasured Urine Occurrence  3 x    Unmeasured Stool Occurrence  0 x          Weights:   IBW (Ideal Body Weight): 68.4 kg    Body mass index is 24.43 kg/m².  Weight (last 2 days)       Date/Time Weight    11/01/24 0500 72.9 (160.7)    10/31/24 1300 73 (161)    10/31/24 1020 --    Comment rows:    OBSERV: Transferred by stretcher with nurse to [facility]  Received by FAUSTO Zazueta and care assumed.  OP site assessed and remains stable.  Safety addressed before leaving bedside. at 10/31/24 1020    10/31/24  0700 73 (161)            Physical Exam  Constitutional:       General: He is not in acute distress.     Appearance: Normal appearance. He is normal weight.   HENT:      Head: Normocephalic and atraumatic.      Nose: Nose normal.      Mouth/Throat:      Mouth: Mucous membranes are moist.   Eyes:      Extraocular Movements: Extraocular movements intact.      Pupils: Pupils are equal, round, and reactive to light.   Cardiovascular:      Rate and Rhythm: Regular rhythm. Bradycardia present.      Pulses: Normal pulses.      Heart sounds: Normal heart sounds. No murmur heard.     No friction rub. No gallop.   Pulmonary:      Effort: Pulmonary effort is normal.      Breath sounds: Normal breath sounds.   Abdominal:      General: Abdomen is flat. Bowel sounds are normal.      Palpations: Abdomen is soft.   Musculoskeletal:         General: Normal range of motion.      Cervical back: Normal range of motion.      Right lower leg: No edema.      Left lower leg: No edema.   Skin:     General: Skin is warm and dry.   Neurological:      General: No focal deficit present.      Mental Status: He is alert and oriented to person, place, and time. Mental status is at baseline.   Psychiatric:         Mood and Affect: Mood normal.         Behavior: Behavior normal.           Lab Results: I have reviewed the following results:  Recent Labs     10/31/24  1315 10/31/24  2037 11/01/24  0351   WBC  --   --  4.04*   HGB  --   --  11.5*   HCT  --   --  35.3*   PLT  --   --  118*   SODIUM  --   --  138   K  --   --  4.1   CL  --   --  106   CO2  --   --  28   BUN  --   --  18   CREATININE  --   --  0.60   GLUC  --   --  166*   MG  --   --  1.7*   PTT 25   < > 83*   INR 1.11  --   --     < > = values in this interval not displayed.       Imaging Results Review: No pertinent imaging studies reviewed.  Other Study Results Review: EKG was reviewed.     Currently Ordered Meds:   Current Facility-Administered Medications:     acetaminophen (TYLENOL)  tablet 650 mg, Q4H PRN    aspirin (ECOTRIN LOW STRENGTH) EC tablet 81 mg, Daily    atorvastatin (LIPITOR) tablet 40 mg, Daily With Dinner    budesonide-formoterol (SYMBICORT) 80-4.5 MCG/ACT inhaler 2 puff, BID    fluticasone (FLONASE) 50 mcg/act nasal spray 2 spray, Daily    heparin (porcine) 25,000 units in 0.45% NaCl 250 mL infusion (premix), Titrated, Last Rate: 20 Units/kg/hr (11/01/24 0701)    heparin (porcine) injection 2,800 Units, Q6H PRN    heparin (porcine) injection 5,600 Units, Q6H PRN    insulin aspart (NovoLOG) FOR PUMP REFILLS 300 Units, Daily PRN    levalbuterol (XOPENEX) inhalation solution 1.25 mg, TID PRN    magnesium sulfate 2 g/50 mL IVPB (premix) 2 g, Once, Last Rate: 2 g (11/01/24 0842)    montelukast (SINGULAIR) tablet 10 mg, HS    nitroglycerin (NITROSTAT) SL tablet 0.4 mg, Q5 Min PRN    pantoprazole (PROTONIX) EC tablet 40 mg, Early Morning    PATIENT MAINTAINED INSULIN PUMP 1 each, Q8H    polyethylene glycol (MIRALAX) packet 17 g, Daily PRN  VTE Pharmacologic Prophylaxis: Heparin  VTE Mechanical Prophylaxis: sequential compression device    Administrative Statements   I have spent a total time of 45 minutes in caring for this patient on the day of the visit/encounter including Diagnostic results, Prognosis, Risks and benefits of tx options, Instructions for management, Patient and family education, Importance of tx compliance, Risk factor reductions, Impressions, Counseling / Coordination of care, Documenting in the medical record, Reviewing / ordering tests, medicine, procedures  , Obtaining or reviewing history  , and Communicating with other healthcare professionals .  Portions of the record may have been created with voice recognition software.

## 2024-11-01 NOTE — PLAN OF CARE
Problem: PAIN - ADULT  Goal: Verbalizes/displays adequate comfort level or baseline comfort level  Description: Interventions:  - Encourage patient to monitor pain and request assistance  - Assess pain using appropriate pain scale  - Administer analgesics based on type and severity of pain and evaluate response  - Implement non-pharmacological measures as appropriate and evaluate response  - Consider cultural and social influences on pain and pain management  - Notify physician/advanced practitioner if interventions unsuccessful or patient reports new pain  Outcome: Progressing     Problem: INFECTION - ADULT  Goal: Absence or prevention of progression during hospitalization  Description: INTERVENTIONS:  - Assess and monitor for signs and symptoms of infection  - Monitor lab/diagnostic results  - Monitor all insertion sites, i.e. indwelling lines, tubes, and drains  - Monitor endotracheal if appropriate and nasal secretions for changes in amount and color  - [place] appropriate cooling/warming therapies per order  - Administer medications as ordered  - Instruct and encourage patient and family to use good hand hygiene technique  - Identify and instruct in appropriate isolation precautions for identified infection/condition  Outcome: Progressing  Goal: Absence of fever/infection during neutropenic period  Description: INTERVENTIONS:  - Monitor WBC    Outcome: Progressing     Problem: SAFETY ADULT  Goal: Patient will remain free of falls  Description: INTERVENTIONS:  - Educate patient/family on patient safety including physical limitations  - Instruct patient to call for assistance with activity   - Consult OT/PT to assist with strengthening/mobility   - Keep Call bell within reach  - Keep bed low and locked with side rails adjusted as appropriate  - Keep care items and personal belongings within reach  - Initiate and maintain comfort rounds  - Make Fall Risk Sign visible to staff  - Offer Toileting every 2 Hours,  in advance of need  - Initiate/Maintain bed alarm  - Apply yellow socks and bracelet for high fall risk patients  - Consider moving patient to room near nurses station  Outcome: Progressing  Goal: Maintain or return to baseline ADL function  Description: INTERVENTIONS:  -  Assess patient's ability to carry out ADLs; assess patient's baseline for ADL function and identify physical deficits which impact ability to perform ADLs (bathing, care of mouth/teeth, toileting, grooming, dressing, etc.)  - Assess/evaluate cause of self-care deficits   - Assess range of motion  - Assess patient's mobility; develop plan if impaired  - Assess patient's need for assistive devices and provide as appropriate  - Encourage maximum independence but intervene and supervise when necessary  - Involve family in performance of ADLs  - Assess for home care needs following discharge   - Consider OT consult to assist with ADL evaluation and planning for discharge  - Provide patient education as appropriate  Outcome: Progressing  Goal: Maintains/Returns to pre admission functional level  Description: INTERVENTIONS:  - Perform AM-PAC 6 Click Basic Mobility/ Daily Activity assessment daily.  - Set and communicate daily mobility goal to care team and patient/family/caregiver.   - Collaborate with rehabilitation services on mobility goals if consulted  - Perform Range of Motion 4 times a day.  - Reposition patient every 2 hours.  - Dangle patient 4 times a day  - Stand patient 4 times a day  - Ambulate patient 4 times a day  - Out of bed to chair 4 times a day   - Out of bed for meals 4 times a day  - Out of bed for toileting  - Record patient progress and toleration of activity level   Outcome: Progressing     Problem: DISCHARGE PLANNING  Goal: Discharge to home or other facility with appropriate resources  Description: INTERVENTIONS:  - Identify barriers to discharge w/patient and caregiver  - Arrange for needed discharge resources and  transportation as appropriate  - Identify discharge learning needs (meds, wound care, etc.)  - Arrange for interpretive services to assist at discharge as needed  - Refer to Case Management Department for coordinating discharge planning if the patient needs post-hospital services based on physician/advanced practitioner order or complex needs related to functional status, cognitive ability, or social support system  Outcome: Progressing     Problem: Knowledge Deficit  Goal: Patient/family/caregiver demonstrates understanding of disease process, treatment plan, medications, and discharge instructions  Description: Complete learning assessment and assess knowledge base.  Interventions:  - Provide teaching at level of understanding  - Provide teaching via preferred learning methods  Outcome: Progressing     Problem: CARDIOVASCULAR - ADULT  Goal: Maintains optimal cardiac output and hemodynamic stability  Description: INTERVENTIONS:  - Monitor I/O, vital signs and rhythm  - Monitor for S/S and trends of decreased cardiac output  - Administer and titrate ordered vasoactive medications to optimize hemodynamic stability  - Assess quality of pulses, skin color and temperature  - Assess for signs of decreased coronary artery perfusion  - Instruct patient to report change in severity of symptoms  Outcome: Progressing

## 2024-11-01 NOTE — PROGRESS NOTES
Progress Note - Cardiac Surgery   Otoniel Martinez 64 y.o. male MRN: [id number]  Unit/Bed#: [facility] 423-01 Encounter: [id number]    24 Hour Events: Bradycardia early this morning rates in the 20s with some presyncope and overall nausea complaints  Cardiology at the bedside during event    Medications:   Scheduled Meds:  Current Facility-Administered Medications   Medication Dose Route Frequency Provider Last Rate    acetaminophen  650 mg Oral Q4H PRN Faustino Jefferson MD      aspirin  81 mg Oral Daily Jameal Hadeed, DO      atorvastatin  40 mg Oral Daily With Dinner Faustino Jefferson MD      budesonide-formoterol  2 puff Inhalation BID Faustino Jefferson MD      fluticasone  2 spray Nasal Daily Faustino Jefferson MD      heparin (porcine)  3-30 Units/kg/hr (Order-Specific) Intravenous Titrated Jameal Hadeed, DO 20 Units/kg/hr (11/01/24 0701)    heparin (porcine)  2,800 Units Intravenous Q6H PRN Jameal Hadeed, DO      heparin (porcine)  5,600 Units Intravenous Q6H PRN Jameal Hadeed, DO      insulin aspart  300 Units Subcutaneous Insulin Pump Daily PRN Harpreet Campos MD      levalbuterol  1.25 mg Nebulization TID PRN Faustino Jefferson MD      magnesium sulfate  2 g Intravenous Once Suleiman Riojas, DO      montelukast  10 mg Oral HS Faustino Jefferson MD      nitroglycerin  0.4 mg Sublingual Q5 Min PRN Faustino Jefferson MD      pantoprazole  40 mg Oral Early Morning Faustino Jefferson MD      patient maintained insulin pump  1 each Subcutaneous Q8H Harpreet Campos MD      polyethylene glycol  17 g Oral Daily PRN Suleiman Neri Kuat, DO       Continuous Infusions:heparin (porcine), 3-30 Units/kg/hr (Order-Specific), Last Rate: 20 Units/kg/hr (11/01/24 0701)        Results:   Results from last 7 days   Lab Units 11/01/24  0351   WBC Thousand/uL 4.04*   HEMOGLOBIN g/dL 11.5*   HEMATOCRIT % 35.3*   PLATELETS Thousands/uL 118*     Results from last 7 days   Lab Units 11/01/24  0351   POTASSIUM mmol/L 4.1   CHLORIDE mmol/L 106   CO2 mmol/L 28    BUN mg/dL 18   CREATININE mg/dL 0.60   CALCIUM mg/dL 8.0*     Results from last 7 days   Lab Units 11/01/24  0351 10/31/24  2037 10/31/24  1315   INR   --   --  1.11   PTT seconds 83* 44* 25       Studies:     Cardiac Catheterization:   Left Main   The vessel is moderate in size.   Dist LM lesion is 80% stenosed. Culprit lesion. JOSE C flow is 3.      Left Anterior Descending   The vessel is moderate in size. There is moderate diffuse disease throughout the vessel.      Left Circumflex   The vessel is small.   Prox Cx to Mid Cx lesion is 90% stenosed. Not the culprit lesion. JOSE C flow is 3.      Right Coronary Artery   The vessel is moderate in size and dominant. There is moderate diffuse disease throughout the vessel.   Mid RCA lesion is 90% stenosed. Not the culprit lesion. JOSE C flow is 3.        Echocardiogram:   Findings    Left Ventricle Left ventricular cavity size is normal. Wall thickness is mildly increased. There is mild concentric hypertrophy. The left ventricular ejection fraction is 55%. Systolic function is normal. Diastolic function is mildly abnormal, consistent with grade I (abnormal) relaxation.   Wall Scoring Baseline     The following segments are hypokinetic: basal inferolateral and mid inferolateral.  All other segments are normal.            Right Ventricle Right ventricular cavity size is normal. Systolic function is normal.   Left Atrium The atrium is normal in size.   Right Atrium The atrium is normal in size.   Aortic Valve The leaflets are not thickened. The leaflets are not calcified. The leaflets exhibit normal mobility. There is no evidence of regurgitation. The aortic valve has no significant stenosis.   Mitral Valve Mitral valve structure is normal.  There is trace regurgitation. There is no evidence of stenosis.   Tricuspid Valve Tricuspid valve structure is normal. There is trace regurgitation. There is no evidence of stenosis. Right ventricular systolic pressure could not be  assessed.   Pulmonic Valve The pulmonic valve was not well visualized. There is no evidence of regurgitation. There is no evidence of stenosis.   Ascending Aorta The aortic root is normal in size. The ascending aorta is mildly dilated. Ao root is 3.10 cm. Asc Ao is 3.8 cm.   IVC/SVC The right atrial pressure is estimated at 3.0 mmHg. The inferior vena cava is normal in size.   Pericardium There is no pericardial effusion.       Carotid artery duplex:  CONCLUSION:     Impression     RIGHT:  There is <50% stenosis noted in the internal carotid artery. Plaque is  heterogenous and irregular.  Vertebral artery flow is antegrade. There is no significant subclavian artery  disease.  LEFT:  There is <50% stenosis noted in the internal carotid artery. Plaque is  heterogenous and irregular.  Vertebral clay    Greater saphenous vein mapping: Adequate conduit for CABG, bilaterally    Above reviewed with patient     Vitals:   Vitals:    10/31/24 2338 11/01/24 0310 11/01/24 0500 11/01/24 0734   BP: 129/66 117/65  122/74   BP Location:  Left arm     Pulse: (!) 47 58  (!) 54   Resp:  18  18   Temp: 98.1 °F (36.7 °C)   98.1 °F (36.7 °C)   TempSrc:       SpO2: 94% 95%  96%   Weight:   72.9 kg (160 lb 11.2 oz)    Height:           Physical Exam:    General: No acute distress, Alert, and Normal appearance  HEENT/NECK:  Normocephalic. Atraumatic.  No jugular venous distention.    Cardiac: Bradycardic  Pulmonary:  Breath sounds clear bilaterally  Abdomen:  Non-tender and Non-distended  Extremities: Extremities warm/dry  Neuro: Alert and oriented X 3  Skin: Warm/Dry, without rashes or lesions.      Assessment:  MVCAD, LVEF 55%    Plan:  Patient agreeable to proceed with surgery  Plavix 600 mg on 10/31  Xarelto at home PTA     Cardiology to review bradycardia    coronary artery bypass grafting scheduled for wednesday with Dr. Rodger Rodriguez D.O.    SIGNATURE: Oliva Sullivan PA-C  DATE: November 1, 2024  TIME: 8:26 AM    * This note  was completed in part utilizing [employer] direct voice recognition software.   Grammatical errors, random word insertion, spelling mistakes, and incomplete sentences may be an occasional consequence of the system secondary to software limitations, ambient noise and hardware issues. At the time of dictation, efforts were made to edit, clarify and /or correct errors. Please read the chart carefully and recognize, using context, where substitutions have occurred.  If you have any questions or concerns about the context, text or information contained within the body of this dictation, please contact myself, the provider, for further clarification.

## 2024-11-01 NOTE — ASSESSMENT & PLAN NOTE
Baseline 40-50s. Tele this morning showed down to 30s. Examined at bedside. Patient endorsed lightheaded when he moved from chair to bed. Patient has been having lightheadedness and dizziness for years. EKG at bedside showed sinus bradycardia with HR 58.    Plan  - EP consulted, appreciate recs  - Tentative plan for pacemaker insertion next week  - Continue to monitor on Tele  - Avoid AV salvador blocking agents

## 2024-11-01 NOTE — PHYSICAL THERAPY NOTE
Physical Therapy Cancellation Note         11/01/24 0802   PT Last Visit   PT Visit Date 11/01/24   Note Type   Note type Cancelled Session       PT orders received and chart reviewed. Pt pending CABG 11/6, will hold intitial PT evaluation until after procedure. Will d/c IP PT orders, please re-consult after CABG as appropraite.     Macario Landrum PT, DPT

## 2024-11-01 NOTE — PROGRESS NOTES
Progress Note - Endocrinology   Name: Otoniel Martinez 64 y.o. male I MRN: [id number]  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  Patient did change his pump supply today.    Continue insulin pump at this time, is in automated mode-patient to self administer insulin via pump.  Discussed with nursing.  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  Patient switched to new insulin pump today.  Subjective : Reports overall feeling well.  Denied any concerns at that time.    Objective :  Temp:  [97 °F (36.1 °C)-98.8 °F (37.1 °C)] 97 °F (36.1 °C)  HR:  [42-58] 56  BP: (109-129)/(65-76) 118/76  Resp:  [17-19] 18  SpO2:  [92 %-97 %] 95 %  O2 Device: None (Room air)    Physical Exam  Vitals reviewed.   Constitutional:       General: He is not in acute distress.     Appearance: Normal appearance. He is not ill-appearing.   HENT:      Head: Normocephalic and atraumatic.   Eyes:      General: No scleral icterus.     Conjunctiva/sclera: Conjunctivae normal.   Cardiovascular:      Rate and Rhythm: Normal rate and regular rhythm.      Pulses: Normal pulses.      Heart sounds: No murmur heard.     No gallop.   Pulmonary:      Effort: Pulmonary effort is normal. No respiratory distress.      Breath sounds: Normal breath sounds. No wheezing or rales.   Abdominal:      General: Bowel sounds are normal. There is no distension.      Palpations: Abdomen is soft.   Musculoskeletal:         General: Normal range of motion.      Cervical back: Normal range of motion and neck supple.      Right lower leg: No edema.      Left lower leg: No edema.      Comments: Insulin pump at L lateral thigh    Skin:     General: Skin is warm and dry.      Capillary Refill: Capillary refill takes less than 2 seconds.      Coloration: Skin is not jaundiced or pale.   Neurological:      Mental Status: He is alert and oriented to person, place, and time. Mental status is at baseline.      Sensory: No sensory deficit.   Psychiatric:         Mood and Affect: Mood normal.         Behavior: Behavior normal.         Lab Results: I have reviewed the following results:CBC/BMP:   .      11/01/24  0351   WBC 4.04*   HGB 11.5*   HCT 35.3*   *   SODIUM 138   K 4.1      CO2 28   BUN 18   CREATININE 0.60   GLUC 166*   MG 1.7*        Imaging Results Review: No pertinent imaging studies reviewed.  Other Study Results Review: No additional pertinent studies reviewed.      Harpreet Campos MD  Endocrinology fellow, PGY-4

## 2024-11-01 NOTE — PLAN OF CARE
Problem: PAIN - ADULT  Goal: Verbalizes/displays adequate comfort level or baseline comfort level  Description: Interventions:  - Encourage patient to monitor pain and request assistance  - Assess pain using appropriate pain scale  - Administer analgesics based on type and severity of pain and evaluate response  - Implement non-pharmacological measures as appropriate and evaluate response  - Consider cultural and social influences on pain and pain management  - Notify physician/advanced practitioner if interventions unsuccessful or patient reports new pain  Outcome: Progressing     Problem: INFECTION - ADULT  Goal: Absence or prevention of progression during hospitalization  Description: INTERVENTIONS:  - Assess and monitor for signs and symptoms of infection  - Monitor lab/diagnostic results  - Monitor all insertion sites, i.e. indwelling lines, tubes, and drains  - Monitor endotracheal if appropriate and nasal secretions for changes in amount and color  - [place] appropriate cooling/warming therapies per order  - Administer medications as ordered  - Instruct and encourage patient and family to use good hand hygiene technique  - Identify and instruct in appropriate isolation precautions for identified infection/condition  Outcome: Progressing     Problem: SAFETY ADULT  Goal: Patient will remain free of falls  Description: INTERVENTIONS:  - Educate patient/family on patient safety including physical limitations  - Instruct patient to call for assistance with activity   - Consult OT/PT to assist with strengthening/mobility   - Keep Call bell within reach  - Keep bed low and locked with side rails adjusted as appropriate  - Keep care items and personal belongings within reach  - Initiate and maintain comfort rounds  - Make Fall Risk Sign visible to staff    - Obtain necessary fall risk management equipment: call bell within reach  - Apply yellow socks and bracelet for high fall risk patients  - Consider moving  patient to room near nurses station  Outcome: Progressing     Problem: DISCHARGE PLANNING  Goal: Discharge to home or other facility with appropriate resources  Description: INTERVENTIONS:  - Identify barriers to discharge w/patient and caregiver  - Arrange for needed discharge resources and transportation as appropriate  - Identify discharge learning needs (meds, wound care, etc.)  - Arrange for interpretive services to assist at discharge as needed  - Refer to Case Management Department for coordinating discharge planning if the patient needs post-hospital services based on physician/advanced practitioner order or complex needs related to functional status, cognitive ability, or social support system  Outcome: Progressing     Problem: Knowledge Deficit  Goal: Patient/family/caregiver demonstrates understanding of disease process, treatment plan, medications, and discharge instructions  Description: Complete learning assessment and assess knowledge base.  Interventions:  - Provide teaching at level of understanding  - Provide teaching via preferred learning methods  Outcome: Progressing     Problem: CARDIOVASCULAR - ADULT  Goal: Maintains optimal cardiac output and hemodynamic stability  Description: INTERVENTIONS:  - Monitor I/O, vital signs and rhythm  - Monitor for S/S and trends of decreased cardiac output  - Administer and titrate ordered vasoactive medications to optimize hemodynamic stability  - Assess quality of pulses, skin color and temperature  - Assess for signs of decreased coronary artery perfusion  - Instruct patient to report change in severity of symptoms  Outcome: Progressing

## 2024-11-01 NOTE — OCCUPATIONAL THERAPY NOTE
Occupational Therapy Cancel        Patient Name: Otoniel Martinez  Today's Date: 11/1/2024 11/01/24 0801   OT Last Visit   OT Visit Date 11/01/24   Note Type   Note type Cancelled Session   Additional Comments Pt pending CABG 11/6, will hold intitial OT evaluation until after procedure. Will d/c IP OT orders, please re-consult after CABG as appropraite.       HUSSEIN Wilkinson, OTR/L

## 2024-11-01 NOTE — CONSULTS
Consultation - Electrophysiology   Name: Otoniel Martinez 64 y.o. male I MRN: [id number]  [facility] I Date of Admission: 10/31/2024   Date of Service: 11/1/2024 I Hospital Day: 1   Inpatient consult to Electrophysiology  Consult performed by: Vignesh Cartagena DO  Consult ordered by: Hilario Matson MD        Physician Requesting Evaluation: Alex Zambrano MD   Reason for Evaluation / Principal Problem: Symptomatic bradycardia    Assessment & Plan  Multivessel CAD  CT surgery following, planning for CABG potentially next Wednesday 11/6.  Symptomatic sinus bradycardia  Presents for CABG evaluation.  Left heart cath 10/31 showing disease in RCA, Left main, circumflex  Patient reports long history of bradycardia.  He has a few episodes of dizziness per week, these can happen at rest.  He does get some shortness of breath when using slight inclines.  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  Telemetry reviewed with sinus bradycardia with rates into the 30s.  Had an episode of presyncope just before my evaluation associated with low heart rate.  Not on any rate control agents    Plan  Discussed with attending  Plan for pacemaker insertion next week, potentially next Monday 11/4  Continue to avoid AV salvador blocking agents  Continue to monitor on telemetry  Will discuss with endo regarding insulin management periprocedure  Please hold heparin gtt at 6am day of procedure  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), 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HCC)  Last A1c 8.4%.  Maintained on insulin pump.  Endocrinology following.    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HCC)    History of recurrent deep vein thrombosis (DVT)  Maintained outpatient on Xarelto, currently on IV heparin   Obstructive sleep apnea  Uses oxygen at night      History of Present Illness   Otoniel Martinez is a 64 y.o. male who presents for CABG evaluation.  He reports he has a history of bradycardia.  There are times where he does get some  lightheadedness even at rest.  He is unable to use stairs due to his myotonic dystrophy, but he says when he uses a slight incline he does become short of breath.  Shortly before my arrival to his room today, he did have an episode of lightheadedness with heart rate in the low 30s causing near syncope.  He says this is the worst episode he can remember.  He gets these dizzy episodes a couple times a week.    Review of Systems   Respiratory:  Positive for shortness of breath.    Neurological:  Positive for dizziness and light-headedness.   All other systems reviewed and are negative.    I have reviewed the patient's PMH, PSH, Social History, Family History, Meds, and Allergies    Objective :  Temp:  [98.1 °F (36.7 °C)-98.8 °F (37.1 °C)] 98.1 °F (36.7 °C)  HR:  [42-58] 42  BP: (107-146)/(59-78) 118/73  Resp:  [16-19] 19  SpO2:  [94 %-97 %] 96 %  O2 Device: None (Room air)    Physical Exam  Constitutional:       General: He is not in acute distress.     Appearance: He is normal weight. He is not ill-appearing.   HENT:      Mouth/Throat:      Mouth: Mucous membranes are moist.   Cardiovascular:      Rate and Rhythm: Regular rhythm. Bradycardia present.      Heart sounds: No murmur heard.  Pulmonary:      Effort: Pulmonary effort is normal. No respiratory distress.      Breath sounds: Normal breath sounds. No wheezing.   Musculoskeletal:      Right lower leg: No edema.      Left lower leg: No edema.   Skin:     General: Skin is warm and dry.   Neurological:      General: No focal deficit present.      Mental Status: He is alert and oriented to person, place, and time.           Lab Results: I have reviewed the following results:  Results from last 7 days   Lab Units 11/01/24  0351   WBC Thousand/uL 4.04*   HEMOGLOBIN g/dL 11.5*   HEMATOCRIT % 35.3*   PLATELETS Thousands/uL 118*     Results from last 7 days   Lab Units 11/01/24  0351   POTASSIUM mmol/L 4.1   CHLORIDE mmol/L 106   CO2 mmol/L 28   BUN mg/dL 18   CREATININE  mg/dL 0.60   CALCIUM mg/dL 8.0*     Results from last 7 days   Lab Units 11/01/24  0351 10/31/24  2037 10/31/24  1315   INR   --   --  1.11   PTT seconds 83* 44* 25     Lab Results   Component Value Date    HGBA1C 8.4 (H) 10/31/2024     Lab Results   Component Value Date    CKTOTAL 344 (H) 01/09/2018    TROPONINI <0.02 03/22/2020

## 2024-11-01 NOTE — ASSESSMENT & PLAN NOTE
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  Patient did change his pump supply today.    Continue insulin pump at this time, is in automated mode-patient to self administer insulin via pump.  Discussed with nursing.  On the morning prior to cardiac surgery/CABG, plan to start insulin infusion at 5-6 AM and stop insulin via pump at that time  Patient to continue monitoring sugars via Dexcom G6  Continue fingerstick glucose 4 times a day prior to meals and at bedtime  Diabetic diet  Hypoglycemia protocol  Endocrine will continue to follow and make recommendations

## 2024-11-01 NOTE — ASSESSMENT & PLAN NOTE
Presents for CABG evaluation.  Left heart cath 10/31 showing disease in RCA, Left main, circumflex  Patient reports long history of bradycardia.  He has a few episodes of dizziness per week, these can happen at rest.  He does get some shortness of breath when using slight inclines.  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  Telemetry reviewed with sinus bradycardia with rates into the 30s.  Had an episode of presyncope just before my evaluation associated with low heart rate.  Not on any rate control agents    Plan  Discussed with attending  Plan for pacemaker insertion next week, potentially next Monday 11/4  Continue to avoid AV salvador blocking agents  Continue to monitor on telemetry  Will discuss with endo regarding insulin management periprocedure  Please hold heparin gtt at 6am day of procedure

## 2024-11-01 NOTE — CONSULTS
"Inpatient Hematology Oncology Consult Note  Otoniel Martinez 64 y.o. male MRN: [id number]  Unit/Bed#: [facility] 423-01 Encounter: [id number]      Reason for Consult:   \"Has MVCAD, in need of CABG. 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  - Low risk for conversion to Waldenström's macroglobulinemia.  - Per cardiac surgery, preoperative antiplatelet/anticoagulation therapy with aspirin 81 and heparin ggt.  Timing for transition back to Xarelto post-CABG for indefinite DVT prophylaxis to be determined by cardiac surgery.  - Routine three-month outpatient follow up with surveillance labs with her primary hematologist/oncologist, Dr. Bronson, is scheduled for 12/3/24.    History of Presenting Illness:  Mr. Martinez is a 64 yoM with PMHx of lymphoplasmacytic lymphoma, monoclonal gammopathy, splenomegaly, squamous cell carcinoma of skin of L UE s/p excision, KALEB pulmonary nodule (likely inflammatory), DVT (after tear in gastrocnemius, on indefinite Xarelto), CAD, MI s/p PCI with stenting (2004), HTN, HLD, CVA, DM1 w/ polyneuropathy, pancreatitis (x3), myotonic dystrophy, KULDIP (not on CPAP, 3L NC), and GERD who was found to have multi-vessel CAD requiring CABG per [facility] (10/31/24).    The patient was admitted to [facility] in Feb 2024 for acute hypoxic respiratory failure suspected to be acute on chronic bronchitis treated with cefdinir, azithromycin, and " prednisone.  Since then, the patient has had gradual worsening SOB/LOJA.  Stress test performed Sep 2024 showed paradoxical perfusion defect which was followed by an elective LHC on 10/31/24 which showed severe multivessel disease.  The patient would like to proceed with CABG, and pre-op TTE with vein mapping has been ordered in preparation for CABG x3 vessels on 11/6/24.  Hematology was consulted for perioperative recommendations considering his multiple hematologic disorders.    At that admission in Feb 2024, CT Chest (2/8/24) incidentally identified mild splenomegaly, and labs showed mild leukopenia and thrombocytopenia.  The patient was referred to hematology which performed the following work up.    Labs (7/1/24) showed M-Fco 0.11, quantitative immunoglobulins w/o elevated IgM, beta-2 microglobulin 1.8, and estimated median time to progression of 9.2 years.  SPEP (7/1/24) showed persistent monoclonal peak in the gamma region. Previous immunofixation on 2/7/18 identified the monoclonal gammopathy as IgM-kappa. Serum immunofixation shows a monoclonal gammopathy identified as IgM-kappa.  UPEP (7/1/24) showed faint monoclonal band of free-kappa light chains.  BMBx (8/7/24) showed low grade CD5+ B-cell lymphoma accompanied by clonal Kappa-restricted plasma cells (20% overall involvement) in normocellular marrow (50% cellularity), IgM Kappa gammopathy and MYD88 mutation detected in peripheral blood (6/13/24) but no mutation detected on NGS panel, no apple green birefringent on Congo red stain, normal male karyotype, no evidence of abnormalities by FISH, overall most suggestive of lymphoplasmacytic lymphoma.    PET CT (8/1/24) was ordered due to monoclonal gammopathy, splenomegaly, leukopenia, and thrombocytopenia which showed only an irregular 1.9 x 1.5 cm mildly avid nodule/consolidation of the KALEB (SUV 2.8).  Repeat CT Chest (9/16/24) showed decreasing masslike nodule/consolidation likely inflammatory or  infectious, and the patient has another surveillance CT Chest planned for 1/15/25.    At this admission, he endorsed one episode of bradycardia to 30s which was associated with transient confusion.  He currently has HR 50s, -120, adequate SpO2 on RA, and RR 18.  Labs show WBC 4.0 and  which are approximately at his baseline, Hb 11.5 which is slightly lower than his baseline of 13, A1c 8.4, INR 1.11, LDL 51, SCr 0.60, Mg 1.7, and all other values for CMP roughly WNL.  CXR is roughly WNL.  There is no micro.  He is on aspirin 81 and heparin GGT.      Oncology History    No history exists.       Review of Systems:  As stated in the HPI otherwise the fourteen point review of systems was negative.    ECOG PS: 0-1    Past Medical History:   Diagnosis Date    Acute respiratory failure with hypoxia (HCC) 02/08/2024    CAD (coronary artery disease)     Congenital myotonia     Deep vein thrombosis (DVT) (HCC)     after tear of gastrocnemus muscle    Diabetes (HCC)     Diabetes mellitus (HCC)     Difficulty walking     hip replacement    HL (hearing loss)     decreased both  ears    Myocardial infarction (HCC)     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    Pancreatitis     three times    Sleep apnea     not using a C-PAP    Superficial thrombophlebitis     Vision loss     reading glasses over the counter       Social History     Socioeconomic History    Marital status: /Civil Union     Spouse name: None    Number of children: None    Years of education: None    Highest education level: None   Occupational History    None   Tobacco Use    Smoking status: Never    Smokeless tobacco: Never   Vaping Use    Vaping status: Never Used   Substance and Sexual Activity    Alcohol use: Yes     Comment: 2 beers on a social occasion    Drug use: No    Sexual activity: Yes     Partners: Female     Birth control/protection: None   Other Topics Concern    None   Social History Narrative    Consumes 1 cup of tea  And 1 glass  of tea per day        Weight loss     Social Determinants of Health     Financial Resource Strain: Low Risk  (12/4/2023)    [facility] from [facility], [facility]    Overall Financial Resource Strain (CARDIA)     Difficulty of Paying Living Expenses: Not very hard   Food Insecurity: No Food Insecurity (10/31/2024)    Nursing - Inadequate Food Risk Classification     Worried About Running Out of Food in the Last Year: Never true     Ran Out of Food in the Last Year: Never true     Ran Out of Food in the Last Year: 1   Transportation Needs: No Transportation Needs (10/31/2024)    Nursing - Transportation Risk Classification     Lack of Transportation: Not on file     Lack of Transportation: 2   Physical Activity: Sufficiently Active (12/4/2023)    Received from [facility]    Exercise Vital Sign     Days of Exercise per Week: 1 day     Minutes of Exercise per Session: 150+ min   Stress: No Stress Concern Present (12/4/2023)    Received from [facility], [facility]    [employer] - Occupational Stress Questionnaire     Feeling of Stress : Not at all   Social Connections: Socially Integrated (12/4/2023)    Received from [facility], [facility]    Social Connection and Isolation Panel [NHANES]     Frequency of Communication with Friends and Family: Three times a week     Frequency of Social Gatherings with Friends and Family: Never     Attends Latter-day Services: More than 4 times per year     Active Member of Clubs or Organizations: Yes     Attends Club or Organization Meetings: More than 4 times per year     Marital Status:    Intimate Partner Violence: Unknown (10/31/2024)    Nursing IPS     Feels Physically and Emotionally Safe: Not on file     Physically Hurt by Someone: Not on file     Humiliated or Emotionally Abused by Someone: Not on file     Physically Hurt  by Someone: 2     Hurt or Threatened by Someone: 2   Housing Stability: Unknown (10/31/2024)    Nursing: Inadequate Housing Risk Classification     Has Housing: Not on file     Worried About Losing Housing: Not on file     Unable to Get Utilities: Not on file     Unable to Pay for Housing in the Last Year: 2     Has Housin       Family History   Problem Relation Age of Onset    Brain cancer Mother     Clotting disorder Mother     Heart disease Mother     Cancer Mother     Hyperlipidemia Mother     Stroke Father     Deep vein thrombosis Father     Emphysema Father     Coronary artery disease Paternal Uncle     Diabetes Maternal Uncle        Allergies   Allergen Reactions    Other Cough     Grass / dogs hair        Current Facility-Administered Medications:     acetaminophen (TYLENOL) tablet 650 mg, 650 mg, Oral, Q4H PRN, Faustino Jefferson MD, 650 mg at 10/31/24 210    aspirin (ECOTRIN LOW STRENGTH) EC tablet 81 mg, 81 mg, Oral, Daily, Jameal Hadeed, DO, 81 mg at 24 0842    atorvastatin (LIPITOR) tablet 40 mg, 40 mg, Oral, Daily With Dinner, Faustino Jefferson MD, 40 mg at 10/31/24 1647    budesonide-formoterol (SYMBICORT) 80-4.5 MCG/ACT inhaler 2 puff, 2 puff, Inhalation, BID, Faustino Jefferson MD, 2 puff at 24 0842    fluticasone (FLONASE) 50 mcg/act nasal spray 2 spray, 2 spray, Nasal, Daily, Faustino Jefferson MD, 2 spray at 10/31/24 2110    heparin (porcine) 25,000 units in 0.45% NaCl 250 mL infusion (premix), 3-30 Units/kg/hr (Order-Specific), Intravenous, Titrated, Jameal Hadeed, DO, Last Rate: 14 mL/hr at 24 0701, 20 Units/kg/hr at 24 0701    heparin (porcine) injection 2,800 Units, 2,800 Units, Intravenous, Q6H PRN, Jameal Hadeed, DO, 2,800 Units at 10/31/24 2150    heparin (porcine) injection 5,600 Units, 5,600 Units, Intravenous, Q6H PRN, Jameal Hadeed, DO    insulin aspart (NovoLOG) FOR PUMP REFILLS 300 Units, 300 Units, Subcutaneous Insulin Pump, Daily PRN, Harpreet Campos MD     "levalbuterol (XOPENEX) inhalation solution 1.25 mg, 1.25 mg, Nebulization, TID PRN, Faustino Jefferson MD    montelukast (SINGULAIR) tablet 10 mg, 10 mg, Oral, HS, Faustino Jefferson MD, 10 mg at 10/31/24 2109    nitroglycerin (NITROSTAT) SL tablet 0.4 mg, 0.4 mg, Sublingual, Q5 Min PRN, Faustino Jefferson MD    pantoprazole (PROTONIX) EC tablet 40 mg, 40 mg, Oral, Early Morning, Faustino Jefferson MD, 40 mg at 11/01/24 0508    PATIENT MAINTAINED INSULIN PUMP 1 each, 1 each, Subcutaneous, Q8H, Harpreet Campos MD, 1 each at 11/01/24 0510    polyethylene glycol (MIRALAX) packet 17 g, 17 g, Oral, Daily PRN, Suleiman He Kuat, DO, 17 g at 11/01/24 0848      /65   Pulse 57   Temp 98.4 °F (36.9 °C)   Resp 18   Ht 5' 8\" (1.727 m)   Wt 72.9 kg (160 lb 11.2 oz)   SpO2 92%   BMI 24.43 kg/m²     General: WDWN, well-appearing, no acute distress  HEENT: PERRLA, moist mucosa, atraumatic  Neck: Normal habitus, no masses, no cervical/supraclavicular lymphadenopathy  Respiratory: CTAB w/o wheezes, rales, or rhonchi, no increased work of breathing  Cardiovascular: RRR w/o murmurs, 2+ radial and pedal pulses, no b/l LE edema  Abdomen: soft, non-tender, non-distended, no hepatomegaly or splenomegaly  /Rectal: deferred  Musculoskeletal: moves all four extremities with normal strength and ROM  Integumentary: warm, dry, no rash or bruising  Neurological: no gross or focal deficits identified, normal sensation  Psychiatric: pleasant and cooperative with normal mood, affect, and cognition      Recent Results (from the past 48 hour(s))   ECG 12 lead    Collection Time: 10/31/24  7:41 AM   Result Value Ref Range    Ventricular Rate 53 BPM    Atrial Rate 53 BPM    UT Interval 150 ms    QRSD Interval 92 ms    QT Interval 462 ms    QTC Interval 433 ms    P Axis -6 degrees    QRS Axis -7 degrees    T Wave Axis 26 degrees   Fingerstick Glucose (POCT)    Collection Time: 10/31/24 11:31 AM   Result Value Ref Range    POC Glucose 214 (H) 65 - 140 " mg/dl   APTT    Collection Time: 10/31/24  1:15 PM   Result Value Ref Range    PTT 25 23 - 34 seconds   Protime-INR    Collection Time: 10/31/24  1:15 PM   Result Value Ref Range    Protime 14.6 12.3 - 15.0 seconds    INR 1.11 0.85 - 1.19   Hemoglobin A1c w/EAG Estimation    Collection Time: 10/31/24  1:15 PM   Result Value Ref Range    Hemoglobin A1C 8.4 (H) Normal 4.0-5.6%; PreDiabetic 5.7-6.4%; Diabetic >=6.5%; Glycemic control for adults with diabetes <7.0% %     mg/dl   Type and screen    Collection Time: 10/31/24  1:15 PM   Result Value Ref Range    ABO Grouping A     Rh Factor Positive     Antibody Screen Negative     Specimen Expiration Date [id number]    Lipid panel    Collection Time: 10/31/24  1:15 PM   Result Value Ref Range    Cholesterol 133 See Comment mg/dL    Triglycerides 209 (H) See Comment mg/dL    HDL, Direct 40 >=40 mg/dL    LDL Calculated 51 0 - 100 mg/dL    Non-HDL-Chol (CHOL-HDL) 93 mg/dl   Echo complete w/ contrast if indicated    Collection Time: 10/31/24  1:55 PM   Result Value Ref Range    LA/Ao Ratio 2D 1.19     LA Volume Index (BP) 22.6 mL/m2    MV Peak A Sam 0.79 m/s    MV stenosis pressure 1/2 time 68 ms    MV Peak E Sam 72 cm/s    E wave deceleration time 232 ms    E/A ratio 0.91     MV valve area p 1/2 method 3.24     A4C EF 63 %    Left ventricular stroke volume (2D) 44.00 mL    IVSd 1.00 cm    Tricuspid annular plane systolic excursion 2.10 cm    Ao root 3.10 cm    LVPWd 1.10 cm    LA size 3.7 cm    Asc Ao 3.8 cm    LA volume (BP) 42 mL    FS 33 28 - 44    LVIDS 2.70 cm    IVS 1 cm    LVIDd 4.00 cm    LEFT VENTRICLE SYSTOLIC VOLUME (MOD BIPLANE) 2D 28 mL    LV DIASTOLIC VOLUME (MOD BIPLANE) 2D 71 mL    Left Atrium Area-systolic Four Chamber 15 cm2    Left Atrium Area-systolic Apical Two Chamber 15.5 cm2    MV E' Tissue Velocity Septal 10 cm/s    LVSV, 2D 44 mL    BSA 1.86 m2    LV EF 55     Est. RA pres 3.0 mmHg   Fingerstick Glucose (POCT)    Collection Time: 10/31/24  5:08  PM   Result Value Ref Range    POC Glucose 233 (H) 65 - 140 mg/dl   Fingerstick Glucose (POCT)    Collection Time: 10/31/24  8:22 PM   Result Value Ref Range    POC Glucose 137 65 - 140 mg/dl   APTT    Collection Time: 10/31/24  8:37 PM   Result Value Ref Range    PTT 44 (H) 23 - 34 seconds   ABORh Recheck - Contact Blood Bank Prior to Collection    Collection Time: 10/31/24  8:37 PM   Result Value Ref Range    ABO Grouping A     Rh Factor Positive    APTT    Collection Time: 11/01/24  3:51 AM   Result Value Ref Range    PTT 83 (H) 23 - 34 seconds   Basic metabolic panel    Collection Time: 11/01/24  3:51 AM   Result Value Ref Range    Sodium 138 135 - 147 mmol/L    Potassium 4.1 3.5 - 5.3 mmol/L    Chloride 106 96 - 108 mmol/L    CO2 28 21 - 32 mmol/L    ANION GAP 4 4 - 13 mmol/L    BUN 18 5 - 25 mg/dL    Creatinine 0.60 0.60 - 1.30 mg/dL    Glucose 166 (H) 65 - 140 mg/dL    Calcium 8.0 (L) 8.4 - 10.2 mg/dL    eGFR 106 ml/min/1.73sq m   CBC    Collection Time: 11/01/24  3:51 AM   Result Value Ref Range    WBC 4.04 (L) 4.31 - 10.16 Thousand/uL    RBC 4.04 3.88 - 5.62 Million/uL    Hemoglobin 11.5 (L) 12.0 - 17.0 g/dL    Hematocrit 35.3 (L) 36.5 - 49.3 %    MCV 87 82 - 98 fL    MCH 28.5 26.8 - 34.3 pg    MCHC 32.6 31.4 - 37.4 g/dL    RDW 14.6 11.6 - 15.1 %    Platelets 118 (L) 149 - 390 Thousands/uL    MPV 9.9 8.9 - 12.7 fL   Magnesium    Collection Time: 11/01/24  3:51 AM   Result Value Ref Range    Magnesium 1.7 (L) 1.9 - 2.7 mg/dL   Fingerstick Glucose (POCT)    Collection Time: 11/01/24  8:18 AM   Result Value Ref Range    POC Glucose 124 65 - 140 mg/dl   Fingerstick Glucose (POCT)    Collection Time: 11/01/24  9:06 AM   Result Value Ref Range    POC Glucose 156 (H) 65 - 140 mg/dl   ECG 12 lead    Collection Time: 11/01/24  9:53 AM   Result Value Ref Range    Ventricular Rate 58 BPM    Atrial Rate 58 BPM    HI Interval 150 ms    QRSD Interval 88 ms    QT Interval 454 ms    QTC Interval 445 ms    P Axis 1 degrees     QRS Axis -5 degrees    T Wave Axis 41 degrees       VAS VEIN MAPPING- LOWER EXTREMITY    Result Date: 10/31/2024  Narrative:  [facility] REPORT CLINICAL: Indications: Patient presents for surgical clearance and general health evaluation secondary to future open heart surgery.  Patient is asymptomatic at this time. Operative History: Cardiac stent, 2004 Risk Factors The patient has history of Diabetes (IDDM), Hyperlipidemia and CAD.  FINDINGS:  Right           AP (mm)  GSV Inguinal        5.9  GSV Prox Thigh      3.6  GSV Mid Thigh       3.0  GSV Dist Thigh      3.4  GSV Knee            3.5  GSV Prox Calf       2.9  GSV Mid Calf        3.5  GSV Dist Calf       3.5  GSV Ankle           3.0   Left            AP (mm)  GSV Inguinal        8.9  GSV Prox Thigh      3.6  GSV Mid Thigh       4.7  GSV Dist Thigh      3.3  GSV Knee            3.0  GSV Prox Calf       2.6  GSV Mid Calf        3.0  GSV Dist Calf       3.2  GSV Ankle           3.2     CONCLUSION: Impression: RIGHT LOWER LIMB: The great saphenous vein is patent  from the groin to the ankle. The intraluminal diameter measurements range from 2.9mm to 5.9mm throughout. LEFT LOWER LIMB: The great saphenous vein is patent  from the groin to the ankle. The intraluminal diameter measurements range from 2.6mm to 8.9mm throughout.  SIGNATURE: Electronically Signed by: MISSY CAVANAUGH DO on 2024-10-31 09:33:29 PM    Echo complete w/ contrast if indicated    Result Date: 10/31/2024  Narrative:   Left Ventricle: Left ventricular cavity size is normal. Wall thickness is mildly increased. There is mild concentric hypertrophy. The left ventricular ejection fraction is 55%. Systolic function is normal. Diastolic function is mildly abnormal, consistent with grade I (abnormal) relaxation.   The following segments are hypokinetic: basal inferolateral and mid inferolateral.   All other segments are normal.   Right Ventricle: Right ventricular cavity size is normal. Systolic  function is normal.   Aorta: The aortic root is normal in size. The ascending aorta is mildly dilated. Ao root is 3.10 cm. Asc Ao is 3.8 cm.     Cardiac catheterization    Result Date: 10/31/2024  Narrative:   Dist LM lesion is 80% stenosed.   Prox Cx to Mid Cx lesion is 90% stenosed.   Mid RCA lesion is 90% stenosed. Significant left main, circumflex and mid RCA disease in a diabetic patient.       I spent 55 minutes on chart review, direct face to face counseling, coordination of care and documentation.    Additional recommendations to follow per attending, Dr. Watts.    Kaz Wynn DO, PGY4  Hematology/Oncology Fellow

## 2024-11-01 NOTE — ASSESSMENT & PLAN NOTE
Management per primary team-undergoing pacemaker placement on Monday  For this procedure, can remain on insulin pump

## 2024-11-01 NOTE — ASSESSMENT & PLAN NOTE
Management per primary team-planned for CABG x 3 on Wednesday  For this surgery, plan to start insulin infusion/drip at 5-6 AM on day of surgery and at that time discontinuing insulin pump

## 2024-11-02 LAB
ANION GAP SERPL CALCULATED.3IONS-SCNC: 9 MMOL/L (ref 4–13)
APTT PPP: 77 SECONDS (ref 23–34)
BUN SERPL-MCNC: 19 MG/DL (ref 5–25)
CALCIUM SERPL-MCNC: 8.2 MG/DL (ref 8.4–10.2)
CHLORIDE SERPL-SCNC: 104 MMOL/L (ref 96–108)
CO2 SERPL-SCNC: 26 MMOL/L (ref 21–32)
CREAT SERPL-MCNC: 0.67 MG/DL (ref 0.6–1.3)
ERYTHROCYTE [DISTWIDTH] IN BLOOD BY AUTOMATED COUNT: 14.7 % (ref 11.6–15.1)
GFR SERPL CREATININE-BSD FRML MDRD: 101 ML/MIN/1.73SQ M
GLUCOSE SERPL-MCNC: 155 MG/DL (ref 65–140)
GLUCOSE SERPL-MCNC: 158 MG/DL (ref 65–140)
GLUCOSE SERPL-MCNC: 223 MG/DL (ref 65–140)
GLUCOSE SERPL-MCNC: 264 MG/DL (ref 65–140)
GLUCOSE SERPL-MCNC: 327 MG/DL (ref 65–140)
HCT VFR BLD AUTO: 35.7 % (ref 36.5–49.3)
HGB BLD-MCNC: 11.4 G/DL (ref 12–17)
MAGNESIUM SERPL-MCNC: 1.9 MG/DL (ref 1.9–2.7)
MCH RBC QN AUTO: 28 PG (ref 26.8–34.3)
MCHC RBC AUTO-ENTMCNC: 31.9 G/DL (ref 31.4–37.4)
MCV RBC AUTO: 88 FL (ref 82–98)
PLATELET # BLD AUTO: 128 THOUSANDS/UL (ref 149–390)
PMV BLD AUTO: 9.8 FL (ref 8.9–12.7)
POTASSIUM SERPL-SCNC: 3.8 MMOL/L (ref 3.5–5.3)
RBC # BLD AUTO: 4.07 MILLION/UL (ref 3.88–5.62)
SODIUM SERPL-SCNC: 139 MMOL/L (ref 135–147)
WBC # BLD AUTO: 2.9 THOUSAND/UL (ref 4.31–10.16)

## 2024-11-02 PROCEDURE — 83735 ASSAY OF MAGNESIUM: CPT

## 2024-11-02 PROCEDURE — 99232 SBSQ HOSP IP/OBS MODERATE 35: CPT | Performed by: INTERNAL MEDICINE

## 2024-11-02 PROCEDURE — 85730 THROMBOPLASTIN TIME PARTIAL: CPT | Performed by: INTERNAL MEDICINE

## 2024-11-02 PROCEDURE — 85027 COMPLETE CBC AUTOMATED: CPT

## 2024-11-02 PROCEDURE — 82948 REAGENT STRIP/BLOOD GLUCOSE: CPT

## 2024-11-02 PROCEDURE — 80048 BASIC METABOLIC PNL TOTAL CA: CPT

## 2024-11-02 PROCEDURE — 99233 SBSQ HOSP IP/OBS HIGH 50: CPT | Performed by: INTERNAL MEDICINE

## 2024-11-02 RX ORDER — POLYETHYLENE GLYCOL 3350 17 G/17G
17 POWDER, FOR SOLUTION ORAL DAILY
Status: DISCONTINUED | OUTPATIENT
Start: 2024-11-02 | End: 2024-11-06

## 2024-11-02 RX ORDER — MAGNESIUM SULFATE HEPTAHYDRATE 40 MG/ML
2 INJECTION, SOLUTION INTRAVENOUS ONCE
Status: COMPLETED | OUTPATIENT
Start: 2024-11-02 | End: 2024-11-02

## 2024-11-02 RX ORDER — POTASSIUM CHLORIDE 1500 MG/1
20 TABLET, EXTENDED RELEASE ORAL ONCE
Status: COMPLETED | OUTPATIENT
Start: 2024-11-02 | End: 2024-11-02

## 2024-11-02 RX ADMIN — MAGNESIUM SULFATE HEPTAHYDRATE 2 G: 40 INJECTION, SOLUTION INTRAVENOUS at 06:44

## 2024-11-02 RX ADMIN — FLUTICASONE PROPIONATE 2 SPRAY: 50 SPRAY, METERED NASAL at 21:20

## 2024-11-02 RX ADMIN — ACETAMINOPHEN 650 MG: 325 TABLET, FILM COATED ORAL at 02:47

## 2024-11-02 RX ADMIN — ASPIRIN 81 MG: 81 TABLET, COATED ORAL at 08:19

## 2024-11-02 RX ADMIN — ATORVASTATIN CALCIUM 40 MG: 40 TABLET, FILM COATED ORAL at 17:27

## 2024-11-02 RX ADMIN — MONTELUKAST 10 MG: 10 TABLET, FILM COATED ORAL at 21:20

## 2024-11-02 RX ADMIN — HEPARIN SODIUM 20 UNITS/KG/HR: 10000 INJECTION, SOLUTION INTRAVENOUS at 15:21

## 2024-11-02 RX ADMIN — POLYETHYLENE GLYCOL 3350 17 G: 17 POWDER, FOR SOLUTION ORAL at 12:30

## 2024-11-02 RX ADMIN — POTASSIUM CHLORIDE 20 MEQ: 1500 TABLET, EXTENDED RELEASE ORAL at 06:44

## 2024-11-02 RX ADMIN — PANTOPRAZOLE SODIUM 40 MG: 40 TABLET, DELAYED RELEASE ORAL at 05:29

## 2024-11-02 RX ADMIN — BUDESONIDE AND FORMOTEROL FUMARATE DIHYDRATE 2 PUFF: 80; 4.5 AEROSOL RESPIRATORY (INHALATION) at 08:19

## 2024-11-02 RX ADMIN — BUDESONIDE AND FORMOTEROL FUMARATE DIHYDRATE 2 PUFF: 80; 4.5 AEROSOL RESPIRATORY (INHALATION) at 17:27

## 2024-11-02 NOTE — ASSESSMENT & PLAN NOTE
Baseline 40-50s. Tele this morning showed down to 30s. Examined at bedside. Patient endorsed lightheaded when he moved from chair to bed. Patient has been having lightheadedness and dizziness for years. EKG at bedside showed sinus bradycardia with HR 58.    Tele overnight showed no significant events, other than bradycardia in the 50s.    Plan  - EP consulted, appreciate recs  - Plan for pacemaker implantation Monday 11/4/24  - Continue to monitor on Tele  - Avoid AV salvador blocking agents

## 2024-11-02 NOTE — ASSESSMENT & PLAN NOTE
Lab Results   Component Value Date    HGBA1C 8.4 (H) 10/31/2024       Recent Labs     11/01/24  1208 11/01/24  1703 11/01/24  2123 11/02/24  0804   POCGLU 345* 236* 351* 158*       Blood Sugar Average: Last 72 hrs:  (P) [id number]    Plan:  - Outpatient f/u with ophthalmology

## 2024-11-02 NOTE — PROGRESS NOTES
"Cardiology Progress Note - Otoniel Martinez 64 y.o. 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  He has no chest pain or significant dyspnea.  Sinus bradycardia noted on telemetry.  Electrophysiology note appreciated.  Patient for PPM 11/4/2024.  BMP today with potassium of 3.8 and creatinine of 0.67.  Hemoglobin 11.4.  Patient with low WBC and platelet count.  He is followed by hematology/oncology in reference to IgM kappa gammopathy.  Patient continues on IV heparin.  History of recurrent DVT typically on Xarelto.    Subjective:   Patient seen and examined.  No significant events overnight.  negative.    Objective:     Vitals: Blood pressure 123/71, pulse 55, temperature 98.4 °F (36.9 °C), temperature source Oral, resp. rate 18, height 5' 8\" (1.727 m), weight 72.4 kg (159 lb 11.2 oz), SpO2 93%., Body mass index is 24.28 kg/m².,   Orthostatic Blood Pressures      Flowsheet Row Most Recent Value   Blood Pressure 123/71 filed at 11/02/2024 0247   Patient Position - Orthostatic VS Lying filed at 11/01/2024 2233        ,      Intake/Output Summary (Last 24 hours) at 11/2/2024 0712  Last data filed at 11/2/2024 0533  Gross per 24 hour   Intake 342 ml   Output 1400 ml   Net -1058 ml       No significant arrhythmias seen on telemetry review.  Sinus bradycardia      Physical Exam:    GEN: Otoniel Martinez appears well, alert and oriented " x 3, pleasant and cooperative   NECK: supple, no carotid bruits, no JVD or HJR  HEART: normal rate, regular rhythm, normal S1 and S2, no murmurs, clicks, gallops or rubs   LUNGS: clear to auscultation bilaterally; no wheezes, rales, or rhonchi   ABDOMEN: normal bowel sounds, soft, no tenderness, no distention  EXTREMITIES: peripheral pulses normal; no clubbing, cyanosis, or edema  SKIN: warm and well perfused, no suspicious lesions on exposed skin    Labs & Results:    Admission on 10/31/2024   Component Date Value    POC Glucose 10/31/2024 214 (H)     PTT 10/31/2024 25     Protime 10/31/2024 14.6     INR 10/31/2024 1.11     Hemoglobin A1C 10/31/2024 8.4 (H)     EAG 10/31/2024 194     MRSA Culture Only 11/01/2024 No Methicillin Resistant Staphlyococcus aureus (MRSA) isolated     ABO Grouping 10/31/2024 A     Rh Factor 10/31/2024 Positive     Antibody Screen 10/31/2024 Negative     Specimen Expiration Date 10/31/2024 [id number]     Cholesterol 10/31/2024 133     Triglycerides 10/31/2024 209 (H)     HDL, Direct 10/31/2024 40     LDL Calculated 10/31/2024 51     Non-HDL-Chol (CHOL-HDL) 10/31/2024 93     LA/Ao Ratio 2D 10/31/2024 1.19     LA Volume Index (BP) 10/31/2024 22.6     MV Peak A Sam 10/31/2024 0.79     MV stenosis pressure 1/2* 10/31/2024 68     MV Peak E Sam 10/31/2024 72     E wave deceleration time 10/31/2024 232     E/A ratio 10/31/2024 0.91     MV valve area p 1/2 meth* 10/31/2024 3.24     A4C EF 10/31/2024 63     Left ventricular stroke * 10/31/2024 44.00     IVSd 10/31/2024 1.00     Tricuspid annular plane * 10/31/2024 2.10     Ao root 10/31/2024 3.10     LVPWd 10/31/2024 1.10     LA size 10/31/2024 3.7     Asc Ao 10/31/2024 3.8     LA volume (BP) 10/31/2024 42     FS 10/31/2024 33     LVIDS 10/31/2024 2.70     IVS 10/31/2024 1     LVIDd 10/31/2024 4.00     LEFT VENTRICLE SYSTOLIC * 10/31/2024 28     LV DIASTOLIC VOLUME (MOD* 10/31/2024 71     Left Atrium Area-systoli* 10/31/2024 15     Left Atrium  Area-systoli* 10/31/2024 15.5     MV E' Tissue Velocity Se* 10/31/2024 10     LVSV, 2D 10/31/2024 44     BSA 10/31/2024 1.86     LV EF 10/31/2024 55     Est. RA pres 10/31/2024 3.0     PTT 10/31/2024 44 (H)     ABO Grouping 10/31/2024 A     Rh Factor 10/31/2024 Positive     POC Glucose 10/31/2024 233 (H)     POC Glucose 10/31/2024 137     Ventricular Rate 10/31/2024 53     Atrial Rate 10/31/2024 53     MI Interval 10/31/2024 150     QRSD Interval 10/31/2024 92     QT Interval 10/31/2024 462     QTC Interval 10/31/2024 433     P Axis 10/31/2024 -6     QRS Floral 10/31/2024 -7     T Wave Floral 10/31/2024 26     PTT 11/01/2024 83 (H)     Sodium 11/01/2024 138     Potassium 11/01/2024 4.1     Chloride 11/01/2024 106     CO2 11/01/2024 28     ANION GAP 11/01/2024 4     BUN 11/01/2024 18     Creatinine 11/01/2024 0.60     Glucose 11/01/2024 166 (H)     Calcium 11/01/2024 8.0 (L)     eGFR 11/01/2024 106     WBC 11/01/2024 4.04 (L)     RBC 11/01/2024 4.04     Hemoglobin 11/01/2024 11.5 (L)     Hematocrit 11/01/2024 35.3 (L)     MCV 11/01/2024 87     MCH 11/01/2024 28.5     MCHC 11/01/2024 32.6     RDW 11/01/2024 14.6     Platelets 11/01/2024 118 (L)     MPV 11/01/2024 9.9     Magnesium 11/01/2024 1.7 (L)     PTT 11/01/2024 61 (H)     POC Glucose 11/01/2024 124     POC Glucose 11/01/2024 156 (H)     Ventricular Rate 11/01/2024 58     Atrial Rate 11/01/2024 58     MI Interval 11/01/2024 150     QRSD Interval 11/01/2024 88     QT Interval 11/01/2024 454     QTC Interval 11/01/2024 445     P Axis 11/01/2024 1     QRS Floral 11/01/2024 -5     T Wave Floral 11/01/2024 41     POC Glucose 11/01/2024 345 (H)     POC Glucose 11/01/2024 236 (H)     POC Glucose 11/01/2024 351 (H)     Sodium 11/02/2024 139     Potassium 11/02/2024 3.8     Chloride 11/02/2024 104     CO2 11/02/2024 26     ANION GAP 11/02/2024 9     BUN 11/02/2024 19     Creatinine 11/02/2024 0.67     Glucose 11/02/2024 155 (H)     Calcium 11/02/2024 8.2 (L)     eGFR 11/02/2024  101     Magnesium 11/02/2024 1.9     WBC 11/02/2024 2.90 (L)     RBC 11/02/2024 4.07     Hemoglobin 11/02/2024 11.4 (L)     Hematocrit 11/02/2024 35.7 (L)     MCV 11/02/2024 88     MCH 11/02/2024 28.0     MCHC 11/02/2024 31.9     RDW 11/02/2024 14.7     Platelets 11/02/2024 128 (L)     MPV 11/02/2024 9.8     PTT 11/02/2024 77 (H)        XR chest pa & lateral    Result Date: 11/1/2024  Narrative: XR CHEST PA AND LATERAL INDICATION: preop CABG eval. COMPARISON: None FINDINGS: Clear lungs. No pneumothorax or pleural effusion. Normal cardiomediastinal silhouette. Bones are unremarkable for age. Normal upper abdomen.     Impression: No acute cardiopulmonary disease. Workstation performed: [id number]     VAS VEIN MAPPING- LOWER EXTREMITY    Result Date: 10/31/2024  Narrative:  [facility] REPORT [facility]INICAL: Indications: Patient presents for surgical clearance and general health evaluation secondary to future open heart surgery.  Patient is asymptomatic at this time. Operative History: Cardiac stent, 2004 Risk Factors The patient has history of Diabetes (IDDM), Hyperlipidemia and CAD.  FINDINGS:  Right           AP (mm)  GSV Inguinal        5.9  GSV Prox Thigh      3.6  GSV Mid Thigh       3.0  GSV Dist Thigh      3.4  GSV Knee            3.5  GSV Prox Calf       2.9  GSV Mid Calf        3.5  GSV Dist Calf       3.5  GSV Ankle           3.0   Left            AP (mm)  GSV Inguinal        8.9  GSV Prox Thigh      3.6  GSV Mid Thigh       4.7  GSV Dist Thigh      3.3  GSV Knee            3.0  GSV Prox Calf       2.6  GSV Mid Calf        3.0  GSV Dist Calf       3.2  GSV Ankle           3.2     CONCLUSION: Impression: RIGHT LOWER LIMB: The great saphenous vein is patent  from the groin to the ankle. The intraluminal diameter measurements range from 2.9mm to 5.9mm throughout. LEFT LOWER LIMB: The great saphenous vein is patent  from the groin to the ankle. The intraluminal diameter measurements range from 2.6mm to 8.9mm  throughout.  SIGNATURE: Electronically Signed by: MISSY CAVANAUGH DO on 2024-10-31 09:33:29 PM    Echo complete w/ contrast if indicated    Result Date: 10/31/2024  Narrative:   Left Ventricle: Left ventricular cavity size is normal. Wall thickness is mildly increased. There is mild concentric hypertrophy. The left ventricular ejection fraction is 55%. Systolic function is normal. Diastolic function is mildly abnormal, consistent with grade I (abnormal) relaxation.   The following segments are hypokinetic: basal inferolateral and mid inferolateral.   All other segments are normal.   Right Ventricle: Right ventricular cavity size is normal. Systolic function is normal.   Aorta: The aortic root is normal in size. The ascending aorta is mildly dilated. Ao root is 3.10 cm. Asc Ao is 3.8 cm.     Cardiac catheterization    Result Date: 10/31/2024  Narrative:   Dist LM lesion is 80% stenosed.   Prox Cx to Mid Cx lesion is 90% stenosed.   Mid RCA lesion is 90% stenosed. Significant left main, circumflex and mid RCA disease in a diabetic patient.       EKG personally reviewed by Hilario Matson MD.     Counseling / Coordination of Care  Total floor / unit time spent today 30 minutes.  Greater than 50% of total time was spent with the patient and / or family counseling and / or coordination of care.

## 2024-11-02 NOTE — ASSESSMENT & PLAN NOTE
Patient with a history of CAD s/p PCI to RCA in 2004 who presented to the hospital for an outpatient LHC due to progressive LOAJ over the last 2-3 years.  Found to have multivessel CAD as mentioned below.  Patient admitted for CABG evaluation    LHC findings:    Dist LM lesion is 80% stenosed.    Prox Cx to Mid Cx lesion is 90% stenosed.    Mid RCA lesion is 90% stenosed.    Echo 10/31/24:  EF 55% with G1DD. Mild concentric hypertrophy. Hypokinetic in basal inferolateral and mid inferolateral segments. Trace TR and mildly dilated ascending aorta 3.8 cm.     VAS vein mapping: patent great saphenous veins in lower extremities.    Plan:  - Cardiothoracic surgery consulted with CABG planned on 11/6/24  - Continue to monitor on telemetry  - Continue to hold Xarelto (for DVT)   - Continue Heparin gtt   - Continue Lipitor 40 mg daily  - Low threshold for serial EKG/troponins if necessary  - PRN nitro sublingual for chest pain  - Avoid AV salvador blocking agents given symptomatic bradycardia

## 2024-11-02 NOTE — PROGRESS NOTES
Progress Note - Electrophysiology   Name: Otoniel Martinez 64 y.o. male I MRN: [id number]  Unit/Bed#: [facility] 423-01 I Date of Admission: 10/31/2024   Date of Service: 11/2/2024 I Hospital Day: 2     Assessment & Plan  Multivessel CAD  CT surgery following, planning for CABG potentially next Wednesday 11/6.  Symptomatic sinus bradycardia  Presents for CABG evaluation.  Left heart cath 10/31 showing disease in RCA, Left main, circumflex  Patient reports long history of bradycardia.  He has a few episodes of dizziness per week, these can happen at rest.  He does get some shortness of breath when using slight inclines.  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  Telemetry reviewed with sinus bradycardia with rates into the 30s.  Had an episode of presyncope just before my evaluation associated with low heart rate.  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  Maintained on insulin pump.  Endocrinology following.    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[facility])    History of recurrent deep vein thrombosis (DVT)  Maintained outpatient on Xarelto, currently on IV heparin   Obstructive sleep apnea  Uses oxygen at night    Subjective   No overnight events.     Objective :  Temp:  [97 °F (36.1 °C)-99.4 °F (37.4 °C)] 98.2 °F (36.8 °C)  HR:  [47-64] 63  BP: (117-139)/(65-80) 139/73  Resp:  [18] 18  SpO2:  [93 %-97 %] 95 %  O2 Device: None (Room air)    Physical Exam  Vitals and nursing note reviewed.   Constitutional:       General: He is not in acute distress.     Appearance: He is well-developed.   HENT:      Head: Normocephalic and atraumatic.   Eyes:      Conjunctiva/sclera: Conjunctivae normal.   Cardiovascular:      Rate and Rhythm: Normal rate and regular rhythm.      Heart  sounds: No murmur heard.  Pulmonary:      Effort: Pulmonary effort is normal. No respiratory distress.      Breath sounds: Normal breath sounds.   Abdominal:      Palpations: Abdomen is soft.      Tenderness: There is no abdominal tenderness.   Musculoskeletal:         General: No swelling.      Cervical back: Neck supple.   Skin:     General: Skin is warm and dry.      Capillary Refill: Capillary refill takes less than 2 seconds.   Neurological:      Mental Status: He is alert.   Psychiatric:         Mood and Affect: Mood normal.            Lab Results: I have reviewed the following results:  Results from last 7 days   Lab Units 11/02/24  0526 11/01/24  0351   WBC Thousand/uL 2.90* 4.04*   HEMOGLOBIN g/dL 11.4* 11.5*   HEMATOCRIT % 35.7* 35.3*   PLATELETS Thousands/uL 128* 118*     Results from last 7 days   Lab Units 11/02/24  0526 11/01/24  0351   POTASSIUM mmol/L 3.8 4.1   CHLORIDE mmol/L 104 106   CO2 mmol/L 26 28   BUN mg/dL 19 18   CREATININE mg/dL 0.67 0.60   CALCIUM mg/dL 8.2* 8.0*     Results from last 7 days   Lab Units 11/02/24  0526 11/01/24  1116 11/01/24  0351 10/31/24  2037 10/31/24  1315   INR   --   --   --   --  1.11   PTT seconds 77* 61* 83*   < > 25    < > = values in this interval not displayed.     Lab Results   Component Value Date    HGBA1C 8.4 (H) 10/31/2024     Lab Results   Component Value Date    CKTOTAL 344 (H) 01/09/2018    TROPONINI <0.02 03/22/2020

## 2024-11-02 NOTE — ASSESSMENT & PLAN NOTE
History of thrombocytopenia baseline in the low 100s.  Likely in the setting of low grade CD5 positive B-cell lymphoma    Platelet stable at 128 11/2/24    Plan:  - Continue to monitor with CBC daily

## 2024-11-02 NOTE — PROGRESS NOTES
Progress Note - Internal Medicine   Name: Otoniel Martinez 64 y.o. male I MRN: [id number]  Unit/Bed#: [facility] 423-01 I Date of Admission: 10/31/2024   Date of Service: 11/2/2024 I Hospital Day: 2    Assessment & Plan  Multivessel CAD  Patient with a history of CAD s/p PCI to RCA in 2004 who presented to the hospital for an outpatient C due to progressive LOJA over the last 2-3 years.  Found to have multivessel CAD as mentioned below.  Patient admitted for CABG evaluation    LHC findings:    Dist LM lesion is 80% stenosed.    Prox Cx to Mid Cx lesion is 90% stenosed.    Mid RCA lesion is 90% stenosed.    Echo 10/31/24:  EF 55% with G1DD. Mild concentric hypertrophy. Hypokinetic in basal inferolateral and mid inferolateral segments. Trace TR and mildly dilated ascending aorta 3.8 cm.     VAS vein mapping: patent great saphenous veins in lower extremities.    Plan:  - Cardiothoracic surgery consulted with CABG planned on 11/6/24  - Continue to monitor on telemetry  - Continue to hold Xarelto (for DVT)   - Continue Heparin gtt   - Continue Lipitor 40 mg daily  - Low threshold for serial EKG/troponins if necessary  - PRN nitro sublingual for chest pain  - Avoid AV salvador blocking agents given symptomatic bradycardia   Thrombocytopenia (HCC)  History of thrombocytopenia baseline in the low 100s.  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  Appreciate recommendations  - Per Endo, continue insulin pump at this time as patient can self administer insulin via insulin pump  - POCT 4 times daily before meals and at bedtime  - Hypoglycemia protocol  - Diabetic diet    Mixed hyperlipidemia  History of hyperlipidemia maintained on Lipitor 40 mg daily    Lipid panel 10/31/24: , , HDL 40, LDL 51    Plan:  - Continue Lipitor 40 mg daily    History of recurrent deep vein thrombosis (DVT)  Patient with a history of recurrent DVTs maintained on Xarelto indefinitely    Plan:  - Hold Xarelto   - Continue heparin gtt for anticoagulation  Chronic bronchitis (HCC)  Patient with a history of chronic bronchitis maintained on Symbicort    Plan:  - Continue home med Symbicort  Insulin pump status  See plan under KAMI  Obstructive sleep apnea  Patient with a history of KULDIP had been on CPAP in the past but now only uses oxygen nightly    Plan:  - Continue oxygen as needed nightly  - Outpatient sleep study ordered by sleep medicine  Monoclonal gammopathy  Patient with a history of 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  Follows with heme-onc as an outpatient.  Bone marrow biopsy in August 2024 showing low-grade CD5 positive B-cell lymphoma as well.  Per most recent heme-onc note, no urgent intervention and follow-up in 3 months    Plan:  - Continue to follow-up outpatient  Splenic artery aneurysm (HCC)  Patient with an incidental finding of splenic artery aneurysm earlier this year.  Follows with vascular who recommended repeat mesenteric duplex in March 2025    Left main coronary artery disease  See plan under multivessel CAD  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)  Lab Results   Component Value Date    HGBA1C 8.4 (H) 10/31/2024       Recent Labs     11/01/24  1208 11/01/24  1703 11/01/24  2123 11/02/24  0804   POCGLU 345* 236* 351* 158*       Blood Sugar Average: Last 72 hrs:  (P)  217.7705421775382326    Plan:  - Outpatient f/u with ophthalmology   Symptomatic sinus bradycardia  Baseline 40-50s. Tele this morning showed down to 30s. Examined at bedside. Patient endorsed lightheaded when he moved from chair to bed. Patient has been having lightheadedness and dizziness for years. EKG at bedside showed sinus bradycardia with HR 58.    Tele overnight showed no significant events, other than bradycardia in the 50s.    Plan  - EP consulted, appreciate recs  - Plan for pacemaker implantation Monday 11/4/24  - Continue to monitor on Tele  - Avoid AV salvador blocking agents       Disposition: Remains inpatient for medical care and CABG on 11/6/24 and pacemaker implantation 11/4/24    Team: SOD TEAM C    Subjective   Patient seen and examined. No acute events overnight. Patient has no acute complaints this morning. Endorses constipation. Denies any lightheadedness, dizziness, chest pain, SOB and N/V.       Objective :  Temp:  [97 °F (36.1 °C)-99.4 °F (37.4 °C)] 97.3 °F (36.3 °C)  HR:  [47-64] 50  BP: (109-131)/(65-80) 130/77  Resp:  [18] 18  SpO2:  [92 %-97 %] 95 %  O2 Device: None (Room air)    I/O         10/30 0701  10/31 0700 10/31 0701 11/01 0700    P.O.  1440    I.V. (mL/kg)  25.2 (0.3)    Total Intake(mL/kg)  1465.2 (20.1)    Urine (mL/kg/hr)  0    Stool  0    Total Output  0    Net  +1465.2          Unmeasured Urine Occurrence  3 x    Unmeasured Stool Occurrence  0 x          Weights:   IBW (Ideal Body Weight): 68.4 kg    Body mass index is 24.28 kg/m².  Weight (last 2 days)       Date/Time Weight    11/02/24 0533 72.4 (159.7)    11/01/24 0500 72.9 (160.7)    10/31/24 1300 73 (161)    10/31/24 1020 --    Comment rows:    OBSERV: [place] with nurse [place] [facility]  Received by FAUSTO Zazueta and care assumed.  OP site assessed and remains stable.  Safety addressed before leaving bedside. at 10/31/24 1020    10/31/24 0700 73 (161)            Physical Exam  Constitutional:       General: He  is not in acute distress.     Appearance: Normal appearance. He is normal weight.   HENT:      Head: Normocephalic and atraumatic.      Nose: Nose normal.      Mouth/Throat:      Mouth: Mucous membranes are moist.   Eyes:      Extraocular Movements: Extraocular movements intact.      Pupils: Pupils are equal, round, and reactive to light.   Cardiovascular:      Rate and Rhythm: Regular rhythm. Bradycardia present.      Pulses: Normal pulses.      Heart sounds: Normal heart sounds. No murmur heard.     No friction rub. No gallop.   Pulmonary:      Effort: Pulmonary effort is normal.      Breath sounds: Normal breath sounds.   Abdominal:      General: Abdomen is flat. Bowel sounds are normal.      Palpations: Abdomen is soft.   Musculoskeletal:         General: Normal range of motion.      Cervical back: Normal range of motion.      Right lower leg: No edema.      Left lower leg: No edema.   Skin:     General: Skin is warm and dry.   Neurological:      General: No focal deficit present.      Mental Status: He is alert and oriented to person, place, and time. Mental status is at baseline.   Psychiatric:         Mood and Affect: Mood normal.         Behavior: Behavior normal.           Lab Results: I have reviewed the following results:  Recent Labs     10/31/24  1315 10/31/24  2037 11/02/24  0526   WBC  --    < > 2.90*   HGB  --    < > 11.4*   HCT  --    < > 35.7*   PLT  --    < > 128*   SODIUM  --    < > 139   K  --    < > 3.8   CL  --    < > 104   CO2  --    < > 26   BUN  --    < > 19   CREATININE  --    < > 0.67   GLUC  --    < > 155*   MG  --    < > 1.9   PTT 25   < > 77*   INR 1.11  --   --     < > = values in this interval not displayed.       Imaging Results Review: No pertinent imaging studies reviewed.  Other Study Results Review: EKG was reviewed.     Currently Ordered Meds:   Current Facility-Administered Medications:     acetaminophen (TYLENOL) tablet 650 mg, Q4H PRN    albuterol (PROVENTIL HFA,VENTOLIN  HFA) inhaler 2 puff, Q4H PRN    aspirin (ECOTRIN LOW STRENGTH) EC tablet 81 mg, Daily    atorvastatin (LIPITOR) tablet 40 mg, Daily With Dinner    budesonide-formoterol (SYMBICORT) 80-4.5 MCG/ACT inhaler 2 puff, BID    fluticasone (FLONASE) 50 mcg/act nasal spray 2 spray, Daily    heparin (porcine) 25,000 units in 0.45% NaCl 250 mL infusion (premix), Titrated, Last Rate: 20 Units/kg/hr (11/02/24 0700)    heparin (porcine) injection 2,800 Units, Q6H PRN    heparin (porcine) injection 5,600 Units, Q6H PRN    insulin aspart (NovoLOG) FOR PUMP REFILLS 300 Units, Daily PRN    montelukast (SINGULAIR) tablet 10 mg, HS    nitroglycerin (NITROSTAT) SL tablet 0.4 mg, Q5 Min PRN    pantoprazole (PROTONIX) EC tablet 40 mg, Early Morning    PATIENT MAINTAINED INSULIN PUMP 1 each, Q8H    polyethylene glycol (MIRALAX) packet 17 g, Daily PRN  VTE Pharmacologic Prophylaxis: Heparin  VTE Mechanical Prophylaxis: sequential compression device    Administrative Statements   I have spent a total time of 45 minutes in caring for this patient on the day of the visit/encounter including Diagnostic results, Prognosis, Risks and benefits of tx options, Instructions for management, Patient and family education, Importance of tx compliance, Risk factor reductions, Impressions, Counseling / Coordination of care, Documenting in the medical record, Reviewing / ordering tests, medicine, procedures  , Obtaining or reviewing history  , and Communicating with other healthcare professionals .  Portions of the record may have been created with voice recognition software.

## 2024-11-02 NOTE — ASSESSMENT & PLAN NOTE
Presents for CABG evaluation.  Left heart cath 10/31 showing disease in RCA, Left main, circumflex  Patient reports long history of bradycardia.  He has a few episodes of dizziness per week, these can happen at rest.  He does get some shortness of breath when using slight inclines.  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  Telemetry reviewed with sinus bradycardia with rates into the 30s.  Had an episode of presyncope just before my evaluation associated with low heart rate.  Not on any rate control agents    Plan  Plan for pacemaker insertion after CABG given concern for lead dislodgement with the bypass procedure  Continue to avoid AV salvador blocking agents  Continue to monitor on telemetry

## 2024-11-02 NOTE — ASSESSMENT & PLAN NOTE
Lab Results   Component Value Date    HGBA1C 8.4 (H) 10/31/2024       Recent Labs     11/01/24  1208 11/01/24  1703 11/01/24  2123 11/02/24  0804   POCGLU 345* 236* 351* 158*       Blood Sugar Average: Last 72 hrs:  (P) 217.0638728907841085    Patient with a history of L ADA first diagnosed 7-8 years ago currently maintained on insulin pump    Per Endo, discontinue insulin pump and  initiate insulin gtt on the day of CABG    Plan:  - Endocrinology consulted.  Appreciate recommendations  - Per Endo, continue insulin pump at this time as patient can self administer insulin via insulin pump  - POCT 4 times daily before meals and at bedtime  - Hypoglycemia protocol  - Diabetic diet

## 2024-11-02 NOTE — PLAN OF CARE
Problem: PAIN - ADULT  Goal: Verbalizes/displays adequate comfort level or baseline comfort level  Description: Interventions:  - Encourage patient to monitor pain and request assistance  - Assess pain using appropriate pain scale  - Administer analgesics based on type and severity of pain and evaluate response  - Implement non-pharmacological measures as appropriate and evaluate response  - Consider cultural and social influences on pain and pain management  - Notify physician/advanced practitioner if interventions unsuccessful or patient reports new pain  Outcome: Progressing     Problem: INFECTION - ADULT  Goal: Absence or prevention of progression during hospitalization  Description: INTERVENTIONS:  - Assess and monitor for signs and symptoms of infection  - Monitor lab/diagnostic results  - Monitor all insertion sites, i.e. indwelling lines, tubes, and drains  - Monitor endotracheal if appropriate and nasal secretions for changes in amount and color  - [place] appropriate cooling/warming therapies per order  - Administer medications as ordered  - Instruct and encourage patient and family to use good hand hygiene technique  - Identify and instruct in appropriate isolation precautions for identified infection/condition  Outcome: Progressing  Goal: Absence of fever/infection during neutropenic period  Description: INTERVENTIONS:  - Monitor WBC    Outcome: Progressing     Problem: SAFETY ADULT  Goal: Patient will remain free of falls  Description: INTERVENTIONS:  - Educate patient/family on patient safety including physical limitations  - Instruct patient to call for assistance with activity   - Consult OT/PT to assist with strengthening/mobility   - Keep Call bell within reach  - Keep bed low and locked with side rails adjusted as appropriate  - Keep care items and personal belongings within reach  - Initiate and maintain comfort rounds  - Make Fall Risk Sign visible to staff  - Offer Toileting every 2 Hours,  in advance of need  - Initiate/Maintain alarm  - Obtain necessary fall risk management equipment:   - Apply yellow socks and bracelet for high fall risk patients  - Consider moving patient to room near nurses station  Outcome: Progressing  Goal: Maintain or return to baseline ADL function  Description: INTERVENTIONS:  -  Assess patient's ability to carry out ADLs; assess patient's baseline for ADL function and identify physical deficits which impact ability to perform ADLs (bathing, care of mouth/teeth, toileting, grooming, dressing, etc.)  - Assess/evaluate cause of self-care deficits   - Assess range of motion  - Assess patient's mobility; develop plan if impaired  - Assess patient's need for assistive devices and provide as appropriate  - Encourage maximum independence but intervene and supervise when necessary  - Involve family in performance of ADLs  - Assess for home care needs following discharge   - Consider OT consult to assist with ADL evaluation and planning for discharge  - Provide patient education as appropriate  Outcome: Progressing  Goal: Maintains/Returns to pre admission functional level  Description: INTERVENTIONS:  - Perform AM-PAC 6 Click Basic Mobility/ Daily Activity assessment daily.  - Set and communicate daily mobility goal to care team and patient/family/caregiver.   - Collaborate with rehabilitation services on mobility goals if consulted  - Perform Range of Motion 3 times a day.  - Reposition patient every 2 hours.  - Dangle patient 3 times a day  - Stand patient 3 times a day  - Ambulate patient 3 times a day  - Out of bed to chair 3 times a day   - Out of bed for meals 3 times a day  - Out of bed for toileting  - Record patient progress and toleration of activity level   Outcome: Progressing     Problem: DISCHARGE PLANNING  Goal: Discharge to home or other facility with appropriate resources  Description: INTERVENTIONS:  - Identify barriers to discharge w/patient and caregiver  -  Arrange for needed discharge resources and transportation as appropriate  - Identify discharge learning needs (meds, wound care, etc.)  - Arrange for interpretive services to assist at discharge as needed  - Refer to Case Management Department for coordinating discharge planning if the patient needs post-hospital services based on physician/advanced practitioner order or complex needs related to functional status, cognitive ability, or social support system  Outcome: Progressing     Problem: Knowledge Deficit  Goal: Patient/family/caregiver demonstrates understanding of disease process, treatment plan, medications, and discharge instructions  Description: Complete learning assessment and assess knowledge base.  Interventions:  - Provide teaching at level of understanding  - Provide teaching via preferred learning methods  Outcome: Progressing     Problem: CARDIOVASCULAR - ADULT  Goal: Maintains optimal cardiac output and hemodynamic stability  Description: INTERVENTIONS:  - Monitor I/O, vital signs and rhythm  - Monitor for S/S and trends of decreased cardiac output  - Administer and titrate ordered vasoactive medications to optimize hemodynamic stability  - Assess quality of pulses, skin color and temperature  - Assess for signs of decreased coronary artery perfusion  - Instruct patient to report change in severity of symptoms  Outcome: Progressing

## 2024-11-03 LAB
ANION GAP SERPL CALCULATED.3IONS-SCNC: 8 MMOL/L (ref 4–13)
APTT PPP: 76 SECONDS (ref 23–34)
BUN SERPL-MCNC: 19 MG/DL (ref 5–25)
CALCIUM SERPL-MCNC: 8.3 MG/DL (ref 8.4–10.2)
CHLORIDE SERPL-SCNC: 104 MMOL/L (ref 96–108)
CO2 SERPL-SCNC: 26 MMOL/L (ref 21–32)
CREAT SERPL-MCNC: 0.52 MG/DL (ref 0.6–1.3)
ERYTHROCYTE [DISTWIDTH] IN BLOOD BY AUTOMATED COUNT: 14.7 % (ref 11.6–15.1)
GFR SERPL CREATININE-BSD FRML MDRD: 112 ML/MIN/1.73SQ M
GLUCOSE SERPL-MCNC: 101 MG/DL (ref 65–140)
GLUCOSE SERPL-MCNC: 195 MG/DL (ref 65–140)
GLUCOSE SERPL-MCNC: 234 MG/DL (ref 65–140)
GLUCOSE SERPL-MCNC: 250 MG/DL (ref 65–140)
GLUCOSE SERPL-MCNC: 277 MG/DL (ref 65–140)
GLUCOSE SERPL-MCNC: 298 MG/DL (ref 65–140)
GLUCOSE SERPL-MCNC: 312 MG/DL (ref 65–140)
GLUCOSE SERPL-MCNC: 322 MG/DL (ref 65–140)
HCT VFR BLD AUTO: 37.7 % (ref 36.5–49.3)
HGB BLD-MCNC: 11.9 G/DL (ref 12–17)
MAGNESIUM SERPL-MCNC: 1.8 MG/DL (ref 1.9–2.7)
MCH RBC QN AUTO: 28.3 PG (ref 26.8–34.3)
MCHC RBC AUTO-ENTMCNC: 31.6 G/DL (ref 31.4–37.4)
MCV RBC AUTO: 90 FL (ref 82–98)
PLATELET # BLD AUTO: 121 THOUSANDS/UL (ref 149–390)
PMV BLD AUTO: 9.8 FL (ref 8.9–12.7)
POTASSIUM SERPL-SCNC: 4.1 MMOL/L (ref 3.5–5.3)
RBC # BLD AUTO: 4.21 MILLION/UL (ref 3.88–5.62)
SODIUM SERPL-SCNC: 138 MMOL/L (ref 135–147)
WBC # BLD AUTO: 2.76 THOUSAND/UL (ref 4.31–10.16)

## 2024-11-03 PROCEDURE — 82948 REAGENT STRIP/BLOOD GLUCOSE: CPT

## 2024-11-03 PROCEDURE — 85730 THROMBOPLASTIN TIME PARTIAL: CPT | Performed by: INTERNAL MEDICINE

## 2024-11-03 PROCEDURE — 80048 BASIC METABOLIC PNL TOTAL CA: CPT | Performed by: INTERNAL MEDICINE

## 2024-11-03 PROCEDURE — NC001 PR NO CHARGE: Performed by: PHYSICIAN ASSISTANT

## 2024-11-03 PROCEDURE — 85027 COMPLETE CBC AUTOMATED: CPT | Performed by: INTERNAL MEDICINE

## 2024-11-03 PROCEDURE — 83735 ASSAY OF MAGNESIUM: CPT | Performed by: INTERNAL MEDICINE

## 2024-11-03 PROCEDURE — 99233 SBSQ HOSP IP/OBS HIGH 50: CPT | Performed by: INTERNAL MEDICINE

## 2024-11-03 PROCEDURE — 99232 SBSQ HOSP IP/OBS MODERATE 35: CPT | Performed by: INTERNAL MEDICINE

## 2024-11-03 RX ORDER — MAGNESIUM SULFATE HEPTAHYDRATE 40 MG/ML
2 INJECTION, SOLUTION INTRAVENOUS ONCE
Status: COMPLETED | OUTPATIENT
Start: 2024-11-03 | End: 2024-11-03

## 2024-11-03 RX ADMIN — FLUTICASONE PROPIONATE 2 SPRAY: 50 SPRAY, METERED NASAL at 21:35

## 2024-11-03 RX ADMIN — MAGNESIUM SULFATE HEPTAHYDRATE 2 G: 40 INJECTION, SOLUTION INTRAVENOUS at 09:07

## 2024-11-03 RX ADMIN — BUDESONIDE AND FORMOTEROL FUMARATE DIHYDRATE 2 PUFF: 80; 4.5 AEROSOL RESPIRATORY (INHALATION) at 09:07

## 2024-11-03 RX ADMIN — HEPARIN SODIUM 20 UNITS/KG/HR: 10000 INJECTION, SOLUTION INTRAVENOUS at 08:57

## 2024-11-03 RX ADMIN — POLYETHYLENE GLYCOL 3350 17 G: 17 POWDER, FOR SOLUTION ORAL at 09:07

## 2024-11-03 RX ADMIN — SODIUM CHLORIDE 5 UNITS/HR: 9 INJECTION, SOLUTION INTRAVENOUS at 14:55

## 2024-11-03 RX ADMIN — PANTOPRAZOLE SODIUM 40 MG: 40 TABLET, DELAYED RELEASE ORAL at 03:36

## 2024-11-03 RX ADMIN — ATORVASTATIN CALCIUM 40 MG: 40 TABLET, FILM COATED ORAL at 17:06

## 2024-11-03 RX ADMIN — MONTELUKAST 10 MG: 10 TABLET, FILM COATED ORAL at 21:35

## 2024-11-03 RX ADMIN — BUDESONIDE AND FORMOTEROL FUMARATE DIHYDRATE 2 PUFF: 80; 4.5 AEROSOL RESPIRATORY (INHALATION) at 17:06

## 2024-11-03 RX ADMIN — ASPIRIN 81 MG: 81 TABLET, COATED ORAL at 09:07

## 2024-11-03 NOTE — ASSESSMENT & PLAN NOTE
Lab Results   Component Value Date    HGBA1C 8.4 (H) 10/31/2024       Recent Labs     11/02/24  0804 11/02/24  1140 11/02/24  1700 11/02/24 2102   POCGLU 158* 327* 223* 264*       Blood Sugar Average: Last 72 hrs:  (P) [id number]

## 2024-11-03 NOTE — ASSESSMENT & PLAN NOTE
Lab Results   Component Value Date    HGBA1C 8.4 (H) 10/31/2024       Recent Labs     11/02/24  1140 11/02/24  1700 11/02/24  2102 11/03/24  0757   POCGLU 327* 223* 264* 101       Blood Sugar Average: Last 72 hrs:  (P) 220.3578317127154688    Patient with a history of L ADA first diagnosed 7-8 years ago currently maintained on insulin pump    Per Endo, discontinue insulin pump and  initiate insulin gtt on the day of CABG    Plan:  - Endocrinology consulted.  Appreciate recommendations  - Per Endo, continue insulin pump   - POCT 4 times daily before meals and at bedtime  - Hypoglycemia protocol  - Diabetic diet

## 2024-11-03 NOTE — QUICK NOTE
BG levels are improving while on insulin pump.  Patient believes that his pump will  tonight or tomorrow.  He is agreeable to transition to IV insulin until after procedures this week.     Recommend he be transitioned to insulin gtt soon before insulin pump is removed      Endocrinology team will continue to follow and provide recommendations as needed.

## 2024-11-03 NOTE — PROGRESS NOTES
"Cardiology Progress Note - Otoniel Martinez 64 y.o. 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  He has no chest pain or significant dyspnea.  Sinus bradycardia noted on telemetry.  Electrophysiology note appreciated.  Patient for PPM post CABG.  BMP today with potassium of 4.1 and creatinine of 0.52.  Hemoglobin 11.9.  Patient with low WBC and platelet count.  He is followed by Hematology/Oncology in reference to IgM kappa gammopathy.  Patient continues on IV heparin.  History of recurrent DVT typically on Xarelto.    Subjective:   Patient seen and examined.  No significant events overnight.  negative.    Objective:     Vitals: Blood pressure 130/72, pulse (!) 50, temperature 98 °F (36.7 °C), resp. rate 16, height 5' 8\" (1.727 m), weight 70.5 kg (155 lb 8 oz), SpO2 98%., Body mass index is 23.64 kg/m².,   Orthostatic Blood Pressures      Flowsheet Row Most Recent Value   Blood Pressure 130/72 filed at 11/03/2024 0211   Patient Position - Orthostatic VS Lying filed at 11/02/2024 1112        ,      Intake/Output Summary (Last 24 hours) at 11/3/2024 0718  Last data filed at 11/3/2024 0340  Gross per 24 hour   Intake 95 ml   Output 1450 ml   Net -1355 ml       Arrhythmias seen on telemetry review.  Sinus bradycardia      Physical Exam:    GEN: Otoniel Martinez appears well, alert and oriented x 3, pleasant and cooperative   NECK: " supple, no carotid bruits, no JVD or HJR  HEART: normal rate, regular rhythm, normal S1 and S2, no murmurs, clicks, gallops or rubs   LUNGS: clear to auscultation bilaterally; no wheezes, rales, or rhonchi   ABDOMEN: normal bowel sounds, soft, no tenderness, no distention  EXTREMITIES: peripheral pulses normal; no clubbing, cyanosis, or edema  SKIN: warm and well perfused, no suspicious lesions on exposed skin    Labs & Results:    Admission on 10/31/2024   Component Date Value    POC Glucose 10/31/2024 214 (H)     PTT 10/31/2024 25     Protime 10/31/2024 14.6     INR 10/31/2024 1.11     Hemoglobin A1C 10/31/2024 8.4 (H)     EAG 10/31/2024 194     MRSA Culture Only 11/01/2024 No Methicillin Resistant Staphlyococcus aureus (MRSA) isolated     ABO Grouping 10/31/2024 A     Rh Factor 10/31/2024 Positive     Antibody Screen 10/31/2024 Negative     Specimen Expiration Date 10/31/2024 [id number]     Cholesterol 10/31/2024 133     Triglycerides 10/31/2024 209 (H)     HDL, Direct 10/31/2024 40     LDL Calculated 10/31/2024 51     Non-HDL-Chol (CHOL-HDL) 10/31/2024 93     LA/Ao Ratio 2D 10/31/2024 1.19     LA Volume Index (BP) 10/31/2024 22.6     MV Peak A Sam 10/31/2024 0.79     MV stenosis pressure 1/2* 10/31/2024 68     MV Peak E Sam 10/31/2024 72     E wave deceleration time 10/31/2024 232     E/A ratio 10/31/2024 0.91     MV valve area p 1/2 meth* 10/31/2024 3.24     A4C EF 10/31/2024 63     Left ventricular stroke * 10/31/2024 44.00     IVSd 10/31/2024 1.00     Tricuspid annular plane * 10/31/2024 2.10     Ao root 10/31/2024 3.10     LVPWd 10/31/2024 1.10     LA size 10/31/2024 3.7     Asc Ao 10/31/2024 3.8     LA volume (BP) 10/31/2024 42     FS 10/31/2024 33     LVIDS 10/31/2024 2.70     IVS 10/31/2024 1     LVIDd 10/31/2024 4.00     LEFT VENTRICLE SYSTOLIC * 10/31/2024 28     LV DIASTOLIC VOLUME (MOD* 10/31/2024 71     Left Atrium Area-systoli* 10/31/2024 15     Left Atrium Area-systoli* 10/31/2024 15.5     MV E'  Tissue Velocity Se* 10/31/2024 10     LVSV, 2D 10/31/2024 44     BSA 10/31/2024 1.86     LV EF 10/31/2024 55     Est. RA pres 10/31/2024 3.0     PTT 10/31/2024 44 (H)     ABO Grouping 10/31/2024 A     Rh Factor 10/31/2024 Positive     POC Glucose 10/31/2024 233 (H)     POC Glucose 10/31/2024 137     Ventricular Rate 10/31/2024 53     Atrial Rate 10/31/2024 53     AL Interval 10/31/2024 150     QRSD Interval 10/31/2024 92     QT Interval 10/31/2024 462     QTC Interval 10/31/2024 433     P Axis 10/31/2024 -6     QRS [place] 10/31/2024 -7     T Wave [place] 10/31/2024 26     PTT 11/01/2024 83 (H)     Sodium 11/01/2024 138     Potassium 11/01/2024 4.1     Chloride 11/01/2024 106     CO2 11/01/2024 28     ANION GAP 11/01/2024 4     BUN 11/01/2024 18     Creatinine 11/01/2024 0.60     Glucose 11/01/2024 166 (H)     Calcium 11/01/2024 8.0 (L)     eGFR 11/01/2024 106     WBC 11/01/2024 4.04 (L)     RBC 11/01/2024 4.04     Hemoglobin 11/01/2024 11.5 (L)     Hematocrit 11/01/2024 35.3 (L)     MCV 11/01/2024 87     MCH 11/01/2024 28.5     MCHC 11/01/2024 32.6     RDW 11/01/2024 14.6     Platelets 11/01/2024 118 (L)     MPV 11/01/2024 9.9     Magnesium 11/01/2024 1.7 (L)     PTT 11/01/2024 61 (H)     POC Glucose 11/01/2024 124     POC Glucose 11/01/2024 156 (H)     Ventricular Rate 11/01/2024 58     Atrial Rate 11/01/2024 58     AL Interval 11/01/2024 150     QRSD Interval 11/01/2024 88     QT Interval 11/01/2024 454     QTC Interval 11/01/2024 445     P Axis 11/01/2024 1     QRS [place] 11/01/2024 -5     T Wave [place] 11/01/2024 41     POC Glucose 11/01/2024 345 (H)     POC Glucose 11/01/2024 236 (H)     POC Glucose 11/01/2024 351 (H)     Sodium 11/02/2024 139     Potassium 11/02/2024 3.8     Chloride 11/02/2024 104     CO2 11/02/2024 26     ANION GAP 11/02/2024 9     BUN 11/02/2024 19     Creatinine 11/02/2024 0.67     Glucose 11/02/2024 155 (H)     Calcium 11/02/2024 8.2 (L)     eGFR 11/02/2024 101     Magnesium 11/02/2024 1.9     WBC  11/02/2024 2.90 (L)     RBC 11/02/2024 4.07     Hemoglobin 11/02/2024 11.4 (L)     Hematocrit 11/02/2024 35.7 (L)     MCV 11/02/2024 88     MCH 11/02/2024 28.0     MCHC 11/02/2024 31.9     RDW 11/02/2024 14.7     Platelets 11/02/2024 128 (L)     MPV 11/02/2024 9.8     PTT 11/02/2024 77 (H)     POC Glucose 11/02/2024 158 (H)     POC Glucose 11/02/2024 327 (H)     POC Glucose 11/02/2024 223 (H)     POC Glucose 11/02/2024 264 (H)     PTT 11/03/2024 76 (H)     Sodium 11/03/2024 138     Potassium 11/03/2024 4.1     Chloride 11/03/2024 104     CO2 11/03/2024 26     ANION GAP 11/03/2024 8     BUN 11/03/2024 19     Creatinine 11/03/2024 0.52 (L)     Glucose 11/03/2024 195 (H)     Calcium 11/03/2024 8.3 (L)     eGFR 11/03/2024 112     WBC 11/03/2024 2.76 (L)     RBC 11/03/2024 4.21     Hemoglobin 11/03/2024 11.9 (L)     Hematocrit 11/03/2024 37.7     MCV 11/03/2024 90     MCH 11/03/2024 28.3     MCHC 11/03/2024 31.6     RDW 11/03/2024 14.7     Platelets 11/03/2024 121 (L)     MPV 11/03/2024 9.8     Magnesium 11/03/2024 1.8 (L)        XR chest pa & lateral    Result Date: 11/1/2024  Narrative: XR CHEST PA AND LATERAL INDICATION: preop CABG eval. COMPARISON: None FINDINGS: Clear lungs. No pneumothorax or pleural effusion. Normal cardiomediastinal silhouette. Bones are unremarkable for age. Normal upper abdomen.     Impression: No acute cardiopulmonary disease. Workstation performed: [id number]     VAS VEIN MAPPING- LOWER EXTREMITY    Result Date: 10/31/2024  Narrative:  [facility] REPORT CLINICAL: Indications: Patient presents for surgical clearance and general health evaluation secondary to future open heart surgery.  Patient is asymptomatic at this time. Operative History: Cardiac stent, 2004 Risk Factors The patient has history of Diabetes (IDDM), Hyperlipidemia and CAD.  FINDINGS:  Right           AP (mm)  GSV Inguinal        5.9  GSV Prox Thigh      3.6  GSV Mid Thigh       3.0  GSV Dist Thigh      3.4  GSV Knee             3.5  GSV Prox Calf       2.9  GSV Mid Calf        3.5  GSV Dist Calf       3.5  GSV Ankle           3.0   Left            AP (mm)  GSV Inguinal        8.9  GSV Prox Thigh      3.6  GSV Mid Thigh       4.7  GSV Dist Thigh      3.3  GSV Knee            3.0  GSV Prox Calf       2.6  GSV Mid Calf        3.0  GSV Dist Calf       3.2  GSV Ankle           3.2     CONCLUSION: Impression: RIGHT LOWER LIMB: The great saphenous vein is patent  from the groin to the ankle. The intraluminal diameter measurements range from 2.9mm to 5.9mm throughout. LEFT LOWER LIMB: The great saphenous vein is patent  from the groin to the ankle. The intraluminal diameter measurements range from 2.6mm to 8.9mm throughout.  SIGNATURE: Electronically Signed by: MISSY CAVANAUGH DO on 2024-10-31 09:33:29 PM    Echo complete w/ contrast if indicated    Result Date: 10/31/2024  Narrative:   Left Ventricle: Left ventricular cavity size is normal. Wall thickness is mildly increased. There is mild concentric hypertrophy. The left ventricular ejection fraction is 55%. Systolic function is normal. Diastolic function is mildly abnormal, consistent with grade I (abnormal) relaxation.   The following segments are hypokinetic: basal inferolateral and mid inferolateral.   All other segments are normal.   Right Ventricle: Right ventricular cavity size is normal. Systolic function is normal.   Aorta: The aortic root is normal in size. The ascending aorta is mildly dilated. Ao root is 3.10 cm. Asc Ao is 3.8 cm.     Cardiac catheterization    Result Date: 10/31/2024  Narrative:   Dist LM lesion is 80% stenosed.   Prox Cx to Mid Cx lesion is 90% stenosed.   Mid RCA lesion is 90% stenosed. Significant left main, circumflex and mid RCA disease in a diabetic patient.       EKG personally reviewed by Hilario Matson MD.     Counseling / Coordination of Care  Total floor / unit time spent today 30 minutes.  Greater than 50% of total time was spent with the  patient and / or family counseling and / or coordination of care.

## 2024-11-03 NOTE — CASE MANAGEMENT
Case Management Discharge Planning Note    Patient name Otoniel Martinez  Location [facility] 423/[facility] 423-01 MRN [id number]  : 1960 Date 11/3/2024       Current Admission Date: 10/31/2024  Current Admission Diagnosis:Multivessel CAD   Patient Active Problem List    Diagnosis Date Noted Date Diagnosed    Symptomatic sinus bradycardia 2024     Left main coronary artery disease 10/31/2024     CAD, multiple vessel 10/31/2024     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[facility]) 10/31/2024     Pulmonary nodule 1 cm or greater in diameter 10/15/2024     Splenic artery aneurysm ([facility]) 2024     Carotid stenosis, asymptomatic, bilateral 2024     Hx of pancreatitis 2024     On home oxygen therapy 2024     Monoclonal gammopathy 2024     Seizure ([facility]) 2024     Memory loss 2024     Establishing care with new doctor, encounter for 2024     Decreased hearing of both ears 2024     Splenomegaly 2024     Chronic bronchitis ([facility]) 2024     Abnormal CT scan of lung 2024     Chronic sinusitis 2024     Insulin pump status 2023     Primary osteoarthritis of right hip 2022     Recurrent cold sores 2022     History of recurrent deep vein thrombosis (DVT) 2021     Dysfunction of right rotator cuff 2021     Vitamin D deficiency 2021     LOJA (dyspnea on exertion) 02/15/2021     Old cerebrovascular accident (CVA) without late effect 10/28/2020     Chronic gastritis without bleeding 2020     Polyneuropathy 10/17/2018     Vertigo 2018     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[facility]) 2018     Thrombocytopenia ([facility]) 2018     Erectile dysfunction of non-organic origin 10/24/2014     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), 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[facility]) 10/04/2013     Mixed hyperlipidemia 2013     Multivessel CAD 2012     Thromboembolism of deep veins of lower extremity ([facility]) 2011     Obstructive sleep apnea  01/20/2007       LOS (days): 3  Geometric Mean LOS (GMLOS) (days): 1.8  Days to GMLOS:-1.4     OBJECTIVE:  Risk of Unplanned Readmission Score: 15.85         Current admission status: Inpatient   Preferred Pharmacy:   [facility] - [place], [place] - [address]  [address]  [facility] [address]  Phone: [phone] Fax: [phone]    [facility]/pharmacy #[id number] - [place], [place] - [address]  [address] [address]  Phone: [phone] Fax: [phone]    [facility] [id number] [facility] [employer] - [place], [place] - [address]  [address]  [facility] [address]  Phone: [phone] Fax: [phone]    Primary Care Provider: Wandy Cheatham DO    Primary Insurance: [employer]  Secondary Insurance:     DISCHARGE DETAILS:     CM reviewed chart. Pt admitted with multivessel CAD.  Anticipated home now needs.  Pt on RA.  CM following for DC needs.

## 2024-11-03 NOTE — ASSESSMENT & PLAN NOTE
Lab Results   Component Value Date    HGBA1C 8.4 (H) 10/31/2024       Recent Labs     11/02/24  1140 11/02/24  1700 11/02/24  2102 11/03/24  0757   POCGLU 327* 223* 264* 101       Blood Sugar Average: Last 72 hrs:  (P) 220.1941693865419112    Plan:  - Outpatient f/u with ophthalmology

## 2024-11-03 NOTE — PROGRESS NOTES
Progress Note - Cardiac Surgery   Otoniel Martinez 64 y.o. male MRN: [id number]  Unit/Bed#: [facility] 423-01 Encounter: [id number]    24 Hour Events: no events, labs and vitals stable. Discussed case with EP.     Medications:   Scheduled Meds:  Current Facility-Administered Medications   Medication Dose Route Frequency Provider Last Rate    acetaminophen  650 mg Oral Q4H PRN Faustino Jefferson MD      albuterol  2 puff Inhalation Q4H PRN Alex Zambrano MD      aspirin  81 mg Oral Daily Jameal Hadeed, DO      atorvastatin  40 mg Oral Daily With Dinner Faustino Jefferson MD      budesonide-formoterol  2 puff Inhalation BID Faustino Jefferson MD      fluticasone  2 spray Nasal Daily Faustino Jefferson MD      heparin (porcine)  3-30 Units/kg/hr (Order-Specific) Intravenous Titrated Jameal Hadeed, DO 20 Units/kg/hr (11/03/24 0857)    heparin (porcine)  2,800 Units Intravenous Q6H PRN Jameal Hadeed, DO      heparin (porcine)  5,600 Units Intravenous Q6H PRN Jameal Hadeed, DO      insulin aspart  300 Units Subcutaneous Insulin Pump Daily PRN Harpreet Campos MD      insulin regular (HumuLIN R,NovoLIN R) 1 Units/mL in sodium chloride 0.9 % 100 mL infusion  0.3-21 Units/hr Intravenous Titrated Akira Foster DO      montelukast  10 mg Oral HS Faustino Jefferson MD      nitroglycerin  0.4 mg Sublingual Q5 Min PRN Faustino Jefferson MD      pantoprazole  40 mg Oral Early Morning Faustino Jefferson MD      patient maintained insulin pump  1 each Subcutaneous Q8H Harpreet Campos MD      polyethylene glycol  17 g Oral Daily Suleiman Riojas DO       Continuous Infusions:heparin (porcine), 3-30 Units/kg/hr (Order-Specific), Last Rate: 20 Units/kg/hr (11/03/24 0857)  insulin regular (HumuLIN R,NovoLIN R) 1 Units/mL in sodium chloride 0.9 % 100 mL infusion, 0.3-21 Units/hr        Results:   Results from last 7 days   Lab Units 11/03/24  0706 11/02/24  0526 11/01/24  0351   WBC Thousand/uL 2.76* 2.90* 4.04*   HEMOGLOBIN g/dL 11.9* 11.4* 11.5*   HEMATOCRIT %  37.7 35.7* 35.3*   PLATELETS Thousands/uL 121* 128* 118*     Results from last 7 days   Lab Units 11/03/24  0422 11/02/24  0526 11/01/24  0351   POTASSIUM mmol/L 4.1 3.8 4.1   CHLORIDE mmol/L 104 104 106   CO2 mmol/L 26 26 28   BUN mg/dL 19 19 18   CREATININE mg/dL 0.52* 0.67 0.60   CALCIUM mg/dL 8.3* 8.2* 8.0*     Results from last 7 days   Lab Units 11/03/24  0425 11/02/24  0526 11/01/24  1116 10/31/24  2037 10/31/24  1315   INR   --   --   --   --  1.11   PTT seconds 76* 77* 61*   < > 25    < > = values in this interval not displayed.       Studies:     Cardiac Cath 10/31/24: distal LM 80%, prox-mid LCX 90%, mid RCA 90%     Echo 2/9/24: normal LV size/fnx, LVEF 55%, RV size/fnx normal, no valve disease     Repeat echo 10/31: LVEF 55%, hypokinetic basal inferolateral and mid inferolateral segments, mild diast dysfnx, RV normal. No valve disease     Stress test 9/4/24: Stress ECG: No ST deviation is noted. Arrhythmias during recovery: rare PVCs. The ECG was not diagnostic due to pharmacological (vasodilator) stress. Perfusion: There is a left ventricular perfusion defect that is small in size present in the mid inferior location(s) that is paradoxical and resolves with proning. The defect appears to be an artifact caused by subdiaphragmatic activity. Stress Function: Left ventricular function post-stress is abnormal. Global function is mildly reduced. There were no regional wall motion abnormalities during stress. Stress ejection fraction is 51%. Stress Combined Conclusion: There is image artifact, without diagnostic evidence for perfusion abnormality.     Carotid US 9/26/24: < 50% ICAs bilaterally, VAFA, no subclavian disease     Vein Mapping: bilateral good      CT Chest 9/16/24: The previously noted hypermetabolic masslike consolidation in the left upper lobe is smaller in size and demonstrating irregularity due to evolving scarring. This was likely inflammatory or infectious in etiology. No new suspicious lung  nodule or mass. Splenomegaly. Heart is unremarkable for patient's age. Ascending aorta measures 4 cm. severe coronary artery calcifications seen.     PET scan 8/1/24: 1. 1.9 x 1.5 cm mildly FDG avid masslike consolidation/nodule involving the left upper lobe with mild halo of groundglass opacity, new since 2/8/2024. While findings could reflect an inflammatory process, underlying malignancy of low metabolic activity is the diagnosis of exclusion. Consider further evaluation with tissue sampling as clinically indicated. If an inflammatory process is favored, short interval follow-up with CT imaging in 6 to 8 weeks is recommended to ensure stability/document Resolution. There is otherwise no additional focal FDG activity to suggest hypermetabolic malignancy. Patient's known mild splenomegaly is non-FDG avid. 2.4 cm rim calcified splenic artery aneurysm versus less likely calcified adrenal nodule. Consider vascular surgical consultation.     EKG 8/23/24: sinus adela, nonspecific t wave abnormality     PFT 2/2024: Normal Spirometry, No significant response to the administration of bronchodilator per ATS Standards, Normal Lung volumes, Normal diffusion capacity, Flow volume loop is normal     Vitals:   Vitals:    11/03/24 0556 11/03/24 0718 11/03/24 0900 11/03/24 1110   BP:  129/71  130/72   BP Location:  Right arm  Right arm   Pulse:  (!) 51  (!) 52   Resp:  18  18   Temp:  (!) 97.4 °F (36.3 °C)  97.7 °F (36.5 °C)   TempSrc:  Oral  Oral   SpO2:  95% 98% 95%   Weight: 70.5 kg (155 lb 8 oz)      Height:           Physical Exam:    General: No acute distress and Alert  HEENT/NECK:  Normocephalic. Atraumatic.  No jugular venous distention.    Cardiac: Regular rate and rhythm, mild adela  Pulmonary:  Breath sounds clear bilaterally  Abdomen:  Non-tender and Non-distended  Extremities: Extremities warm/dry and No edema B/L  Neuro: Alert and oriented X 3 and No focal deficits  Skin: Warm/Dry, without rashes or  lesions.    Assessment:  - severe LM/MVCAD  - preserved LVEF 55%  - LOJA  - bradycardia episode this admission, EP eval for PPM    Plan:  - Patient agreeable to proceed with surgery  - cleared by heme/onc for surgery  - EP evaluation noted, recommending non-urgent PPM, after discussion will plan for PPM after CABG to avoid new PPM lead dislodgement during CABG  - preop workup completed and acceptable  - coronary artery bypass grafting scheduled for wednesday with [email or web address].    SIGNATURE: Tylor Ojeda PA-C  DATE: November 3, 2024  TIME: 12:35 PM    * This note was completed in part utilizing [employer] direct voice recognition software.   Grammatical errors, random word insertion, spelling mistakes, and incomplete sentences may be an occasional consequence of the system secondary to software limitations, ambient noise and hardware issues. At the time of dictation, efforts were made to edit, clarify and /or correct errors. Please read the chart carefully and recognize, using context, where substitutions have occurred.  If you have any questions or concerns about the context, text or information contained within the body of this dictation, please contact myself, the provider, for further clarification.

## 2024-11-03 NOTE — ASSESSMENT & PLAN NOTE
Lab Results   Component Value Date    HGBA1C 8.4 (H) 10/31/2024       Recent Labs     11/02/24  0804 11/02/24  1140 11/02/24  1700 11/02/24 2102   POCGLU 158* 327* 223* 264*       Blood Sugar Average: Last 72 hrs:  (P) [id number]

## 2024-11-03 NOTE — PROGRESS NOTES
Insulin drip started (Alg #1), initial Glucose 298, 5u/hr. Pt's own pump ran out of Insulin, turned off at same time. Will check blood sugar at 2 hr intervals.

## 2024-11-03 NOTE — PROGRESS NOTES
Progress Note - Internal Medicine   Name: Otoniel Martinez 64 y.o. male I MRN: [id number]  Unit/Bed#: [facility] 423-01 I Date of Admission: 10/31/2024   Date of Service: 11/3/2024 I Hospital Day: 3    Assessment & Plan  Multivessel CAD  Patient with a history of CAD s/p PCI to RCA in 2004 who presented to the hospital for an outpatient C due to progressive LOJA over the last 2-3 years.  Found to have multivessel CAD as mentioned below.  Patient admitted for CABG evaluation    LHC findings:    Dist LM lesion is 80% stenosed.    Prox Cx to Mid Cx lesion is 90% stenosed.    Mid RCA lesion is 90% stenosed.    Echo 10/31/24:  EF 55% with G1DD. Mild concentric hypertrophy. Hypokinetic in basal inferolateral and mid inferolateral segments. Trace TR and mildly dilated ascending aorta 3.8 cm.     VAS vein mapping: patent great saphenous veins in lower extremities.    Plan:  - Cardiothoracic surgery consulted with CABG planned on 11/6/24  - No plans for pacemaker placement post CABG due to bradycardia   - Continue to monitor on telemetry  - Continue to hold Xarelto (for DVT)   - Continue Heparin gtt   - Continue Lipitor 40 mg daily  - Low threshold for serial EKG/troponins if necessary  - PRN nitro sublingual for chest pain  - Avoid AV salvador blocking agents given symptomatic bradycardia   Thrombocytopenia (HCC)  History of thrombocytopenia baseline in the low 100s.  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  Appreciate recommendations  - Per Endo, continue insulin pump   - POCT 4 times daily before meals and at bedtime  - Hypoglycemia protocol  - Diabetic diet    Mixed hyperlipidemia  History of hyperlipidemia maintained on Lipitor 40 mg daily    Lipid panel 10/31/24: , , HDL 40, LDL 51    Plan:  - Continue Lipitor 40 mg daily    History of recurrent deep vein thrombosis (DVT)  Patient with a history of recurrent DVTs maintained on Xarelto indefinitely    Plan:  - Hold Xarelto   - Continue heparin gtt for anticoagulation  Chronic bronchitis (HCC)  Patient with a history of chronic bronchitis maintained on Symbicort    Plan:  - Continue home med Symbicort  Insulin pump status  See plan under KAMI  Obstructive sleep apnea  Patient with a history of KULDIP had been on CPAP in the past but now only uses oxygen nightly    Plan:  - Continue oxygen as needed nightly  - Outpatient sleep study ordered by sleep medicine  Monoclonal gammopathy  Patient with a history of 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  Follows with heme-onc as an outpatient.  Bone marrow biopsy in August 2024 showing low-grade CD5 positive B-cell lymphoma as well.  Per most recent heme-onc note, no urgent intervention and follow-up in 3 months    Plan:  - Continue to follow-up outpatient  Splenic artery aneurysm (HCC)  Patient with an incidental finding of splenic artery aneurysm earlier this year.  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 Tele this morning showed down to 30s. Examined at bedside. Patient endorsed lightheaded when he moved from chair to bed. Patient has been having lightheadedness and dizziness for years. EKG at bedside showed sinus bradycardia with HR 58.    Tele overnight showed no significant events, other than bradycardia in the 50s.    Plan  - EP consulted, appreciate recs  Tentative plan for pacemaker placement implantation post CABG per EP  - Continue to monitor on Tele  - Avoid AV salvador blocking agents     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HCC)      Disposition: Remains inpatient for medical care and CABG on 11/6/24 and pacemaker implantation 11/4/24    Team: SOD TEAM C    Subjective   Patient seen and examined. No acute events overnight. Patient has no acute complaints this morning. Denies any lightheadedness, dizziness, chest pain, SOB and N/V.       Objective :  Temp:  [97.4 °F (36.3 °C)-98.4 °F (36.9 °C)] 97.4 °F (36.3 °C)  HR:  [50-63] 51  BP: (129-148)/(71-84) 129/71  Resp:  [16-20] 18  SpO2:  [93 %-98 %] 98 %  O2 Device: None (Room air)    I/O         10/30 0701  10/31 0700 10/31 0701  11/01 0700    P.O.  1440    I.V. (mL/kg)  25.2 (0.3)    Total Intake(mL/kg)  1465.2 (20.1)    Urine (mL/kg/hr)  0    Stool  0    Total Output  0    Net  +1465.2          Unmeasured Urine Occurrence  3 x    Unmeasured Stool Occurrence  0 x          Weights:   IBW (Ideal Body Weight): 68.4 kg    Body mass index is 23.64 kg/m².  Weight (last 2 days)       Date/Time Weight    11/03/24 0556 70.5 (155.5)    11/02/24 0533 72.4 (159.7)    11/01/24 0500 72.9 (160.7)            Physical Exam  Constitutional:       General: He is not in acute distress.     Appearance: Normal appearance. He is normal weight.   HENT:      Head: Normocephalic and atraumatic.      Nose: Nose normal.      Mouth/Throat:      Mouth: Mucous membranes are moist.   Eyes:       Extraocular Movements: Extraocular movements intact.      Pupils: Pupils are equal, round, and reactive to light.   Cardiovascular:      Rate and Rhythm: Regular rhythm. Bradycardia present.      Pulses: Normal pulses.      Heart sounds: Normal heart sounds. No murmur heard.     No friction rub. No gallop.   Pulmonary:      Effort: Pulmonary effort is normal.      Breath sounds: Normal breath sounds.   Abdominal:      General: Abdomen is flat. Bowel sounds are normal.      Palpations: Abdomen is soft.   Musculoskeletal:         General: Normal range of motion.      Cervical back: Normal range of motion.      Right lower leg: No edema.      Left lower leg: No edema.   Skin:     General: Skin is warm and dry.   Neurological:      General: No focal deficit present.      Mental Status: He is alert and oriented to person, place, and time. Mental status is at baseline.   Psychiatric:         Mood and Affect: Mood normal.         Behavior: Behavior normal.           Lab Results: I have reviewed the following results:  Recent Labs     10/31/24  1315 10/31/24  2037 11/03/24  0422 11/03/24  0425 11/03/24  0706   WBC  --    < >  --   --  2.76*   HGB  --    < >  --   --  11.9*   HCT  --    < >  --   --  37.7   PLT  --    < >  --   --  121*   SODIUM  --    < > 138  --   --    K  --    < > 4.1  --   --    CL  --    < > 104  --   --    CO2  --    < > 26  --   --    BUN  --    < > 19  --   --    CREATININE  --    < > 0.52*  --   --    GLUC  --    < > 195*  --   --    MG  --    < > 1.8*  --   --    PTT 25   < >  --  76*  --    INR 1.11  --   --   --   --     < > = values in this interval not displayed.       Imaging Results Review: No pertinent imaging studies reviewed.  Other Study Results Review: EKG was reviewed.     Currently Ordered Meds:   Current Facility-Administered Medications:     acetaminophen (TYLENOL) tablet 650 mg, Q4H PRN    albuterol (PROVENTIL HFA,VENTOLIN HFA) inhaler 2 puff, Q4H PRN    aspirin (ECOTRIN LOW  STRENGTH) EC tablet 81 mg, Daily    atorvastatin (LIPITOR) tablet 40 mg, Daily With Dinner    budesonide-formoterol (SYMBICORT) 80-4.5 MCG/ACT inhaler 2 puff, BID    fluticasone (FLONASE) 50 mcg/act nasal spray 2 spray, Daily    heparin (porcine) 25,000 units in 0.45% NaCl 250 mL infusion (premix), Titrated, Last Rate: 20 Units/kg/hr (11/03/24 0857)    heparin (porcine) injection 2,800 Units, Q6H PRN    heparin (porcine) injection 5,600 Units, Q6H PRN    insulin aspart (NovoLOG) FOR PUMP REFILLS 300 Units, Daily PRN    magnesium sulfate 2 g/50 mL IVPB (premix) 2 g, Once, Last Rate: 2 g (11/03/24 0907)    montelukast (SINGULAIR) tablet 10 mg, HS    nitroglycerin (NITROSTAT) SL tablet 0.4 mg, Q5 Min PRN    pantoprazole (PROTONIX) EC tablet 40 mg, Early Morning    PATIENT MAINTAINED INSULIN PUMP 1 each, Q8H    polyethylene glycol (MIRALAX) packet 17 g, Daily  VTE Pharmacologic Prophylaxis: Heparin  VTE Mechanical Prophylaxis: sequential compression device    Administrative Statements   I have spent a total time of 45 minutes in caring for this patient on the day of the visit/encounter including Diagnostic results, Prognosis, Risks and benefits of tx options, Instructions for management, Patient and family education, Importance of tx compliance, Risk factor reductions, Impressions, Counseling / Coordination of care, Documenting in the medical record, Reviewing / ordering tests, medicine, procedures  , Obtaining or reviewing history  , and Communicating with other healthcare professionals .  Portions of the record may have been created with voice recognition software.

## 2024-11-03 NOTE — ASSESSMENT & PLAN NOTE
Baseline 40-50s. Tele this morning showed down to 30s. Examined at bedside. Patient endorsed lightheaded when he moved from chair to bed. Patient has been having lightheadedness and dizziness for years. EKG at bedside showed sinus bradycardia with HR 58.    Tele overnight showed no significant events, other than bradycardia in the 50s.    Plan  - EP consulted, appreciate recs  Tentative plan for pacemaker placement implantation post CABG per EP  - Continue to monitor on Tele  - Avoid AV salvador blocking agents

## 2024-11-04 LAB
ANION GAP SERPL CALCULATED.3IONS-SCNC: 7 MMOL/L (ref 4–13)
APTT PPP: 72 SECONDS (ref 23–34)
BUN SERPL-MCNC: 21 MG/DL (ref 5–25)
CALCIUM SERPL-MCNC: 8.6 MG/DL (ref 8.4–10.2)
CHLORIDE SERPL-SCNC: 103 MMOL/L (ref 96–108)
CO2 SERPL-SCNC: 30 MMOL/L (ref 21–32)
CREAT SERPL-MCNC: 0.68 MG/DL (ref 0.6–1.3)
ERYTHROCYTE [DISTWIDTH] IN BLOOD BY AUTOMATED COUNT: 14.6 % (ref 11.6–15.1)
GFR SERPL CREATININE-BSD FRML MDRD: 100 ML/MIN/1.73SQ M
GLUCOSE SERPL-MCNC: 121 MG/DL (ref 65–140)
GLUCOSE SERPL-MCNC: 124 MG/DL (ref 65–140)
GLUCOSE SERPL-MCNC: 125 MG/DL (ref 65–140)
GLUCOSE SERPL-MCNC: 125 MG/DL (ref 65–140)
GLUCOSE SERPL-MCNC: 133 MG/DL (ref 65–140)
GLUCOSE SERPL-MCNC: 136 MG/DL (ref 65–140)
GLUCOSE SERPL-MCNC: 142 MG/DL (ref 65–140)
GLUCOSE SERPL-MCNC: 232 MG/DL (ref 65–140)
GLUCOSE SERPL-MCNC: 288 MG/DL (ref 65–140)
GLUCOSE SERPL-MCNC: 312 MG/DL (ref 65–140)
GLUCOSE SERPL-MCNC: 346 MG/DL (ref 65–140)
GLUCOSE SERPL-MCNC: 430 MG/DL (ref 65–140)
GLUCOSE SERPL-MCNC: 63 MG/DL (ref 65–140)
GLUCOSE SERPL-MCNC: 78 MG/DL (ref 65–140)
HCT VFR BLD AUTO: 37.7 % (ref 36.5–49.3)
HGB BLD-MCNC: 12.3 G/DL (ref 12–17)
INR PPP: 1.07 (ref 0.85–1.19)
MCH RBC QN AUTO: 28.1 PG (ref 26.8–34.3)
MCHC RBC AUTO-ENTMCNC: 32.6 G/DL (ref 31.4–37.4)
MCV RBC AUTO: 86 FL (ref 82–98)
PLATELET # BLD AUTO: 118 THOUSANDS/UL (ref 149–390)
PMV BLD AUTO: 9.6 FL (ref 8.9–12.7)
POTASSIUM SERPL-SCNC: 4.9 MMOL/L (ref 3.5–5.3)
PROTHROMBIN TIME: 14.2 SECONDS (ref 12.3–15)
RBC # BLD AUTO: 4.37 MILLION/UL (ref 3.88–5.62)
SODIUM SERPL-SCNC: 140 MMOL/L (ref 135–147)
WBC # BLD AUTO: 3.29 THOUSAND/UL (ref 4.31–10.16)

## 2024-11-04 PROCEDURE — 85027 COMPLETE CBC AUTOMATED: CPT | Performed by: PHYSICIAN ASSISTANT

## 2024-11-04 PROCEDURE — 99232 SBSQ HOSP IP/OBS MODERATE 35: CPT | Performed by: INTERNAL MEDICINE

## 2024-11-04 PROCEDURE — 85610 PROTHROMBIN TIME: CPT | Performed by: INTERNAL MEDICINE

## 2024-11-04 PROCEDURE — 99233 SBSQ HOSP IP/OBS HIGH 50: CPT | Performed by: INTERNAL MEDICINE

## 2024-11-04 PROCEDURE — 82948 REAGENT STRIP/BLOOD GLUCOSE: CPT

## 2024-11-04 PROCEDURE — 85730 THROMBOPLASTIN TIME PARTIAL: CPT | Performed by: INTERNAL MEDICINE

## 2024-11-04 PROCEDURE — 80048 BASIC METABOLIC PNL TOTAL CA: CPT | Performed by: PHYSICIAN ASSISTANT

## 2024-11-04 PROCEDURE — NC001 PR NO CHARGE: Performed by: PHYSICIAN ASSISTANT

## 2024-11-04 RX ADMIN — ATORVASTATIN CALCIUM 40 MG: 40 TABLET, FILM COATED ORAL at 18:10

## 2024-11-04 RX ADMIN — BUDESONIDE AND FORMOTEROL FUMARATE DIHYDRATE 2 PUFF: 80; 4.5 AEROSOL RESPIRATORY (INHALATION) at 08:13

## 2024-11-04 RX ADMIN — MONTELUKAST 10 MG: 10 TABLET, FILM COATED ORAL at 21:01

## 2024-11-04 RX ADMIN — ASPIRIN 81 MG: 81 TABLET, COATED ORAL at 08:13

## 2024-11-04 RX ADMIN — BUDESONIDE AND FORMOTEROL FUMARATE DIHYDRATE 2 PUFF: 80; 4.5 AEROSOL RESPIRATORY (INHALATION) at 18:10

## 2024-11-04 RX ADMIN — HEPARIN SODIUM 20 UNITS/KG/HR: 10000 INJECTION, SOLUTION INTRAVENOUS at 19:00

## 2024-11-04 RX ADMIN — FLUTICASONE PROPIONATE 2 SPRAY: 50 SPRAY, METERED NASAL at 21:02

## 2024-11-04 RX ADMIN — PANTOPRAZOLE SODIUM 40 MG: 40 TABLET, DELAYED RELEASE ORAL at 05:08

## 2024-11-04 RX ADMIN — POLYETHYLENE GLYCOL 3350 17 G: 17 POWDER, FOR SOLUTION ORAL at 08:49

## 2024-11-04 RX ADMIN — HEPARIN SODIUM 20 UNITS/KG/HR: 10000 INJECTION, SOLUTION INTRAVENOUS at 01:30

## 2024-11-04 NOTE — ASSESSMENT & PLAN NOTE
Lab Results   Component Value Date    HGBA1C 8.4 (H) 10/31/2024     Recent Labs     11/04/24  1004 11/04/24  1201 11/04/24  1359 11/04/24  1605   POCGLU 346* 312* 124 63*   Blood Sugar Average: Last 72 hrs:  (P) 219.2    Uncontrolled KAMI/diabetes mellitus, with complication of diabetes related retinopathy of the right eye, MV-CAD  Follows with [facility] endocrinology  Home regimen: Uses OmniPod 5 insulin pump (with settings noted below)  On 10/31/2024 underwent outpatient left heart cath which showed MV-CAD, admitted for CABG which is planned for Wednesday 11/6.  Has been on insulin drip since yesterday 2/2 insulin pump expiring and patient deferring use of home supply of insulin pumps.  Since starting infusion, has had readings above goal.  Patient reports compliance with avoiding sugary drinks and compliant with inpatient diabetic diet.  Of note, did have a hypoglycemia episode at 4 PM, during which he was asymptomatic.  Rate was adjusted and algorithm was moved downwards.    Continue non-DKA insulin with glucose checks every 2 hours  Monitor fingerstick glucose  Diabetic diet  Hypoglycemia protocol  Endocrine will continue to follow and make recommendations

## 2024-11-04 NOTE — ASSESSMENT & PLAN NOTE
Patient with a history of CAD s/p PCI to RCA in 2004 who presented to the hospital for an outpatient LHC due to progressive LJOA over the last 2-3 years.  Found to have multivessel CAD as mentioned below.  Patient admitted for CABG evaluation    LHC findings:    Dist LM lesion is 80% stenosed.    Prox Cx to Mid Cx lesion is 90% stenosed.    Mid RCA lesion is 90% stenosed.    Echo 10/31/24:  EF 55% with G1DD. Mild concentric hypertrophy. Hypokinetic in basal inferolateral and mid inferolateral segments. Trace TR and mildly dilated ascending aorta 3.8 cm.     VAS vein mapping: patent great saphenous veins in lower extremities.    Plan:  - Cardiothoracic surgery consulted with CABG planned on 11/6/24  - Plan for pacemaker placement post CABG due to bradycardia, no date set   - Continue to monitor on telemetry  - Home Xarelto (for DVT) transitioned to heparin gtt   - Continue Lipitor 40 mg daily  - Low threshold for serial EKG/troponins if necessary  - PRN nitro sublingual for chest pain  - Avoid AV salvador blocking agents given symptomatic bradycardia

## 2024-11-04 NOTE — ASSESSMENT & PLAN NOTE
Lab Results   Component Value Date    HGBA1C 8.4 (H) 10/31/2024       Recent Labs     11/04/24  0800 11/04/24  1004 11/04/24  1201 11/04/24  1359   POCGLU 142* 346* 312* 124       Blood Sugar Average: Last 72 hrs:  (P) 225.2657597758801445    Plan:  - Outpatient f/u with ophthalmology

## 2024-11-04 NOTE — ASSESSMENT & PLAN NOTE
For CABG 11/6    Plan: Pt with MV disease including 80% distal LM on cardiac cath 10/31. EF 55% no valvular disease. Awaiting CABG on 11/6. Pre-op workup completed.   Chest pain free. Continue current medications. No changes made  Avoid AV salvador agents due to bradycardia. Continue telemetry. EP planning PPM implant after CABG.

## 2024-11-04 NOTE — ASSESSMENT & PLAN NOTE
Baseline 40-50s. Patient has been having lightheadedness and dizziness for years.     Tele overnight showed no significant events, other than bradycardia in the 40s-50s.    Plan  - EP consulted, appreciate recs  - Pacemaker placement implantation post CABG per EP  - Continue to monitor on Tele  - Avoid AV salvador blocking agents

## 2024-11-04 NOTE — PROGRESS NOTES
Progress Note - Cardiology   Name: Otoniel Martinez 64 y.o. male I MRN: [id number]  Unit/Bed#: [facility] 423-01 I Date of Admission: 10/31/2024   Date of Service: 11/4/2024 I Hospital Day: 4     Assessment & Plan  Multivessel CAD  Pt with prior MI and RCA stenting 2004; EF preserved historically.  Presented for elective cardiac catheterization on 10/31 due to ongoing dyspnea on exertion.   Cath revealed MV disease including distal LM 80% as well as prox to mid Circ 90% and mid RCA 90%  Awaiting CABG on 11/6.  Remain chest pain free.   Continue IV heparin, ASA, statin. No BB due to bradycardia with PPM planned  Labs and VS stable  Tele showing sinus bradycardia.  Echo LVEF 55% with hypokinesis of basal inferolateral and mid inferolateral.   No significant valvular disease.   Symptomatic sinus bradycardia  Sinus bradycardia on telemetry; no pauses or profound bradycardia past 24 hours; no recurrent episodes of pre-syncope; still getting dizzy/lightheaded when laying flat.   Evaluated by EP and they are planning PPM implant after CABG (not prior to due to concern for possible lead dislodgment with bypass surgery).   Not on any AV salvador agents; continue telemetry  Thrombocytopenia (HCC)  Plt 118 today  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), 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HCC)  Lab Results   Component Value Date    HGBA1C 8.4 (H) 10/31/2024       Recent Labs     11/04/24  0156 11/04/24  0354 11/04/24  0550 11/04/24  0800   POCGLU 125 133 121 142*       Blood Sugar Average: Last 72 hrs:  (P) 220.8769547871662276    Currently on IV insulin infusion being managed by endocrinology.   Mixed hyperlipidemia  Continue statin; atorvastatin 40mg daily  History of recurrent deep vein thrombosis (DVT)  On Xarelto at baseline. IV heparin here.  Left main coronary artery disease  For CABG 11/6    Plan: Pt with MV disease including 80% distal LM on cardiac cath 10/31. EF 55% no valvular disease. Awaiting CABG on 11/6. Pre-op workup completed.  "  Chest pain free. Continue current medications. No changes made  Avoid AV salvador agents due to bradycardia. Continue telemetry. EP planning PPM implant after CABG.     Subjective: Pt seen for follow up; wife present at bedside. No events overnight. Did not sleep well with getting BG checked every 2 hours. Still having some dizziness when he lays flat but no more pre-syncopal events or significant bradycardia/pauses. No chest pain. CABG is planned for  with PPM placement during recovery period.     Review of Systems   Constitutional: Negative for decreased appetite and fever.   Cardiovascular:  Negative for chest pain, leg swelling and near-syncope.   Respiratory:  Negative for cough and shortness of breath.    Gastrointestinal:  Negative for abdominal pain.   Neurological:  Positive for dizziness (when laying flat).   All other systems reviewed and are negative.    Objective:   Vitals: Blood pressure 125/75, pulse (!) 53, temperature 97.6 °F (36.4 °C), resp. rate 14, height 5' 8\" (1.727 m), weight 69.6 kg (153 lb 7 oz), SpO2 97%.,     Body mass index is 23.33 kg/m².,     Systolic (24hrs), Av , Min:125 , Max:141     Diastolic (24hrs), Av, Min:72, Max:78      Intake/Output Summary (Last 24 hours) at 2024 0913  Last data filed at 2024 0636  Gross per 24 hour   Intake 1693.44 ml   Output 1200 ml   Net 493.44 ml     Weight (last 2 days)       Date/Time Weight    24 0600 69.6 (153.44)    24 0556 70.5 (155.5)    24 0533 72.4 (159.7)          Telemetry Review:   Sinus bradycardia; no significant adela events past 24 hours; Hrs 40-50s    Physical Exam  Vitals reviewed.   Constitutional:       General: He is not in acute distress.     Appearance: Normal appearance.      Comments: Pt sitting up in bed in NAD; alert and cooperative   HENT:      Head: Normocephalic.      Mouth/Throat:      Mouth: Mucous membranes are moist.   Cardiovascular:      Rate and Rhythm: Normal rate and " regular rhythm.      Heart sounds: S1 normal and S2 normal. No murmur heard.  Pulmonary:      Effort: Pulmonary effort is normal.      Breath sounds: Normal breath sounds. No wheezing or rales.      Comments: oN RA  Abdominal:      Palpations: Abdomen is soft.   Musculoskeletal:      Right lower leg: No edema.      Left lower leg: No edema.   Skin:     General: Skin is warm.   Neurological:      Mental Status: He is alert and oriented to person, place, and time.   Psychiatric:         Mood and Affect: Mood normal.       LABORATORY RESULTS      CBC with diff:   Results from last 7 days   Lab Units 11/04/24  0555 11/03/24  0706 11/02/24 0526 11/01/24  0351   WBC Thousand/uL 3.29* 2.76* 2.90* 4.04*   HEMOGLOBIN g/dL 12.3 11.9* 11.4* 11.5*   HEMATOCRIT % 37.7 37.7 35.7* 35.3*   MCV fL 86 90 88 87   PLATELETS Thousands/uL 118* 121* 128* 118*   RBC Million/uL 4.37 4.21 4.07 4.04   MCH pg 28.1 28.3 28.0 28.5   MCHC g/dL 32.6 31.6 31.9 32.6   RDW % 14.6 14.7 14.7 14.6   MPV fL 9.6 9.8 9.8 9.9     CMP:  Results from last 7 days   Lab Units 11/04/24  0555 11/03/24 0422 11/02/24 0526 11/01/24  0351   POTASSIUM mmol/L 4.9 4.1 3.8 4.1   CHLORIDE mmol/L 103 104 104 106   CO2 mmol/L 30 26 26 28   BUN mg/dL 21 19 19 18   CREATININE mg/dL 0.68 0.52* 0.67 0.60   CALCIUM mg/dL 8.6 8.3* 8.2* 8.0*   EGFR ml/min/1.73sq m 100 112 101 106     BMP:  Results from last 7 days   Lab Units 11/04/24  0555 11/03/24 0422 11/02/24 0526 11/01/24  0351   POTASSIUM mmol/L 4.9 4.1 3.8 4.1   CHLORIDE mmol/L 103 104 104 106   CO2 mmol/L 30 26 26 28   BUN mg/dL 21 19 19 18   CREATININE mg/dL 0.68 0.52* 0.67 0.60   CALCIUM mg/dL 8.6 8.3* 8.2* 8.0*     Results from last 7 days   Lab Units 11/03/24  0422 11/02/24  0526 11/01/24  0351   MAGNESIUM mg/dL 1.8* 1.9 1.7*     Results from last 7 days   Lab Units 10/31/24  1315   HEMOGLOBIN A1C % 8.4*     Results from last 7 days   Lab Units 11/04/24  0348 10/31/24  1315   INR  1.07 1.11     Lipid Profile:    Lab Results   Component Value Date    CHOL 133 2018     Lab Results   Component Value Date    HDL 40 10/31/2024    HDL 42 09/10/2024    HDL 45 2024     Lab Results   Component Value Date    LDLCALC 51 10/31/2024    LDLCALC 70 09/10/2024    LDLCALC 59 2024     Lab Results   Component Value Date    TRIG 209 (H) 10/31/2024    TRIG 60 09/10/2024    TRIG 122 2024     Results for orders placed during the hospital encounter of 21    NM myocardial perfusion spect (stress and/or rest)    [place] [address]  [phone]    Stress Gated SPECT Myocardial Perfusion Imaging after Exercise    Patient: MONTANA CORONADO  MR number: [id number]  Account number: [id number]  : 1960  Age: 60 years  Gender: Male  Status: Outpatient  Location: [address] [place] [address]  Height: 69 in  Weight: 156 lb  BP: 118/ 64 mmHg    Allergies: NO KNOWN ALLERGIES    Referring Physician:  Ventura Ladd MD  Primary Physician:  Jt Grey DO  Technician:  Obinna Farmer  RN:  Karla Domínguez RN  Group:  [facility]  Cardiology Fellow:  Praful Lowry MD  Report Prepared by::  Karla Domínguez RN  Interpreting Physician:  Hilario Matson MD    INDICATIONS: Evaluation of known coronary artery disease.    HISTORY: The patient is a 60 year old  male. Chest pain status: no chest pain. Other symptoms: dyspnea. Coronary artery disease risk factors: dyslipidemia, family history of premature coronary artery disease, and diabetes  mellitus. Cardiovascular history: coronary artery disease, prior myocardial infarction, and stroke. Prior cardiovascular procedures: angioplasty of right coronary artery (on ).    PHYSICAL EXAM: Baseline physical exam screening: normal.    REST ECG: Normal sinus rhythm.    PROCEDURE: The study was performed in the the [address] [place] [address]. Treadmill exercise testing  was performed, using the Madeleine protocol. Gated SPECT myocardial perfusion imaging was performed during stress. Systolic blood  pressure was 118 mmHg, at the start of the study. Diastolic blood pressure was 64 mmHg, at the start of the study. The heart rate was 56 bpm, at the start of the study. IV double checked.    MADELEINE PROTOCOL:  HR bpm SBP mmHg DBP mmHg Symptoms  Baseline 56 118 64 none  Stage 1 86 138 60 --  Stage 2 108 162 70 --  Stage 3 127 184 88 mild chest discomfort  Stage 4 142 -- -- mild chest discomfort  Recovery 1 59 176 84 subsiding  Recovery 2 62 146 80 none  No medications or fluids given.    STRESS SUMMARY: Duration of exercise was 9 min and 16 sec. The patient exercised to protocol stage 4. Maximal work rate was 10.9 METs. Maximal heart rate during stress was 142 bpm ( 89 % of maximal predicted heart rate). The heart rate  response to stress was normal. There was normal resting blood pressure with an appropriate response to stress. The rate-pressure product for the peak heart rate and blood pressure was 56861. The patient experienced chest pain during  stress; pain resolved spontaneously. The stress test was terminated due to mild chest discomfort. Pre oxygen saturation: 98 %. Peak oxygen saturation: 98 %. The stress ECG was negative for ischemia and normal. There were no stress  arrhythmias or conduction abnormalities.    ISOTOPE ADMINISTRATION:  Resting isotope administration Stress isotope administration  Agent Tetrofosmin Tetrofosmin  Dose 10.66 mCi 30.7 mCi  Date 03/02/2021 03/02/2021  Injection-image interval 42 min 60 min    The radiopharmaceutical was injected one minute before the end of exercise.    MYOCARDIAL PERFUSION IMAGING:  The image quality was good. Rotating projection images reveal moderate patient motion. Left ventricular size was normal. The TID ratio was 1.03.    PERFUSION DEFECTS:  -  There were no perfusion defects.    GATED SPECT:  The calculated left ventricular ejection  fraction was 65 %. Left ventricular ejection fraction was within normal limits by visual estimate. There was no left ventricular regional abnormality.    SUMMARY:  -  Stress results: Duration of exercise was 9 min and 16 sec. Target heart rate was achieved. The patient experienced chest pain during stress; pain resolved spontaneously.  -  ECG conclusions: The stress ECG was negative for ischemia and normal.  -  Perfusion imaging: Rotating projection images reveal moderate patient motion. There were no perfusion defects.  -  Gated SPECT: The calculated left ventricular ejection fraction was 65 %. Left ventricular ejection fraction was within normal limits by visual estimate. There was no left ventricular regional abnormality.  -  Impressions and recommendations: Normal study after maximal exercise with reproduction of symptoms. Myocardial perfusion imaging was normal at rest and with stress without evidence of ischemia.    IMPRESSIONS: Normal study after maximal exercise with reproduction of symptoms. Myocardial perfusion imaging was normal at rest and with stress without evidence of ischemia. Left ventricular systolic function was normal.    Prepared and signed by    Hilario Matson MD  Signed 03/02/2021 16:12:09    Meds/Allergies   current meds:   Current Facility-Administered Medications:     acetaminophen (TYLENOL) tablet 650 mg, Q4H PRN    albuterol (PROVENTIL HFA,VENTOLIN HFA) inhaler 2 puff, Q4H PRN    aspirin (ECOTRIN LOW STRENGTH) EC tablet 81 mg, Daily    atorvastatin (LIPITOR) tablet 40 mg, Daily With Dinner    budesonide-formoterol (SYMBICORT) 80-4.5 MCG/ACT inhaler 2 puff, BID    fluticasone (FLONASE) 50 mcg/act nasal spray 2 spray, Daily    heparin (porcine) 25,000 units in 0.45% NaCl 250 mL infusion (premix), Titrated, Last Rate: 20 Units/kg/hr (11/04/24 0636)    heparin (porcine) injection 2,800 Units, Q6H PRN    heparin (porcine) injection 5,600 Units, Q6H PRN    insulin aspart (NovoLOG) FOR PUMP  REFILLS 300 Units, Daily PRN    insulin regular (HumuLIN R,NovoLIN R) 1 Units/mL in sodium chloride 0.9 % 100 mL infusion, Titrated, Last Rate: 4 Units/hr (11/04/24 0818)    montelukast (SINGULAIR) tablet 10 mg, HS    nitroglycerin (NITROSTAT) SL tablet 0.4 mg, Q5 Min PRN    pantoprazole (PROTONIX) EC tablet 40 mg, Early Morning    PATIENT MAINTAINED INSULIN PUMP 1 each, Q8H    polyethylene glycol (MIRALAX) packet 17 g, Daily    Medications Prior to Admission:     aspirin (ECOTRIN LOW STRENGTH) 81 mg EC tablet    atorvastatin (LIPITOR) 40 mg tablet    azelastine (ASTELIN) 0.1 % nasal spray    budesonide-formoterol (Symbicort) 80-4.5 MCG/ACT inhaler    cholecalciferol (VITAMIN D3) 400 units tablet    Continuous Blood Gluc Sensor (Dexcom G6 Sensor) MISC    Continuous Blood Gluc Transmit (Dexcom G6 Transmitter) MISC    fluticasone (FLONASE) 50 mcg/act nasal spray    Gvoke HypoPen 2-Pack 1 MG/0.2ML SOAJ    Insulin Disposable Pump (Omnipod 5 G6 Pods, Gen 5,) MISC    insulin glargine (LANTUS) 100 units/mL subcutaneous injection    L-Methylfolate-B6-B12 (Foltanx) 3-35-2 MG TABS    levalbuterol (XOPENEX) 1.25 mg/3 mL nebulizer solution    montelukast (SINGULAIR) 10 mg tablet    mupirocin (BACTROBAN) 2 % ointment    NovoLOG 100 UNIT/ML injection    pantoprazole (PROTONIX) 20 mg tablet    pantoprazole (PROTONIX) 40 mg tablet    sildenafil (VIAGRA) 100 mg tablet    sodium chloride 0.9 % nebulizer solution    acyclovir (ZOVIRAX) 200 mg capsule    rivaroxaban (Xarelto) 20 mg tablet    heparin (porcine), 3-30 Units/kg/hr (Order-Specific), Last Rate: 20 Units/kg/hr (11/04/24 0636)  insulin regular (HumuLIN R,NovoLIN R) 1 Units/mL in sodium chloride 0.9 % 100 mL infusion, 0.3-21 Units/hr, Last Rate: 4 Units/hr (11/04/24 0818)      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), 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HCC)    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HCC)    Mixed  hyperlipidemia    History of recurrent deep vein thrombosis (DVT)    Chronic bronchitis (HCC)    Insulin pump status    Obstructive sleep apnea    Monoclonal gammopathy    Splenic artery aneurysm (HCC)    Left main coronary artery disease    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  Greater than 50% of total time was spent with the patient and / or family counseling and / or coordination of care.      ** Please Note: Dragon 360 Dictation voice to text software may have been used in the creation of this document. **

## 2024-11-04 NOTE — ASSESSMENT & PLAN NOTE
Lab Results   Component Value Date    HGBA1C 8.4 (H) 10/31/2024       Recent Labs     11/04/24  0156 11/04/24  0354 11/04/24  0550 11/04/24  0800   POCGLU 125 133 121 142*       Blood Sugar Average: Last 72 hrs:  (P) 220.1625758164504703    Currently on IV insulin infusion being managed by endocrinology.

## 2024-11-04 NOTE — ASSESSMENT & PLAN NOTE
Sinus bradycardia on telemetry; no pauses or profound bradycardia past 24 hours; no recurrent episodes of pre-syncope; still getting dizzy/lightheaded when laying flat.   Evaluated by EP and they are planning PPM implant after CABG (not prior to due to concern for possible lead dislodgment with bypass surgery).   Not on any AV salvador agents; continue telemetry

## 2024-11-04 NOTE — ASSESSMENT & PLAN NOTE
Patient with a history of chronic bronchitis maintained on Symbicort, montelukast and fluticasone.    Plan:  - Continue home meds with as needed albuterol

## 2024-11-04 NOTE — ASSESSMENT & PLAN NOTE
Pt with prior MI and RCA stenting 2004; EF preserved historically.  Presented for elective cardiac catheterization on 10/31 due to ongoing dyspnea on exertion.   Cath revealed MV disease including distal LM 80% as well as prox to mid Circ 90% and mid RCA 90%  Awaiting CABG on 11/6.  Remain chest pain free.   Continue IV heparin, ASA, statin. No BB due to bradycardia with PPM planned  Labs and VS stable  Tele showing sinus bradycardia.  Echo LVEF 55% with hypokinesis of basal inferolateral and mid inferolateral.   No significant valvular disease.

## 2024-11-04 NOTE — ASSESSMENT & PLAN NOTE
Management per primary team-planned for CABG x 3 on Wednesday  For this surgery, continue insulin infusion/drip

## 2024-11-04 NOTE — CASE MANAGEMENT
Case Management Discharge Planning Note    Patient name Otoniel Martinez  Location [facility] 423/[facility] 423-01 MRN [id number]  : 1960 Date 2024       Current Admission Date: 10/31/2024  Current Admission Diagnosis:Multivessel CAD   Patient Active Problem List    Diagnosis Date Noted Date Diagnosed    Symptomatic sinus bradycardia 2024     Left main coronary artery disease 10/31/2024     CAD, multiple vessel 10/31/2024     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[facility]) 10/31/2024     Pulmonary nodule 1 cm or greater in diameter 10/15/2024     Splenic artery aneurysm ([facility]) 2024     Carotid stenosis, asymptomatic, bilateral 2024     Hx of pancreatitis 2024     On home oxygen therapy 2024     Monoclonal gammopathy 2024     Seizure ([facility]) 2024     Memory loss 2024     Establishing care with new doctor, encounter for 2024     Decreased hearing of both ears 2024     Splenomegaly 2024     Chronic bronchitis ([facility]) 2024     Abnormal CT scan of lung 2024     Chronic sinusitis 2024     Insulin pump status 2023     Primary osteoarthritis of right hip 2022     Recurrent cold sores 2022     History of recurrent deep vein thrombosis (DVT) 2021     Dysfunction of right rotator cuff 2021     Vitamin D deficiency 2021     LOJA (dyspnea on exertion) 02/15/2021     Old cerebrovascular accident (CVA) without late effect 10/28/2020     Chronic gastritis without bleeding 2020     Polyneuropathy 10/17/2018     Vertigo 2018     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[facility]) 2018     Thrombocytopenia ([facility]) 2018     Erectile dysfunction of non-organic origin 10/24/2014     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), 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[facility]) 10/04/2013     Mixed hyperlipidemia 2013     Multivessel CAD 2012     Thromboembolism of deep veins of lower extremity ([facility]) 2011     Obstructive sleep apnea  01/20/2007       LOS (days): 4  Geometric Mean LOS (GMLOS) (days): 1.8  Days to GMLOS:-2.4     OBJECTIVE:  Risk of Unplanned Readmission Score: 12.54         Current admission status: Inpatient   Preferred Pharmacy:   [facility] - [place], [place] - [address]  [address]  [facility] [address]  Phone: [phone] Fax: [phone]    [id number] - [place], [place] - [address]  [address] [address]  Phone: [phone] Fax: [phone]    [id number] - [place], [place] - [address]  [address]  [facility] [address]  Phone: [phone] Fax: [phone]    Primary Care Provider: Wandy Cheatham DO    Primary Insurance: [employer]  Secondary Insurance:     DISCHARGE DETAILS:     CM attempted open x 2. Pt was not in room at the time. Per chart review, pt with CABG planned for 11/6, and pacemaker after CABG for bradycardia. CM office will follow and assist with DCP when appropriate.

## 2024-11-04 NOTE — ASSESSMENT & PLAN NOTE
Management per primary team-undergoing pacemaker placement during this admission  Can undergo procedure either on insulin infusion or insulin pump

## 2024-11-04 NOTE — PROGRESS NOTES
Progress Note - Endocrinology   Name: Otoniel Martinez 64 y.o. male I MRN: [id number]  [facility] 423-01 I Date of Admission: 10/31/2024   Date of Service: 11/4/2024 I Hospital Day: 4    Assessment & Plan  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), 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HCC)  Lab Results   Component Value Date    HGBA1C 8.4 (H) 10/31/2024     Recent Labs     11/04/24  1004 11/04/24  1201 11/04/24  1359 11/04/24  1605   POCGLU 346* 312* 124 63*   Blood Sugar Average: Last 72 hrs:  (P) 219.2    Uncontrolled KAMI/diabetes mellitus, with complication of diabetes related retinopathy of the right eye, MV-CAD  Follows with [facility] endocrinology  Home regimen: Uses OmniPod 5 insulin pump (with settings noted below)  On 10/31/2024 underwent outpatient left heart cath which showed MV-CAD, admitted for CABG which is planned for Wednesday 11/6.  Has been on insulin drip since yesterday 2/2 insulin pump expiring and patient deferring use of home supply of insulin pumps.  Since starting infusion, has had readings above goal.  Patient reports compliance with avoiding sugary drinks and compliant with inpatient diabetic diet.  Of note, did have a hypoglycemia episode at 4 PM, during which he was asymptomatic.  Rate was adjusted and algorithm was moved downwards.    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  Did have an episode of hypoglycemia, with a glucose of 63, at 4 PM, during which time patient was asymptomatic.  Recommended to give juice, lower rate to 0.3 units/h, and also decrease drip algorithm.  Subjective : Reports overall feeling well.  Denied any concerns at that time.    Objective :  Temp:  [97.6 °F (36.4 °C)-98.8 °F (37.1 °C)] 98.5 °F (36.9 °C)  HR:  [49-56] 56  BP: (112-141)/(69-78) 112/69  Resp:  [14-16] 14  SpO2:  [91 %-98 %] 94 %  O2 Device: None (Room air)    Physical Exam  Vitals reviewed.   Constitutional:       General: He is not in acute distress.     Appearance: Normal appearance. He is not ill-appearing.   HENT:      Head: Normocephalic and atraumatic.   Eyes:      General: No scleral icterus.     Conjunctiva/sclera: Conjunctivae normal.   Cardiovascular:      Rate and Rhythm: Normal rate and regular rhythm.      Pulses: Normal pulses.      Heart sounds: No murmur heard.     No gallop.   Pulmonary:      Effort: Pulmonary effort is normal. No respiratory distress.      Breath sounds: Normal breath sounds. No wheezing or rales.   Abdominal:      General: Bowel sounds are normal. There is no distension.      Palpations: Abdomen is soft.   Musculoskeletal:         General: Normal range of motion.      Cervical back: Normal range of motion and neck supple.      Right lower leg: No edema.      Left lower leg: No edema.      Comments: Insulin pump at L lateral thigh    Skin:     General: Skin is warm and dry.      Capillary Refill: Capillary refill takes less than 2 seconds.      Coloration: Skin is not jaundiced or pale.   Neurological:      Mental Status: He is alert and oriented to person, place, and time. Mental status is at baseline.       Sensory: No sensory deficit.   Psychiatric:         Mood and Affect: Mood normal.         Behavior: Behavior normal.         Lab Results: I have reviewed the following results:CBC/BMP:   .     11/04/24  0555   WBC 3.29*   HGB 12.3   HCT 37.7   *   SODIUM 140   K 4.9      CO2 30   BUN 21   CREATININE 0.68   GLUC 125        Imaging Results Review: No pertinent imaging studies reviewed.  Other Study Results Review: No additional pertinent studies reviewed.      Harpreet Campos MD  Endocrinology fellow, PGY-4

## 2024-11-04 NOTE — PROGRESS NOTES
Progress Note - Cardiac Surgery   Otoniel Martinez 64 y.o. male MRN: [id number]  Unit/Bed#: [facility] 423-01 Encounter: [id number]    24 Hour Events: no events, labs and vitals stable. MRSA negative.    Medications:   Scheduled Meds:  Current Facility-Administered Medications   Medication Dose Route Frequency Provider Last Rate    acetaminophen  650 mg Oral Q4H PRN Faustino Jefferson MD      albuterol  2 puff Inhalation Q4H PRN Alex Zambrano MD      aspirin  81 mg Oral Daily Jameal Hadeed, DO      atorvastatin  40 mg Oral Daily With Dinner Faustino Jefferson MD      budesonide-formoterol  2 puff Inhalation BID Faustino Jefferson MD      fluticasone  2 spray Nasal Daily Faustino Jefferson MD      heparin (porcine)  3-30 Units/kg/hr (Order-Specific) Intravenous Titrated Jameal Hadeed, DO 20 Units/kg/hr (11/04/24 0636)    heparin (porcine)  2,800 Units Intravenous Q6H PRN Jameal Hadeed, DO      heparin (porcine)  5,600 Units Intravenous Q6H PRN Jameal Hadeed, DO      insulin aspart  300 Units Subcutaneous Insulin Pump Daily PRN Harpreet Campos MD      insulin regular (HumuLIN R,NovoLIN R) 1 Units/mL in sodium chloride 0.9 % 100 mL infusion  0.3-21 Units/hr Intravenous Titrated Ammar Alam, DO 2 Units/hr (11/04/24 0636)    montelukast  10 mg Oral HS Faustino Jefferson MD      nitroglycerin  0.4 mg Sublingual Q5 Min PRN Faustino Jefferson MD      pantoprazole  40 mg Oral Early Morning Faustino Jefferson MD      patient maintained insulin pump  1 each Subcutaneous Q8H Harpreet Campos MD      polyethylene glycol  17 g Oral Daily Suleiman Riojas, DO       Continuous Infusions:heparin (porcine), 3-30 Units/kg/hr (Order-Specific), Last Rate: 20 Units/kg/hr (11/04/24 0636)  insulin regular (HumuLIN R,NovoLIN R) 1 Units/mL in sodium chloride 0.9 % 100 mL infusion, 0.3-21 Units/hr, Last Rate: 2 Units/hr (11/04/24 0636)        Results:   Results from last 7 days   Lab Units 11/04/24  0555 11/03/24  0706 11/02/24  0526   WBC Thousand/uL 3.29* 2.76*  2.90*   HEMOGLOBIN g/dL 12.3 11.9* 11.4*   HEMATOCRIT % 37.7 37.7 35.7*   PLATELETS Thousands/uL 118* 121* 128*     Results from last 7 days   Lab Units 11/04/24  0555 11/03/24  0422 11/02/24  0526   POTASSIUM mmol/L 4.9 4.1 3.8   CHLORIDE mmol/L 103 104 104   CO2 mmol/L 30 26 26   BUN mg/dL 21 19 19   CREATININE mg/dL 0.68 0.52* 0.67   CALCIUM mg/dL 8.6 8.3* 8.2*     Results from last 7 days   Lab Units 11/04/24  0348 11/03/24  0425 11/02/24  0526 10/31/24  2037 10/31/24  1315   INR  1.07  --   --   --  1.11   PTT seconds 72* 76* 77*   < > 25    < > = values in this interval not displayed.       Studies:     Cardiac Cath 10/31/24: distal LM 80%, prox-mid LCX 90%, mid RCA 90%     Echo 2/9/24: normal LV size/fnx, LVEF 55%, RV size/fnx normal, no valve disease     Repeat echo 10/31: LVEF 55%, hypokinetic basal inferolateral and mid inferolateral segments, mild diast dysfnx, RV normal. No valve disease     Stress test 9/4/24: Stress ECG: No ST deviation is noted. Arrhythmias during recovery: rare PVCs. The ECG was not diagnostic due to pharmacological (vasodilator) stress. Perfusion: There is a left ventricular perfusion defect that is small in size present in the mid inferior location(s) that is paradoxical and resolves with proning. The defect appears to be an artifact caused by subdiaphragmatic activity. Stress Function: Left ventricular function post-stress is abnormal. Global function is mildly reduced. There were no regional wall motion abnormalities during stress. Stress ejection fraction is 51%. Stress Combined Conclusion: There is image artifact, without diagnostic evidence for perfusion abnormality.     Carotid US 9/26/24: < 50% ICAs bilaterally, VAFA, no subclavian disease     Vein Mapping: bilateral good      CT Chest 9/16/24: The previously noted hypermetabolic masslike consolidation in the left upper lobe is smaller in size and demonstrating irregularity due to evolving scarring. This was likely  inflammatory or infectious in etiology. No new suspicious lung nodule or mass. Splenomegaly. Heart is unremarkable for patient's age. Ascending aorta measures 4 cm. severe coronary artery calcifications seen.     PET scan 8/1/24: 1. 1.9 x 1.5 cm mildly FDG avid masslike consolidation/nodule involving the left upper lobe with mild halo of groundglass opacity, new since 2/8/2024. While findings could reflect an inflammatory process, underlying malignancy of low metabolic activity is the diagnosis of exclusion. Consider further evaluation with tissue sampling as clinically indicated. If an inflammatory process is favored, short interval follow-up with CT imaging in 6 to 8 weeks is recommended to ensure stability/document Resolution. There is otherwise no additional focal FDG activity to suggest hypermetabolic malignancy. Patient's known mild splenomegaly is non-FDG avid. 2.4 cm rim calcified splenic artery aneurysm versus less likely calcified adrenal nodule. Consider vascular surgical consultation.     EKG 8/23/24: sinus adela, nonspecific t wave abnormality     PFT 2/2024: Normal Spirometry, No significant response to the administration of bronchodilator per ATS Standards, Normal Lung volumes, Normal diffusion capacity, Flow volume loop is normal     Vitals:   Vitals:    11/03/24 1838 11/03/24 2200 11/04/24 0206 11/04/24 0600   BP: 141/78 138/77 125/75    BP Location: Right arm Right arm Right arm    Pulse: 56 56 (!) 49    Resp: 16 16 16    Temp: 98.8 °F (37.1 °C) 97.8 °F (36.6 °C) 98.2 °F (36.8 °C)    TempSrc: Oral Oral Oral    SpO2: 95% 91% 98%    Weight:    69.6 kg (153 lb 7 oz)   Height:         Physical Exam:    General: No acute distress and Normal appearance  HEENT/NECK:  Normocephalic. Atraumatic.  No jugular venous distention.    Cardiac: Bradycardic and No murmurs/rubs/gallops  Pulmonary:  Breath sounds clear bilaterally and No rales/rhonchi/wheezes  Abdomen:  Non-tender, Non-distended, and Normal bowel  sounds  Extremities: Extremities warm/dry and No edema B/L  Neuro: Alert and oriented X 3 and No focal deficits  Skin: Warm/Dry, without rashes or lesions.       Assessment:  - severe LM/MVCAD  - preserved LVEF 55%  - LOJA  - bradycardia episode this admission, EP eval for PPM after surgery    Plan:  - Patient agreeable to proceed with surgery, consent obtained  - cleared by heme/onc for surgery  - EP evaluation noted, recommending non-urgent PPM, after discussion will plan for PPM after CABG to avoid new PPM lead dislodgement during CABG  - preop workup completed and acceptable  - coronary artery bypass grafting scheduled for wednesday with Dr. Rodger Rodriguez D.O.    SIGNATURE: Lainey Joya PA-C  DATE: November 4, 2024  TIME: 6:58 AM    * This note was completed in part utilizing [employer] direct voice recognition software.   Grammatical errors, random word insertion, spelling mistakes, and incomplete sentences may be an occasional consequence of the system secondary to software limitations, ambient noise and hardware issues. At the time of dictation, efforts were made to edit, clarify and /or correct errors. Please read the chart carefully and recognize, using context, where substitutions have occurred.  If you have any questions or concerns about the context, text or information contained within the body of this dictation, please contact myself, the provider, for further clarification.

## 2024-11-04 NOTE — PROGRESS NOTES
Progress Note - Internal Medicine   Name: Otoniel Martinez 64 y.o. male I MRN: [id number]  Unit/Bed#: [facility] 423-01 I Date of Admission: 10/31/2024   Date of Service: 11/4/2024 I Hospital Day: 4     Assessment & Plan  Multivessel CAD  Patient with a history of CAD s/p PCI to RCA in 2004 who presented to the hospital for an outpatient C due to progressive LOJA over the last 2-3 years.  Found to have multivessel CAD as mentioned below.  Patient admitted for CABG evaluation    LHC findings:    Dist LM lesion is 80% stenosed.    Prox Cx to Mid Cx lesion is 90% stenosed.    Mid RCA lesion is 90% stenosed.    Echo 10/31/24:  EF 55% with G1DD. Mild concentric hypertrophy. Hypokinetic in basal inferolateral and mid inferolateral segments. Trace TR and mildly dilated ascending aorta 3.8 cm.     VAS vein mapping: patent great saphenous veins in lower extremities.    Plan:  - Cardiothoracic surgery consulted with CABG planned on 11/6/24  - Plan for pacemaker placement post CABG due to bradycardia, no date set   - Continue to monitor on telemetry  - Home Xarelto (for DVT) transitioned to heparin gtt   - Continue Lipitor 40 mg daily  - Low threshold for serial EKG/troponins if necessary  - PRN nitro sublingual for chest pain  - Avoid AV salvador blocking agents given symptomatic bradycardia   Thrombocytopenia (HCC)  History of thrombocytopenia baseline in the low 100s.  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    Plan:  -  Endocrinology consulted.  Appreciate recommendations  - Per Cardiology, maintain on insulin drip prior to CABG. He was transitioned from insulin pump to insulin drip this morning.   - POCT 4 times daily before meals and at bedtime  - Hypoglycemia protocol  - Diabetic diet    Mixed hyperlipidemia  History of hyperlipidemia maintained on Lipitor 40 mg daily    Lipid panel 10/31/24: , , HDL 40, LDL 51    Plan:  - Continue Lipitor 40 mg daily    History of recurrent deep vein thrombosis (DVT)  Patient with a history of recurrent DVTs maintained on Xarelto indefinitely    Plan:  - Hold Xarelto   - Continue heparin gtt for anticoagulation  Chronic bronchitis (HCC)  Patient with a history of chronic bronchitis maintained on Symbicort, montelukast and fluticasone.    Plan:  - Continue home meds with as needed albuterol  Insulin pump status  See plan under KAMI  Obstructive sleep apnea  Patient with a history of KULDIP had been on CPAP in the past but now only uses oxygen nightly    Plan:  - Continue oxygen as needed nightly  - Outpatient sleep study ordered by sleep medicine  Monoclonal gammopathy  Patient with a history of 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  Follows with heme-onc as an outpatient.  Bone marrow biopsy in August 2024 showing low-grade CD5 positive B-cell lymphoma as well.  Per most recent heme-onc note, no urgent intervention and follow-up in 3 months    Plan:  - Continue to follow-up outpatient  Splenic artery aneurysm (HCC)  Patient with an incidental finding of splenic artery aneurysm earlier this year.  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 Patient has been having lightheadedness and dizziness for years.     Tele overnight showed no significant events, other than bradycardia in the 40s-50s.    Plan  - EP consulted, appreciate recs  - Pacemaker placement implantation post CABG per EP  - Continue to monitor on Tele  - Avoid AV salvador blocking agents       Disposition: Pending CABG and PPM     Team: SOD TEAM A    Subjective   Patient seen and examined. No acute events overnight. Feeling well this morning although bored. Reports difficulty sleeping due to frequent interruption from staff and IV beeping if he moves his arms. Eating and drinking well. No acute complaints other than some residual right wrist pain at cath access site.    Objective :  Temp:  [97.6 °F (36.4 °C)-98.8 °F (37.1 °C)] 97.6 °F (36.4 °C)  HR:  [49-56] 53  BP: (125-141)/(72-78) 125/75  Resp:  [14-16] 14  SpO2:  [91 %-98 %] 97 %  O2 Device: None (Room air)    I/O         11/02 0701 11/03 0700 11/03 0701 11/04 0700 11/04 0701 11/05 0700    P.O. 90 780 0    I.V. (mL/kg) 5 (0.1) 1213.4 (17.4)     Total Intake(mL/kg) 95 (1.3) 1993.4 (28.6) 0 (0)    Urine (mL/kg/hr) 1450 (0.9) 1500 (0.9) 0 (0)    Total Output 1450 1500 0    Net -1355 +493.4 0                 Weights:   IBW (Ideal Body Weight): 68.4 kg    Body mass index is 23.33 kg/m².  Weight (last 2 days)       Date/Time Weight    11/04/24 0600 69.6 (153.44)    11/03/24 0556 70.5 (155.5)    11/02/24 0533 72.4 (159.7)            Physical Exam  Vitals reviewed.   Constitutional:       General: He is not in acute distress.     Appearance: Normal appearance. He is normal weight. He is not ill-appearing.   HENT:      Head: Normocephalic and atraumatic.      Mouth/Throat:      Mouth: Mucous membranes are moist.   Eyes:      Extraocular Movements: Extraocular movements intact.    Cardiovascular:      Rate and Rhythm: Regular rhythm. Bradycardia present.      Pulses:           Radial pulses are 2+ on the left side.   Pulmonary:      Effort: Pulmonary effort is normal. No respiratory distress.      Breath sounds: No wheezing.   Musculoskeletal:      Right lower leg: No edema.      Left lower leg: No edema.   Skin:     General: Skin is warm and dry.   Neurological:      Mental Status: He is alert.           Lab Results: I have reviewed the following results:  Recent Labs     11/03/24  0422 11/03/24  0425 11/04/24  0348 11/04/24  0555   WBC  --    < >  --  3.29*   HGB  --    < >  --  12.3   HCT  --    < >  --  37.7   PLT  --    < >  --  118*   SODIUM 138  --   --  140   K 4.1  --   --  4.9     --   --  103   CO2 26  --   --  30   BUN 19  --   --  21   CREATININE 0.52*  --   --  0.68   GLUC 195*  --   --  125   MG 1.8*  --   --   --    PTT  --    < > 72*  --    INR  --   --  1.07  --     < > = values in this interval not displayed.             Currently Ordered Meds:   Current Facility-Administered Medications:     acetaminophen (TYLENOL) tablet 650 mg, Q4H PRN    albuterol (PROVENTIL HFA,VENTOLIN HFA) inhaler 2 puff, Q4H PRN    aspirin (ECOTRIN LOW STRENGTH) EC tablet 81 mg, Daily    atorvastatin (LIPITOR) tablet 40 mg, Daily With Dinner    budesonide-formoterol (SYMBICORT) 80-4.5 MCG/ACT inhaler 2 puff, BID    fluticasone (FLONASE) 50 mcg/act nasal spray 2 spray, Daily    heparin (porcine) 25,000 units in 0.45% NaCl 250 mL infusion (premix), Titrated, Last Rate: 20 Units/kg/hr (11/04/24 0730)    heparin (porcine) injection 2,800 Units, Q6H PRN    heparin (porcine) injection 5,600 Units, Q6H PRN    insulin aspart (NovoLOG) FOR PUMP REFILLS 300 Units, Daily PRN    insulin regular (HumuLIN R,NovoLIN R) 1 Units/mL in sodium chloride 0.9 % 100 mL infusion, Titrated, Last Rate: 3 Units/hr (11/04/24 1401)    montelukast (SINGULAIR) tablet 10 mg, HS    nitroglycerin (NITROSTAT) SL tablet 0.4  mg, Q5 Min PRN    pantoprazole (PROTONIX) EC tablet 40 mg, Early Morning    PATIENT MAINTAINED INSULIN PUMP 1 each, Q8H    polyethylene glycol (MIRALAX) packet 17 g, Daily  VTE Pharmacologic Prophylaxis: VTE covered by:  heparin (porcine), Intravenous, 20 Units/kg/hr at 11/04/24 0730  heparin (porcine), Intravenous, 2,800 Units at 10/31/24 2150  heparin (porcine), Intravenous     VTE Mechanical Prophylaxis: sequential compression device    Administrative Statements     Portions of the record may have been created with voice recognition software.

## 2024-11-04 NOTE — ASSESSMENT & PLAN NOTE
Lab Results   Component Value Date    HGBA1C 8.4 (H) 10/31/2024       Recent Labs     11/04/24  0800 11/04/24  1004 11/04/24  1201 11/04/24  1359   POCGLU 142* 346* 312* 124       Blood Sugar Average: Last 72 hrs:  (P) 225.4344191781150211    Patient with a history of L ADA first diagnosed 7-8 years ago maintained on insulin pump at home.    Plan:  - Endocrinology consulted.  Appreciate recommendations  - Per Cardiology, maintain on insulin drip prior to CABG. He was transitioned from insulin pump to insulin drip this morning.   - POCT 4 times daily before meals and at bedtime  - Hypoglycemia protocol  - Diabetic diet

## 2024-11-05 ENCOUNTER — ANESTHESIA EVENT (INPATIENT)
Dept: PERIOP | Facility: HOSPITAL | Age: 64
DRG: 233 | End: 2024-11-05
Payer: COMMERCIAL

## 2024-11-05 LAB
ABO GROUP BLD: NORMAL
ANION GAP SERPL CALCULATED.3IONS-SCNC: 6 MMOL/L (ref 4–13)
ANION GAP SERPL CALCULATED.3IONS-SCNC: 8 MMOL/L (ref 4–13)
APTT PPP: 63 SECONDS (ref 23–34)
BLD GP AB SCN SERPL QL: NEGATIVE
BUN SERPL-MCNC: 21 MG/DL (ref 5–25)
BUN SERPL-MCNC: 23 MG/DL (ref 5–25)
CALCIUM SERPL-MCNC: 8.8 MG/DL (ref 8.4–10.2)
CALCIUM SERPL-MCNC: 9.1 MG/DL (ref 8.4–10.2)
CHLORIDE SERPL-SCNC: 102 MMOL/L (ref 96–108)
CHLORIDE SERPL-SCNC: 99 MMOL/L (ref 96–108)
CO2 SERPL-SCNC: 28 MMOL/L (ref 21–32)
CO2 SERPL-SCNC: 29 MMOL/L (ref 21–32)
CREAT SERPL-MCNC: 0.57 MG/DL (ref 0.6–1.3)
CREAT SERPL-MCNC: 1.02 MG/DL (ref 0.6–1.3)
ERYTHROCYTE [DISTWIDTH] IN BLOOD BY AUTOMATED COUNT: 14.7 % (ref 11.6–15.1)
GFR SERPL CREATININE-BSD FRML MDRD: 108 ML/MIN/1.73SQ M
GFR SERPL CREATININE-BSD FRML MDRD: 77 ML/MIN/1.73SQ M
GLUCOSE SERPL-MCNC: 101 MG/DL (ref 65–140)
GLUCOSE SERPL-MCNC: 114 MG/DL (ref 65–140)
GLUCOSE SERPL-MCNC: 124 MG/DL (ref 65–140)
GLUCOSE SERPL-MCNC: 137 MG/DL (ref 65–140)
GLUCOSE SERPL-MCNC: 139 MG/DL (ref 65–140)
GLUCOSE SERPL-MCNC: 142 MG/DL (ref 65–140)
GLUCOSE SERPL-MCNC: 156 MG/DL (ref 65–140)
GLUCOSE SERPL-MCNC: 175 MG/DL (ref 65–140)
GLUCOSE SERPL-MCNC: 177 MG/DL (ref 65–140)
GLUCOSE SERPL-MCNC: 218 MG/DL (ref 65–140)
GLUCOSE SERPL-MCNC: 258 MG/DL (ref 65–140)
GLUCOSE SERPL-MCNC: 384 MG/DL (ref 65–140)
GLUCOSE SERPL-MCNC: 409 MG/DL (ref 65–140)
GLUCOSE SERPL-MCNC: 418 MG/DL (ref 65–140)
GLUCOSE SERPL-MCNC: 439 MG/DL (ref 65–140)
GLUCOSE SERPL-MCNC: 442 MG/DL (ref 65–140)
GLUCOSE SERPL-MCNC: 95 MG/DL (ref 65–140)
HCT VFR BLD AUTO: 38.8 % (ref 36.5–49.3)
HGB BLD-MCNC: 12.6 G/DL (ref 12–17)
MCH RBC QN AUTO: 28.1 PG (ref 26.8–34.3)
MCHC RBC AUTO-ENTMCNC: 32.5 G/DL (ref 31.4–37.4)
MCV RBC AUTO: 86 FL (ref 82–98)
PLATELET # BLD AUTO: 125 THOUSANDS/UL (ref 149–390)
PMV BLD AUTO: 9.4 FL (ref 8.9–12.7)
POTASSIUM SERPL-SCNC: 4.1 MMOL/L (ref 3.5–5.3)
POTASSIUM SERPL-SCNC: 4.6 MMOL/L (ref 3.5–5.3)
RBC # BLD AUTO: 4.49 MILLION/UL (ref 3.88–5.62)
RH BLD: POSITIVE
SODIUM SERPL-SCNC: 134 MMOL/L (ref 135–147)
SODIUM SERPL-SCNC: 138 MMOL/L (ref 135–147)
SPECIMEN EXPIRATION DATE: NORMAL
WBC # BLD AUTO: 3.69 THOUSAND/UL (ref 4.31–10.16)

## 2024-11-05 PROCEDURE — 80048 BASIC METABOLIC PNL TOTAL CA: CPT

## 2024-11-05 PROCEDURE — 86850 RBC ANTIBODY SCREEN: CPT | Performed by: PHYSICIAN ASSISTANT

## 2024-11-05 PROCEDURE — 86901 BLOOD TYPING SEROLOGIC RH(D): CPT | Performed by: PHYSICIAN ASSISTANT

## 2024-11-05 PROCEDURE — 86900 BLOOD TYPING SEROLOGIC ABO: CPT | Performed by: PHYSICIAN ASSISTANT

## 2024-11-05 PROCEDURE — 85730 THROMBOPLASTIN TIME PARTIAL: CPT | Performed by: INTERNAL MEDICINE

## 2024-11-05 PROCEDURE — 80048 BASIC METABOLIC PNL TOTAL CA: CPT | Performed by: INTERNAL MEDICINE

## 2024-11-05 PROCEDURE — 94664 DEMO&/EVAL PT USE INHALER: CPT

## 2024-11-05 PROCEDURE — 86923 COMPATIBILITY TEST ELECTRIC: CPT

## 2024-11-05 PROCEDURE — 99232 SBSQ HOSP IP/OBS MODERATE 35: CPT | Performed by: INTERNAL MEDICINE

## 2024-11-05 PROCEDURE — 82948 REAGENT STRIP/BLOOD GLUCOSE: CPT

## 2024-11-05 PROCEDURE — NC001 PR NO CHARGE: Performed by: PHYSICIAN ASSISTANT

## 2024-11-05 PROCEDURE — 94760 N-INVAS EAR/PLS OXIMETRY 1: CPT

## 2024-11-05 PROCEDURE — 94640 AIRWAY INHALATION TREATMENT: CPT

## 2024-11-05 PROCEDURE — 85027 COMPLETE CBC AUTOMATED: CPT

## 2024-11-05 PROCEDURE — 99233 SBSQ HOSP IP/OBS HIGH 50: CPT | Performed by: INTERNAL MEDICINE

## 2024-11-05 RX ORDER — INSULIN LISPRO 100 [IU]/ML
10 INJECTION, SOLUTION INTRAVENOUS; SUBCUTANEOUS ONCE
Status: COMPLETED | OUTPATIENT
Start: 2024-11-05 | End: 2024-11-05

## 2024-11-05 RX ORDER — LEVALBUTEROL INHALATION SOLUTION 1.25 MG/3ML
1.25 SOLUTION RESPIRATORY (INHALATION)
Status: DISCONTINUED | OUTPATIENT
Start: 2024-11-05 | End: 2024-11-11

## 2024-11-05 RX ORDER — PHENYLEPHRINE HCL IN 0.9% NACL 1 MG/10 ML
200-2000 SYRINGE (ML) INTRAVENOUS ONCE
Status: CANCELLED | OUTPATIENT
Start: 2024-11-06

## 2024-11-05 RX ORDER — INSULIN ASPART 100 [IU]/ML
10 INJECTION, SOLUTION INTRAVENOUS; SUBCUTANEOUS ONCE
Status: DISCONTINUED | OUTPATIENT
Start: 2024-11-05 | End: 2024-11-05

## 2024-11-05 RX ORDER — CHLORHEXIDINE GLUCONATE ORAL RINSE 1.2 MG/ML
15 SOLUTION DENTAL ONCE
Status: COMPLETED | OUTPATIENT
Start: 2024-11-06 | End: 2024-11-06

## 2024-11-05 RX ADMIN — PANTOPRAZOLE SODIUM 40 MG: 40 TABLET, DELAYED RELEASE ORAL at 05:00

## 2024-11-05 RX ADMIN — ATORVASTATIN CALCIUM 40 MG: 40 TABLET, FILM COATED ORAL at 18:03

## 2024-11-05 RX ADMIN — BUDESONIDE AND FORMOTEROL FUMARATE DIHYDRATE 2 PUFF: 80; 4.5 AEROSOL RESPIRATORY (INHALATION) at 22:09

## 2024-11-05 RX ADMIN — ASPIRIN 81 MG: 81 TABLET, COATED ORAL at 08:07

## 2024-11-05 RX ADMIN — SODIUM CHLORIDE 4 UNITS/HR: 9 INJECTION, SOLUTION INTRAVENOUS at 16:12

## 2024-11-05 RX ADMIN — POLYETHYLENE GLYCOL 3350 17 G: 17 POWDER, FOR SOLUTION ORAL at 08:07

## 2024-11-05 RX ADMIN — HEPARIN SODIUM 20 UNITS/KG/HR: 10000 INJECTION, SOLUTION INTRAVENOUS at 10:09

## 2024-11-05 RX ADMIN — INSULIN LISPRO 10 UNITS: 100 INJECTION, SOLUTION INTRAVENOUS; SUBCUTANEOUS at 13:49

## 2024-11-05 RX ADMIN — LEVALBUTEROL HYDROCHLORIDE 1.25 MG: 1.25 SOLUTION RESPIRATORY (INHALATION) at 20:14

## 2024-11-05 RX ADMIN — MONTELUKAST 10 MG: 10 TABLET, FILM COATED ORAL at 22:09

## 2024-11-05 RX ADMIN — BUDESONIDE AND FORMOTEROL FUMARATE DIHYDRATE 2 PUFF: 80; 4.5 AEROSOL RESPIRATORY (INHALATION) at 08:08

## 2024-11-05 NOTE — ASSESSMENT & PLAN NOTE
Lab Results   Component Value Date    HGBA1C 8.4 (H) 10/31/2024     Recent Labs     11/05/24  1511 11/05/24  1531 11/05/24  1619 11/05/24  1804   POCGLU 258* 218* 175* 142*     Uncontrolled KAMI/diabetes mellitus, with complication of diabetes related retinopathy of the right eye, MV-CAD  Follows with [facility] endocrinology  Home regimen: Uses OmniPod 5 insulin pump (with settings noted below)  On 10/31/2024 outpatient left heart cath showed MV-CAD, admitted for CABG - planned for Wednesday 11/6.  Has been on insulin drip since 11/3 - 2/2 insulin pump expiring and patient deferring use of home supply of insulin pumps.    While on insulin drip, had hyperglycemia since 9:59 AM.  Glucose improved with 10 units of insulin lispro from 439 to 258.  Last insulin bag change was documented on 11/3/2024.  Likely in the setting of insulin in the bag bag becoming ineffective VS requiring sooner insulin bag change.  After insulin bag change, x 2 glucose are within goal.    Continue non-DKA insulin  Glucose checks every 2 hours  Monitor fingerstick glucose  Diabetic diet -no juice  Hypoglycemia protocol  Endocrine will continue to follow and make recommendations

## 2024-11-05 NOTE — PLAN OF CARE
Problem: PAIN - ADULT  Goal: Verbalizes/displays adequate comfort level or baseline comfort level  Description: Interventions:  - Encourage patient to monitor pain and request assistance  - Assess pain using appropriate pain scale  - Administer analgesics based on type and severity of pain and evaluate response  - Implement non-pharmacological measures as appropriate and evaluate response  - Consider cultural and social influences on pain and pain management  - Notify physician/advanced practitioner if interventions unsuccessful or patient reports new pain  Outcome: Progressing     Problem: INFECTION - ADULT  Goal: Absence or prevention of progression during hospitalization  Description: INTERVENTIONS:  - Assess and monitor for signs and symptoms of infection  - Monitor lab/diagnostic results  - Monitor all insertion sites, i.e. indwelling lines, tubes, and drains  - Monitor endotracheal if appropriate and nasal secretions for changes in amount and color  - Bergoo appropriate cooling/warming therapies per order  - Administer medications as ordered  - Instruct and encourage patient and family to use good hand hygiene technique  - Identify and instruct in appropriate isolation precautions for identified infection/condition  Outcome: Progressing  Goal: Absence of fever/infection during neutropenic period  Description: INTERVENTIONS:  - Monitor WBC    Outcome: Progressing     Problem: SAFETY ADULT  Goal: Patient will remain free of falls  Description: INTERVENTIONS:  - Educate patient/family on patient safety including physical limitations  - Instruct patient to call for assistance with activity   - Consult OT/PT to assist with strengthening/mobility   - Keep Call bell within reach  - Keep bed low and locked with side rails adjusted as appropriate  - Keep care items and personal belongings within reach  - Initiate and maintain comfort rounds  - Make Fall Risk Sign visible to staff  - Offer Toileting every 2 Hours,  in advance of need  - Initiate/Maintain bed alarm  - Apply yellow socks and bracelet for high fall risk patients  - Consider moving patient to room near nurses station  Outcome: Progressing  Goal: Maintain or return to baseline ADL function  Description: INTERVENTIONS:  -  Assess patient's ability to carry out ADLs; assess patient's baseline for ADL function and identify physical deficits which impact ability to perform ADLs (bathing, care of mouth/teeth, toileting, grooming, dressing, etc.)  - Assess/evaluate cause of self-care deficits   - Assess range of motion  - Assess patient's mobility; develop plan if impaired  - Assess patient's need for assistive devices and provide as appropriate  - Encourage maximum independence but intervene and supervise when necessary  - Involve family in performance of ADLs  - Assess for home care needs following discharge   - Consider OT consult to assist with ADL evaluation and planning for discharge  - Provide patient education as appropriate  Outcome: Progressing  Goal: Maintains/Returns to pre admission functional level  Description: INTERVENTIONS:  - Perform AM-PAC 6 Click Basic Mobility/ Daily Activity assessment daily.  - Set and communicate daily mobility goal to care team and patient/family/caregiver.   - Collaborate with rehabilitation services on mobility goals if consulted  - Perform Range of Motion 4 times a day.  - Reposition patient every 2 hours.  - Dangle patient 4 times a day  - Stand patient 4 times a day  - Ambulate patient 4 times a day  - Out of bed to chair 4 times a day   - Out of bed for meals 4 times a day  - Out of bed for toileting  - Record patient progress and toleration of activity level   Outcome: Progressing     Problem: DISCHARGE PLANNING  Goal: Discharge to home or other facility with appropriate resources  Description: INTERVENTIONS:  - Identify barriers to discharge w/patient and caregiver  - Arrange for needed discharge resources and  transportation as appropriate  - Identify discharge learning needs (meds, wound care, etc.)  - Arrange for interpretive services to assist at discharge as needed  - Refer to Case Management Department for coordinating discharge planning if the patient needs post-hospital services based on physician/advanced practitioner order or complex needs related to functional status, cognitive ability, or social support system  Outcome: Progressing     Problem: Knowledge Deficit  Goal: Patient/family/caregiver demonstrates understanding of disease process, treatment plan, medications, and discharge instructions  Description: Complete learning assessment and assess knowledge base.  Interventions:  - Provide teaching at level of understanding  - Provide teaching via preferred learning methods  Outcome: Progressing     Problem: CARDIOVASCULAR - ADULT  Goal: Maintains optimal cardiac output and hemodynamic stability  Description: INTERVENTIONS:  - Monitor I/O, vital signs and rhythm  - Monitor for S/S and trends of decreased cardiac output  - Administer and titrate ordered vasoactive medications to optimize hemodynamic stability  - Assess quality of pulses, skin color and temperature  - Assess for signs of decreased coronary artery perfusion  - Instruct patient to report change in severity of symptoms  Outcome: Progressing

## 2024-11-05 NOTE — PROGRESS NOTES
Progress Note - Cardiac Surgery   Otoniel Martinez 64 y.o. male MRN: [id number]  Unit/Bed#: [facility] 423-01 Encounter: [id number]    24 Hour Events: Doing well. No complaints. Questions answered.    Medications:   Scheduled Meds:  Current Facility-Administered Medications   Medication Dose Route Frequency Provider Last Rate    acetaminophen  650 mg Oral Q4H PRN Faustino Jefferson MD      albuterol  2 puff Inhalation Q4H PRN Alex Zambrano MD      aspirin  81 mg Oral Daily Jameal Hadeed, DO      atorvastatin  40 mg Oral Daily With Dinner Faustino Jefferson MD      budesonide-formoterol  2 puff Inhalation BID Faustino Jefferson MD      fluticasone  2 spray Nasal Daily Faustino Jefferson MD      heparin (porcine)  3-30 Units/kg/hr (Order-Specific) Intravenous Titrated Jameal Hadeed, DO 20 Units/kg/hr (11/05/24 0730)    heparin (porcine)  2,800 Units Intravenous Q6H PRN Jameal Hadeed, DO      heparin (porcine)  5,600 Units Intravenous Q6H PRN Jameal Hadeed, DO      insulin aspart  300 Units Subcutaneous Insulin Pump Daily PRN Harpreet Campos MD      insulin regular (HumuLIN R,NovoLIN R) 1 Units/mL in sodium chloride 0.9 % 100 mL infusion  0.3-21 Units/hr Intravenous Titrated Ammar Alam, DO 0.5 Units/hr (11/05/24 0811)    montelukast  10 mg Oral HS Faustino Jefferson MD      nitroglycerin  0.4 mg Sublingual Q5 Min PRN Faustino Jefferson MD      pantoprazole  40 mg Oral Early Morning Faustino Jefferson MD      patient maintained insulin pump  1 each Subcutaneous Q8H Harpreet Capmos MD      polyethylene glycol  17 g Oral Daily Suleiman Riojas, DO       Continuous Infusions:heparin (porcine), 3-30 Units/kg/hr (Order-Specific), Last Rate: 20 Units/kg/hr (11/05/24 0730)  insulin regular (HumuLIN R,NovoLIN R) 1 Units/mL in sodium chloride 0.9 % 100 mL infusion, 0.3-21 Units/hr, Last Rate: 0.5 Units/hr (11/05/24 0811)        Results:   Results from last 7 days   Lab Units 11/05/24  8077 11/04/24  0555 11/03/24  0706   WBC Thousand/uL 3.69* 3.29*  2.76*   HEMOGLOBIN g/dL 12.6 12.3 11.9*   HEMATOCRIT % 38.8 37.7 37.7   PLATELETS Thousands/uL 125* 118* 121*     Results from last 7 days   Lab Units 11/05/24 0447 11/04/24  0555 11/03/24  0422   POTASSIUM mmol/L 4.1 4.9 4.1   CHLORIDE mmol/L 102 103 104   CO2 mmol/L 28 30 26   BUN mg/dL 21 21 19   CREATININE mg/dL 0.57* 0.68 0.52*   CALCIUM mg/dL 8.8 8.6 8.3*     Results from last 7 days   Lab Units 11/05/24 0447 11/04/24  0348 11/03/24  0425 10/31/24  2037 10/31/24  1315   INR   --  1.07  --   --  1.11   PTT seconds 63* 72* 76*   < > 25    < > = values in this interval not displayed.       Studies:     Cardiac Cath 10/31/24: distal LM 80%, prox-mid LCX 90%, mid RCA 90%     Echo 2/9/24: normal LV size/fnx, LVEF 55%, RV size/fnx normal, no valve disease     Repeat echo 10/31: LVEF 55%, hypokinetic basal inferolateral and mid inferolateral segments, mild diast dysfnx, RV normal. No valve disease     Stress test 9/4/24: Stress ECG: No ST deviation is noted. Arrhythmias during recovery: rare PVCs. The ECG was not diagnostic due to pharmacological (vasodilator) stress. Perfusion: There is a left ventricular perfusion defect that is small in size present in the mid inferior location(s) that is paradoxical and resolves with proning. The defect appears to be an artifact caused by subdiaphragmatic activity. Stress Function: Left ventricular function post-stress is abnormal. Global function is mildly reduced. There were no regional wall motion abnormalities during stress. Stress ejection fraction is 51%. Stress Combined Conclusion: There is image artifact, without diagnostic evidence for perfusion abnormality.     Carotid US 9/26/24: < 50% ICAs bilaterally, VAFA, no subclavian disease     Vein Mapping: bilateral good      CT Chest 9/16/24: The previously noted hypermetabolic masslike consolidation in the left upper lobe is smaller in size and demonstrating irregularity due to evolving scarring. This was likely  inflammatory or infectious in etiology. No new suspicious lung nodule or mass. Splenomegaly. Heart is unremarkable for patient's age. Ascending aorta measures 4 cm. severe coronary artery calcifications seen.     PET scan 8/1/24: 1. 1.9 x 1.5 cm mildly FDG avid masslike consolidation/nodule involving the left upper lobe with mild halo of groundglass opacity, new since 2/8/2024. While findings could reflect an inflammatory process, underlying malignancy of low metabolic activity is the diagnosis of exclusion. Consider further evaluation with tissue sampling as clinically indicated. If an inflammatory process is favored, short interval follow-up with CT imaging in 6 to 8 weeks is recommended to ensure stability/document Resolution. There is otherwise no additional focal FDG activity to suggest hypermetabolic malignancy. Patient's known mild splenomegaly is non-FDG avid. 2.4 cm rim calcified splenic artery aneurysm versus less likely calcified adrenal nodule. Consider vascular surgical consultation.     EKG 8/23/24: sinus adela, nonspecific t wave abnormality     PFT 2/2024: Normal Spirometry, No significant response to the administration of bronchodilator per ATS Standards, Normal Lung volumes, Normal diffusion capacity, Flow volume loop is normal     Vitals:   Vitals:    11/04/24 1532 11/04/24 2305 11/05/24 0500 11/05/24 0746   BP: 112/69 124/80  128/76   BP Location:    Right arm   Pulse: 56 59  59   Resp:  17     Temp: 98.5 °F (36.9 °C) 98.2 °F (36.8 °C)  98.4 °F (36.9 °C)   TempSrc:    Oral   SpO2: 94% 94%  95%   Weight:   69 kg (152 lb 1.6 oz)    Height:         Physical Exam:    General: No acute distress, Alert, and Normal appearance  HEENT/NECK:  Normocephalic. Atraumatic.  no jugular venous distention.    Cardiac: Regular rate and rhythm and No murmurs/rubs/gallops  Pulmonary:  Breath sounds clear bilaterally and No rales/rhonchi/wheezes  Neuro: Alert and oriented X 3, Sensation is grossly intact, and No  focal deficits  Skin: Warm/Dry, without rashes or lesions.       Assessment:  - severe LM/MVCAD  - preserved LVEF 55%  - LOJA  - bradycardia episode this admission, EP eval for PPM after surgery    Plan:  - Patient agreeable to proceed with surgery, consent obtained  - cleared by heme/onc for surgery  - EP evaluation noted, recommending non-urgent PPM, after discussion will plan for PPM after CABG to avoid new PPM lead dislodgement during CABG  - preop workup completed and acceptable  - NPO after midnight  RBC prepared  Hold Heparin at 0400  Preoperative beta blocker, insulin, and antibiotics ordered  coronary artery bypass grafting scheduled for Wednesday 11/6 with Dr. Rodger Rodriguez D.O.    SIGNATURE: Estefania Ramos PA-C  DATE: November 5, 2024  TIME: 9:47 AM    * This note was completed in part utilizing [id number] direct voice recognition software.   Grammatical errors, random word insertion, spelling mistakes, and incomplete sentences may be an occasional consequence of the system secondary to software limitations, ambient noise and hardware issues. At the time of dictation, efforts were made to edit, clarify and /or correct errors. Please read the chart carefully and recognize, using context, where substitutions have occurred.  If you have any questions or concerns about the context, text or information contained within the body of this dictation, please contact myself, the provider, for further clarification.

## 2024-11-05 NOTE — ASSESSMENT & PLAN NOTE
Pt with prior MI and RCA stenting 2004;  Presented for elective cardiac catheterization on 10/31 due to ongoing dyspnea on exertion.   Cath revealed MV disease including distal LM 80% as well as prox to mid Circ 90% and mid RCA 90%  Remain chest pain free.   Continue IV heparin, ASA, statin. No BB due to bradycardia with PPM planned  Labs and VS stable  Tele showing sinus bradycardia.  Echo LVEF 55% with hypokinesis of basal inferolateral and mid inferolateral.   No significant valvular disease.     Plan is for open heart surgery and CABG tomorrow, 11/6.

## 2024-11-05 NOTE — ASSESSMENT & PLAN NOTE
Patient with a history of CAD s/p PCI to RCA in 2004 who presented to the hospital for an outpatient LHC due to progressive LOJA over the last 2-3 years.  Found to have multivessel CAD as mentioned below.  Patient admitted for CABG evaluation    LHC findings:    Dist LM lesion is 80% stenosed.    Prox Cx to Mid Cx lesion is 90% stenosed.    Mid RCA lesion is 90% stenosed.    Echo 10/31/24:  EF 55% with G1DD. Mild concentric hypertrophy. Hypokinetic in basal inferolateral and mid inferolateral segments. Trace TR and mildly dilated ascending aorta 3.8 cm.     VAS vein mapping: patent great saphenous veins in lower extremities.    Plan:  - Cardiothoracic surgery consulted with CABG planned on 11/6/24  - Plan for pacemaker placement post CABG due to bradycardia, no date set   - Continue to monitor on telemetry  - Home Xarelto (for DVT) transitioned to heparin gtt   - Continue Lipitor 40 mg daily  - Low threshold for serial EKG/troponins if necessary  - PRN nitro sublingual for chest pain  - Avoid AV salvador blocking agents given symptomatic bradycardia

## 2024-11-05 NOTE — RESPIRATORY THERAPY NOTE
RT Protocol Note  Otoniel Martinez 64 y.o. 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), 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HCC)    Mixed hyperlipidemia    History of recurrent deep vein thrombosis (DVT)    Chronic bronchitis ([facility])    Insulin pump status    Obstructive sleep apnea    Monoclonal gammopathy    Splenic artery aneurysm ([facility])    Left main coronary artery disease    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[facility])    Symptomatic sinus bradycardia      Home Pulmonary Medications:  Albuterol, symbicort       Past Medical History:   Diagnosis Date    Acute respiratory failure with hypoxia ([facility]) 02/08/2024    CAD (coronary artery disease)     Congenital myotonia     Deep vein thrombosis (DVT) ([facility])     after tear of gastrocnemus muscle    Diabetes ([facility])     Diabetes mellitus ([facility])     Difficulty walking     hip replacement    HL (hearing loss)     decreased both  ears    Myocardial infarction ([facility])     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    Pancreatitis     three times    Sleep apnea     not using a C-PAP    Superficial thrombophlebitis     Vision loss     reading glasses over the counter     Social History     Socioeconomic History    Marital status: /Civil Union     Spouse name: None    Number of children: None    Years of education: None    Highest education level: None   Occupational History    None   Tobacco Use    Smoking status: Never    Smokeless tobacco: Never   Vaping Use    Vaping status: Never Used   Substance and Sexual Activity    Alcohol use: Yes     Comment: 2 beers on a social occasion    Drug use: No    Sexual activity: Yes     Partners: Female     Birth control/protection: None   Other Topics Concern    None   Social History Narrative    Consumes 1 cup of tea  And 1 glass of tea per day        Weight loss     Social Determinants of Health      Financial Resource Strain: Low Risk  (12/4/2023)    [facility]    Overall Financial Resource Strain (CARDIA)     Difficulty of Paying Living Expenses: Not very hard   Food Insecurity: No Food Insecurity (10/31/2024)    Nursing - Inadequate Food Risk Classification     Worried About Running Out of Food in the Last Year: Never true     Ran Out of Food in the Last Year: Never true     Ran Out of Food in the Last Year: 1   Transportation Needs: No Transportation Needs (10/31/2024)    Nursing - Transportation Risk Classification     Lack of Transportation: Not on file     Lack of Transportation: 2   Physical Activity: Sufficiently Active (12/4/2023)    Received from [facility]    Exercise Vital Sign     Days of Exercise per Week: 1 day     Minutes of Exercise per Session: 150+ min   Stress: No Stress Concern Present (12/4/2023)    Received from [facility], [facility]    [employer] [place] [employer] - Occupational Stress Questionnaire     Feeling of Stress : Not at all   Social Connections: Socially Integrated (12/4/2023)    Received from [facility], [facility]    Social Connection and Isolation Panel [NHANES]     Frequency of Communication with Friends and Family: Three times a week     Frequency of Social Gatherings with Friends and Family: Never     Attends [employer] Services: More than 4 times per year     Active Member of Clubs or Organizations: Yes     Attends Club or Organization Meetings: More than 4 times per year     Marital Status:    Intimate Partner Violence: Unknown (10/31/2024)    Nursing IPS     Feels Physically and Emotionally Safe: Not on file     Physically Hurt by Someone: Not on file     Humiliated or Emotionally Abused by Someone: Not on file     Physically Hurt by Someone: 2     Hurt or Threatened by Someone: 2   Housing  "Stability: Unknown (10/31/2024)    Nursing: Inadequate Housing Risk Classification     Has Housing: Not on file     Worried About Losing Housing: Not on file     Unable to Get Utilities: Not on file     Unable to Pay for Housing in the Last Year: 2     Has Housin       Subjective         Objective    Physical Exam:   Assessment Type: Assess only  General Appearance: Awake, Alert  Respiratory Pattern: Normal  Chest Assessment: Chest expansion symmetrical  Bilateral Breath Sounds: Clear    Vitals:  Blood pressure 128/76, pulse 59, temperature 98.4 °F (36.9 °C), temperature source Oral, resp. rate 17, height 5' 8\" (1.727 m), weight 69 kg (152 lb 1.6 oz), SpO2 95%.          Imaging and other studies: Results Review Statement: No pertinent imaging studies reviewed.          Plan    Respiratory Plan: Home Bronchodilator Patient pathway        Resp Comments: pt assess at this time for respiratory protocol. pt has hx of KULDIP does not wear cpap and needs to have a new sleep study done.pt  takes xopenex and symbicort. pt scheduled for CABG tomorrow.  will order pt on tx per home treatment plan.   "

## 2024-11-05 NOTE — PROGRESS NOTES
Progress Note - Internal Medicine   Name: Otoniel Martinez 64 y.o. male I MRN: [id number]  Unit/Bed#: [facility] 423-01 I Date of Admission: 10/31/2024   Date of Service: 11/5/2024 I Hospital Day: 5     Assessment & Plan  Multivessel CAD  Patient with a history of CAD s/p PCI to RCA in 2004 who presented to the hospital for an outpatient C due to progressive LOJA over the last 2-3 years.  Found to have multivessel CAD as mentioned below.  Patient admitted for CABG evaluation    LHC findings:    Dist LM lesion is 80% stenosed.    Prox Cx to Mid Cx lesion is 90% stenosed.    Mid RCA lesion is 90% stenosed.    Echo 10/31/24:  EF 55% with G1DD. Mild concentric hypertrophy. Hypokinetic in basal inferolateral and mid inferolateral segments. Trace TR and mildly dilated ascending aorta 3.8 cm.     VAS vein mapping: patent great saphenous veins in lower extremities.    Plan:  - Cardiothoracic surgery consulted with CABG planned on 11/6/24  - Plan for pacemaker placement post CABG due to bradycardia, no date set   - Continue to monitor on telemetry  - Home Xarelto (for DVT) transitioned to heparin gtt   - Continue Lipitor 40 mg daily  - Low threshold for serial EKG/troponins if necessary  - PRN nitro sublingual for chest pain  - Avoid AV salvador blocking agents given symptomatic bradycardia   Thrombocytopenia (HCC)  History of thrombocytopenia baseline in the low 100s.  Likely in the setting of low grade CD5 positive B-cell lymphoma    Platelet stable throughout his hospitalization and for the past 8 years in the low 100s.  No concern for HIT at this time.    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    Plan:  - Endocrinology consulted.  Appreciate recommendations  - Per Cardiology, maintain on insulin drip prior to CABG. He was transitioned from insulin pump to insulin drip yesterday.  Had 1 episode of asymptomatic hypoglycemia yesterday 4 PM and algorithm was adjusted.  Well-controlled since that time.  - POCT 4 times daily before meals and at bedtime  - Hypoglycemia protocol  - Diabetic diet    Mixed hyperlipidemia  History of hyperlipidemia maintained on Lipitor 40 mg daily    Lipid panel 10/31/24: , , HDL 40, LDL 51    Plan:  - Continue Lipitor 40 mg daily    History of recurrent deep vein thrombosis (DVT)  Patient with a history of recurrent DVTs maintained on Xarelto indefinitely    Plan:  - Hold Xarelto   - Continue heparin gtt for anticoagulation  Chronic bronchitis (HCC)  Patient with a history of chronic bronchitis maintained on Symbicort, montelukast and fluticasone.    Plan:  - Continue home meds with as needed albuterol  Insulin pump status  See plan under KAMI  Obstructive sleep apnea  Patient with a history of KULDIP had been on CPAP in the past but now only uses oxygen nightly    Plan:  - Continue oxygen as needed nightly  - Outpatient sleep study ordered by sleep medicine  Monoclonal gammopathy  Patient with a history of 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  Follows with heme-onc as an outpatient.  Bone marrow biopsy in August 2024 showing low-grade CD5 positive B-cell lymphoma as well.  Per most recent heme-onc note, no urgent intervention and follow-up in 3 months    Plan:  - Continue to follow-up outpatient  Splenic artery aneurysm (HCC)  Patient with an incidental finding of splenic artery aneurysm earlier this year.  Follows with vascular who recommended repeat mesenteric duplex in March 2025    Left main coronary artery disease  See plan under multivessel CAD  Diabetes related retinopathy of  right eye (HCC)  Lab Results   Component Value Date    HGBA1C 8.4 (H) 10/31/2024       Recent Labs     11/05/24  0153 11/05/24  0451 11/05/24  0636 11/05/24  0749   POCGLU 114 137 156* 95       Blood Sugar Average: Last 72 hrs:  (P) [id number]    Plan:  - Outpatient f/u with ophthalmology   Symptomatic sinus bradycardia  Baseline 40-50s. Patient has been having lightheadedness and dizziness for years.     Tele overnight showed no significant events, other than bradycardia in the primarily in the range of 48-55.    Plan  - EP consulted, appreciate recs  - Pacemaker placement implantation post CABG per EP  - Continue to monitor on Tele  - Avoid AV salvador blocking agents       Disposition: Pending CABG and PPM     Team: SOD TEAM A    Subjective   Patient seen and examined. No acute events overnight.  No acute complaints at this time.  Was asymptomatic during his hypoglycemic episode yesterday.  No questions or concerns at this time.    Objective :  Temp:  [98.2 °F (36.8 °C)-98.5 °F (36.9 °C)] 98.4 °F (36.9 °C)  HR:  [56-59] 59  BP: (112-128)/(69-80) 128/76  Resp:  [17] 17  SpO2:  [94 %-95 %] 95 %    I/O         11/02 0701 11/03 0700 11/03 0701 11/04 0700 11/04 0701 11/05 0700    P.O. 90 780 0    I.V. (mL/kg) 5 (0.1) 1213.4 (17.4)     Total Intake(mL/kg) 95 (1.3) 1993.4 (28.6) 0 (0)    Urine (mL/kg/hr) 1450 (0.9) 1500 (0.9) 0 (0)    Total Output 1450 1500 0    Net -1355 +493.4 0                 Weights:   IBW (Ideal Body Weight): 68.4 kg    Body mass index is 23.13 kg/m².  Weight (last 2 days)       Date/Time Weight    11/05/24 0500 69 (152.1)    11/04/24 0600 69.6 (153.44)    11/03/24 0556 70.5 (155.5)            Physical Exam  Vitals reviewed.   Constitutional:       General: He is not in acute distress.     Appearance: Normal appearance. He is normal weight. He is not ill-appearing.   HENT:      Head: Normocephalic and atraumatic.      Mouth/Throat:      Mouth: Mucous membranes are moist.   Eyes:       Extraocular Movements: Extraocular movements intact.   Cardiovascular:      Rate and Rhythm: Regular rhythm. Bradycardia present.   Pulmonary:      Effort: Pulmonary effort is normal. No respiratory distress.      Breath sounds: No wheezing.   Musculoskeletal:      Right lower leg: No edema.      Left lower leg: No edema.   Skin:     General: Skin is warm and dry.   Neurological:      Mental Status: He is alert.           Lab Results: I have reviewed the following results:  Recent Labs     11/03/24 0422 11/03/24  0425 11/04/24  0348 11/04/24  0555 11/05/24  0447   WBC  --    < >  --    < > 3.69*   HGB  --    < >  --    < > 12.6   HCT  --    < >  --    < > 38.8   PLT  --    < >  --    < > 125*   SODIUM 138  --   --    < > 138   K 4.1  --   --    < > 4.1     --   --    < > 102   CO2 26  --   --    < > 28   BUN 19  --   --    < > 21   CREATININE 0.52*  --   --    < > 0.57*   GLUC 195*  --   --    < > 139   MG 1.8*  --   --   --   --    PTT  --    < > 72*  --  63*   INR  --   --  1.07  --   --     < > = values in this interval not displayed.             Currently Ordered Meds:   Current Facility-Administered Medications:     acetaminophen (TYLENOL) tablet 650 mg, Q4H PRN    albuterol (PROVENTIL HFA,VENTOLIN HFA) inhaler 2 puff, Q4H PRN    aspirin (ECOTRIN LOW STRENGTH) EC tablet 81 mg, Daily    atorvastatin (LIPITOR) tablet 40 mg, Daily With Dinner    budesonide-formoterol (SYMBICORT) 80-4.5 MCG/ACT inhaler 2 puff, BID    fluticasone (FLONASE) 50 mcg/act nasal spray 2 spray, Daily    heparin (porcine) 25,000 units in 0.45% NaCl 250 mL infusion (premix), Titrated, Last Rate: 20 Units/kg/hr (11/05/24 0640)    heparin (porcine) injection 2,800 Units, Q6H PRN    heparin (porcine) injection 5,600 Units, Q6H PRN    insulin aspart (NovoLOG) FOR PUMP REFILLS 300 Units, Daily PRN    insulin regular (HumuLIN R,NovoLIN R) 1 Units/mL in sodium chloride 0.9 % 100 mL infusion, Titrated, Last Rate: 4 Units/hr (11/05/24 0640)     montelukast (SINGULAIR) tablet 10 mg, HS    nitroglycerin (NITROSTAT) SL tablet 0.4 mg, Q5 Min PRN    pantoprazole (PROTONIX) EC tablet 40 mg, Early Morning    PATIENT MAINTAINED INSULIN PUMP 1 each, Q8H    polyethylene glycol (MIRALAX) packet 17 g, Daily  VTE Pharmacologic Prophylaxis: VTE covered by:  heparin (porcine), Intravenous, 20 Units/kg/hr at 11/05/24 0640  heparin (porcine), Intravenous, 2,800 Units at 10/31/24 2150  heparin (porcine), Intravenous      VTE Mechanical Prophylaxis: sequential compression device    Administrative Statements     Portions of the record may have been created with voice recognition software.

## 2024-11-05 NOTE — ASSESSMENT & PLAN NOTE
Lab Results   Component Value Date    HGBA1C 8.4 (H) 10/31/2024       Recent Labs     11/05/24  0153 11/05/24  0451 11/05/24  0636 11/05/24  0749   POCGLU 114 137 156* 95       Blood Sugar Average: Last 72 hrs:  (P) [id number]    Plan:  - Outpatient f/u with ophthalmology

## 2024-11-05 NOTE — PROGRESS NOTES
Progress Note - Endocrinology   Name: Otoniel Martinez 64 y.o. 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  Has been on insulin drip since 11/3 - 2/2 insulin pump expiring and patient deferring use of home supply of insulin pumps.    While on insulin drip, had hyperglycemia since 9:59 AM.  Glucose improved with 10 units of insulin lispro from 439 to 258.  Last insulin bag change was documented on 11/3/2024.  Likely in the setting of insulin in the bag bag becoming ineffective VS requiring sooner insulin bag change.  After insulin bag change, x 2 glucose are within goal.    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  Subjective : Reports overall feeling well.  Denied any concerns at that time.  Following a diabetic diet.    Objective :  Temp:  [98 °F (36.7 °C)-98.4 °F (36.9 °C)] 98 °F (36.7 °C)  HR:  [54-59] 54  BP: (124-128)/(76-83) 128/83  Resp:  [17] 17  SpO2:  [94 %-95 %] 94 %    Physical Exam  Vitals reviewed.   Constitutional:       General: He is not in acute distress.     Appearance: Normal appearance. He is not ill-appearing.   HENT:      Head: Normocephalic and atraumatic.   Eyes:      General: No scleral icterus.     Conjunctiva/sclera: Conjunctivae normal.   Cardiovascular:      Rate and Rhythm: Normal rate and regular rhythm.      Pulses: Normal pulses.      Heart sounds: No murmur heard.     No gallop.   Pulmonary:      Effort: Pulmonary effort is normal. No respiratory distress.      Breath sounds: Normal breath sounds. No wheezing or rales.   Abdominal:      General: Bowel sounds are normal. There is no distension.      Palpations: Abdomen is soft.   Musculoskeletal:         General: Normal range of motion.      Cervical back: Normal range of motion and neck supple.      Right lower leg: No edema.      Left lower leg: No edema.      Comments: Insulin pump at L lateral thigh    Skin:     General: Skin is warm and dry.      Capillary Refill: Capillary refill takes less than 2 seconds.      Coloration: Skin is not jaundiced or pale.   Neurological:      Mental Status: He is alert and oriented to person, place, and time. Mental status is at baseline.      Sensory: No sensory deficit.   Psychiatric:         Mood and Affect: Mood normal.         Behavior: Behavior normal.         Lab Results: I have reviewed the following results:CBC/BMP:   .     11/05/24  0447 11/05/24  1349   WBC 3.69*   --    HGB 12.6  --    HCT 38.8  --    *  --    SODIUM 138 134*   K 4.1 4.6    99   CO2 28 29   BUN 21 23   CREATININE 0.57* 1.02   GLUC 139 439*        Imaging Results Review: No pertinent imaging studies reviewed.  Other Study Results Review: No additional pertinent studies reviewed.      Harpreet Campos MD  Endocrinology fellow, PGY-4

## 2024-11-05 NOTE — ASSESSMENT & PLAN NOTE
Baseline 40-50s. Patient has been having lightheadedness and dizziness for years.     Tele overnight showed no significant events, other than bradycardia in the primarily in the range of 48-55.    Plan  - EP consulted, appreciate recs  - Pacemaker placement implantation post CABG per EP  - Continue to monitor on Tele  - Avoid AV salvador blocking agents

## 2024-11-05 NOTE — ASSESSMENT & PLAN NOTE
Sinus bradycardia on telemetry; no pauses or profound bradycardia past 24 hours; no recurrent episodes of pre-syncope; still getting dizzy/lightheaded when laying flat.   Evaluated by EP and they are planning PPM implant after CABG.  Not on any AV salvador agents; however will benefit from this and therefore we will initiate metoprolol post pacemaker.

## 2024-11-05 NOTE — ASSESSMENT & PLAN NOTE
History of thrombocytopenia baseline in the low 100s.  Likely in the setting of low grade CD5 positive B-cell lymphoma    Platelet stable throughout his hospitalization and for the past 8 years in the low 100s.  No concern for HIT at this time.    Plan:  - Continue to monitor with CBC daily

## 2024-11-05 NOTE — ASSESSMENT & PLAN NOTE
Lab Results   Component Value Date    HGBA1C 8.4 (H) 10/31/2024       Recent Labs     11/05/24  0153 11/05/24  0451 11/05/24  0636 11/05/24  0749   POCGLU 114 137 156* 95       Blood Sugar Average: Last 72 hrs:  (P) 204.5977275628349124    Patient with a history of L ADA first diagnosed 7-8 years ago maintained on insulin pump at home.    Plan:  - Endocrinology consulted.  Appreciate recommendations  - Per Cardiology, maintain on insulin drip prior to CABG. He was transitioned from insulin pump to insulin drip yesterday.  Had 1 episode of asymptomatic hypoglycemia yesterday 4 PM and algorithm was adjusted.  Well-controlled since that time.  - POCT 4 times daily before meals and at bedtime  - Hypoglycemia protocol  - Diabetic diet

## 2024-11-05 NOTE — ASSESSMENT & PLAN NOTE
Lab Results   Component Value Date    HGBA1C 8.4 (H) 10/31/2024       Recent Labs     11/05/24  0451 11/05/24  0636 11/05/24  0749 11/05/24  0959   POCGLU 137 156* 95 384*       Blood Sugar Average: Last 72 hrs:  (P) 210.2    Currently on IV insulin infusion being managed by endocrinology.

## 2024-11-05 NOTE — PROGRESS NOTES
Progress Note - Cardiology   Name: Otoniel Martinez 64 y.o. male I MRN: [id number]  Unit/Bed#: [facility]PHP-322-01 I Date of Admission: 10/31/2024   Date of Service: 11/5/2024 I Hospital Day: 5     Assessment & Plan  Multivessel CAD  Pt with prior MI and RCA stenting 2004;  Presented for elective cardiac catheterization on 10/31 due to ongoing dyspnea on exertion.   Cath revealed MV disease including distal LM 80% as well as prox to mid Circ 90% and mid RCA 90%  Remain chest pain free.   Continue IV heparin, ASA, statin. No BB due to bradycardia with PPM planned  Labs and VS stable  Tele showing sinus bradycardia.  Echo LVEF 55% with hypokinesis of basal inferolateral and mid inferolateral.   No significant valvular disease.     Plan is for open heart surgery and CABG tomorrow, 11/6.  Symptomatic sinus bradycardia  Sinus bradycardia on telemetry; no pauses or profound bradycardia past 24 hours; no recurrent episodes of pre-syncope; still getting dizzy/lightheaded when laying flat.   Evaluated by EP and they are planning PPM implant after CABG.  Not on any AV salvador agents; however will benefit from this and therefore we will initiate metoprolol post pacemaker.  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), 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HCC)  Lab Results   Component Value Date    HGBA1C 8.4 (H) 10/31/2024       Recent Labs     11/05/24  0451 11/05/24  0636 11/05/24  0749 11/05/24  0959   POCGLU 137 156* 95 384*       Blood Sugar Average: Last 72 hrs:  (P) 210.2    Currently on IV insulin infusion being managed by endocrinology.   Mixed hyperlipidemia  Continue statin; atorvastatin 40mg daily  History of recurrent deep vein thrombosis (DVT)  On Xarelto at baseline. IV heparin here.  Left main coronary artery disease  For CABG 11/6       Subjective:   Patient seen and examined.  No significant events overnight.  Denies chest pain or shortness of breath.    Objective:     Vitals: Blood pressure 128/76, pulse 59, temperature 98.4 °F (36.9 °C),  "temperature source Oral, resp. rate 17, height 5' 8\" (1.727 m), weight 69 kg (152 lb 1.6 oz), SpO2 95%., Body mass index is 23.13 kg/m².,   Orthostatic Blood Pressures      Flowsheet Row Most Recent Value   Blood Pressure 128/76 filed at 11/05/2024 0746   Patient Position - Orthostatic VS Lying filed at 11/05/2024 0746              Intake/Output Summary (Last 24 hours) at 11/5/2024 1214  Last data filed at 11/5/2024 0900  Gross per 24 hour   Intake 546.53 ml   Output 200 ml   Net 346.53 ml       No significant arrhythmias seen on telemetry review.    Physical Exam:  GEN: Otoniel Martinez appears well, alert and oriented x 3, pleasant and cooperative   HEENT: pupils equal, round, and reactive to light; extraocular muscles intact  NECK: supple, no carotid bruits   HEART: regular rhythm, normal S1 and S2, no murmurs, clicks, gallops or rubs   LUNGS: clear to auscultation bilaterally; no wheezes, rales, or rhonchi   ABDOMEN: normal bowel sounds, soft, no tenderness, no distention  EXTREMITIES: peripheral pulses normal; no clubbing, cyanosis, or edema  NEURO: no focal findings   SKIN: normal without suspicious lesions on exposed skin    Medications:      Current Facility-Administered Medications:     acetaminophen (TYLENOL) tablet 650 mg, 650 mg, Oral, Q4H PRN, Faustino Jefferson MD, 650 mg at 11/02/24 0247    albuterol (PROVENTIL HFA,VENTOLIN HFA) inhaler 2 puff, 2 puff, Inhalation, Q4H PRN, Alex Zambrano MD    aspirin (ECOTRIN LOW STRENGTH) EC tablet 81 mg, 81 mg, Oral, Daily, Jameal Hadeed, DO, 81 mg at 11/05/24 0807    atorvastatin (LIPITOR) tablet 40 mg, 40 mg, Oral, Daily With Dinner, Faustino Jefferson MD, 40 mg at 11/04/24 1810    budesonide-formoterol (SYMBICORT) 80-4.5 MCG/ACT inhaler 2 puff, 2 puff, Inhalation, BID, Faustino Jefferson MD, 2 puff at 11/05/24 0808    [START ON 11/6/2024] chlorhexidine (PERIDEX) 0.12 % oral rinse 15 mL, 15 mL, Swish & Spit, Once, Estefania Ramos PA-C    fluticasone (FLONASE) 50 mcg/act " nasal spray 2 spray, 2 spray, Nasal, Daily, Faustino Jefferson MD, 2 spray at 11/04/24 2102    heparin (porcine) 25,000 units in 0.45% NaCl 250 mL infusion (premix), 3-30 Units/kg/hr (Order-Specific), Intravenous, Titrated, Estefania Ramos PA-C, Last Rate: 14 mL/hr at 11/05/24 1009, 20 Units/kg/hr at 11/05/24 1009    heparin (porcine) injection 2,800 Units, 2,800 Units, Intravenous, Q6H PRN, Estefania Ramos PA-C, 2,800 Units at 10/31/24 2150    heparin (porcine) injection 5,600 Units, 5,600 Units, Intravenous, Q6H PRN, Estefania Ramos PA-C    insulin aspart (NovoLOG) FOR PUMP REFILLS 300 Units, 300 Units, Subcutaneous Insulin Pump, Daily PRN, Harpreet Campos MD    insulin regular (HumuLIN R,NovoLIN R) 1 Units/mL in sodium chloride 0.9 % 100 mL infusion, 0.3-21 Units/hr, Intravenous, Titrated, Akira Foster DO, Last Rate: 10 mL/hr at 11/05/24 1007, 10 Units/hr at 11/05/24 1007    [START ON 11/6/2024] metoprolol tartrate (LOPRESSOR) partial tablet 12.5 mg, 12.5 mg, Oral, Once, Estefania Ramos PA-C    montelukast (SINGULAIR) tablet 10 mg, 10 mg, Oral, HS, Faustino Jefferson MD, 10 mg at 11/04/24 2101    [START ON 11/6/2024] mupirocin (BACTROBAN) 2 % nasal ointment 1 Application, 1 Application, Nasal, Once, Estefania Ramos PA-C    nitroglycerin (NITROSTAT) SL tablet 0.4 mg, 0.4 mg, Sublingual, Q5 Min PRN, Faustino Jefferson MD    pantoprazole (PROTONIX) EC tablet 40 mg, 40 mg, Oral, Early Morning, Faustino Jefferson MD, 40 mg at 11/05/24 0500    PATIENT MAINTAINED INSULIN PUMP 1 each, 1 each, Subcutaneous, Q8H, Harpreet Campos MD, 1 each at 11/03/24 1138    polyethylene glycol (MIRALAX) packet 17 g, 17 g, Oral, Daily, Suleiman Riojas DO, 17 g at 11/05/24 0807     Labs & Results:        Results from last 7 days   Lab Units 11/05/24  0447 11/04/24  0555 11/03/24  0706   WBC Thousand/uL 3.69* 3.29* 2.76*   HEMOGLOBIN g/dL 12.6 12.3 11.9*   HEMATOCRIT % 38.8 37.7 37.7   PLATELETS Thousands/uL 125* 118*  121*     Results from last 7 days   Lab Units 10/31/24  1315   TRIGLYCERIDES mg/dL 209*   HDL mg/dL 40     Results from last 7 days   Lab Units 11/05/24  0447 11/04/24  0555 11/03/24  0422   POTASSIUM mmol/L 4.1 4.9 4.1   CHLORIDE mmol/L 102 103 104   CO2 mmol/L 28 30 26   BUN mg/dL 21 21 19   CREATININE mg/dL 0.57* 0.68 0.52*   CALCIUM mg/dL 8.8 8.6 8.3*     Results from last 7 days   Lab Units 11/05/24  0447 11/04/24  0348 11/03/24  0425 10/31/24  2037 10/31/24  1315   INR   --  1.07  --   --  1.11   PTT seconds 63* 72* 76*   < > 25    < > = values in this interval not displayed.     Results from last 7 days   Lab Units 11/03/24  0422 11/02/24  0526 11/01/24  0351   MAGNESIUM mg/dL 1.8* 1.9 1.7*       EKG personally reviewed by Jc Adrian MD.  Sinus bradycardia    ECHO:    Left Ventricle: Left ventricular cavity size is normal. Wall thickness is mildly increased. There is mild concentric hypertrophy. The left ventricular ejection fraction is 55%. Systolic function is normal. Diastolic function is mildly abnormal, consistent with grade I (abnormal) relaxation.    The following segments are hypokinetic: basal inferolateral and mid inferolateral.    All other segments are normal.    Right Ventricle: Right ventricular cavity size is normal. Systolic function is normal.    Aorta: The aortic root is normal in size. The ascending aorta is mildly dilated. Ao root is 3.10 cm. Asc Ao is 3.8 cm.    Counseling / Coordination of Care  Total floor / unit time spent today 25 minutes.  Greater than 50% of total time was spent with the patient and / or family counseling and / or coordination of care.

## 2024-11-05 NOTE — CASE MANAGEMENT
Case Management Assessment & Discharge Planning Note    Patient name Otoniel Martinez  Location [facility]-01 MRN [id number]  : 1960 Date 2024       Current Admission Date: 10/31/2024  Current Admission Diagnosis:Multivessel CAD   Patient Active Problem List    Diagnosis Date Noted Date Diagnosed    Symptomatic sinus bradycardia 2024     Left main coronary artery disease 10/31/2024     CAD, multiple vessel 10/31/2024     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[facility]) 10/31/2024     Pulmonary nodule 1 cm or greater in diameter 10/15/2024     Splenic artery aneurysm ([facility]) 2024     Carotid stenosis, asymptomatic, bilateral 2024     Hx of pancreatitis 2024     On home oxygen therapy 2024     Monoclonal gammopathy 2024     Seizure ([facility]) 2024     Memory loss 2024     Establishing care with new doctor, encounter for 2024     Decreased hearing of both ears 2024     Splenomegaly 2024     Chronic bronchitis ([facility]) 2024     Abnormal CT scan of lung 2024     Chronic sinusitis 2024     Insulin pump status 2023     Primary osteoarthritis of right hip 2022     Recurrent cold sores 2022     History of recurrent deep vein thrombosis (DVT) 2021     Dysfunction of right rotator cuff 2021     Vitamin D deficiency 2021     LOJA (dyspnea on exertion) 02/15/2021     Old cerebrovascular accident (CVA) without late effect 10/28/2020     Chronic gastritis without bleeding 2020     Polyneuropathy 10/17/2018     Vertigo 2018     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[facility]) 2018     Thrombocytopenia ([facility]) 2018     Erectile dysfunction of non-organic origin 10/24/2014     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), 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[facility]) 10/04/2013     Mixed hyperlipidemia 2013     Multivessel CAD 2012     Thromboembolism of deep veins of lower extremity ([facility]) 2011     Obstructive  sleep apnea 01/20/2007       LOS (days): 5  Geometric Mean LOS (GMLOS) (days): 1.8  Days to GMLOS:-3.3     OBJECTIVE:  Risk of Unplanned Readmission Score: 13.16       Current admission status: Inpatient     Preferred Pharmacy:   [facility] - [place], [place] - [address]  [address]  [facility] [address]  Phone: [phone] Fax: [phone]    [facility]/pharmacy #[id number] - [place], [place] - [address]  [address] [address]  Phone: [phone] Fax: [phone]    [facility] [id number] [facility] [employer] - [place], [place] - [address]  [address]  [facility] [address]  Phone: [phone] Fax: [phone]    Primary Care Provider: Wandy Cheatham DO    Primary Insurance: [email or web address]  Secondary Insurance:     ASSESSMENT:  Active Health Care Proxies    There are no active Health Care Proxies on file.        Patient Information  Mental Status: Alert  During Assessment patient was accompanied by: Not accompanied during assessment  Assessment information provided by:: Patient  Support Systems: Self, Spouse/significant other, Family members, Son  County of Residence: [place]  What city do you live in?: [place]  Home entry access options. Select all that apply.: Stairs  Number of steps to enter home.: 1  Type of Current Residence: 2 Divide home  Upon entering residence, is there a bedroom on the main floor (no further steps)?: No  A bedroom is located on the following floor levels of residence (select all that apply):: 2nd Floor  Upon entering residence, is there a bathroom on the main floor (no further steps)?: Yes  Number of steps to 2nd floor from main floor: 7  Is patient a ?: No    Activities of Daily Living Prior to Admission  Functional Status: Independent  Completes ADLs independently?: Yes  Ambulates independently?: Yes  Does patient use assisted devices?: Yes  Does patient currently own DME?: Yes  What DME does the patient currently own?: Walker, Straight  Cane, Nebulizer (pt unsure if walker owned as wheels)  Does patient have a history of Outpatient Therapy (PT/OT)?: Yes  Does patient have a history of HHC?: Yes  Does patient currently have HHC?: No     Patient Information Continued  Income Source: Pension/shelter  Does patient have prescription coverage?: Yes  Does patient receive dialysis treatments?: No     Means of Transportation  Means of Transport to Appts:: Drives Self    DISCHARGE DETAILS:    Discharge planning discussed with:: Pt bedside  Freedom of Choice: Yes  Comments - Freedom of Choice: discussed; pt agreeable to blanket HHC referral  CM contacted family/caregiver?: No- see comments (a&o x4)   Transport at Discharge : Family      Additional Comments: CM met with pt bedside to introduce self and CM role. Pt resides w/ spouse in 2SH, 1STE with bathroom on first level, and 7 steps to access the 2nd level with bedroom. Pt uses nebulizer and owns walker and cane, pt unsure if walker has wheels, reports will f/u with spouse regarding such. Pt reports his preferred pharmacy is [facility] on [address] CM to follow and assist with DCP

## 2024-11-06 ENCOUNTER — APPOINTMENT (INPATIENT)
Dept: NON INVASIVE DIAGNOSTICS | Facility: HOSPITAL | Age: 64
DRG: 233 | End: 2024-11-06
Payer: COMMERCIAL

## 2024-11-06 ENCOUNTER — ANESTHESIA (INPATIENT)
Dept: PERIOP | Facility: HOSPITAL | Age: 64
DRG: 233 | End: 2024-11-06
Payer: COMMERCIAL

## 2024-11-06 ENCOUNTER — APPOINTMENT (INPATIENT)
Dept: RADIOLOGY | Facility: HOSPITAL | Age: 64
DRG: 233 | End: 2024-11-06
Payer: COMMERCIAL

## 2024-11-06 PROBLEM — Z95.1 S/P CABG (CORONARY ARTERY BYPASS GRAFT): Status: ACTIVE | Noted: 2024-11-06

## 2024-11-06 PROBLEM — I25.10 CAD, MULTIPLE VESSEL: Status: RESOLVED | Noted: 2024-10-31 | Resolved: 2024-11-06

## 2024-11-06 LAB
ANION GAP SERPL CALCULATED.3IONS-SCNC: 6 MMOL/L (ref 4–13)
ANION GAP SERPL CALCULATED.3IONS-SCNC: 7 MMOL/L (ref 4–13)
APTT PPP: 31 SECONDS (ref 23–34)
ATRIAL RATE: 45 BPM
BASE EXCESS BLDA CALC-SCNC: -1 MMOL/L (ref -2–3)
BASE EXCESS BLDA CALC-SCNC: -1 MMOL/L (ref -2–3)
BASE EXCESS BLDA CALC-SCNC: -2 MMOL/L (ref -2–3)
BASE EXCESS BLDA CALC-SCNC: -3 MMOL/L (ref -2–3)
BASE EXCESS BLDA CALC-SCNC: 0 MMOL/L (ref -2–3)
BASE EXCESS BLDA CALC-SCNC: 1 MMOL/L (ref -2–3)
BASOPHILS # BLD AUTO: 0.02 THOUSANDS/ÂΜL (ref 0–0.1)
BASOPHILS NFR BLD AUTO: 1 % (ref 0–1)
BUN SERPL-MCNC: 18 MG/DL (ref 5–25)
BUN SERPL-MCNC: 24 MG/DL (ref 5–25)
CA-I BLD-SCNC: 1 MMOL/L (ref 1.12–1.32)
CA-I BLD-SCNC: 1.02 MMOL/L (ref 1.12–1.32)
CA-I BLD-SCNC: 1.05 MMOL/L (ref 1.12–1.32)
CA-I BLD-SCNC: 1.13 MMOL/L (ref 1.12–1.32)
CA-I BLD-SCNC: 1.19 MMOL/L (ref 1.12–1.32)
CA-I BLD-SCNC: 1.24 MMOL/L (ref 1.12–1.32)
CALCIUM SERPL-MCNC: 7.2 MG/DL (ref 8.4–10.2)
CALCIUM SERPL-MCNC: 8.6 MG/DL (ref 8.4–10.2)
CHLORIDE SERPL-SCNC: 104 MMOL/L (ref 96–108)
CHLORIDE SERPL-SCNC: 111 MMOL/L (ref 96–108)
CO2 SERPL-SCNC: 22 MMOL/L (ref 21–32)
CO2 SERPL-SCNC: 29 MMOL/L (ref 21–32)
CREAT SERPL-MCNC: 0.55 MG/DL (ref 0.6–1.3)
CREAT SERPL-MCNC: 0.64 MG/DL (ref 0.6–1.3)
EOSINOPHIL # BLD AUTO: 0.25 THOUSAND/ÂΜL (ref 0–0.61)
EOSINOPHIL NFR BLD AUTO: 7 % (ref 0–6)
ERYTHROCYTE [DISTWIDTH] IN BLOOD BY AUTOMATED COUNT: 14.8 % (ref 11.6–15.1)
FIBRINOGEN PPP-MCNC: 286 MG/DL (ref 206–523)
GFR SERPL CREATININE-BSD FRML MDRD: 103 ML/MIN/1.73SQ M
GFR SERPL CREATININE-BSD FRML MDRD: 110 ML/MIN/1.73SQ M
GLUCOSE SERPL-MCNC: 108 MG/DL (ref 65–140)
GLUCOSE SERPL-MCNC: 110 MG/DL (ref 65–140)
GLUCOSE SERPL-MCNC: 123 MG/DL (ref 65–140)
GLUCOSE SERPL-MCNC: 126 MG/DL (ref 65–140)
GLUCOSE SERPL-MCNC: 129 MG/DL (ref 65–140)
GLUCOSE SERPL-MCNC: 130 MG/DL (ref 65–140)
GLUCOSE SERPL-MCNC: 131 MG/DL (ref 65–140)
GLUCOSE SERPL-MCNC: 138 MG/DL (ref 65–140)
GLUCOSE SERPL-MCNC: 139 MG/DL (ref 65–140)
GLUCOSE SERPL-MCNC: 154 MG/DL (ref 65–140)
GLUCOSE SERPL-MCNC: 157 MG/DL (ref 65–140)
GLUCOSE SERPL-MCNC: 158 MG/DL (ref 65–140)
GLUCOSE SERPL-MCNC: 158 MG/DL (ref 65–140)
GLUCOSE SERPL-MCNC: 161 MG/DL (ref 65–140)
GLUCOSE SERPL-MCNC: 166 MG/DL (ref 65–140)
GLUCOSE SERPL-MCNC: 178 MG/DL (ref 65–140)
GLUCOSE SERPL-MCNC: 70 MG/DL (ref 65–140)
GLUCOSE SERPL-MCNC: 78 MG/DL (ref 65–140)
GLUCOSE SERPL-MCNC: 78 MG/DL (ref 65–140)
HCO3 BLDA-SCNC: 22.7 MMOL/L (ref 22–28)
HCO3 BLDA-SCNC: 23 MMOL/L (ref 24–30)
HCO3 BLDA-SCNC: 25.8 MMOL/L (ref 24–30)
HCO3 BLDA-SCNC: 26 MMOL/L (ref 24–30)
HCO3 BLDA-SCNC: 26.1 MMOL/L (ref 22–28)
HCO3 BLDA-SCNC: 26.1 MMOL/L (ref 22–28)
HCT VFR BLD AUTO: 33 % (ref 36.5–49.3)
HCT VFR BLD AUTO: 33.9 % (ref 36.5–49.3)
HCT VFR BLD AUTO: 37 % (ref 36.5–49.3)
HCT VFR BLD CALC: 24 % (ref 36.5–49.3)
HCT VFR BLD CALC: 25 % (ref 36.5–49.3)
HCT VFR BLD CALC: 25 % (ref 36.5–49.3)
HCT VFR BLD CALC: 26 % (ref 36.5–49.3)
HCT VFR BLD CALC: 29 % (ref 36.5–49.3)
HCT VFR BLD CALC: 36 % (ref 36.5–49.3)
HGB BLD-MCNC: 10.8 G/DL (ref 12–17)
HGB BLD-MCNC: 11 G/DL (ref 12–17)
HGB BLD-MCNC: 12.1 G/DL (ref 12–17)
HGB BLDA-MCNC: 12.2 G/DL (ref 12–17)
HGB BLDA-MCNC: 8.2 G/DL (ref 12–17)
HGB BLDA-MCNC: 8.5 G/DL (ref 12–17)
HGB BLDA-MCNC: 8.5 G/DL (ref 12–17)
HGB BLDA-MCNC: 8.8 G/DL (ref 12–17)
HGB BLDA-MCNC: 9.9 G/DL (ref 12–17)
IMM GRANULOCYTES # BLD AUTO: 0.03 THOUSAND/UL (ref 0–0.2)
IMM GRANULOCYTES NFR BLD AUTO: 1 % (ref 0–2)
INR PPP: 1.34 (ref 0.85–1.19)
KCT BLD-ACNC: 116 SEC (ref 89–137)
KCT BLD-ACNC: 123 SEC (ref 89–137)
KCT BLD-ACNC: 368 SEC (ref 89–137)
KCT BLD-ACNC: 429 SEC (ref 89–137)
KCT BLD-ACNC: 429 SEC (ref 89–137)
KCT BLD-ACNC: 469 SEC (ref 89–137)
KCT BLD-ACNC: 482 SEC (ref 89–137)
LYMPHOCYTES # BLD AUTO: 0.66 THOUSANDS/ÂΜL (ref 0.6–4.47)
LYMPHOCYTES NFR BLD AUTO: 18 % (ref 14–44)
MCH RBC QN AUTO: 28.1 PG (ref 26.8–34.3)
MCHC RBC AUTO-ENTMCNC: 32.7 G/DL (ref 31.4–37.4)
MCV RBC AUTO: 86 FL (ref 82–98)
MONOCYTES # BLD AUTO: 0.51 THOUSAND/ÂΜL (ref 0.17–1.22)
MONOCYTES NFR BLD AUTO: 14 % (ref 4–12)
NEUTROPHILS # BLD AUTO: 2.25 THOUSANDS/ÂΜL (ref 1.85–7.62)
NEUTS SEG NFR BLD AUTO: 59 % (ref 43–75)
NRBC BLD AUTO-RTO: 0 /100 WBCS
P AXIS: 17 DEGREES
PCO2 BLD: 24 MMOL/L (ref 21–32)
PCO2 BLD: 24 MMOL/L (ref 21–32)
PCO2 BLD: 27 MMOL/L (ref 21–32)
PCO2 BLD: 28 MMOL/L (ref 21–32)
PCO2 BLD: 39 MM HG (ref 36–44)
PCO2 BLD: 43.3 MM HG (ref 42–50)
PCO2 BLD: 46.5 MM HG (ref 36–44)
PCO2 BLD: 49.9 MM HG (ref 36–44)
PCO2 BLD: 51.5 MM HG (ref 42–50)
PCO2 BLD: 52.9 MM HG (ref 42–50)
PH BLD: 7.3 [PH] (ref 7.3–7.4)
PH BLD: 7.31 [PH] (ref 7.3–7.4)
PH BLD: 7.33 [PH] (ref 7.35–7.45)
PH BLD: 7.33 [PH] (ref 7.3–7.4)
PH BLD: 7.36 [PH] (ref 7.35–7.45)
PH BLD: 7.37 [PH] (ref 7.35–7.45)
PLATELET # BLD AUTO: 104 THOUSANDS/UL (ref 149–390)
PLATELET # BLD AUTO: 123 THOUSANDS/UL (ref 149–390)
PLATELET # BLD AUTO: 127 THOUSANDS/UL (ref 149–390)
PMV BLD AUTO: 9.4 FL (ref 8.9–12.7)
PMV BLD AUTO: 9.6 FL (ref 8.9–12.7)
PMV BLD AUTO: 9.6 FL (ref 8.9–12.7)
PO2 BLD: 159 MM HG (ref 35–45)
PO2 BLD: 362 MM HG (ref 75–129)
PO2 BLD: 383 MM HG (ref 75–129)
PO2 BLD: 46 MM HG (ref 35–45)
PO2 BLD: 58 MM HG (ref 35–45)
PO2 BLD: 80 MM HG (ref 75–129)
POTASSIUM BLD-SCNC: 3.9 MMOL/L (ref 3.5–5.3)
POTASSIUM BLD-SCNC: 4.1 MMOL/L (ref 3.5–5.3)
POTASSIUM BLD-SCNC: 4.3 MMOL/L (ref 3.5–5.3)
POTASSIUM BLD-SCNC: 4.3 MMOL/L (ref 3.5–5.3)
POTASSIUM BLD-SCNC: 4.7 MMOL/L (ref 3.5–5.3)
POTASSIUM BLD-SCNC: 4.7 MMOL/L (ref 3.5–5.3)
POTASSIUM SERPL-SCNC: 3.7 MMOL/L (ref 3.5–5.3)
POTASSIUM SERPL-SCNC: 3.9 MMOL/L (ref 3.5–5.3)
POTASSIUM SERPL-SCNC: 4.6 MMOL/L (ref 3.5–5.3)
POTASSIUM SERPL-SCNC: 4.9 MMOL/L (ref 3.5–5.3)
PR INTERVAL: 244 MS
PROTHROMBIN TIME: 16.9 SECONDS (ref 12.3–15)
QRS AXIS: 10 DEGREES
QRSD INTERVAL: 142 MS
QT INTERVAL: 512 MS
QTC INTERVAL: 442 MS
RBC # BLD AUTO: 4.3 MILLION/UL (ref 3.88–5.62)
SAO2 % BLD FROM PO2: 100 % (ref 60–85)
SAO2 % BLD FROM PO2: 100 % (ref 60–85)
SAO2 % BLD FROM PO2: 77 % (ref 60–85)
SAO2 % BLD FROM PO2: 86 % (ref 60–85)
SAO2 % BLD FROM PO2: 95 % (ref 60–85)
SAO2 % BLD FROM PO2: 99 % (ref 60–85)
SODIUM BLD-SCNC: 135 MMOL/L (ref 136–145)
SODIUM BLD-SCNC: 136 MMOL/L (ref 136–145)
SODIUM BLD-SCNC: 136 MMOL/L (ref 136–145)
SODIUM BLD-SCNC: 137 MMOL/L (ref 136–145)
SODIUM BLD-SCNC: 138 MMOL/L (ref 136–145)
SODIUM BLD-SCNC: 140 MMOL/L (ref 136–145)
SODIUM SERPL-SCNC: 139 MMOL/L (ref 135–147)
SODIUM SERPL-SCNC: 140 MMOL/L (ref 135–147)
SPECIMEN SOURCE: ABNORMAL
SPECIMEN SOURCE: NORMAL
SPECIMEN SOURCE: NORMAL
T WAVE AXIS: 18 DEGREES
VENTRICULAR RATE: 45 BPM
WBC # BLD AUTO: 3.72 THOUSAND/UL (ref 4.31–10.16)

## 2024-11-06 PROCEDURE — 80048 BASIC METABOLIC PNL TOTAL CA: CPT | Performed by: PHYSICIAN ASSISTANT

## 2024-11-06 PROCEDURE — 85014 HEMATOCRIT: CPT

## 2024-11-06 PROCEDURE — A7041 WATER SEAL DRAIN CONTAINER: HCPCS | Performed by: THORACIC SURGERY (CARDIOTHORACIC VASCULAR SURGERY)

## 2024-11-06 PROCEDURE — 94002 VENT MGMT INPAT INIT DAY: CPT

## 2024-11-06 PROCEDURE — 85384 FIBRINOGEN ACTIVITY: CPT | Performed by: PHYSICIAN ASSISTANT

## 2024-11-06 PROCEDURE — 5A1221Z PERFORMANCE OF CARDIAC OUTPUT, CONTINUOUS: ICD-10-PCS | Performed by: THORACIC SURGERY (CARDIOTHORACIC VASCULAR SURGERY)

## 2024-11-06 PROCEDURE — 99291 CRITICAL CARE FIRST HOUR: CPT | Performed by: STUDENT IN AN ORGANIZED HEALTH CARE EDUCATION/TRAINING PROGRAM

## 2024-11-06 PROCEDURE — 84295 ASSAY OF SERUM SODIUM: CPT

## 2024-11-06 PROCEDURE — 33518 CABG ARTERY-VEIN TWO: CPT | Performed by: PHYSICIAN ASSISTANT

## 2024-11-06 PROCEDURE — 94664 DEMO&/EVAL PT USE INHALER: CPT

## 2024-11-06 PROCEDURE — 33508 ENDOSCOPIC VEIN HARVEST: CPT | Performed by: PHYSICIAN ASSISTANT

## 2024-11-06 PROCEDURE — 33533 CABG ARTERIAL SINGLE: CPT | Performed by: THORACIC SURGERY (CARDIOTHORACIC VASCULAR SURGERY)

## 2024-11-06 PROCEDURE — 82948 REAGENT STRIP/BLOOD GLUCOSE: CPT

## 2024-11-06 PROCEDURE — 02100Z9 BYPASS CORONARY ARTERY, ONE ARTERY FROM LEFT INTERNAL MAMMARY, OPEN APPROACH: ICD-10-PCS | Performed by: THORACIC SURGERY (CARDIOTHORACIC VASCULAR SURGERY)

## 2024-11-06 PROCEDURE — 93355 ECHO TRANSESOPHAGEAL (TEE): CPT

## 2024-11-06 PROCEDURE — 93005 ELECTROCARDIOGRAM TRACING: CPT

## 2024-11-06 PROCEDURE — 85018 HEMOGLOBIN: CPT | Performed by: PHYSICIAN ASSISTANT

## 2024-11-06 PROCEDURE — 85014 HEMATOCRIT: CPT | Performed by: PHYSICIAN ASSISTANT

## 2024-11-06 PROCEDURE — 06BQ4ZZ EXCISION OF LEFT SAPHENOUS VEIN, PERCUTANEOUS ENDOSCOPIC APPROACH: ICD-10-PCS | Performed by: THORACIC SURGERY (CARDIOTHORACIC VASCULAR SURGERY)

## 2024-11-06 PROCEDURE — 84132 ASSAY OF SERUM POTASSIUM: CPT

## 2024-11-06 PROCEDURE — 33518 CABG ARTERY-VEIN TWO: CPT | Performed by: THORACIC SURGERY (CARDIOTHORACIC VASCULAR SURGERY)

## 2024-11-06 PROCEDURE — 85730 THROMBOPLASTIN TIME PARTIAL: CPT | Performed by: PHYSICIAN ASSISTANT

## 2024-11-06 PROCEDURE — 82947 ASSAY GLUCOSE BLOOD QUANT: CPT

## 2024-11-06 PROCEDURE — 85610 PROTHROMBIN TIME: CPT | Performed by: PHYSICIAN ASSISTANT

## 2024-11-06 PROCEDURE — 33533 CABG ARTERIAL SINGLE: CPT | Performed by: PHYSICIAN ASSISTANT

## 2024-11-06 PROCEDURE — 80048 BASIC METABOLIC PNL TOTAL CA: CPT

## 2024-11-06 PROCEDURE — 93010 ELECTROCARDIOGRAM REPORT: CPT | Performed by: INTERNAL MEDICINE

## 2024-11-06 PROCEDURE — 94760 N-INVAS EAR/PLS OXIMETRY 1: CPT

## 2024-11-06 PROCEDURE — 94640 AIRWAY INHALATION TREATMENT: CPT

## 2024-11-06 PROCEDURE — 82803 BLOOD GASES ANY COMBINATION: CPT

## 2024-11-06 PROCEDURE — 99232 SBSQ HOSP IP/OBS MODERATE 35: CPT | Performed by: INTERNAL MEDICINE

## 2024-11-06 PROCEDURE — 85049 AUTOMATED PLATELET COUNT: CPT | Performed by: THORACIC SURGERY (CARDIOTHORACIC VASCULAR SURGERY)

## 2024-11-06 PROCEDURE — 82330 ASSAY OF CALCIUM: CPT

## 2024-11-06 PROCEDURE — 85025 COMPLETE CBC W/AUTO DIFF WBC: CPT

## 2024-11-06 PROCEDURE — 02HV33Z INSERTION OF INFUSION DEVICE INTO SUPERIOR VENA CAVA, PERCUTANEOUS APPROACH: ICD-10-PCS | Performed by: STUDENT IN AN ORGANIZED HEALTH CARE EDUCATION/TRAINING PROGRAM

## 2024-11-06 PROCEDURE — 85347 COAGULATION TIME ACTIVATED: CPT

## 2024-11-06 PROCEDURE — 30233N0 TRANSFUSION OF AUTOLOGOUS RED BLOOD CELLS INTO PERIPHERAL VEIN, PERCUTANEOUS APPROACH: ICD-10-PCS | Performed by: THORACIC SURGERY (CARDIOTHORACIC VASCULAR SURGERY)

## 2024-11-06 PROCEDURE — 85049 AUTOMATED PLATELET COUNT: CPT | Performed by: PHYSICIAN ASSISTANT

## 2024-11-06 PROCEDURE — 021109W BYPASS CORONARY ARTERY, TWO ARTERIES FROM AORTA WITH AUTOLOGOUS VENOUS TISSUE, OPEN APPROACH: ICD-10-PCS | Performed by: THORACIC SURGERY (CARDIOTHORACIC VASCULAR SURGERY)

## 2024-11-06 PROCEDURE — 71045 X-RAY EXAM CHEST 1 VIEW: CPT

## 2024-11-06 PROCEDURE — 33508 ENDOSCOPIC VEIN HARVEST: CPT | Performed by: THORACIC SURGERY (CARDIOTHORACIC VASCULAR SURGERY)

## 2024-11-06 PROCEDURE — 84132 ASSAY OF SERUM POTASSIUM: CPT | Performed by: PHYSICIAN ASSISTANT

## 2024-11-06 PROCEDURE — B24BZZ4 ULTRASONOGRAPHY OF HEART WITH AORTA, TRANSESOPHAGEAL: ICD-10-PCS | Performed by: STUDENT IN AN ORGANIZED HEALTH CARE EDUCATION/TRAINING PROGRAM

## 2024-11-06 PROCEDURE — 5A1223Z PERFORMANCE OF CARDIAC PACING, CONTINUOUS: ICD-10-PCS | Performed by: THORACIC SURGERY (CARDIOTHORACIC VASCULAR SURGERY)

## 2024-11-06 DEVICE — MARKER CORONARY BYPASS VOSS GRAFT: Type: IMPLANTABLE DEVICE | Site: HEART | Status: FUNCTIONAL

## 2024-11-06 DEVICE — FIBERTAPE STERNAL CLOSURE W/ CUTTING NDL
Type: IMPLANTABLE DEVICE | Site: HEART | Status: FUNCTIONAL
Brand: ARTHREX®

## 2024-11-06 DEVICE — TIGERTAPE STERNAL CLOSURE W/ BLUNT NDL
Type: IMPLANTABLE DEVICE | Site: HEART | Status: FUNCTIONAL
Brand: ARTHREX®

## 2024-11-06 RX ORDER — ONDANSETRON 2 MG/ML
4 INJECTION INTRAMUSCULAR; INTRAVENOUS EVERY 6 HOURS PRN
Status: DISCONTINUED | OUTPATIENT
Start: 2024-11-06 | End: 2024-11-07

## 2024-11-06 RX ORDER — SODIUM CHLORIDE 450 MG/100ML
20 INJECTION, SOLUTION INTRAVENOUS CONTINUOUS
Status: DISCONTINUED | OUTPATIENT
Start: 2024-11-06 | End: 2024-11-10

## 2024-11-06 RX ORDER — MAGNESIUM SULFATE 500 MG/ML
VIAL (ML) INJECTION AS NEEDED
Status: DISCONTINUED | OUTPATIENT
Start: 2024-11-06 | End: 2024-11-06

## 2024-11-06 RX ORDER — HYDROMORPHONE HCL/PF 1 MG/ML
0.5 SYRINGE (ML) INJECTION EVERY 2 HOUR PRN
Status: DISCONTINUED | OUTPATIENT
Start: 2024-11-06 | End: 2024-11-07

## 2024-11-06 RX ORDER — HEPARIN SODIUM 1000 [USP'U]/ML
INJECTION, SOLUTION INTRAVENOUS; SUBCUTANEOUS AS NEEDED
Status: DISCONTINUED | OUTPATIENT
Start: 2024-11-06 | End: 2024-11-06

## 2024-11-06 RX ORDER — PANTOPRAZOLE SODIUM 40 MG/1
40 TABLET, DELAYED RELEASE ORAL DAILY
Status: DISCONTINUED | OUTPATIENT
Start: 2024-11-06 | End: 2024-11-07

## 2024-11-06 RX ORDER — ALBUMIN HUMAN 50 G/1000ML
12.5 SOLUTION INTRAVENOUS ONCE
Status: COMPLETED | OUTPATIENT
Start: 2024-11-06 | End: 2024-11-06

## 2024-11-06 RX ORDER — ROCURONIUM BROMIDE 10 MG/ML
INJECTION, SOLUTION INTRAVENOUS AS NEEDED
Status: DISCONTINUED | OUTPATIENT
Start: 2024-11-06 | End: 2024-11-06

## 2024-11-06 RX ORDER — SODIUM CHLORIDE, SODIUM GLUCONATE, SODIUM ACETATE, POTASSIUM CHLORIDE, MAGNESIUM CHLORIDE, SODIUM PHOSPHATE, DIBASIC, AND POTASSIUM PHOSPHATE .53; .5; .37; .037; .03; .012; .00082 G/100ML; G/100ML; G/100ML; G/100ML; G/100ML; G/100ML; G/100ML
INJECTION, SOLUTION INTRAVENOUS AS NEEDED
Status: DISCONTINUED | OUTPATIENT
Start: 2024-11-06 | End: 2024-11-06

## 2024-11-06 RX ORDER — ATORVASTATIN CALCIUM 80 MG/1
80 TABLET, FILM COATED ORAL
Status: DISCONTINUED | OUTPATIENT
Start: 2024-11-06 | End: 2024-11-11 | Stop reason: HOSPADM

## 2024-11-06 RX ORDER — ACETAMINOPHEN 325 MG/1
975 TABLET ORAL
Status: DISCONTINUED | OUTPATIENT
Start: 2024-11-06 | End: 2024-11-07

## 2024-11-06 RX ORDER — MAGNESIUM HYDROXIDE 1200 MG/15ML
LIQUID ORAL AS NEEDED
Status: DISCONTINUED | OUTPATIENT
Start: 2024-11-06 | End: 2024-11-06 | Stop reason: HOSPADM

## 2024-11-06 RX ORDER — VANCOMYCIN HYDROCHLORIDE 1 G/20ML
INJECTION, POWDER, LYOPHILIZED, FOR SOLUTION INTRAVENOUS AS NEEDED
Status: DISCONTINUED | OUTPATIENT
Start: 2024-11-06 | End: 2024-11-06 | Stop reason: HOSPADM

## 2024-11-06 RX ORDER — CHLORHEXIDINE GLUCONATE ORAL RINSE 1.2 MG/ML
15 SOLUTION DENTAL 2 TIMES DAILY
Status: DISCONTINUED | OUTPATIENT
Start: 2024-11-06 | End: 2024-11-09

## 2024-11-06 RX ORDER — ALBUMIN, HUMAN INJ 5% 5 %
SOLUTION INTRAVENOUS AS NEEDED
Status: DISCONTINUED | OUTPATIENT
Start: 2024-11-06 | End: 2024-11-06

## 2024-11-06 RX ORDER — CALCIUM CHLORIDE 100 MG/ML
INJECTION INTRAVENOUS; INTRAVENTRICULAR AS NEEDED
Status: DISCONTINUED | OUTPATIENT
Start: 2024-11-06 | End: 2024-11-06

## 2024-11-06 RX ORDER — SODIUM CHLORIDE 9 MG/ML
INJECTION, SOLUTION INTRAVENOUS CONTINUOUS PRN
Status: DISCONTINUED | OUTPATIENT
Start: 2024-11-06 | End: 2024-11-06

## 2024-11-06 RX ORDER — WARFARIN SODIUM 2.5 MG/1
2.5 TABLET ORAL
Status: COMPLETED | OUTPATIENT
Start: 2024-11-06 | End: 2024-11-06

## 2024-11-06 RX ORDER — HEPARIN SODIUM 1000 [USP'U]/ML
10000 INJECTION, SOLUTION INTRAVENOUS; SUBCUTANEOUS ONCE
Status: DISCONTINUED | OUTPATIENT
Start: 2024-11-06 | End: 2024-11-06 | Stop reason: HOSPADM

## 2024-11-06 RX ORDER — CALCIUM GLUCONATE 20 MG/ML
2 INJECTION, SOLUTION INTRAVENOUS ONCE AS NEEDED
Status: DISCONTINUED | OUTPATIENT
Start: 2024-11-06 | End: 2024-11-08

## 2024-11-06 RX ORDER — POTASSIUM CHLORIDE 14.9 MG/ML
20 INJECTION INTRAVENOUS ONCE AS NEEDED
Status: DISCONTINUED | OUTPATIENT
Start: 2024-11-06 | End: 2024-11-07

## 2024-11-06 RX ORDER — CEFAZOLIN SODIUM 2 G/50ML
2000 SOLUTION INTRAVENOUS ONCE
Status: DISCONTINUED | OUTPATIENT
Start: 2024-11-06 | End: 2024-11-06 | Stop reason: HOSPADM

## 2024-11-06 RX ORDER — ACETAMINOPHEN 650 MG/1
650 SUPPOSITORY RECTAL EVERY 4 HOURS PRN
Status: DISCONTINUED | OUTPATIENT
Start: 2024-11-06 | End: 2024-11-11 | Stop reason: HOSPADM

## 2024-11-06 RX ORDER — ASPIRIN 325 MG
325 TABLET ORAL DAILY
Status: DISCONTINUED | OUTPATIENT
Start: 2024-11-06 | End: 2024-11-07

## 2024-11-06 RX ORDER — OXYCODONE HYDROCHLORIDE 5 MG/1
2.5 TABLET ORAL EVERY 4 HOURS PRN
Status: DISCONTINUED | OUTPATIENT
Start: 2024-11-06 | End: 2024-11-07

## 2024-11-06 RX ORDER — AMINOCAPROIC ACID 250 MG/ML
INJECTION, SOLUTION INTRAVENOUS AS NEEDED
Status: DISCONTINUED | OUTPATIENT
Start: 2024-11-06 | End: 2024-11-06

## 2024-11-06 RX ORDER — PROTAMINE SULFATE 10 MG/ML
INJECTION, SOLUTION INTRAVENOUS AS NEEDED
Status: DISCONTINUED | OUTPATIENT
Start: 2024-11-06 | End: 2024-11-06

## 2024-11-06 RX ORDER — PROPOFOL 10 MG/ML
INJECTION, EMULSION INTRAVENOUS AS NEEDED
Status: DISCONTINUED | OUTPATIENT
Start: 2024-11-06 | End: 2024-11-06

## 2024-11-06 RX ORDER — LIDOCAINE HCL/PF 100 MG/5ML
SYRINGE (ML) INJECTION AS NEEDED
Status: DISCONTINUED | OUTPATIENT
Start: 2024-11-06 | End: 2024-11-06

## 2024-11-06 RX ORDER — AMIODARONE HYDROCHLORIDE 200 MG/1
200 TABLET ORAL EVERY 8 HOURS SCHEDULED
Status: DISCONTINUED | OUTPATIENT
Start: 2024-11-06 | End: 2024-11-11 | Stop reason: HOSPADM

## 2024-11-06 RX ORDER — OXYCODONE HYDROCHLORIDE 5 MG/1
5 TABLET ORAL EVERY 4 HOURS PRN
Status: DISCONTINUED | OUTPATIENT
Start: 2024-11-06 | End: 2024-11-07

## 2024-11-06 RX ORDER — MAGNESIUM SULFATE HEPTAHYDRATE 40 MG/ML
2 INJECTION, SOLUTION INTRAVENOUS ONCE
Status: COMPLETED | OUTPATIENT
Start: 2024-11-06 | End: 2024-11-06

## 2024-11-06 RX ORDER — FENTANYL CITRATE 50 UG/ML
50 INJECTION, SOLUTION INTRAMUSCULAR; INTRAVENOUS ONCE
Status: COMPLETED | OUTPATIENT
Start: 2024-11-06 | End: 2024-11-06

## 2024-11-06 RX ORDER — ALBUMIN HUMAN 50 G/1000ML
SOLUTION INTRAVENOUS
Status: COMPLETED
Start: 2024-11-06 | End: 2024-11-06

## 2024-11-06 RX ORDER — FONDAPARINUX SODIUM 2.5 MG/.5ML
2.5 INJECTION SUBCUTANEOUS DAILY
Status: DISCONTINUED | OUTPATIENT
Start: 2024-11-07 | End: 2024-11-07

## 2024-11-06 RX ORDER — HEPARIN SODIUM 1000 [USP'U]/ML
400 INJECTION, SOLUTION INTRAVENOUS; SUBCUTANEOUS ONCE
Status: DISCONTINUED | OUTPATIENT
Start: 2024-11-06 | End: 2024-11-06 | Stop reason: HOSPADM

## 2024-11-06 RX ORDER — CEFAZOLIN SODIUM 2 G/50ML
2000 SOLUTION INTRAVENOUS EVERY 8 HOURS
Status: COMPLETED | OUTPATIENT
Start: 2024-11-06 | End: 2024-11-07

## 2024-11-06 RX ORDER — POTASSIUM CHLORIDE 29.8 MG/ML
40 INJECTION INTRAVENOUS ONCE AS NEEDED
Status: DISCONTINUED | OUTPATIENT
Start: 2024-11-06 | End: 2024-11-07

## 2024-11-06 RX ORDER — POLYETHYLENE GLYCOL 3350 17 G/17G
17 POWDER, FOR SOLUTION ORAL DAILY
Status: DISCONTINUED | OUTPATIENT
Start: 2024-11-07 | End: 2024-11-11 | Stop reason: HOSPADM

## 2024-11-06 RX ORDER — MANNITOL 250 MG/ML
INJECTION, SOLUTION INTRAVENOUS AS NEEDED
Status: DISCONTINUED | OUTPATIENT
Start: 2024-11-06 | End: 2024-11-06

## 2024-11-06 RX ORDER — ACETAMINOPHEN 10 MG/ML
1000 INJECTION, SOLUTION INTRAVENOUS ONCE
Status: COMPLETED | OUTPATIENT
Start: 2024-11-06 | End: 2024-11-06

## 2024-11-06 RX ORDER — POTASSIUM CHLORIDE 29.8 MG/ML
40 INJECTION INTRAVENOUS ONCE AS NEEDED
Status: COMPLETED | OUTPATIENT
Start: 2024-11-06 | End: 2024-11-06

## 2024-11-06 RX ORDER — CEFAZOLIN SODIUM 2 G/50ML
SOLUTION INTRAVENOUS AS NEEDED
Status: DISCONTINUED | OUTPATIENT
Start: 2024-11-06 | End: 2024-11-06

## 2024-11-06 RX ORDER — MIDAZOLAM HYDROCHLORIDE 2 MG/2ML
INJECTION, SOLUTION INTRAMUSCULAR; INTRAVENOUS AS NEEDED
Status: DISCONTINUED | OUTPATIENT
Start: 2024-11-06 | End: 2024-11-06

## 2024-11-06 RX ORDER — LIDOCAINE HYDROCHLORIDE 10 MG/ML
INJECTION, SOLUTION EPIDURAL; INFILTRATION; INTRACAUDAL; PERINEURAL AS NEEDED
Status: DISCONTINUED | OUTPATIENT
Start: 2024-11-06 | End: 2024-11-06

## 2024-11-06 RX ORDER — ONDANSETRON 2 MG/ML
INJECTION INTRAMUSCULAR; INTRAVENOUS AS NEEDED
Status: DISCONTINUED | OUTPATIENT
Start: 2024-11-06 | End: 2024-11-06

## 2024-11-06 RX ORDER — AMIODARONE HYDROCHLORIDE 150 MG/3ML
INJECTION, SOLUTION INTRAVENOUS AS NEEDED
Status: DISCONTINUED | OUTPATIENT
Start: 2024-11-06 | End: 2024-11-06

## 2024-11-06 RX ORDER — BISACODYL 10 MG
10 SUPPOSITORY, RECTAL RECTAL DAILY PRN
Status: DISCONTINUED | OUTPATIENT
Start: 2024-11-06 | End: 2024-11-11

## 2024-11-06 RX ORDER — FENTANYL CITRATE 50 UG/ML
INJECTION, SOLUTION INTRAMUSCULAR; INTRAVENOUS AS NEEDED
Status: DISCONTINUED | OUTPATIENT
Start: 2024-11-06 | End: 2024-11-06

## 2024-11-06 RX ORDER — FENTANYL CITRATE 50 UG/ML
50 INJECTION, SOLUTION INTRAMUSCULAR; INTRAVENOUS
Status: DISCONTINUED | OUTPATIENT
Start: 2024-11-06 | End: 2024-11-07

## 2024-11-06 RX ADMIN — CEFAZOLIN SODIUM 2000 MG: 2 SOLUTION INTRAVENOUS at 12:01

## 2024-11-06 RX ADMIN — HYDROMORPHONE HYDROCHLORIDE 0.5 MG: 1 INJECTION, SOLUTION INTRAMUSCULAR; INTRAVENOUS; SUBCUTANEOUS at 21:25

## 2024-11-06 RX ADMIN — Medication 650 ML: at 10:44

## 2024-11-06 RX ADMIN — ALBUMIN (HUMAN) 12.5 G: 12.5 INJECTION, SOLUTION INTRAVENOUS at 15:10

## 2024-11-06 RX ADMIN — HEPARIN SODIUM 7500 UNITS: 1000 INJECTION INTRAVENOUS; SUBCUTANEOUS at 10:23

## 2024-11-06 RX ADMIN — SODIUM CHLORIDE 20 ML/HR: 0.45 INJECTION, SOLUTION INTRAVENOUS at 12:55

## 2024-11-06 RX ADMIN — MIDAZOLAM 2 MG: 1 INJECTION INTRAMUSCULAR; INTRAVENOUS at 08:19

## 2024-11-06 RX ADMIN — MUPIROCIN 1 APPLICATION: 20 OINTMENT TOPICAL at 05:50

## 2024-11-06 RX ADMIN — SUGAMMADEX 200 MG: 100 INJECTION, SOLUTION INTRAVENOUS at 12:50

## 2024-11-06 RX ADMIN — LEVALBUTEROL HYDROCHLORIDE 1.25 MG: 1.25 SOLUTION RESPIRATORY (INHALATION) at 19:42

## 2024-11-06 RX ADMIN — CALCIUM CHLORIDE 0.5 G: 100 INJECTION INTRAVENOUS; INTRAVENTRICULAR at 11:55

## 2024-11-06 RX ADMIN — AMIODARONE HYDROCHLORIDE 200 MG: 200 TABLET ORAL at 17:14

## 2024-11-06 RX ADMIN — CHLORHEXIDINE GLUCONATE 0.12% ORAL RINSE 15 ML: 1.2 LIQUID ORAL at 05:50

## 2024-11-06 RX ADMIN — HYDROMORPHONE HYDROCHLORIDE 0.5 MG: 1 INJECTION, SOLUTION INTRAMUSCULAR; INTRAVENOUS; SUBCUTANEOUS at 23:53

## 2024-11-06 RX ADMIN — PANTOPRAZOLE SODIUM 40 MG: 40 TABLET, DELAYED RELEASE ORAL at 05:50

## 2024-11-06 RX ADMIN — ROCURONIUM BROMIDE 100 MG: 10 INJECTION, SOLUTION INTRAVENOUS at 08:32

## 2024-11-06 RX ADMIN — PHENYLEPHRINE HYDROCHLORIDE 50 MCG/MIN: 10 INJECTION INTRAVENOUS at 08:40

## 2024-11-06 RX ADMIN — HEPARIN SODIUM 7000 UNITS: 1000 INJECTION INTRAVENOUS; SUBCUTANEOUS at 10:41

## 2024-11-06 RX ADMIN — SODIUM CHLORIDE, SODIUM GLUCONATE, SODIUM ACETATE, POTASSIUM CHLORIDE, MAGNESIUM CHLORIDE, SODIUM PHOSPHATE, DIBASIC, AND POTASSIUM PHOSPHATE 500 ML: .53; .5; .37; .037; .03; .012; .00082 INJECTION, SOLUTION INTRAVENOUS at 08:04

## 2024-11-06 RX ADMIN — FENTANYL CITRATE 50 MCG: 50 INJECTION INTRAMUSCULAR; INTRAVENOUS at 14:15

## 2024-11-06 RX ADMIN — OXYCODONE HYDROCHLORIDE 5 MG: 5 TABLET ORAL at 15:11

## 2024-11-06 RX ADMIN — LIDOCAINE HYDROCHLORIDE 100 MG: 20 INJECTION INTRAVENOUS at 11:43

## 2024-11-06 RX ADMIN — FENTANYL CITRATE 250 MCG: 50 INJECTION INTRAMUSCULAR; INTRAVENOUS at 09:26

## 2024-11-06 RX ADMIN — ACETAMINOPHEN 1000 MG: 10 INJECTION INTRAVENOUS at 16:49

## 2024-11-06 RX ADMIN — OXYCODONE HYDROCHLORIDE 5 MG: 5 TABLET ORAL at 20:24

## 2024-11-06 RX ADMIN — MANNITOL 25 G: 12.5 INJECTION, SOLUTION INTRAVENOUS at 10:47

## 2024-11-06 RX ADMIN — SODIUM CHLORIDE, SODIUM LACTATE, POTASSIUM CHLORIDE, AND CALCIUM CHLORIDE 500 ML: .6; .31; .03; .02 INJECTION, SOLUTION INTRAVENOUS at 13:39

## 2024-11-06 RX ADMIN — ATORVASTATIN CALCIUM 80 MG: 80 TABLET, FILM COATED ORAL at 15:33

## 2024-11-06 RX ADMIN — FENTANYL CITRATE 250 MCG: 50 INJECTION INTRAMUSCULAR; INTRAVENOUS at 10:41

## 2024-11-06 RX ADMIN — PROPOFOL 50 MG: 10 INJECTION, EMULSION INTRAVENOUS at 08:31

## 2024-11-06 RX ADMIN — MAGNESIUM SULFATE HEPTAHYDRATE 2 G: 40 INJECTION, SOLUTION INTRAVENOUS at 13:17

## 2024-11-06 RX ADMIN — PANTOPRAZOLE SODIUM 40 MG: 40 TABLET, DELAYED RELEASE ORAL at 15:13

## 2024-11-06 RX ADMIN — CEFAZOLIN SODIUM 2000 MG: 2 SOLUTION INTRAVENOUS at 19:33

## 2024-11-06 RX ADMIN — MAGNESIUM SULFATE HEPTAHYDRATE 2 G: 500 INJECTION, SOLUTION INTRAMUSCULAR; INTRAVENOUS at 11:28

## 2024-11-06 RX ADMIN — SODIUM CHLORIDE 2 UNITS/HR: 9 INJECTION, SOLUTION INTRAVENOUS at 09:35

## 2024-11-06 RX ADMIN — AMIODARONE HYDROCHLORIDE 150 MG: 50 INJECTION, SOLUTION INTRAVENOUS at 11:32

## 2024-11-06 RX ADMIN — POTASSIUM CHLORIDE 40 MEQ: 29.8 INJECTION, SOLUTION INTRAVENOUS at 15:13

## 2024-11-06 RX ADMIN — LIDOCAINE HYDROCHLORIDE 5 ML: 10 INJECTION, SOLUTION EPIDURAL; INFILTRATION; INTRACAUDAL; PERINEURAL at 08:31

## 2024-11-06 RX ADMIN — FENTANYL CITRATE 200 MCG: 50 INJECTION INTRAMUSCULAR; INTRAVENOUS at 09:25

## 2024-11-06 RX ADMIN — CHLORHEXIDINE GLUCONATE 0.12% ORAL RINSE 15 ML: 1.2 LIQUID ORAL at 17:14

## 2024-11-06 RX ADMIN — PROTAMINE SULFATE 200 MG: 10 INJECTION, SOLUTION INTRAVENOUS at 11:57

## 2024-11-06 RX ADMIN — SODIUM CHLORIDE: 9 INJECTION, SOLUTION INTRAVENOUS at 08:58

## 2024-11-06 RX ADMIN — ALBUMIN (HUMAN) 12.5 G: 12.5 INJECTION, SOLUTION INTRAVENOUS at 19:33

## 2024-11-06 RX ADMIN — ONDANSETRON 4 MG: 2 INJECTION INTRAMUSCULAR; INTRAVENOUS at 12:22

## 2024-11-06 RX ADMIN — MUPIROCIN 1 APPLICATION: 20 OINTMENT TOPICAL at 20:04

## 2024-11-06 RX ADMIN — FENTANYL CITRATE 50 MCG: 50 INJECTION INTRAMUSCULAR; INTRAVENOUS at 16:10

## 2024-11-06 RX ADMIN — AMINOCAPROIC ACID 5 G: 250 INJECTION, SOLUTION INTRAVENOUS at 09:36

## 2024-11-06 RX ADMIN — HEPARIN SODIUM 22600 UNITS: 1000 INJECTION INTRAVENOUS; SUBCUTANEOUS at 10:10

## 2024-11-06 RX ADMIN — SODIUM BICARBONATE 50 MEQ: 84 INJECTION, SOLUTION INTRAVENOUS at 10:44

## 2024-11-06 RX ADMIN — CEFAZOLIN SODIUM 2000 MG: 2 SOLUTION INTRAVENOUS at 09:02

## 2024-11-06 RX ADMIN — ROCURONIUM BROMIDE 20 MG: 10 INJECTION, SOLUTION INTRAVENOUS at 10:48

## 2024-11-06 RX ADMIN — HEPARIN SODIUM 5000 UNITS: 1000 INJECTION INTRAVENOUS; SUBCUTANEOUS at 09:54

## 2024-11-06 RX ADMIN — Medication 12.5 MG: at 05:50

## 2024-11-06 RX ADMIN — ASPIRIN 325 MG ORAL TABLET 325 MG: 325 PILL ORAL at 17:18

## 2024-11-06 RX ADMIN — HEPARIN SODIUM 7500 UNITS: 1000 INJECTION INTRAVENOUS; SUBCUTANEOUS at 11:06

## 2024-11-06 RX ADMIN — FENTANYL CITRATE 300 MCG: 50 INJECTION INTRAMUSCULAR; INTRAVENOUS at 08:31

## 2024-11-06 RX ADMIN — Medication 250 ML: at 11:02

## 2024-11-06 RX ADMIN — ACETAMINOPHEN 975 MG: 325 TABLET, FILM COATED ORAL at 20:24

## 2024-11-06 RX ADMIN — WARFARIN SODIUM 2.5 MG: 2.5 TABLET ORAL at 17:14

## 2024-11-06 RX ADMIN — ALBUMIN, HUMAN INJ 5% 12.5 G: 5 SOLUTION at 11:41

## 2024-11-06 RX ADMIN — PHENYLEPHRINE HYDROCHLORIDE 250 MCG: 10 INJECTION INTRAVENOUS at 11:05

## 2024-11-06 RX ADMIN — SODIUM CHLORIDE: 0.9 INJECTION, SOLUTION INTRAVENOUS at 08:32

## 2024-11-06 RX ADMIN — MONTELUKAST 10 MG: 10 TABLET, FILM COATED ORAL at 20:27

## 2024-11-06 RX ADMIN — AMINOCAPROIC ACID 1 G/HR: 250 INJECTION, SOLUTION INTRAVENOUS at 09:36

## 2024-11-06 RX ADMIN — ALBUMIN (HUMAN): 12.5 INJECTION, SOLUTION INTRAVENOUS at 17:16

## 2024-11-06 RX ADMIN — HEPARIN SODIUM 2500 UNITS: 1000 INJECTION INTRAVENOUS; SUBCUTANEOUS at 11:45

## 2024-11-06 RX ADMIN — PHENYLEPHRINE HYDROCHLORIDE 250 MCG: 10 INJECTION INTRAVENOUS at 11:25

## 2024-11-06 RX ADMIN — AMIODARONE HYDROCHLORIDE 200 MG: 200 TABLET ORAL at 20:27

## 2024-11-06 RX ADMIN — SODIUM CHLORIDE 2 UNITS/HR: 9 INJECTION, SOLUTION INTRAVENOUS at 12:55

## 2024-11-06 RX ADMIN — Medication 1 EACH: at 08:04

## 2024-11-06 NOTE — ASSESSMENT & PLAN NOTE
Baseline 40-50s. Patient has been having lightheadedness and dizziness for years.     Tele overnight showed no significant events, other than bradycardia in the primarily in the range of 52-58.    Plan  - EP consulted, appreciate recs  - Pacemaker placement implantation post CABG per EP  - Continue to monitor on Tele  - Avoid AV salvador blocking agents

## 2024-11-06 NOTE — ANESTHESIA POSTPROCEDURE EVALUATION
Post-Op Assessment Note    CV Status:  Stable  Pain Score: 0    Pain management: adequate       Mental Status:  Alert   Hydration Status:  Stable   PONV Controlled:  None   Airway Patency:  Patent  Airway: intubated  Two or more mitigation strategies used for obstructive sleep apnea   Post Op Vitals Reviewed: Yes    No anethesia notable event occurred.    Staff: Anesthesiologist           Last Filed PACU Vitals:  Vitals Value Taken Time   Temp     Pulse 70 11/06/24 1258   /58    Resp 15 11/06/24 1258   SpO2 100    Vitals shown include unfiled device data.    Modified Salima:  No data recorded

## 2024-11-06 NOTE — ANESTHESIA PREPROCEDURE EVALUATION
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), 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HCC)      HEMATOLOGY   (+) Thrombocytopenia (HCC)      MUSCULOSKELETAL   (+) 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HCC)   (+) Primary osteoarthritis of right hip      NEURO/PSYCH   (+) Seizure (HCC)      PULMONARY   (+) Chronic bronchitis (HCC)   (+) LOJA (dyspnea on exertion)   (+) Obstructive sleep apnea   (+) On home oxygen therapy      Other   (+) Splenomegaly        Physical Exam    Airway    Mallampati score: II  TM Distance: >3 FB  Neck ROM: full     Dental       Cardiovascular      Pulmonary      Other Findings        Anesthesia Plan  ASA Score- 4     Anesthesia Type- general with ASA Monitors.         Additional Monitors: arterial line, central venous line and pulmonary artery catheter.    Airway Plan: ETT.    Comment: JASEN.       Plan Factors-Exercise tolerance (METS): <4 METS.    Chart reviewed.        Patient is not a current smoker.  Patient did not smoke on day of surgery.            Induction- intravenous.    Postoperative Plan-     Perioperative Resuscitation Plan - Level 1 - Full Code.       Informed Consent- Anesthetic plan and risks discussed with patient.  I personally reviewed this patient with the CRNA. Discussed and agreed on the Anesthesia Plan with the CRNA..    Risks/benefits and alternatives discussed with Otoniel including possible PONV, sore throat, damage to teeth/lips/gums, and possibility of rare anesthetic and surgical emergencies including but not limited to heart attack, stroke, and/or death.    Patient has no history of dysphagia, diverticula, esophageal dysmotility, strictures, stents, or varices.  Additional risks associated with JASEN probe placement discussed including  but not limited to soft tissue injury to oropharynx, dental injury, thermal injury to esophagus, and/or perforation of pharynx or esophagus.    Additional risks of central line placement discussed including but not limited to infection, bleeding, inadvertent arterial cannulation, and/or pneumothorax.    Preinduction ABP; post-induction TLC and PAC; JASEN and ICU post-operatively.

## 2024-11-06 NOTE — CONSULTS
Consultation - Critical Care/ICU   Name: Otoniel Martinez 64 y.o. 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   ATC tylenol, PRN oxycodone for pain  Trend neuro exam  Delirium precautions    CV:   Goals:   Cardiac Surgery Hemodynamic Monitoring goals: MAP goal >65 CI >2.2 SBP < 130 Continue pulmonary artery catheter for hemodynamic monitoring  Continue central line due to vasoactive medications Continue arterial line for blood pressure monitoring and/or frequent blood gas draws  Volume resuscitation as needed.   Epicardial pacing wire plan : Epicardial pacing wires in place. Will pace as needed for bradycardia or cardiac output   Monitor rhythm on telemetry.        Lung:   Check STAT post-op ABG and CXR  Wean vent with spontaneous breathing trial with goal to extubate today    GI:   GI prophylaxis with PPI  Bowel regimen  Zofran PRN for nausea.     FEN:   NPO Replenish K >4.0, mag >2.0 and calcium >7.0.     :   Check STAT post-op BMP  Nix in place.   Monitor UOP with goal >0.5cc/kg/hour.  Lasix versus volume resuscitate as needed depending on hemodynamics and volume status.    ID:   Prophylactic post-op abx. Maintain normothermia. Trend temps.     Heme:   Check STAT post-op H/H and platelets.  Monitor incision site, invasive lines, and chest tube outputs for bleeding. Send coag panel if needed.    Endo:   Insulin gtt for blood  sugar control.     Disposition: ICU Care     History of Present Illness   HPI: Otoniel Martinez is a 63 y/o male with a complex medical history including CAD/prior MI s/p PCI to RCA (2004), recurrent DVT on Xarelto, hypertension, hyperlipidemia, latent autoimmune diabetes managed as DM type 1, polymyopathy, myotonic dystrophy, chronic gastritis, dysphagia/dystonia, lymphoplasmacytic lymphoma, monoclonal gammopathy, splenomegaly with known splenic artery aneurysm, chronic thrombocytopenia, and KULDIP on  3L nocturnal O2 who originally presented to the hospital on 10/31/24 for an outpatient LHC following 2-3 years of progressive dyspnea which demonstrated MVCAD. He was directly admitted to the general medical team with CT surgery consultation, and presents today,11/6/24, s/p CABG x 3 (LIMA > LAD, SVG > RPDA, SVG > OM1).     CARDIOPULMONARY BYPASS TIME: 75 minutes.   CROSSCLAMP TIME: 69 minutes.    Intra-op JASEN: LVEF 55-60%, normal RV    Post-op medications: Critical Care Infusions : Neosynephrine 20 mcg/min    ROS unable to be obtained due to patient being intubated and sedated.   History obtained from chart review due to patient being intubated and sedated.   Historical Information   Past Medical History:  02/08/2024: Acute respiratory failure with hypoxia (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)      Comment:  after tear of gastrocnemus muscle  No date: Diabetes ([facility])  No date: Diabetes mellitus ([facility])  No date: Difficulty walking      Comment:  hip replacement  No date: HL (hearing loss)      Comment:  decreased both  ears  No date: Myocardial infarction ([facility])  12/06/2017: 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[facility])  No date: Pancreatitis      Comment:  three times  No date: Sleep apnea      Comment:  not using a C-PAP  No date: Superficial thrombophlebitis  No date: Vision loss      Comment:  reading glasses over the counter Past Surgical History:  10/31/2024:  CARDIAC CATHETERIZATION; N/A      Comment:  Procedure: Cardiac catheterization;  Surgeon: Ky Sandoval MD;  Location: [facility];  Service:                Cardiology  10/31/2024: CARDIAC CATHETERIZATION; N/A      Comment:  Procedure: Cardiac Coronary Angiogram;  Surgeon: Ky Sandoval MD;  Location: [facility];  Service:                Cardiology  No date: CAROTID STENT  No date: CHOLECYSTECTOMY  No date: CIRCUMCISION      Comment:  Elective  No date: COLONOSCOPY      Comment:  complete, polyps 2 years ago  No date: CORONARY ANGIOPLASTY WITH STENT PLACEMENT      Comment:  stent to RCA 2004  No date: INGUINAL HERNIA REPAIR  8/7/2024: IR BIOPSY BONE MARROW  No date: ORIF RADIAL SHAFT FRACTURE; Left      Comment:  Open Treatment of Multiple Fractures of the Radial Shaft  No date: ORIF ULNAR / RADIAL SHAFT FRACTURE; Left      Comment:  Open Treatment of Multiple Fractures of the Ulnar Shaft  No date: TONSILLECTOMY AND ADENOIDECTOMY   Current Outpatient Medications   Medication Instructions    acyclovir (ZOVIRAX) 200 mg, Oral, 5 times daily    aspirin (ECOTRIN LOW STRENGTH) 81 mg, Oral, Daily    atorvastatin (LIPITOR) 40 mg, Oral, Daily    azelastine (ASTELIN) 0.1 % nasal spray 2 sprays, Nasal, 2 times daily, Use in each nostril as directed    budesonide-formoterol (Symbicort) 80-4.5 MCG/ACT inhaler 2 puffs, Inhalation, 2 times daily, Rinse mouth after use.    cholecalciferol (VITAMIN D3) 400 units tablet 1 tablet, Oral, Every morning    Continuous Blood Gluc Sensor (Dexcom G6 Sensor) MISC Change every 10 days. E10.65    Continuous Blood Gluc Transmit (Dexcom G6 Transmitter) MISC Change every 90 days. E10.65    fluticasone (FLONASE) 50 mcg/act nasal spray 2 sprays, Nasal, Daily    Gvoke HypoPen 2-Pack 1 MG/0.2ML SOAJ     Insulin Disposable Pump (Omnipod 5 G6 Pods, Gen 5,) MISC INJECT 1 UNDER THE SKIN EVERY OTHER DAY. CHANGING EVERY 2 DAYS DUE TO HIGH INSULIN REQUIREMENT.     insulin glargine (LANTUS) 100 units/mL subcutaneous injection Subcutaneous, PRN for when pod doesn't work     L-Methylfolate-B6-B12 (Foltanx) 3-35-2 MG TABS 1 tablet, Oral, Daily    levalbuterol (XOPENEX) 1.25 mg, Nebulization, 3 times daily PRN    montelukast (SINGULAIR) 10 mg, Oral, Daily at bedtime,       mupirocin (BACTROBAN) 2 % ointment     NovoLOG 100 UNIT/ML injection As needed    pantoprazole (PROTONIX) 20 mg tablet TAKE 1 TABLET (20 MG TOTAL) BY MOUTH DAILY AS NEEDED FOR HEARTBURN OR INDIGESTION.    pantoprazole (PROTONIX) 40 mg tablet TAKE 1 TABLET TWICE DAILY 30 MINS PRIOR TO BREAKFAST AND SUPPER.    sildenafil (VIAGRA) 100 mg, Oral, Daily PRN    sodium chloride 0.9 % nebulizer solution 3 mL, Nebulization, As needed    Xarelto 20 mg, Oral, Daily with breakfast    Allergies   Allergen Reactions    Other Cough     Grass / dogs hair       Social History     Tobacco Use    Smoking status: Never    Smokeless tobacco: Never   Vaping Use    Vaping status: Never Used   Substance Use Topics    Alcohol use: Yes     Comment: 2 beers on a social occasion    Drug use: No    Family History   Problem Relation Age of Onset    Brain cancer Mother     Clotting disorder Mother     Heart disease Mother     Cancer Mother     Hyperlipidemia Mother     Stroke Father     Deep vein thrombosis Father     Emphysema Father     Coronary artery disease Paternal Uncle     Diabetes Maternal Uncle             Objective  :     Vitals: Invasive Monitoring      /81   Pulse (!) 48   Resp 12   O2 Sat 100 %   O2 Device Ventilator   Temp (!) 95.9 °F (35.5 °C)    Arterial Line  [place] /60  Arterial Line BP  Min: 113/60  Max: 137/79   MAP 78 mmHg  Arterial Line MAP (mmHg)  Min: 78 mmHg  Max: 99 mmHg     PA Catheter   Most Recent  Min/Max in 24hrs    PAP 32/13 PAP  Min: 18/9  Max: 32/13   CVP 14 mmHg CVP (mean)  Min: 14 mmHg  Max: 15 mmHg   CI 1.9 L/min/m2  CI (L/min/m2)  Min: 1.9 L/min/m2  Max: 1.9 L/min/m2   SVR 1606 (dyne*sec)/cm5  SVR (dyne*sec)/cm5  Min: 1606 (dyne*sec)/cm5  Max: 1606 (dyne*sec)/cm5          Physical Exam   Physical Exam  Vitals and nursing note reviewed.   Eyes:      Conjunctiva/sclera: Conjunctivae normal.      Pupils: Pupils are equal, round, and reactive to light.   Skin:     General: Skin is warm and dry.      Capillary Refill: Capillary refill takes less than 2 seconds.   HENT:      Head: Normocephalic and atraumatic.      Mouth/Throat:      Mouth: Mucous membranes are dry.   Neck:      Vascular: Central line present.     Cardiovascular:      Rate and Rhythm: Paced rhythm.      Pulses:           Radial pulses are 2+ on the right side and 2+ on the left side.        Dorsalis pedis pulses are 1+ on the right side and 1+ on the left side.      Heart sounds: Normal heart sounds.      Comments: V-paced  Musculoskeletal:      Right lower leg: No edema.      Left lower leg: No edema.   Abdominal: General: There is no distension.      Palpations: Abdomen is soft.      Tenderness: There is no abdominal tenderness.   Constitutional:       General: He is not in acute distress.     Appearance: He is well-developed and well-nourished.      Interventions: He is sedated, intubated and restrained.   Pulmonary:      Effort: He is intubated.      Breath sounds: Examination of the right-lower field reveals decreased breath sounds. Examination of the left-lower field reveals decreased breath sounds. Decreased breath sounds and rhonchi present. No wheezing.   Chest:       Neurological:      General: No focal deficit present.      Mental Status: He is lethargic.      GCS: GCS eye subscore is 1. GCS verbal subscore is 1. GCS motor subscore is 1.          Diagnostic Studies      11/06/24 CXR: No apical pneumothorax or large effusion. Atelectasis L>R. Lines and tubes in appropriate positioning   This was personally reviewed by myself in PACS.  11/06/24 EKG: Sinus adela with 1st degree AVB. New RBBB.   This was personally reviewed by myself.      Medications:  Scheduled PRN   acetaminophen, 975 mg, Q6H While awake  amiodarone, 200 mg, Q8H ENRRIQUE  aspirin, 325 mg, Daily  atorvastatin, 80 mg, Daily With Dinner  cefazolin, 2,000 mg, Q8H  chlorhexidine, 15 mL, BID  fluticasone, 2 spray, Daily  [START ON 11/7/2024] fondaparinux, 2.5 mg, Daily  levalbuterol, 1.25 mg, After Dinner  montelukast, 10 mg, HS  mupirocin, 1 Application, Q12H ENRRIQUE  pantoprazole, 40 mg, Daily  [START ON 11/7/2024] polyethylene glycol, 17 g, Daily  warfarin, 2.5 mg, Once (warfarin)      acetaminophen, 650 mg, Q4H PRN  bisacodyl, 10 mg, Daily PRN  calcium gluconate, 2 g, Once PRN  fentaNYL, 50 mcg, Q1H PRN  HYDROmorphone, 0.5 mg, Q2H PRN  lactated ringers, 500 mL, Once PRN  ondansetron, 4 mg, Q6H PRN  oxyCODONE, 2.5 mg, Q4H PRN   Or  oxyCODONE, 5 mg, Q4H PRN  potassium chloride, 20 mEq, Once PRN  potassium chloride, 40 mEq, Once PRN  potassium chloride, 40 mEq, Once PRN       Continuous    insulin regular (HumuLIN R,NovoLIN R) 1 Units/mL in sodium chloride 0.9 % 100 mL infusion, 0.3-21 Units/hr, Last Rate: 1.5 Units/hr (11/06/24 1322)  niCARdipine, 2.5-15 mg/hr  phenylephine,  mcg/min  sodium chloride, 20 mL/hr         Labs:  CBC  Recent Labs     11/05/24  0447 11/06/24  0000 11/06/24  0411 11/06/24  0854 11/06/24  1259 11/06/24  1303   WBC 3.69*  --  3.72*  --   --   --    HGB 12.6  --  12.1   < > 10.8* 9.9*   HCT 38.8  --  37.0   < > 33.0* 29*   *   < > 127*  --  104*  --     < > = values in this interval not displayed.     BMP  Recent Labs     11/06/24  0411 11/06/24  0854 11/06/24  1259 11/06/24  1303   SODIUM 139  --  140  --    K 3.7  --  3.9  --      --  111*  --    CO2 29   < > 22 24   AGAP 6  --  7  --    BUN 24  --  18  --    CREATININE 0.64  --  0.55*  --    CALCIUM 8.6  --  7.2*  --     < > = values in this interval not displayed.       Coags  Recent Labs     11/05/24  0447 11/06/24  1307   INR  --  1.34*   PTT 63* 31        Additional Electrolytes  Recent Labs      11/06/24  1216 11/06/24  1303   CAIONIZED 1.19 1.13          Blood Gas  No recent results  No recent results LFTs  No recent results    Infectious  No recent results     No recent results Glucose  Recent Labs     11/05/24  0447 11/05/24  1349 11/06/24  0411 11/06/24  1259   GLUC 139 439* 70 130        Invasive lines and devices:  Invasive Devices       Central Venous Catheter Line  Duration             CVC Central Lines 11/06/24 <1 day    Introducer 11/06/24 <1 day              Peripheral Intravenous Line  Duration             Peripheral IV 11/04/24 Distal;Dorsal (posterior);Left Forearm 2 days    Peripheral IV 11/04/24 Left;Ventral (anterior) Forearm 2 days    Peripheral IV 11/06/24 Right Forearm <1 day              Arterial Line  Duration             Arterial Line 11/06/24 Right Radial <1 day              Line  Duration             Pacer Wires <1 day              Drain  Duration             Chest Tube 1 Left Pleural 32 Fr. <1 day    Chest Tube 2 Mediastinal 32 Fr. <1 day    Chest Tube 3  Mediastinal 24 Fr. <1 day    Urethral Catheter Temperature probe 16 Fr. <1 day              Airway  Duration             ETT  Hi-Lo;Cuffed;Oral 8 mm <1 day

## 2024-11-06 NOTE — INTERVAL H&P NOTE
H&P reviewed. After examining the patient I find no changes in the patients condition since the H&P had been written.    Anticipated Length of Stay:  Patient will be admitted on an Inpatient basis with an anticipated length of stay of  greater than 2 midnights.       Justification for Hospital Stay:  Post surgical recovery following open heart surgery.      Vitals:    11/06/24 0550   BP: 130/81   Pulse: 55   Resp:    Temp:    SpO2:

## 2024-11-06 NOTE — ASSESSMENT & PLAN NOTE
Patient with a history of recurrent DVTs maintained on Xarelto indefinitely    Plan:  - Hold Xarelto   - Continue heparin gtt for anticoagulation.  Currently stopped prior to CABG.  -Earlier this morning noticed some old blood when coughing this morning.  This is not happened before this hospitalization but reports he has had nosebleeds in the past.  No current bleeding.

## 2024-11-06 NOTE — ANESTHESIA PROCEDURE NOTES
Procedure Performed: JASEN Anesthesia  Start Time:  11/6/2024 8:56 AM        Preanesthesia Checklist    Patient identified, IV assessed, risks and benefits discussed, monitors and equipment assessed, procedure being performed at surgeon's request and anesthesia consent obtained.      Procedure     Type of JASEN: complete JASEN with interpretation. Images Saved: ultrasound permanent image saved. Physician Requesting Echo: Rodger Rodriguez DO.  Location performed: OR. Intubated. Bite block not placed.   Heart visualized. Insertion of JASEN Probe:  Atraumatic. Probe Type:  Multiplane. Modalities:  2D only, color flow mapping, continuous wave Doppler and pulse wave Doppler.      Echocardiographic and Doppler Measurements    PREPROCEDURE    LEFT VENTRICLE:  Systolic Function: normal. Ejection Fraction: 55-60%. Cavity size: normal.   Regional Wall Motion Abnormalities: none.    RIGHT VENTRICLE:  Systolic Function: normal.                AORTIC VALVE:  Leaflets: normal. Leaflet motions normal and normal. Stenosis: none.     Regurgitation: none.      MITRAL VALVE:  Leaflets: normal. Leaflet Motions: normal. Regurgitation: mild.   Stenosis: none.       TRICUSPID VALVE:  Leaflets: normal.  Stenosis: none. Regurgitation: trace.      PULMONIC VALVE:  Leaflets: normal.          ASCENDING AORTA:  Size:  normal.  Dissection not present.      AORTIC ARCH:  Size:  normal.  dissection not present. Grade 3: atheroma protruding < 0.5 cm into lumen.    DESCENDING AORTA:  Size: normal.  Dissection not present. Grade 3: atheroma protruding < 0.5 cm into lumen.        RIGHT ATRIUM:  Size:  normal. No spontaneous echo contrast.    LEFT ATRIUM:  Size: normal. No spontaneous echo contrast.    LEFT ATRIAL APPENDAGE:  Size: normal. No spontaneous echo contrast         ATRIAL SEPTUM:  Intra-atrial septal morphology: normal.          VENTRICULAR SEPTUM:  Intra-ventricular septum morphology: normal.         EPIAORTIC:  Plaque Thickness: 0-5 mm.    OTHER  FINDINGS:  Pericardium:  normal. Pleural Effusion:  none.        POSTPROCEDURE    LEFT VENTRICLE: Unchanged .         RIGHT VENTRICLE: Unchanged .           AORTIC VALVE: Unchanged .           MITRAL VALVE: Unchanged .         TRICUSPID VALVE: Unchanged .        PULMONIC VALVE: Unchanged             ATRIA: Unchanged .          AORTA: Unchanged .        REMOVAL:  Probe Removal: atraumatic.

## 2024-11-06 NOTE — ANESTHESIA PROCEDURE NOTES
Introducer/[facility]    Performed by: Jc Vargas MD  Authorized by: Jc Vargas MD    Date/Time: 11/6/2024 8:55 AM  Consent: Verbal consent obtained. Written consent obtained.  Risks and benefits: risks, benefits and alternatives were discussed  Consent given by: patient  Patient understanding: patient states understanding of the procedure being performed  Patient consent: the patient's understanding of the procedure matches consent given  Procedure consent: procedure consent matches procedure scheduled  Required items: required blood products, implants, devices, and special equipment available  Patient identity confirmed: verbally with patient, arm band and provided demographic data  Indications: vascular access and central pressure monitoring  Location details: right internal jugular  Catheter size: 9 Fr  Patient position: Trendelenburg  Assessment: blood return through all ports and free fluid flow  Preparation: skin prepped with 2% chlorhexidine  Skin prep agent dried: skin prep agent completely dried prior to procedure  Sterile barriers: all five maximum sterile barriers used - cap, mask, sterile gown, sterile gloves, and large sterile sheet  Hand hygiene: hand hygiene performed prior to central venous catheter insertion  Ultrasound guidance: yes  ultrasound permanent image saved  Pre-procedure: landmarks identified  Number of attempts: 1  Successful placement: yes  Post-procedure: line sutured and dressing applied  Patient tolerance: Patient tolerated the procedure well with no immediate complications  Comments: Central line placed under sterile conditions with anatomic and ultrasound guidance. Venous access confirmed both with ultrasound and transduced column of blood. Column of blood was non-pulsatile and continuously drop to level of CVP.

## 2024-11-06 NOTE — ASSESSMENT & PLAN NOTE
Lab Results   Component Value Date    HGBA1C 8.4 (H) 10/31/2024       Recent Labs     11/06/24  0202 11/06/24  0409 11/06/24  0516 11/06/24  0601   POCGLU 110 78 78 139       Blood Sugar Average: Last 72 hrs:  (P) 206.425    Plan:  - Outpatient f/u with ophthalmology

## 2024-11-06 NOTE — PLAN OF CARE
Problem: PAIN - ADULT  Goal: Verbalizes/displays adequate comfort level or baseline comfort level  Description: Interventions:  - Encourage patient to monitor pain and request assistance  - Assess pain using appropriate pain scale  - Administer analgesics based on type and severity of pain and evaluate response  - Implement non-pharmacological measures as appropriate and evaluate response  - Consider cultural and social influences on pain and pain management  - Notify physician/advanced practitioner if interventions unsuccessful or patient reports new pain  Outcome: Progressing     Problem: INFECTION - ADULT  Goal: Absence or prevention of progression during hospitalization  Description: INTERVENTIONS:  - Assess and monitor for signs and symptoms of infection  - Monitor lab/diagnostic results  - Monitor all insertion sites, i.e. indwelling lines, tubes, and drains  - Monitor endotracheal if appropriate and nasal secretions for changes in amount and color  - [place] appropriate cooling/warming therapies per order  - Administer medications as ordered  - Instruct and encourage patient and family to use good hand hygiene technique  - Identify and instruct in appropriate isolation precautions for identified infection/condition  Outcome: Progressing     Problem: INFECTION - ADULT  Goal: Absence of fever/infection during neutropenic period  Description: INTERVENTIONS:  - Monitor WBC    Outcome: Progressing     Problem: CARDIOVASCULAR - ADULT  Goal: Maintains optimal cardiac output and hemodynamic stability  Description: INTERVENTIONS:  - Monitor I/O, vital signs and rhythm  - Monitor for S/S and trends of decreased cardiac output  - Administer and titrate ordered vasoactive medications to optimize hemodynamic stability  - Assess quality of pulses, skin color and temperature  - Assess for signs of decreased coronary artery perfusion  - Instruct patient to report change in severity of symptoms  Outcome: Progressing

## 2024-11-06 NOTE — ANESTHESIA PROCEDURE NOTES
Central Line Insertion    Performed by: Jc Vargas MD  Authorized by: Jc Vargas MD    Date/Time: 11/6/2024 8:50 AM  Catheter Type:  triple lumen  Consent: Verbal consent obtained. Written consent obtained.  Risks and benefits: risks, benefits and alternatives were discussed  Consent given by: patient  Patient understanding: patient states understanding of the procedure being performed  Patient consent: the patient's understanding of the procedure matches consent given  Procedure consent: procedure consent matches procedure scheduled  Required items: required blood products, implants, devices, and special equipment available  Patient identity confirmed: verbally with patient, arm band and provided demographic data  Indications: vascular access  Catheter size: 7 Fr  Patient position: Trendelenburg  Assessment: blood return through all ports and free fluid flow  Preparation: skin prepped with 2% chlorhexidine  Skin prep agent dried: skin prep agent completely dried prior to procedure  Sterile barriers: all five maximum sterile barriers used - cap, mask, sterile gown, sterile gloves, and large sterile sheet  Hand hygiene: hand hygiene performed prior to central venous catheter insertion  sterile gel and probe cover used in ultrasound-guided central venous catheter insertionultrasound permanent image saved  Pre-procedure: landmarks identified  Number of attempts: 1  Successful placement: yes  Post-procedure: line sutured and dressing applied  Patient tolerance: Patient tolerated the procedure well with no immediate complications  Comments: Central line placed under sterile conditions with anatomic and ultrasound guidance. Venous access confirmed both with ultrasound and transduced column of blood. Column of blood was non-pulsatile and continuously drop to level of CVP.

## 2024-11-06 NOTE — OP NOTE
OPERATIVE REPORT  PATIENT NAME: Otoniel Martinez    :  1960  MRN: [id number]  Pt Location:  OR ROOM 10    SURGERY DATE: 2024    SURGEON: Rodger Rodriguez DO    ASSISTANT: Jorge Coelho PA-C    PREOPERATIVE DIAGNOSIS:  Multivessel coronary artery disease, Left main coronary artery disease, unstable angina    POSTOPERATIVE DIAGNOSIS:  Multivessel coronary artery disease, Left main coronary artery disease, unstable angina    NYHA Class: 2    CCS Class: 3    PROCEDURE: Coronary artery bypass grafting x 3 with left internal mammary artery to left anterior descending, saphenous vein graft to right posterior descending artery, saphenous vein graft to obtuse marginal 1.     ANESTHESIA: General endotracheal anesthesia with transesophageal echocardiogram guidance, Dr. Derian Vargas     CARDIOPULMONARY BYPASS TIME: 75 minutes.     CROSSCLAMP TIME: 69 minutes.    PACKS/TUBES/DRAINS: Chest tubes x 3.     MATERIALS: Pacing wires: A x1. V x1.     TRANSFUSION: None.     SPECIMENS: None.     ESTIMATED BLOOD LOSS: 250 mL    OPERATIVE TECHNIQUE:    The patient was taken to the operating room and placed supine on the operating table. Following the satisfactory induction of general anesthesia and placement of monitoring lines, the patient was prepped and draped in the usual sterile fashion. A time-out procedure was performed.    The patient underwent median sternotomy, LIMA harvest, endoscopic left greater saphenous vein harvest, systemic heparinization and conduit preparation. The patient underwent pericardiotomy and epiaortic ultrasound was used to evaluate the ascending aorta, which was found to be free of significant atheromatous disease. The patient underwent aortic and right atrial cannulation and was initiated on bypass. The ascending aorta was crossclamped. Antegrade del Nido cardioplegia was delivered with an excellent arrest.    The saphenous vein was anastomosed to the right posterior descending artery in  end-to-side fashion using running 7-0 Prolene suture. The flow was good at 180ml/min. The saphenous vein was anastomosed to the obtuse marginal 1 in end-to-side fashion using running 7-0 Prolene suture. The flow was good at 140ml/min. The left internal mammary artery was anastomosed to the left anterior descending in end-to-side fashion using running 7-0 Prolene suture. The flow was excellent. A total of two proximal anastomoses were completed on the ascending aorta in end-to-side fashion using running 6-0 Prolene suture. The heart was de-aired and the crossclamp was removed. Atrial and ventricular pacing wires were placed. Following a period of reperfusion, the patient was weaned from cardiopulmonary bypass and decannulated. Protamine was administered with normalization of the ACT. Hemostasis was confirmed in all fields. Thoracostomy tubes were placed. The sternum was closed with stainless steel wires. The fascia, subdermis and skin were closed with multiple layers of running absorbable suture.    As the attending surgeon, I was present and scrubbed for all critical portions of this procedure. Sponge, needle and instrument counts were reported as correct by the nursing staff. There is no cardiac residency program at this facility and for complex procedures such as this, it is vital to have a physician assistant experienced in cardiac surgery.  The assistance of Jorge Coelho PA-C was necessary for endoscopic saphenous vein harvesting, retraction of the heart and coronary conduit with proper tissue handling techniques, and following of the suture with correct tension during construction of the proximal and distal coronary anastomoses.    Final transesophageal echocardiogram demonstrated normal biventricular function.        SIGNATURE: Rodger Rodriguez DO  DATE: November 6, 2024  TIME: 12:28 PM

## 2024-11-06 NOTE — ASSESSMENT & PLAN NOTE
Lab Results   Component Value Date    HGBA1C 8.4 (H) 10/31/2024       Recent Labs     11/06/24  0202 11/06/24  0409 11/06/24  0516 11/06/24  0601   POCGLU 110 78 78 139       Blood Sugar Average: Last 72 hrs:  (P) 206.425    Patient with a history of L ADA first diagnosed 7-8 years ago maintained on insulin pump at home.    Plan:  - Endocrinology consulted.  Appreciate recommendations  - Per Cardiology, maintain on insulin drip prior to CABG. He was transitioned from insulin pump to insulin drip.  Persistently hyperglycemic yesterday on insulin pump but improved after the addition of 10 units lispro.  Well-controlled since that time.  - POCT 4 times daily before meals and at bedtime  - Hypoglycemia protocol  - Diabetic diet

## 2024-11-06 NOTE — PROGRESS NOTES
Progress Note - Internal Medicine   Name: Otoniel Martinez 64 y.o. male I MRN: [id number]  Unit/Bed#: PPHP-322-01 I Date of Admission: 10/31/2024   Date of Service: 11/6/2024 I Hospital Day: 6     Assessment & Plan  Multivessel CAD  Patient with a history of CAD s/p PCI to RCA in 2004 who presented to the hospital for an outpatient C due to progressive LOJA over the last 2-3 years.  Found to have multivessel CAD as mentioned below.  Patient admitted for CABG evaluation    LHC findings:    Dist LM lesion is 80% stenosed.    Prox Cx to Mid Cx lesion is 90% stenosed.    Mid RCA lesion is 90% stenosed.    Echo 10/31/24:  EF 55% with G1DD. Mild concentric hypertrophy. Hypokinetic in basal inferolateral and mid inferolateral segments. Trace TR and mildly dilated ascending aorta 3.8 cm.     VAS vein mapping: patent great saphenous veins in lower extremities.    Plan:  - Cardiothoracic surgery consulted with CABG planned on 11/6/24  - Plan for pacemaker placement post CABG due to bradycardia, no date set   - Continue to monitor on telemetry  - Home Xarelto (for DVT) transitioned to heparin gtt   - Continue Lipitor 40 mg daily  - Low threshold for serial EKG/troponins if necessary  - PRN nitro sublingual for chest pain  - Avoid AV salvador blocking agents given symptomatic bradycardia   Thrombocytopenia (HCC)  History of thrombocytopenia baseline in the low 100s.  Likely in the setting of low grade CD5 positive B-cell lymphoma    Platelet stable throughout his hospitalization and for the past 8 years in the low 100s.  No concern for HIT at this time.    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    Plan:  - Endocrinology consulted.  Appreciate recommendations  - Per Cardiology, maintain on insulin drip prior to CABG. He was transitioned from insulin pump to insulin drip.  Persistently hyperglycemic yesterday on insulin pump but improved after the addition of 10 units lispro.  Well-controlled since that time.  - POCT 4 times daily before meals and at bedtime  - Hypoglycemia protocol  - Diabetic diet    Mixed hyperlipidemia  History of hyperlipidemia maintained on Lipitor 40 mg daily    Lipid panel 10/31/24: , , HDL 40, LDL 51    Plan:  - Continue Lipitor 40 mg daily    History of recurrent deep vein thrombosis (DVT)  Patient with a history of recurrent DVTs maintained on Xarelto indefinitely    Plan:  - Hold Xarelto   - Continue heparin gtt for anticoagulation.  Currently stopped prior to CABG.  -Earlier this morning noticed some old blood when coughing this morning.  This is not happened before this hospitalization but reports he has had nosebleeds in the past.  No current bleeding.  Chronic bronchitis (HCC)  Patient with a history of chronic bronchitis maintained on Symbicort, montelukast and fluticasone.    Plan:  - Continue home meds with as needed albuterol  Insulin pump status  See plan under KAMI  Obstructive sleep apnea  Patient with a history of KULDIP had been on CPAP in the past but now only uses oxygen nightly    Plan:  - Continue oxygen as needed nightly  - Outpatient sleep study ordered by sleep medicine  Monoclonal gammopathy  Patient with a history of 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  Follows with heme-onc as an outpatient.  Bone marrow biopsy in August 2024 showing low-grade CD5 positive B-cell lymphoma as well.  Per most recent heme-onc note, no urgent intervention and follow-up in 3 months    Plan:  - Continue to follow-up outpatient  Splenic artery aneurysm (HCC)  Patient with an incidental finding  of splenic artery aneurysm earlier this year.  Follows with vascular who recommended repeat mesenteric duplex in March 2025    Left main coronary artery disease  See plan under multivessel CAD  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)  Lab Results   Component Value Date    HGBA1C 8.4 (H) 10/31/2024       Recent Labs     11/06/24  0202 11/06/24  0409 11/06/24  0516 11/06/24  0601   POCGLU 110 78 78 139       Blood Sugar Average: Last 72 hrs:  (P) 206.425    Plan:  - Outpatient f/u with ophthalmology   Symptomatic sinus bradycardia  Baseline 40-50s. Patient has been having lightheadedness and dizziness for years.     Tele overnight showed no significant events, other than bradycardia in the primarily in the range of 52-58.    Plan  - EP consulted, appreciate recs  - Pacemaker placement implantation post CABG per EP  - Continue to monitor on Tele  - Avoid AV salvador blocking agents     S/P CABG (coronary artery bypass graft)      Disposition: Pending CABG and pacemaker    Team: SOD TEAM C    Subjective   Patient seen and examined.  Sitting up in bed watching election news.  Does report that he noticed some dry blood in his mouth this morning when he coughed.  Denies bleeding from mouth, hematuria, melena or hematochezia.  Reports a history of nosebleeds at night in the past but not recently.  Also reports some lingering pain in right wrist at cath access site.  No chest pain or shortness of breath.  No dizziness/lightheadedness.    Objective :  Temp:  [97.8 °F (36.6 °C)-98.2 °F (36.8 °C)] 97.8 °F (36.6 °C)  HR:  [54-59] 55  BP: (126-134)/(76-85) 130/81  Resp:  [17-18] 18  SpO2:  [94 %-98 %] 95 %    I/O         11/04 0701 11/05 0700 11/05 0701 11/06 0700 11/06 0701 11/07 0700    P.O. 0 180     I.V. (mL/kg) 366.5 (5.3) 407.8 (5.9)     Total Intake(mL/kg) 366.5 (5.3) 587.8 (8.5)     Urine (mL/kg/hr) 200 (0.1) 600 (0.4)     Total Output 200 600     Net +166.5 -12.2                  Weights:   IBW (Ideal Body  Weight): 68.4 kg    Body mass index is 23.26 kg/m².  Weight (last 2 days)       Date/Time Weight    11/06/24 0534 69.4 (153)    11/05/24 0500 69 (152.1)    11/04/24 0600 69.6 (153.44)            Physical Exam  Vitals reviewed.   Constitutional:       General: He is not in acute distress.     Appearance: Normal appearance. He is normal weight. He is not ill-appearing.   HENT:      Head: Normocephalic and atraumatic.      Mouth/Throat:      Mouth: Mucous membranes are moist.   Eyes:      Extraocular Movements: Extraocular movements intact.   Cardiovascular:      Rate and Rhythm: Regular rhythm. Bradycardia present.   Pulmonary:      Effort: Pulmonary effort is normal. No respiratory distress.      Breath sounds: No wheezing.   Musculoskeletal:      Right lower leg: No edema.      Left lower leg: No edema.   Skin:     General: Skin is warm and dry.      Comments: Right wrist without any ecchymosis or erythema.   Neurological:      Mental Status: He is alert.           Lab Results: I have reviewed the following results:  Recent Labs     11/04/24  0348 11/04/24  0555 11/05/24  0447 11/05/24  1349 11/06/24  0411   WBC  --    < > 3.69*  --  3.72*   HGB  --    < > 12.6  --  12.1   HCT  --    < > 38.8  --  37.0   PLT  --    < > 125*  --  127*   SODIUM  --    < > 138   < > 139   K  --    < > 4.1   < > 3.7   CL  --    < > 102   < > 104   CO2  --    < > 28   < > 29   BUN  --    < > 21   < > 24   CREATININE  --    < > 0.57*   < > 0.64   GLUC  --    < > 139   < > 70   PTT 72*  --  63*  --   --    INR 1.07  --   --   --   --     < > = values in this interval not displayed.             Currently Ordered Meds:   Current Facility-Administered Medications:     [Transfer Hold] acetaminophen (TYLENOL) tablet 650 mg, Q4H PRN    [Transfer Hold] albuterol (PROVENTIL HFA,VENTOLIN HFA) inhaler 2 puff, Q4H PRN    [Transfer Hold] aspirin (ECOTRIN LOW STRENGTH) EC tablet 81 mg, Daily    [Transfer Hold] atorvastatin (LIPITOR) tablet 40 mg, Daily  With Dinner    [Transfer Hold] budesonide-formoterol (SYMBICORT) 80-4.5 MCG/ACT inhaler 2 puff, BID    [Transfer Hold] fluticasone (FLONASE) 50 mcg/act nasal spray 2 spray, Daily    [Transfer Hold] insulin aspart (NovoLOG) FOR PUMP REFILLS 300 Units, Daily PRN    insulin regular (HumuLIN R,NovoLIN R) 1 Units/mL in sodium chloride 0.9 % 100 mL infusion, Titrated, Last Rate: 1.5 Units/hr (11/06/24 0613)    [Transfer Hold] levalbuterol (XOPENEX) inhalation solution 1.25 mg, After Dinner    [Transfer Hold] montelukast (SINGULAIR) tablet 10 mg, HS    [Transfer Hold] nitroglycerin (NITROSTAT) SL tablet 0.4 mg, Q5 Min PRN    [Transfer Hold] pantoprazole (PROTONIX) EC tablet 40 mg, Early Morning    [Transfer Hold] polyethylene glycol (MIRALAX) packet 17 g, Daily  VTE Pharmacologic Prophylaxis: Reason for no pharmacologic prophylaxis surgery today  VTE Mechanical Prophylaxis: sequential compression device    Administrative Statements     Portions of the record may have been created with voice recognition software.

## 2024-11-06 NOTE — ANESTHESIA PROCEDURE NOTES
Arterial Line Insertion    Performed by: Jc Vargas MD  Authorized by: Jc Vargas MD  Consent: Verbal consent obtained. Written consent obtained.  Risks and benefits: risks, benefits and alternatives were discussed  Consent given by: patient  Patient understanding: patient states understanding of the procedure being performed  Patient consent: the patient's understanding of the procedure matches consent given  Procedure consent: procedure consent matches procedure scheduled  Required items: required blood products, implants, devices, and special equipment available  Patient identity confirmed: verbally with patient and provided demographic data  Preparation: Patient was prepped and draped in the usual sterile fashion.  Indications: hemodynamic monitoring  Orientation:  Right  Location: radial artery  Procedure Details:  Needle gauge: 20  Seldinger technique: Seldinger technique used  Number of attempts: 1    Post-procedure:  Post-procedure: dressing applied  Waveform: good waveform  Post-procedure CNS: unchanged and normal  Patient tolerance: Patient tolerated the procedure well with no immediate complications  Comments: Single skin and vessel puncture.  Easy thread of wires.  No apparent hematoma or complications.  Ultrasound guided.

## 2024-11-07 ENCOUNTER — ANESTHESIA EVENT (INPATIENT)
Dept: NON INVASIVE DIAGNOSTICS | Facility: HOSPITAL | Age: 64
DRG: 233 | End: 2024-11-07
Payer: COMMERCIAL

## 2024-11-07 ENCOUNTER — APPOINTMENT (INPATIENT)
Dept: RADIOLOGY | Facility: HOSPITAL | Age: 64
DRG: 233 | End: 2024-11-07
Payer: COMMERCIAL

## 2024-11-07 DIAGNOSIS — J32.9 CHRONIC SINUSITIS, UNSPECIFIED LOCATION: ICD-10-CM

## 2024-11-07 LAB
ABO GROUP BLD BPU: NORMAL
ANION GAP SERPL CALCULATED.3IONS-SCNC: 8 MMOL/L (ref 4–13)
APICAL FOUR CHAMBER EJECTION FRACTION: 58 %
ATRIAL RATE: 73 BPM
ATRIAL RATE: 74 BPM
BPU ID: NORMAL
BSA FOR ECHO PROCEDURE: 1.88 M2
BUN SERPL-MCNC: 16 MG/DL (ref 5–25)
CALCIUM SERPL-MCNC: 7.7 MG/DL (ref 8.4–10.2)
CHLORIDE SERPL-SCNC: 108 MMOL/L (ref 96–108)
CO2 SERPL-SCNC: 22 MMOL/L (ref 21–32)
CREAT SERPL-MCNC: 0.57 MG/DL (ref 0.6–1.3)
CROSSMATCH: NORMAL
ERYTHROCYTE [DISTWIDTH] IN BLOOD BY AUTOMATED COUNT: 15.1 % (ref 11.6–15.1)
FRACTIONAL SHORTENING: 36 (ref 28–44)
GFR SERPL CREATININE-BSD FRML MDRD: 108 ML/MIN/1.73SQ M
GLUCOSE SERPL-MCNC: 129 MG/DL (ref 65–140)
GLUCOSE SERPL-MCNC: 129 MG/DL (ref 65–140)
GLUCOSE SERPL-MCNC: 130 MG/DL (ref 65–140)
GLUCOSE SERPL-MCNC: 146 MG/DL (ref 65–140)
GLUCOSE SERPL-MCNC: 152 MG/DL (ref 65–140)
GLUCOSE SERPL-MCNC: 165 MG/DL (ref 65–140)
GLUCOSE SERPL-MCNC: 192 MG/DL (ref 65–140)
GLUCOSE SERPL-MCNC: 192 MG/DL (ref 65–140)
GLUCOSE SERPL-MCNC: 202 MG/DL (ref 65–140)
GLUCOSE SERPL-MCNC: 207 MG/DL (ref 65–140)
GLUCOSE SERPL-MCNC: 226 MG/DL (ref 65–140)
GLUCOSE SERPL-MCNC: 246 MG/DL (ref 65–140)
HCT VFR BLD AUTO: 36.5 % (ref 36.5–49.3)
HGB BLD-MCNC: 11.7 G/DL (ref 12–17)
INR PPP: 1.26 (ref 0.85–1.19)
INTERVENTRICULAR SEPTUM IN DIASTOLE (PARASTERNAL SHORT AXIS VIEW): 1.6 CM
INTERVENTRICULAR SEPTUM: 1.6 CM (ref 0.6–1.1)
LEFT ATRIUM SIZE: 4.7 CM
LEFT INTERNAL DIMENSION IN SYSTOLE: 2.5 CM (ref 2.1–4)
LEFT VENTRICLE DIASTOLIC VOLUME (MOD BIPLANE): 75 ML
LEFT VENTRICLE DIASTOLIC VOLUME INDEX (MOD BIPLANE): 39.9 ML/M2
LEFT VENTRICLE SYSTOLIC VOLUME (MOD BIPLANE): 28 ML
LEFT VENTRICLE SYSTOLIC VOLUME INDEX (MOD BIPLANE): 14.9 ML/M2
LEFT VENTRICULAR INTERNAL DIMENSION IN DIASTOLE: 3.9 CM (ref 3.5–6)
LEFT VENTRICULAR POSTERIOR WALL IN END DIASTOLE: 1.2 CM
LEFT VENTRICULAR STROKE VOLUME: 45 ML
LV EF: 63 %
LVSV (TEICH): 45 ML
MAGNESIUM SERPL-MCNC: 2.2 MG/DL (ref 1.9–2.7)
MCH RBC QN AUTO: 28.4 PG (ref 26.8–34.3)
MCHC RBC AUTO-ENTMCNC: 32.1 G/DL (ref 31.4–37.4)
MCV RBC AUTO: 89 FL (ref 82–98)
P AXIS: 58 DEGREES
P AXIS: 78 DEGREES
PLATELET # BLD AUTO: 126 THOUSANDS/UL (ref 149–390)
PMV BLD AUTO: 9.7 FL (ref 8.9–12.7)
POTASSIUM SERPL-SCNC: 4.4 MMOL/L (ref 3.5–5.3)
PR INTERVAL: 144 MS
PR INTERVAL: 156 MS
PROTHROMBIN TIME: 16.1 SECONDS (ref 12.3–15)
QRS AXIS: 1 DEGREES
QRS AXIS: 13 DEGREES
QRSD INTERVAL: 78 MS
QRSD INTERVAL: 78 MS
QT INTERVAL: 376 MS
QT INTERVAL: 396 MS
QTC INTERVAL: 414 MS
QTC INTERVAL: 439 MS
RBC # BLD AUTO: 4.12 MILLION/UL (ref 3.88–5.62)
SL CV LV EF: 58
SL CV PED ECHO LEFT VENTRICLE DIASTOLIC VOLUME (MOD BIPLANE) 2D: 68 ML
SL CV PED ECHO LEFT VENTRICLE SYSTOLIC VOLUME (MOD BIPLANE) 2D: 23 ML
SODIUM SERPL-SCNC: 138 MMOL/L (ref 135–147)
T WAVE AXIS: -14 DEGREES
T WAVE AXIS: -14 DEGREES
TR MAX PG: 5 MMHG
TR PEAK VELOCITY: 1.1 M/S
TRICUSPID ANNULAR PLANE SYSTOLIC EXCURSION: 1.2 CM
TRICUSPID VALVE PEAK REGURGITATION VELOCITY: 1.13 M/S
UNIT DISPENSE STATUS: NORMAL
UNIT PRODUCT CODE: NORMAL
UNIT PRODUCT VOLUME: 350 ML
UNIT RH: NORMAL
VENTRICULAR RATE: 73 BPM
VENTRICULAR RATE: 74 BPM
WBC # BLD AUTO: 11.22 THOUSAND/UL (ref 4.31–10.16)

## 2024-11-07 PROCEDURE — 85610 PROTHROMBIN TIME: CPT | Performed by: PHYSICIAN ASSISTANT

## 2024-11-07 PROCEDURE — 93325 DOPPLER ECHO COLOR FLOW MAPG: CPT | Performed by: INTERNAL MEDICINE

## 2024-11-07 PROCEDURE — 93005 ELECTROCARDIOGRAM TRACING: CPT

## 2024-11-07 PROCEDURE — 83735 ASSAY OF MAGNESIUM: CPT | Performed by: PHYSICIAN ASSISTANT

## 2024-11-07 PROCEDURE — 93325 DOPPLER ECHO COLOR FLOW MAPG: CPT

## 2024-11-07 PROCEDURE — 97163 PT EVAL HIGH COMPLEX 45 MIN: CPT

## 2024-11-07 PROCEDURE — 82948 REAGENT STRIP/BLOOD GLUCOSE: CPT

## 2024-11-07 PROCEDURE — 99232 SBSQ HOSP IP/OBS MODERATE 35: CPT | Performed by: INTERNAL MEDICINE

## 2024-11-07 PROCEDURE — 93321 DOPPLER ECHO F-UP/LMTD STD: CPT | Performed by: INTERNAL MEDICINE

## 2024-11-07 PROCEDURE — 85027 COMPLETE CBC AUTOMATED: CPT | Performed by: PHYSICIAN ASSISTANT

## 2024-11-07 PROCEDURE — 93308 TTE F-UP OR LMTD: CPT | Performed by: INTERNAL MEDICINE

## 2024-11-07 PROCEDURE — 93010 ELECTROCARDIOGRAM REPORT: CPT | Performed by: INTERNAL MEDICINE

## 2024-11-07 PROCEDURE — 94640 AIRWAY INHALATION TREATMENT: CPT

## 2024-11-07 PROCEDURE — 97167 OT EVAL HIGH COMPLEX 60 MIN: CPT

## 2024-11-07 PROCEDURE — NC001 PR NO CHARGE: Performed by: INTERNAL MEDICINE

## 2024-11-07 PROCEDURE — 71045 X-RAY EXAM CHEST 1 VIEW: CPT

## 2024-11-07 PROCEDURE — 93308 TTE F-UP OR LMTD: CPT

## 2024-11-07 PROCEDURE — 80048 BASIC METABOLIC PNL TOTAL CA: CPT | Performed by: PHYSICIAN ASSISTANT

## 2024-11-07 PROCEDURE — 99024 POSTOP FOLLOW-UP VISIT: CPT | Performed by: PHYSICIAN ASSISTANT

## 2024-11-07 PROCEDURE — 93321 DOPPLER ECHO F-UP/LMTD STD: CPT

## 2024-11-07 RX ORDER — FUROSEMIDE 10 MG/ML
40 INJECTION INTRAMUSCULAR; INTRAVENOUS
Status: DISCONTINUED | OUTPATIENT
Start: 2024-11-07 | End: 2024-11-08

## 2024-11-07 RX ORDER — ACETAMINOPHEN 160 MG/5ML
650 SUSPENSION ORAL EVERY 4 HOURS PRN
Status: DISCONTINUED | OUTPATIENT
Start: 2024-11-07 | End: 2024-11-07

## 2024-11-07 RX ORDER — MAGNESIUM SULFATE HEPTAHYDRATE 40 MG/ML
2 INJECTION, SOLUTION INTRAVENOUS ONCE
Status: COMPLETED | OUTPATIENT
Start: 2024-11-07 | End: 2024-11-07

## 2024-11-07 RX ORDER — POTASSIUM CHLORIDE 1500 MG/1
20 TABLET, EXTENDED RELEASE ORAL 2 TIMES DAILY
Status: DISCONTINUED | OUTPATIENT
Start: 2024-11-07 | End: 2024-11-07

## 2024-11-07 RX ORDER — ACETAMINOPHEN 325 MG/1
975 TABLET ORAL EVERY 8 HOURS
Status: DISCONTINUED | OUTPATIENT
Start: 2024-11-07 | End: 2024-11-07

## 2024-11-07 RX ORDER — OXYCODONE HYDROCHLORIDE 5 MG/1
5 TABLET ORAL EVERY 4 HOURS PRN
Status: DISCONTINUED | OUTPATIENT
Start: 2024-11-07 | End: 2024-11-08

## 2024-11-07 RX ORDER — ASPIRIN 81 MG/1
81 TABLET ORAL DAILY
Status: DISCONTINUED | OUTPATIENT
Start: 2024-11-07 | End: 2024-11-11 | Stop reason: HOSPADM

## 2024-11-07 RX ORDER — ACETAMINOPHEN 325 MG/1
975 TABLET ORAL EVERY 8 HOURS SCHEDULED
Status: DISCONTINUED | OUTPATIENT
Start: 2024-11-07 | End: 2024-11-08

## 2024-11-07 RX ORDER — LISINOPRIL 5 MG/1
5 TABLET ORAL DAILY
Status: DISCONTINUED | OUTPATIENT
Start: 2024-11-07 | End: 2024-11-08

## 2024-11-07 RX ORDER — TEMAZEPAM 15 MG/1
15 CAPSULE ORAL
Status: DISCONTINUED | OUTPATIENT
Start: 2024-11-07 | End: 2024-11-11 | Stop reason: HOSPADM

## 2024-11-07 RX ORDER — POTASSIUM CHLORIDE 20MEQ/15ML
20 LIQUID (ML) ORAL 2 TIMES DAILY
Status: DISCONTINUED | OUTPATIENT
Start: 2024-11-07 | End: 2024-11-08

## 2024-11-07 RX ORDER — AZELASTINE HYDROCHLORIDE 137 UG/1
SPRAY, METERED NASAL
Qty: 90 ML | Refills: 1 | Status: SHIPPED | OUTPATIENT
Start: 2024-11-07 | End: 2024-11-11

## 2024-11-07 RX ORDER — METHOCARBAMOL 500 MG/1
500 TABLET, FILM COATED ORAL EVERY 6 HOURS PRN
Status: DISCONTINUED | OUTPATIENT
Start: 2024-11-07 | End: 2024-11-08

## 2024-11-07 RX ORDER — WARFARIN SODIUM 2.5 MG/1
2.5 TABLET ORAL
Status: COMPLETED | OUTPATIENT
Start: 2024-11-07 | End: 2024-11-07

## 2024-11-07 RX ORDER — FONDAPARINUX SODIUM 2.5 MG/.5ML
2.5 INJECTION SUBCUTANEOUS DAILY
Status: DISCONTINUED | OUTPATIENT
Start: 2024-11-07 | End: 2024-11-10

## 2024-11-07 RX ORDER — LIDOCAINE 50 MG/G
2 PATCH TOPICAL DAILY
Status: DISCONTINUED | OUTPATIENT
Start: 2024-11-07 | End: 2024-11-11 | Stop reason: HOSPADM

## 2024-11-07 RX ORDER — WARFARIN SODIUM 2.5 MG/1
2.5 TABLET ORAL
Status: DISCONTINUED | OUTPATIENT
Start: 2024-11-07 | End: 2024-11-07

## 2024-11-07 RX ORDER — PANTOPRAZOLE SODIUM 40 MG/1
40 TABLET, DELAYED RELEASE ORAL DAILY
Status: DISCONTINUED | OUTPATIENT
Start: 2024-11-07 | End: 2024-11-08

## 2024-11-07 RX ORDER — DOCUSATE SODIUM 100 MG/1
100 CAPSULE, LIQUID FILLED ORAL 2 TIMES DAILY
Status: DISCONTINUED | OUTPATIENT
Start: 2024-11-07 | End: 2024-11-11 | Stop reason: HOSPADM

## 2024-11-07 RX ORDER — ONDANSETRON 2 MG/ML
4 INJECTION INTRAMUSCULAR; INTRAVENOUS EVERY 6 HOURS PRN
Status: DISCONTINUED | OUTPATIENT
Start: 2024-11-07 | End: 2024-11-10

## 2024-11-07 RX ORDER — CEFAZOLIN SODIUM 2 G/50ML
2000 SOLUTION INTRAVENOUS ONCE
Status: DISCONTINUED | OUTPATIENT
Start: 2024-11-08 | End: 2024-11-08 | Stop reason: HOSPADM

## 2024-11-07 RX ADMIN — FUROSEMIDE 40 MG: 10 INJECTION, SOLUTION INTRAMUSCULAR; INTRAVENOUS at 08:08

## 2024-11-07 RX ADMIN — HYDROMORPHONE HYDROCHLORIDE 0.5 MG: 1 INJECTION, SOLUTION INTRAMUSCULAR; INTRAVENOUS; SUBCUTANEOUS at 05:03

## 2024-11-07 RX ADMIN — LISINOPRIL 5 MG: 5 TABLET ORAL at 18:36

## 2024-11-07 RX ADMIN — LEVALBUTEROL HYDROCHLORIDE 1.25 MG: 1.25 SOLUTION RESPIRATORY (INHALATION) at 19:34

## 2024-11-07 RX ADMIN — NICARDIPINE HYDROCHLORIDE 5 MG/HR: 2.5 INJECTION, SOLUTION INTRAVENOUS at 01:37

## 2024-11-07 RX ADMIN — CHLORHEXIDINE GLUCONATE 0.12% ORAL RINSE 15 ML: 1.2 LIQUID ORAL at 08:09

## 2024-11-07 RX ADMIN — METHOCARBAMOL 500 MG: 500 TABLET ORAL at 13:53

## 2024-11-07 RX ADMIN — CHLORHEXIDINE GLUCONATE 0.12% ORAL RINSE 15 ML: 1.2 LIQUID ORAL at 21:27

## 2024-11-07 RX ADMIN — CEFAZOLIN SODIUM 2000 MG: 2 SOLUTION INTRAVENOUS at 03:02

## 2024-11-07 RX ADMIN — MAGNESIUM SULFATE HEPTAHYDRATE 2 G: 40 INJECTION, SOLUTION INTRAVENOUS at 08:09

## 2024-11-07 RX ADMIN — SODIUM CHLORIDE 3 UNITS/HR: 9 INJECTION, SOLUTION INTRAVENOUS at 13:53

## 2024-11-07 RX ADMIN — CEFAZOLIN SODIUM 2000 MG: 2 SOLUTION INTRAVENOUS at 12:29

## 2024-11-07 RX ADMIN — MUPIROCIN 1 APPLICATION: 20 OINTMENT TOPICAL at 10:02

## 2024-11-07 RX ADMIN — ASPIRIN 81 MG: 81 TABLET, COATED ORAL at 08:09

## 2024-11-07 RX ADMIN — AMIODARONE HYDROCHLORIDE 200 MG: 200 TABLET ORAL at 05:30

## 2024-11-07 RX ADMIN — MUPIROCIN 1 APPLICATION: 20 OINTMENT TOPICAL at 21:27

## 2024-11-07 RX ADMIN — PANTOPRAZOLE SODIUM 40 MG: 40 TABLET, DELAYED RELEASE ORAL at 08:08

## 2024-11-07 RX ADMIN — FUROSEMIDE 40 MG: 10 INJECTION, SOLUTION INTRAMUSCULAR; INTRAVENOUS at 16:28

## 2024-11-07 RX ADMIN — ACETAMINOPHEN 975 MG: 325 TABLET, FILM COATED ORAL at 13:53

## 2024-11-07 RX ADMIN — LIDOCAINE 2 PATCH: 50 PATCH TOPICAL at 13:53

## 2024-11-07 RX ADMIN — POTASSIUM CHLORIDE 20 MEQ: 1500 TABLET, EXTENDED RELEASE ORAL at 08:08

## 2024-11-07 RX ADMIN — OXYCODONE HYDROCHLORIDE 5 MG: 5 TABLET ORAL at 05:29

## 2024-11-07 RX ADMIN — ACETAMINOPHEN 650 MG: 650 SUSPENSION ORAL at 10:20

## 2024-11-07 RX ADMIN — OXYCODONE HYDROCHLORIDE 5 MG: 5 TABLET ORAL at 01:29

## 2024-11-07 RX ADMIN — POTASSIUM CHLORIDE 20 MEQ: 1.5 SOLUTION ORAL at 10:20

## 2024-11-07 RX ADMIN — FONDAPARINUX SODIUM 2.5 MG: 2.5 INJECTION SUBCUTANEOUS at 08:08

## 2024-11-07 RX ADMIN — WARFARIN SODIUM 2.5 MG: 2.5 TABLET ORAL at 18:36

## 2024-11-07 RX ADMIN — POTASSIUM CHLORIDE 20 MEQ: 1.5 SOLUTION ORAL at 18:42

## 2024-11-07 RX ADMIN — ONDANSETRON 4 MG: 2 INJECTION INTRAMUSCULAR; INTRAVENOUS at 09:26

## 2024-11-07 NOTE — PROGRESS NOTES
Progress Note - Cardiac Surgery   Otoniel Martinez 64 y.o. male MRN: [id number]  Unit/Bed#: PPHP-311-01 Encounter: [id number]    LM/MVCAD S/P CABG POD # 1    24 Hour Events: Transferred out of OR on elissa at 20 yest. Initially required pacing for SB 40s, HR improved to SR 60s-70s overnight, no pacing requirements.     Medications:   Scheduled Meds:  Current Facility-Administered Medications   Medication Dose Route Frequency Provider Last Rate    acetaminophen  650 mg Rectal Q4H PRN Jorge Coelho PA-C      acetaminophen  975 mg Oral Q6H While awake Jorge Coelho PA-C      amiodarone  200 mg Oral Q8H [facility] Jorge Coelho PA-C      aspirin  325 mg Oral Daily Jorge Coelho PA-C      atorvastatin  80 mg Oral Daily With Dinner Jorge Coelho PA-C      bisacodyl  10 mg Rectal Daily PRN Jorge Coelho PA-C      calcium gluconate  2 g Intravenous Once PRN Jorge Coelho PA-C      cefazolin  2,000 mg Intravenous Q8H Jorge Coelho PA-C Stopped (11/07/24 0332)    chlorhexidine  15 mL Mouth/Throat BID Jorge Coelho PA-C      fentaNYL  50 mcg Intravenous Q1H PRN Jorge Coelho PA-C      fluticasone  2 spray Nasal Daily Jorge Coelho PA-C      fondaparinux  2.5 mg Subcutaneous Daily Jorge Coelho PA-C      HYDROmorphone  0.5 mg Intravenous Q2H PRN Jorge Coelho PA-C      insulin regular (HumuLIN R,NovoLIN R) 1 Units/mL in sodium chloride 0.9 % 100 mL infusion  0.3-21 Units/hr Intravenous Titrated Jorge Coelho PA-C 4 Units/hr (11/07/24 0340)    levalbuterol  1.25 mg Nebulization After Dinner Jorge Coelho PA-C      montelukast  10 mg Oral HS Jorge Coelho PA-C      mupirocin  1 Application Nasal Q12H [facility] Jorge Coelho PA-C      niCARdipine  2.5-15 mg/hr Intravenous Titrated Jorge Coelho PA-C Stopped (11/07/24 0253)    ondansetron  4 mg Intravenous Q6H PRN Jorge Coelho PA-C      oxyCODONE  2.5 mg Oral Q4H PRN Jorge Coelho PA-C      Or    oxyCODONE  5 mg Oral Q4H PRN Jorge Coelho PA-C       pantoprazole  40 mg Oral Daily Jorge Coelho PA-C      phenylephine   mcg/min Intravenous Titrated Jorge Coelho PA-C Stopped (11/06/24 2302)    polyethylene glycol  17 g Oral Daily Jorge Coelho PA-C      potassium chloride  20 mEq Intravenous Once PRN Jorge Coelho PA-C      potassium chloride  40 mEq Intravenous Once PRN Jorge Coelho PA-C      sodium chloride  20 mL/hr Intravenous Continuous Jorge Coelho PA-C 20 mL/hr (11/06/24 1255)     Continuous Infusions:insulin regular (HumuLIN R,NovoLIN R) 1 Units/mL in sodium chloride 0.9 % 100 mL infusion, 0.3-21 Units/hr, Last Rate: 4 Units/hr (11/07/24 0340)  niCARdipine, 2.5-15 mg/hr, Last Rate: Stopped (11/07/24 0253)  phenylephine,  mcg/min, Last Rate: Stopped (11/06/24 2302)  sodium chloride, 20 mL/hr, Last Rate: 20 mL/hr (11/06/24 1255)      PRN Meds:.  acetaminophen    bisacodyl    calcium gluconate    fentaNYL    HYDROmorphone    ondansetron    oxyCODONE **OR** oxyCODONE    potassium chloride    potassium chloride    Vitals:   Vitals:    11/07/24 0300 11/07/24 0400 11/07/24 0538 11/07/24 0600   BP: 138/72 130/69  129/79   BP Location:  Left arm     Pulse: 74 75  75   Resp: 22 20  22   Temp: 99.5 °F (37.5 °C) 99.3 °F (37.4 °C)     TempSrc: Core Core     SpO2: 91% 93%  92%   Weight:   74.5 kg (164 lb 3.9 oz)    Height:           Hemodynamics:  PAP: (18-45)/(9-22) 32/11  CO:  [1.6 L/min-6.8 L/min] 6.8 L/min  CI:  [1.6 L/min/m2-3.8 L/min/m2] 3.8 L/min/m2    Respiratory:   SpO2: SpO2: 93 %; 1 LPM    Intake/Output:   I/O         11/05 0701 11/06 0700 11/06 0701 11/07 0700    P.O. 180     I.V. (mL/kg) 407.8 (5.9) 2944.6 (39.5)    IV Piggyback  1350    Total Intake(mL/kg) 587.8 (8.5) 4294.6 (57.6)    Urine (mL/kg/hr) 600 (0.4) 2375 (1.3)    Chest Tube  370    Total Output 600 2745    Net -12.2 +1549.6                  Weights:   Weight (last 2 days)       Date/Time Weight    11/07/24 0538 74.5 (164.24)    11/06/24 0534 69.4 (153)    11/05/24  0500 69 (152.1)          Admit Weight: 153 lbs    Chest tube Output:    Mediastinal tubes: 160 mL/8 hours  370 mL/24 hours   Pleural tubes: 0 mL/8 hours  0 mL/24 hours     Results:   Results from last 7 days   Lab Units 24  0317 24  1303 24  1259 24  0854 24  0411 24  0000 247   WBC Thousand/uL 11.22*  --   --   --   --  3.72*  --  3.69*   HEMOGLOBIN g/dL 11.7* 11.0*  --  10.8*  --  12.1  --  12.6   I STAT HEMOGLOBIN g/dl  --   --  9.9*  --    < >  --   --   --    HEMATOCRIT % 36.5 33.9*  --  33.0*  --  37.0  --  38.8   HEMATOCRIT, ISTAT %  --   --  29*  --    < >  --   --   --    PLATELETS Thousands/uL 126*  --   --  104*  --  127*   < > 125*    < > = values in this interval not displayed.     Results from last 7 days   Lab Units 24  0340 24  1750 24  1303 24  1259 24  0854 241   SODIUM mmol/L 138  --   --   --  140  --  139   POTASSIUM mmol/L 4.4 4.6 4.9  --  3.9  --  3.7   CHLORIDE mmol/L 108  --   --   --  111*  --  104   CO2 mmol/L 22  --   --   --  22  --  29   CO2, I-STAT mmol/L  --   --   --  24  --    < >  --    BUN mg/dL 16  --   --   --  18  --  24   CREATININE mg/dL 0.57*  --   --   --  0.55*  --  0.64   GLUCOSE, ISTAT mg/dl  --   --   --  129  --    < >  --    CALCIUM mg/dL 7.7*  --   --   --  7.2*  --  8.6    < > = values in this interval not displayed.     Recent Labs     11/07/24  0340   MG 2.2     Results from last 7 days   Lab Units 24  1307 24  0447 24  0348 10/31/24  2037 10/31/24  1315   INR  1.34*  --  1.07  --  1.11   PTT seconds 31 63* 72*   < > 25    < > = values in this interval not displayed.       Date:   INR:  Coumadin Dose:    -  2.5 mg    1.34  2.5 mg      Point of care glucose: 108-192    Studies:    CXR:   No PTX, no pl effusions, mild pulm vasc congestion, lines and tubes in position      EK/7  SR 74, no significant ST  elevations    Results Review Statement: I personally reviewed the following image studies in PACS and associated radiology reports: EKG and chest xray. My interpretation of the radiology images/reports is: same as above.    Invasive Lines/Tubes:  Invasive Devices       Central Venous Catheter Line  Duration             CVC Central Lines 11/06/24 <1 day              Peripheral Intravenous Line  Duration             Peripheral IV 11/04/24 Distal;Dorsal (posterior);Left Forearm 2 days    Peripheral IV 11/04/24 Left;Ventral (anterior) Forearm 2 days    Peripheral IV 11/06/24 Right Forearm <1 day              Line  Duration             Pacer Wires <1 day              Drain  Duration             Chest Tube 1 Left Pleural 32 Fr. <1 day    Chest Tube 2 Mediastinal 32 Fr. <1 day    Chest Tube 3  Mediastinal 24 Fr. <1 day    Urethral Catheter Temperature probe 16 Fr. <1 day                    Physical Exam:    General: No acute distress  HEENT/NECK:  Normocephalic. Atraumatic.  No jugular venous distention.    Cardiac: Regular rate and rhythm and No murmurs/rubs/gallops  Pulmonary:  Breath sounds clear bilaterally and Poor inspiratory effort  Abdomen:  Non-tender, Non-distended, and Hypo-active bowel sounds  Incisions: Sternum is stable.  Incision dressed with Acticoat.  No erythema or drainage and Saphenectomy incision dressed with Acticoat.  No erythema or drainage  Extremities: Extremities warm/dry and Trace edema B/L  Neuro: Alert and oriented X 3 and No focal deficits  Skin: Warm/Dry, without rashes or lesions.    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)    Symptomatic sinus bradycardia    S/P CABG (coronary artery bypass graft)       LM/MVCAD S/P CABG POD # 1    Plan:    Cardiac:     Severe LM CAD prompting admission after cath  Normal ventricular systolic function, EF 55%    Cardiac infusions: None    Cardiac index > 2.2  D/C Zwingle Asuncion catheter, done  D/C Arterial line, done    Had preop bradycardia episode, EP was consulted, recommended nonurgent PPM placement, and for placement after CABG    NSR; 60s-70s, SBP 120s  Consider starting low dose lopressor 12.5 BID vs holding, will d/w attending and EP    ACE inhibitor/ARB not indicated; Will not order    Continue prophylactic Amiodarone, 200 mg PO TID    Anticoagulated for recurrent DVT preop  INR pending, Dose Coumadin, 2.5 mg today    Continue ASA and Statin therapy    Maintain epicardial pacing wires for beta blocker initiation vs removing    Maintain central IV access today for medications requiring central IV access    Continue Arixtra for DVT prophylaxis    Consult cardiology for postoperative medical management    Pulmonary:     Extubated    CXR findings: No PTX, no pl effusions, mild pulm vasc congestion, lines and tubes in position    Good Room air oxygen saturation; Continue incentive spirometry/Coughing/Deep breathing exercises    Chest tube output remains moderate as expected; Continue chest tubes to suction today    Renal:     Normal preoperative renal function    Intake/Output net: +1500 mL/24 hours    Diuretic Regimen:  Start IV Lasix, 40 mg BID  Start Potassium Chloride 20 mEq PO BID    Discontinue potassium replacement scales    Remove nayak catheter    Neuro:    Neurologically intact; No active issues     Incisional pain well controlled     Continue tylenol, 975 mg PO q 8, standing dose   Continue oxycodone, 2.5 to 5 mg PO q 4 hours prn pain    GI:    Controlled carbohydrate diet level two/Cardiac diet, with 1800 mL fluid  restriction    Tolerating diet without complaint    Continue stool softeners and prn suppository    Continue GI prophylaxis    Endo:     Type 1 DM on insulin pump preop, A1c 8.4  Continue insulin infusion today. Consult Endocrinology for management, already following    Hematology:     Post-operative acute blood loss anemia; Hemoglobin 11.7  trend prn    Chronic mild thrombocytopenia, plts 126k (baseline range 112-137k)    Post op acute leukocytosis; Afebrile with no signs of infection; Trend CBC prn  Baseline chronic mild leukopenia    Lymphoplasmacytic lymphoma, monoclonal gammopathy, splenomegaly   - cleared by heme/onc preop   - no treatment as outpatient    Disposition:    Transfer from ICU to telemetry today      VTE Pharmacologic Prophylaxis: Fondaparinux (Arixtra)  VTE Mechanical Prophylaxis: sequential compression device    Collaborative rounds completed with supervising physician and with the bedside nurse    SIGNATURE: Tylor Ojeda PA-C  DATE: November 7, 2024  TIME: 6:54 AM

## 2024-11-07 NOTE — PLAN OF CARE
Problem: OCCUPATIONAL THERAPY ADULT  Goal: Performs self-care activities at highest level of function for planned discharge setting.  See evaluation for individualized goals.  Description: Treatment Interventions: ADL retraining, Functional transfer training, UE strengthening/ROM, Endurance training, Cognitive reorientation, Patient/family training, Equipment evaluation/education, Compensatory technique education, Continued evaluation, Energy conservation, Cardiac education, Activityengagement          See flowsheet documentation for full assessment, interventions and recommendations.   Note: Limitation: Decreased ADL status, Decreased Safe judgement during ADL, Decreased cognition, Decreased endurance, Decreased self-care trans, Decreased high-level ADLs  Prognosis: Fair  Assessment: Pt is a 65 yo male who presented to [facility] for outpatient [facility] in which testing revealed multivessel CAD with direction admission for CABG eval. Pt s/p CBAG x3 on 11/6, now with sternal/cardiac precautions. 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 Pt with active OT orders in which OT consulted to assess pt's functional status and occupational performance to determine safe d/c needs. Pt seen with PT to increase safety, decrease fall risk, and maximize functional/occupational performance 2* medical complexity which is a regression from pt's functional baseline. Pt lives with his wife in a 2  with 1 VERONICA. PTA, pt was independent in ADLs/IADLs and uses no DME for functional mobility. (+) driving. Currently, pt performing functional transfers/mobility w/ Mod A x1 w/ RW, UB ADLs w/ Min A,  and LB ADLs w/ Mod A. Pt demonstrates the following limitations/impairments which impact the pt's ability to engage in valued occupations: cardiac/sternal precautions, balance, endurance/activity tolerance, standing tolerance, functional reach, postural/trunk control, strength, safety awareness, and pain. From an OT standpoint, recommend discharge to home with increased support/assistance, once medically stable. The patient's raw score on the AM-PAC Daily Activity Inpatient Short Form is 16. A raw score of less than 19 suggests the patient may benefit from discharge to post-acute rehabilitation services however please refer to the recommendation of the Occupational Therapist for safe discharge planning.  Pt would benefit from skilled OT services 2-3x/wk to address acute care needs and underlying performance skills to promote safety, decrease fall risk, and enhance occupational performance to return to PLOF. Goals to be met within the next 10-14 days.     Rehab Resource Intensity Level, OT: No post-acute rehabilitation needs (Anticipate pending continued functional progress, pt will progress to home with increased support/assistance upon d/c; will continue to assess)

## 2024-11-07 NOTE — PROGRESS NOTES
11/07/24 1500   Clinical Encounter Type   Visited With Patient      visited patient and he said that he had an earlier visit from Father Mahin.   blessed patient and will be available upon request.

## 2024-11-07 NOTE — PROGRESS NOTES
Pastoral Care Progress Note               11/07/24 1529   Clinical Encounter Type   Visited With Patient and family together  (Father Mahin)   Druze Encounters   Druze Needs Prayer  (Father Mahin offered the patient a blessing)   Sacramental Encounters   Sacrament of Sick-Anointing Patient declined anointing

## 2024-11-07 NOTE — PLAN OF CARE
Problem: PHYSICAL THERAPY ADULT  Goal: Performs mobility at highest level of function for planned discharge setting.  See evaluation for individualized goals.  Description: Treatment/Interventions: Functional transfer training, LE strengthening/ROM, Elevations, Therapeutic exercise, Endurance training, Patient/family training, Equipment eval/education, Bed mobility, Gait training, Spoke to nursing, OT  Equipment Recommended: Walker (at this time)       See flowsheet documentation for full assessment, interventions and recommendations.  Note: Prognosis: Good  Problem List: Decreased strength, Decreased endurance, Impaired balance, Decreased mobility, Pain  Assessment: Pt is 64 y.o. male admitted after cardiac cath revealed LV/MVCAD and underwent Coronary artery bypass grafting x 3 with left internal mammary artery to left anterior descending, saphenous vein graft to right posterior descending artery, saphenous vein graft to obtuse marginal 1 on 11/6/2024. Pt 's comorbidities affecting POC include:Congenital myotonia, Deep vein thrombosis (DVT), low-grade CD5 positive B-cell lymphoma, chronic bronchitis, Diabetes (HCC), Difficulty walking, HL (hearing loss), Myocardial infarction (HCC), 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HCC), Sleep apnea and Vision loss and personal factors of: VERONICA. Pt's clinical presentation is currently unstable/unpredictable which is evident in ongoing telemetry monitoring while in a critical care unit, supplemental O2 needs, abnormal lab values being monitored/trending, CT in place and inability to progress further w/ mobilization at this time. Pt presents w/ postop pain and guarding, generalized weakness, incl decreased LE strength, decreased functional endurance and activity tolerance, and impaired balance w/ associated gait deviations requiring use of rw at this time. Will cont to follow pt in PT for progressive mobilization to address above functional deficits and to max level of (I), endurance, and  safety. Otherwise, anticipate pt will return home w/ available family support upon D/C provided he cont improving w/ mobility skills, safety, and endurance (incl on the steps) and when medically cleared; D/C recommendations are outlined below; will follow.        Rehab Resource Intensity Level, PT: No post-acute rehabilitation needs (pending progress)    See flowsheet documentation for full assessment.

## 2024-11-07 NOTE — QUICK NOTE
There are no additional recommendations from hematology/oncology at this time.  The patient has outpatient follow up with Dr. Bronson 12/3/24.  We will sign of at this time, but are available to assist if there are any additional questions or concerns.  Thank you for the opportunity to participate in this patient's care.     Kaz Wynn DO, PGY4  Hematology/Oncology Fellow

## 2024-11-07 NOTE — ANESTHESIA PREPROCEDURE EVALUATION
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), 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HCC)      GI/HEPATIC (within normal limits)      /RENAL (within normal limits)      HEMATOLOGY   (+) Thrombocytopenia (HCC)      MUSCULOSKELETAL   (+) 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HCC)   (+) Primary osteoarthritis of right hip      NEURO/PSYCH   (+) Seizure (HCC)      PULMONARY   (+) Chronic bronchitis (HCC)   (+) LOJA (dyspnea on exertion)   (+) Obstructive sleep apnea   (+) On home oxygen therapy      Neurology/Sleep   (+) Old cerebrovascular accident (CVA) without late effect      Surgery/Wound/Pain   (+) S/P CABG (coronary artery bypass graft)      Other   (+) History of recurrent deep vein thrombosis (DVT)   (+) Splenomegaly     3 L/min O2 NC continuous    MI 2004, s/p stenting 2004     S/p CABGx3 (LIMA to LAD, SVG to rPDA, SVG to OM1) 11/6/2024    TTE 11/7/2024:    Left Ventricle: Left ventricular cavity size is normal. Wall thickness is mildly increased. There is mild concentric hypertrophy. The left ventricular ejection fraction is 58%. Systolic function is normal. Diastolic function is mildly abnormal, consistent with grade I (abnormal) relaxation.    The following segments are hypokinetic: basal inferolateral and mid inferolateral.    All other segments are normal.    IVS: There is abnormal septal motion consistent with post-operative status.    Right Ventricle: Right ventricular cavity size is normal. Systolic function is normal.    Aorta: The aortic root is normal in size. The ascending aorta is mildly dilated.    Lab Results   Component Value Date    WBC 11.22 (H) 11/07/2024    HGB 11.7 (L) 11/07/2024    HCT 36.5 11/07/2024    MCV 89  11/07/2024     (L) 11/07/2024     Lab Results   Component Value Date    SODIUM 138 11/07/2024    K 4.4 11/07/2024     11/07/2024    CO2 22 11/07/2024    BUN 16 11/07/2024    CREATININE 0.57 (L) 11/07/2024    GLUC 202 (H) 11/07/2024    CALCIUM 7.7 (L) 11/07/2024     Lab Results   Component Value Date    INR 1.26 (H) 11/07/2024    INR 1.34 (H) 11/06/2024    INR 1.07 11/04/2024    PROTIME 16.1 (H) 11/07/2024    PROTIME 16.9 (H) 11/06/2024    PROTIME 14.2 11/04/2024     Lab Results   Component Value Date    HGBA1C 8.4 (H) 10/31/2024          Physical Exam    Airway    Mallampati score: II  TM Distance: >3 FB  Neck ROM: full     Dental   No notable dental hx     Cardiovascular  Cardiovascular exam normal    Pulmonary  Pulmonary exam normal     Other Findings        Anesthesia Plan  ASA Score- 4     Anesthesia Type- IV sedation with anesthesia with ASA Monitors.         Additional Monitors:     Airway Plan:            Plan Factors-    Chart reviewed. EKG reviewed.  Existing labs reviewed. Patient summary reviewed.                  Induction- intravenous.    Postoperative Plan-     Perioperative Resuscitation Plan - Level 1 - Full Code.       Informed Consent- Anesthetic plan and risks discussed with patient.  I personally reviewed this patient with the CRNA. Discussed and agreed on the Anesthesia Plan with the CRNA..

## 2024-11-07 NOTE — PHYSICAL THERAPY NOTE
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), 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HCC)    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HCC)    Mixed hyperlipidemia    Vertigo    Polyneuropathy    Chronic gastritis without bleeding    Old cerebrovascular accident (CVA) without late effect    LOJA (dyspnea on exertion)    Vitamin D deficiency    History of recurrent deep vein thrombosis (DVT)    Dysfunction of right rotator cuff    Primary osteoarthritis of right hip    Recurrent cold sores    Chronic bronchitis (HCC)    Abnormal CT scan of lung    Chronic sinusitis    Insulin pump status    Obstructive sleep apnea    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)    Splenomegaly    Establishing care with new doctor, encounter for    Decreased hearing of both ears    Memory loss    Seizure ([facility])    Monoclonal gammopathy    Hx of pancreatitis    On home oxygen therapy    Splenic artery aneurysm (HCC)    Carotid stenosis, asymptomatic, bilateral    Pulmonary nodule 1 cm or greater in diameter    Left main coronary artery disease    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[facility])    Symptomatic sinus bradycardia    S/P CABG (coronary artery bypass graft)       Past Medical History  Past Medical History:   Diagnosis Date    Acute respiratory failure with hypoxia ([facility]) 02/08/2024    CAD (coronary artery disease)     Congenital myotonia     Deep vein thrombosis (DVT) ([facility])     after tear of gastrocnemus muscle    Diabetes (HCC)     Diabetes mellitus (HCC)     Difficulty walking     hip replacement    HL (hearing loss)     decreased both  ears    Myocardial infarction (HCC)     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[facility]) 12/06/2017    Pancreatitis     three times    Sleep apnea     not using a C-PAP     Superficial thrombophlebitis     Vision loss     reading glasses over the counter       Past Surgical History  Past Surgical History:   Procedure Laterality Date    CARDIAC CATHETERIZATION N/A 10/31/2024    Procedure: Cardiac catheterization;  Surgeon: Ky Sandoval MD;  [facility];  Service: Cardiology    CARDIAC CATHETERIZATION N/A 10/31/2024    Procedure: Cardiac Coronary Angiogram;  Surgeon: Ky Sandoval MD;  [facility];  Service: Cardiology    CAROTID STENT      CHOLECYSTECTOMY      CIRCUMCISION      Elective    COLONOSCOPY      complete, polyps 2 years ago    CORONARY ANGIOPLASTY WITH STENT PLACEMENT      stent to RCA 2004    INGUINAL HERNIA REPAIR      IR BIOPSY BONE MARROW  8/7/2024    ORIF RADIAL SHAFT FRACTURE Left     Open Treatment of Multiple Fractures of the Radial Shaft    ORIF ULNAR / RADIAL SHAFT FRACTURE Left     Open Treatment of Multiple Fractures of the Ulnar Shaft    TONSILLECTOMY AND ADENOIDECTOMY            11/07/24 0852   PT Last Visit   PT Visit Date 11/07/24   Note Type   Note type Evaluation   Pain Assessment   Pain Assessment Tool FLACC   Pain Location/Orientation Orientation: Mid;Location: Chest;Location: Incision   Pain Onset/Description Onset: Ongoing;Frequency: Constant/Continuous;Descriptor: Aching;Descriptor: Discomfort;Descriptor: Sore   Effect of Pain on Daily Activities guarding w/ positional changes   Patient's Stated Pain Goal No pain   Hospital Pain Intervention(s) Repositioned;Ambulation/increased activity;Emotional support  (RN aware)   Pain Rating: FLACC (Rest) - Face 0   Pain Rating: FLACC (Rest) - Legs 0   Pain Rating: FLACC (Rest) - Activity 0   Pain Rating: FLACC (Rest) - Cry 0   Pain Rating: FLACC (Rest) - Consolability 0   Score: FLACC (Rest) 0   Pain Rating: FLACC (Activity) - Face 1   Pain Rating: FLACC (Activity) - Legs 0   Pain Rating: FLACC (Activity) - Activity 0   Pain Rating: FLACC (Activity) - Cry 1   Pain Rating: FLACC  (Activity) - Consolability 1   Score: FLACC (Activity) 3   Restrictions/Precautions   Braces or Orthoses   (denies)   Other Precautions Cardiac/sternal;Multiple lines;Telemetry;O2;Fall Risk;Pain  (CT)   Home Living   Type of Home House   Home Layout Two level  (1 VERONICA)   Home Equipment Walker;Cane   Prior Function   Level of [place] Independent with functional mobility  (amb w/o Ad)   Lives With Spouse   General   Additional Pertinent History cleared for assessment by unique   Cognition   Overall Cognitive Status WFL   Arousal/Participation Other (Comment)  (somnolent)   Attention Attends with cues to redirect   Orientation Level Oriented to person;Oriented to situation   Memory Decreased recall of recent events;Decreased recall of precautions   Following Commands Follows one step commands with increased time or repetition   Subjective   Subjective Pt is resting in the chair; arousable but remains somnolent initially; agreeable to mobilize w/ encouragement and re-assurance provided   RUE Assessment   RUE Assessment WFL  (AROM)   LUE Assessment   LUE Assessment WFL  (AROM)   RLE Assessment   RLE Assessment WFL  (AROM)   Strength RLE   RLE Overall Strength   (fair +)   LLE Assessment   LLE Assessment WFL  (AROM)   Strength LLE   LLE Overall Strength   (fair +)   Transfers   Sit to Stand 3  Moderate assistance   Additional items Assist x 1;Increased time required;Verbal cues   Stand to Sit 3  Moderate assistance   Additional items Assist x 1;Increased time required;Verbal cues   Ambulation/Elevation   Gait pattern Short stride;Inconsistent kera;Excessively slow   Gait Assistance 3  Moderate assist   Additional items Assist x 1;Verbal cues;Tactile cues   Assistive Device Rolling walker   Distance 10 ft;   Balance   Static Sitting Fair -   Dynamic Sitting Poor +   Static Standing Poor +   Dynamic Standing Poor   Ambulatory Poor   Activity Tolerance   Activity Tolerance Patient limited by fatigue;Patient limited by  pain   Medical Staff Made Aware Co-eval performed w/ OTR due to complexity of medical status and multiple comorbidities   Nurse Made Aware spoke to FAUSTO Carlin   Assessment   Prognosis Good   Problem List Decreased strength;Decreased endurance;Impaired balance;Decreased mobility;Pain   Assessment Pt is 64 y.o. male admitted after cardiac cath revealed LV/MVCAD and underwent Coronary artery bypass grafting x 3 with left internal mammary artery to left anterior descending, saphenous vein graft to right posterior descending artery, saphenous vein graft to obtuse marginal 1 on 11/6/2024. Pt 's comorbidities affecting POC include:Congenital myotonia, Deep vein thrombosis (DVT), low-grade CD5 positive B-cell lymphoma, chronic bronchitis, Diabetes (HCC), Difficulty walking, HL (hearing loss), Myocardial infarction (HCC), 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HCC), Sleep apnea and Vision loss and personal factors of: VERONICA. Pt's clinical presentation is currently unstable/unpredictable which is evident in ongoing telemetry monitoring while in a critical care unit, supplemental O2 needs, abnormal lab values being monitored/trending, CT in place and inability to progress further w/ mobilization at this time. Pt presents w/ postop pain and guarding, generalized weakness, incl decreased LE strength, decreased functional endurance and activity tolerance, and impaired balance w/ associated gait deviations requiring use of rw at this time. Will cont to follow pt in PT for progressive mobilization to address above functional deficits and to max level of (I), endurance, and safety. Otherwise, anticipate pt will return home w/ available family support upon D/C provided he cont improving w/ mobility skills, safety, and endurance (incl on the steps) and when medically cleared; D/C recommendations are outlined below; will follow.   Goals   Patient Goals to feel better and have less pain   STG Expiration Date 11/17/24   Short Term Goal #1 7-10 days. Pt  will amb 300 ft w/ appropriate assistive device PRN, mod (I) in order to facilitate safe return to premorbid environment and community amb status. Pt will negotiate 12 steps w/ hand rail, mod (I) in order to navigate between levels of home environment safely. Pt will negotiate 1 step w/ appropriate AD PRN, (S)x1 in order to assure safe navigation in and out of the premorbid living environment. Pt will achieve mod (I) level w/ bed mob in order to facilitate safety with OOB and back to bed transitions in own living environment. Pt will perform transfers w/ mod (I) to assure (I) and safety w/ transitions during aspects of mobility/locomotion.  Pt will participate in LE therex and balance activities to max progression w/ mobility skills.   PT Treatment Day 0   Plan   Treatment/Interventions Functional transfer training;LE strengthening/ROM;Elevations;Therapeutic exercise;Endurance training;Patient/family training;Equipment eval/education;Bed mobility;Gait training;Spoke to nursing;OT   PT Frequency 4-6x/wk   Discharge Recommendation   Rehab Resource Intensity Level, PT No post-acute rehabilitation needs  (pending progress)   Equipment Recommended Walker  (at this time)   AM-PAC Basic Mobility Inpatient   Turning in Flat Bed Without Bedrails 3   Lying on Back to Sitting on Edge of Flat Bed Without Bedrails 2   Moving Bed to Chair 2   Standing Up From Chair Using Arms 2   Walk in Room 2   Climb 3-5 Stairs With Railing 2   Basic Mobility Inpatient Raw Score 13   Basic Mobility Standardized Score 33.99   [facility] Highest Level Of Mobility   -[facility] Goal 4: Move to chair/commode   -HLM Achieved 6: Walk 10 steps or more   Modified Nicole Scale   Modified [place] Scale 4   End of Consult   Patient Position at End of Consult Bedside chair;All needs within reach         Doni Ramirez, PT

## 2024-11-07 NOTE — OCCUPATIONAL THERAPY NOTE
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), 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[facility])    Mixed hyperlipidemia    History of recurrent deep vein thrombosis (DVT)    Chronic bronchitis ([facility])    Insulin pump status    Obstructive sleep apnea    Monoclonal gammopathy    Splenic artery aneurysm (HCC)    Left main coronary artery disease    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[facility])    Symptomatic sinus bradycardia    S/P CABG (coronary artery bypass graft)    Past Medical History  Past Medical History:   Diagnosis Date    Acute respiratory failure with hypoxia ([facility]) 02/08/2024    CAD (coronary artery disease)     Congenital myotonia     Deep vein thrombosis (DVT) ([facility])     after tear of gastrocnemus muscle    Diabetes ([facility])     Diabetes mellitus ([facility])     Difficulty walking     hip replacement    HL (hearing loss)     decreased both  ears    Myocardial infarction ([facility])     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[facility]) 12/06/2017    Pancreatitis     three times    Sleep apnea     not using a C-PAP    Superficial thrombophlebitis     Vision loss     reading glasses over the counter     Past Surgical History  Past Surgical History:   Procedure Laterality Date    CARDIAC CATHETERIZATION N/A 10/31/2024    Procedure: Cardiac catheterization;  Surgeon: Ky Sandoval MD;  [facility];  Service: Cardiology    CARDIAC CATHETERIZATION N/A 10/31/2024    Procedure: Cardiac Coronary Angiogram;  Surgeon: Ky Sandoval MD;  [facility];  Service: Cardiology    CAROTID STENT      CHOLECYSTECTOMY      CIRCUMCISION      Elective    COLONOSCOPY      complete, polyps 2 years ago    CORONARY ANGIOPLASTY WITH STENT PLACEMENT      stent to RCA 2004    INGUINAL HERNIA REPAIR      IR BIOPSY BONE MARROW  8/7/2024    ORIF RADIAL SHAFT FRACTURE Left     Open  Treatment of Multiple Fractures of the Radial Shaft    ORIF ULNAR / RADIAL SHAFT FRACTURE Left     Open Treatment of Multiple Fractures of the Ulnar Shaft    TONSILLECTOMY AND ADENOIDECTOMY           11/07/24 0851   OT Last Visit   OT Visit Date 11/07/24   Note Type   Note type Evaluation   Additional Comments Pt seen w/ PT to increase safety, decrease fall risk, and maximize functional/occupational performance 2* medical complexity which is a regression from pt's functional baseline.   Pain Assessment   Pain Assessment Tool FLACC   Pain Location/Orientation Location: Generalized;Location: Chest   Effect of Pain on Daily Activities Impacts ability to engage in valued occupations   Hospital Pain Intervention(s) Repositioned;Ambulation/increased activity;Emotional support   Pain Rating: FLACC (Rest) - Face 0   Pain Rating: FLACC (Rest) - Legs 0   Pain Rating: FLACC (Rest) - Activity 0   Pain Rating: FLACC (Rest) - Cry 0   Pain Rating: FLACC (Rest) - Consolability 0   Score: FLACC (Rest) 0   Pain Rating: FLACC (Activity) - Face 1   Pain Rating: FLACC (Activity) - Legs 0   Pain Rating: FLACC (Activity) - Activity 0   Pain Rating: FLACC (Activity) - Cry 1   Pain Rating: FLACC (Activity) - Consolability 0   Score: FLACC (Activity) 2   Restrictions/Precautions   Weight Bearing Precautions Per Order No   Other Precautions Cognitive;Chair Alarm;Bed Alarm;Multiple lines;Telemetry;O2;Fall Risk;Pain;Cardiac/sternal  (2 chest tubes, nayak, pacing wires)   Home Living   Type of Home House  (2  with 1 [facility])   Home Layout One level;Bed/bath upstairs;Performs ADLs on one level;Able to live on main level with bedroom/bathroom;Access   Bathroom Shower/Tub Walk-in shower   Bathroom Toilet Standard   Bathroom Equipment Grab bars in shower  (Pt's wife reporting that she can get shower chair if needed)   Bathroom Accessibility Accessible   Home Equipment Walker;Cane   Additional Comments Upon arrival, pt presenting as poor historian 2*  "increased pain in which wife present in room reporting that she lives with the pt in a 2 SH with 1 VERONICA and 7 steps to access 2nd floor; reports that they have  a full bathroom on the first floor, but typically main bathroom/bedroom is on 2nd floor; reports that pt will be staying on the first floor in a recliner/love seat; has RW and SPC however does not use DME for functional mobility   Prior Function   Level of [place] Independent with ADLs;Independent with functional mobility;Independent with IADLS   Lives With Spouse   Receives Help From Family   IADLs Independent with driving;Independent with medication management;Independent with meal prep   Falls in the last 6 months 0   Vocational Retired   Comments (+) driving   Lifestyle   Autonomy PTA, pt was independent in ADLs/IADLs and uses no DME for functional mobility; (+) driving   Reciprocal Relationships Lives with his wife   Service to Others Retired   Intrinsic Gratification Enjoys golfing and spending time with grandchildren   General   Family/Caregiver Present Yes   Additional General Comments Pt's wife present   Subjective   Subjective \"I am so tired.\"   ADL   Where Assessed Chair   Eating Assistance 4  Minimal Assistance   Grooming Assistance 5  Supervision/Setup   UB Bathing Assistance 4  Minimal Assistance   LB Bathing Assistance 3  Moderate Assistance   UB Dressing Assistance 4  Minimal Assistance   LB Dressing Assistance 3  Moderate Assistance   Toileting Assistance  3  Moderate Assistance   Functional Assistance 3  Moderate Assistance   Additional Comments Anticipate pending continued functional progress, cognition, and pain management, pt will progress to higher levels of independence   Bed Mobility   Supine to Sit Unable to assess   Sit to Supine Unable to assess   Additional Comments Upon arrival, pt found sitting upright in recliner; @ end of session, pt left sitting upright in recliner with all functional needs in reach with family present in " room   Transfers   Sit to Stand 3  Moderate assistance   Additional items Assist x 1;Increased time required;Verbal cues   Stand to Sit 3  Moderate assistance   Additional items Assist x 1;Increased time required;Verbal cues   Additional Comments w/ RW with increased verbal cues for safety, proper hand placement, and technique   Functional Mobility   Functional Mobility 3  Moderate assistance   Additional Comments Pt completed short household functional mobility distances with Mod A x1 w/ RW for safety and support with 1-2 present for chair follow and line management; pt reporting increased dizziness in standing, very lethargic   Additional items Rolling walker   Balance   Static Sitting Fair +   Dynamic Sitting Fair   Static Standing Fair -   Dynamic Standing Poor +   Ambulatory Poor   Activity Tolerance   Activity Tolerance Patient limited by fatigue  (Cognition, lethargy)   Medical Staff Made Aware LAVERNE MarionT; [facility] Tech   Nurse Made Aware RN cleared   RUE Assessment   RUE Assessment WFL   LUE Assessment   LUE Assessment WFL   Hand Function   Gross Motor Coordination Functional   Fine Motor Coordination Functional   Cognition   Overall Cognitive Status Impaired   Arousal/Participation Lethargic;Cooperative   Attention Attends with cues to redirect   Memory Decreased recall of precautions   Following Commands Follows one step commands with increased time or repetition   Comments Pt presentig with flat affect, very lethargic throughout session with ongoing verbal cues to keep eyes open throughout; anticipate pending continued functional progess and cognition/mediciation management, pt will progress to normal cognitive functioning   Assessment   Limitation Decreased ADL status;Decreased Safe judgement during ADL;Decreased cognition;Decreased endurance;Decreased self-care trans;Decreased high-level ADLs   Prognosis Fair   Assessment Pt is a 63 yo male who presented to [facility] for outpatient [facility] in which  testing revealed multivessel CAD with direction admission for CABG eval. Pt s/p CBAG x3 on 11/6, now with sternal/cardiac precautions. Pt  has a past medical history of Acute respiratory failure with hypoxia ([facility]) (02/08/2024), CAD (coronary artery disease), Congenital myotonia, Deep vein thrombosis (DVT) ([facility]), Diabetes ([facility]), Diabetes mellitus ([facility]), Difficulty walking, HL (hearing loss), Myocardial infarction ([facility]), 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[facility]) (12/06/2017), Pancreatitis, Sleep apnea, Superficial thrombophlebitis, and Vision loss. Pt with active OT orders in which OT consulted to assess pt's functional status and occupational performance to determine safe d/c needs. Pt seen with PT to increase safety, decrease fall risk, and maximize functional/occupational performance 2* medical complexity which is a regression from pt's functional baseline. Pt lives with his wife in a 2  with 1 VERONICA. PTA, pt was independent in ADLs/IADLs and uses no DME for functional mobility. (+) driving. Currently, pt performing functional transfers/mobility w/ Mod A x1 w/ RW, UB ADLs w/ Min A, and LB ADLs w/ Mod A. Pt demonstrates the following limitations/impairments which impact the pt's ability to engage in valued occupations: cardiac/sternal precautions, balance, endurance/activity tolerance, standing tolerance, functional reach, postural/trunk control, strength, safety awareness, and pain. From an OT standpoint, recommend discharge to home with increased support/assistance, once medically stable. The patient's raw score on the -PAC Daily Activity Inpatient Short Form is 16. A raw score of less than 19 suggests the patient may benefit from discharge to post-acute rehabilitation services however please refer to the recommendation of the Occupational Therapist for safe discharge planning.  Pt would benefit from skilled OT services 2-3x/wk to address acute care needs and underlying performance skills to promote safety, decrease fall risk,  and enhance occupational performance to return to PLOF. Goals to be met within the next 10-14 days.   Goals   Patient Goals To sit down   LTG Time Frame 10-14   Long Term Goal #1 See OT goals listed below   Plan   Treatment Interventions ADL retraining;Functional transfer training;UE strengthening/ROM;Endurance training;Cognitive reorientation;Patient/family training;Equipment evaluation/education;Compensatory technique education;Continued evaluation;Energy conservation;Cardiac education;Activityengagement   Goal Expiration Date 11/21/24   OT Frequency 2-3x/wk   Discharge Recommendation   Rehab Resource Intensity Level, OT No post-acute rehabilitation needs  (Anticipate pending continued functional progress, pt will progress to home with increased support/assistance upon d/c; will continue to assess)   AM-PAC Daily Activity Inpatient   Lower Body Dressing 2   Bathing 2   Toileting 2   Upper Body Dressing 3   Grooming 3   Eating 4   Daily Activity Raw Score 16   Daily Activity Standardized Score (Calc for Raw Score >=11) 35.96   AM-PAC Applied Cognition Inpatient   Following a Speech/Presentation 1   Understanding Ordinary Conversation 3   Taking Medications 2   Remembering Where Things Are Placed or Put Away 2   Remembering List of 4-5 Errands 2   Taking Care of Complicated Tasks 1   Applied Cognition Raw Score 11   Applied Cognition Standardized Score 27.03   End of Consult   Education Provided Yes;Family or social support of family present for education by provider   Patient Position at End of Consult Bedside chair;All needs within reach   Nurse Communication Nurse aware of consult       OT GOALS:    Pt will improve functional mobility during ADL/IADL/leisure tasks with Mod I using AE/DME prn.    Pt will improve activity tolerance/functional endurance during ADL/IADL/leisure tasks for at least 20 minutes to improve occupational performance and engagement in valued occupations using AE/DME prn.    Pt will engage in  ongoing functional/formal cognitive assessments to assist with safe d/c planning and increase safety during functional tasks.    Pt will participate in simulated IADL management task w/ Mod I to increase independence and engagement in valued occupations w/ good balance/safety.    Pt will improve dynamic standing balance for at least 15 minutes with Mod I during functional tasks to decrease fall risk and improve independence and engagement in ADL/IADL/leisure activities.    Pt will follow 100% of multi-step commands in ADL/IADL/leisure activities to improve functional cognition used in functional daily routines.    Pt will complete functional transfers on and off all surfaces used in daily routines with Mod I for safety to maximize functional/occupational performance.    Pt will complete all bed mobility tasks with Mod I to serve as a prerequisite for EOB/OOB ADL/IADL/leisure tasks, optimize positioning/comfort, and increase functional independence.    Pt will engage in cardiac education packet and independently demonstrate/verbalize safety precautions during ADL/IADL/leisure tasks with energy conservation techniques s/p skilled instruction without verbal cues.    Pt will complete UB ADL tasks with Mod I using AE/DME prn to increase functional independence in ADL/IADL/leisure tasks.    Pt will complete LB ADL tasks with Mod I using AE/DME prn to increase functional independence in ADL/IADL/leisure tasks.     Pt will complete toileting tasks with Mod I and good hygiene/thoroughness using AE/DME prn to increase functional independence.    Pt will independently identify and utilize 2-3 positive coping strategies to enhance overall wellbeing and engagement in valued occupations.    Citlalli Grubbs MS, OTR/L

## 2024-11-07 NOTE — ASSESSMENT & PLAN NOTE
Presents for CABG evaluation.  Left heart cath 10/31 showing disease in RCA, Left main, circumflex  Patient reports long history of bradycardia.  He has a few episodes of dizziness per week, these can happen at rest.  He does get some shortness of breath when using slight inclines.  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  Telemetry reviewed with sinus bradycardia with rates into the 30s.  Had an episode of presyncope just before my evaluation associated with low heart rate and in seated position.  Not on any rate control agents  Now status post CABG with continued bradycardia immediately post op    Plan  Patient is now status post CABG.  Is felt that although heart rates have improved to 60s and 70s, he still did have some bradycardia and is felt that even before admission into the hospital he had signs of sick sinus syndrome and therefore recommendation is still to undergo pacemaker implantation.  Patient is agreeable to this.  His wife was also called on the phone and updated.    Will be made fasting after midnight to undergo left-sided dual-chamber pacemaker.  Will check limited echo to ensure her EF is still preserved.  AV salvador blocking agents and amiodarone (is per CTS protocol post CABG) are on hold until device in place.  Okay to give anticoagulation tonight as well.

## 2024-11-07 NOTE — PROGRESS NOTES
Progress Note - Electrophysiology   Name: Otoniel Martinez 64 y.o. male I MRN: [id number]  Unit/Bed#: PPHP-311-01 I Date of Admission: 10/31/2024   Date of Service: 11/7/2024 I Hospital Day: 7     Assessment & Plan  Symptomatic sinus bradycardia  Presents for CABG evaluation.  Left heart cath 10/31 showing disease in RCA, Left main, circumflex  Patient reports long history of bradycardia.  He has a few episodes of dizziness per week, these can happen at rest.  He does get some shortness of breath when using slight inclines.  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  Telemetry reviewed with sinus bradycardia with rates into the 30s.  Had an episode of presyncope just before my evaluation associated with low heart rate and in seated position.  Not on any rate control agents  Now status post CABG with continued bradycardia immediately post op    Plan  Patient is now status post CABG.  Is felt that although heart rates have improved to 60s and 70s, he still did have some bradycardia and is felt that even before admission into the hospital he had signs of sick sinus syndrome and therefore recommendation is still to undergo pacemaker implantation.  Patient is agreeable to this.  His wife was also called on the phone and updated.    Will be made fasting after midnight to undergo left-sided dual-chamber pacemaker.  Will check limited echo to ensure her EF is still preserved.  AV salvador blocking agents and amiodarone (is per CTS protocol post CABG) are on hold until device in place.  Okay to give anticoagulation tonight as well.  Multivessel CAD  POD1 CABGx3 (LIMA to LAD, SVG to rPDA, SVG to OM1)  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), 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HCC)  Last A1c 8.4%.  Maintained on insulin pump.  Endocrinology following.    History of recurrent deep vein thrombosis (DVT)  Maintained outpatient on Xarelto, currently on IV heparin   Obstructive sleep apnea  Uses oxygen at night  S/P CABG (coronary  "artery bypass graft)      Subjective/Objective   Subjective: Patient is status post CABG yesterday.  He reports that he is still having some lightheadedness but thinks that this is due to the lack of sleep.    He was evaluated by electrophysiology earlier during his stay for symptomatic bradycardia and was noted to have sinus node dysfunction and his heart rate was in the 30s even when sitting.  He also noted symptoms of lightheadedness as an outpatient.    He was awaiting CABG evaluation when this transpired.  It was felt that he would need to wait until open heart surgery to then do device.  He was noted to be bradycardic post CABG and did require epicardial pacing.  Now currently today heart rates did improve into the 60s and 70s.    EKG:      Objective:  Vitals: /74 (BP Location: Left arm)   Pulse 66   Temp 98.5 °F (36.9 °C) (Oral)   Resp (!) 25   Ht 5' 8\" (1.727 m)   Wt 74.5 kg (164 lb 3.9 oz)   SpO2 (!) 88%   BMI 24.97 kg/m²     Vitals:    11/06/24 0534 11/07/24 0538   Weight: 69.4 kg (153 lb) 74.5 kg (164 lb 3.9 oz)     Orthostatic Blood Pressures      Flowsheet Row Most Recent Value   Blood Pressure 128/74 filed at 11/07/2024 0800   Patient Position - Orthostatic VS Sitting filed at 11/07/2024 0800              Intake/Output Summary (Last 24 hours) at 11/7/2024 1345  Last data filed at 11/7/2024 1229  Gross per 24 hour   Intake 1807.71 ml   Output 3235 ml   Net -1427.29 ml       Invasive Devices       Central Venous Catheter Line  Duration             CVC Central Lines 11/06/24 1 day              Peripheral Intravenous Line  Duration             Peripheral IV 11/04/24 Distal;Dorsal (posterior);Left Forearm 3 days    Peripheral IV 11/04/24 Left;Ventral (anterior) Forearm 3 days    Peripheral IV 11/06/24 Right Forearm 1 day              Line  Duration             Pacer Wires 1 day              Drain  Duration             Chest Tube 1 Left Pleural 32 Fr. 1 day    Chest Tube 2 Mediastinal 32 Fr. 1 " day    Chest Tube 3  Mediastinal 24 Fr. 1 day                              Scheduled Meds:  Current Facility-Administered Medications   Medication Dose Route Frequency Provider Last Rate    acetaminophen  650 mg Rectal Q4H PRN Jorge Coelho PA-C      acetaminophen  975 mg Oral Q8H [facility] Nel Lakhani PA-C      [Held by provider] amiodarone  200 mg Oral Q8H [facility] Jorge Coelho PA-C      aspirin  81 mg Oral Daily Tylor Ojeda PA-C      atorvastatin  80 mg Oral Daily With Dinner Jorge Coelho PA-C      bisacodyl  10 mg Rectal Daily PRN Jorge Coelho PA-C      calcium gluconate  2 g Intravenous Once PRN Jorge Coelho PA-C      [START ON 11/8/2024] cefazolin  2,000 mg Intravenous Once Juli Almanza PA-C      chlorhexidine  15 mL Mouth/Throat BID Jorge Coelho PA-C      docusate sodium  100 mg Oral BID Tylor Ojeda PA-C      fluticasone  2 spray Nasal Daily Jorge Coelho PA-C      fondaparinux  2.5 mg Subcutaneous Daily Tylor Ojeda PA-C      furosemide  40 mg Intravenous BID (diuretic) Tylor Ojeda PA-C      insulin regular (HumuLIN R,NovoLIN R) 1 Units/mL in sodium chloride 0.9 % 100 mL infusion  0.3-21 Units/hr Intravenous Titrated Jorge Coelho PA-C 3 Units/hr (11/07/24 1154)    levalbuterol  1.25 mg Nebulization After Dinner Jorge Coelho PA-C      lidocaine  2 patch Topical Daily Nel Lakhani PA-C      methocarbamol  500 mg Oral Q6H PRN Nel Lakhani PA-C      montelukast  10 mg Oral HS Jorge Coelho PA-C      mupirocin  1 Application Nasal Q12H [facility] Jorge Coelho PA-C      niCARdipine  2.5-15 mg/hr Intravenous Titrated Jorge Coelho PA-C Stopped (11/07/24 0253)    ondansetron  4 mg Intravenous Q6H PRN Tylor Ojeda PA-C      oxyCODONE  5 mg Oral Q4H PRN Tylor Ojeda PA-C      oxyCODONE  2.5 mg Oral Q4H PRN Tylor Ojeda PA-C      pantoprazole  40 mg Oral Daily Tylor Ojeda PA-C      phenylephine   mcg/min Intravenous Titrated Jorge Coelho PA-C Stopped  (11/06/24 2302)    polyethylene glycol  17 g Oral Daily Jorge Coelho PA-C      potassium chloride  20 mEq Oral BID Estefania Ramos PA-C      sodium chloride  20 mL/hr Intravenous Continuous Jorge Coelho PA-C 20 mL/hr (11/06/24 1255)    temazepam  15 mg Oral HS PRN Tylor Ojeda PA-C      warfarin  2.5 mg Oral Once (warfarin) Tylor Ojeda PA-C       Continuous Infusions:insulin regular (HumuLIN R,NovoLIN R) 1 Units/mL in sodium chloride 0.9 % 100 mL infusion, 0.3-21 Units/hr, Last Rate: 3 Units/hr (11/07/24 1154)  niCARdipine, 2.5-15 mg/hr, Last Rate: Stopped (11/07/24 0253)  phenylephine,  mcg/min, Last Rate: Stopped (11/06/24 2302)  sodium chloride, 20 mL/hr, Last Rate: 20 mL/hr (11/06/24 1255)      PRN Meds:.  acetaminophen    bisacodyl    calcium gluconate    methocarbamol    ondansetron    oxyCODONE    oxyCODONE    temazepam    Review of Systems:  ROS  ROS as noted above, otherwise 12 point review of systems was performed and is negative.     Physical Exam:   GEN: NAD, alert and oriented, well appearing  SKIN: dry without significant lesions or rashes  HEENT: NCAT, PERRL, EOMs intact  NECK: No JVD appreciated  CARDIOVASCULAR: regular  LUNGS: Good respiratory effort with no audible wheezes  PSYCH: Normal mood and affect  NEURO: CN ll-Xll grossly intact    Physical Exam              Lab Results: I have personally reviewed pertinent lab results.    Results from last 7 days   Lab Units 11/07/24  0317 11/06/24  2004 11/06/24  1303 11/06/24  1259 11/06/24  0854 11/06/24  0411 11/06/24  0000 11/05/24  0447   WBC Thousand/uL 11.22*  --   --   --   --  3.72*  --  3.69*   HEMOGLOBIN g/dL 11.7* 11.0*  --  10.8*  --  12.1  --  12.6   I STAT HEMOGLOBIN g/dl  --   --  9.9*  --    < >  --   --   --    HEMATOCRIT % 36.5 33.9*  --  33.0*  --  37.0  --  38.8   HEMATOCRIT, ISTAT %  --   --  29*  --    < >  --   --   --    PLATELETS Thousands/uL 126*  --   --  104*  --  127*   < > 125*    < > = values in this  interval not displayed.     Results from last 7 days   Lab Units 11/07/24  0340 11/06/24  2004 11/06/24  1750 11/06/24  1303 11/06/24  1259 11/06/24  0854 11/06/24  0411   POTASSIUM mmol/L 4.4 4.6 4.9  --  3.9  --  3.7   CHLORIDE mmol/L 108  --   --   --  111*  --  104   CO2 mmol/L 22  --   --   --  22  --  29   CO2, I-STAT mmol/L  --   --   --  24  --    < >  --    BUN mg/dL 16  --   --   --  18  --  24   CREATININE mg/dL 0.57*  --   --   --  0.55*  --  0.64   GLUCOSE, ISTAT mg/dl  --   --   --  129  --    < >  --    CALCIUM mg/dL 7.7*  --   --   --  7.2*  --  8.6    < > = values in this interval not displayed.     Results from last 7 days   Lab Units 11/07/24  0711 11/06/24  1307 11/05/24  0447 11/04/24  0348   INR  1.26* 1.34*  --  1.07   PTT seconds  --  31 63* 72*     Results from last 7 days   Lab Units 11/07/24  0340 11/03/24  0422 11/02/24  0526   MAGNESIUM mg/dL 2.2 1.8* 1.9       Imaging: Results Review Statement: No pertinent imaging studies reviewed.  No results found for this or any previous visit.      VTE Pharmacologic Prophylaxis: VTE covered by:  fondaparinux, Subcutaneous, 2.5 mg at 11/07/24 0808  warfarin, Oral     VTE Mechanical Prophylaxis: sequential compression device

## 2024-11-08 ENCOUNTER — HOME HEALTH ADMISSION (OUTPATIENT)
Dept: HOME HEALTH SERVICES | Facility: HOME HEALTHCARE | Age: 64
End: 2024-11-08
Payer: COMMERCIAL

## 2024-11-08 ENCOUNTER — ANESTHESIA (INPATIENT)
Dept: NON INVASIVE DIAGNOSTICS | Facility: HOSPITAL | Age: 64
DRG: 233 | End: 2024-11-08
Payer: COMMERCIAL

## 2024-11-08 ENCOUNTER — APPOINTMENT (INPATIENT)
Dept: RADIOLOGY | Facility: HOSPITAL | Age: 64
DRG: 233 | End: 2024-11-08
Payer: COMMERCIAL

## 2024-11-08 LAB
ANION GAP SERPL CALCULATED.3IONS-SCNC: 6 MMOL/L (ref 4–13)
ATRIAL RATE: 79 BPM
BUN SERPL-MCNC: 18 MG/DL (ref 5–25)
CALCIUM SERPL-MCNC: 8.2 MG/DL (ref 8.4–10.2)
CHLORIDE SERPL-SCNC: 103 MMOL/L (ref 96–108)
CO2 SERPL-SCNC: 31 MMOL/L (ref 21–32)
CREAT SERPL-MCNC: 0.52 MG/DL (ref 0.6–1.3)
ERYTHROCYTE [DISTWIDTH] IN BLOOD BY AUTOMATED COUNT: 15.2 % (ref 11.6–15.1)
GFR SERPL CREATININE-BSD FRML MDRD: 112 ML/MIN/1.73SQ M
GLUCOSE SERPL-MCNC: 127 MG/DL (ref 65–140)
GLUCOSE SERPL-MCNC: 134 MG/DL (ref 65–140)
GLUCOSE SERPL-MCNC: 135 MG/DL (ref 65–140)
GLUCOSE SERPL-MCNC: 136 MG/DL (ref 65–140)
GLUCOSE SERPL-MCNC: 138 MG/DL (ref 65–140)
GLUCOSE SERPL-MCNC: 140 MG/DL (ref 65–140)
GLUCOSE SERPL-MCNC: 142 MG/DL (ref 65–140)
GLUCOSE SERPL-MCNC: 147 MG/DL (ref 65–140)
GLUCOSE SERPL-MCNC: 149 MG/DL (ref 65–140)
GLUCOSE SERPL-MCNC: 150 MG/DL (ref 65–140)
GLUCOSE SERPL-MCNC: 152 MG/DL (ref 65–140)
GLUCOSE SERPL-MCNC: 158 MG/DL (ref 65–140)
GLUCOSE SERPL-MCNC: 173 MG/DL (ref 65–140)
HCT VFR BLD AUTO: 34 % (ref 36.5–49.3)
HGB BLD-MCNC: 10.7 G/DL (ref 12–17)
INR PPP: 1.62 (ref 0.85–1.19)
MAGNESIUM SERPL-MCNC: 1.9 MG/DL (ref 1.9–2.7)
MCH RBC QN AUTO: 28.2 PG (ref 26.8–34.3)
MCHC RBC AUTO-ENTMCNC: 31.5 G/DL (ref 31.4–37.4)
MCV RBC AUTO: 90 FL (ref 82–98)
P AXIS: 59 DEGREES
POTASSIUM SERPL-SCNC: 3.8 MMOL/L (ref 3.5–5.3)
PR INTERVAL: 140 MS
PROTHROMBIN TIME: 19.4 SECONDS (ref 12.3–15)
QRS AXIS: 2 DEGREES
QRSD INTERVAL: 84 MS
QT INTERVAL: 392 MS
QTC INTERVAL: 449 MS
RBC # BLD AUTO: 3.79 MILLION/UL (ref 3.88–5.62)
SODIUM SERPL-SCNC: 140 MMOL/L (ref 135–147)
T WAVE AXIS: 26 DEGREES
VENTRICULAR RATE: 79 BPM
WBC # BLD AUTO: 6.07 THOUSAND/UL (ref 4.31–10.16)

## 2024-11-08 PROCEDURE — 93005 ELECTROCARDIOGRAM TRACING: CPT

## 2024-11-08 PROCEDURE — 33208 INSRT HEART PM ATRIAL & VENT: CPT | Performed by: INTERNAL MEDICINE

## 2024-11-08 PROCEDURE — C1887 CATHETER, GUIDING: HCPCS | Performed by: INTERNAL MEDICINE

## 2024-11-08 PROCEDURE — 97530 THERAPEUTIC ACTIVITIES: CPT

## 2024-11-08 PROCEDURE — 82948 REAGENT STRIP/BLOOD GLUCOSE: CPT

## 2024-11-08 PROCEDURE — 02HK3JZ INSERTION OF PACEMAKER LEAD INTO RIGHT VENTRICLE, PERCUTANEOUS APPROACH: ICD-10-PCS | Performed by: INTERNAL MEDICINE

## 2024-11-08 PROCEDURE — 97535 SELF CARE MNGMENT TRAINING: CPT

## 2024-11-08 PROCEDURE — 94760 N-INVAS EAR/PLS OXIMETRY 1: CPT

## 2024-11-08 PROCEDURE — C1898 LEAD, PMKR, OTHER THAN TRANS: HCPCS | Performed by: INTERNAL MEDICINE

## 2024-11-08 PROCEDURE — 71045 X-RAY EXAM CHEST 1 VIEW: CPT

## 2024-11-08 PROCEDURE — 0JH606Z INSERTION OF PACEMAKER, DUAL CHAMBER INTO CHEST SUBCUTANEOUS TISSUE AND FASCIA, OPEN APPROACH: ICD-10-PCS | Performed by: INTERNAL MEDICINE

## 2024-11-08 PROCEDURE — 85027 COMPLETE CBC AUTOMATED: CPT | Performed by: PHYSICIAN ASSISTANT

## 2024-11-08 PROCEDURE — 02H63JZ INSERTION OF PACEMAKER LEAD INTO RIGHT ATRIUM, PERCUTANEOUS APPROACH: ICD-10-PCS | Performed by: INTERNAL MEDICINE

## 2024-11-08 PROCEDURE — 94640 AIRWAY INHALATION TREATMENT: CPT

## 2024-11-08 PROCEDURE — 80048 BASIC METABOLIC PNL TOTAL CA: CPT | Performed by: PHYSICIAN ASSISTANT

## 2024-11-08 PROCEDURE — 3E0102A INTRODUCTION OF ANTI-INFECTIVE ENVELOPE INTO SUBCUTANEOUS TISSUE, OPEN APPROACH: ICD-10-PCS | Performed by: INTERNAL MEDICINE

## 2024-11-08 PROCEDURE — 93010 ELECTROCARDIOGRAM REPORT: CPT | Performed by: INTERNAL MEDICINE

## 2024-11-08 PROCEDURE — C1785 PMKR, DUAL, RATE-RESP: HCPCS | Performed by: INTERNAL MEDICINE

## 2024-11-08 PROCEDURE — C1892 INTRO/SHEATH,FIXED,PEEL-AWAY: HCPCS | Performed by: INTERNAL MEDICINE

## 2024-11-08 PROCEDURE — 94664 DEMO&/EVAL PT USE INHALER: CPT

## 2024-11-08 PROCEDURE — 85610 PROTHROMBIN TIME: CPT | Performed by: PHYSICIAN ASSISTANT

## 2024-11-08 PROCEDURE — 83735 ASSAY OF MAGNESIUM: CPT | Performed by: PHYSICIAN ASSISTANT

## 2024-11-08 PROCEDURE — 99024 POSTOP FOLLOW-UP VISIT: CPT | Performed by: PHYSICIAN ASSISTANT

## 2024-11-08 PROCEDURE — C1769 GUIDE WIRE: HCPCS | Performed by: INTERNAL MEDICINE

## 2024-11-08 DEVICE — ENVELOPE CMRM6122 ABSORB MED MR
Type: IMPLANTABLE DEVICE | Site: CHEST  WALL | Status: FUNCTIONAL
Brand: TYRX™

## 2024-11-08 DEVICE — LEAD 5076-52 MRI US RCMCRD
Type: IMPLANTABLE DEVICE | Site: HEART | Status: FUNCTIONAL
Brand: CAPSUREFIX NOVUS MRI™ SURESCAN®

## 2024-11-08 DEVICE — IPG W1DR01 AZURE XT DR MRI USA
Type: IMPLANTABLE DEVICE | Site: CHEST  WALL | Status: FUNCTIONAL
Brand: AZURE™ XT DR MRI SURESCAN™

## 2024-11-08 DEVICE — LEAD 3830 US MKT/ 69CM MRI LBBAP
Type: IMPLANTABLE DEVICE | Site: HEART | Status: FUNCTIONAL
Brand: SELECTSECURE™ MRI SURESCAN™

## 2024-11-08 RX ORDER — POTASSIUM CHLORIDE 14.9 MG/ML
20 INJECTION INTRAVENOUS ONCE
Status: COMPLETED | OUTPATIENT
Start: 2024-11-08 | End: 2024-11-08

## 2024-11-08 RX ORDER — POTASSIUM CHLORIDE 20MEQ/15ML
20 LIQUID (ML) ORAL DAILY
Status: DISCONTINUED | OUTPATIENT
Start: 2024-11-08 | End: 2024-11-08

## 2024-11-08 RX ORDER — PROPOFOL 10 MG/ML
INJECTION, EMULSION INTRAVENOUS AS NEEDED
Status: DISCONTINUED | OUTPATIENT
Start: 2024-11-08 | End: 2024-11-08

## 2024-11-08 RX ORDER — FENTANYL CITRATE 50 UG/ML
INJECTION, SOLUTION INTRAMUSCULAR; INTRAVENOUS AS NEEDED
Status: DISCONTINUED | OUTPATIENT
Start: 2024-11-08 | End: 2024-11-08

## 2024-11-08 RX ORDER — FUROSEMIDE 10 MG/ML
40 INJECTION INTRAMUSCULAR; INTRAVENOUS ONCE
Status: COMPLETED | OUTPATIENT
Start: 2024-11-08 | End: 2024-11-08

## 2024-11-08 RX ORDER — CEFAZOLIN SODIUM 2 G/50ML
SOLUTION INTRAVENOUS AS NEEDED
Status: DISCONTINUED | OUTPATIENT
Start: 2024-11-08 | End: 2024-11-08

## 2024-11-08 RX ORDER — TORSEMIDE 20 MG/1
20 TABLET ORAL 2 TIMES DAILY
Status: DISCONTINUED | OUTPATIENT
Start: 2024-11-08 | End: 2024-11-08

## 2024-11-08 RX ORDER — ACETAMINOPHEN 160 MG/5ML
650 SUSPENSION ORAL EVERY 4 HOURS PRN
Status: DISCONTINUED | OUTPATIENT
Start: 2024-11-08 | End: 2024-11-11 | Stop reason: HOSPADM

## 2024-11-08 RX ORDER — GENTAMICIN 40 MG/ML
INJECTION, SOLUTION INTRAMUSCULAR; INTRAVENOUS CODE/TRAUMA/SEDATION MEDICATION
Status: DISCONTINUED | OUTPATIENT
Start: 2024-11-08 | End: 2024-11-08 | Stop reason: HOSPADM

## 2024-11-08 RX ORDER — MIDAZOLAM HYDROCHLORIDE 2 MG/2ML
INJECTION, SOLUTION INTRAMUSCULAR; INTRAVENOUS AS NEEDED
Status: DISCONTINUED | OUTPATIENT
Start: 2024-11-08 | End: 2024-11-08

## 2024-11-08 RX ORDER — FUROSEMIDE 10 MG/ML
40 INJECTION INTRAMUSCULAR; INTRAVENOUS DAILY
Status: DISCONTINUED | OUTPATIENT
Start: 2024-11-09 | End: 2024-11-09

## 2024-11-08 RX ORDER — WARFARIN SODIUM 2.5 MG/1
2.5 TABLET ORAL
Status: COMPLETED | OUTPATIENT
Start: 2024-11-08 | End: 2024-11-08

## 2024-11-08 RX ORDER — HYDROMORPHONE HCL/PF 1 MG/ML
0.5 SYRINGE (ML) INJECTION ONCE
Status: COMPLETED | OUTPATIENT
Start: 2024-11-08 | End: 2024-11-08

## 2024-11-08 RX ORDER — LIDOCAINE HYDROCHLORIDE 10 MG/ML
INJECTION, SOLUTION EPIDURAL; INFILTRATION; INTRACAUDAL; PERINEURAL AS NEEDED
Status: DISCONTINUED | OUTPATIENT
Start: 2024-11-08 | End: 2024-11-08

## 2024-11-08 RX ORDER — METHOCARBAMOL 100 MG/ML
750 INJECTION, SOLUTION INTRAMUSCULAR; INTRAVENOUS EVERY 8 HOURS PRN
Status: DISPENSED | OUTPATIENT
Start: 2024-11-08 | End: 2024-11-11

## 2024-11-08 RX ORDER — POTASSIUM CHLORIDE 20MEQ/15ML
20 LIQUID (ML) ORAL DAILY
Status: DISCONTINUED | OUTPATIENT
Start: 2024-11-09 | End: 2024-11-09

## 2024-11-08 RX ORDER — PANTOPRAZOLE SODIUM 40 MG/10ML
40 INJECTION, POWDER, LYOPHILIZED, FOR SOLUTION INTRAVENOUS
Status: DISCONTINUED | OUTPATIENT
Start: 2024-11-08 | End: 2024-11-11 | Stop reason: HOSPADM

## 2024-11-08 RX ORDER — FUROSEMIDE 10 MG/ML
40 INJECTION INTRAMUSCULAR; INTRAVENOUS DAILY
Status: DISCONTINUED | OUTPATIENT
Start: 2024-11-08 | End: 2024-11-08

## 2024-11-08 RX ORDER — LIDOCAINE HYDROCHLORIDE 10 MG/ML
INJECTION, SOLUTION EPIDURAL; INFILTRATION; INTRACAUDAL; PERINEURAL CODE/TRAUMA/SEDATION MEDICATION
Status: DISCONTINUED | OUTPATIENT
Start: 2024-11-08 | End: 2024-11-08 | Stop reason: HOSPADM

## 2024-11-08 RX ADMIN — DOCUSATE SODIUM 100 MG: 100 CAPSULE, LIQUID FILLED ORAL at 20:27

## 2024-11-08 RX ADMIN — Medication 12.5 MG: at 20:27

## 2024-11-08 RX ADMIN — WARFARIN SODIUM 2.5 MG: 2.5 TABLET ORAL at 17:58

## 2024-11-08 RX ADMIN — LEVALBUTEROL HYDROCHLORIDE 1.25 MG: 1.25 SOLUTION RESPIRATORY (INHALATION) at 19:57

## 2024-11-08 RX ADMIN — FONDAPARINUX SODIUM 2.5 MG: 2.5 INJECTION SUBCUTANEOUS at 20:27

## 2024-11-08 RX ADMIN — MIDAZOLAM 2 MG: 1 INJECTION INTRAMUSCULAR; INTRAVENOUS at 13:15

## 2024-11-08 RX ADMIN — METHOCARBAMOL 750 MG: 100 INJECTION INTRAMUSCULAR; INTRAVENOUS at 14:32

## 2024-11-08 RX ADMIN — LIDOCAINE HYDROCHLORIDE 2 ML: 10 INJECTION, SOLUTION EPIDURAL; INFILTRATION; INTRACAUDAL; PERINEURAL at 13:08

## 2024-11-08 RX ADMIN — PANTOPRAZOLE SODIUM 40 MG: 40 INJECTION, POWDER, FOR SOLUTION INTRAVENOUS at 10:57

## 2024-11-08 RX ADMIN — HYDROMORPHONE HYDROCHLORIDE 0.5 MG: 1 INJECTION, SOLUTION INTRAMUSCULAR; INTRAVENOUS; SUBCUTANEOUS at 00:58

## 2024-11-08 RX ADMIN — AMIODARONE HYDROCHLORIDE 200 MG: 200 TABLET ORAL at 22:23

## 2024-11-08 RX ADMIN — FENTANYL CITRATE 25 MCG: 50 INJECTION INTRAMUSCULAR; INTRAVENOUS at 13:27

## 2024-11-08 RX ADMIN — TEMAZEPAM 15 MG: 15 CAPSULE ORAL at 00:05

## 2024-11-08 RX ADMIN — MUPIROCIN 1 APPLICATION: 20 OINTMENT TOPICAL at 10:39

## 2024-11-08 RX ADMIN — FUROSEMIDE 40 MG: 10 INJECTION, SOLUTION INTRAMUSCULAR; INTRAVENOUS at 10:57

## 2024-11-08 RX ADMIN — CHLORHEXIDINE GLUCONATE 0.12% ORAL RINSE 15 ML: 1.2 LIQUID ORAL at 20:27

## 2024-11-08 RX ADMIN — AMIODARONE HYDROCHLORIDE 200 MG: 200 TABLET ORAL at 17:57

## 2024-11-08 RX ADMIN — SODIUM CHLORIDE 4 UNITS/HR: 9 INJECTION, SOLUTION INTRAVENOUS at 12:39

## 2024-11-08 RX ADMIN — FENTANYL CITRATE 25 MCG: 50 INJECTION INTRAMUSCULAR; INTRAVENOUS at 13:19

## 2024-11-08 RX ADMIN — MUPIROCIN 1 APPLICATION: 20 OINTMENT TOPICAL at 20:27

## 2024-11-08 RX ADMIN — PROPOFOL 80 MCG/KG/MIN: 10 INJECTION, EMULSION INTRAVENOUS at 13:08

## 2024-11-08 RX ADMIN — CEFAZOLIN SODIUM 2000 MG: 2 SOLUTION INTRAVENOUS at 13:00

## 2024-11-08 RX ADMIN — SODIUM CHLORIDE 20 ML/HR: 0.45 INJECTION, SOLUTION INTRAVENOUS at 16:41

## 2024-11-08 RX ADMIN — NOREPINEPHRINE BITARTRATE 2 MCG/MIN: 1 INJECTION, SOLUTION, CONCENTRATE INTRAVENOUS at 13:17

## 2024-11-08 RX ADMIN — POTASSIUM CHLORIDE 20 MEQ: 14.9 INJECTION, SOLUTION INTRAVENOUS at 10:39

## 2024-11-08 NOTE — PHYSICAL THERAPY NOTE
PHYSICAL THERAPY NOTE          Patient Name: Otoniel Martinez  Today's Date: 11/8/2024 11/08/24 0854   PT Last Visit   PT Visit Date 11/08/24   Note Type   Note Type Treatment   Pain Assessment   Pain Assessment Tool FLACC   Pain Location/Orientation Orientation: Mid;Location: Chest;Location: Incision   Pain Onset/Description Onset: Ongoing;Frequency: Intermittent;Descriptor: Aching;Descriptor: Discomfort;Descriptor: Sore   Effect of Pain on Daily Activities occasional discomfort w/ coughing   Patient's Stated Pain Goal No pain   Hospital Pain Intervention(s) Repositioned;Ambulation/increased activity;Emotional support   Pain Rating: FLACC (Rest) - Face 0   Pain Rating: FLACC (Rest) - Legs 0   Pain Rating: FLACC (Rest) - Activity 0   Pain Rating: FLACC (Rest) - Cry 0   Pain Rating: FLACC (Rest) - Consolability 0   Score: FLACC (Rest) 0   Pain Rating: FLACC (Activity) - Face 1   Pain Rating: FLACC (Activity) - Legs 0   Pain Rating: FLACC (Activity) - Activity 0   Pain Rating: FLACC (Activity) - Cry 1   Pain Rating: FLACC (Activity) - Consolability 1   Score: FLACC (Activity) 3   Restrictions/Precautions   Other Precautions Cardiac/sternal;Multiple lines;Telemetry;O2  (CT; PPM placement pending)   General   Chart Reviewed Yes   Additional Pertinent History cleared for Tx session   Response to Previous Treatment Patient with no complaints from previous session.   Family/Caregiver Present Yes   Cognition   Overall Cognitive Status WFL   Arousal/Participation Cooperative;Other (Comment)  (somnolent)   Attention Attends with cues to redirect   Orientation Level Oriented to person;Oriented to place;Oriented to situation   Memory Decreased recall of precautions   Following Commands Follows one step commands without difficulty   Subjective   Subjective Pt is in the chair; flat affect but more alert and responding than yesterday; agreeable  to mobilize;   Transfers   Sit to Stand 4  Minimal assistance   Additional items Assist x 1;Increased time required;Verbal cues   Stand to Sit 4  Minimal assistance   Additional items Assist x 1;Increased time required;Verbal cues   Ambulation/Elevation   Gait pattern Excessively slow;Short stride;Inconsistent kera   Gait Assistance 4  Minimal assist   Additional items Assist x 1;Verbal cues;Tactile cues   Assistive Device Rolling walker   Distance 90 ft + 70 ft + 50 ft w/ extended seated rest periods in between   Balance   Static Sitting Fair   Dynamic Sitting Fair -   Static Standing Fair -   Dynamic Standing Poor +   Ambulatory Poor +   Activity Tolerance   Activity Tolerance Patient limited by fatigue   Medical Staff Made Aware Co-Tx performed w/ OTR due to complexity of medical status   Nurse Made Aware spoke to FAUSTO Bergeron   Assessment   Prognosis Good   Problem List Decreased strength;Decreased endurance;Impaired balance;Decreased mobility   Assessment Pt is gradually progressing w/ balance, endurance and functional mobility skills achieving min (A) w/ transfers and amb and ambulating further distances as well; pt still remains generally guarded and overall weak and deconditioned w/ rest periods provided b/in bouts of amb; D/C recommendations are listed below; will follow   Goals   Patient Goals to cont getting better   STG Expiration Date 11/17/24   PT Treatment Day 1   Plan   Treatment/Interventions Functional transfer training;LE strengthening/ROM;Elevations;Therapeutic exercise;Endurance training;Equipment eval/education;Bed mobility;Gait training;Spoke to nursing;OT;Family   Progress Progressing toward goals   PT Frequency 4-6x/wk   Discharge Recommendation   Rehab Resource Intensity Level, PT No post-acute rehabilitation needs   Equipment Recommended Walker   AM-PAC Basic Mobility Inpatient   Turning in Flat Bed Without Bedrails 3   Lying on Back to Sitting on Edge of Flat Bed Without Bedrails 2    Moving Bed to Chair 3   Standing Up From Chair Using Arms 3   Walk in Room 3   Climb 3-5 Stairs With Railing 2   Basic Mobility Inpatient Raw Score 16   Basic Mobility Standardized Score 38.32   [facility] Highest Level Of Mobility   -[facility] Goal 5: Stand one or more mins   -HL Achieved 7: Walk 25 feet or more   Education   Education Provided Mobility training;Assistive device   Patient Demonstrates verbal understanding;Reinforcement needed   End of Consult   Patient Position at End of Consult Bedside chair;Bed/Chair alarm activated;All needs within reach     Doni Ramirez

## 2024-11-08 NOTE — ASSESSMENT & PLAN NOTE
Management per primary team-undergoing pacemaker placement during this admission  Can undergo procedure either on insulin infusion

## 2024-11-08 NOTE — SPEECH THERAPY NOTE
Speech Language/Pathology    Consult received.  Chart reviewed.  Pt is currently NPO for pacer placement scheduled around noon this date.  Pt is known to [facility] for outpatient swallow and voice therapy.  He was on a regular and thin liquid diet in 8/2024.  An MBS/VBS was completed 6/24/24 which revealed no penetration or aspiration but pharyngeal and esophageal stasis. Will follow for swallow evaluation when appropriate. Nurse aware.

## 2024-11-08 NOTE — QUICK NOTE
11/08/24    Procedure: Epicardial Pacing Wire removal    Otoniel Martinez was returned to bed and informed of mandatory one hour post-procedure bed rest.  The assigned nurse was notified.  Epicardial pacing wires removed in routine fashion, without incident.  The patient tolerated the procedure well.  Vital signs ordered  q 15 minutes for one hour, as per protocol.    SIGNATURE: Oliva Sullivan PA-C  DATE: November 8, 2024  TIME: 4:21 PM

## 2024-11-08 NOTE — PROGRESS NOTES
Progress Note - Endocrinology   Name: Otoniel Martinez 64 y.o. 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  While on insulin drip, has remained at goal.  Reports poor oral intake.    Continue non-DKA insulin, until oral intake improves  Plan to transition to either basal-bolus regimen VS insulin pump.  Would consider restarting pump once patient feels ready to manage pump.  Will reassess tomorrow.  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  Subjective : Reports poor oral intake.  Reported overall feeling unwell.  Accompanied by wife at bedside.    Objective :  Temp:  [97.8 °F (36.6 °C)-99.5 °F (37.5 °C)] 97.8 °F (36.6 °C)  HR:  [66-83] 77  BP: ()/(62-81) 142/81  Resp:  [16-35] 16  SpO2:  [88 %-97 %] 92 %  O2 Device: Nasal cannula  Nasal Cannula O2 Flow Rate (L/min):  [1 L/min-3 L/min] 2 L/min    Physical Exam  Vitals reviewed.   Constitutional:       General: He is not in acute distress.     Appearance: Normal appearance. He is not ill-appearing or diaphoretic.   HENT:      Head: Normocephalic and atraumatic.   Eyes:      General: No scleral icterus.     Conjunctiva/sclera: Conjunctivae normal.   Cardiovascular:      Rate and Rhythm: Normal rate and regular rhythm.   Pulmonary:      Effort: Pulmonary effort is normal. No respiratory distress.   Abdominal:      General: There is no distension.   Musculoskeletal:      Cervical back: Normal range of motion.   Skin:     General: Skin is dry.      Capillary Refill: Capillary refill takes less than 2 seconds.      Coloration: Skin is not jaundiced or pale.   Neurological:      Mental Status: He is alert and oriented to person, place, and time. Mental status is at baseline.      Sensory: No sensory deficit.   Psychiatric:         Mood and Affect: Mood normal.         Behavior: Behavior normal.         Lab Results: I have reviewed the following results:CBC/BMP:   .     11/07/24  0317 11/07/24  0340   WBC 11.22*  --    HGB 11.7*  --    HCT 36.5  --    *  --    SODIUM  --  138   K  --  4.4   CL  --  108   CO2  --  22   BUN  --  16   CREATININE  --  0.57*   GLUC  --  202*   MG  --  2.2        Imaging Results Review: No pertinent imaging studies reviewed.  Other Study Results Review: No additional pertinent studies reviewed.      Harpreet Campos MD  Endocrinology fellow,  PGY-4

## 2024-11-08 NOTE — ANESTHESIA POSTPROCEDURE EVALUATION
Post-Op Assessment Note    CV Status:  Stable  Pain Score: 0    Pain management: adequate       Mental Status:  Arousable   Hydration Status:  Euvolemic   PONV Controlled:  Controlled   Airway Patency:  Patent     Post Op Vitals Reviewed: Yes    No anethesia notable event occurred.    Staff: Anesthesiologist, CRNA       Last Filed PACU Vitals:  Vitals Value Taken Time   Temp     Pulse 92    /63    Resp     SpO2 99 room air        Modified Salima:  No data recorded

## 2024-11-08 NOTE — OCCUPATIONAL THERAPY NOTE
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)    Symptomatic sinus bradycardia    S/P CABG (coronary artery bypass graft)              11/08/24 0855   OT Last Visit   OT Visit Date 11/08/24   Note Type   Note Type Treatment   Pain Assessment   Pain Assessment Tool FLACC   Pain Location/Orientation Orientation: Mid;Location: Chest;Location: Incision   Patient's Stated Pain Goal No pain   Hospital Pain Intervention(s) Repositioned;Ambulation/increased activity   Pain Rating: FLACC (Rest) - Face 0   Pain Rating: FLACC (Rest) - Legs 0   Pain Rating: FLACC (Rest) - Activity 0   Pain Rating: FLACC (Rest) - Cry 0   Pain Rating: FLACC (Rest) - Consolability 0   Score: FLACC (Rest) 0   Pain Rating: FLACC (Activity) - Face 1   Pain Rating: FLACC (Activity) - Legs 0   Pain Rating: FLACC (Activity) - Activity 0   Pain Rating: FLACC (Activity) - Cry 1   Pain Rating: FLACC (Activity) - Consolability 0   Score: FLACC (Activity) 2   Restrictions/Precautions   Other Precautions Cardiac/sternal;Chair Alarm;Multiple lines;Telemetry;O2;Pain  (CT)   Lifestyle   Autonomy PTA, pt was independent in ADLs/IADLs and uses no DME for functional mobility; (+) driving   Reciprocal Relationships Lives with his wife   Service to Others Retired   Intrinsic Gratification Enjoys golfing and spending time with grandchildren   ADL   Where Assessed Chair   LB Dressing Assistance 4  Minimal Assistance   LB Dressing Deficit Don/doff R sock;Don/doff  L sock   LB Dressing Comments Pt unable to achieve full seated figure 4 technique although will have assist as needed from spouse at home.   Bed Mobility   Additional Comments Pt greeted OOB in chair, left in chair with alarm on and all needs within reach.   Transfers   Sit to Stand 4  Minimal assistance   Additional items Assist x 1;Increased time required;Verbal cues   Stand to Sit 4  Minimal assistance   Additional items Assist x 1;Increased time required;Verbal cues   Additional Comments with RW   Functional Mobility   Functional Mobility 4  Minimal assistance   Additional Comments Pt performs household distance mobility with MIN A x 1 with RW and chair follow requiring seated rest breaks.   Additional items Rolling walker   Cognition   Overall Cognitive Status WFL   Arousal/Participation Cooperative   Attention Attends with cues to redirect   Orientation Level Oriented to person;Oriented to place;Oriented to situation   Memory Decreased recall of precautions   Following Commands Follows one step commands without difficulty   Comments Pt cooperative to therapy although flat throughout session, reviewed PPM pxns as pt pending PPM placement procedure, with good support at home.   Additional Activities   Additional Activities Other (Comment)  (Recovering After Cardiac Surgery education packet)   Additional Activities Comments Pt and spouse provided s/p cardiac sx education packet, reviewed w/ OT, discussed cardiac/sternal precautions, lifestyle modifications, post hospitalization IADL management, environmental temperature control, emotional regulation and management, lifting/driving restrictions, energy conservation techniques, mobility schedule, incisional management, VNA, and cardiac rehabilitation initiation upon clearance per physician at follow up. Pt and spouse reviewed education packet and acknowledged reception of such.   Activity Tolerance   Activity Tolerance Patient limited by fatigue;Patient limited by  pain   Medical Staff Made Aware RN cleared, co-tx with DPT due to medical complexity and multiple lines.   Assessment   Assessment Pt seen for OT treatment session day 1 on this date focused on ADL retraining, functional transfers and mobility, energy conservation, functional endurance, recall of safety precautions, and patient education. Pt was greeted in chair and was cooperative throughout session. Following session, pt was left in chair with chair alarm on and all needs within reach. Pt continues to be limited by functional status related to ADLs and transfers requiring MIN A for LB dressing although spouse is able to assist as needed, also requiring MIN A for transfers and FM. The patient's raw score on the AM-PAC Daily Activity Inpatient Short Form is 19. A raw score of greater than or equal to 19 suggests the patient may benefit from discharge to home. Please refer to the recommendation of the Occupational Therapist for safe discharge planning. Pt is expected to continue to progress well and has adequate assist at home, indicating no further acute OT needs at this time, d/c acute OT.  Recommend d/c to home with increased social support, no further OT needs.   Plan   Treatment Interventions ADL retraining;Functional transfer training;Endurance training;Continued evaluation;Cardiac education;Energy conservation   Goal Expiration Date 11/21/24   OT Treatment Day 1   OT Frequency 2-3x/wk   Discharge Recommendation   Rehab Resource Intensity Level, OT No post-acute rehabilitation needs   AM-PAC Daily Activity Inpatient   Lower Body Dressing 3   Bathing 3   Toileting 3   Upper Body Dressing 3   Grooming 3   Eating 4   Daily Activity Raw Score 19   Daily Activity Standardized Score (Calc for Raw Score >=11) 40.22   AM-PAC Applied Cognition Inpatient   Following a Speech/Presentation 4   Understanding Ordinary Conversation 4   Taking Medications 3   Remembering Where Things Are Placed or Put Away 3   Remembering List  of 4-5 Errands 3   Taking Care of Complicated Tasks 3   Applied Cognition Raw Score 20   Applied Cognition Standardized Score 41.76   Modified [place] Scale   Modified [place] Scale 4   End of Consult   Education Provided Yes;Family or social support of family present for education by provider   Patient Position at End of Consult Bedside chair;Bed/Chair alarm activated;All needs within reach   Nurse Communication Nurse aware of consult       SOY Garcia, OTR/L

## 2024-11-08 NOTE — PLAN OF CARE
Problem: OCCUPATIONAL THERAPY ADULT  Goal: Performs self-care activities at highest level of function for planned discharge setting.  See evaluation for individualized goals.  Description: Treatment Interventions: ADL retraining, Functional transfer training, UE strengthening/ROM, Endurance training, Cognitive reorientation, Patient/family training, Equipment evaluation/education, Compensatory technique education, Continued evaluation, Energy conservation, Cardiac education, Activityengagement          See flowsheet documentation for full assessment, interventions and recommendations.   Outcome: Completed  Note: Limitation: Decreased ADL status, Decreased Safe judgement during ADL, Decreased cognition, Decreased endurance, Decreased self-care trans, Decreased high-level ADLs  Prognosis: Fair  Assessment: Pt seen for OT treatment session day 1 on this date focused on ADL retraining, functional transfers and mobility, energy conservation, functional endurance, recall of safety precautions, and patient education. Pt was greeted in chair and was cooperative throughout session. Following session, pt was left in chair with chair alarm on and all needs within reach. Pt continues to be limited by functional status related to ADLs and transfers requiring MIN A for LB dressing although spouse is able to assist as needed, also requiring MIN A for transfers and FM. The patient's raw score on the -PAC Daily Activity Inpatient Short Form is 19. A raw score of greater than or equal to 19 suggests the patient may benefit from discharge to home. Please refer to the recommendation of the Occupational Therapist for safe discharge planning. Pt is expected to continue to progress well and has adequate assist at home, indicating no further acute OT needs at this time, d/c acute OT.  Recommend d/c to home with increased social support, no further OT needs.     Rehab Resource Intensity Level, OT: No post-acute rehabilitation needs

## 2024-11-08 NOTE — RESPIRATORY THERAPY NOTE
RT Protocol Note  Otoniel Martinez 64 y.o. 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), 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HCC)    Mixed hyperlipidemia    History of recurrent deep vein thrombosis (DVT)    Chronic bronchitis ([facility])    Insulin pump status    Obstructive sleep apnea    Monoclonal gammopathy    Splenic artery aneurysm ([facility])    Left main coronary artery disease    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[facility])    Symptomatic sinus bradycardia    S/P CABG (coronary artery bypass graft)      Home Pulmonary Medications:         Past Medical History:   Diagnosis Date    Acute respiratory failure with hypoxia ([facility]) 02/08/2024    CAD (coronary artery disease)     Congenital myotonia     Deep vein thrombosis (DVT) ([facility])     after tear of gastrocnemus muscle    Diabetes ([facility])     Diabetes mellitus ([facility])     Difficulty walking     hip replacement    HL (hearing loss)     decreased both  ears    Myocardial infarction ([facility])     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    Pancreatitis     three times    Sleep apnea     not using a C-PAP    Superficial thrombophlebitis     Vision loss     reading glasses over the counter     Social History     Socioeconomic History    Marital status: /Civil Union     Spouse name: None    Number of children: None    Years of education: None    Highest education level: None   Occupational History    None   Tobacco Use    Smoking status: Never    Smokeless tobacco: Never   Vaping Use    Vaping status: Never Used   Substance and Sexual Activity    Alcohol use: Yes     Comment: 2 beers on a social occasion    Drug use: No    Sexual activity: Yes     Partners: Female     Birth control/protection: None   Other Topics Concern    None   Social History Narrative    Consumes 1 cup of tea  And 1 glass of tea per day        Weight loss     Social  Determinants of Health     Financial Resource Strain: Low Risk  (12/4/2023)    [facility]    Overall Financial Resource Strain (CARDIA)     Difficulty of Paying Living Expenses: Not very hard   Food Insecurity: No Food Insecurity (10/31/2024)    Nursing - Inadequate Food Risk Classification     Worried About Running Out of Food in the Last Year: Never true     Ran Out of Food in the Last Year: Never true     Ran Out of Food in the Last Year: 1   Transportation Needs: No Transportation Needs (10/31/2024)    Nursing - Transportation Risk Classification     Lack of Transportation: Not on file     Lack of Transportation: 2   Physical Activity: Sufficiently Active (12/4/2023)    Received from [facility]    Exercise Vital Sign     Days of Exercise per Week: 1 day     Minutes of Exercise per Session: 150+ min   Stress: No Stress Concern Present (12/4/2023)    [facility]    [employer] [place] [employer] - Occupational Stress Questionnaire     Feeling of Stress : Not at all   Social Connections: Socially Integrated (12/4/2023)    [facility]    Social Connection and Isolation Panel [NHANES]     Frequency of Communication with Friends and Family: Three times a week     Frequency of Social Gatherings with Friends and Family: Never     Attends L[employer] Services: More than 4 times per year     Active Member of Clubs or Organizations: Yes     Attends Club or Organization Meetings: More than 4 times per year     Marital Status:    Intimate Partner Violence: Unknown (10/31/2024)    Nursing IPS     Feels Physically and Emotionally Safe: Not on file     Physically Hurt by Someone: Not on file     Humiliated or Emotionally Abused by Someone: Not on file     Physically Hurt by Someone: 2     Hurt or Threatened by  "Someone: 2   Housing Stability: Unknown (10/31/2024)    Nursing: Inadequate Housing Risk Classification     Has Housing: Not on file     Worried About Losing Housing: Not on file     Unable to Get Utilities: Not on file     Unable to Pay for Housing in the Last Year: 2     Has Housin       Subjective         Objective    Physical Exam:        Vitals:  Blood pressure 105/61, pulse 73, temperature 99 °F (37.2 °C), resp. rate 20, height 5' 8\" (1.727 m), weight 72.4 kg (159 lb 9.8 oz), SpO2 90%.          Imaging and other studies:           Plan    Respiratory Plan: Home Bronchodilator Patient pathway, Vent/NIV/HFNC  Airway Clearance Plan: Incentive Spirometer (Post extubation.)     Resp Comments: (P) Pt has been on aw clearance for 48 Hours.  He is reaching appropriate goals will dc protocol @ this time.   "

## 2024-11-08 NOTE — ASSESSMENT & PLAN NOTE
Lab Results   Component Value Date    HGBA1C 8.4 (H) 10/31/2024     Recent Labs     11/07/24  1153 11/07/24  1416 11/07/24  1711 11/07/24  1805   POCGLU 146* 129 246* 226*     Uncontrolled KAMI/diabetes mellitus, with complication of diabetes related retinopathy of the right eye, MV-CAD  Follows with [facility] endocrinology  Home regimen: Uses OmniPod 5 insulin pump (with settings noted below)  On 10/31/2024 outpatient left heart cath showed MV-CAD, admitted for CABG -done 11/6.  While on insulin drip, has remained at goal.  Reports poor oral intake.    Continue non-DKA insulin, until oral intake improves  Plan to transition to either basal-bolus regimen VS insulin pump.  Would consider restarting pump once patient feels ready to manage pump.  Will reassess tomorrow.  Glucose checks every 2 hours  Monitor fingerstick glucose  Diabetic diet -no juice  Hypoglycemia protocol  Endocrine will continue to follow and make recommendations  Discharge instructions:  Plan to discharge patient on insulin pump, on prior settings  Consider SGLT2 inhibitor at discharge

## 2024-11-08 NOTE — PLAN OF CARE
Problem: PHYSICAL THERAPY ADULT  Goal: Performs mobility at highest level of function for planned discharge setting.  See evaluation for individualized goals.  Description: Treatment/Interventions: Functional transfer training, LE strengthening/ROM, Elevations, Therapeutic exercise, Endurance training, Patient/family training, Equipment eval/education, Bed mobility, Gait training, Spoke to nursing, OT  Equipment Recommended: Walker (at this time)       See flowsheet documentation for full assessment, interventions and recommendations.  Outcome: Progressing  Note: Prognosis: Good  Problem List: Decreased strength, Decreased endurance, Impaired balance, Decreased mobility  Assessment: Pt is gradually progressing w/ balance, endurance and functional mobility skills achieving min (A) w/ transfers and amb and ambulating further distances as well; pt still remains generally guarded and overall weak and deconditioned w/ rest periods provided b/in bouts of amb; D/C recommendations are listed below; will follow        Rehab Resource Intensity Level, PT: No post-acute rehabilitation needs    See flowsheet documentation for full assessment.

## 2024-11-08 NOTE — DISCHARGE INSTR - AVS FIRST PAGE
Please refer to post pacemaker implantation discharge instructions and restrictions and your pacemaker booklet/temporary card.     Keep incision dry for one week. Leave outer bandage in place for 1 week - it is water proof, and as long as it is fully adhered to your skin you may shower with it.  If it appears as though the bandage is coming off and/or there is any communication to the area of device incision, please then keep the whole area dry for the remaining week.  After 1 week, please remove by pulling all edges away from the center of the bandage. After the bandage is removed, you may then shower normally and get the area wet with soap and water, no scrubbing, and pat dry. Do not use lotions/powders/creams on incision.    No overhead reaching/pushing/pulling/lifting greater than 5-10lbs with left arm for six weeks. Please call the office if you notice redness, swelling, bleeding, or drainage from incision or if you develop fevers.       AFTER PACEMAKER CARE:    If you have any questions, please call [phone] to speak with a nurse (8:30am-4pm, or [phone] after hours). For appointments, please call [phone].      WHAT YOU SHOULD KNOW:   A pacemaker is a small, battery-powered device that is placed under your skin in your upper chest area with wires placed through a vein that lead directly into the heart. It helps regulate your heart rate and prevent your heart from beating too slowly.                 AFTER YOU LEAVE:     Medicines:     Pain medicine:  You may need medicine to take away or decrease pain.     Learn how to take your medicine. Ask what medicine and how much you should take. Be sure you know how, when, and how often to take it. Usually Over the counter pain medicine is sufficient to control pain (Acetominophen or Ibuprofen) Ask your doctor if you may take these. If this does not control your pain, narcotic pain killers may be prescribed, please call if you need prescription.     Do not  wait until the pain is severe before you take your medicine. Tell caregivers if your pain does not decrease.    Pain medicine can make you dizzy or sleepy. Prevent falls by calling someone when you get out of bed or if you need help.        Take your medicine as directed.  Call your healthcare provider if you think your medicine is not helping or if you have side effects. Tell him if you are allergic to any medicine.    Follow up with your cardiologist after your procedure:  You will need a follow-up visit approximately 2 weeks after you leave the hospital. Your cardiologist will check your wound and make sure that your pacemaker is working correctly.     Follow the instructions to check your pacemaker:  Your cardiologist or primary healthcare provider will check your pacemaker and the battery regularly.  He will use a computer to check your pacemaker over the telephone or wireless device which will be given to you.     Pacemaker batteries usually last 8 to 10 years. The pacemaker unit will be replaced when the battery gets low. This is a simpler procedure than the original one to implant your pacemaker.    Wound care:  Keep your incision dry for one week.  Do not use lotions/powders/creams on incision.     Leave outer bandage in place for 1 week - it is water proof, and as long as it is fully adhered to your skin you may shower with it.  If it appears as though the bandage is coming off and/or there is any communication to the area of device incision, please then keep the whole area dry for the remaining week.  After 1 week, please remove by pulling all edges away from the center of the bandage.    Please call the office if you notice redness, swelling, bleeding, or drainage from incision or if you develop fevers.       Activity:   Arm movement and lifting:  Be careful using the arm on the side of your pacemaker. Do not move your arm for the first 24 hours after your procedure. Do not  lift your arm above your  shoulder or lift more than 10 pounds for one month after your procedure. Avoid pushing, pulling, or repetitive arm movements for 6 weeks. This helps the leads stay in place and helps your wound heal. Ask your caregiver when you can drive after your procedure. You may move your arm side to side without lifting above your shoulder, and do not need to wear a sling at home.   Driving: you are ok to drive 48 hours after pacemaker is implanted   Sports:  Ask your caregiver when it is okay to play tennis, golf, basketball, or any sport that requires you to lift your arms. Do not play full contact sports, such as football, that could damage your pacemaker. Ask your cardiologist or primary healthcare provider how much and what kinds of physical activity are safe for you.    Living with a pacemaker:   Tell all caregivers you have a pacemaker:  This includes surgeons, radiologists, and medical technicians. You may want to wear a medical alert ID bracelet or necklace that states that you have a pacemaker.    Carry your pacemaker ID card:  Make sure you receive a pacemaker ID card. Carry it with you at all times. It lists important information about your pacemaker. Show it to airport security if you travel.     Avoid electrical interference:  Avoid welding equipment and other equipment with large magnets or electric fields. These things could interfere with how your pacemaker works. Use your cell phone on the ear opposite from your pacemaker. Do not carry your cell phone in your shirt pocket over your chest.     Some Pacemakers are MRI safe. Ask you doctor if it is safe to proceed with MRI and let the radiologist and staff know you have a pacemaker.     Do not touch the skin around your pacemaker:  This can cause damage to the lead wires or move the pacemaker unit from where it should be.    Contact your cardiologist or primary healthcare provider if:   The area around your pacemaker has increasing amount of pain after surgery.  The pain should improve over first few days after implantation.     The skin around your stitches has increasing redness, swelling, or has drainage. This may mean that you have an infection.     You have a fever.     You have chills, a cough, and feel weak or achy. These are also signs of infection.    Your feet or ankles are more swollen than your baseline.     Your Heart rate is less than 50 beats per minute     Seek care immediately if:   Your bandage becomes soaked with blood.     Your pacemaker is swelling rapidly    Your stitches open up.     You feel your heart suddenly beating very slowly or quickly.    You become too weak or dizzy to stand, or you pass out.     Your arm or leg feels warm, tender, and painful. It may look swollen and red.    You have chest pain that does not go away with rest or medicine.     You feel lightheaded, short of breath, and have chest pain.     You cough up blood.        © 2014 [employer]. Information is for End User's use only and may not be sold, redistributed or otherwise used for commercial purposes. All illustrations and images included in CareNotes® are the copyrighted property of [employer] or [employer].  The above information is an  only. It is not intended as medical advice for individual conditions or treatments. Talk to your doctor, nurse or pharmacist before following any medical regimen to see if it is safe and effective for you.     alcohol.  The combination can cause an overdose, or cause you to stop breathing.   Do not drive or operate heavy machinery after you use a narcotic.    Talk to your healthcare provider if you have any side effects.  Side effects include nausea, sleepiness, itching, and trouble thinking clearly.     What can I do to manage constipation?  Constipation is the most common side effect of narcotic medicine. Constipation is when you have hard, dry bowel movements, or you go longer than usual between bowel movements. The following are ways you can prevent or relieve constipation:  Drink liquids as allowed.  Drinking extra liquids will help soften and move your bowels. You should drink up to your maximum fluid allotment of 2 liters.    Eat high-fiber foods.  This may help decrease constipation by adding bulk to your bowel movements. High-fiber foods include fruits, vegetables, whole-grain breads and cereals, and beans  Supplements. You have been prescribed a fiber supplement called polyethylene glycol (Claribel lax) and a stool softener called docusate sodium (Colace). You should take these for as long as you continue narcotic medications.  Exercise regularly.  Regular physical activity can help stimulate your intestines. Walking is a good exercise to prevent or relieve constipation. Ask which exercises are best for you.    How do I store narcotics safely?   Store narcotics where others cannot easily get them.  Keep them in a locked cabinet or secure area. Do not  keep them in a purse or other bag you carry with you. A person may be looking for something else and find the narcotics.    Make sure narcotics are stored out of the reach of children.  A child can easily overdose on narcotics. Narcotics may look like candy to a small child.              Please refer to post pacemaker implantation discharge instructions and restrictions and your pacemaker booklet/temporary card.     Keep incision dry for one week. Leave outer bandage  in place for 1 week - it is water proof, and as long as it is fully adhered to your skin you may shower with it.  If it appears as though the bandage is coming off and/or there is any communication to the area of device incision, please then keep the whole area dry for the remaining week.  After 1 week, please remove by pulling all edges away from the center of the bandage. After the bandage is removed, you may then shower normally and get the area wet with soap and water, no scrubbing, and pat dry. Do not use lotions/powders/creams on incision.    No overhead reaching/pushing/pulling/lifting greater than 5-10lbs with left arm for six weeks. Please call the office if you notice redness, swelling, bleeding, or drainage from incision or if you develop fevers.       AFTER PACEMAKER CARE:    If you have any questions, please call [phone] to speak with a nurse (8:30am-4pm, or [phone] after hours). For appointments, please call [phone].      WHAT YOU SHOULD KNOW:   A pacemaker is a small, battery-powered device that is placed under your skin in your upper chest area with wires placed through a vein that lead directly into the heart. It helps regulate your heart rate and prevent your heart from beating too slowly.                 AFTER YOU LEAVE:     Medicines:     Pain medicine:  You may need medicine to take away or decrease pain.     Learn how to take your medicine. Ask what medicine and how much you should take. Be sure you know how, when, and how often to take it. Usually Over the counter pain medicine is sufficient to control pain (Acetominophen or Ibuprofen) Ask your doctor if you may take these. If this does not control your pain, narcotic pain killers may be prescribed, please call if you need prescription.     Do not wait until the pain is severe before you take your medicine. Tell caregivers if your pain does not decrease.    Pain medicine can make you dizzy or sleepy. Prevent falls by calling  someone when you get out of bed or if you need help.        Take your medicine as directed.  Call your healthcare provider if you think your medicine is not helping or if you have side effects. Tell him if you are allergic to any medicine.    Follow up with your cardiologist after your procedure:  You will need a follow-up visit approximately 2 weeks after you leave the hospital. Your cardiologist will check your wound and make sure that your pacemaker is working correctly.     Follow the instructions to check your pacemaker:  Your cardiologist or primary healthcare provider will check your pacemaker and the battery regularly.  He will use a computer to check your pacemaker over the telephone or wireless device which will be given to you.     Pacemaker batteries usually last 8 to 10 years. The pacemaker unit will be replaced when the battery gets low. This is a simpler procedure than the original one to implant your pacemaker.    Wound care:  Keep your incision dry for one week.  Do not use lotions/powders/creams on incision.     Leave outer bandage in place for 1 week - it is water proof, and as long as it is fully adhered to your skin you may shower with it.  If it appears as though the bandage is coming off and/or there is any communication to the area of device incision, please then keep the whole area dry for the remaining week.  After 1 week, please remove by pulling all edges away from the center of the bandage.    Please call the office if you notice redness, swelling, bleeding, or drainage from incision or if you develop fevers.       Activity:   Arm movement and lifting:  Be careful using the arm on the side of your pacemaker. Do not move your arm for the first 24 hours after your procedure. Do not  lift your arm above your shoulder or lift more than 10 pounds for one month after your procedure. Avoid pushing, pulling, or repetitive arm movements for 6 weeks. This helps the leads stay in place and helps  your wound heal. Ask your caregiver when you can drive after your procedure. You may move your arm side to side without lifting above your shoulder, and do not need to wear a sling at home.   Driving: you are ok to drive 48 hours after pacemaker is implanted   Sports:  Ask your caregiver when it is okay to play tennis, golf, basketball, or any sport that requires you to lift your arms. Do not play full contact sports, such as football, that could damage your pacemaker. Ask your cardiologist or primary healthcare provider how much and what kinds of physical activity are safe for you.    Living with a pacemaker:   Tell all caregivers you have a pacemaker:  This includes surgeons, radiologists, and medical technicians. You may want to wear a medical alert ID bracelet or necklace that states that you have a pacemaker.    Carry your pacemaker ID card:  Make sure you receive a pacemaker ID card. Carry it with you at all times. It lists important information about your pacemaker. Show it to airport security if you travel.     Avoid electrical interference:  Avoid welding equipment and other equipment with large magnets or electric fields. These things could interfere with how your pacemaker works. Use your cell phone on the ear opposite from your pacemaker. Do not carry your cell phone in your shirt pocket over your chest.     Some Pacemakers are MRI safe. Ask you doctor if it is safe to proceed with MRI and let the radiologist and staff know you have a pacemaker.     Do not touch the skin around your pacemaker:  This can cause damage to the lead wires or move the pacemaker unit from where it should be.    Contact your cardiologist or primary healthcare provider if:   The area around your pacemaker has increasing amount of pain after surgery. The pain should improve over first few days after implantation.     The skin around your stitches has increasing redness, swelling, or has drainage. This may mean that you have an  infection.     You have a fever.     You have chills, a cough, and feel weak or achy. These are also signs of infection.    Your feet or ankles are more swollen than your baseline.     Your Heart rate is less than 50 beats per minute     Seek care immediately if:   Your bandage becomes soaked with blood.     Your pacemaker is swelling rapidly    Your stitches open up.     You feel your heart suddenly beating very slowly or quickly.    You become too weak or dizzy to stand, or you pass out.     Your arm or leg feels warm, tender, and painful. It may look swollen and red.    You have chest pain that does not go away with rest or medicine.     You feel lightheaded, short of breath, and have chest pain.     You cough up blood.        © 2014 [employer] Information is for End User's use only and may not be sold, redistributed or otherwise used for commercial purposes. All illustrations and images included in CareNotes® are the copyrighted property of [employer], [employer]. or [employer].  The above information is an  only. It is not intended as medical advice for individual conditions or treatments. Talk to your doctor, nurse or pharmacist before following any medical regimen to see if it is safe and effective for you.

## 2024-11-08 NOTE — PROGRESS NOTES
Progress Note - Cardiac Surgery   Otoniel Martinez 64 y.o. male MRN: [id number]  Unit/Bed#: [facility]-324-01 Encounter: [id number]    Coronary artery disease. S/P coronary artery bypass grafting; POD # 2      24 Hour Events: C/o difficulty swallowing, refusing pills, SLP consult. Nausea yesterday. NPO for PPM today. C/o pain, oxys dc/'d 2/2 confusion yesterday. Poor appetite.     Medications:   Scheduled Meds:  Current Facility-Administered Medications   Medication Dose Route Frequency Provider Last Rate    acetaminophen  650 mg Rectal Q4H PRN Jorge Coelho PA-C      acetaminophen  975 mg Oral Q8H [facility] Nel Lakhani PA-C      [Held by provider] amiodarone  200 mg Oral Q8H [facility] Jorge Coelho PA-C      aspirin  81 mg Oral Daily Tylor Ojeda PA-C      atorvastatin  80 mg Oral Daily With Dinner Jorge Coelho PA-C      bisacodyl  10 mg Rectal Daily PRN Jorge Coelho PA-C      calcium gluconate  2 g Intravenous Once PRN Jorge Coelho PA-C      cefazolin  2,000 mg Intravenous Once Juli Almanza PA-C      chlorhexidine  15 mL Mouth/Throat BID Jorge Coelho PA-C      docusate sodium  100 mg Oral BID Tylor Ojeda PA-C      fluticasone  2 spray Nasal Daily Jorge Coelho PA-C      fondaparinux  2.5 mg Subcutaneous Daily Tylor Ojeda PA-C      furosemide  40 mg Intravenous BID (diuretic) Tylor Ojeda PA-C      insulin regular (HumuLIN R,NovoLIN R) 1 Units/mL in sodium chloride 0.9 % 100 mL infusion  0.3-21 Units/hr Intravenous Titrated Jorge Coelho PA-C 4 Units/hr (11/08/24 0549)    levalbuterol  1.25 mg Nebulization After Dinner Jorge Coelho PA-C      lidocaine  2 patch Topical Daily Nel Lakhani PA-C      lisinopril  5 mg Oral Daily Nel Lakhani PA-C      methocarbamol  500 mg Oral Q6H PRN Nel Lakhani PA-C      montelukast  10 mg Oral HS Jorge Coelho PA-C      mupirocin  1 Application Nasal Q12H [facility] Jorge Coelho PA-C      ondansetron  4 mg Intravenous Q6H PRN Tylor Ojeda PA-C       oxyCODONE  5 mg Oral Q4H PRN Tylor Ojeda PA-C      oxyCODONE  2.5 mg Oral Q4H PRN Tylor Ojeda PA-C      pantoprazole  40 mg Oral Daily Tylor Ojeda PA-C      polyethylene glycol  17 g Oral Daily Jorge Coelho PA-C      potassium chloride  20 mEq Intravenous Once Estefania Ramos PA-C      potassium chloride  20 mEq Oral BID Estefania Ramos PA-C      sodium chloride  20 mL/hr Intravenous Continuous Jorge Coelho PA-C 10 mL/hr (11/08/24 0546)    temazepam  15 mg Oral HS PRN Tylor Ojeda PA-C       Continuous Infusions:insulin regular (HumuLIN R,NovoLIN R) 1 Units/mL in sodium chloride 0.9 % 100 mL infusion, 0.3-21 Units/hr, Last Rate: 4 Units/hr (11/08/24 0549)  sodium chloride, 20 mL/hr, Last Rate: 10 mL/hr (11/08/24 0546)      PRN Meds:.  acetaminophen    bisacodyl    calcium gluconate    methocarbamol    ondansetron    oxyCODONE    oxyCODONE    temazepam    Vitals:   Vitals:    11/07/24 2329 11/08/24 0159 11/08/24 0600 11/08/24 0654   BP: 128/71 124/74  105/61   BP Location:       Pulse: 78 79  73   Resp: 16 20     Temp: 98.7 °F (37.1 °C) 97.9 °F (36.6 °C)  99 °F (37.2 °C)   TempSrc:       SpO2: 90% 97%  90%   Weight:   72.4 kg (159 lb 9.8 oz)    Height:           Telemetry: NSR; Heart Rate: 70s, preop adela    Respiratory:   SpO2: SpO2: 90 %; 2 LPM    Intake/Output:     Intake/Output Summary (Last 24 hours) at 11/8/2024 0917  Last data filed at 11/8/2024 0545  Gross per 24 hour   Intake 920.78 ml   Output 2135 ml   Net -1214.22 ml        Weights:   Weight (last 2 days)       Date/Time Weight    11/08/24 0600 72.4 (159.61)    11/07/24 1430 74.4 (164)    11/07/24 0538 74.5 (164.24)    11/06/24 0534 69.4 (153)            Chest tube Output:    Mediastinal tubes: 60 mL/8 hours  240 mL/24 hours            Results:   Results from last 7 days   Lab Units 11/08/24  0431 11/07/24  0317 11/06/24  2004 11/06/24  1303 11/06/24  1259 11/06/24  0854 11/06/24  0411   WBC Thousand/uL 6.07 11.22*  --   --    --   --  3.72*   HEMOGLOBIN g/dL 10.7* 11.7* 11.0*  --  10.8*  --  12.1   I STAT HEMOGLOBIN   --   --   --    < >  --    < >  --    HEMATOCRIT % 34.0* 36.5 33.9*  --  33.0*  --  37.0   HEMATOCRIT, ISTAT   --   --   --    < >  --    < >  --    PLATELETS Thousands/uL  --  126*  --   --  104*  --  127*    < > = values in this interval not displayed.     Results from last 7 days   Lab Units 11/08/24  0431 11/07/24  0340 11/06/24  2004 11/06/24  1750 11/06/24  1303 11/06/24  1259   SODIUM mmol/L 140 138  --   --   --  140   POTASSIUM mmol/L 3.8 4.4 4.6   < >  --  3.9   CHLORIDE mmol/L 103 108  --   --   --  111*   CO2 mmol/L 31 22  --   --   --  22   CO2, I-STAT mmol/L  --   --   --   --  24  --    BUN mg/dL 18 16  --   --   --  18   CREATININE mg/dL 0.52* 0.57*  --   --   --  0.55*   GLUCOSE, ISTAT mg/dl  --   --   --   --  129  --    CALCIUM mg/dL 8.2* 7.7*  --   --   --  7.2*    < > = values in this interval not displayed.     Recent Labs     11/08/24 0431   MG 1.9     Results from last 7 days   Lab Units 11/08/24 0431 11/07/24  0711 11/06/24  1307 11/05/24  0447 11/04/24  0348   INR  1.62* 1.26* 1.34*  --  1.07   PTT seconds  --   --  31 63* 72*         Date:   INR:  Coumadin Dose:  11/8  1.62  2.5  11/7  1.26  2.5  11/6  1.34  2.5    Point of care glucose: 127 - 158    Studies:  CXR 11/7:  IMPRESSION:     Expected radiographic appearance in a patient who has recently undergone cardiac surgery.     Left basilar opacity which may be due to combination of small effusion/atelectasis, improved       Results Review Statement: I reviewed radiology reports from this admission including: chest xray.    Invasive Lines/Tubes:  Invasive Devices       Central Venous Catheter Line  Duration             CVC Central Lines 11/06/24 2 days              Peripheral Intravenous Line  Duration             Peripheral IV 11/06/24 Right Forearm 2 days              Line  Duration             Pacer Wires 1 day              Drain  Duration              Chest Tube 1 Left Pleural 32 Fr. 1 day    Chest Tube 2 Mediastinal 32 Fr. 1 day    Chest Tube 3  Mediastinal 24 Fr. 1 day                    Physical Exam:    General: No acute distress, Alert, and Normal appearance  HEENT/NECK:  Normocephalic. Atraumatic.  no jugular venous distention.    Cardiac: Regular rate and rhythm and No murmurs/rubs/gallops  Pulmonary:  Breath sounds diminished in the bases bilaterally  and No rales/rhonchi/wheezes  Abdomen:  Non-tender, Non-distended, and Hypo-active bowel sounds  Incisions: Sternum is stable.  Incision dressed with Acticoat.  No erythema or drainage and Saphenectomy incision dressed with Acticoat.  No erythema or drainage  Extremities: Extremities warm/dry and Trace edema B/L  Neuro: Alert and oriented X 3, Sensation is grossly intact, and No focal deficits  Skin: Warm/Dry, without rashes or lesions.    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 S/P coronary artery bypass grafting; POD # 2    Plan:    Cardiac:     Severe LM CAD prompting admission after cath  Normal ventricular systolic function, EF 55%    NSR; had preop bradycardia, EP was consulted, rec PPM, for palcement today  -120s    BB held, restart after PPM placement    ACE inhibitor/ARB indicated; Continue Lisinopril, 5mg PO Daily - refusing to take, trouble swallowing    Amio on hold    Anticoagulated for recurrent DVT preop  INR 1.62, Dose Coumadin, 2.5 mg  today    Continue ASA and Statin therapy    Epicardial pacing wires no longer required.  Remove today after PPM placement    Maintain central IV access today for lack of peripheral access    Continue Arixtra and Coumadin for DVT prophylaxis    Pulmonary:     Acute post-op pulmonary insufficiency; Requiring 2 Liters via nasal cannula, secondary to atelectasis, pulmonary vascular congestion, and poor inspiratory effort secondary to pain. Continue incentive spirometry/coughing/deep breathing exercises.  Wean supplemental oxygen as tolerated for saturation > 90%  Cont Singulair and home inhalers    Chest tube output remains persistently high; Continue chest tubes to suction today    Renal:     Normal preoperative renal function  Creatinine 0.52 today, from 0.57    Intake/Output net: -1.3 L/24 hours, UO 2.1L/24hr    Diuretic Regimen:  Continue IV Lasix, 20 mg QD  Continue Potassium Chloride 20 mEq PO QD    Neuro:    Neurologically intact; No active issues     Incisional pain well controlled   Continue tylenol, 975 mg PO q 8, standing dose - changed to soln   Discontinue oxycodone, 2.5 to 5 mg PO q 4 hours prn pain   Continue topical Lidocaine patch to affected area   Continue methocarbamol, 500 mg PO q 6 hours for pain/muscle spasm - changed to IV    GI:    Cardiac diet, with 1800 mL fluid restriction or Controlled carbohydrate diet level two/Cardiac diet, with 1800 mL fluid restriction - currently NPO for PPM today    Tolerating diet without complaint  Increase bowel regimen    Dysphagia - Speech eval    Continue stool softeners and prn suppository    Continue GI prophylaxis    Endo:     Pre-Op Hgb A1C: 8.4    Remains on continuous insulin infusion  Endocrinology following daily  Type 1 Diabetic on insulin pump    7    Hematology:     Post-operative acute blood loss anemia; Hemoglobin 10.7; trend prn  Thrombocytopenia - plt clumped, 126k yesterday    8.   Disposition:        Following daily PT/OT recommendations  regarding home vs. rehab when medically cleared for discharge - no rehab needs  Not yet medically cleared for discharge, pending CT removal, PPM      VTE Pharmacologic Prophylaxis: Fondaparinux (Arixtra) and Warfarin (Coumadin)  VTE Mechanical Prophylaxis: sequential compression device    Collaborative rounds completed with supervising physician  Plan of care discussed with bedside nurse    SIGNATURE: Estefania Ramos PA-C  DATE: November 8, 2024  TIME: 9:17 AM

## 2024-11-08 NOTE — CASE MANAGEMENT
Case Management Discharge Planning Note    Patient name Otoniel Martinez  Location [facility]01 MRN [id number]  : 1960 Date 2024       Current Admission Date: 10/31/2024  Current Admission Diagnosis:Multivessel CAD   Patient Active Problem List    Diagnosis Date Noted Date Diagnosed    S/P CABG (coronary artery bypass graft) 2024     Symptomatic sinus bradycardia 2024     Left main coronary artery disease 10/31/2024     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[facility]) 10/31/2024     Pulmonary nodule 1 cm or greater in diameter 10/15/2024     Splenic artery aneurysm ([facility]) 2024     Carotid stenosis, asymptomatic, bilateral 2024     Hx of pancreatitis 2024     On home oxygen therapy 2024     Monoclonal gammopathy 2024     Seizure ([facility]) 2024     Memory loss 2024     Establishing care with new doctor, encounter for 2024     Decreased hearing of both ears 2024     Splenomegaly 2024     Chronic bronchitis ([facility]) 2024     Abnormal CT scan of lung 2024     Chronic sinusitis 2024     Insulin pump status 2023     Primary osteoarthritis of right hip 2022     Recurrent cold sores 2022     History of recurrent deep vein thrombosis (DVT) 2021     Dysfunction of right rotator cuff 2021     Vitamin D deficiency 2021     LOJA (dyspnea on exertion) 02/15/2021     Old cerebrovascular accident (CVA) without late effect 10/28/2020     Chronic gastritis without bleeding 2020     Polyneuropathy 10/17/2018     Vertigo 2018     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[facility]) 2018     Thrombocytopenia ([facility]) 2018     Erectile dysfunction of non-organic origin 10/24/2014     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 Fax: [phone]    [facility]/pharmacy #[id number] - [place], [place] - [address]  [address] [address]  Phone: [phone] Fax: [phone]    [facility] [id number] [facility] [employer] - [place], [place] - [address]  [address]  [facility] [address]  Phone: [phone] Fax: [phone]    Primary Care Provider: Wandy Cheatham DO    Primary Insurance: [employer] [facility]  Secondary Insurance:     DISCHARGE DETAILS:  Discharge planning discussed with:: Pt, pt spouse bedside     Requested Home Health Care         Is the patient interested in HHC at discharge?: Yes  Home Health Discipline requested:: Nursing  Home Health Agency Name:: [facility] (spouse voiced preference for [facility]; reserved in [place] for pt)  Home Health Follow-Up Provider:: PCP  Home Health Services Needed:: Post-Op Care and Assessment  Homebound Criteria Met:: Requires the Assistance of Another Person for Safe Ambulation or to Leave the Home  Supporting Clincal Findings:: Fatigues Easliy in Short Distances, Limited Endurance    DME Referral Provided  Referral made for DME?: No (pt and spouse have RW and additional DME at home)    Treatment Team Recommendation: Home with Home Health Care  Discharge Destination Plan:: Home with Home Health Care  Transport at Discharge : Family

## 2024-11-08 NOTE — ANESTHESIA POSTPROCEDURE EVALUATION
Post-Op Assessment Note    CV Status:  Stable    Pain management: adequate       Mental Status:  Alert and awake   Hydration Status:  Euvolemic   PONV Controlled:  Controlled   Airway Patency:  Patent     Post Op Vitals Reviewed: Yes    No anethesia notable event occurred.    Staff: Anesthesiologist         Last Filed PACU Vitals:  Vitals Value Taken Time   Temp     Pulse 72 11/08/24 1432   /72 11/08/24 1427   Resp 18 11/08/24 1415   SpO2 94 % 11/08/24 1432   Vitals shown include unfiled device data.    Modified Salima:  No data recorded

## 2024-11-09 ENCOUNTER — APPOINTMENT (INPATIENT)
Dept: RADIOLOGY | Facility: HOSPITAL | Age: 64
DRG: 233 | End: 2024-11-09
Payer: COMMERCIAL

## 2024-11-09 LAB
ANION GAP SERPL CALCULATED.3IONS-SCNC: 7 MMOL/L (ref 4–13)
ATRIAL RATE: 70 BPM
BUN SERPL-MCNC: 19 MG/DL (ref 5–25)
CALCIUM SERPL-MCNC: 8.2 MG/DL (ref 8.4–10.2)
CHLORIDE SERPL-SCNC: 102 MMOL/L (ref 96–108)
CO2 SERPL-SCNC: 28 MMOL/L (ref 21–32)
CREAT SERPL-MCNC: 0.49 MG/DL (ref 0.6–1.3)
ERYTHROCYTE [DISTWIDTH] IN BLOOD BY AUTOMATED COUNT: 15.5 % (ref 11.6–15.1)
GFR SERPL CREATININE-BSD FRML MDRD: 115 ML/MIN/1.73SQ M
GLUCOSE SERPL-MCNC: 102 MG/DL (ref 65–140)
GLUCOSE SERPL-MCNC: 121 MG/DL (ref 65–140)
GLUCOSE SERPL-MCNC: 125 MG/DL (ref 65–140)
GLUCOSE SERPL-MCNC: 127 MG/DL (ref 65–140)
GLUCOSE SERPL-MCNC: 128 MG/DL (ref 65–140)
GLUCOSE SERPL-MCNC: 135 MG/DL (ref 65–140)
GLUCOSE SERPL-MCNC: 135 MG/DL (ref 65–140)
GLUCOSE SERPL-MCNC: 171 MG/DL (ref 65–140)
GLUCOSE SERPL-MCNC: 180 MG/DL (ref 65–140)
GLUCOSE SERPL-MCNC: 182 MG/DL (ref 65–140)
GLUCOSE SERPL-MCNC: 201 MG/DL (ref 65–140)
GLUCOSE SERPL-MCNC: 202 MG/DL (ref 65–140)
GLUCOSE SERPL-MCNC: 206 MG/DL (ref 65–140)
HCT VFR BLD AUTO: 33.2 % (ref 36.5–49.3)
HGB BLD-MCNC: 10.5 G/DL (ref 12–17)
INR PPP: 1.69 (ref 0.85–1.19)
MCH RBC QN AUTO: 28.2 PG (ref 26.8–34.3)
MCHC RBC AUTO-ENTMCNC: 31.6 G/DL (ref 31.4–37.4)
MCV RBC AUTO: 89 FL (ref 82–98)
P AXIS: 3 DEGREES
PLATELET # BLD AUTO: 115 THOUSANDS/UL (ref 149–390)
PMV BLD AUTO: 10.1 FL (ref 8.9–12.7)
POTASSIUM SERPL-SCNC: 3.9 MMOL/L (ref 3.5–5.3)
PR INTERVAL: 166 MS
PROTHROMBIN TIME: 20 SECONDS (ref 12.3–15)
QRS AXIS: -8 DEGREES
QRSD INTERVAL: 82 MS
QT INTERVAL: 396 MS
QTC INTERVAL: 427 MS
RBC # BLD AUTO: 3.73 MILLION/UL (ref 3.88–5.62)
SODIUM SERPL-SCNC: 137 MMOL/L (ref 135–147)
T WAVE AXIS: 10 DEGREES
VENTRICULAR RATE: 70 BPM
WBC # BLD AUTO: 5.76 THOUSAND/UL (ref 4.31–10.16)

## 2024-11-09 PROCEDURE — 85027 COMPLETE CBC AUTOMATED: CPT | Performed by: PHYSICIAN ASSISTANT

## 2024-11-09 PROCEDURE — 80048 BASIC METABOLIC PNL TOTAL CA: CPT | Performed by: PHYSICIAN ASSISTANT

## 2024-11-09 PROCEDURE — 82948 REAGENT STRIP/BLOOD GLUCOSE: CPT

## 2024-11-09 PROCEDURE — 99024 POSTOP FOLLOW-UP VISIT: CPT | Performed by: PHYSICIAN ASSISTANT

## 2024-11-09 PROCEDURE — 71045 X-RAY EXAM CHEST 1 VIEW: CPT

## 2024-11-09 PROCEDURE — 71046 X-RAY EXAM CHEST 2 VIEWS: CPT

## 2024-11-09 PROCEDURE — 92610 EVALUATE SWALLOWING FUNCTION: CPT

## 2024-11-09 PROCEDURE — 99024 POSTOP FOLLOW-UP VISIT: CPT | Performed by: INTERNAL MEDICINE

## 2024-11-09 PROCEDURE — 99232 SBSQ HOSP IP/OBS MODERATE 35: CPT | Performed by: INTERNAL MEDICINE

## 2024-11-09 PROCEDURE — 93010 ELECTROCARDIOGRAM REPORT: CPT | Performed by: INTERNAL MEDICINE

## 2024-11-09 PROCEDURE — 85610 PROTHROMBIN TIME: CPT | Performed by: PHYSICIAN ASSISTANT

## 2024-11-09 RX ORDER — CHLORHEXIDINE GLUCONATE ORAL RINSE 1.2 MG/ML
15 SOLUTION DENTAL 2 TIMES DAILY
Status: DISCONTINUED | OUTPATIENT
Start: 2024-11-09 | End: 2024-11-11 | Stop reason: HOSPADM

## 2024-11-09 RX ORDER — POTASSIUM CHLORIDE 20MEQ/15ML
20 LIQUID (ML) ORAL 2 TIMES DAILY
Status: DISCONTINUED | OUTPATIENT
Start: 2024-11-09 | End: 2024-11-11 | Stop reason: HOSPADM

## 2024-11-09 RX ORDER — TORSEMIDE 20 MG/1
20 TABLET ORAL 2 TIMES DAILY
Status: DISCONTINUED | OUTPATIENT
Start: 2024-11-09 | End: 2024-11-11 | Stop reason: HOSPADM

## 2024-11-09 RX ORDER — WARFARIN SODIUM 5 MG/1
5 TABLET ORAL
Status: COMPLETED | OUTPATIENT
Start: 2024-11-09 | End: 2024-11-09

## 2024-11-09 RX ADMIN — Medication 12.5 MG: at 21:27

## 2024-11-09 RX ADMIN — CHLORHEXIDINE GLUCONATE 0.12% ORAL RINSE 15 ML: 1.2 LIQUID ORAL at 21:27

## 2024-11-09 RX ADMIN — POTASSIUM CHLORIDE 20 MEQ: 1.5 SOLUTION ORAL at 17:33

## 2024-11-09 RX ADMIN — DOCUSATE SODIUM 100 MG: 100 CAPSULE, LIQUID FILLED ORAL at 17:33

## 2024-11-09 RX ADMIN — SODIUM CHLORIDE 9 UNITS/HR: 9 INJECTION, SOLUTION INTRAVENOUS at 13:21

## 2024-11-09 RX ADMIN — AMIODARONE HYDROCHLORIDE 200 MG: 200 TABLET ORAL at 05:35

## 2024-11-09 RX ADMIN — ATORVASTATIN CALCIUM 80 MG: 80 TABLET, FILM COATED ORAL at 17:33

## 2024-11-09 RX ADMIN — FLUTICASONE PROPIONATE 2 SPRAY: 50 SPRAY, METERED NASAL at 21:27

## 2024-11-09 RX ADMIN — FONDAPARINUX SODIUM 2.5 MG: 2.5 INJECTION SUBCUTANEOUS at 08:58

## 2024-11-09 RX ADMIN — MONTELUKAST 10 MG: 10 TABLET, FILM COATED ORAL at 21:27

## 2024-11-09 RX ADMIN — PANTOPRAZOLE SODIUM 40 MG: 40 INJECTION, POWDER, FOR SOLUTION INTRAVENOUS at 08:58

## 2024-11-09 RX ADMIN — POLYETHYLENE GLYCOL 3350 17 G: 17 POWDER, FOR SOLUTION ORAL at 08:58

## 2024-11-09 RX ADMIN — TORSEMIDE 20 MG: 20 TABLET ORAL at 17:33

## 2024-11-09 RX ADMIN — ASPIRIN 81 MG: 81 TABLET, COATED ORAL at 08:57

## 2024-11-09 RX ADMIN — CHLORHEXIDINE GLUCONATE 0.12% ORAL RINSE 15 ML: 1.2 LIQUID ORAL at 08:58

## 2024-11-09 RX ADMIN — AMIODARONE HYDROCHLORIDE 200 MG: 200 TABLET ORAL at 21:27

## 2024-11-09 RX ADMIN — WARFARIN SODIUM 5 MG: 5 TABLET ORAL at 17:33

## 2024-11-09 RX ADMIN — Medication 12.5 MG: at 08:57

## 2024-11-09 RX ADMIN — POTASSIUM CHLORIDE 20 MEQ: 1.5 SOLUTION ORAL at 08:58

## 2024-11-09 RX ADMIN — DOCUSATE SODIUM 100 MG: 100 CAPSULE, LIQUID FILLED ORAL at 08:58

## 2024-11-09 RX ADMIN — ACETAMINOPHEN 650 MG: 650 SUSPENSION ORAL at 11:14

## 2024-11-09 RX ADMIN — TORSEMIDE 20 MG: 20 TABLET ORAL at 08:57

## 2024-11-09 RX ADMIN — AMIODARONE HYDROCHLORIDE 200 MG: 200 TABLET ORAL at 13:08

## 2024-11-09 NOTE — PROGRESS NOTES
"Progress Note - Otoniel Martinez 64 y.o. male MRN: [id number]    Unit/Bed#: [facility]-324-01 Encounter: [id number]      CC: diabetes f/u    Subjective:   Otoniel Martinez is a 64 y.o. year old male with type 1 diabetes.  Feels well.  No complaints.  No hypoglycemia.  Continues on IV insulin.    Objective:     Vitals: Blood pressure 105/69, pulse 69, temperature 98.7 °F (37.1 °C), resp. rate 14, height 5' 8\" (1.727 m), weight 71.7 kg (158 lb 1.1 oz), SpO2 90%.,Body mass index is 24.03 kg/m².      Intake/Output Summary (Last 24 hours) at 11/9/2024 1341  Last data filed at 11/9/2024 1224  Gross per 24 hour   Intake 502.81 ml   Output 1455 ml   Net -952.19 ml       Physical Exam:  General Appearance: awake, appears stated age and cooperative  Head: Normocephalic, without obvious abnormality, atraumatic  Extremities: moves all extremities  Skin: Skin color and temperature normal.   Pulm: no labored breathing    Lab, Imaging and other studies: Results Review Statement: No pertinent imaging studies reviewed.    POC Glucose (mg/dl)   Date Value   11/09/2024 202 (H)   11/09/2024 201 (H)   11/09/2024 182 (H)   11/09/2024 135   11/09/2024 121   11/09/2024 128   11/08/2024 135   11/08/2024 152 (H)   11/08/2024 136   11/08/2024 140       Assessment:  diabetes with hyperglycemia and coronary artery disease s/p CABG    Plan:  1.  Type 1 diabetes with hyperglycemia-transition to insulin pump and a continuous glucose monitor tomorrow afternoon.  Continue IV insulin for now.  Continue to monitor blood sugar over time and make adjustments to the regimen if necessary.    2.  Coronary artery disease status post CABG-management per cardiac surgery.      Portions of the record may have been created with voice recognition software.     "

## 2024-11-09 NOTE — PROCEDURES
11/09/24    Procedure: Chest tube removal    Chest tubes removed in routine fashion without incident.  Insertion site dressed with Acticoat.  Otoniel Martienz tolerated the procedure well.  Nurse notified.    SIGNATURE: Estefania Ramos PA-C  DATE: November 9, 2024  TIME: 10:57 AM

## 2024-11-09 NOTE — SPEECH THERAPY NOTE
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)    Mixed hyperlipidemia    History of recurrent deep vein thrombosis (DVT)    Chronic bronchitis ([facility])    Insulin pump status    Obstructive sleep apnea    Monoclonal gammopathy    Splenic artery aneurysm (HCC)    Left main coronary artery disease    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[facility])    Symptomatic sinus bradycardia    S/P CABG (coronary artery bypass graft)      Past Medical History  Past Medical History:   Diagnosis Date    Acute respiratory failure with hypoxia ([facility]) 02/08/2024    CAD (coronary artery disease)     Congenital myotonia     Deep vein thrombosis (DVT) ([facility])     after tear of gastrocnemus muscle    Diabetes ([facility])     Diabetes mellitus ([facility])     Difficulty walking     hip replacement    HL (hearing loss)     decreased both  ears    Myocardial infarction (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    Pancreatitis     three times    Sleep apnea     not using a C-PAP    Superficial thrombophlebitis     Vision loss     reading glasses over the counter       Past Surgical History  Past Surgical History:   Procedure Laterality Date    CARDIAC CATHETERIZATION N/A 10/31/2024    Procedure: Cardiac catheterization;  Surgeon: Ky Sandoval MD;  Location: [facility];  Service: Cardiology    CARDIAC CATHETERIZATION N/A 10/31/2024    Procedure: Cardiac Coronary Angiogram;  Surgeon: Ky Sandoval MD;  Location: [facility];  Service: Cardiology    CAROTID STENT      CHOLECYSTECTOMY      CIRCUMCISION      Elective    COLONOSCOPY      complete, polyps 2 years ago    CORONARY ANGIOPLASTY WITH STENT PLACEMENT      stent to RCA 2004    INGUINAL HERNIA REPAIR      IR BIOPSY BONE MARROW   8/7/2024    ORIF RADIAL SHAFT FRACTURE Left     Open Treatment of Multiple Fractures of the Radial Shaft    ORIF ULNAR / RADIAL SHAFT FRACTURE Left     Open Treatment of Multiple Fractures of the Ulnar Shaft    [place] CORONARY ARTERY BYP W/VEIN & ARTERY GRAFT 3 VEIN N/A 11/6/2024    Procedure: CORONARY ARTERY BYPASS GRAFT (CABG) 3 VESSELS, LIMA - LAD, LEFT LEG EVH/SVG - PDA AND OM1; JASEN;  Surgeon: Rodger Rodriguez DO;  Location: [facility]IN [facility];  Service: Cardiac Surgery    TONSILLECTOMY AND ADENOIDECTOMY         Summary   The pt presents with a functional oropharyngeal swallow at bedside.  No overt s/s aspiration or penetration.  The pt is known to [facility] outpatient speech therapy.  He is aware of how his diagnosis of myotonic dystrophy can affect his speech and swallowing.  Per charting, the pt has not had penetration nor aspiration on swallowing studies but pharyngeal and esophageal residual has been noted due to decreased muscle strength/contraction.  The pt is aware of strategies to improve esophageal clearance.  He states that pills are the main issue he has.  Discussed strategies of taking whole in puree.  He is agreeable to try. Maximal support provided. Will sign off.  Reconsult as needed.     Recommended Diet: regular diet and thin liquids   Recommended Form of Meds: whole with liquid and whole with puree   Aspiration precautions and swallowing strategies: upright posture, slow rate of feeding, small bites/sips, and alternating bites and sips; extra gravies, sauces and condiments.   Other Recommendations: Continue frequent oral care as needed.     Current Medical Status  Pt is a 64 y.o. male who presented to [facility] with past medical history significant for CAD, diabetes, DVT after tear in gastrocnemius muscle (on indefinite anticoagulation with Xarelto), recurrent pancreatitis, diabetes type 1 and sleep apnea.  Patient was admitted to [facility] from 2/8/2024 - 2/10/2024 with acute  respiratory failure with hypoxia which was suspected related to acute on chronic bronchitis.  He was treated with antibiotics and prednisone.  He underwent a CT of the chest which incidentally noted splenomegaly.  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     Current Precautions:  Fall     Allergies:  No known food allergies    Past medical history:  Please see H&P for details    Special Studies:  CXR 11/9 IMPRESSION:Unchanged left basilar atelectasis and small left pleural effusion.   Small pneumothorax with left chest tube in place. Small amount of intraperitoneal air under the right hemidiaphragm, not visible on 11/8/2024, but present on 11/7/2024. This can be expected recent postop findings after CABG. Please correlate with any abdominal symptoms however, and reassess on follow-up chest x-rays.    Social/Education/Vocational Hx:  Pt lives at home. Spouse was present in the room.      Swallow Information   Current Risks for Dysphagia & Aspiration: known history of dysphagia  Current Symptoms/Concerns:  Difficulty with pills.   Current Diet: mechanically altered/level 2 diet and thin liquids   Baseline Diet: regular diet and thin liquids    Speech History:  The pt was seen in Outpatient for voice and swallow therapy.  His goal was to strengthen his voice for the choir. He has a history of myotonic dystrophy which he attributes to affecting his voice and swallow.  At this time he reports having difficulty with pills, but is tolerating regular solids and thin liquids.  Notes from 6/24/24 MBS/VBS swallow study recommended regular solids, avoid dense items or add condiments/moisture to assist with clearance of esophageal stasis.  Mild oral stage and mild/moderate pharyngeal stage characterized by mild oral transit and posterior tongue residual, reduced pharyngeal constriction and  tongue base retraction, incomplete epiglottic inversion, reduced hyolaryngeal excursion, prominent cricopharyngeus, reduced passage of bread through UES, near full retention in the vallecular with all trials of PO.     ENT consult 6/14/14: False Vocal cords:  No evidence of mass or lesion, but there is erythema and edema of the false cords (this is symmetric and likely 2/2 LPR)  True Vocal cords:  both cords are inflamed and erythematous.  No masses, nodules, polyps, paresis or paralysis of either cord.  Webbing of mucous b/t the cords    Baseline Assessment   Behavior/Cognition: alert  Speech/Language Status: able to participate in conversation and able to follow commands  Patient Positioning: upright in chair  Pain Status/Interventions/Response to Interventions: No report of or nonverbal indications of pain.    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     Volitional Cough:  Did not have pt cough due to recent pacer.  He does have pillow on chest to squeeze when a cough is needed.     Respiratory Status: on RA      Consistencies Assessed and Performance   Consistencies Administered: 2oz of thins via cup and straw (on fluid restriction), 1oz applesauce, virginie doone cookie.     Oral Stage:   Mastication was adequate with the materials administered today.  Bolus formation and transfer were functional with no significant oral residue noted.  No overt s/s reduced oral control.    Pharyngeal Stage:   Swallow Mechanics:  Swallowing initiation appeared prompt.  Laryngeal rise was palpated and judged to be within functional limits.  No coughing, throat clearing, change in vocal quality or respiratory status noted today.     Esophageal Concerns: none reported with items trialed but he states that pills stick in the  upper part of his chest around the clavicle at home.  He also had some difficulty here.  Reviewed the strategy of meds whole [place] [facility]       Summary and Recommendations (see above)    Results Reviewed with: patient, RN, family, and Surgery team      Treatment Recommended: No, pt has returned to baseline. Could return to Outpatient Speech Therapy as needed.     Patient Stated Goal:  to get stronger.           Speech Therapy Prognosis   Prognosis: good    Prognosis Considerations: prior medical history

## 2024-11-09 NOTE — PROGRESS NOTES
Progress Note - Cardiac Surgery   Otoniel Martinez 64 y.o. male MRN: [id number]  Unit/Bed#: PPHP-324-01 Encounter: [id number]    Coronary artery disease. S/P coronary artery bypass grafting; POD # 3      24 Hour Events: Pain ok. Looks better , s/p PPM. Speeach folowing, on a soft diet. Ambualting well. On insulin pump at home, can swtich over to that. No BM yet    Medications:   Scheduled Meds:  Current Facility-Administered Medications   Medication Dose Route Frequency Provider Last Rate    acetaminophen  650 mg Oral Q4H PRN Oliva Sullivan PA-C      acetaminophen  650 mg Rectal Q4H PRN Jorge Coelho PA-C      amiodarone  200 mg Oral Q8H [facility] Oliva Sullivan PA-C      aspirin  81 mg Oral Daily Oliva Sullivan PA-C      atorvastatin  80 mg Oral Daily With Dinner Jorge Coelho PA-C      bisacodyl  10 mg Rectal Daily PRN Jorge Coelho PA-C      chlorhexidine  15 mL Mouth/Throat BID Jorge Coelho PA-C      docusate sodium  100 mg Oral BID Oliva Sullivan PA-C      fluticasone  2 spray Nasal Daily Jorge Coelho PA-C      fondaparinux  2.5 mg Subcutaneous Daily Oliva Sullivan PA-C      furosemide  40 mg Intravenous Daily Oliva Sullivan PA-C      insulin regular (HumuLIN R,NovoLIN R) 1 Units/mL in sodium chloride 0.9 % 100 mL infusion  0.3-21 Units/hr Intravenous Titrated Jorge Coelho PA-C 2 Units/hr (11/09/24 0016)    levalbuterol  1.25 mg Nebulization After Dinner Jorge Coelho PA-C      lidocaine  2 patch Topical Daily Oliva Sullivan PA-C      magnesium hydroxide  30 mL Oral Daily PRN Oliva Sullivan PA-C      methocarbamol  750 mg Intravenous Q8H PRN Oliva Sullivan PA-C      metoprolol tartrate  12.5 mg Oral Q12H [facility] Oliva Sullivan PA-C      montelukast  10 mg Oral HS Jorge Coelho PA-C      ondansetron  4 mg Intravenous Q6H PRN Oliva Sullivan PA-C      pantoprazole  40 mg Intravenous Q24H [facility] Oliva Sullivan PA-C      polyethylene glycol  17 g Oral  Daily Jorge Coelho PA-C      potassium chloride  20 mEq Oral Daily Oliva Sullivan PA-C      sodium chloride  20 mL/hr Intravenous Continuous Jorge Coelho PA-C 20 mL/hr (11/08/24 1641)    temazepam  15 mg Oral HS PRN Oliva Sullivan PA-C       Continuous Infusions:insulin regular (HumuLIN R,NovoLIN R) 1 Units/mL in sodium chloride 0.9 % 100 mL infusion, 0.3-21 Units/hr, Last Rate: 2 Units/hr (11/09/24 0016)  sodium chloride, 20 mL/hr, Last Rate: 20 mL/hr (11/08/24 1641)      PRN Meds:.  acetaminophen    acetaminophen    bisacodyl    magnesium hydroxide    methocarbamol    ondansetron    temazepam    Vitals:   Vitals:    11/08/24 2154 11/09/24 0221 11/09/24 0600 11/09/24 0704   BP: 120/71 112/72  114/73   BP Location:       Pulse: 65 70  72   Resp: 18 14     Temp: 99 °F (37.2 °C) 99.7 °F (37.6 °C)  98.7 °F (37.1 °C)   TempSrc:       SpO2: 95% 93%  (!) 88%   Weight:   71.7 kg (158 lb 1.1 oz)    Height:           Telemetry: NSR; Heart Rate: 70s    Respiratory:   SpO2: SpO2: (!) 88 %; 2 LPM    Intake/Output:     Intake/Output Summary (Last 24 hours) at 11/9/2024 0747  Last data filed at 11/9/2024 0601  Gross per 24 hour   Intake 402.81 ml   Output 1730 ml   Net -1327.19 ml        Weights:   Weight (last 2 days)       Date/Time Weight    11/09/24 0600 71.7 (158.07)    11/08/24 0600 72.4 (159.61)    11/07/24 1430 74.4 (164)    11/07/24 0538 74.5 (164.24)            Chest tube Output:    Mediastinal tubes: 0 mL/8 hours  30 mL/24 hours            Results:   Results from last 7 days   Lab Units 11/09/24  0421 11/08/24  0431 11/07/24  0317 11/06/24  1303 11/06/24  1259   WBC Thousand/uL 5.76 6.07 11.22*  --   --    HEMOGLOBIN g/dL 10.5* 10.7* 11.7*   < > 10.8*   I STAT HEMOGLOBIN   --   --   --    < >  --    HEMATOCRIT % 33.2* 34.0* 36.5   < > 33.0*   HEMATOCRIT, ISTAT   --   --   --    < >  --    PLATELETS Thousands/uL 115*  --  126*  --  104*    < > = values in this interval not displayed.     Results from  last 7 days   Lab Units 11/09/24  0421 11/08/24  0431 11/07/24  0340 11/06/24  1750 11/06/24  1303   SODIUM mmol/L 137 140 138  --   --    POTASSIUM mmol/L 3.9 3.8 4.4   < >  --    CHLORIDE mmol/L 102 103 108  --   --    CO2 mmol/L 28 31 22  --   --    CO2, I-STAT mmol/L  --   --   --   --  24   BUN mg/dL 19 18 16  --   --    CREATININE mg/dL 0.49* 0.52* 0.57*  --   --    GLUCOSE, ISTAT mg/dl  --   --   --   --  129   CALCIUM mg/dL 8.2* 8.2* 7.7*  --   --     < > = values in this interval not displayed.     Recent Labs     11/08/24 0431   MG 1.9     Results from last 7 days   Lab Units 11/09/24  0421 11/08/24  0431 11/07/24  0711 11/06/24  1307 11/05/24  0447 11/04/24  0348   INR  1.69* 1.62* 1.26* 1.34*  --  1.07   PTT seconds  --   --   --  31 63* 72*         Date:   INR:  Coumadin Dose:  11/9  1.69  5  11/8  1.62  2.5  11/7  1.26  2.5  11/6  1.34  2.5    Point of care glucose: 127 - 158    Studies:  CXR 11/9: reviewed by me, bibasilar atelectasis, small left pleural eff, tubes in place, small left apical PTX    CXR 11/7:  IMPRESSION:     Expected radiographic appearance in a patient who has recently undergone cardiac surgery.     Left basilar opacity which may be due to combination of small effusion/atelectasis, improved       Results Review Statement: I reviewed radiology reports from this admission including: chest xray.    Invasive Lines/Tubes:  Invasive Devices       Central Venous Catheter Line  Duration             CVC Central Lines 11/06/24 2 days              Peripheral Intravenous Line  Duration             Peripheral IV 11/06/24 Right Forearm 2 days              Drain  Duration             Chest Tube 1 Left Pleural 32 Fr. 2 days    Chest Tube 2 Mediastinal 32 Fr. 2 days    Chest Tube 3  Mediastinal 24 Fr. 2 days                    Physical Exam:    General: No acute distress, Alert, and Normal appearance  HEENT/NECK:  Normocephalic. Atraumatic.  no jugular venous distention.    Cardiac: Regular rate  and rhythm and No murmurs/rubs/gallops  Pulmonary:  Breath sounds clear bilaterally and No rales/rhonchi/wheezes  Abdomen:  Non-tender, Non-distended, and Normal bowel sounds  Incisions: Sternum is stable.  Incision is clean, dry, and intact. , Saphenectomy incison is clean, dry, and intact. , and Pacemaker incision is clean, dry, and intact.   Extremities: Extremities warm/dry and Trace edema B/L  Neuro: Alert and oriented X 3, Sensation is grossly intact, and No focal deficits  Skin: Warm/Dry, without rashes or lesions.       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 S/P coronary artery bypass grafting; POD # 3    Plan:    Cardiac:     Severe LM CAD prompting admission after cath  Normal ventricular systolic function, EF 55%    NSR; had preop bradycardia, EP was consulted, s/p PPM 11/8  -120s    Cont lopressor 12.5 BID    ACE inhibitor/ARB not indicated; Will not order - maximize BB, takes lisinopril at home    Cont prophylactic Amio 200mg TID    Anticoagulated for recurrent DVT preop  INR 1.69, Dose Coumadin, 5 mg today    Continue ASA and Statin therapy    Epicardial pacing wires have been removed     Maintain central IV access today for lack of peripheral access    Continue Arixtra and Coumadin for DVT prophylaxis    Pulmonary:     Acute post-op pulmonary insufficiency; Requiring 2 Liters via nasal cannula, secondary to atelectasis, pulmonary vascular congestion, and poor inspiratory  effort secondary to pain. Continue incentive spirometry/coughing/deep breathing exercises.  Wean supplemental oxygen as tolerated for saturation > 90%  Cont Singulair and home inhalers    Chest tube drainage diminished; D/C today - no AL    CXR small left apical ptx    Renal:     Normal preoperative renal function  Creatinine 0.49 today, from 0.52    Intake/Output net: -1.3 L/24 hours, UO 1.7L/24hr    Diuretic Regimen:  Transition to  PO Torsemide, 20 mg QD  Continue Potassium Chloride 20 mEq PO QD    Neuro:    Neurologically intact; No active issues     Incisional pain well controlled   Continue tylenol, 975 mg PO q 8, standing dose - changed to soln   Discontinue oxycodone, 2.5 to 5 mg PO q 4 hours prn pain   Continue topical Lidocaine patch to affected area   Continue methocarbamol, 500 mg PO q 6 hours for pain/muscle spasm - changed to IV    GI:    Cardiac diet, with 1800 mL fluid restriction or Controlled carbohydrate diet level two/Cardiac diet, with 1800 mL fluid restriction     Tolerating diet without complaint  Increase bowel regimen    Dysphagia - Speech eval - Patient known to [facility] Speech therapy, MBS/VBS was completed 6/24/24 which revealed no penetration or aspiration but pharyngeal and esophageal stasis, they will re-eval today    Continue stool softeners and prn suppository    Continue GI prophylaxis    Endo:     Pre-Op Hgb A1C: 8.4    Remains on continuous insulin infusion  Endocrinology following daily  Type 1 Diabetic on insulin pump    7    Hematology:     Post-operative acute blood loss anemia; Hemoglobin 10.7; trend prn  Thrombocytopenia - 115k, monitor    8.   Disposition:        Following daily PT/OT recommendations regarding home vs. rehab when medically cleared for discharge - no rehab needs  Not yet medically cleared for discharge, pending speech eval, wean O2, coumadin/INR therapeutic      VTE Pharmacologic Prophylaxis: Fondaparinux (Arixtra) and Warfarin (Coumadin)  VTE Mechanical  Prophylaxis: sequential compression device    Collaborative rounds completed with supervising physician  Plan of care discussed with bedside nurse    SIGNATURE: Estefania Ramos PA-C  DATE: November 9, 2024  TIME: 7:47 AM

## 2024-11-09 NOTE — PROGRESS NOTES
Progress Note - Cardiac Surgery   Otoniel Martinez 64 y.o. male MRN: [id number]  Unit/Bed#: PPHP-324-01 Encounter: [id number]    Coronary artery disease. S/P coronary artery bypass grafting; POD # 4      24 Hour Events: Doing well. Pain ok. Coughing up mucous. Good appetie, swallowing ok. +BM. Ambualting well. PPM working well, a-paced 70. Continue coumadin.    Medications:   Scheduled Meds:  Current Facility-Administered Medications   Medication Dose Route Frequency Provider Last Rate    acetaminophen  650 mg Oral Q4H PRN Oliva Sullivan PA-C      acetaminophen  650 mg Rectal Q4H PRN Jorge Coelho PA-C      amiodarone  200 mg Oral Q8H On license of [facility] Oliva Sullivan PA-C      aspirin  81 mg Oral Daily Oliva Sullivan PA-C      atorvastatin  80 mg Oral Daily With Dinner Jorge Coelho PA-C      bisacodyl  10 mg Rectal Daily PRN Jorge Coelho PA-C      chlorhexidine  15 mL Mouth/Throat BID Estefania Ramos PA-C      docusate sodium  100 mg Oral BID Oliva Sullivan PA-C      fluticasone  2 spray Nasal Daily Jorge Coelho PA-C      fondaparinux  2.5 mg Subcutaneous Daily Oliva Sullivan PA-C      insulin regular (HumuLIN R,NovoLIN R) 1 Units/mL in sodium chloride 0.9 % 100 mL infusion  0.3-21 Units/hr Intravenous Titrated Jorge Coelho PA-C 4 Units/hr (11/10/24 0551)    levalbuterol  1.25 mg Nebulization After Dinner Jorge Coelho PA-C      lidocaine  2 patch Topical Daily Oliva Sullivan PA-C      magnesium hydroxide  30 mL Oral Daily PRN Oliva Sullivan PA-C      methocarbamol  750 mg Intravenous Q8H PRN Oliva Sullivan PA-C      metoprolol tartrate  12.5 mg Oral Q12H On license of [facility] Oliva Sullivan PA-C      montelukast  10 mg Oral HS Jorge Coelho PA-C      ondansetron  4 mg Intravenous Q6H PRN Oliva Sullivan PA-C      pantoprazole  40 mg Intravenous Q24H On license of [facility] Oliva Sullivan PA-C      polyethylene glycol  17 g Oral Daily Jorge Coelho PA-C      potassium chloride  20 mEq Oral BID  Estefania Ramos PA-C      sodium chloride  20 mL/hr Intravenous Continuous Jorge Coelho PA-C 20 mL/hr (11/08/24 1641)    temazepam  15 mg Oral HS PRN Oliva Sullivan PA-C      torsemide  20 mg Oral BID Estefania Ramos PA-C       Continuous Infusions:insulin regular (HumuLIN R,NovoLIN R) 1 Units/mL in sodium chloride 0.9 % 100 mL infusion, 0.3-21 Units/hr, Last Rate: 4 Units/hr (11/10/24 0551)  sodium chloride, 20 mL/hr, Last Rate: 20 mL/hr (11/08/24 1641)      PRN Meds:.  acetaminophen    acetaminophen    bisacodyl    magnesium hydroxide    methocarbamol    ondansetron    temazepam    Vitals:   Vitals:    11/09/24 2241 11/10/24 0157 11/10/24 0600 11/10/24 0728   BP: 109/69 119/73  129/85   Pulse: 70 70  70   Resp:  14     Temp: 98.6 °F (37 °C) 98.4 °F (36.9 °C)  98 °F (36.7 °C)   TempSrc:       SpO2: 95% 92%  94%   Weight:   69.1 kg (152 lb 5.4 oz)    Height:           Telemetry: paced; Heart Rate: 70s    Respiratory:   SpO2: SpO2: 94 %; RA (2L ON)    Intake/Output:     Intake/Output Summary (Last 24 hours) at 11/10/2024 0733  Last data filed at 11/10/2024 0721  Gross per 24 hour   Intake 1289.38 ml   Output 2725 ml   Net -1435.62 ml        Weights:   Weight (last 2 days)       Date/Time Weight    11/10/24 0600 69.1 (152.34)    11/09/24 0600 71.7 (158.07)    11/08/24 0600 72.4 (159.61)            Chest tube Output:  removed      Results:   Results from last 7 days   Lab Units 11/09/24  0421 11/08/24  0431 11/07/24  0317 11/06/24  1303 11/06/24  1259   WBC Thousand/uL 5.76 6.07 11.22*  --   --    HEMOGLOBIN g/dL 10.5* 10.7* 11.7*   < > 10.8*   I STAT HEMOGLOBIN   --   --   --    < >  --    HEMATOCRIT % 33.2* 34.0* 36.5   < > 33.0*   HEMATOCRIT, ISTAT   --   --   --    < >  --    PLATELETS Thousands/uL 115*  --  126*  --  104*    < > = values in this interval not displayed.     Results from last 7 days   Lab Units 11/09/24  0421 11/08/24  0431 11/07/24  0340 11/06/24  1750 11/06/24  1303   SODIUM mmol/L  137 140 138  --   --    POTASSIUM mmol/L 3.9 3.8 4.4   < >  --    CHLORIDE mmol/L 102 103 108  --   --    CO2 mmol/L 28 31 22  --   --    CO2, I-STAT mmol/L  --   --   --   --  24   BUN mg/dL 19 18 16  --   --    CREATININE mg/dL 0.49* 0.52* 0.57*  --   --    GLUCOSE, ISTAT mg/dl  --   --   --   --  129   CALCIUM mg/dL 8.2* 8.2* 7.7*  --   --     < > = values in this interval not displayed.     Recent Labs     11/08/24  0431   MG 1.9     Results from last 7 days   Lab Units 11/10/24  0425 11/09/24  0421 11/08/24  0431 11/07/24  0711 11/06/24  1307 11/05/24  0447 11/04/24  0348   INR  1.97* 1.69* 1.62*   < > 1.34*  --  1.07   PTT seconds  --   --   --   --  31 63* 72*    < > = values in this interval not displayed.         Date:   INR:  Coumadin Dose:  11/10  1.97  5  11/9  1.69  5  11/8  1.62  2.5  11/7  1.26  2.5  11/6  1.34  2.5    Point of care glucose: 127 - 158    Studies:  CXR 11/9 s/p CT removal    CXR 11/9: reviewed by me, bibasilar atelectasis, small left pleural eff, tubes in place, small left apical PTX    CXR 11/7:  IMPRESSION:     Expected radiographic appearance in a patient who has recently undergone cardiac surgery.     Left basilar opacity which may be due to combination of small effusion/atelectasis, improved       Results Review Statement: I reviewed radiology reports from this admission including: chest xray.    Invasive Lines/Tubes:  Invasive Devices       Central Venous Catheter Line  Duration             CVC Central Lines 11/06/24 3 days                  Physical Exam:    General: No acute distress, Alert, and Normal appearance  HEENT/NECK:  Normocephalic. Atraumatic.  no jugular venous distention.    Cardiac: Regular rate and rhythm and No murmurs/rubs/gallops  Pulmonary:  Breath sounds clear bilaterally and No rales/rhonchi/wheezes  Abdomen:  Non-tender, Non-distended, and Normal bowel sounds  Incisions: Sternum is stable.  Incision is clean, dry, and intact. , Saphenectomy incison is clean,  dry, and intact. , and Pacemaker incision is clean, dry, and intact.   Extremities: Extremities warm/dry and No edema B/L  Neuro: Alert and oriented X 3, Sensation is grossly intact, and No focal deficits  Skin: Warm/Dry, without rashes or lesions.       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 S/P coronary artery bypass grafting; POD # 4    Plan:    Cardiac:     Severe LM CAD prompting admission after cath  Normal ventricular systolic function, EF 55%    NSR; had preop bradycardia, EP was consulted, s/p PPM 11/8  -120s    Cont lopressor 12.5 BID    ACE inhibitor/ARB not indicated; Will not order - maximize BB, takes lisinopril at home    Cont prophylactic Amio 200mg TID    Anticoagulated for recurrent DVT preop  INR 1.97, Dose Coumadin, 5 mg today    Continue ASA and Statin therapy    Epicardial pacing wires have been removed     Maintain central IV access today for lack of peripheral access    Continue Coumadin for DVT prophylaxis    Pulmonary:     Good Room air oxygen saturation; Continue incentive spirometry/Coughing/Deep breathing exercises  Cont Singulair and home inhalers    Chest tubes have been discontinued     CXR small left apical ptx    Renal:     Normal preoperative renal function  Cr stable  [id number]    Intake/Output net: -1.2 L/24 hours, UO 2.5L/24hr+ 1 unmeamsured     Diuretic Regimen:  Continue PO Torsemide, 20 mg QD  Continue Potassium Chloride 20 mEq PO  QD    Neuro:    Neurologically intact; No active issues     Incisional pain well controlled   Continue tylenol, 975 mg PO q 8, standing dose - changed to soln   Discontinue oxycodone (AMS)   Continue topical Lidocaine patch to affected area   Continue methocarbamol, 500 mg PO q 6 hours for pain/muscle spasm - changed to IV    GI:    Cardiac diet, with 1800 mL fluid restriction or Controlled carbohydrate diet level two/Cardiac diet, with 1800 mL fluid restriction     Tolerating diet without complaint  Increase bowel regimen    Dysphagia - Speech eval - Patient known to [facility] Speech therapy, MBS/VBS was completed 6/24/24 which revealed no penetration or aspiration but pharyngeal and esophageal stasis, cont regular diet    Continue stool softeners and prn suppository    Continue GI prophylaxis    Endo:     Pre-Op Hgb A1C: 8.4    Remains on continuous insulin infusion  Endocrinology following daily  Type 1 Diabetic on insulin pump    7    Hematology:     Post-operative acute blood loss anemia; Hemoglobin stable; trend prn  Thrombocytopenia     8.   Disposition:        Following daily PT/OT recommendations regarding home vs. rehab when medically cleared for discharge - no rehab needs  Possible d/c home today      VTE Pharmacologic Prophylaxis: Warfarin (Coumadin)  VTE Mechanical Prophylaxis: sequential compression device    Collaborative rounds completed with supervising physician  Plan of care discussed with bedside nurse    SIGNATURE: Estefaina Ramos PA-C  DATE: November 10, 2024  TIME: 7:33 AM

## 2024-11-09 NOTE — RESTORATIVE TECHNICIAN NOTE
Restorative Technician Note      Patient Name: Otoniel Martinez     Restorative Tech Visit Date: 11/09/24  Note Type: Mobility  Patient Position Upon Consult: Bedside chair  Activity Performed: Ambulated  Assistive Device: Roller walker  Patient Position at End of Consult: Bed/Chair alarm activated; All needs within reach; Bedside chair

## 2024-11-09 NOTE — RESTORATIVE TECHNICIAN NOTE
Restorative Technician Note      Patient Name: Otoniel Martinez     Restorative Tech Visit Date: 11/09/24  Note Type: Mobility  Patient Position Upon Consult: Bedside chair  Activity Performed: Ambulated  Assistive Device: Roller walker  Patient Position at End of Consult: Other (comment) (on the stretcher)

## 2024-11-09 NOTE — PROGRESS NOTES
Progress Note - Electrophysiology-Cardiology (EP)   Otoniel Martinez 64 y.o. male MRN: [id number]  Unit/Bed#: PPHP-324-01 Encounter: [id number]      Assessment:  Assessment & Plan  Symptomatic sinus bradycardia  Presents for CABG evaluation.  Left heart cath 10/31 showing disease in RCA, Left main, circumflex  Patient reports long history of bradycardia.  He has a few episodes of dizziness per week, these can happen at rest.  He does get some shortness of breath when using slight inclines.  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  Telemetry reviewed with sinus bradycardia with rates into the 30s.  Had an episode of presyncope just before my evaluation associated with low heart rate and in seated position.  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  Maintained on insulin pump.  Endocrinology following.    History of recurrent deep vein thrombosis (DVT)  Maintained outpatient on Xarelto, currently on IV heparin   Obstructive sleep apnea  Uses oxygen at night  S/P CABG (coronary artery bypass graft)          Plan:  Device interrogation today shows appropriate device function, including lead sensing, thresholds, and impedances. Incision is clean, dry, intact without swelling, hematoma, redness, bleeding, drainage, or signs of infection. Portable chest xray yesterday had low suspicion for small left apical pneumothorax, and attention on surveillance imaging recommended.  Chest x-ray this morning this morning still shows appropriate lead placement, will await official read regarding potential small pneumothorax.  Clinically, he reports doing well.  Pulse ox readings are similar to prior to pacemaker.      All post implantation  "discharge instructions and restrictions were reviewed in detail, all questions were answered. Follow up has been arranged.     No further EP needs at this time, please contact us with questions or concerns.  Will await results of chest x-ray today.        Subjective/Objective   Chief Complaint: No acute complaints    Subjective: He is currently sitting comfortably, admits to mild pain at device site but otherwise denies significant shortness of breath, palpitations, dizziness, or lightheadedness.  No significant vents reported overnight.      Objective:     Vitals: /73   Pulse 72   Temp 98.7 °F (37.1 °C)   Resp 14   Ht 5' 8\" (1.727 m)   Wt 71.7 kg (158 lb 1.1 oz)   SpO2 (!) 88%   BMI 24.03 kg/m²   Vitals:    11/08/24 0600 11/09/24 0600   Weight: 72.4 kg (159 lb 9.8 oz) 71.7 kg (158 lb 1.1 oz)     Orthostatic Blood Pressures      Flowsheet Row Most Recent Value   Blood Pressure 114/73 filed at 11/09/2024 0704   Patient Position - Orthostatic VS Lying filed at 11/08/2024 1545              Intake/Output Summary (Last 24 hours) at 11/9/2024 0742  Last data filed at 11/9/2024 0601  Gross per 24 hour   Intake 402.81 ml   Output 1730 ml   Net -1327.19 ml       Invasive Devices       Central Venous Catheter Line  Duration             CVC Central Lines 11/06/24 2 days              Peripheral Intravenous Line  Duration             Peripheral IV 11/06/24 Right Forearm 2 days              Drain  Duration             Chest Tube 1 Left Pleural 32 Fr. 2 days    Chest Tube 2 Mediastinal 32 Fr. 2 days    Chest Tube 3  Mediastinal 24 Fr. 2 days                                Scheduled Meds:  Current Facility-Administered Medications   Medication Dose Route Frequency Provider Last Rate    acetaminophen  650 mg Oral Q4H PRN Oliva Sullivan PA-C      acetaminophen  650 mg Rectal Q4H PRN Jorge Coelho PA-C      amiodarone  200 mg Oral Q8H formerly [facility] Oliva Sullivan PA-C      aspirin  81 mg Oral Daily Oliva Sullivan, " LEANA      atorvastatin  80 mg Oral Daily With Dinner Jorge Coelho PA-C      bisacodyl  10 mg Rectal Daily PRN Jorge Coelho PA-C      chlorhexidine  15 mL Mouth/Throat BID Jorge Coelho PA-C      docusate sodium  100 mg Oral BID Oliva Sullivan PA-C      fluticasone  2 spray Nasal Daily Jorge Coelho PA-C      fondaparinux  2.5 mg Subcutaneous Daily Oliva Sullivan PA-C      furosemide  40 mg Intravenous Daily Oliva Sullivan PA-C      insulin regular (HumuLIN R,NovoLIN R) 1 Units/mL in sodium chloride 0.9 % 100 mL infusion  0.3-21 Units/hr Intravenous Titrated Jorge Coelho PA-C 2 Units/hr (11/09/24 0016)    levalbuterol  1.25 mg Nebulization After Dinner Jorge Coelho PA-C      lidocaine  2 patch Topical Daily Oliva Sullivan PA-C      magnesium hydroxide  30 mL Oral Daily PRN Oliva Sullivan PA-C      methocarbamol  750 mg Intravenous Q8H PRN Oliva Sullivan PA-C      metoprolol tartrate  12.5 mg Oral Q12H ENRRIQUE Oliva Sullivan PA-C      montelukast  10 mg Oral HS Jorge Coelho PA-C      ondansetron  4 mg Intravenous Q6H PRN Oliva Sullivan PA-C      pantoprazole  40 mg Intravenous Q24H ENRRIQUE Oliva Sullivan PA-C      polyethylene glycol  17 g Oral Daily Jorge Coelho PA-C      potassium chloride  20 mEq Oral Daily Oliva Sullivan PA-C      sodium chloride  20 mL/hr Intravenous Continuous Jorge Coelho PA-C 20 mL/hr (11/08/24 1641)    temazepam  15 mg Oral HS PRN Oliva Sullivan PA-C       Continuous Infusions:insulin regular (HumuLIN R,NovoLIN R) 1 Units/mL in sodium chloride 0.9 % 100 mL infusion, 0.3-21 Units/hr, Last Rate: 2 Units/hr (11/09/24 0016)  sodium chloride, 20 mL/hr, Last Rate: 20 mL/hr (11/08/24 1641)      PRN Meds:.  acetaminophen    acetaminophen    bisacodyl    magnesium hydroxide    methocarbamol    ondansetron    temazepam    Review of Systems   Constitutional: Negative for fever and malaise/fatigue.   Cardiovascular:  Positive for  chest pain. Negative for dyspnea on exertion, irregular heartbeat, leg swelling, near-syncope, orthopnea, palpitations and paroxysmal nocturnal dyspnea.   All other systems reviewed and are negative.        Physical Exam:   GEN: NAD, alert and oriented x 3, well appearing  SKIN: dry without significant lesions or rashes  HEENT: NCAT, PERRL, EOMs intact  NECK: No JVD appreciated  CARDIOVASCULAR: RRR, normal S1, S2 without murmurs, rubs, or gallops appreciated  LUNGS: Clear to auscultation bilaterally without wheezes, rhonchi, or rales  ABDOMEN: Soft, nontender, nondistended  EXTREMITIES/VASCULAR: perfused without clubbing, cyanosis, or LE edema b/l  PSYCH: Normal mood and affect  NEURO: CN ll-Xll grossly intact                Lab Results: I have personally reviewed pertinent lab results.    Results from last 7 days   Lab Units 11/09/24 0421 11/08/24 0431 11/07/24  0317 11/06/24  1303 11/06/24  1259   WBC Thousand/uL 5.76 6.07 11.22*  --   --    HEMOGLOBIN g/dL 10.5* 10.7* 11.7*   < > 10.8*   I STAT HEMOGLOBIN   --   --   --    < >  --    HEMATOCRIT % 33.2* 34.0* 36.5   < > 33.0*   HEMATOCRIT, ISTAT   --   --   --    < >  --    PLATELETS Thousands/uL 115*  --  126*  --  104*    < > = values in this interval not displayed.     Results from last 7 days   Lab Units 11/09/24 0421 11/08/24  0431 11/07/24  0340 11/06/24  1750 11/06/24  1303   POTASSIUM mmol/L 3.9 3.8 4.4   < >  --    CHLORIDE mmol/L 102 103 108  --   --    CO2 mmol/L 28 31 22  --   --    CO2, I-STAT mmol/L  --   --   --   --  24   BUN mg/dL 19 18 16  --   --    CREATININE mg/dL 0.49* 0.52* 0.57*  --   --    GLUCOSE, ISTAT mg/dl  --   --   --   --  129   CALCIUM mg/dL 8.2* 8.2* 7.7*  --   --     < > = values in this interval not displayed.     Results from last 7 days   Lab Units 11/09/24  0421 11/08/24  0431 11/07/24  0711 11/06/24  1307 11/05/24  0447 11/04/24  0348   INR  1.69* 1.62* 1.26* 1.34*  --  1.07   PTT seconds  --   --   --  31 63* 72*      Results from last 7 days   Lab Units 11/08/24  0431 11/07/24  0340 11/03/24  0422   MAGNESIUM mg/dL 1.9 2.2 1.8*         Imaging: Results Review Statement: No pertinent imaging studies reviewed.    ECHO: No results found for this or any previous visit.      Results for orders placed during the hospital encounter of 10/31/24    Echo complete w/ contrast if indicated    Interpretation Summary    Left Ventricle: Left ventricular cavity size is normal. Wall thickness is mildly increased. There is mild concentric hypertrophy. The left ventricular ejection fraction is 55%. Systolic function is normal. Diastolic function is mildly abnormal, consistent with grade I (abnormal) relaxation.    The following segments are hypokinetic: basal inferolateral and mid inferolateral.    All other segments are normal.    Right Ventricle: Right ventricular cavity size is normal. Systolic function is normal.    Aorta: The aortic root is normal in size. The ascending aorta is mildly dilated. Ao root is 3.10 cm. Asc Ao is 3.8 cm.        EKG/TELEMETRY: No significant arrhythmias noted, appropriate pacing      VTE Pharmacologic Prophylaxis: VTE covered by:  fondaparinux, Subcutaneous, 2.5 mg at 11/09/24 0858  warfarin, Oral

## 2024-11-09 NOTE — ASSESSMENT & PLAN NOTE
Presents for CABG evaluation.  Left heart cath 10/31 showing disease in RCA, Left main, circumflex  Patient reports long history of bradycardia.  He has a few episodes of dizziness per week, these can happen at rest.  He does get some shortness of breath when using slight inclines.  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  Telemetry reviewed with sinus bradycardia with rates into the 30s.  Had an episode of presyncope just before my evaluation associated with low heart rate and in seated position.  Not on any rate control agents  Now status post CABG with continued bradycardia immediately post op  Status post Medtronic dual-chamber pacemaker implantation 11/8/2024

## 2024-11-10 ENCOUNTER — APPOINTMENT (INPATIENT)
Dept: RADIOLOGY | Facility: HOSPITAL | Age: 64
DRG: 233 | End: 2024-11-10
Payer: COMMERCIAL

## 2024-11-10 VITALS
TEMPERATURE: 98.2 F | OXYGEN SATURATION: 95 % | DIASTOLIC BLOOD PRESSURE: 70 MMHG | SYSTOLIC BLOOD PRESSURE: 121 MMHG | BODY MASS INDEX: 23.09 KG/M2 | RESPIRATION RATE: 14 BRPM | HEART RATE: 70 BPM | HEIGHT: 68 IN | WEIGHT: 152.34 LBS

## 2024-11-10 LAB
GLUCOSE SERPL-MCNC: 178 MG/DL (ref 65–140)
GLUCOSE SERPL-MCNC: 195 MG/DL (ref 65–140)
GLUCOSE SERPL-MCNC: 197 MG/DL (ref 65–140)
GLUCOSE SERPL-MCNC: 225 MG/DL (ref 65–140)
GLUCOSE SERPL-MCNC: 238 MG/DL (ref 65–140)
GLUCOSE SERPL-MCNC: 265 MG/DL (ref 65–140)
GLUCOSE SERPL-MCNC: 277 MG/DL (ref 65–140)
GLUCOSE SERPL-MCNC: 381 MG/DL (ref 65–140)
GLUCOSE SERPL-MCNC: 96 MG/DL (ref 65–140)
INR PPP: 1.97 (ref 0.85–1.19)
PROTHROMBIN TIME: 22.5 SECONDS (ref 12.3–15)

## 2024-11-10 PROCEDURE — 74176 CT ABD & PELVIS W/O CONTRAST: CPT

## 2024-11-10 PROCEDURE — 99024 POSTOP FOLLOW-UP VISIT: CPT | Performed by: PHYSICIAN ASSISTANT

## 2024-11-10 PROCEDURE — 82948 REAGENT STRIP/BLOOD GLUCOSE: CPT

## 2024-11-10 PROCEDURE — 94760 N-INVAS EAR/PLS OXIMETRY 1: CPT

## 2024-11-10 PROCEDURE — 71250 CT THORAX DX C-: CPT

## 2024-11-10 PROCEDURE — 99024 POSTOP FOLLOW-UP VISIT: CPT | Performed by: INTERNAL MEDICINE

## 2024-11-10 PROCEDURE — 99232 SBSQ HOSP IP/OBS MODERATE 35: CPT | Performed by: INTERNAL MEDICINE

## 2024-11-10 PROCEDURE — 85610 PROTHROMBIN TIME: CPT | Performed by: PHYSICIAN ASSISTANT

## 2024-11-10 RX ORDER — POTASSIUM CHLORIDE 1500 MG/1
20 TABLET, EXTENDED RELEASE ORAL DAILY
Qty: 7 TABLET | Refills: 0 | Status: CANCELLED | OUTPATIENT
Start: 2024-11-10

## 2024-11-10 RX ORDER — WARFARIN SODIUM 5 MG/1
5 TABLET ORAL
Status: COMPLETED | OUTPATIENT
Start: 2024-11-10 | End: 2024-11-10

## 2024-11-10 RX ORDER — POLYETHYLENE GLYCOL 3350 17 G/17G
17 POWDER, FOR SOLUTION ORAL DAILY PRN
Qty: 500 G | Refills: 0 | Status: CANCELLED | OUTPATIENT
Start: 2024-11-10 | End: 2024-12-10

## 2024-11-10 RX ORDER — OXYCODONE HYDROCHLORIDE 5 MG/1
5 TABLET ORAL EVERY 6 HOURS PRN
Qty: 30 TABLET | Refills: 0 | Status: CANCELLED | OUTPATIENT
Start: 2024-11-10

## 2024-11-10 RX ORDER — WARFARIN SODIUM 5 MG/1
TABLET ORAL
Qty: 45 TABLET | Refills: 3 | Status: CANCELLED | OUTPATIENT
Start: 2024-11-10

## 2024-11-10 RX ORDER — ATORVASTATIN CALCIUM 80 MG/1
80 TABLET, FILM COATED ORAL
Qty: 30 TABLET | Refills: 3 | Status: CANCELLED | OUTPATIENT
Start: 2024-11-10

## 2024-11-10 RX ORDER — TORSEMIDE 20 MG/1
20 TABLET ORAL DAILY
Qty: 7 TABLET | Refills: 0 | Status: CANCELLED | OUTPATIENT
Start: 2024-11-10

## 2024-11-10 RX ORDER — METOPROLOL TARTRATE 25 MG/1
12.5 TABLET, FILM COATED ORAL EVERY 12 HOURS SCHEDULED
Qty: 30 TABLET | Refills: 3 | Status: CANCELLED | OUTPATIENT
Start: 2024-11-10

## 2024-11-10 RX ADMIN — LIDOCAINE 2 PATCH: 50 PATCH TOPICAL at 08:10

## 2024-11-10 RX ADMIN — Medication 12.5 MG: at 08:09

## 2024-11-10 RX ADMIN — POTASSIUM CHLORIDE 20 MEQ: 1.5 SOLUTION ORAL at 17:49

## 2024-11-10 RX ADMIN — DOCUSATE SODIUM 100 MG: 100 CAPSULE, LIQUID FILLED ORAL at 08:09

## 2024-11-10 RX ADMIN — AMIODARONE HYDROCHLORIDE 200 MG: 200 TABLET ORAL at 21:35

## 2024-11-10 RX ADMIN — ATORVASTATIN CALCIUM 80 MG: 80 TABLET, FILM COATED ORAL at 17:49

## 2024-11-10 RX ADMIN — CHLORHEXIDINE GLUCONATE 0.12% ORAL RINSE 15 ML: 1.2 LIQUID ORAL at 08:09

## 2024-11-10 RX ADMIN — ACETAMINOPHEN 650 MG: 650 SUSPENSION ORAL at 15:39

## 2024-11-10 RX ADMIN — TORSEMIDE 20 MG: 20 TABLET ORAL at 08:09

## 2024-11-10 RX ADMIN — CHLORHEXIDINE GLUCONATE 0.12% ORAL RINSE 15 ML: 1.2 LIQUID ORAL at 21:35

## 2024-11-10 RX ADMIN — POLYETHYLENE GLYCOL 3350 17 G: 17 POWDER, FOR SOLUTION ORAL at 08:09

## 2024-11-10 RX ADMIN — Medication 12.5 MG: at 21:35

## 2024-11-10 RX ADMIN — MONTELUKAST 10 MG: 10 TABLET, FILM COATED ORAL at 21:35

## 2024-11-10 RX ADMIN — PANTOPRAZOLE SODIUM 40 MG: 40 INJECTION, POWDER, FOR SOLUTION INTRAVENOUS at 08:10

## 2024-11-10 RX ADMIN — AMIODARONE HYDROCHLORIDE 200 MG: 200 TABLET ORAL at 05:53

## 2024-11-10 RX ADMIN — AMIODARONE HYDROCHLORIDE 200 MG: 200 TABLET ORAL at 14:16

## 2024-11-10 RX ADMIN — WARFARIN SODIUM 5 MG: 5 TABLET ORAL at 17:49

## 2024-11-10 RX ADMIN — POTASSIUM CHLORIDE 20 MEQ: 1.5 SOLUTION ORAL at 08:09

## 2024-11-10 RX ADMIN — TORSEMIDE 20 MG: 20 TABLET ORAL at 17:49

## 2024-11-10 RX ADMIN — ASPIRIN 81 MG: 81 TABLET, COATED ORAL at 08:09

## 2024-11-10 NOTE — PROGRESS NOTES
"Progress Note - Otoniel Martinez 64 y.o. male MRN: [id number]    Unit/Bed#: PPHP-324-01 Encounter: [id number]      CC: diabetes f/u    Subjective:   Otoniel Martinez is a 64 y.o. year old male with type  diabetes.  Feels well.  No complaints.  No hypoglycemia.    Objective:     Vitals: Blood pressure 129/85, pulse 70, temperature 98 °F (36.7 °C), resp. rate 14, height 5' 8\" (1.727 m), weight 69.1 kg (152 lb 5.4 oz), SpO2 92%.,Body mass index is 23.16 kg/m².      Intake/Output Summary (Last 24 hours) at 11/10/2024 1310  Last data filed at 11/10/2024 1207  Gross per 24 hour   Intake 1425.38 ml   Output 2700 ml   Net -1274.62 ml       Physical Exam:  General Appearance: awake, appears stated age and cooperative  Head: Normocephalic, without obvious abnormality, atraumatic  Extremities: moves all extremities  Skin: Skin color and temperature normal.   Pulm: no labored breathing    Lab, Imaging and other studies: Results Review Statement: No pertinent imaging studies reviewed.    POC Glucose (mg/dl)   Date Value   11/10/2024 195 (H)   11/10/2024 265 (H)   11/10/2024 381 (H)   11/10/2024 277 (H)   11/10/2024 197 (H)   11/10/2024 178 (H)   11/10/2024 96   11/09/2024 135   11/09/2024 180 (H)   11/09/2024 206 (H)       Assessment:  diabetes with hyperglycemia and status post CABG    Plan:  1.  Type 1 diabetes with hyperglycemia-he has been transition to OmniPod insulin pump along with a Dexcom G7 continuous glucose monitor.  Pump orders have been placed.  The blood sugar is improving.  Continue to monitor blood sugar over time and make adjustments to the regimen if necessary.    2.  Status post CABG-management per cardiac surgery.        Portions of the record may have been created with voice recognition software.     "

## 2024-11-10 NOTE — ASSESSMENT & PLAN NOTE
Pt with prior MI and RCA stenting 2004;  Presented for elective cardiac catheterization on 10/31 due to ongoing dyspnea on exertion.   Cath revealed MV disease including distal LM 80% as well as prox to mid Circ 90% and mid RCA 90%  Status post CABG x 3, LIMA to the LAD, SVG to PDA and SVG to OM1.  Postoperative day 4.    Continue usual postoperative care.  Doing well and likely to be discharged later today.  Continue aspirin, statin and beta-blocker.

## 2024-11-10 NOTE — PROGRESS NOTES
"Progress Note - Cardiology   Name: Otoniel Martinez 64 y.o. male I MRN: [id number]  Unit/Bed#: PPHP-324-01 I Date of Admission: 10/31/2024   Date of Service: 11/10/2024 I Hospital Day: 10     Assessment & Plan  Multivessel CAD  Pt with prior MI and RCA stenting 2004;  Presented for elective cardiac catheterization on 10/31 due to ongoing dyspnea on exertion.   Cath revealed MV disease including distal LM 80% as well as prox to mid Circ 90% and mid RCA 90%  Status post CABG x 3, LIMA to the LAD, SVG to PDA and SVG to OM1.  Postoperative day 4.    Continue usual postoperative care.  Doing well and likely to be discharged later today.  Continue aspirin, statin and beta-blocker.    Symptomatic sinus bradycardia  Sinus bradycardia earlier on in admission.  Evaluated by EP and patient is now status post permanent pacemaker.  Will follow-up in the device clinic.  Telemetry currently showing an atrial paced rhythm.  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   Mixed hyperlipidemia  Continue statin; atorvastatin 80mg daily  History of recurrent deep vein thrombosis (DVT)  On Xarelto at baseline.  Will be on warfarin temporarily postoperatively per CT surgery.  S/P CABG (coronary artery bypass graft)      Left main coronary artery disease         Subjective:   Patient seen and examined.  No significant events overnight.  Denies chest pain or shortness of breath.    Objective:     Vitals: Blood pressure 129/85, pulse 70, temperature 98 °F (36.7 °C), resp. rate 14, height 5' 8\" (1.727 m), weight 69.1 kg (152 lb 5.4 oz), SpO2 92%., Body mass index is 23.16 kg/m².,   Orthostatic Blood Pressures      Flowsheet Row " Most Recent Value   Blood Pressure 129/85 filed at 11/10/2024 0728   Patient Position - Orthostatic VS Lying filed at 11/08/2024 1545              Intake/Output Summary (Last 24 hours) at 11/10/2024 1023  Last data filed at 11/10/2024 0941  Gross per 24 hour   Intake 1525.38 ml   Output 3075 ml   Net -1549.62 ml       No significant arrhythmias seen on telemetry review.  Sinus or an atrial paced rhythm.    Physical Exam:  GEN: Otoniel Martinez appears well, alert and oriented x 3, pleasant and cooperative   HEENT: pupils equal, round, and reactive to light; extraocular muscles intact  NECK: supple, no carotid bruits   HEART: regular rhythm, normal S1 and S2, no significant murmurs, clicks, gallops or rubs   LUNGS: Diminished at bases bilaterally; no wheezes, rales, or rhonchi   ABDOMEN: normal bowel sounds, soft, no tenderness, no distention  EXTREMITIES: peripheral pulses normal; no clubbing, cyanosis, or edema  NEURO: no focal findings   SKIN: normal without suspicious lesions on exposed skin    Medications:      Current Facility-Administered Medications:     acetaminophen (TYLENOL) oral suspension 650 mg, 650 mg, Oral, Q4H PRN, Oliva Sullivan PA-C, 650 mg at 11/09/24 1114    acetaminophen (TYLENOL) rectal suppository 650 mg, 650 mg, Rectal, Q4H PRN, Jorge Coelho PA-C    amiodarone tablet 200 mg, 200 mg, Oral, Q8H ENRRIQUE, Oliva Sullivan PA-C, 200 mg at 11/10/24 0553    aspirin (ECOTRIN LOW STRENGTH) EC tablet 81 mg, 81 mg, Oral, Daily, Oliva Sullivan PA-C, 81 mg at 11/10/24 0809    atorvastatin (LIPITOR) tablet 80 mg, 80 mg, Oral, Daily With Dinner, Jorge Coelho PA-C, 80 mg at 11/09/24 1733    bisacodyl (DULCOLAX) rectal suppository 10 mg, 10 mg, Rectal, Daily PRN, Jorge Coelho PA-C    chlorhexidine (PERIDEX) 0.12 % oral rinse 15 mL, 15 mL, Mouth/Throat, BID, Estefania Ramos PA-C, 15 mL at 11/10/24 0809    docusate sodium (COLACE) capsule 100 mg, 100 mg, Oral, BID, Oliva Sullivan PA-C,  100 mg at 11/10/24 0809    fluticasone (FLONASE) 50 mcg/act nasal spray 2 spray, 2 spray, Nasal, Daily, Jorge Coelho PA-C, 2 spray at 11/09/24 2127    insulin regular (HumuLIN R,NovoLIN R) 1 Units/mL in sodium chloride 0.9 % 100 mL infusion, 0.3-21 Units/hr, Intravenous, Titrated, Jorge Coelho PA-C, Last Rate: 13 mL/hr at 11/10/24 1003, 13 Units/hr at 11/10/24 1003    levalbuterol (XOPENEX) inhalation solution 1.25 mg, 1.25 mg, Nebulization, After Dinner, Jorge Coelho PA-C, 1.25 mg at 11/08/24 1957    lidocaine (LIDODERM) 5 % patch 2 patch, 2 patch, Topical, Daily, Oliva Sullivan PA-C, 2 patch at 11/10/24 0810    magnesium hydroxide (MILK OF MAGNESIA) oral suspension 30 mL, 30 mL, Oral, Daily PRN, Oliva Sullivan PA-C    methocarbamol (ROBAXIN) injection 750 mg, 750 mg, Intravenous, Q8H PRN, Oliva Sullivan PA-C, 750 mg at 11/08/24 1432    metoprolol tartrate (LOPRESSOR) partial tablet 12.5 mg, 12.5 mg, Oral, Q12H ENRRIQUE, Oliva Sullivan PA-C, 12.5 mg at 11/10/24 0809    montelukast (SINGULAIR) tablet 10 mg, 10 mg, Oral, HS, Jorge Coelho PA-C, 10 mg at 11/09/24 2127    pantoprazole (PROTONIX) injection 40 mg, 40 mg, Intravenous, Q24H ENRRIQUE, Oliva Sullivan PA-C, 40 mg at 11/10/24 0810    polyethylene glycol (MIRALAX) packet 17 g, 17 g, Oral, Daily, Jorge Coelho PA-C, 17 g at 11/10/24 0809    potassium chloride oral solution 20 mEq, 20 mEq, Oral, BID, Estefania Ramos PA-C, 20 mEq at 11/10/24 0809    sodium chloride infusion 0.45 %, 20 mL/hr, Intravenous, Continuous, Jorge Coelho PA-C, Last Rate: 20 mL/hr at 11/08/24 1641, 20 mL/hr at 11/08/24 1641    temazepam (RESTORIL) capsule 15 mg, 15 mg, Oral, HS PRN, Oliva Sullivan PA-C, 15 mg at 11/08/24 0005    torsemide (DEMADEX) tablet 20 mg, 20 mg, Oral, BID, Estefania Ramos PA-C, 20 mg at 11/10/24 0809    warfarin (COUMADIN) tablet 5 mg, 5 mg, Oral, Once (warfarin), Estefania Ramos PA-C     Labs & Results:        Results  from last 7 days   Lab Units 11/09/24 0421 11/08/24 0431 11/07/24  0317 11/06/24  1303 11/06/24  1259   WBC Thousand/uL 5.76 6.07 11.22*  --   --    HEMOGLOBIN g/dL 10.5* 10.7* 11.7*   < > 10.8*   I STAT HEMOGLOBIN   --   --   --    < >  --    HEMATOCRIT % 33.2* 34.0* 36.5   < > 33.0*   HEMATOCRIT, ISTAT   --   --   --    < >  --    PLATELETS Thousands/uL 115*  --  126*  --  104*    < > = values in this interval not displayed.           Results from last 7 days   Lab Units 11/09/24 0421 11/08/24 0431 11/07/24 0340 11/06/24  1750 11/06/24  1303 11/06/24  1259 11/06/24  1216 11/06/24  1136   POTASSIUM mmol/L 3.9 3.8 4.4   < >  --    < >  --   --    CHLORIDE mmol/L 102 103 108  --   --    < >  --   --    CO2 mmol/L 28 31 22  --   --    < >  --   --    CO2, I-STAT mmol/L  --   --   --   --  24 -- 24 28   BUN mg/dL 19 18 16  --   --    < >  --   --    CREATININE mg/dL 0.49* 0.52* 0.57*  --   --    < >  --   --    CALCIUM mg/dL 8.2* 8.2* 7.7*  --   --    < >  --   --    GLUCOSE, ISTAT mg/dl  --   --   --   --  129  --  157* 158*    < > = values in this interval not displayed.     Results from last 7 days   Lab Units 11/10/24  0425 11/09/24  0421 11/08/24  0431 11/07/24  0711 11/06/24  1307 11/05/24  0447 11/04/24  0348   INR  1.97* 1.69* 1.62*   < > 1.34*  --  1.07   PTT seconds  --   --   --   --  31 63* 72*    < > = values in this interval not displayed.     Results from last 7 days   Lab Units 11/08/24  0431 11/07/24  0340   MAGNESIUM mg/dL 1.9 2.2       EKG personally reviewed by Jc Adrian MD.  Sinus bradycardia    ECHO:    Left Ventricle: Left ventricular cavity size is normal. Wall thickness is mildly increased. There is mild concentric hypertrophy. The left ventricular ejection fraction is 55%. Systolic function is normal. Diastolic function is mildly abnormal, consistent with grade I (abnormal) relaxation.    The following segments are hypokinetic: basal inferolateral and mid inferolateral.    All  other segments are normal.    Right Ventricle: Right ventricular cavity size is normal. Systolic function is normal.    Aorta: The aortic root is normal in size. The ascending aorta is mildly dilated. Ao root is 3.10 cm. Asc Ao is 3.8 cm.    Counseling / Coordination of Care  Total floor / unit time spent today 25 minutes.  Greater than 50% of total time was spent with the patient and / or family counseling and / or coordination of care.

## 2024-11-10 NOTE — DISCHARGE INSTRUCTIONS
Instructions Following Open Heart Surgery      Protect your sternum (breast bone). Wires are placed during surgery to hold the sternum together. Do not lift anything heavier than 10 pounds for the first four weeks after surgery. (For example, a gallon of milk weighs 8 pounds) When you are seen for a routine postop check, you will be cleared to lift up to 25 lbs. Following three months from the time of surgery, you may lift without any restrictions.     Hug a pillow to your chest or cross your arms over your chest when you laugh, sneeze, or cough. You may resume sexual activity when you feel ready. Do not put any excessive pressure on your chest.    Be careful when you get into or out of a chair or bed.  Hug a pillow or cross your arms when you stand or sit. Do not twist as you move. Use only your legs to sit and stand. You may need a raised toilet seat if you have trouble standing up without using your arms.     No sleeping on side for the first 4 weeks. Limit daytime naps, to prevent difficulty sleeping at night.    Women: Wear a clean surgical bra every day.     Do not play sports that use your shoulder.  Examples include tennis and golf.    Do not drive until cleared by surgeon. Typically cleared to drive after one month, determination will be made at routine postop appointment. When a passenger in the car, wear a seat belt.    Continue you breathing exercises throughout the day with incentive spirometry    Activity: Your goal is to go on frequent walks, slowly increasing your activity level. Walking will help prevent leg swelling and prevent blood clots. When you start outpatient cardiac rehab in one month, you will be given additional instructions and a formal exercise plan. It is OK to go up steps, with help.   You may resume sexual activity when you are comfortable. Do not put excessive pressure on your chest.     Weigh yourself daily in the morning and keep of log of your weight. If your weight increases  more than 2 lbs per day or more than 5 lbs in a week, call your surgeon's office.    Keep your legs elevated when sitting. Pressure stockings may be worn to prevent significant leg swelling.     You may get routine vaccines any time after open heart surgery    Do not smoke:  Nicotine can damage blood vessels and make it more difficult to heal. Do not use e-cigarettes or smokeless tobacco in place of cigarettes or to help you quit. They still contain nicotine. Ask your primary care provider for information if you currently smoke and need help quitting.     Incision/Wound Care:    Shower daily. Gently wash your incision with warm water and mild soap. Do not scrub the area. Gently pat the area dry with a clean towel. If you have a bandage, dry the area and put on a new, clean bandage. Change your bandage at least daily, or if it gets wet or dirty. Always wash your hands before you care for your wound. Do not apply ointments, creams, lotions, powders, etc. If you develop signs of an infection (fever > 101, redness, warm skin, or drainage) please call your surgeon's office.     Do not pick at or touch puncture sites or incisions. Allow and scabs to come off on their own.     It is normal to have some drainage from the chest tube sites. Apply clean/dry dressings, until this resolves.    You may have discomfort or numbness along the incision for 3-4 weeks. It is normal to occasionally experience brief sharp shooing pains, tightness, or pulling sensations.    Do not take a bath or swim until cleared by surgeon.    As your incision heals, sometimes small tender lumps or pimple-like bumps develop which is due to your body attempting to “dissolve” the suture (stiches).     Call your local emergency number (421 in the [place]) or have someone call if:   You have squeezing, pressure, or pain in your chest or back, lightheadedness or sudden cold sweat  Short of breath that does not go away with rest  You cough up blood.  You have a  fast heartbeat that flutters. Or palpitations  You faint.  Signs of a stroke:  Numbness or drooping on one side of your face. Weakness in an arm or leg. Confusion or difficulty speaking. Dizziness, a severe headache, or vision loss    Call your surgeons office if:   You have bleeding that does not stop even after you apply pressure for 5 minutes.  You have redness, tenderness, or signs of infection at incision (pain, swelling, odor, or green/yellow discharge from incision site)  You have a severe headache.  You have a fever higher than 101°F (38.4°C).  You urinate less, or not at all.  You have persistent nausea, vomiting, or diarrhea  You hear persistent or worsening crunching or grinding in your sternum.  You have questions or concerns about your condition or care.      Diet:    Eat heart-healthy foods, low in unhealthy fats and sodium (salt). A heart healthy diet helps improve your cholesterol levels and lowers your risk for heart disease and stroke. You will receive formal education on healthy eating and label reading during your cardiac rehab in one month.   Choose foods that contain healthy fats, such as soybean, canola, olive, corn, and sunflower oils.  Limit unhealthy fats. Examples include:  Cholesterol  is found in animal foods, such as eggs and lobster, and in dairy products made from whole milk.    Saturated fat  is found in meats, such as burks and hamburger. It is also found in chicken or turkey skin, whole milk, and butter.     Trans fat  is found in packaged foods, such as potato chips and cookies. It is also in some fried foods, and shortening. Do not eat foods that contain trans fats.  Get 20 to 30 grams of fiber each day.  Fruits, vegetables, whole-grain foods, and legumes (cooked beans) are good sources of fiber.  Low Sodium: Limit to 2,000 mg or less each day, unless otherwise directed. Choose low-sodium or no-salt-added foods. Add little or no salt to food you prepare. Use herbs and spices in  place of salt.  Foods high in sodium include frozen dinners, macaroni and cheese, instant potatoes, canned vegetables, cured or smoked meats, hot dogs, burks and sausage, ketchup, barbecue sauce, salad dressing, pickles, olives, soy sauce, and miso.     Fluid Restriction: Fluid restriction after hospital discharge is advised. Limit your fluid intake to no more than 8 cups of liquid (8oz = 1cup) or 2000 mL per day.    Limit/Do not drink alcohol. Alcohol can damage heart and raise your blood pressure. The general recommended limit is 1 drink a day for women 21 or older and for men 65 or older. Do not have more than 3 drinks within 24 hours or 7 within a week. A drink of alcohol is 12 oz of beer, 5 oz of wine, or 1½ oz of liquor.        Narcotic Safety     What do I need to know about narcotic safety?    A narcotic is a type of medicine used to treat pain. When you are discharged from the hospital, you will be prescribed a narcotic called oxycodone. Pain control and management may help you rest, heal, and return to your daily activities. Do not take more than the recommended amount. Too much can cause a life-threatening overdose. Do not continue to take it after your pain stops.     How do I use narcotics safely?   Take prescribed narcotics exactly as directed.  You may develop tolerance. This means you keep needing higher doses to get the same effect. You may also develop narcotic use disorder. This means you are not able to control your narcotic use.  Do not give narcotics to others or take narcotics that belong to someone else.  Do not mix narcotics with other medicines or alcohol.  The combination can cause an overdose, or cause you to stop breathing.   Do not drive or operate heavy machinery after you use a narcotic.    Talk to your healthcare provider if you have any side effects.  Side effects include nausea, sleepiness, itching, and trouble thinking clearly.     What can I do to manage constipation?   Constipation is the most common side effect of narcotic medicine. Constipation is when you have hard, dry bowel movements, or you go longer than usual between bowel movements. The following are ways you can prevent or relieve constipation:  Drink liquids as allowed.  Drinking extra liquids will help soften and move your bowels. You should drink up to your maximum fluid allotment of 2 liters.    Eat high-fiber foods.  This may help decrease constipation by adding bulk to your bowel movements. High-fiber foods include fruits, vegetables, whole-grain breads and cereals, and beans  Supplements. You have been prescribed a fiber supplement called polyethylene glycol (Claribel lax) and a stool softener called docusate sodium (Colace). You should take these for as long as you continue narcotic medications.  Exercise regularly.  Regular physical activity can help stimulate your intestines. Walking is a good exercise to prevent or relieve constipation. Ask which exercises are best for you.       How do I store narcotics safely?   Store narcotics where others cannot easily get them.  Keep them in a locked cabinet or secure area. Do not  keep them in a purse or other bag you carry with you. A person may be looking for something else and find the narcotics.    Make sure narcotics are stored out of the reach of children.  A child can easily overdose on narcotics. Narcotics may look like candy to a small child.              Blood Thinners    What you need to know:     You have been prescribed a blood thinner(s) to help prevent blood clots. Blood clots can cause strokes, heart attacks, and death. Examples of blood thinners include Aspirin, Plavix (clopidorel), Brilinta (ticagrelor), Xarelto (rivaroxaban), Pradaxa., (dabigatran), Eliquis (apixaban), and Coumadin (warfarin)    While taking any blood thinner, watch for bleeding and bruising. Watch for blood in your urine and bowel movements. Use a soft washcloth on your skin, and a soft  toothbrush to brush your teeth. If you shave, use an electric shaver. Do not play contact sports.    Do not start or stop any other medicines unless your healthcare provider tells you to do so. (Many medicines cannot be used with blood thinners)    Take your blood thinner exactly as prescribed by your healthcare provider. Do not skip a dose or take less than prescribed. Tell your provider right away if you forget to take your blood thinner, or if you took too much.    Warfarin (Coumadin):     You will need a regular blood test called a PT/INR. This test checks how thin your blood is. Your doctor may change your Coumadin dose, based on blood test (INR) results.    Following discharge from the hospital, you will be contacted by the [facility] to order these blood tests. If you do not hear from them within two days, contact the office at [phone]  It is not safe to take this medicine during pregnancy. It could harm an unborn baby. Tell your doctor right away if you become pregnant. Use an effective form of birth control to keep from getting pregnant during treatment and for at least 1 month after your last dose.    If you miss a dose do not take extra medicine to make up for a missed dose. Inform your healthcare provider.     Foods and medicines can affect your body's response to Coumadin. Warfarin works best when you eat about the same amount of vitamin K every day. Vitamin K is found in green leafy vegetables and certain other foods. Do not make major changes to your diet while you take Coumadin. Do not eat grapefruit or drink grapefruit juice while you are using this medicine.

## 2024-11-10 NOTE — ASSESSMENT & PLAN NOTE
Sinus bradycardia earlier on in admission.  Evaluated by EP and patient is now status post permanent pacemaker.  Will follow-up in the device clinic.  Telemetry currently showing an atrial paced rhythm.

## 2024-11-10 NOTE — ASSESSMENT & PLAN NOTE
Lab Results   Component Value Date    HGBA1C 8.4 (H) 10/31/2024       Recent Labs     11/10/24  0421 11/10/24  0547 11/10/24  0806 11/10/24  1002   POCGLU 178* 197* 277* 381*       Blood Sugar Average: Last 72 hrs:  (P) 166.55    Currently on IV insulin infusion being managed by endocrinology.

## 2024-11-11 ENCOUNTER — APPOINTMENT (INPATIENT)
Dept: RADIOLOGY | Facility: HOSPITAL | Age: 64
DRG: 233 | End: 2024-11-11
Payer: COMMERCIAL

## 2024-11-11 ENCOUNTER — ANTICOAG VISIT (OUTPATIENT)
Dept: CARDIOLOGY CLINIC | Facility: CLINIC | Age: 64
End: 2024-11-11

## 2024-11-11 ENCOUNTER — TELEPHONE (OUTPATIENT)
Age: 64
End: 2024-11-11

## 2024-11-11 VITALS
RESPIRATION RATE: 18 BRPM | OXYGEN SATURATION: 94 % | HEART RATE: 71 BPM | TEMPERATURE: 97.5 F | SYSTOLIC BLOOD PRESSURE: 111 MMHG | HEIGHT: 68 IN | DIASTOLIC BLOOD PRESSURE: 77 MMHG | WEIGHT: 144.84 LBS | BODY MASS INDEX: 21.95 KG/M2

## 2024-11-11 DIAGNOSIS — I82.409 THROMBOEMBOLISM OF DEEP VEINS OF LOWER EXTREMITY, UNSPECIFIED LATERALITY (HCC): Primary | ICD-10-CM

## 2024-11-11 LAB
ANION GAP SERPL CALCULATED.3IONS-SCNC: 10 MMOL/L (ref 4–13)
BUN SERPL-MCNC: 29 MG/DL (ref 5–25)
CALCIUM SERPL-MCNC: 8.6 MG/DL (ref 8.4–10.2)
CHLORIDE SERPL-SCNC: 95 MMOL/L (ref 96–108)
CO2 SERPL-SCNC: 34 MMOL/L (ref 21–32)
CREAT SERPL-MCNC: 0.62 MG/DL (ref 0.6–1.3)
ERYTHROCYTE [DISTWIDTH] IN BLOOD BY AUTOMATED COUNT: 15.1 % (ref 11.6–15.1)
GFR SERPL CREATININE-BSD FRML MDRD: 104 ML/MIN/1.73SQ M
GLUCOSE SERPL-MCNC: 142 MG/DL (ref 65–140)
GLUCOSE SERPL-MCNC: 160 MG/DL (ref 65–140)
GLUCOSE SERPL-MCNC: 372 MG/DL (ref 65–140)
HCT VFR BLD AUTO: 36.7 % (ref 36.5–49.3)
HGB BLD-MCNC: 12 G/DL (ref 12–17)
INR PPP: 3.15 (ref 0.85–1.19)
MCH RBC QN AUTO: 28.5 PG (ref 26.8–34.3)
MCHC RBC AUTO-ENTMCNC: 32.7 G/DL (ref 31.4–37.4)
MCV RBC AUTO: 87 FL (ref 82–98)
PLATELET # BLD AUTO: 181 THOUSANDS/UL (ref 149–390)
PMV BLD AUTO: 9.5 FL (ref 8.9–12.7)
POTASSIUM SERPL-SCNC: 3.5 MMOL/L (ref 3.5–5.3)
PROTHROMBIN TIME: 32 SECONDS (ref 12.3–15)
RBC # BLD AUTO: 4.21 MILLION/UL (ref 3.88–5.62)
SODIUM SERPL-SCNC: 139 MMOL/L (ref 135–147)
WBC # BLD AUTO: 6.18 THOUSAND/UL (ref 4.31–10.16)

## 2024-11-11 PROCEDURE — 85027 COMPLETE CBC AUTOMATED: CPT | Performed by: PHYSICIAN ASSISTANT

## 2024-11-11 PROCEDURE — 82948 REAGENT STRIP/BLOOD GLUCOSE: CPT

## 2024-11-11 PROCEDURE — 97116 GAIT TRAINING THERAPY: CPT

## 2024-11-11 PROCEDURE — 99024 POSTOP FOLLOW-UP VISIT: CPT | Performed by: PHYSICIAN ASSISTANT

## 2024-11-11 PROCEDURE — 71046 X-RAY EXAM CHEST 2 VIEWS: CPT

## 2024-11-11 PROCEDURE — 80048 BASIC METABOLIC PNL TOTAL CA: CPT | Performed by: PHYSICIAN ASSISTANT

## 2024-11-11 PROCEDURE — 99232 SBSQ HOSP IP/OBS MODERATE 35: CPT | Performed by: INTERNAL MEDICINE

## 2024-11-11 PROCEDURE — 85610 PROTHROMBIN TIME: CPT | Performed by: PHYSICIAN ASSISTANT

## 2024-11-11 RX ORDER — POTASSIUM CHLORIDE 1500 MG/1
40 TABLET, EXTENDED RELEASE ORAL DAILY
Status: DISCONTINUED | OUTPATIENT
Start: 2024-11-11 | End: 2024-11-11

## 2024-11-11 RX ORDER — METOPROLOL TARTRATE 25 MG/1
12.5 TABLET, FILM COATED ORAL EVERY 12 HOURS SCHEDULED
Qty: 30 TABLET | Refills: 2 | Status: SHIPPED | OUTPATIENT
Start: 2024-11-11

## 2024-11-11 RX ORDER — METHOCARBAMOL 500 MG/1
500 TABLET, FILM COATED ORAL EVERY 6 HOURS PRN
Qty: 30 TABLET | Refills: 0 | Status: SHIPPED | OUTPATIENT
Start: 2024-11-11

## 2024-11-11 RX ORDER — BISACODYL 10 MG
10 SUPPOSITORY, RECTAL RECTAL DAILY PRN
Status: DISCONTINUED | OUTPATIENT
Start: 2024-11-11 | End: 2024-11-11 | Stop reason: HOSPADM

## 2024-11-11 RX ORDER — SENNOSIDES 8.6 MG
650 CAPSULE ORAL EVERY 4 HOURS
Qty: 30 TABLET | Refills: 0 | Status: SHIPPED | OUTPATIENT
Start: 2024-11-11

## 2024-11-11 RX ORDER — METHOCARBAMOL 100 MG/ML
750 INJECTION, SOLUTION INTRAMUSCULAR; INTRAVENOUS EVERY 8 HOURS PRN
Status: DISCONTINUED | OUTPATIENT
Start: 2024-11-11 | End: 2024-11-11 | Stop reason: HOSPADM

## 2024-11-11 RX ORDER — ATORVASTATIN CALCIUM 80 MG/1
80 TABLET, FILM COATED ORAL
Qty: 30 TABLET | Refills: 1 | Status: SHIPPED | OUTPATIENT
Start: 2024-11-11

## 2024-11-11 RX ORDER — DOCUSATE SODIUM 100 MG/1
100 CAPSULE, LIQUID FILLED ORAL 2 TIMES DAILY
COMMUNITY
Start: 2024-11-11

## 2024-11-11 RX ORDER — POTASSIUM CHLORIDE 20MEQ/15ML
40 LIQUID (ML) ORAL DAILY
Status: DISCONTINUED | OUTPATIENT
Start: 2024-11-11 | End: 2024-11-11 | Stop reason: HOSPADM

## 2024-11-11 RX ORDER — POLYETHYLENE GLYCOL 3350 17 G/17G
17 POWDER, FOR SOLUTION ORAL DAILY
Qty: 510 G | Refills: 0 | Status: SHIPPED | OUTPATIENT
Start: 2024-11-12 | End: 2024-12-12

## 2024-11-11 RX ORDER — WARFARIN SODIUM 1 MG/1
TABLET ORAL
Qty: 30 TABLET | Refills: 1 | Status: SHIPPED | OUTPATIENT
Start: 2024-11-11

## 2024-11-11 RX ADMIN — Medication 12.5 MG: at 08:45

## 2024-11-11 RX ADMIN — POTASSIUM CHLORIDE 20 MEQ: 1.5 SOLUTION ORAL at 08:45

## 2024-11-11 RX ADMIN — POTASSIUM CHLORIDE 40 MEQ: 1.5 SOLUTION ORAL at 12:05

## 2024-11-11 RX ADMIN — LIDOCAINE 2 PATCH: 50 PATCH TOPICAL at 08:46

## 2024-11-11 RX ADMIN — ACETAMINOPHEN 650 MG: 650 SUSPENSION ORAL at 08:49

## 2024-11-11 RX ADMIN — CHLORHEXIDINE GLUCONATE 0.12% ORAL RINSE 15 ML: 1.2 LIQUID ORAL at 08:45

## 2024-11-11 RX ADMIN — TORSEMIDE 20 MG: 20 TABLET ORAL at 08:46

## 2024-11-11 RX ADMIN — ASPIRIN 81 MG: 81 TABLET, COATED ORAL at 08:45

## 2024-11-11 RX ADMIN — AMIODARONE HYDROCHLORIDE 200 MG: 200 TABLET ORAL at 06:06

## 2024-11-11 RX ADMIN — PANTOPRAZOLE SODIUM 40 MG: 40 INJECTION, POWDER, FOR SOLUTION INTRAVENOUS at 08:45

## 2024-11-11 NOTE — PROGRESS NOTES
Patient was advised by hospital to hold warfarin tonight for INR was 3.15. [facility].   Wife Oliva is a nurse and they are very familiar with  warfarin. They do not need any information about it.     Order put in for [facility] for lab draw.

## 2024-11-11 NOTE — TELEPHONE ENCOUNTER
Caller: Oliva     Doctor: Dr. Hollins     Reason for call: wife called and advised that patient has an appt w/ Dr. Hollins on 12/12 to f/u but they no longer would like to see him. Wife stated that Dr. Adrian agreed to see them from now on. Can someone please verify if he is ok w/ seeing him and if he wants to be seen this year or next available.    Call back#: [phone]

## 2024-11-11 NOTE — ASSESSMENT & PLAN NOTE
Sinus bradycardia earlier on in admission.  Evaluated by EP and patient is now status post permanent pacemaker.  Will follow-up in the device clinic.

## 2024-11-11 NOTE — ASSESSMENT & PLAN NOTE
Pt with prior MI and RCA stenting 2004;  Presented for elective cardiac catheterization on 10/31 due to ongoing dyspnea on exertion.   Cath revealed MV disease including distal LM 80% as well as prox to mid Circ 90% and mid RCA 90%  Status post CABG x 3, LIMA to the LAD, SVG to PDA and SVG to OM1.  Postoperative day 5.    Continue usual postoperative care. Continue aspirin, statin and beta-blocker.  Outpatient follow up has been scheduled for 11/21.

## 2024-11-11 NOTE — ASSESSMENT & PLAN NOTE
Lab Results   Component Value Date    HGBA1C 8.4 (H) 10/31/2024       Recent Labs     11/10/24  1306 11/10/24  1540 11/10/24  2046 11/11/24  0650   POCGLU 195* 225* 238* 160*       Blood Sugar Average: Last 72 hrs:  (P) 171.5    Currently on IV insulin infusion being managed by endocrinology.

## 2024-11-11 NOTE — ASSESSMENT & PLAN NOTE
Has hypoglycemia. Last A1C 8.4% and above goal.   Reduce Tresiba to 24 units daily.   Continue regular insulin 10 units q AC. Reviewed pt may need to cut back further after rotating insulin admin sites. Has lipohypertrophy over abd where he currently injects prandial insulin. Is taking regular insulin which is less ideal. Will continue for now. Pt administering insulin about 30 minutes prior to meal. Reviewed that he should not forget to eat after taking regular insulin.   Send to diabetic education to discuss carb counting and how foods affect his blood sugar.  Add activity log including insulin administration and meals into belle.   Needs dexcom or updated belle with alarms due to hypoglycemia and hypoglycemic unawareness. Pt checking BG 4x daily and doing MDI of insulin.   Up to date with eye exam   On ARB.  Discussed proper foot care.  Counseled pt on low carb/low fat diet and to increase physical activity.  On statin     On Xarelto at baseline.  Will be on warfarin temporarily postoperatively per CT surgery.

## 2024-11-11 NOTE — TELEPHONE ENCOUNTER
Spoke to pt wife, relayed Dr. VALERIA Gomes message. Will have clerical reach out to schedule f/u.

## 2024-11-11 NOTE — PROGRESS NOTES
Progress Note - Cardiac Surgery   Otoniel Martinez 64 y.o. male MRN: [id number]  Unit/Bed#: PPHP-324-01 Encounter: [id number]    Coronary artery disease. S/P coronary artery bypass grafting; POD # 5    24 Hour Events: No events overnight.  No complaints.  Ambulating well.   Tolerating diet.   +BM  Pain controlled.     Medications:   Scheduled Meds:  Current Facility-Administered Medications   Medication Dose Route Frequency Provider Last Rate    acetaminophen  650 mg Oral Q4H PRN Oliva Sullivan PA-C      acetaminophen  650 mg Rectal Q4H PRN Jorge Coelho PA-C      amiodarone  200 mg Oral Q8H [facility] Oliva Sullivan PA-C      aspirin  81 mg Oral Daily Oliva Sullivan PA-C      atorvastatin  80 mg Oral Daily With Dinner Jorge Coelho PA-C      bisacodyl  10 mg Rectal Daily PRN Jorge Coelho PA-C      chlorhexidine  15 mL Mouth/Throat BID Estefania Ramos PA-C      docusate sodium  100 mg Oral BID Oliva Sullivan PA-C      fluticasone  2 spray Nasal Daily Jorge Coelho PA-C      insulin aspart  300 Units Subcutaneous Insulin Pump Daily PRN Shania John MD      lidocaine  2 patch Topical Daily Oliva Sullivan PA-C      magnesium hydroxide  30 mL Oral Daily PRN Oliva Sullivan PA-C      methocarbamol  750 mg Intravenous Q8H PRN Oliva Sullivan PA-C      metoprolol tartrate  12.5 mg Oral Q12H [facility] Oliva Sullivan PA-C      montelukast  10 mg Oral HS Jorge Coelho PA-C      pantoprazole  40 mg Intravenous Q24H [facility] Oliva Sullivan PA-C      patient maintained insulin pump  1 each Subcutaneous Q8H Shania John MD      polyethylene glycol  17 g Oral Daily Jorge Coelho PA-C      potassium chloride  20 mEq Oral BID Estefania Ramos PA-C      temazepam  15 mg Oral HS PRN Oliva Sullivan PA-C      torsemide  20 mg Oral BID Estefania Ramos PA-C       Continuous Infusions:   PRN Meds:.  acetaminophen    acetaminophen    bisacodyl    insulin aspart    magnesium hydroxide     "methocarbamol    temazepam    Vitals:   Vitals:    11/10/24 2330 11/11/24 0334 11/11/24 0649 11/11/24 0651   BP: 121/70 118/79  145/90   Pulse: 70 70  70   Resp:    18   Temp: 98.2 °F (36.8 °C) 98.8 °F (37.1 °C)  98.1 °F (36.7 °C)   TempSrc:       SpO2: 95% 96%  96%   Weight:   65.7 kg (144 lb 13.5 oz)    Height:         Telemetry: NSR; Heart Rate: 74    Respiratory:   SpO2: SpO2: 96 %; Room Air    Intake/Output:     Intake/Output Summary (Last 24 hours) at 11/11/2024 0819  Last data filed at 11/11/2024 0755  Gross per 24 hour   Intake 861.58 ml   Output 1425 ml   Net -563.42 ml        Weights:   Weight (last 2 days)       Date/Time Weight    11/11/24 0649 65.7 (144.84)    11/10/24 0600 69.1 (152.34)    11/09/24 0600 71.7 (158.07)          Results:   Results from last 7 days   Lab Units 11/11/24  0426 11/09/24  0421 11/08/24  0431 11/07/24  0317   WBC Thousand/uL 6.18 5.76 6.07 11.22*   HEMOGLOBIN g/dL 12.0 10.5* 10.7* 11.7*   HEMATOCRIT % 36.7 33.2* 34.0* 36.5   PLATELETS Thousands/uL 181 115*  --  126*     Results from last 7 days   Lab Units 11/11/24  0426 11/09/24  0421 11/08/24  0431 11/06/24  1750 11/06/24  1303   SODIUM mmol/L 139 137 140   < >  --    POTASSIUM mmol/L 3.5 3.9 3.8   < >  --    CHLORIDE mmol/L 95* 102 103   < >  --    CO2 mmol/L 34* 28 31   < >  --    CO2, I-STAT mmol/L  --   --   --   --  24   BUN mg/dL 29* 19 18   < >  --    CREATININE mg/dL 0.62 0.49* 0.52*   < >  --    GLUCOSE, ISTAT mg/dl  --   --   --   --  129   CALCIUM mg/dL 8.6 8.2* 8.2*   < >  --     < > = values in this interval not displayed.     No results for input(s): \"MG\" in the last 72 hours.  Results from last 7 days   Lab Units 11/11/24  0426 11/10/24  0425 11/09/24  0421 11/07/24  0711 11/06/24  1307 11/05/24  0447   INR  3.15* 1.97* 1.69*   < > 1.34*  --    PTT seconds  --   --   --   --  31 63*    < > = values in this interval not displayed.     Date:   INR:  Coumadin Dose:  11/11  3.15    11/10               1.97          "        5  11/9                 1.69                 5  11/8                 1.62                 2.5  11/7                 1.26                 2.5  11/6                 1.34                 2.5    Point of care glucose: 160-265    Studies:  Pa lateral this morning    Invasive Lines/Tubes:  Invasive Devices       Central Venous Catheter Line  Duration             CVC Central Lines 11/06/24 4 days                    Physical Exam:    General: No acute distress, Alert, and Normal appearance  HEENT/NECK:  Normocephalic. Atraumatic.  No jugular venous distention.    Cardiac: Regular rate and rhythm  Pulmonary:  Breath sounds clear bilaterally  Abdomen:  Non-tender, Non-distended, and Normal bowel sounds  Incisions: Sternum is stable.  Incision is clean, dry, and intact.  and Saphenectomy incison is clean, dry, and intact.   Extremities: Extremities warm/dry and No edema B/L  Neuro: Alert and oriented X 3  Skin: Warm/Dry, without rashes or lesions.    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 S/P coronary artery bypass grafting; POD # 5    Plan:    Cardiac:   LVEF 55%, NSR with pre/post bradycardia now s/p PPM 11/8, metoprolol 12.5 mg bid   Anticoagulated for recurrent DVT  INR 3.15, Dose Coumadin (on preoperative NOAC, will stay on coumadin for at minimum 30 days post procedure)  Continue ASA and Statin therapy    Epicardial pacing wires have been removed    Central IV access no longer required;  Remove central venous catheter today    Continue Coumadin for DVT prophylaxis    Pulmonary:     Good Room air oxygen saturation; Continue incentive spirometry/Coughing/Deep breathing exercises    Chest tubes have been discontinued    Renal:     Normal preoperative renal function    Intake/Output net: -563 and many missed voids mL/24 hours    Diuretic Regimen: on torsemide 20 mg bid     Neuro:    Neurologically intact; No active issues     GI:    Controlled carbohydrate diet level two/Cardiac diet, with 1800 mL fluid restriction    Tolerating diet without complaint  + BM postoperatively    Continue stool softeners and prn suppository    Continue GI prophylaxis    Endo: T1DM endocrinology following, has insulin pump with dexcom in place     7    Hematology:     Post-operative blood count acceptable; Trend prn    8.   Disposition:        Anticipated discharge date: possibly today/tomorrow       VTE Pharmacologic Prophylaxis: Warfarin (Coumadin)  VTE Mechanical Prophylaxis: sequential compression device    Collaborative rounds completed with supervising physician  Plan of care discussed with bedside nurse    SIGNATURE: Oliva Sullivan PA-C  DATE: November 11, 2024  TIME: 8:19 AM

## 2024-11-11 NOTE — UTILIZATION REVIEW
Continued Stay Review    Date: 11/10/24                          Current Patient Class: Inpatient  Current Level of Care: Critical Care    HPI:64 y.o. male initially admitted on 10/31/24  Multivessel CAD     Assessment/Plan:  Cath revealed MV disease including distal LM 80% as well as prox to mid Circ 90% and mid RCA 90%. Status post CABG x 3, LIMA to the LAD, SVG to PDA and SVG to OM1.  Continue usual postoperative care. Continue aspirin, statin and beta-blocker. Telemetry currently showing an atrial paced rhythm. Continue ambulation, pain control, GI ppx, bowel regimen. Endocrinology following, pt has been transition to OmniPod insulin pump along with a Dexcom G7 continuous glucose monitor.  Pump orders have been placed.  The blood sugar is improving.  Continue to monitor blood sugar over time and make adjustments to the regimen if necessary.     Medications:   Scheduled Medications:  amiodarone, 200 mg, Oral, Q8H ENRRIQUE  aspirin, 81 mg, Oral, Daily  atorvastatin, 80 mg, Oral, Daily With Dinner  chlorhexidine, 15 mL, Mouth/Throat, BID  docusate sodium, 100 mg, Oral, BID  fluticasone, 2 spray, Nasal, Daily  lidocaine, 2 patch, Topical, Daily  metoprolol tartrate, 12.5 mg, Oral, Q12H ENRRIQUE  montelukast, 10 mg, Oral, HS  pantoprazole, 40 mg, Intravenous, Q24H ENRRIQUE  patient maintained insulin pump, 1 each, Subcutaneous, Q8H  polyethylene glycol, 17 g, Oral, Daily  potassium chloride, 20 mEq, Oral, BID  torsemide, 20 mg, Oral, BID      Continuous IV Infusions: none     PRN Meds:  acetaminophen, 650 mg, Oral, Q4H PRN  acetaminophen, 650 mg, Rectal, Q4H PRN  bisacodyl, 10 mg, Rectal, Daily PRN  insulin aspart, 300 Units, Subcutaneous Insulin Pump, Daily PRN  magnesium hydroxide, 30 mL, Oral, Daily PRN  methocarbamol, 750 mg, Intravenous, Q8H PRN  temazepam, 15 mg, Oral, HS PRN      Discharge Plan: tbd    Vital Signs (last 3 days)       Date/Time Temp Pulse Resp BP MAP (mmHg) SpO2 Calculated FIO2 (%) - Nasal Cannula Nasal  Cannula O2 Flow Rate (L/min) O2 Device Patient Position - Orthostatic VS [place] Coma Scale Score Pain    11/11/24 0849 -- -- -- -- -- -- -- -- -- -- -- 7    11/11/24 08:39:21 -- 71 -- 125/80 95 92 % -- -- -- -- -- --    11/11/24 0800 -- -- -- -- -- 93 % -- -- None (Room air) -- 15 7    11/11/24 06:51:54 98.1 °F (36.7 °C) 70 18 145/90 108 96 % -- -- -- -- -- --    11/11/24 03:34:12 98.8 °F (37.1 °C) 70 -- 118/79 92 96 % -- -- -- -- -- --    11/10/24 23:30:13 98.2 °F (36.8 °C) 70 -- 121/70 87 95 % 32 3 L/min Nasal cannula -- -- --    11/10/24 21:34:48 -- 71 -- 121/81 94 96 % -- -- -- -- -- --    11/10/24 2100 -- -- -- -- -- 97 % -- -- None (Room air) -- 15 2    11/10/24 19:25:51 98.6 °F (37 °C) 70 -- 105/64 78 87 % -- -- -- -- -- --    11/10/24 15:42:47 98.3 °F (36.8 °C) 71 -- 141/90 107 91 % -- -- -- -- -- --    11/10/24 0900 -- -- -- -- -- 92 % 20 0 L/min None (Room air) -- -- --    11/10/24 0800 -- -- -- -- -- 94 % -- -- None (Room air) -- -- No Pain    11/10/24 07:28:28 98 °F (36.7 °C) 70 -- 129/85 100 94 % -- -- -- -- -- --    11/10/24 01:57:02 98.4 °F (36.9 °C) 70 14 119/73 88 92 % -- -- -- -- -- --    11/09/24 22:41:41 98.6 °F (37 °C) 70 -- 109/69 82 95 % -- -- -- -- -- --    11/09/24 2200 -- -- -- -- -- -- 32 3 L/min Nasal cannula -- -- --    11/09/24 21:26:30 -- 71 -- 116/75 89 86 % -- -- None (Room air) -- -- --    11/09/24 2015 -- -- -- -- -- -- -- -- -- -- 15 No Pain    11/09/24 18:54:08 98.9 °F (37.2 °C) 73 -- 121/76 91 90 % -- -- -- -- -- --    11/09/24 16:02:58 99 °F (37.2 °C) 70 -- 117/76 90 91 % -- -- -- -- -- --    11/09/24 11:12:45 -- 69 -- 105/69 81 90 % -- -- -- -- -- --    11/09/24 0800 -- -- -- -- -- 95 % -- -- None (Room air) -- -- 2    11/09/24 07:04:08 98.7 °F (37.1 °C) 72 -- 114/73 87 -- -- -- -- -- -- --    11/09/24 02:21:06 99.7 °F (37.6 °C) 70 14 112/72 85 93 % -- -- -- -- -- --    11/08/24 21:54:55 99 °F (37.2 °C) 65 18 120/71 87 95 % -- -- -- -- -- --    11/08/24 2027 -- 81 -- 131/70  -- -- -- -- -- -- -- --    11/08/24 20:26:30 -- 81 -- 131/70 90 95 % 28 2 L/min Nasal cannula -- 15 4    11/08/24 1959 -- -- -- -- -- 96 % -- -- -- -- -- --    11/08/24 18:50:39 99.7 °F (37.6 °C) 76 16 117/69 85 95 % -- -- -- -- -- --    11/08/24 1800 -- 85 -- 102/80 87 95 % 28 2 L/min Nasal cannula -- -- --    11/08/24 1700 -- 78 -- 131/74 93 96 % 28 2 L/min Nasal cannula -- -- --    11/08/24 1645 -- 76 -- 124/72 89 95 % 28 2 L/min Nasal cannula -- -- --    11/08/24 1615 -- 70 -- 107/63 78 97 % 28 2 L/min Nasal cannula -- -- --    11/08/24 1600 -- 70 -- 113/66 82 97 % 28 2 L/min Nasal cannula -- -- --    11/08/24 1545 -- 69 -- 110/65 80 95 % 28 2 L/min Nasal cannula Lying -- --    11/08/24 15:39:17 98.6 °F (37 °C) 70 -- 107/63 78 95 % -- -- -- -- -- --    11/08/24 1530 98.6 °F (37 °C) 73 18 107/63 78 95 % 28 2 L/min Nasal cannula Lying -- --    11/08/24 1515 -- 70 18 123/73 90 96 % 28 2 L/min Nasal cannula Lying -- --    11/08/24 1500 -- 70 18 122/75 91 95 % 28 2 L/min Nasal cannula Lying -- --    11/08/24 1445 -- 70 18 121/78 92 -- -- -- -- Lying -- --    11/08/24 1430 -- 72 18 118/72 87 94 % 28 2 L/min Nasal cannula Lying -- --    11/08/24 1423 -- -- -- -- -- 94 % 28 2 L/min Nasal cannula -- -- --    11/08/24 1415 -- 74 18 158/80 106 -- -- -- -- Lying -- --    11/08/24 14:13:20 -- 74 -- 158/80 106 81 % -- -- -- -- -- --    11/08/24 10:49:57 98.4 °F (36.9 °C) 64 -- 112/64 80 95 % -- -- -- -- -- --    11/08/24 10:37:12 -- 69 -- 112/64 80 96 % -- -- -- -- -- --    11/08/24 0800 -- -- -- -- -- -- -- -- -- -- 15 5    11/08/24 06:54:01 99 °F (37.2 °C) 73 -- 105/61 76 90 % -- -- -- -- -- --    11/08/24 01:59:44 97.9 °F (36.6 °C) 79 20 124/74 91 97 % -- -- -- -- -- --    11/08/24 0058 -- -- -- -- -- -- -- -- -- -- -- 6          Weight (last 2 days)       Date/Time Weight    11/11/24 0649 65.7 (144.84)    11/10/24 0600 69.1 (152.34)    11/09/24 0600 71.7 (158.07)            Pertinent Labs/Diagnostic Results:    Radiology:  CT chest wo contrast   Final Interpretation by Rudolph Faria MD (11/10 7822)      Since September 2024 there is a new small left pneumothorax of approximately 10%. This is probably related to recent surgery.      New small bilateral pleural effusions with probable minimal left pleural hematoma.      Pneumomediastinum and retrosternal mediastinal edema. Findings probably secondary to recent cardiac surgery.      New bibasilar atelectasis.      Minimal pneumoperitoneum in the upper abdomen probably related to dissection of gas from the chest into the abdomen.      Left chest wall gas probably secondary to the recent surgery.      Interval resolution of previously demonstrated pulmonary opacity along the anterior left upper lobe that was probably infectious/inflammatory in etiology.      The study was marked in EPIC for immediate notification.         Workstation performed: [id number]         CT abdomen pelvis wo contrast   Final Interpretation by Rudolph Faria MD (11/10 1230)      Small left-sided pneumothorax of at least 10%.      Small amount of pneumomediastinum with retrosternal fluid in the mediastinum probably secondary to recent surgery      Small bilateral pleural effusions with bibasilar atelectasis.      Small amount of pneumoperitoneum. No evidence of viscus injury. This probably is secondary to dissection of gas from the chest.      Unchanged rim calcified lesion in the region of the left adrenal gland. Finding may represent a nonspecific rim calcified adrenal mass or saccular aneurysm from the splenic or adrenal artery. This finding is unchanged compared to PET/CT from 8/1/2024.    Continued CT follow-up recommended in 6 to 12 months.      Workstation performed: [id number]         XR chest portable   Final Interpretation by Dayan Alegre MD (11/10 9051)      Left chest tube removal with bibasilar atelectasis with no pneumothorax. Trace left pleural effusion.      Persistent  mild pneumoperitoneum. Correlate with clinical signs and symptoms. Follow-up with abdominal CT if clinically warranted.      The study was marked in EPIC for immediate notification.            Workstation performed: [id number]         XR chest 2 views   Final Interpretation by Kervin Steel MD (11/09 1214)      Unchanged left basilar atelectasis and small left pleural effusion.      Small pneumothorax with left chest tube in place.      Small amount of intraperitoneal air under the right hemidiaphragm, not visible on 11/8/2024, but present on 11/7/2024. This can be expected recent postop findings after CABG. Please correlate with any abdominal symptoms however, and reassess on follow-up    chest x-rays.      Support devices as above.            Resident: Sonido Fragoso I, the attending radiologist, have reviewed the images and agree with the final report above.      Workstation performed: [id number]         XR chest portable   Final Interpretation by Nunu Sterling MD (11/08 2032)      Probable left lung base atelectasis. Lungs are otherwise clear.      Low suspicion for small left apical pneumothorax. Recommend attention on surveillance radiograph imaging.            Workstation performed: [id number]         XR chest portable   Final Interpretation by Fatemeh Nixon MD (11/07 1129)      Expected radiographic appearance in a patient who has recently undergone cardiac surgery.      Left basilar opacity which may be due to combination of small effusion/atelectasis, improved      Resident: Reilly Euceda I, the attending radiologist, have reviewed the images and agree with the final report above.      Workstation performed: [id number]         XR chest portable ICU   Final Interpretation by Dominguez Segovia MD (11/06 2548)      Postop findings as noted with some atelectasis in the left lung and suspicion for tiny left-sided pneumothorax visible along the margin of the fissure. Attention on  continued follow-up is reasonable.            Workstation performed: [id number]         VAS VEIN MAPPING- LOWER EXTREMITY   Final Interpretation by Wyatt Oneil DO (10/31 2133)      XR chest pa & lateral   Final Interpretation by Beba Ruth MD ([id number])   No acute cardiopulmonary disease.            Workstation performed: [id number]         XR chest pa & lateral    (Results Pending)     Cardiology:  ECG 12 lead   Final Result by Jc Adrian MD ([id number])   Atrial-paced rhythm   Abnormal ECG   When compared with ECG of 2024 00:42,   Electronic atrial pacemaker has replaced Sinus rhythm   Confirmed by Jc Adrian ([id number]) on 2024 9:24:26 AM      Cardiac ep lab eps/ablations   Final Result by Frankie Valdez DO ([id number])   Images from the original result were not included.   ELECTROPHYSIOLOGY   OPERATIVE REPORT   PATIENT NAME: Otoniel Martinez   :  1960   MRN: [id number]   Date of surgery: 24   Surgeon: Frankie Valdez DO   Pt Location:  Cardiac Electrophysiology Laboratory      PROCEDURE PERFORMED:    1)Dual Chamber Permanent Pacemaker Implantation         Preoperative Medications: ancef   ANESTHESIA: general      PREOPERATIVE DIAGNOSIS:    Symptomatic sick sinus syndrome         POSTOPERATIVE DIAGNOSIS: Successful Dual Chamber Permanent Pacemaker    implant.  Same as Preop.       Informed Consent: Risks, benefits, and alternatives discussed with patient    and any family present. The patient understands risks, which include but    are not limited to life threatening  bleeding, infection, air around    lungs, blood around the heart and reoperation dislodged or malfunctioning    device.       Procedure Description:   After informed consent was obtained, the patient was brought to the    electrophysiology laboratory NPO. A time out was called and the patient    was properly identified. The patient was pre-medicated as above. The left    infraclavicular area was prepped and  draped in the usual sterile fashion.    After local anesthetic infiltration with 1% lidocaine, an incision was    made below clavicle. The incision was extended down to the level of the    pectoral fascia. A pocket was formed above the pectoral fascia using    cautery and blunt dissection. axillary approach was done. A safe sheath    introducer was advanced over a wire into the axillary vein, the wire was    confirmed to be in the right atrium on flouroscopy, the wire was removed.    Through the introducer the pacing lead was passed into the right heart.    Under fluoroscopic guidance the right ventricular lead was positioned on    the right ventricular septum. After satisfactory ventricular sensing and    pacing thresholds were confirmed, the lead was sewn to the pectoral    fascia/muscle using 0 silk sutures.       The right atrial lead was placed in the right atrial appendage with    similar fashion using a preformed J stylet, the helix was deployed, tissue    contact was confirmed and good lead parameters were seen, the Safe-sheath    was removed and the lead was tied down with 0 silk sutures to the muscle.       Pocket flushed with antibiotic solution.      Device placed in an antibiotic pouch.        The lead and pulse generator were placed in the pre-pectoral pocket. The    pocket was then closed with 3 layers of 2-0, 3-0 Vicryl 4-0 Monocryl    suture was used to close the skin.       Sterile dressing applied.         EBL: Minimal   Complications: None   Contrast:10 cc   Findings: sinus bradycardia now atrially paced      The patient tolerated the procedure well.      Plan: Routine postoperative care and CXR. Follow-up in 2 weeks with    incision check and interrogation.              ECG 12 lead   Final Result by Jc Adrian MD (11/08 7060)   Normal sinus rhythm   Normal ECG   When compared with ECG of 07-Nov-2024 05:39,   No significant change was found   Confirmed by Jc Adrian ([id number]) on 11/8/2024  8:51:05 AM      Echo follow up/limited w/ contrast if indicated   Final Result by Andrade Mart MD (11/07 1534)        Left Ventricle: Left ventricular cavity size is normal. Wall thickness    is mildly increased. There is mild concentric hypertrophy. The left    ventricular ejection fraction is 58%. Systolic function is normal.    Diastolic function is mildly abnormal, consistent with grade I (abnormal)    relaxation.     The following segments are hypokinetic: basal inferolateral and mid    inferolateral.     All other segments are normal.     IVS: There is abnormal septal motion consistent with post-operative    status.     Right Ventricle: Right ventricular cavity size is normal. Systolic    function is normal.     Aorta: The aortic root is normal in size. The ascending aorta is mildly    dilated.         ECG 12 lead   Final Result by Edward Zhang MD (11/07 0956)   Normal sinus rhythm   Inferior infarct , age undetermined   Abnormal ECG   When compared with ECG of 07-Nov-2024 00:23, (unconfirmed)   Nonspecific T wave abnormality now evident in Lateral leads   Confirmed by Edward Zhang ([id number]) on 11/7/2024 9:56:40 AM      ECG 12 lead   Final Result by Edward Zhang MD (11/07 0955)   Normal sinus rhythm   Nonspecific ST abnormality   Abnormal ECG   When compared with ECG of 06-Nov-2024 12:59,   DE interval has decreased   Vent. rate has increased by  28 bpm   Right bundle branch block is no longer Present   Confirmed by Edward Zhang ([id number]) on 11/7/2024 9:55:43 AM      ECG 12 lead   Final Result by Jc Adrian MD (11/06 1300)   Sinus bradycardia with 1st degree A-V block   Right bundle branch block   Abnormal ECG   When compared with ECG of 01-Nov-2024 09:53,   DE interval has increased      Confirmed by Jc Adrian ([id number]) on 11/6/2024 1:00:31 PM      ECG 12 lead   Final Result by Rodger Mcfarland MD (11/01 1016)   Sinus bradycardia   Minimal voltage criteria for LVH, may be normal  variant   Borderline ECG   When compared with ECG of 31-OCT-2024 07:41,   No significant change was found   Confirmed by Rodger Mcfarland ([id number]) on 11/1/2024 10:16:43 AM      Echo complete w/ contrast if indicated   Final Result by Andrade Mart MD (10/31 0033)        Left Ventricle: Left ventricular cavity size is normal. Wall thickness    is mildly increased. There is mild concentric hypertrophy. The left    ventricular ejection fraction is 55%. Systolic function is normal.    Diastolic function is mildly abnormal, consistent with grade I (abnormal)    relaxation.     The following segments are hypokinetic: basal inferolateral and mid    inferolateral.     All other segments are normal.     Right Ventricle: Right ventricular cavity size is normal. Systolic    function is normal.     Aorta: The aortic root is normal in size. The ascending aorta is mildly    dilated. Ao root is 3.10 cm. Asc Ao is 3.8 cm.         Cardiac catheterization   Final Result by Ky Sandoval MD (10/31 9742)        Dist LM lesion is 80% stenosed.     Prox Cx to Mid Cx lesion is 90% stenosed.     Mid RCA lesion is 90% stenosed.      Significant left main, circumflex and mid RCA disease in a diabetic    patient.         ECG 12 lead   Final Result by Jamar Mosley MD (10/31 9609)   Sinus bradycardia   Minimal voltage criteria for LVH, may be normal variant   Borderline ECG   No previous ECGs available   Confirmed by Jamar Mosley ([id number]) on 10/31/2024 10:49:10 PM        GI:  No orders to display           Results from last 7 days   Lab Units 11/11/24  0426 11/09/24  0421 11/08/24  0431 11/07/24  0317 11/06/24  2004 11/06/24  0854 11/06/24  0411   WBC Thousand/uL 6.18 5.76 6.07 11.22*  --   --  3.72*   HEMOGLOBIN g/dL 12.0 10.5* 10.7* 11.7* 11.0*   < > 12.1   I STAT HEMOGLOBIN   --   --   --   --   --    < >  --    HEMATOCRIT % 36.7 33.2* 34.0* 36.5 33.9*   < > 37.0   HEMATOCRIT, ISTAT   --   --   --   --   --    < >  --     PLATELETS Thousands/uL 181 115*  --  126*  --    < > 127*   TOTAL NEUT ABS Thousands/µL  --   --   --   --   --   --  2.25    < > = values in this interval not displayed.         Results from last 7 days   Lab Units 11/11/24  0426 11/09/24  0421 11/08/24  0431 11/07/24  0340 11/06/24  2004 11/06/24  1750 11/06/24  1303 11/06/24  1259 11/06/24  1216 11/06/24  1136 11/06/24  1119 11/06/24  1054   SODIUM mmol/L 139 137 140 138  --   --   --  140  --   --   --   --    POTASSIUM mmol/L 3.5 3.9 3.8 4.4 4.6   < >  --  3.9  --   --   --   --    CHLORIDE mmol/L 95* 102 103 108  --   --   --  111*  --   --   --   --    CO2 mmol/L 34* 28 31 22  --   --   --  22  --   --   --   --    CO2, I-STAT mmol/L  --   --   --   --   --   --  24  --  24 28 28 28   ANION GAP mmol/L 10 7 6 8  --   --   --  7  --   --   --   --    BUN mg/dL 29* 19 18 16  --   --   --  18  --   --   --   --    CREATININE mg/dL 0.62 0.49* 0.52* 0.57*  --   --   --  0.55*  --   --   --   --    EGFR ml/min/1.73sq m 104 115 112 108  --   --   --  110  --   --   --   --    CALCIUM mg/dL 8.6 8.2* 8.2* 7.7*  --   --   --  7.2*  --   --   --   --    CALCIUM, IONIZED, ISTAT mmol/L  --   --   --   --   --   --  1.13  --  1.19 1.02* 1.05* 1.00*   MAGNESIUM mg/dL  --   --  1.9 2.2  --   --   --   --   --   --   --   --     < > = values in this interval not displayed.         Results from last 7 days   Lab Units 11/11/24  1039 11/11/24  0650 11/10/24  2046 11/10/24  1540 11/10/24  1306 11/10/24  1202 11/10/24  1002 11/10/24  0806 11/10/24  0547 11/10/24  0421 11/10/24  0155 11/09/24  2345   POC GLUCOSE mg/dl 372* 160* 238* 225* 195* 265* 381* 277* 197* 178* 96 135     Results from last 7 days   Lab Units 11/11/24  0426 11/09/24  0421 11/08/24  0431 11/07/24  0340 11/06/24  1259 11/06/24  0411 11/05/24  1349 11/05/24  0447   GLUCOSE RANDOM mg/dL 142* 127 150* 202* 130 70 439* 139     Results from last 7 days   Lab Units 11/06/24  1303 11/06/24  1216 11/06/24  1136  11/06/24  1119 11/06/24  1054 11/06/24  0854   PH, MAKENZIE I-STAT  7.333  --   --  7.300  --  7.308   PCO2, MAKENZIE ISTAT mm HG 43.3  --   --  52.9*  --  51.5*   PO2, MAKENZIE ISTAT mm .0*  --   --  58.0*  --  46.0*   HCO3, MAKENZIE ISTAT mmol/L 23.0*  --   --  26.0  --  25.8   I STAT BASE EXC mmol/L -3* -2 1 -1 0 -1   I STAT O2 SAT % 99* 95* 100* 86* 100* 77   ISTAT PH ART   --  7.374 7.357  --  7.327*  --    I STAT ART PCO2 mm HG  --  39.0 46.5*  --  49.9*  --    I STAT ART PO2 mm HG  --  80.0 362.0*  --  383.0*  --    I STAT ART HCO3 mmol/L  --  22.7 26.1  --  26.1  --      Results from last 7 days   Lab Units 11/11/24  0426 11/10/24  0425 11/09/24  0421 11/07/24  0711 11/06/24  1307 11/05/24  0447   PROTIME seconds 32.0* 22.5* 20.0*   < > 16.9*  --    INR  3.15* 1.97* 1.69*   < > 1.34*  --    PTT seconds  --   --   --   --  31 63*    < > = values in this interval not displayed.     Results from last 7 days   Lab Units 11/07/24  0739   UNIT PRODUCT CODE  [id number]  [id number]  [id number]  [id number]   UNIT NUMBER  [id number]  [id number]  [id number]  [id number]   UNITABO  A  A  A  A   UNITRH  POS  POS  POS  POS   CROSSMATCH  Compatible  Compatible  Compatible  Compatible   UNIT DISPENSE STATUS  Return to Inv  Return to Inv  Return to Inv  Return to Inv   UNIT PRODUCT VOL mL 350  350  350  350     Network Utilization Review Department  ATTENTION: Please call with any questions or concerns to [phone] and carefully listen to the prompts so that you are directed to the right person. All voicemails are confidential.   For Discharge needs, contact Care Management DC Support Team at [phone] opt. [phone]  Send all requests for admission clinical reviews, approved or denied determinations and any other requests to dedicated fax number below belonging to the campus where the patient is receiving treatment. List of dedicated fax numbers for the Facilities:  FACILITY NAME UR FAX NUMBER   ADMISSION  DENIALS (Administrative/Medical Necessity) [phone]   DISCHARGE SUPPORT TEAM (NETWORK) [phone]   PARENT CHILD HEALTH (Maternity/NICU/Pediatrics) [phone]   [facility] [phone]   [facility] [phone]   [facility] [phone]   [facility] [phone]   Count includes the [facility] [phone]   [facility] [phone]   [facility] [phone]   [facility] [phone]   [facility] [phone]   [facility] [phone]   [facility] [phone]   [facility] [phone]

## 2024-11-11 NOTE — DISCHARGE SUMMARY
Discharge Summary - Cardiac Surgery   Otoniel Martinez 64 y.o. male MRN: [id number]  Unit/Bed#: PPHP-324-01 Encounter: [id number]    Admission Date: 10/31/2024     Discharge Date: 11/11/24    Admitting Diagnosis: LOJA (dyspnea on exertion) [R06.09]    Primary Discharge Diagnosis:   Multivessel coronary artery disease, Left main coronary artery disease, unstable angina S/P Coronary artery bypass grafting x 3 with left internal mammary artery to left anterior descending, saphenous vein graft to right posterior descending artery, saphenous vein graft to obtuse marginal 1.     Secondary Discharge Diagnosis:   CAD/old MI s/p PCI/RCA stenting in 2004, History of DVT on Xarelto, splenic artery aneurysm, KAMI DM1 on insulin pump A1c 8.4, polyneuropathy, chronic gastritis, myotonic dystrophy,  short term memory loss, dysphagia/dysphonia, GERD,  hypertension, hyperlipidemia, lymphoplasmacytic lymphoma, monoclonal gammopathy, splenomegaly, chronic mild thrombocytopenia (plts 112k-137k), chronic mild leukopenia, hospitalized 2/2024 with acute hypoxic respiratory failure with pneumonia, chronic bronchitis (normal PFTs), pulmonary nodule KALEB FDG avid (under surveillance by pulm, may need biopsy), OA/DJD s/p right hip replacement 2022, pancreatitis x 3, KULDIP not on CPAP, old CVA, hx of mireille, inguinal hernia repair, ORIF left forearm fracture, bone marrow biopsy 8/27/24, hx tonsils/adenoids    Attending: Rodger Rodriguez D.O.    Consulting Physician(s):   Endocrinology  Medical critical care  Hematology oncology  Electrophysiology    Procedures Performed:   Procedure(s):  Cardiac pacer implant     Coronary artery bypass grafting x 3 with left internal mammary artery to left anterior descending, saphenous vein graft to right posterior descending artery, saphenous vein graft to obtuse marginal 1.     Hospital Course:   10/31: 65 yo M with significant PMH as above. Has had chronic LOJA/SOB. Has chronic bronchitis on inhalers and cough.  Follows with cardiology and pulmonary. Had hospitalization 2/2024 with pneumonia causing hypoxic respiratory failure. Echo 2/2024 with normal LV/RV function, no valve disease. Had ongoing LOJA/SOB. Also had hematological workup for splenomegaly and thrombocytopenia and monoclonal gammopathy which lead to bone marrow biopsy and diagnosis of lymphoplasmacytic lymphoma. Was considered asymptomatic, no treatment needed per heme/onc.  Stress test done 9/4/24 without significant findings except paradoxical perfusion defect. Cardiac cath scheduled electively given hx of CAD and ongoing symptoms of LOJA/SOB, but without CP/angina. Cardiac cath 10/31 shows severe LV/MVCAD. Patient admitted after cath for cardiac surgery consult for CABG eval.  PM: CABG Wednesday 11/1: RRT this morning for bradycardia rate as low as 28 with dropped complexes, was presyncopal and overall nausea.  No syncope. Cardiology at the bedside. Heme/Onc consult for his PMH.      11/2: Seen by heme/onc, cleared for surgery, no recs. Seen by EP, planning for PPM. Labs and vitals stable, but SB 40s-50s. On IV heparin (off xarelto for DVT hx). Discussed with EP regarding PPM plans prior to CABG or after. Decision made for PPM now after CABG.      11/3: SB 50s, no events. Labs and vitals stable. On IV heparin. EP planning for PPM after CABG.      11/4: No events or complaints, remains on heparin gtt. Consent obtained. MRSA neg.      11/5: orders in, ready for OR tomorrow     11/6:  CABG x3.  No significant postoperative bleeding.  Transferred to ICU supported with mohan 20.  Wean towards extubation. V-paced for SB 40s. 2.5mg coumadin given (on xarelto preop for hx of recurrent DVT)     11/7: SR 60s-70s, no pacing requirements overnight. Mohan off. On 1LNC, CI > 2.2, delined. Hgb 11.7,  plts 126k (baseline range 112k-137k), Cr 0.57. net + 1.5L. Start lasix 40 IV BID, hold BB. Was seen by EP preop for bradycardia and poss PPM placement,  poss D/C pacer wires vs  keep. Coumadin 2.5mg in evening. Keep CTs. Continue insulin gtt, has type 1 DM, endo following. Transfer to tele.  PM: seen by EP, plan for PPM tomorrow, to continue dosing coumadin, keep PW until after PPM done tomorrow. Hypertensive, start Lisinopril 5mg daily.     11/8: NSR. 60-70s. Pre op bradycardia, PPM today. Restarted BB after and D/c PWs. Keep CTS today for high output. INR 1.62, cont coumadin. Wean O2. Dysphagia, speech eval. Keep on iv lasix. Labs stable. Good UO. Ambulating. Home over weekend     11/9: s/p PPM. A paced 70s. Cont lopresor 12.5, started after PPM yesterday. PW out. D/c CTS. WEan O2. INR 1.69, coumadin 5 mg. Change to torsemide today. Speech following, regualr diet ok. -1.3L, Cr 0.49, Hgb 10.5. Endo following, on insulin gtt, can transition to insulin pump.      11/10: A paced 70. Cont lopressor 12.5 bid. Transition to insulin pump for type 1 DM. INR 1.97 cont coumadin. Cont torsemide QD. CXR yesterday shows ?free air under diaphragm. Ab soft nontender, +BM, check CT scan to be sure. Ambulating well. CT ab looks ok but shows PTX, check CT chest. CXR in AM.     11/11: NSR on RA. Labs stable. No complaints. +BM, no abdominal pain.  No noted free air or PTX on CXR, cleared by Dr Rodriguez for home will f/u CXR at time of outpatient appt in the office.     Condition at Discharge:   good     Discharge Physical Exam:    Please see the documented physical exam from this morning's progress note for details.    Discharge Data:  Results from last 7 days   Lab Units 11/11/24  0426 11/09/24  0421 11/08/24  0431 11/07/24  0317   WBC Thousand/uL 6.18 5.76 6.07 11.22*   HEMOGLOBIN g/dL 12.0 10.5* 10.7* 11.7*   HEMATOCRIT % 36.7 33.2* 34.0* 36.5   PLATELETS Thousands/uL 181 115*  --  126*     Results from last 7 days   Lab Units 11/11/24  0426 11/09/24  0421 11/08/24  0431 11/06/24  1750 11/06/24  1303   POTASSIUM mmol/L 3.5 3.9 3.8   < >  --    CHLORIDE mmol/L 95* 102 103   < >  --    CO2 mmol/L 34* 28 31   <  >  --    CO2, I-STAT mmol/L  --   --   --   --  24   BUN mg/dL 29* 19 18   < >  --    CREATININE mg/dL 0.62 0.49* 0.52*   < >  --    GLUCOSE, ISTAT mg/dl  --   --   --   --  129   CALCIUM mg/dL 8.6 8.2* 8.2*   < >  --     < > = values in this interval not displayed.     Results from last 7 days   Lab Units 11/11/24  0426 11/10/24  0425 11/09/24  0421 11/07/24  0711 11/06/24  1307 11/05/24  0447   INR  3.15* 1.97* 1.69*   < > 1.34*  --    PTT seconds  --   --   --   --  31 63*    < > = values in this interval not displayed.       Discharge instructions/Information to patient and family:   See after visit summary for information provided to patient and family.      Otoniel Martinez was educated on restrictions regarding driving and lifting, and techniques of proper incisional care.  They were specifically counselled on signs and symptoms of an incisional infection, and advised to contact our service immediately should they develop fevers, sweats, chill, redness or drainage at the site of any incisions.    Provisions for Follow-Up Care:  See after visit summary for information related to follow-up care and any pertinent home health orders.      Disposition:  Home    Planned Readmission:   No    Discharge Medications:  See after visit summary for reconciled discharge medications provided to patient and family.      Otoniel Martinez was provided contact information and scheduled a follow up appointment with Rodger Rodriguez D.O.  Additionally, follow up appointments have been scheduled for their primary care physician and primary cardiologist.  Contact information was provided.    Otoniel Martinez was counseled on the importance of avoiding tobacco products.  As with all patients whom have undergone open heart surgery, tobacco cessation medication was contraindicated at the time of discharge.     ACE/ARB was Contraindicated secondary to hypotension    Beta Blocker was Prescribed at discharge    Aspirin was Prescribed at  discharge    Statin was Prescribed at discharge    Otoniel Martinez is being discharged on anticoagulation therapy for treatment of DVT.  Prior to this surgical admission they were anticoagulated with xarelto.   As the novel anticoagulants are not reversible, they are held in our open heart patients for a period of three months, should they require reoperation.  Consequently the patient will be discharged on Coumadin with an INR goal of 2 to 3.   Arrangements have been made for the [facility] to monitor INR levels and provide dosing instructions. Their office was notified by Epic messaging, which itemized the patient’s daily INR’s and correlating Coumadin doses during their hospitalization.  After a period of one to three months, the patient can return to their preoperative anticoagulation regimen.    Otoniel Martinez has been prescribed Coumadin, 1 mg tabs, with 30 tablets being dispensed.  They have been advised to take 0 mg daily, unless otherwise directed.  Follow up PT/INR will be ordered at the discretion of the Coumadin clinic.        No narcotics at d/c  No diuretics at d/c needed, he is euvolemic on examination. He will weight himself daily and call with weight increases.    The patient was informed that following their postoperative surgical evaluation, they will be referred to outpatient cardiac rehabilitation.  They were counseled that this program is run by specialists who will help them safely strengthen their heart and prevent more heart disease.  Cardiac rehabilitation will include exercise, relaxation, stress management, and heart-healthy nutrition.  Caregivers will also check to make sure their medication regimen is working.    During this admission, the patient was questioned on their use of tobacco, alcohol, and illicit/non-prescription drug use in the  previous 24 months. Otoniel Martinez states that they have not used any of these substances in this time frame.    I spent 30 minutes  discharging the patient. This time was spent on the day of discharge. I had direct contact with the patient on the day of discharge. Additional documentation is required if more than 30 minutes were spent on discharge.     SIGNATURE: Oliva Sullivan PA-C  DATE: November 11, 2024  TIME: 11:11 AM

## 2024-11-11 NOTE — PROGRESS NOTES
Progress Note - Cardiology   Name: Otoniel Martinez 64 y.o. male I MRN: [id number]  Unit/Bed#: PPHP-324-01 I Date of Admission: 10/31/2024   Date of Service: 11/11/2024 I Hospital Day: 11     Assessment & Plan  Multivessel CAD  Pt with prior MI and RCA stenting 2004;  Presented for elective cardiac catheterization on 10/31 due to ongoing dyspnea on exertion.   Cath revealed MV disease including distal LM 80% as well as prox to mid Circ 90% and mid RCA 90%  Status post CABG x 3, LIMA to the LAD, SVG to PDA and SVG to OM1.  Postoperative day 5.    Continue usual postoperative care. Continue aspirin, statin and beta-blocker.  Outpatient follow up has been scheduled for 11/21.   Symptomatic sinus bradycardia  Sinus bradycardia earlier on in admission.  Evaluated by EP and patient is now status post permanent pacemaker.  Will follow-up in the device clinic.   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   Mixed hyperlipidemia  Continue statin; atorvastatin 80mg daily  History of recurrent deep vein thrombosis (DVT)  On Xarelto at baseline.  Will be on warfarin temporarily postoperatively per CT surgery.  S/P CABG (coronary artery bypass graft)      Subjective     Patient seen and evaluated. Doing well overall with no active cardiopulmonary complaints.     Objective :  Temp:  [98.1 °F (36.7 °C)-98.8 °F (37.1 °C)] 98.1 °F (36.7 °C)  HR:  [70-71] 71  BP: (105-145)/(64-90) 125/80  Resp:  [18] 18  SpO2:  [87 %-97 %] 92 %  O2 Device: Nasal cannula  Nasal Cannula O2 Flow Rate (L/min):  [3 L/min] 3 L/min  Orthostatic Blood Pressures      Flowsheet Row Most Recent Value   Blood Pressure 125/80 filed at 11/11/2024 0839    Patient Position - Orthostatic VS Lying filed at 11/08/2024 1545          First Weight: Weight - Scale: 73 kg (161 lb) (10/31/24 0700)  Vitals:    11/10/24 0600 11/11/24 0649   Weight: 69.1 kg (152 lb 5.4 oz) 65.7 kg (144 lb 13.5 oz)     Physical Exam  Physical Examination:  Gen: A&Ox3  HEENT: NC/AT, no JVD  CVS: RRR, S1S2 audible and w/o m/r/g  Resp: Clear to ascultation bilaterally, no wheeze, rales, ronchi.   Abd: Soft, non-tender, non-distended.   MSK: Trace edema      Lab Results: I have reviewed the following results:  Results from last 7 days   Lab Units 11/11/24 0426 11/09/24 0421 11/08/24 0431 11/07/24  0317   WBC Thousand/uL 6.18 5.76 6.07 11.22*   HEMOGLOBIN g/dL 12.0 10.5* 10.7* 11.7*   HEMATOCRIT % 36.7 33.2* 34.0* 36.5   PLATELETS Thousands/uL 181 115*  --  126*     Results from last 7 days   Lab Units 11/11/24 0426 11/09/24 0421 11/08/24  0431 11/06/24  1750 11/06/24  1303   POTASSIUM mmol/L 3.5 3.9 3.8   < >  --    CHLORIDE mmol/L 95* 102 103   < >  --    CO2 mmol/L 34* 28 31   < >  --    CO2, I-STAT mmol/L  --   --   --   --  24   BUN mg/dL 29* 19 18   < >  --    CREATININE mg/dL 0.62 0.49* 0.52*   < >  --    GLUCOSE, ISTAT mg/dl  --   --   --   --  129   CALCIUM mg/dL 8.6 8.2* 8.2*   < >  --     < > = values in this interval not displayed.     Results from last 7 days   Lab Units 11/11/24  0426 11/10/24  0425 11/09/24  0421 11/07/24  0711 11/06/24  1307 11/05/24  0447   INR  3.15* 1.97* 1.69*   < > 1.34*  --    PTT seconds  --   --   --   --  31 63*    < > = values in this interval not displayed.     Lab Results   Component Value Date    HGBA1C 8.4 (H) 10/31/2024     Lab Results   Component Value Date    CKTOTAL 344 (H) 01/09/2018    TROPONINI <0.02 03/22/2020       Imaging Results Review: I reviewed radiology reports from this admission including: xray(s) and Echocardiogram.  Other Study Results Review: EKG was reviewed.   EKG personally reviewed: Sinus bradycardia.     ECHO 11/7/24:     Left Ventricle: Left ventricular cavity size is normal. Wall thickness is mildly increased. There is mild concentric hypertrophy. The left ventricular ejection fraction is 58%. Systolic function is normal. Diastolic function is mildly abnormal, consistent with grade I (abnormal) relaxation.    The following segments are hypokinetic: basal inferolateral and mid inferolateral.    All other segments are normal.    IVS: There is abnormal septal motion consistent with post-operative status.    Right Ventricle: Right ventricular cavity size is normal. Systolic function is normal.    Aorta: The aortic root is normal in size. The ascending aorta is mildly dilated.    Cece Fernandez MD

## 2024-11-11 NOTE — PHYSICAL THERAPY NOTE
Physical Therapy Treatment Note    Patient's Name: Otoniel Martinez  : 24 0918   PT Last Visit   PT Visit Date 24   Note Type   Note Type Treatment   Pain Assessment   Pain Assessment Tool 0-10   Pain Score No Pain   Restrictions/Precautions   Weight Bearing Precautions Per Order No   Other Precautions Cardiac/sternal;Telemetry;Fluid restriction   General   Chart Reviewed Yes   Response to Previous Treatment Patient with no complaints from previous session.   Family/Caregiver Present Yes  (spouse)   Subjective   Subjective Agreeable to mobilize.   Bed Mobility   Additional Comments Pt greeted in chair.   Transfers   Sit to Stand 5  Supervision   Additional items Armrests;Verbal cues;Increased time required   Stand to Sit 5  Supervision   Additional items Increased time required;Armrests   Additional Comments RW   Ambulation/Elevation   Gait pattern Excessively slow;Short stride   Gait Assistance 5  Supervision   Additional items Verbal cues   Assistive Device Rolling walker   Distance 70' + stairs + 150'   Stair Management Assistance 5  Supervision   Additional items Verbal cues  (vc to use HR as a guide, use BLE as much as possible to avoid pulling on incision)   Stair Management Technique One rail L;Nonreciprocal  (both hands on LUE)   Number of Stairs 10   Balance   Static Sitting Good   Dynamic Sitting Fair +   Static Standing Fair   Dynamic Standing Fair -   Ambulatory Fair -  (RW)   Endurance Deficit   Endurance Deficit Yes   Activity Tolerance   Activity Tolerance Patient tolerated treatment well   Nurse Made Aware yes - cleared for PT   Assessment   Prognosis Good   Problem List Decreased strength;Decreased endurance;Impaired balance;Decreased mobility   Assessment Pt requesting assistance for sit>stand t/f before attempting on his own. With cues to push up from armrest pt able to complete w/ S. Pt w/ slow, steady gait. Able to negotiate 10 stairs w/ S. Spouse updated on how to  perform stairs when back to room. Pt has demonstrated everything necessary re: functional mobility in order to return home safely when medically cleared.   Goals   Patient Goals go home   Plan   Treatment/Interventions Functional transfer training;LE strengthening/ROM;Therapeutic exercise;Elevations;Endurance training;Patient/family training;Equipment eval/education;Bed mobility;Compensatory technique education;Gait training;Spoke to nursing;Family   Progress Progressing toward goals   PT Frequency 4-6x/wk   Discharge Recommendation   Rehab Resource Intensity Level, PT No post-acute rehabilitation needs   Equipment Recommended   (pt already has RW)   AM-PAC Basic Mobility Inpatient   Turning in Flat Bed Without Bedrails 3   Lying on Back to Sitting on Edge of Flat Bed Without Bedrails 3   Moving Bed to Chair 3   Standing Up From Chair Using Arms 3   Walk in Room 3   Climb 3-5 Stairs With Railing 3   Basic Mobility Inpatient Raw Score 18   Basic Mobility Standardized Score 41.05   [facility] Highest Level Of Mobility   JH-HLM Goal 6: Walk 10 steps or more   JH-HLM Achieved 7: Walk 25 feet or more   Education   Education Provided Mobility training;Assistive device  (stair negotiation, sternal pxn, ambulate q hour once home)   Patient Demonstrates acceptance/verbal understanding   End of Consult   Patient Position at End of Consult Bedside chair;All needs within reach  (BLE elevated, spouse at bedside)       Beba Garcia, PT, DPT

## 2024-11-12 ENCOUNTER — HOME CARE VISIT (OUTPATIENT)
Dept: HOME HEALTH SERVICES | Facility: HOME HEALTHCARE | Age: 64
End: 2024-11-12
Payer: COMMERCIAL

## 2024-11-12 ENCOUNTER — ANTICOAG VISIT (OUTPATIENT)
Dept: CARDIOLOGY CLINIC | Facility: CLINIC | Age: 64
End: 2024-11-12

## 2024-11-12 LAB — INR PPP: 5.1 (ref 0.85–1.19)

## 2024-11-12 PROCEDURE — G0299 HHS/HOSPICE OF RN EA 15 MIN: HCPCS

## 2024-11-12 PROCEDURE — 400013 VN SOC

## 2024-11-12 NOTE — PROGRESS NOTES
Received call from Rekha HILLMAN with INR 5.1. patient did not take any Coumadin last night.  When reviewing chart, patient was on amiodarone 200 mg every 8 hours in the hospital, was not discharged home on it.  Advised to hold tonight and tomorrow, will recheck INR at next visit 11/14/24

## 2024-11-12 NOTE — UTILIZATION REVIEW
NOTIFICATION OF ADMISSION DISCHARGE   This is a Notification of Discharge from [facility]. Please be advised that this patient has been discharge from our facility. Below you will find the admission and discharge date and time including the patient’s disposition.   UTILIZATION REVIEW CONTACT:  Gage Kong  Utilization   Network Utilization Review Department  Phone: [phone] x carefully listen to the prompts. All voicemails are confidential.  Email: [email or web address]     ADMISSION INFORMATION  PRESENTATION DATE: 10/31/2024  7:11 AM  OBERVATION ADMISSION DATE: N/A  INPATIENT ADMISSION DATE: 10/31/24 11:01 AM   DISCHARGE DATE: 11/11/2024 12:36 PM   DISPOSITION:Home with Home Health Care    Network Utilization Review Department  ATTENTION: Please call with any questions or concerns to [phone] and carefully listen to the prompts so that you are directed to the right person. All voicemails are confidential.   For Discharge needs, contact Care Management DC Support Team at [phone] opt. 2  Send all requests for admission clinical reviews, approved or denied determinations and any other requests to dedicated fax number below belonging to the campus where the patient is receiving treatment. List of dedicated fax numbers for the Facilities:  FACILITY NAME UR FAX NUMBER   ADMISSION DENIALS (Administrative/Medical Necessity) [phone]   DISCHARGE SUPPORT TEAM ([facility]) [phone]   PARENT CHILD HEALTH (Maternity/NICU/Pediatrics) [phone]   [facility] [phone]   [facility] [phone]   [facility] [phone]   [facility] [phone]   [facility] [phone]   [facility] [phone]   [facility] [phone]   [facility]  [phone]   [facility] [phone]   [facility] [phone]   [facility] [phone]   [facility] [phone]

## 2024-11-14 ENCOUNTER — HOME CARE VISIT (OUTPATIENT)
Dept: HOME HEALTH SERVICES | Facility: HOME HEALTHCARE | Age: 64
End: 2024-11-14
Payer: COMMERCIAL

## 2024-11-14 ENCOUNTER — ANTICOAG VISIT (OUTPATIENT)
Dept: CARDIOLOGY CLINIC | Facility: CLINIC | Age: 64
End: 2024-11-14

## 2024-11-14 ENCOUNTER — NURSE TRIAGE (OUTPATIENT)
Age: 64
End: 2024-11-14

## 2024-11-14 VITALS
DIASTOLIC BLOOD PRESSURE: 60 MMHG | TEMPERATURE: 97.7 F | OXYGEN SATURATION: 95 % | HEART RATE: 72 BPM | SYSTOLIC BLOOD PRESSURE: 110 MMHG

## 2024-11-14 VITALS — HEART RATE: 71 BPM | SYSTOLIC BLOOD PRESSURE: 118 MMHG | OXYGEN SATURATION: 98 % | DIASTOLIC BLOOD PRESSURE: 62 MMHG

## 2024-11-14 LAB — INR PPP: 1.8 (ref 0.85–1.19)

## 2024-11-14 PROCEDURE — G0151 HHCP-SERV OF PT,EA 15 MIN: HCPCS

## 2024-11-14 PROCEDURE — G0299 HHS/HOSPICE OF RN EA 15 MIN: HCPCS

## 2024-11-14 NOTE — PROGRESS NOTES
[facility] nurse Zara called INR today 1.8 will take 2 mg daily and will recheck on 11/18/24 [facility] provided patient with directions.  
Detail Level: Detailed

## 2024-11-14 NOTE — TELEPHONE ENCOUNTER
"Reason for Conversation: [facility] nurse calling to report weight gain of almost 3lbs since Tuesday. Patient denies shortness of breath, chest pain, swelling.    VS/Weight: (Note: Please include date/time vitals/weight were measured)  today at visit: 110/60, 72, 95%, 97.7;                       Weights: Tuesday 145lbs                                     Wednesday 146.6lbs                                     Thursday  147.8lbs    Pain: No    Risk Factors: Hypertension, Diabetic, and Other CAD, MI stents    Recent relevant testing and date of testing: Stress, Echo, Labs, Cardiac Catheterization, and Other JASEN  pacemaker placed 11-8-24    Medication: Metoprolol Tartrate 12.5mg BID    Upcoming Office Visit: Yes 11-21-24    Last Office Visit: 10-23-24      Reason for Disposition   Nursing judgment     Awaiting recommendations    Answer Assessment - Initial Assessment Questions  1. REASON FOR CALL: \"What is the main reason for your call?\" or \"How can I best help you?\"      Home health nurse calling to report weight gain.  2. SYMPTOMS : \"Do you have any symptoms?\"       Almost 3lb weight gain since tuesday    Protocols used: Information Only Call - No Triage-Adult-[place]    "

## 2024-11-15 ENCOUNTER — HOME CARE VISIT (OUTPATIENT)
Dept: HOME HEALTH SERVICES | Facility: HOME HEALTHCARE | Age: 64
End: 2024-11-15
Payer: COMMERCIAL

## 2024-11-15 PROCEDURE — G0152 HHCP-SERV OF OT,EA 15 MIN: HCPCS

## 2024-11-15 NOTE — CASE COMMUNICATION
Patient  evaluated  and  to  continue  with  home  PT  2  x  per  week  with  treatment  consisting  of  transfer  and  G.T.  and  strengthening  ex  to  [facility].  Also  to  continue  with  instruction  in  HEP  home  safety  and  energy  conservation  meaures

## 2024-11-15 NOTE — TELEPHONE ENCOUNTER
Wife Oliva called back today regarding patient's weight. Weight today is the same as yesterday.  Wife is also a nurse who added that patient's lungs are clear, he has no edema.  O2 sat is 93% on pulse ox.  BP today 102/56, HR 66.    Oliva wants to make sure the weight is not of concern. Patient is not on a diuretic and wife thinks weight gain might be due to patient eating more.    Wt today is 147.8 lbs  Yesterday 147.8 lbs  Weds 146.6  Tues 145 lbs.     Patient returned home from the hospital on Monday 11/11.    Spouse asked that message be sent to Dr JAYSON Adrian who is out of the office today, and then requested message go to Dr Fernandez who also saw the patient in the hospital.

## 2024-11-15 NOTE — TELEPHONE ENCOUNTER
"Per secure chat response from Dr Fernandez: \"If asymptomatic, there is nothing that we need to do just yet. Please have them continue to monitor weight. If he becomes short of breath or develops edema, please have them call back \"    I spoke with wife Oliva and relayed Dr Fernandez's response. She verbalized understanding.  "

## 2024-11-17 VITALS — OXYGEN SATURATION: 93 % | DIASTOLIC BLOOD PRESSURE: 56 MMHG | HEART RATE: 66 BPM | SYSTOLIC BLOOD PRESSURE: 102 MMHG

## 2024-11-18 ENCOUNTER — HOSPITAL ENCOUNTER (OUTPATIENT)
Dept: RADIOLOGY | Facility: IMAGING CENTER | Age: 64
Discharge: HOME | End: 2024-11-18
Payer: COMMERCIAL

## 2024-11-18 ENCOUNTER — HOME CARE VISIT (OUTPATIENT)
Dept: HOME HEALTH SERVICES | Facility: HOME HEALTHCARE | Age: 64
End: 2024-11-18
Payer: COMMERCIAL

## 2024-11-18 ENCOUNTER — ANTICOAG VISIT (OUTPATIENT)
Dept: CARDIOLOGY CLINIC | Facility: CLINIC | Age: 64
End: 2024-11-18

## 2024-11-18 ENCOUNTER — OFFICE VISIT (OUTPATIENT)
Dept: INTERNAL MEDICINE CLINIC | Facility: OTHER | Age: 64
End: 2024-11-18
Payer: COMMERCIAL

## 2024-11-18 VITALS
OXYGEN SATURATION: 95 % | DIASTOLIC BLOOD PRESSURE: 86 MMHG | HEIGHT: 68 IN | WEIGHT: 152 LBS | SYSTOLIC BLOOD PRESSURE: 132 MMHG | HEART RATE: 84 BPM | BODY MASS INDEX: 23.04 KG/M2 | TEMPERATURE: 97.9 F

## 2024-11-18 DIAGNOSIS — Z95.1 S/P CABG (CORONARY ARTERY BYPASS GRAFT): Primary | ICD-10-CM

## 2024-11-18 DIAGNOSIS — Z99.81 ON HOME OXYGEN THERAPY: ICD-10-CM

## 2024-11-18 DIAGNOSIS — I25.10 LEFT MAIN CORONARY ARTERY DISEASE: ICD-10-CM

## 2024-11-18 DIAGNOSIS — R00.1 SYMPTOMATIC SINUS BRADYCARDIA: ICD-10-CM

## 2024-11-18 DIAGNOSIS — Z95.0 PACEMAKER: ICD-10-CM

## 2024-11-18 DIAGNOSIS — Z95.1 S/P CABG (CORONARY ARTERY BYPASS GRAFT): ICD-10-CM

## 2024-11-18 LAB — INR PPP: 1.5 (ref 0.85–1.19)

## 2024-11-18 PROCEDURE — G0299 HHS/HOSPICE OF RN EA 15 MIN: HCPCS

## 2024-11-18 PROCEDURE — 71046 X-RAY EXAM CHEST 2 VIEWS: CPT

## 2024-11-18 PROCEDURE — 99495 TRANSJ CARE MGMT MOD F2F 14D: CPT | Performed by: FAMILY MEDICINE

## 2024-11-18 NOTE — PROGRESS NOTES
Ladonna from [facility] called will take 4 mg today then 3 mg on tues and Wed   [facility] will recheck on 11.21 at next visiit

## 2024-11-18 NOTE — TELEPHONE ENCOUNTER
Pt's wife Oliva called today regarding weight gain. Pt seen PCP today as well- and wanted cardiologist to be aware of weight gain. Wife reports no swelling and/or SOB.    11/16 147.8lbs  11/17 149lbs  11/8 150.8lbs    Please advise

## 2024-11-18 NOTE — PROGRESS NOTES
Transition of Care Visit  Name: Otoniel Martinez      : 1960      MRN: [id number]  Encounter Provider: Wandy Cheatham DO  Encounter Date: 2024   Encounter department: [facility] [place]    Assessment & Plan  S/P CABG (coronary artery bypass graft)         Symptomatic sinus bradycardia         Pacemaker         On home oxygen therapy         Left main coronary artery disease              History of Present Illness     Transitional Care Management Review:   Otoniel Martinez is a 64 y.o. male here for TCM follow up.     During the TCM phone call patient stated:  TCM Call       Date and time call was made  2024  8:45 AM    Hospital care reviewed  Records reviewed    Patient was hospitialized at  [facility]    Date of Admission  10/31/24    Date of discharge  24    Diagnosis  s/p CABG    Disposition  Home    Current Symptoms  Weakness; Fatigue; Incisional pain; Chest pain  pacemaker pain          TCM Call       Post hospital issues  Reduced activity; Poor ADL (Activities of Daily Living) performance    Scheduled for follow up?  Yes    Did you obtain your prescribed medications  Yes    Do you need help managing your prescriptions or medications  Yes    Why type of assitance do you need  spouse assisting    Is transportation to your appointment needed  No    I have advised the patient to call PCP with any new or worsening symptoms  Palmer Soriano [facility]          HPI    Patient status post CABG, now at home, doing fairly well but not at his baseline, he is not having any chest pain or shortness of breath, occasionally has incisional discomfort but otherwise healing well with no new issues.  Patient states his energy levels are not up to normal, his appetite is good and he has been sleeping well, his wife accompanies him today.  His left lower extremity has an area of erythema near the incision with some firmness palpated it was nontender to palpation, has been  "visible for a few days, he has visiting nurses at home, no discharge and no other issues with the area, patient not having any fevers      Review of Systems    Constitutional:  Denies fever or chills   Eyes:  Denies double , blurry vision or eye pain  HENT:  Denies nasal congestion, sore throat or new hearing issues  Respiratory:  Denies cough or shortness of breath or wheezing  Cardiovascular:  Denies palpitations or chest pain  GI:  Denies abdominal pain, nausea, or vomiting, no loose stools, no reflux  Integument:  Denies rash , no open areas  Neurologic:  Denies headache or focal weakness, no dizziness  : no dysuria, or hematuria    Objective   /86 (BP Location: Right arm, Patient Position: Sitting, Cuff Size: Adult)   Pulse 84   Temp 97.9 °F (36.6 °C)   Ht 5' 8\" (1.727 m)   Wt 68.9 kg (152 lb)   SpO2 95%   BMI 23.11 kg/m²     Physical Exam      Constitutional:  Well developed, well nourished, no acute distress, non-toxic appearance   Eyes:  PERRL, conjunctiva normal , non icteric sclera  HENT:  Atraumatic, oropharynx moist. Neck-  supple   Respiratory:  CTA b/l, normal breath sounds, no rales, no wheezing   Cardiovascular:  RRR, no murmurs, no LE edema b/l  GI:  Soft, nondistended, normal bowel sounds x 4, nontender, no organomegaly, no mass, no rebound, no guarding   Neurologic:  no focal deficits noted   Psychiatric:  Speech and behavior appropriate , AAO x 3          Medications have been reviewed by provider in current encounter      "

## 2024-11-19 ENCOUNTER — TELEPHONE (OUTPATIENT)
Dept: CARDIAC SURGERY | Facility: CLINIC | Age: 64
End: 2024-11-19

## 2024-11-19 ENCOUNTER — HOME CARE VISIT (OUTPATIENT)
Dept: HOME HEALTH SERVICES | Facility: HOME HEALTHCARE | Age: 64
End: 2024-11-19
Payer: COMMERCIAL

## 2024-11-19 VITALS
HEART RATE: 63 BPM | SYSTOLIC BLOOD PRESSURE: 118 MMHG | OXYGEN SATURATION: 96 % | RESPIRATION RATE: 16 BRPM | DIASTOLIC BLOOD PRESSURE: 72 MMHG | TEMPERATURE: 98.5 F

## 2024-11-19 PROCEDURE — G0151 HHCP-SERV OF PT,EA 15 MIN: HCPCS

## 2024-11-19 PROCEDURE — G0180 MD CERTIFICATION HHA PATIENT: HCPCS | Performed by: THORACIC SURGERY (CARDIOTHORACIC VASCULAR SURGERY)

## 2024-11-19 NOTE — TELEPHONE ENCOUNTER
POST OP CALL    11/6/24: CABG X 3  11/11/24: discharge to home  11/19/24: Called to performed routine follow-up call after hospital discharge. Spoke to wife who states patient is fine. Attempted to ask a few questions about how he is doing and wife was sadie stating we see you next week.   Thank you for allowing me to be part of your healthcare team at [facility]. It is a pleasure to care for you today.   Please take all of your medications as instructed and follow all new instructions from your visit today.  If you received labs or medical tests today you should hear information about results or scheduling either by phone or mychart within approximately a week.   If you have any questions or concerns please do not hesitate to call. Have a blessed day and I hope to see you again soon.  BIANCA Lara

## 2024-11-19 NOTE — TELEPHONE ENCOUNTER
Spoke with patient's wife.  Patient lost about 14 to 15 pounds since his recent surgery due to poor appetite and some vomiting that he had in the hospital.  His appetite is much improved and I suspect he is regaining some of his lost weight back.  He was up to 160 pounds at his prior office visit.  Advised patient's wife to continue monitoring his weight.  She does not have to call in as he has any symptoms of lower extremity edema, PND, orthopnea, or dyspnea.  However, if he continues to gain weight beyond his baseline, and certainly if he has any symptoms she was welcomed to call back.  Thank you.

## 2024-11-20 VITALS — OXYGEN SATURATION: 97 % | HEART RATE: 74 BPM | DIASTOLIC BLOOD PRESSURE: 69 MMHG | SYSTOLIC BLOOD PRESSURE: 118 MMHG

## 2024-11-20 PROBLEM — Z95.0 PACEMAKER: Status: ACTIVE | Noted: 2024-11-20

## 2024-11-20 NOTE — ASSESSMENT & PLAN NOTE
History of chronic left temporal infarct and mild bilateral carotid stenosis <50%  On aspirin and statins

## 2024-11-20 NOTE — ASSESSMENT & PLAN NOTE
On Xarelto at baseline.  Will be on warfarin temporarily (1-3 months) postoperatively per CT surgery. INR goal of 2 to 3. Most recent INR 1.50  Following with coumading clinic

## 2024-11-20 NOTE — ASSESSMENT & PLAN NOTE
Pre op bradycardia, symptomatic sick sinus syndrome.  [employer] dual-chamber pacemaker on 11/3/2024  Site is healing well.   Continue arm restriction precautions

## 2024-11-20 NOTE — PROGRESS NOTES
General Cardiology Note  Otoniel Jacksonak  1960  [id number]  [facility]  [address]  [place] [address] [phone]    Referring Provider - No ref. provider found  Chief Complaint   Patient presents with    Follow-up     Some chest discomfort in chest area,  On 3 liters of oxygen at night.    Dizziness       Assessment & Plan  S/P CABG (coronary artery bypass graft)  Status post CABG x 3, LIMA to the LAD, SVG to PDA and SVG to OM1 on 11/6/24 by Dr OGDEN  F/u with CTS on 11/25  Cardiac rehab 11/26  Denies chest pain. Reports chest soreness  Chest healing well. Scabbing present. No bruises, no redness, no drainage noted  Pacemaker site, healing well. No bruises, no redness, no drainage noted  Left leg harvest site with small semi firm hematoma, scabbing present, slightly discolored but not warm to touch. numbness reported to palpation.   Left groin with small scab likely from prior lines. Femoral pulse palpable  Coronary artery disease of native artery of native heart with stable angina pectoris (HCC)  Pt with prior MI and RCA stenting 2004;  Presented for elective cardiac catheterization on 10/31/24 due to ongoing dyspnea on exertion.   Cath revealed MV disease including distal LM 80% as well as prox to mid Circ 90% and mid RCA 90%  Status post CABG x 3, LIMA to the LAD, SVG to PDA and SVG to OM1.    Continue aspirin, statin and beta-blocker.  Pacemaker  Pre op bradycardia, symptomatic sick sinus syndrome.  Medtronic dual-chamber pacemaker on 11/3/2024  Site is healing well.   Continue arm restriction precautions   History of recurrent deep vein thrombosis (DVT)  On Xarelto at baseline.  Will be on warfarin temporarily (1-3 months) postoperatively per CT surgery. INR goal of 2 to 3. Most recent INR 1.50  Following with coumading clinic  Carotid stenosis, asymptomatic, bilateral  History of chronic left temporal infarct and mild bilateral carotid stenosis <50%  On  aspirin and statins  Splenic artery aneurysm (HCC)  Patient with an incidental finding of splenic artery aneurysm earlier this year.  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 Uses oxygen at night  On home oxygen therapy  3 L at night     Will RTO 1/23 with Dr Adrian or sooner if necessary. Will call with any concerns.     Interval History: 64 y.o.  male  with PMH as below is here for HFU. Patient of Dr. Hollins  Pt with prior MI and RCA stenting 2004;  Presented for elective cardiac catheterization on 10/31/24 due to ongoing dyspnea on exertion.   Cath revealed MV disease including distal LM 80% as well as prox to mid Circ 90% and mid RCA 90%  Status post CABG x 3, LIMA to the LAD, SVG to PDA and SVG to OM1.    Pre op bradycardia, symptomatic sick sinus syndrome.  Medtronic dual-chamber pacemaker on 11/8/2024  On Xarelto at baseline.  Will be on warfarin temporarily (1-3 months) postoperatively per CT surgery. As the novel anticoagulants are not reversible, they are held in our open heart patients for a period of three months, should they require reoperation.   10/31 Admission weight 161 lb bed scale  11/11 Discharge weight 144 lbs standing scale    Today, he weighs 154 Lb, gradual weight gain. Diet is improved, better appetite   He reports significant improvement of  LOJA and Dizziness since surgery but occasionally has some LOJA and dizziness when moving around too fast. He has 8 steps at home and tolerating well. Wears 3 L O2 at night  He reports left leg harvest site hematoma and numbness to touch, chest soreness from surgery but no significant pain  Wife is  present during visit    Patient Active Problem List    Diagnosis Date Noted    Pacemaker 11/20/2024    S/P CABG (coronary artery bypass graft) 11/06/2024    Symptomatic sinus bradycardia 11/01/2024    Left main coronary artery disease 10/31/2024    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Formerly [facility]) 10/31/2024    Pulmonary nodule 1 cm or greater in diameter 10/15/2024    Splenic artery aneurysm (Former[facility]) 09/18/2024    Carotid stenosis, asymptomatic, bilateral 09/18/2024    Hx of pancreatitis 09/09/2024    On home oxygen therapy 09/09/2024    Monoclonal gammopathy 08/14/2024    Seizure ([facility]) 06/17/2024    Memory loss 06/12/2024    Establishing care with new doctor, encounter for 04/24/2024    Decreased hearing of both ears 04/24/2024    Splenomegaly 02/08/2024    Chronic bronchitis ([facility]) 02/06/2024    Abnormal CT scan of lung 02/06/2024    Chronic sinusitis 02/06/2024    Insulin pump status 02/02/2023    Primary osteoarthritis of right hip 01/24/2022    Recurrent cold sores 01/24/2022    History of recurrent deep vein thrombosis (DVT) 07/02/2021    Dysfunction of right rotator cuff 07/02/2021    Vitamin D deficiency 03/03/2021    LOJA (dyspnea on exertion) 02/15/2021    Old cerebrovascular accident (CVA) without late effect 10/28/2020    Chronic gastritis without bleeding 03/25/2020    Polyneuropathy 10/17/2018    Vertigo 09/21/2018    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    Thrombocytopenia ([facility]) 02/05/2018    Erectile dysfunction of non-organic origin 10/24/2014    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), 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[facility]) 10/04/2013    Mixed hyperlipidemia 04/28/2013    Multivessel CAD 09/19/2012    Thromboembolism of deep veins of lower extremity ([facility]) 07/21/2011    Obstructive sleep apnea 01/20/2007     Past Medical History:   Diagnosis Date    Acute respiratory failure with hypoxia ([facility]ly [facility]) 02/08/2024    CAD (coronary artery disease)     Congenital myotonia     Deep vein thrombosis (DVT) ([facility])     after tear of  gastrocnemus muscle    Diabetes (HCC)     Diabetes mellitus (HCC)     Difficulty walking     hip replacement    HL (hearing loss)     decreased both  ears    Myocardial infarction (HCC)     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    Pancreatitis     three times    Sleep apnea     not using a C-PAP    Superficial thrombophlebitis     Vision loss     reading glasses over the counter     Social History     Tobacco Use    Smoking status: Never    Smokeless tobacco: Never   Vaping Use    Vaping status: Never Used   Substance Use Topics    Alcohol use: Yes     Comment: 2 beers on a social occasion    Drug use: No      Family History   Problem Relation Age of Onset    Brain cancer Mother     Clotting disorder Mother     Heart disease Mother     Cancer Mother     Hyperlipidemia Mother     Stroke Father     Deep vein thrombosis Father     Emphysema Father     Coronary artery disease Paternal Uncle     Diabetes Maternal Uncle      Past Surgical History:   Procedure Laterality Date    CARDIAC CATHETERIZATION N/A 10/31/2024    Procedure: Cardiac catheterization;  Surgeon: Ky Sandoval MD;  Location: [facility];  Service: Cardiology    CARDIAC CATHETERIZATION N/A 10/31/2024    Procedure: Cardiac Coronary Angiogram;  Surgeon: Ky Sandoval MD;  Location: [facility];  Service: Cardiology    CARDIAC ELECTROPHYSIOLOGY PROCEDURE N/A 11/8/2024    Procedure: Cardiac pacer implant;  Surgeon: Frankie Valdez DO;  Location: [facility];  Service: Cardiology    CAROTID STENT      CHOLECYSTECTOMY      CIRCUMCISION      Elective    COLONOSCOPY      complete, polyps 2 years ago    CORONARY ANGIOPLASTY WITH STENT PLACEMENT      stent to RCA 2004    INGUINAL HERNIA REPAIR      IR BIOPSY BONE MARROW  8/7/2024    ORIF RADIAL SHAFT FRACTURE Left     Open Treatment of Multiple Fractures of the Radial Shaft    ORIF ULNAR / RADIAL SHAFT FRACTURE Left     Open Treatment of Multiple Fractures of the Ulnar Shaft    NC CORONARY ARTERY  BYP W/VEIN & ARTERY GRAFT 3 VEIN N/A 11/6/2024    Procedure: CORONARY ARTERY BYPASS GRAFT (CABG) 3 VESSELS, LIMA - LAD, LEFT LEG EVH/SVG - PDA AND OM1; JASEN;  Surgeon: Rodger Rodriguez DO;  Location: [facility];  Service: Cardiac Surgery    TONSILLECTOMY AND ADENOIDECTOMY         Current Outpatient Medications:     acetaminophen (TYLENOL) 650 mg CR tablet, Take 1 tablet (650 mg total) by mouth every 4 (four) hours, Disp: 30 tablet, Rfl: 0    aspirin (ECOTRIN LOW STRENGTH) 81 mg EC tablet, Take 81 mg by mouth daily, Disp: , Rfl:     atorvastatin (LIPITOR) 80 mg tablet, Take 1 tablet (80 mg total) by mouth daily with dinner, Disp: 30 tablet, Rfl: 1    budesonide-formoterol (Symbicort) 80-4.5 MCG/ACT inhaler, Inhale 2 puffs 2 (two) times a day Rinse mouth after use., Disp: 30.6 g, Rfl: 5    cholecalciferol (VITAMIN D3) 400 units tablet, Take 1 tablet by mouth every morning, Disp: , Rfl:     Continuous Blood Gluc Sensor (Dexcom G6 Sensor) MISC, Change every 10 days. E10.65, Disp: , Rfl:     Continuous Blood Gluc Transmit (Dexcom G6 Transmitter) MISC, Change every 90 days. E10.65, Disp: , Rfl:     fluticasone (FLONASE) 50 mcg/act nasal spray, 2 sprays into each nostril daily, Disp: , Rfl:     Gvoke HypoPen 2-Pack 1 MG/0.2ML SOAJ, , Disp: , Rfl:     Insulin Disposable Pump (Omnipod 5 G6 Pods, Gen 5,) MISC, INJECT 1 UNDER THE SKIN EVERY OTHER DAY. CHANGING EVERY 2 DAYS DUE TO HIGH INSULIN REQUIREMENT., Disp: , Rfl:     insulin glargine (LANTUS) 100 units/mL subcutaneous injection, Inject under the skin PRN for when pod doesn't work Patient instructed to call office if pod not working to get direction on how much to give, Disp: , Rfl:     levalbuterol (XOPENEX) 1.25 mg/3 mL nebulizer solution, Take 1.25 mg by nebulization 3 (three) times a day as needed for wheezing, Disp: , Rfl:     methocarbamol (ROBAXIN) 500 mg tablet, Take 1 tablet (500 mg total) by mouth every 6 (six) hours as needed for muscle spasms, Disp: 30 tablet,  "Rfl: 0    metoprolol tartrate (LOPRESSOR) 25 mg tablet, Take 0.5 tablets (12.5 mg total) by mouth every 12 (twelve) hours, Disp: 30 tablet, Rfl: 2    montelukast (SINGULAIR) 10 mg tablet, TAKE 1 TABLET BY MOUTH EVERYDAY AT BEDTIME, Disp: 90 tablet, Rfl: 1    NovoLOG 100 UNIT/ML injection, Inject under the skin if needed for high blood sugar Take as needed sq as directed by doc if pump is not working , Disp: , Rfl:     oxygen gas, Inhale 3 L/min daily at bedtime. nasal cannula, Disp: , Rfl:     pantoprazole (PROTONIX) 40 mg tablet, TAKE 1 TABLET TWICE DAILY 30 MINS PRIOR TO BREAKFAST AND SUPPER., Disp: 180 tablet, Rfl: 3    sodium chloride 0.9 % nebulizer solution, Take 3 mL by nebulization as needed for wheezing, Disp: 90 mL, Rfl: 3    warfarin (COUMADIN) 1 mg tablet, Do not take any Coumadin unless instructed by the Coumadin Clinic, Disp: 30 tablet, Rfl: 1    docusate sodium (COLACE) 100 mg capsule, Take 1 capsule (100 mg total) by mouth 2 (two) times a day (Patient not taking: Reported on 11/21/2024), Disp: , Rfl:     magnesium hydroxide (MILK OF MAGNESIA) 400 mg/5 mL oral suspension, Take 30 mL by mouth daily as needed for constipation (Patient not taking: Reported on 11/18/2024), Disp: , Rfl:     polyethylene glycol (MIRALAX) 17 g packet, Take 17 g by mouth daily (Patient not taking: Reported on 11/21/2024), Disp: 510 g, Rfl: 0    Allergies   Allergen Reactions    Other Cough     Grass / dogs hair        Vitals:    11/21/24 1247   BP: 99/74   BP Location: Right arm   Patient Position: Sitting   Cuff Size: Standard   Pulse: 72   SpO2: 100%   Weight: 70 kg (154 lb 6.4 oz)   Height: 5' 8\" (1.727 m)        Vitals:    11/21/24 1247   Weight: 70 kg (154 lb 6.4 oz)      Height: 5' 8\" (172.7 cm)   Body mass index is 23.48 kg/m².    Labs:     Chemistry        Component Value Date/Time     01/09/2018 0702    K 3.5 11/11/2024 0426    K 4.1 12/22/2023 1348    CL 95 (L) 11/11/2024 0426     12/22/2023 1348    CO2 " "34 (H) 11/11/2024 0426    CO2 24 11/06/2024 1303    CO2 30 12/22/2023 1348    BUN 29 (H) 11/11/2024 0426    BUN 18 12/22/2023 1348    CREATININE 0.62 11/11/2024 0426    CREATININE 0.63 12/22/2023 1348        Component Value Date/Time    CALCIUM 8.6 11/11/2024 0426    CALCIUM 9.7 12/22/2023 1348    ALKPHOS 76 06/13/2024 0706    ALKPHOS 96 12/22/2023 1348    AST 53 (H) 06/13/2024 0706    AST 39 12/22/2023 1348     (H) 06/13/2024 0706    ALT 71 (H) 12/22/2023 1348    BILITOT 1.4 (H) 01/09/2018 0702          Lab Results   Component Value Date    HGBA1C 8.4 (H) 10/31/2024      Lab Results   Component Value Date    CHOL 133 01/09/2018     Lab Results   Component Value Date    HDL 40 10/31/2024    HDL 42 09/10/2024    HDL 45 04/30/2024     Lab Results   Component Value Date    LDLCALC 51 10/31/2024    LDLCALC 70 09/10/2024    LDLCALC 59 04/30/2024     Lab Results   Component Value Date    TRIG 209 (H) 10/31/2024    TRIG 60 09/10/2024    TRIG 122 04/30/2024     No results found for: \"CHOLHDL\"    Imaging: XR chest pa and lateral  Result Date: 11/18/2024  Narrative: CHEST INDICATION:   Presence of aortocoronary bypass graft. COMPARISON:  None. EXAM PERFORMED/VIEWS:  XR CHEST PA AND LATERAL FINDINGS: Cardiomediastinal silhouette appears unremarkable. Apparently post-CABG although no sternotomy wires are seen. Dual-chamber pacemaker with the ventricular lead coiled in the inferior right atrium. Small left pleural effusion, increased from prior. Mild atelectasis adjacent in the left base. Osseous structures appear within normal limits for patient age.     Impression: Small left pleural effusion increased from prior 11/11/2024 with adjacent atelectasis in the left lung base. Post apparent CABG although no sternotomy wires are seen. No pulmonary edema or airspace consolidation. Pacemaker with the ventricular lead coiled proximally in the right atrium and tip near the right ventricle, properly positioned and unchanged from " prior study of 11/11/2024 Electronically signed: 11/18/2024 05:27 PM Guille Neil MD    JASEN Intraop Interventional w/ realtime guidance of cardiac procedures  Result Date: 11/18/2024  Narrative: This order contains the linked images for the JASEN that was performed by the Anesthesiologist.  Please see the  CARDIAC JASEN ANESTHESIA procedure for results.    XR chest pa & lateral  Result Date: 11/12/2024  Narrative: XR CHEST PA AND LATERAL INDICATION: ptx. Recent pacer insertion. COMPARISON: Chest radiograph 11/8/2024, 11/9/2024, 10/31/2024; CT chest 11/10/2024 FINDINGS: Left chest wall intracardiac device with intact lead(s). No abandoned lead(s). Monitoring leads and clips project over the chest. Right internal jugular approach central venous catheter with tip overlying the distal SVC. Small left hydropneumothorax, stable. Small right effusion. No focal consolidation. Compressive atelectasis at the left lung base. Previously seen pneumomediastinum barely visible on this study. Unchanged cardiomediastinal silhouette. CABG. Bones are unremarkable for age. Normal upper abdomen.     Impression: Stable small left hydropneumothorax and small right effusion, no significant change. . Resident: Kervin Robertson I, the attending radiologist, have reviewed the images and agree with the final report above. Workstation performed: [id number]     CT chest wo contrast  Result Date: 11/10/2024  Narrative: CT CHEST WITHOUT IV CONTRAST INDICATION: ptx. COMPARISON: CT scan of the chest dated 9/16/2024 TECHNIQUE: CT examination of the chest was performed without intravenous contrast. Multiplanar 2D reformatted images were created from the source data. This examination, like all CT scans performed in the [facility], was performed utilizing techniques to minimize radiation dose exposure, including the use of iterative reconstruction and automated exposure control. Radiation dose length product (DLP) for this visit: 435  mGy-cm FINDINGS: LUNGS: Bibasilar lung atelectasis. PLEURA: Small bilateral pleural effusions. No pleural thickening. Small left-sided pneumothorax of approximately 10%. No right-sided pneumothorax. Small pleural nodules are seen along the lateral left upper lung that measure 0.5 x 1.1 and 0.5 x 1.3 cm on  image 31 of series 2 and image 44 of series 2 probably representing small pleural hematomas. HEART/GREAT VESSELS: Coronary artery bypass surgery with LIMA to LAD bypass graft. Venous bypass grafts as well. Extensive native coronary artery calcification. Minimal aneurysmal dilation of the aortic root measuring 4.1 cm in the mid ascending thoracic  aorta pacemaker leads within the right atrial appendage and right ventricular apex. Right jugular central venous catheter present with tip in the superior vena cava. MEDIASTINUM AND ADITYA: Edema within the anterior mediastinum compatible with the recent surgery. There is pneumomediastinum deep to the sternum with a small amount of substernal fluid inferiorly. Findings are compatible with expected recent surgery. Additional edema and pneumomediastinum at the level of the thoracic inlet. CHEST WALL AND LOWER NECK: There is chest wall gas along the left axilla from recent surgery. Additional small amount of gas is seen along the upper anterior abdominal wall adjacent to midline. VISUALIZED STRUCTURES IN THE UPPER ABDOMEN: Small amount of pneumoperitoneum along the right hemidiaphragm. This probably represents dissection of gas from the chest into the abdomen. Rim calcified structure involving the left adrenal gland that measures  1.7 x 1.9 cm image 115 of series 2. OSSEOUS STRUCTURES: Mild endplate degenerative changes present throughout the thoracic spine. The patient is status post median sternotomy.     Impression: Since September 2024 there is a new small left pneumothorax of approximately 10%. This is probably related to recent surgery. New small bilateral pleural  effusions with probable minimal left pleural hematoma. Pneumomediastinum and retrosternal mediastinal edema. Findings probably secondary to recent cardiac surgery. New bibasilar atelectasis. Minimal pneumoperitoneum in the upper abdomen probably related to dissection of gas from the chest into the abdomen. Left chest wall gas probably secondary to the recent surgery. Interval resolution of previously demonstrated pulmonary opacity along the anterior left upper lobe that was probably infectious/inflammatory in etiology. The study was marked in EPIC for immediate notification. Workstation performed: [id number]     CT abdomen pelvis wo contrast  Result Date: 11/10/2024  Narrative: CT ABDOMEN AND PELVIS WITHOUT IV CONTRAST INDICATION: possible free air seen on CXR. COMPARISON: PET/CT scan dated 8/1/2024 TECHNIQUE: CT examination of the abdomen and pelvis was performed without intravenous contrast. Multiplanar 2D reformatted images were created from the source data. This examination, like all CT scans performed in the [facility], was performed utilizing techniques to minimize radiation dose exposure, including the use of iterative reconstruction and automated exposure control. Radiation dose length product (DLP) for this visit: 639 mGy-cm Enteric Contrast: Not administered. FINDINGS: ABDOMEN LOWER CHEST: Small bilateral pleural effusions. Bibasilar atelectasis. Small left-sided pneumothorax. Small amount of pneumomediastinum. Moderate to marked coronary artery calcification. Pacemaker leads along the right atrial appendage and right ventricular apex. Prior median sternotomy. There is a small amount of retrosternal fluid. There is mild diffuse pericardial thickening. LIVER/BILIARY TREE: Unremarkable. GALLBLADDER: Cholecystectomy SPLEEN: Unremarkable. PANCREAS: Unremarkable. ADRENAL GLANDS: Rim calcified lesion within the left adrenal gland that measures 1.9 x 1.9 cm image 49 of series 2. This could  represent an exophytic saccular aneurysm of the splenic artery. KIDNEYS/URETERS: Unremarkable. No hydronephrosis. STOMACH AND BOWEL: Unremarkable. APPENDIX: No findings to suggest appendicitis. The appendix is normal. ABDOMINOPELVIC CAVITY: There is a small amount of pneumoperitoneum. No abdominal ascites. No lymphadenopathy. VESSELS: Unremarkable for patient's age. PELVIS REPRODUCTIVE ORGANS: Prostate and seminal vesicles are normal. URINARY BLADDER: Small focus of gas within the urinary bladder probably secondary to recent catheterization. ABDOMINAL WALL/INGUINAL REGIONS: There is gas within the left anterior abdominal wall probably related to recent surgery. BONES: Mild to moderate endplate degenerative change in the lower thoracic and lumbar spine. Mild to moderate degenerative change of the left hip. Right hip prosthesis. Mild to moderate facet joint arthropathy in the lower thoracic and lumbar spine.     Impression: Small left-sided pneumothorax of at least 10%. Small amount of pneumomediastinum with retrosternal fluid in the mediastinum probably secondary to recent surgery Small bilateral pleural effusions with bibasilar atelectasis. Small amount of pneumoperitoneum. No evidence of viscus injury. This probably is secondary to dissection of gas from the chest. Unchanged rim calcified lesion in the region of the left adrenal gland. Finding may represent a nonspecific rim calcified adrenal mass or saccular aneurysm from the splenic or adrenal artery. This finding is unchanged compared to PET/CT from 8/1/2024. Continued CT follow-up recommended in 6 to 12 months. Workstation performed: [id number]     XR chest portable  Result Date: 11/10/2024  Narrative: XR CHEST PORTABLE INDICATION: s/p chest tube removal. COMPARISON: CXR 11/9/2024, chest CT 9/16/2024. FINDINGS: Right jugular catheter at cavoatrial junction. Left chest tube removed. No pneumothorax. Mild bibasilar atelectasis. Trace left pleural effusion. Normal  heart size, CABG, left subclavian pacemaker leads in right atrium and right ventricle. Mediastinal drains removed. Bones are unremarkable for age. Persistent trace pneumoperitoneum under the diaphragm, stable from earlier today not visible on 11/8/2024. Clips in the left upper quadrant.     Impression: Left chest tube removal with bibasilar atelectasis with no pneumothorax. Trace left pleural effusion. Persistent mild pneumoperitoneum. Correlate with clinical signs and symptoms. Follow-up with abdominal CT if clinically warranted. The study was marked in EPIC for immediate notification. Workstation performed: [id number]     XR chest 2 views  Result Date: 11/9/2024  Narrative: XR CHEST PA AND LATERAL INDICATION: Patient s/p Pacemaker/ICD Insertion - To be done post-procedure day one at 7a.m. and read before 9 a.m. Do not lift affected arm above shoulder.. Postop day 3. COMPARISON: Chest radiograph 11/8/2024 and multiple other chest x-rays, including 11/7/2024 and 11/6/2024. FINDINGS: Left chest wall intracardiac device with intact lead(s). No abandoned lead(s). The tip of the right-sided central venous catheter projects at the superior vena cava. Tip of the left chest tube projects over the left lower lung field. Tip of the  mediastinal drain projects over the left hemidiaphragm. Small pneumothorax with left chest tube in place. Unchanged left basilar atelectasis and small left pleural effusion. Normal cardiomediastinal silhouette. Bones are unremarkable for age. Small amount of intraperitoneal air under the right hemidiaphragm, not visible on 11/8/2024, but present on 11/7/2024.     Impression: Unchanged left basilar atelectasis and small left pleural effusion. Small pneumothorax with left chest tube in place. Small amount of intraperitoneal air under the right hemidiaphragm, not visible on 11/8/2024, but present on 11/7/2024. This can be expected recent postop findings after CABG. Please correlate with any  abdominal symptoms however, and reassess on follow-up  chest x-rays. Support devices as above. Resident: Sonido Fragoso I, the attending radiologist, have reviewed the images and agree with the final report above. Workstation performed: [id number]     Cardiac ep lab eps/ablations  Result Date: 2024  Narrative: Images from the original result were not included. ELECTROPHYSIOLOGY OPERATIVE REPORT PATIENT NAME: Otoniel Martinez :  1960 MRN: [id number] Date of surgery: 24 Surgeon: Frankie Valdez DO Pt Location:  Cardiac Electrophysiology Laboratory PROCEDURE PERFORMED: 1)Dual Chamber Permanent Pacemaker Implantation Preoperative Medications: ancef ANESTHESIA: general PREOPERATIVE DIAGNOSIS: Symptomatic sick sinus syndrome POSTOPERATIVE DIAGNOSIS: Successful Dual Chamber Permanent Pacemaker implant.  Same as Preop. Informed Consent: Risks, benefits, and alternatives discussed with patient and any family present. The patient understands risks, which include but are not limited to life threatening  bleeding, infection, air around lungs, blood around the heart and reoperation dislodged or malfunctioning device. Procedure Description: After informed consent was obtained, the patient was brought to the electrophysiology laboratory NPO. A time out was called and the patient was properly identified. The patient was pre-medicated as above. The left infraclavicular area was prepped and draped in the usual sterile fashion. After local anesthetic infiltration with 1% lidocaine, an incision was made below clavicle. The incision was extended down to the level of the pectoral fascia. A pocket was formed above the pectoral fascia using cautery and blunt dissection. axillary approach was done. A safe sheath introducer was advanced over a wire into the axillary vein, the wire was confirmed to be in the right atrium on flouroscopy, the wire was removed. Through the introducer the pacing lead was passed into the right heart.  Under fluoroscopic guidance the right ventricular lead was positioned on the right ventricular septum. After satisfactory ventricular sensing and pacing thresholds were confirmed, the lead was sewn to the pectoral fascia/muscle using 0 silk sutures. The right atrial lead was placed in the right atrial appendage with similar fashion using a preformed J stylet, the helix was deployed, tissue contact was confirmed and good lead parameters were seen, the Safe-sheath was removed and the lead was tied down with 0 silk sutures to the muscle. Pocket flushed with antibiotic solution. Device placed in an antibiotic pouch.  The lead and pulse generator were placed in the pre-pectoral pocket. The pocket was then closed with 3 layers of 2-0, 3-0 Vicryl 4-0 Monocryl suture was used to close the skin. Sterile dressing applied. EBL: Minimal Complications: None Contrast:10 cc Findings: sinus bradycardia now atrially paced The patient tolerated the procedure well. Plan: Routine postoperative care and CXR. Follow-up in 2 weeks with incision check and interrogation.      XR chest portable  Result Date: 11/8/2024  Narrative: XR CHEST PORTABLE INDICATION: Patient s/p Pacemaker/ICD Insertion. COMPARISON: 11/7/2024 FINDINGS: Right  sided central venous catheter tip terminates near cavoatrial junction. Mediastinal/thoracostomy tubes persist. Multi-lead left-sided cardiac conduction device. Probable left lung base atelectasis. Lungs are otherwise clear. Low suspicion for small left apical pneumothorax. Recommend attention on follow-up imaging. Unchanged cardiomediastinal silhouette. No acute osseous abnormality within the limitations of portable radiography. Normal upper abdomen.     Impression: Probable left lung base atelectasis. Lungs are otherwise clear. Low suspicion for small left apical pneumothorax. Recommend attention on surveillance radiograph imaging. Workstation performed: [id number]     Echo follow up/limited w/ contrast if  indicated  Result Date: 11/7/2024  Narrative:   Left Ventricle: Left ventricular cavity size is normal. Wall thickness is mildly increased. There is mild concentric hypertrophy. The left ventricular ejection fraction is 58%. Systolic function is normal. Diastolic function is mildly abnormal, consistent with grade I (abnormal) relaxation.   The following segments are hypokinetic: basal inferolateral and mid inferolateral.   All other segments are normal.   IVS: There is abnormal septal motion consistent with post-operative status.   Right Ventricle: Right ventricular cavity size is normal. Systolic function is normal.   Aorta: The aortic root is normal in size. The ascending aorta is mildly dilated.     XR chest portable  Result Date: 11/7/2024  Narrative: XR CHEST PORTABLE INDICATION: Post Open Heart Surgery. COMPARISON: Chest radiograph performed the previous day November 6, 2024 FINDINGS: Monitoring leads and clips project over the chest. Right internal jugular catheter tip at the cavoatrial junction. Chest tube and mediastinal drain in unchanged positions. Left basilar density due to combination of atelectasis, trace effusion, decreased. Right basilar density due to atelectasis Interval resolution of trace perifissural left pneumothorax. Expected appearance of cardiomediastinal silhouette after CABG. Bones are unremarkable for age. Normal upper abdomen.     Impression: Expected radiographic appearance in a patient who has recently undergone cardiac surgery. Left basilar opacity which may be due to combination of small effusion/atelectasis, improved Resident: Reilly Euceda I, the attending radiologist, have reviewed the images and agree with the final report above. Workstation performed: [id number]     XR chest portable ICU  Result Date: 11/6/2024  Narrative: XR CHEST PORTABLE ICU INDICATION: S/P open heart. COMPARISON: 10/31/2024 FINDINGS: Endotracheal tube is above melissa. [place]ion catheter tip  directed to the right pulmonary artery. Right IJ line tip at the SVC caval junction. Chest tube mediastinal drain noted. Left basilar atelectasis is mild. There is also some atelectasis in the left midlung field. There appears to be a tiny left pneumothorax visible laterally along the margin of the fissure. Attention on continued follow-up is reasonable. No significant right-sided pleural abnormality seen. Post CABG findings. Heart size within normal limits. Bones are unremarkable for age. Normal upper abdomen.     Impression: Postop findings as noted with some atelectasis in the left lung and suspicion for tiny left-sided pneumothorax visible along the margin of the fissure. Attention on continued follow-up is reasonable. Workstation performed: [id number]     JASEN Anesthesia  Result Date: 11/6/2024  Narrative: Jc Vargas MD     11/6/2024  1:00 PM Procedure Performed: JASEN Anesthesia Start Time:  11/6/2024 8:56 AM Preanesthesia Checklist Patient identified, IV assessed, risks and benefits discussed, monitors and equipment assessed, procedure being performed at surgeon's request and anesthesia consent obtained. Procedure  Type of JASEN: complete JASEN with interpretation. Images Saved: ultrasound permanent image saved. Physician Requesting Echo: Rodger Rodriguez DO.  Location performed: OR. Intubated. Bite block not placed.  Heart visualized. Insertion of JASEN Probe:  Atraumatic. Probe Type:  Multiplane. Modalities:  2D only, color flow mapping, continuous wave Doppler and pulse wave Doppler. Echocardiographic and Doppler Measurements PREPROCEDURE LEFT VENTRICLE: Systolic Function: normal. Ejection Fraction: 55-60%. Cavity size: normal.   Regional Wall Motion Abnormalities: none. RIGHT VENTRICLE: Systolic Function: normal.    AORTIC VALVE: Leaflets: normal. Leaflet motions normal and normal. Stenosis: none.     Regurgitation: none.  MITRAL VALVE: Leaflets: normal. Leaflet Motions: normal. Regurgitation: mild.    Stenosis: none.   TRICUSPID VALVE: Leaflets: normal.  Stenosis: none. Regurgitation: trace. PULMONIC VALVE: Leaflets: normal.  ASCENDING AORTA: Size:  normal.  Dissection not present.  AORTIC ARCH: Size:  normal.  dissection not present. Grade 3: atheroma protruding < 0.5 cm into lumen. DESCENDING AORTA: Size: normal.  Dissection not present. Grade 3: atheroma protruding < 0.5 cm into lumen. RIGHT ATRIUM: Size:  normal. No spontaneous echo contrast. LEFT ATRIUM: Size: normal. No spontaneous echo contrast. LEFT ATRIAL APPENDAGE: Size: normal. No spontaneous echo contrast ATRIAL SEPTUM: Intra-atrial septal morphology: normal.  VENTRICULAR SEPTUM: Intra-ventricular septum morphology: normal. EPIAORTIC: Plaque Thickness: 0-5 mm. OTHER FINDINGS: Pericardium:  normal. Pleural Effusion:  none. POSTPROCEDURE LEFT VENTRICLE: Unchanged .    RIGHT VENTRICLE: Unchanged .  AORTIC VALVE: Unchanged .      MITRAL VALVE: Unchanged .    TRICUSPID VALVE: Unchanged .   PULMONIC VALVE: Unchanged   ATRIA: Unchanged .   AORTA: Unchanged . REMOVAL: Probe Removal: atraumatic.      XR chest pa & lateral  Result Date: 11/1/2024  Narrative: XR CHEST PA AND LATERAL INDICATION: preop CABG eval. COMPARISON: None FINDINGS: Clear lungs. No pneumothorax or pleural effusion. Normal cardiomediastinal silhouette. Bones are unremarkable for age. Normal upper abdomen.     Impression: No acute cardiopulmonary disease. Workstation performed: [id number]     VAS VEIN MAPPING- LOWER EXTREMITY  Result Date: 10/31/2024  Narrative:  [facility] REPORT [facility]INICAL: Indications: Patient presents for surgical clearance and general health evaluation secondary to future open heart surgery.  Patient is asymptomatic at this time. Operative History: Cardiac stent, 2004 Risk Factors The patient has history of Diabetes (IDDM), Hyperlipidemia and CAD.  FINDINGS:  Right           AP (mm)  GSV Inguinal        5.9  GSV Prox Thigh      3.6  GSV Mid Thigh       3.0  GSV  Dist Thigh      3.4  GSV Knee            3.5  GSV Prox Calf       2.9  GSV Mid Calf        3.5  GSV Dist Calf       3.5  GSV Ankle           3.0   Left            AP (mm)  GSV Inguinal        8.9  GSV Prox Thigh      3.6  GSV Mid Thigh       4.7  GSV Dist Thigh      3.3  GSV Knee            3.0  GSV Prox Calf       2.6  GSV Mid Calf        3.0  GSV Dist Calf       3.2  GSV Ankle           3.2     CONCLUSION: Impression: RIGHT LOWER LIMB: The great saphenous vein is patent  from the groin to the ankle. The intraluminal diameter measurements range from 2.9mm to 5.9mm throughout. LEFT LOWER LIMB: The great saphenous vein is patent  from the groin to the ankle. The intraluminal diameter measurements range from 2.6mm to 8.9mm throughout.  SIGNATURE: Electronically Signed by: MISSY CAVANAUGH DO on 2024-10-31 09:33:29 PM    Echo complete w/ contrast if indicated  Result Date: 10/31/2024  Narrative:   Left Ventricle: Left ventricular cavity size is normal. Wall thickness is mildly increased. There is mild concentric hypertrophy. The left ventricular ejection fraction is 55%. Systolic function is normal. Diastolic function is mildly abnormal, consistent with grade I (abnormal) relaxation.   The following segments are hypokinetic: basal inferolateral and mid inferolateral.   All other segments are normal.   Right Ventricle: Right ventricular cavity size is normal. Systolic function is normal.   Aorta: The aortic root is normal in size. The ascending aorta is mildly dilated. Ao root is 3.10 cm. Asc Ao is 3.8 cm.     Cardiac catheterization  Result Date: 10/31/2024  Narrative:   Dist LM lesion is 80% stenosed.   Prox Cx to Mid Cx lesion is 90% stenosed.   Mid RCA lesion is 90% stenosed. Significant left main, circumflex and mid RCA disease in a diabetic patient.         Review of Systems   Cardiovascular:  Positive for dyspnea on exertion (occasinal). Chest pain: soreness.  Neurological:  Positive for dizziness (occasional  dizziness with too much activity).   All other systems reviewed and are negative.    Except as noted in HPI, is otherwise reviewed in detail and a 12 point review of systems is negative.    Physical Exam  Vitals reviewed.   Constitutional:       General: He is not in acute distress.     Appearance: Normal appearance. He is not diaphoretic.   HENT:      Head: Normocephalic and atraumatic.   Eyes:      General: No scleral icterus.     Extraocular Movements: Extraocular movements intact.      Pupils: Pupils are equal, round, and reactive to light.   Cardiovascular:      Rate and Rhythm: Normal rate and regular rhythm.      Pulses:           Radial pulses are 2+ on the right side and 2+ on the left side.        Femoral pulses are 2+ on the left side.       Dorsalis pedis pulses are 1+ on the right side and 1+ on the left side.      Heart sounds: Normal heart sounds, S1 normal and S2 normal.   Pulmonary:      Effort: Pulmonary effort is normal. No tachypnea or respiratory distress.      Breath sounds: Decreased breath sounds present. No wheezing or rales.   Abdominal:      General: Bowel sounds are normal. There is no distension.      Palpations: Abdomen is soft.   Musculoskeletal:      Right lower leg: No edema.      Left lower leg: No edema.   Skin:     General: Skin is warm and dry.      Capillary Refill: Capillary refill takes less than 2 seconds.      Comments: Chest healing well. Scabbing present. No bruises, no redness, no drainage noted  Pacemaker site, healing well. No bruises, no redness, no drainage noted  Left leg harvest site with small semi firm hematoma, scabbing present, slightly discolored but not warm to touch. numbness reported to palpation.   Left groin with small scab likely from prior lines. Femoral pulse palpable   Neurological:      Mental Status: He is alert and oriented to person, place, and time.      Gait: Gait abnormal (uses a cane).   Psychiatric:         Mood and Affect: Mood normal.          Behavior: Behavior normal.          **Please Note: This note may have been constructed using a voice recognition system**     Thank you for the opportunity to participate in the care of this patient.   INDER Simms

## 2024-11-20 NOTE — ASSESSMENT & PLAN NOTE
Status post CABG x 3, LIMA to the LAD, SVG to PDA and SVG to OM1 on 11/6/24 by Dr MELVI SAWANT/cain with CTS on 11/25  Cardiac rehab 11/26  Denies chest pain. Reports chest soreness  Chest healing well. Scabbing present. No bruises, no redness, no drainage noted  Pacemaker site, healing well. No bruises, no redness, no drainage noted  Left leg harvest site with small semi firm hematoma, scabbing present, slightly discolored but not warm to touch. numbness reported to palpation.   Left groin with small scab likely from prior lines. Femoral pulse palpable

## 2024-11-20 NOTE — ASSESSMENT & PLAN NOTE
Pt with prior MI and RCA stenting 2004;  Presented for elective cardiac catheterization on 10/31/24 due to ongoing dyspnea on exertion.   Cath revealed MV disease including distal LM 80% as well as prox to mid Circ 90% and mid RCA 90%  Status post CABG x 3, LIMA to the LAD, SVG to PDA and SVG to OM1.    Continue aspirin, statin and beta-blocker.

## 2024-11-20 NOTE — ASSESSMENT & PLAN NOTE
Care per endocrinology, on insulin pump  Lab Results   Component Value Date    HGBA1C 8.4 (H) 10/31/2024

## 2024-11-21 ENCOUNTER — HOME CARE VISIT (OUTPATIENT)
Dept: HOME HEALTH SERVICES | Facility: HOME HEALTHCARE | Age: 64
End: 2024-11-21
Payer: COMMERCIAL

## 2024-11-21 ENCOUNTER — ANTICOAG VISIT (OUTPATIENT)
Dept: CARDIOLOGY CLINIC | Facility: CLINIC | Age: 64
End: 2024-11-21

## 2024-11-21 ENCOUNTER — IN-CLINIC DEVICE VISIT (OUTPATIENT)
Dept: CARDIOLOGY CLINIC | Facility: CLINIC | Age: 64
End: 2024-11-21
Payer: COMMERCIAL

## 2024-11-21 ENCOUNTER — OFFICE VISIT (OUTPATIENT)
Dept: CARDIOLOGY CLINIC | Facility: CLINIC | Age: 64
End: 2024-11-21
Payer: COMMERCIAL

## 2024-11-21 VITALS
HEART RATE: 72 BPM | DIASTOLIC BLOOD PRESSURE: 74 MMHG | HEIGHT: 68 IN | BODY MASS INDEX: 23.4 KG/M2 | WEIGHT: 154.4 LBS | OXYGEN SATURATION: 100 % | SYSTOLIC BLOOD PRESSURE: 99 MMHG

## 2024-11-21 VITALS
HEART RATE: 73 BPM | TEMPERATURE: 97.4 F | SYSTOLIC BLOOD PRESSURE: 108 MMHG | OXYGEN SATURATION: 93 % | DIASTOLIC BLOOD PRESSURE: 62 MMHG

## 2024-11-21 DIAGNOSIS — I72.8 SPLENIC ARTERY ANEURYSM (HCC): ICD-10-CM

## 2024-11-21 DIAGNOSIS — Z95.1 S/P CABG (CORONARY ARTERY BYPASS GRAFT): Primary | ICD-10-CM

## 2024-11-21 DIAGNOSIS — Z86.718 HISTORY OF RECURRENT DEEP VEIN THROMBOSIS (DVT): ICD-10-CM

## 2024-11-21 DIAGNOSIS — Z99.81 ON HOME OXYGEN THERAPY: ICD-10-CM

## 2024-11-21 DIAGNOSIS — G47.33 OBSTRUCTIVE SLEEP APNEA: Chronic | ICD-10-CM

## 2024-11-21 DIAGNOSIS — E13.9 LADA (LATENT AUTOIMMUNE DIABETES IN ADULTS), MANAGED AS TYPE 1 (HCC): ICD-10-CM

## 2024-11-21 DIAGNOSIS — Z95.0 PACEMAKER: ICD-10-CM

## 2024-11-21 DIAGNOSIS — I25.118 CORONARY ARTERY DISEASE OF NATIVE ARTERY OF NATIVE HEART WITH STABLE ANGINA PECTORIS (HCC): ICD-10-CM

## 2024-11-21 DIAGNOSIS — Z95.0 PRESENCE OF CARDIAC PACEMAKER: ICD-10-CM

## 2024-11-21 DIAGNOSIS — R00.1 SYMPTOMATIC SINUS BRADYCARDIA: Primary | ICD-10-CM

## 2024-11-21 DIAGNOSIS — E78.2 MIXED HYPERLIPIDEMIA: ICD-10-CM

## 2024-11-21 DIAGNOSIS — I65.23 CAROTID STENOSIS, ASYMPTOMATIC, BILATERAL: ICD-10-CM

## 2024-11-21 LAB — INR PPP: 2.5 (ref 0.85–1.19)

## 2024-11-21 PROCEDURE — G0299 HHS/HOSPICE OF RN EA 15 MIN: HCPCS

## 2024-11-21 PROCEDURE — 99024 POSTOP FOLLOW-UP VISIT: CPT | Performed by: INTERNAL MEDICINE

## 2024-11-21 PROCEDURE — 99214 OFFICE O/P EST MOD 30 MIN: CPT

## 2024-11-21 NOTE — CASE COMMUNICATION
Patient  declining  visit  on  11/21  secondary  to  medical  appointment.  Plan  to  see  patient  1  additional  time  on  11/25  and  DC  from  PT  following  that  visit.  Patient  to  begin  cardiac  rehab  on  11/26

## 2024-11-21 NOTE — CASE COMMUNICATION
Patient  declining  visit  for  today  secondary  to  having  several  medical  appointments.  Plan  to  re visit  11/25

## 2024-11-22 ENCOUNTER — RESULTS FOLLOW-UP (OUTPATIENT)
Dept: CARDIOLOGY CLINIC | Facility: CLINIC | Age: 64
End: 2024-11-22

## 2024-11-24 DIAGNOSIS — J42 CHRONIC BRONCHITIS, UNSPECIFIED CHRONIC BRONCHITIS TYPE (HCC): ICD-10-CM

## 2024-11-25 ENCOUNTER — OFFICE VISIT (OUTPATIENT)
Dept: CARDIAC SURGERY | Facility: CLINIC | Age: 64
End: 2024-11-25

## 2024-11-25 ENCOUNTER — HOME CARE VISIT (OUTPATIENT)
Dept: HOME HEALTH SERVICES | Facility: HOME HEALTHCARE | Age: 64
End: 2024-11-25
Payer: COMMERCIAL

## 2024-11-25 VITALS
BODY MASS INDEX: 23.34 KG/M2 | WEIGHT: 154 LBS | DIASTOLIC BLOOD PRESSURE: 62 MMHG | HEART RATE: 77 BPM | OXYGEN SATURATION: 95 % | SYSTOLIC BLOOD PRESSURE: 106 MMHG | TEMPERATURE: 97.5 F | HEIGHT: 68 IN

## 2024-11-25 VITALS — HEART RATE: 77 BPM | OXYGEN SATURATION: 95 % | DIASTOLIC BLOOD PRESSURE: 64 MMHG | SYSTOLIC BLOOD PRESSURE: 110 MMHG

## 2024-11-25 DIAGNOSIS — Z95.1 S/P CABG (CORONARY ARTERY BYPASS GRAFT): Primary | ICD-10-CM

## 2024-11-25 DIAGNOSIS — I25.118 CORONARY ARTERY DISEASE OF NATIVE ARTERY OF NATIVE HEART WITH STABLE ANGINA PECTORIS (HCC): ICD-10-CM

## 2024-11-25 DIAGNOSIS — Z99.81 ON HOME OXYGEN THERAPY: ICD-10-CM

## 2024-11-25 DIAGNOSIS — I82.409 THROMBOEMBOLISM OF DEEP VEINS OF LOWER EXTREMITY, UNSPECIFIED LATERALITY (HCC): ICD-10-CM

## 2024-11-25 DIAGNOSIS — Z95.0 PACEMAKER: ICD-10-CM

## 2024-11-25 PROCEDURE — G0151 HHCP-SERV OF PT,EA 15 MIN: HCPCS

## 2024-11-25 PROCEDURE — 99024 POSTOP FOLLOW-UP VISIT: CPT | Performed by: THORACIC SURGERY (CARDIOTHORACIC VASCULAR SURGERY)

## 2024-11-25 RX ORDER — SODIUM CHLORIDE FOR INHALATION 0.9 %
3 VIAL, NEBULIZER (ML) INHALATION AS NEEDED
Qty: 300 ML | Refills: 3 | Status: SHIPPED | OUTPATIENT
Start: 2024-11-25

## 2024-11-25 NOTE — PROGRESS NOTES
Procedure: S/P coronary artery bypass grafting, performed on 11/6/24    History: Otoniel Martinez is a 64 y.o. year old male who presents to our office today for routine post-surgical follow up care.  The patient’s postoperative hospital course was fairly uneventful. HE required PPM for significant bradycardia preop which was done on 11/8. CXR with very small PTX. He was started on beta blocker, ASA, statin. He was started on coumadin for h/o DVT (previously on Xarelto). HE had brief PAF. Diuresed with IV lasix which was transitioned to po torsemide. Progressed ambulation with PT/OT. He was discharged POD 5 on 11/11/24.      Since his discharge, he reports feeling well & an improvement in symptoms. He admits to minor chest discomfort. He does have what appears like a seroma near his SVH incision. Minimal pain does not appears swollen. Denies CP, palpitations, SOB, LOJA, lightheadedness, dizziness, presyncopal symptoms, recent weight gain/loss, LE edema b/l, or signs of incisional erythema/tenderness/discharge. He has been compliant with his driving/lifting restrictions up to this point. He ambulates daily at home. He states his incisional pain has been well controlled with oral medication. Askign about getting a Flu shot    He had a follow-up appointment with Cardiology on 11/21/24. He had a follow-up appointment with his PCP on 11/18/24.     Vital Signs:   There were no vitals filed for this visit.    Home Medications:   Prior to Admission medications    Medication Sig Start Date End Date Taking? Authorizing Provider   acetaminophen (TYLENOL) 650 mg CR tablet Take 1 tablet (650 mg total) by mouth every 4 (four) hours 11/11/24   Oliva Sullivan PA-C   aspirin (ECOTRIN LOW STRENGTH) 81 mg EC tablet Take 81 mg by mouth daily    Historical Provider, MD   atorvastatin (LIPITOR) 80 mg tablet Take 1 tablet (80 mg total) by mouth daily with dinner 11/11/24   Oliva Sullivan PA-C   budesonide-formoterol (Symbicort)  80-4.5 MCG/ACT inhaler Inhale 2 puffs 2 (two) times a day Rinse mouth after use. 2/6/24   Tomas Solano MD   cholecalciferol (VITAMIN D3) 400 units tablet Take 1 tablet by mouth every morning    Historical Provider, MD   Continuous Blood Gluc Sensor (Dexcom G6 Sensor) MISC Change every 10 days. E10.65 10/3/22   Historical Provider, MD   Continuous Blood Gluc Transmit (Dexcom G6 Transmitter) MISC Change every 90 days. E10.65 10/3/22   Historical Provider, MD   docusate sodium (COLACE) 100 mg capsule Take 1 capsule (100 mg total) by mouth 2 (two) times a day  Patient not taking: Reported on 11/21/2024 11/11/24   Oliva Sullivan PA-C   fluticasone (FLONASE) 50 mcg/act nasal spray 2 sprays into each nostril daily    Historical Provider, MD   Gvoke HypoPen 2-Pack 1 MG/0.2ML SOAJ  10/21/22   Historical Provider, MD   Insulin Disposable Pump (Omnipod 5 G6 Pods, Gen 5,) MISC INJECT 1 UNDER THE SKIN EVERY OTHER DAY. CHANGING EVERY 2 DAYS DUE TO HIGH INSULIN REQUIREMENT. 4/9/24   Historical Provider, MD   insulin glargine (LANTUS) 100 units/mL subcutaneous injection Inject under the skin PRN for when pod doesn't work  Patient instructed to call office if pod not working to get direction on how much to give    Historical Provider, MD   levalbuterol (XOPENEX) 1.25 mg/3 mL nebulizer solution Take 1.25 mg by nebulization 3 (three) times a day as needed for wheezing 8/25/24   Historical Provider, MD   magnesium hydroxide (MILK OF MAGNESIA) 400 mg/5 mL oral suspension Take 30 mL by mouth daily as needed for constipation  Patient not taking: Reported on 11/18/2024 11/11/24   Oliva Sullivan PA-C   methocarbamol (ROBAXIN) 500 mg tablet Take 1 tablet (500 mg total) by mouth every 6 (six) hours as needed for muscle spasms 11/11/24   Oliva Sullivan PA-C   metoprolol tartrate (LOPRESSOR) 25 mg tablet Take 0.5 tablets (12.5 mg total) by mouth every 12 (twelve) hours 11/11/24   Oliva Sullivan PA-C  "  montelukast (SINGULAIR) 10 mg tablet TAKE 1 TABLET BY MOUTH EVERYDAY AT BEDTIME 8/25/24   Wandy Cheatham,    NovoLOG 100 UNIT/ML injection Inject under the skin if needed for high blood sugar Take as needed sq as directed by doc if pump is not working  4/24/24   Historical Provider, MD   oxygen gas Inhale 3 L/min daily at bedtime. nasal cannula    Historical Provider, MD   pantoprazole (PROTONIX) 40 mg tablet TAKE 1 TABLET TWICE DAILY 30 MINS PRIOR TO BREAKFAST AND SUPPER. 8/5/24   Keke Howard PA-C   polyethylene glycol (MIRALAX) 17 g packet Take 17 g by mouth daily  Patient not taking: Reported on 11/21/2024 11/12/24 12/12/24  Oliva Sullivan PA-C   sodium chloride 0.9 % nebulizer solution Take 3 mL by nebulization as needed for wheezing 2/11/24   Amos Le MD   warfarin (COUMADIN) 1 mg tablet Do not take any Coumadin unless instructed by the [facility] 11/11/24   Oliva Sullivan PA-C       Physical Exam:    HEENT/NECK:  Normocephalic. Atraumatic.  no jugular venous distention.    Cardiac: Regular rate and rhythm and No murmurs/rubs/gallops  Pulmonary:  Breath sounds clear bilaterally and No rales/rhonchi/wheezes  Abdomen:  Non-tender, Non-distended, and Normal bowel sounds  Incisions: Sternum is stable.  Incision is clean, dry, and intact.  and Saphenectomy incison is clean, dry, and intact. Seroma near SVH site, nontender, no signs of infection  Extremities: Extremities warm/dry and No edema B/L  Neuro: Alert and oriented X 3.  Sensation is grossly intact.  No focal deficits.  Skin: Warm/Dry, without rashes or lesions.    Lab Results:               Invalid input(s): \"LABGLOM\"  Results from last 7 days   Lab Units 11/21/24  0000   INR  2.50*     Lab Results   Component Value Date    HGBA1C 8.4 (H) 10/31/2024     Lab Results   Component Value Date    CKTOTAL 344 (H) 01/09/2018    TROPONINI <0.02 03/22/2020       Imaging Studies:     CXR:  IMPRESSION:        Small left pleural " effusion increased from prior 11/11/2024 with adjacent atelectasis in the left lung base.  Post apparent CABG although no sternotomy wires are seen.  No pulmonary edema or airspace consolidation.  Pacemaker with the ventricular lead coiled proximally in the right atrium and tip near the right ventricle, properly positioned and unchanged from prior study of 11/11/2024    Results Review Statement: I personally reviewed the following image studies in PACS and associated radiology reports: chest xray. My interpretation of the radiology images/reports is: same as above.    Assessment: Coronary artery disease. S/P coronary artery bypass grafting; pst op PPM placement    Plan:     Otoniel Martinez continues to recover well following surgery.  To date they have made progressive improvements with their physical rehabilitation. At this point I have cleared them to begin outpatient cardiac rehabilitation and have encouraged them to to do so.  Otoniel Mratinez has also been cleared to resume driving. I asked that them do so cautiously, in progressive increments. I did remind them of their ongoing lifting restrictions of 25 pounds for an additional 2 months.    Otoniel Martinez has already been evaluated by their primary care physician cardiologist for ongoing medical care. At this point we will not schedule Otoniel Martinez  for routine followup care with our office. Future medical management will be directed by their outpatient cardiologist.. I have advised them to call with any new concerns that may arise. Otoniel Martinez was comfortable with our recommendations and their questions were answered to their satisfaction.    The patient recently had a screening colonoscopy in 2023.  Therefore GI referral is not indicated at this time.     Estefania Ramos PA-C  11/25/24    * This note was completed in part utilizing [employer] fluency direct voice recognition software.   Grammatical errors, random word insertion, spelling mistakes, and  incomplete sentences may be an occasional consequence of the system secondary to software limitations, ambient noise and hardware issues. At the time of dictation, efforts were made to edit, clarify and /or correct errors. Please read the chart carefully and recognize, using context, where substitutions have occurred.  If you have any questions or concerns about the context, text or information contained within the body of this dictation, please contact myself, the provider, for further clarification.

## 2024-11-26 ENCOUNTER — HOME CARE VISIT (OUTPATIENT)
Dept: HOME HEALTH SERVICES | Facility: HOME HEALTHCARE | Age: 64
End: 2024-11-26
Payer: COMMERCIAL

## 2024-11-26 ENCOUNTER — ANTICOAG VISIT (OUTPATIENT)
Dept: CARDIOLOGY CLINIC | Facility: CLINIC | Age: 64
End: 2024-11-26

## 2024-11-26 VITALS
RESPIRATION RATE: 14 BRPM | SYSTOLIC BLOOD PRESSURE: 116 MMHG | TEMPERATURE: 97.6 F | DIASTOLIC BLOOD PRESSURE: 62 MMHG | OXYGEN SATURATION: 96 % | HEART RATE: 66 BPM

## 2024-11-26 LAB — INR PPP: 3.3 (ref 0.85–1.19)

## 2024-11-26 PROCEDURE — G0299 HHS/HOSPICE OF RN EA 15 MIN: HCPCS

## 2024-11-26 NOTE — PROGRESS NOTES
Received Secure chat from [facility] with INR 3.3, advised to have patient take 3 mg Mon Wed Fri, 2 mg all other days, will recheck Fri 11/29/24, next [facility] visit

## 2024-11-27 ENCOUNTER — HOME CARE VISIT (OUTPATIENT)
Dept: HOME HEALTH SERVICES | Facility: HOME HEALTHCARE | Age: 64
End: 2024-11-27
Payer: COMMERCIAL

## 2024-11-29 ENCOUNTER — HOME CARE VISIT (OUTPATIENT)
Dept: HOME HEALTH SERVICES | Facility: HOME HEALTHCARE | Age: 64
End: 2024-11-29
Payer: COMMERCIAL

## 2024-11-29 ENCOUNTER — TELEPHONE (OUTPATIENT)
Dept: CARDIOLOGY CLINIC | Facility: CLINIC | Age: 64
End: 2024-11-29

## 2024-11-29 ENCOUNTER — ANTICOAG VISIT (OUTPATIENT)
Dept: CARDIOLOGY CLINIC | Facility: CLINIC | Age: 64
End: 2024-11-29

## 2024-11-29 ENCOUNTER — TELEPHONE (OUTPATIENT)
Age: 64
End: 2024-11-29

## 2024-11-29 VITALS
TEMPERATURE: 97.7 F | DIASTOLIC BLOOD PRESSURE: 64 MMHG | OXYGEN SATURATION: 95 % | SYSTOLIC BLOOD PRESSURE: 108 MMHG | HEART RATE: 75 BPM

## 2024-11-29 DIAGNOSIS — I82.419 DEEP VEIN THROMBOSIS (DVT) OF FEMORAL VEIN, UNSPECIFIED CHRONICITY, UNSPECIFIED LATERALITY (HCC): Primary | ICD-10-CM

## 2024-11-29 LAB — INR PPP: 2 (ref 0.85–1.19)

## 2024-11-29 PROCEDURE — G0299 HHS/HOSPICE OF RN EA 15 MIN: HCPCS

## 2024-11-29 NOTE — TELEPHONE ENCOUNTER
Pt stated Oncology Provider:     Actionable item: No    What is the reason for the call/chief complaint? Patient's wife, Oliva called regarding blood work for his 12/3 appointment. He completed a CBC on 11/11 and she wanted to confirm if this will be sufficient for his 12/3 visit with Dr Bronson. I reviewed his last visit notes and saw there are other labs needed for this visit. Advised that patient will still need to get those done. She understood and will have him complete all labs including the CBC and CMP since he will be going to the lab anyway.

## 2024-11-29 NOTE — PROGRESS NOTES
Rekha from [facility] it is last visit there, will alt 3 mg 2 mg and recheck on 12/3 at [facility].

## 2024-12-02 ENCOUNTER — APPOINTMENT (OUTPATIENT)
Dept: LAB | Facility: IMAGING CENTER | Age: 64
End: 2024-12-02
Payer: COMMERCIAL

## 2024-12-02 DIAGNOSIS — D72.819 LEUKOPENIA, UNSPECIFIED TYPE: ICD-10-CM

## 2024-12-02 DIAGNOSIS — D69.6 THROMBOCYTOPENIA (HCC): ICD-10-CM

## 2024-12-02 DIAGNOSIS — J30.2 SEASONAL ALLERGIES: ICD-10-CM

## 2024-12-02 DIAGNOSIS — I82.409 THROMBOEMBOLISM OF DEEP VEINS OF LOWER EXTREMITY, UNSPECIFIED LATERALITY (HCC): ICD-10-CM

## 2024-12-02 DIAGNOSIS — D47.2 IGM KAPPA MONOCLONAL GAMMOPATHY: ICD-10-CM

## 2024-12-02 DIAGNOSIS — D47.2 MONOCLONAL GAMMOPATHY: ICD-10-CM

## 2024-12-02 LAB
ALBUMIN SERPL BCG-MCNC: 3.6 G/DL (ref 3.5–5)
ALP SERPL-CCNC: 118 U/L (ref 34–104)
ALT SERPL W P-5'-P-CCNC: 37 U/L (ref 7–52)
ANION GAP SERPL CALCULATED.3IONS-SCNC: 7 MMOL/L (ref 4–13)
AST SERPL W P-5'-P-CCNC: 26 U/L (ref 13–39)
BASOPHILS # BLD AUTO: 0.01 THOUSANDS/ΜL (ref 0–0.1)
BASOPHILS NFR BLD AUTO: 0 % (ref 0–1)
BILIRUB SERPL-MCNC: 0.64 MG/DL (ref 0.2–1)
BUN SERPL-MCNC: 20 MG/DL (ref 5–25)
CALCIUM SERPL-MCNC: 8.9 MG/DL (ref 8.4–10.2)
CHLORIDE SERPL-SCNC: 103 MMOL/L (ref 96–108)
CO2 SERPL-SCNC: 28 MMOL/L (ref 21–32)
CREAT SERPL-MCNC: 0.65 MG/DL (ref 0.6–1.3)
EOSINOPHIL # BLD AUTO: 0.33 THOUSAND/ΜL (ref 0–0.61)
EOSINOPHIL NFR BLD AUTO: 9 % (ref 0–6)
ERYTHROCYTE [DISTWIDTH] IN BLOOD BY AUTOMATED COUNT: 15.5 % (ref 11.6–15.1)
GFR SERPL CREATININE-BSD FRML MDRD: 102 ML/MIN/1.73SQ M
GLUCOSE SERPL-MCNC: 330 MG/DL (ref 65–140)
HCT VFR BLD AUTO: 39.3 % (ref 36.5–49.3)
HGB BLD-MCNC: 12.2 G/DL (ref 12–17)
IGA SERPL-MCNC: 83 MG/DL (ref 66–433)
IGG SERPL-MCNC: 580 MG/DL (ref 635–1741)
IGM SERPL-MCNC: 310 MG/DL (ref 45–281)
IMM GRANULOCYTES # BLD AUTO: 0.02 THOUSAND/UL (ref 0–0.2)
IMM GRANULOCYTES NFR BLD AUTO: 1 % (ref 0–2)
INR PPP: 1.85 (ref 0.85–1.19)
LDH SERPL-CCNC: 263 U/L (ref 140–271)
LYMPHOCYTES # BLD AUTO: 0.51 THOUSANDS/ΜL (ref 0.6–4.47)
LYMPHOCYTES NFR BLD AUTO: 14 % (ref 14–44)
MCH RBC QN AUTO: 28 PG (ref 26.8–34.3)
MCHC RBC AUTO-ENTMCNC: 31 G/DL (ref 31.4–37.4)
MCV RBC AUTO: 90 FL (ref 82–98)
MONOCYTES # BLD AUTO: 0.44 THOUSAND/ΜL (ref 0.17–1.22)
MONOCYTES NFR BLD AUTO: 12 % (ref 4–12)
NEUTROPHILS # BLD AUTO: 2.37 THOUSANDS/ΜL (ref 1.85–7.62)
NEUTS SEG NFR BLD AUTO: 64 % (ref 43–75)
NRBC BLD AUTO-RTO: 0 /100 WBCS
PLATELET # BLD AUTO: 177 THOUSANDS/UL (ref 149–390)
PMV BLD AUTO: 10.4 FL (ref 8.9–12.7)
POTASSIUM SERPL-SCNC: 4.7 MMOL/L (ref 3.5–5.3)
PROT SERPL-MCNC: 6.3 G/DL (ref 6.4–8.4)
PROTHROMBIN TIME: 21.4 SECONDS (ref 12.3–15)
RBC # BLD AUTO: 4.36 MILLION/UL (ref 3.88–5.62)
SODIUM SERPL-SCNC: 138 MMOL/L (ref 135–147)
WBC # BLD AUTO: 3.68 THOUSAND/UL (ref 4.31–10.16)

## 2024-12-02 PROCEDURE — 36415 COLL VENOUS BLD VENIPUNCTURE: CPT

## 2024-12-02 PROCEDURE — 82784 ASSAY IGA/IGD/IGG/IGM EACH: CPT

## 2024-12-02 PROCEDURE — 82232 ASSAY OF BETA-2 PROTEIN: CPT

## 2024-12-02 PROCEDURE — 85610 PROTHROMBIN TIME: CPT

## 2024-12-02 PROCEDURE — 86334 IMMUNOFIX E-PHORESIS SERUM: CPT

## 2024-12-02 PROCEDURE — 84165 PROTEIN E-PHORESIS SERUM: CPT

## 2024-12-02 PROCEDURE — 80053 COMPREHEN METABOLIC PANEL: CPT

## 2024-12-02 PROCEDURE — 85025 COMPLETE CBC W/AUTO DIFF WBC: CPT

## 2024-12-02 PROCEDURE — 83521 IG LIGHT CHAINS FREE EACH: CPT

## 2024-12-02 PROCEDURE — 83615 LACTATE (LD) (LDH) ENZYME: CPT

## 2024-12-03 ENCOUNTER — OFFICE VISIT (OUTPATIENT)
Dept: HEMATOLOGY ONCOLOGY | Facility: CLINIC | Age: 64
End: 2024-12-03
Payer: COMMERCIAL

## 2024-12-03 ENCOUNTER — ANTICOAG VISIT (OUTPATIENT)
Dept: CARDIOLOGY CLINIC | Facility: CLINIC | Age: 64
End: 2024-12-03

## 2024-12-03 VITALS
TEMPERATURE: 97.3 F | HEART RATE: 93 BPM | HEIGHT: 68 IN | OXYGEN SATURATION: 97 % | SYSTOLIC BLOOD PRESSURE: 106 MMHG | RESPIRATION RATE: 18 BRPM | WEIGHT: 156 LBS | DIASTOLIC BLOOD PRESSURE: 62 MMHG | BODY MASS INDEX: 23.64 KG/M2

## 2024-12-03 DIAGNOSIS — C83.00 LYMPHOPLASMACYTIC LYMPHOMA (HCC): Primary | ICD-10-CM

## 2024-12-03 DIAGNOSIS — D69.6 THROMBOCYTOPENIA (HCC): ICD-10-CM

## 2024-12-03 DIAGNOSIS — D47.2 IGM KAPPA MONOCLONAL GAMMOPATHY: ICD-10-CM

## 2024-12-03 LAB
ALBUMIN SERPL ELPH-MCNC: 3.27 G/DL (ref 3.2–5.1)
ALBUMIN SERPL ELPH-MCNC: 55.4 % (ref 48–70)
ALPHA1 GLOB SERPL ELPH-MCNC: 0.33 G/DL (ref 0.15–0.47)
ALPHA1 GLOB SERPL ELPH-MCNC: 5.6 % (ref 1.8–7)
ALPHA2 GLOB SERPL ELPH-MCNC: 0.81 G/DL (ref 0.42–1.04)
ALPHA2 GLOB SERPL ELPH-MCNC: 13.7 % (ref 5.9–14.9)
BETA GLOB ABNORMAL SERPL ELPH-MCNC: 0.46 G/DL (ref 0.31–0.57)
BETA1 GLOB SERPL ELPH-MCNC: 7.8 % (ref 4.7–7.7)
BETA2 GLOB SERPL ELPH-MCNC: 5.6 % (ref 3.1–7.9)
BETA2+GAMMA GLOB SERPL ELPH-MCNC: 0.33 G/DL (ref 0.2–0.58)
GAMMA GLOB ABNORMAL SERPL ELPH-MCNC: 0.7 G/DL (ref 0.4–1.66)
GAMMA GLOB SERPL ELPH-MCNC: 11.9 % (ref 6.9–22.3)
IGG/ALB SER: 1.24 {RATIO} (ref 1.1–1.8)
INTERPRETATION UR IFE-IMP: NORMAL
M PROTEIN 1 MFR SERPL ELPH: 2.3 %
M PROTEIN 1 SERPL ELPH-MCNC: 0.14 G/DL
PROT PATTERN SERPL ELPH-IMP: ABNORMAL
PROT SERPL-MCNC: 5.9 G/DL (ref 6.4–8.2)

## 2024-12-03 PROCEDURE — 86334 IMMUNOFIX E-PHORESIS SERUM: CPT | Performed by: PATHOLOGY

## 2024-12-03 PROCEDURE — 99214 OFFICE O/P EST MOD 30 MIN: CPT | Performed by: INTERNAL MEDICINE

## 2024-12-03 PROCEDURE — 84165 PROTEIN E-PHORESIS SERUM: CPT | Performed by: PATHOLOGY

## 2024-12-03 RX ORDER — MONTELUKAST SODIUM 10 MG/1
10 TABLET ORAL
Qty: 90 TABLET | Refills: 1 | Status: SHIPPED | OUTPATIENT
Start: 2024-12-03

## 2024-12-03 NOTE — PROGRESS NOTES
Spoke with Oliva, advised INR just a little low, current dose verified. Advised to have patient take 2 mg Mon Wed Fri, 3 mg all other days, will recheck next week 12/9/24 or 12/10/24

## 2024-12-03 NOTE — PROGRESS NOTES
Hematology/Oncology Outpatient Office Note  Date of Service: 12/3/2024    [facility]  [address]  [facility] [address]  [phone]    Reason for Consultation:   Chief Complaint   Patient presents with    Follow-up     Referral Physician: No ref. provider found  Primary Care Physician:  Wandy Cheatham DO     Assessment/plan:     1.  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  CXCR4 mutation not detected. Low risk  >> Low risk: Bone marrow infiltration: 20%, IgM: 234, beta-2 microglobulin: 1.8, albumin: 3.7  2.  Mild leukopenia without neutropenia  3.  Chronic thrombocytopenia  4.  Incidentally noted mild splenomegaly on CT chest     Otoniel Martinez is a 64 y.o. male with past medical history significant for CAD, diabetes, DVT after tear in gastrocnemius muscle (on indefinite anticoagulation with Xarelto), recurrent pancreatitis, diabetes type 1 and sleep apnea.  Patient was admitted to [facility] from 2/8/2024 - 2/10/2024 with acute respiratory failure with hypoxia which was suspected related to acute on chronic bronchitis.  He was treated with antibiotics and prednisone.  He underwent a CT of the chest which incidentally noted splenomegaly.  He underwent a bone marrow biopsy on 8/7/2024 which is consistent with a low-grade CD5 positive B-cell lymphoma accompanied by clonal kappa restricted plasma cells detected by flow cytometry normocellular marrow, together with IgM kappa gammopathy and MYD 88 mutation detected in peripheral blood flow suggestive of lymphoplasmacytic lymphoma.  No apple green birefringent material is identified on Congo red stain, normal male karyotype, no evidence of abnormalities by FISH and no mutation detected any of the genes on the NGS panel. Serum immunofixation showed a monoclonal gammopathy identified as IgM kappa, M-Fco: 0.11 g/deciliter.  Quantitative  immunoglobulins with no elevation in IgM.  Beta-2 microglobulin 1.8.  Patient with overall low risk disease which equates to median time to progression of 9.2 years.  Recommendation reviewed per NCCN guidelines.  Patient currently asymptomatic.     We discussed lymphoplasmacytic lymphoma is a mature B-cell lymphoma usually involving the bone marrow, spleen or lymph nodes. Waldenström macroglobulinemia is a distinct clinicopathologic entity demonstrating LPL in the bone marrow with an IgM monoclonal gammopathy in the blood. The clinical presentation of LPL is varied and can include symptoms related to tumor infiltration (lymphadenopathy, organomegaly, cytopenias) or monoclonal protein production (hyperviscosity, neuropathy).  Patient currently remains asymptomatic.    His most recent labs for SPEP and light chains remain pending.  Overall remains asymptomatic from oncologic perspective. Will continue to monitor. Office f/u in 3 months with labs prior.    4. Recurrent DVT  Currently on indefinite anticoagulation.  On treatment with Coumadin (previously on Xarelto).  Per cardiology, will be on Coumadin temporarily.    5.  Left upper lobe lung nodule  Patient was incidentally noted to have a 1.9 x 1.5 cm mildly FDG avid left upper lobe lung nodule.  Findings could reflect an inflammatory process versus an underlying malignant process.  Agree with pulmonary eval to follow-up with a CT scan in 6 weeks to assess further.  Will need biopsy if persists.  Low suspicion that this is related to his monoclonal gammopathy.    6.  Splenic artery aneurysm  Incidentally noted to have a 2.4 cm splenic artery aneurysm on imaging.  Recommend vascular surgery consultation.    Orders Placed This Encounter   Procedures    CBC and differential    Comprehensive metabolic panel    Protein electrophoresis, serum    IgG, IgA, IgM    Beta 2 microglobulin, serum    Kappa/Lambda Light Chains Free With Ratio, Serum    LD,Blood      Diagnosis  ICD-10-CM Associated Orders   1. Lymphoplasmacytic lymphoma (HCC)  C83.00 CBC and differential     Comprehensive metabolic panel     Protein electrophoresis, serum     IgG, IgA, IgM     Beta 2 microglobulin, serum     Kappa/Lambda Light Chains Free With Ratio, Serum     LD,Blood      2. IgM kappa monoclonal gammopathy  D47.2 CBC and differential     Comprehensive metabolic panel     Protein electrophoresis, serum     IgG, IgA, IgM     Beta 2 microglobulin, serum     Kappa/Lambda Light Chains Free With Ratio, Serum     LD,Blood      3. Thrombocytopenia (HCC)  D69.6 CBC and differential     Comprehensive metabolic panel     Protein electrophoresis, serum     IgG, IgA, IgM     Beta 2 microglobulin, serum     Kappa/Lambda Light Chains Free With Ratio, Serum     LD,Blood          Return in about 4 months (around 4/3/2025), or if symptoms worsen or fail to improve, for Office Visit, Labs.    Juli Bronson, DO 12/3/2024   Hematology & Medical Oncology Physician    History of present illness:   Otoniel Martinez is a 63-year-old male with past medical history significant for CAD, diabetes, DVT after tear in gastrocnemius muscle on indefinite anticoagulation with Xarelto), recurrent pancreatitis, diabetes type 1 and sleep apnea.  Patient was admitted to [facility] from 2/8/2024 - 2/10/2024 with acute respiratory failure with hypoxia which was suspected related to acute on chronic bronchitis.  He was treated with antibiotics and prednisone.  He underwent a CT of the chest which incidentally noted splenomegaly.  Patient referred to hematology for further evaluation.  He presents for initial consultation.     Patient denies any fever, night sweats, weight loss. Denies any abd pain, n/v/d. Denies frequent or recurrent illness. ETOH use socially. Denies easy bruising or bleeding.     Interval History:   Subsequent workup in the interim shows IgM kappa monoclonal gammopathy on SPEP.  Peripheral smear with involvement by  a monotypic kappa B-cell population with MYD88 mutation.  Patient denies any fever, night sweats or unexpected weight loss.    Interval history 8/12/2024:  In the interim, patient underwent bone marrow biopsy on 8/7/2024 which remains pending.  PET CT scan noted a hypermetabolic masslike consolidation in the left upper lobe measuring 1.9 x 1.5 cm.  Mild splenomegaly.  Incidentally noted a 2.4 cm splenic artery aneurysm for which vascular surgical consultation recommended.    Interval history 9/3/2024:  Patient presents for follow-up visit accompanied by his wife.  Patient denies any fever, night sweats, respiratory symptoms or bone pain.  In the interim he underwent bone marrow biopsy which was consistent with lymphoplasmacytic lymphoma.    Interval history 12/3/2024:  Patient presents for follow-up accompanied by wife.  Recently underwent coronary artery bypass grafting on 11/6/2024.  He is to start cardiac rehab.  Following with cardiology  Review of systems:   Review of Systems   Constitutional: Negative. Negative for fever and malaise/fatigue.   HENT: Negative.     Cardiovascular: Negative.  Negative for chest pain, dyspnea on exertion and palpitations.   Respiratory: Negative.  Negative for shortness of breath.    Hematologic/Lymphatic: Negative.  Does not bruise/bleed easily.   Skin: Negative.    Musculoskeletal: Negative.    Gastrointestinal: Negative.  Negative for abdominal pain.   Neurological: Negative.    Psychiatric/Behavioral: Negative.          A 10-point review of system was performed, pertinent positive and negative were detailed as above. Otherwise, the 10-point review of system was negative.    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    Pancreatitis     three times    Sleep apnea     not using a C-PAP    Superficial thrombophlebitis     Vision loss     reading glasses over the counter       Past Surgical History:   Procedure Laterality Date    CARDIAC CATHETERIZATION N/A 10/31/2024    Procedure: Cardiac catheterization;  Surgeon: Ky Sandoval MD;  Location: [facility];  Service: Cardiology    CARDIAC CATHETERIZATION N/A 10/31/2024    Procedure: Cardiac Coronary Angiogram;  Surgeon: Ky Sandoval MD;  Location: [facility];  Service: Cardiology    CARDIAC ELECTROPHYSIOLOGY PROCEDURE N/A 11/8/2024    Procedure: Cardiac pacer implant;  Surgeon: Frankie Valdez DO;  Location: [facility];  Service: Cardiology    CAROTID STENT      CHOLECYSTECTOMY      CIRCUMCISION      Elective    COLONOSCOPY      complete, polyps 2 years ago    CORONARY ANGIOPLASTY WITH STENT PLACEMENT      stent to RCA 2004    INGUINAL HERNIA REPAIR      IR BIOPSY BONE MARROW  8/7/2024    ORIF RADIAL SHAFT FRACTURE Left     Open Treatment of Multiple Fractures of the Radial Shaft    ORIF ULNAR / RADIAL SHAFT FRACTURE Left     Open Treatment of Multiple Fractures of the Ulnar Shaft    WA CORONARY ARTERY BYP W/VEIN & ARTERY GRAFT 3 VEIN N/A 11/6/2024    Procedure: CORONARY ARTERY BYPASS GRAFT (CABG) 3 VESSELS, LIMA - LAD, LEFT LEG EVH/SVG - PDA AND OM1; JASEN;  Surgeon: Rodger Rodriguez DO;  Location: [facility];  Service: Cardiac Surgery    TONSILLECTOMY AND ADENOIDECTOMY         Family History   Problem Relation Age of Onset    Brain cancer Mother     Clotting disorder Mother     Heart disease Mother     Cancer Mother     Hyperlipidemia Mother     Stroke Father     Deep vein thrombosis Father     Emphysema Father     Coronary artery disease Paternal Uncle     Diabetes Maternal Uncle        Social History     Socioeconomic History     Marital status: /Civil Union     Spouse name: Not on file    Number of children: Not on file    Years of education: Not on file    Highest education level: Not on file   Occupational History    Not on file   Tobacco Use    Smoking status: Never    Smokeless tobacco: Never   Vaping Use    Vaping status: Never Used   Substance and Sexual Activity    Alcohol use: Not Currently    Drug use: No    Sexual activity: Yes     Partners: Female     Birth control/protection: None   Other Topics Concern    Not on file   Social History Narrative    Consumes 1 cup of tea  And 1 glass of tea per day        Weight loss     Social Drivers of Health     Financial Resource Strain: Low Risk  (12/4/2023)    [facility]    Overall Financial Resource Strain (CARDIA)     Difficulty of Paying Living Expenses: Not very hard   Food Insecurity: No Food Insecurity (10/31/2024)    Nursing - Inadequate Food Risk Classification     Worried About Running Out of Food in the Last Year: Never true     Ran Out of Food in the Last Year: Never true     Ran Out of Food in the Last Year: 1   Transportation Needs: No Transportation Needs (11/29/2024)    OASIS : Transportation     Lack of Transportation (Medical): No     Lack of Transportation (Non-Medical): No     Patient Unable or Declines to Respond: No   Physical Activity: Sufficiently Active (12/4/2023)    Received from [facility]    Exercise Vital Sign     Days of Exercise per Week: 1 day     Minutes of Exercise per Session: 150+ min   Stress: No Stress Concern Present (12/4/2023)    [facility]    [employer] [place] [employer] - Occupational Stress Questionnaire     Feeling of Stress : Not at all   Social Connections: Socially Integrated (12/4/2023)    [facility]    Social  Connection and Isolation Panel [NHANES]     Frequency of Communication with Friends and Family: Three times a week     Frequency of Social Gatherings with Friends and Family: Never     Attends Holiness Services: More than 4 times per year     Active Member of Clubs or Organizations: Yes     Attends Club or Organization Meetings: More than 4 times per year     Marital Status:    Intimate Partner Violence: Unknown (10/31/2024)    Nursing IPS     Feels Physically and Emotionally Safe: Not on file     Physically Hurt by Someone: Not on file     Humiliated or Emotionally Abused by Someone: Not on file     Physically Hurt by Someone: 2     Hurt or Threatened by Someone: 2   Housing Stability: Unknown (10/31/2024)    Nursing: Inadequate Housing Risk Classification     Has Housing: Not on file     Worried About Losing Housing: Not on file     Unable to Get Utilities: Not on file     Unable to Pay for Housing in the Last Year: 2     Has Housin       Allergies   Allergen Reactions    Oxycodone Hallucinations     Spouse states to never give them to him, she felt something was wrong with him.     Other Cough     Grass / dogs hair        Current Outpatient Medications   Medication Sig Dispense Refill    acetaminophen (TYLENOL) 650 mg CR tablet Take 1 tablet (650 mg total) by mouth every 4 (four) hours 30 tablet 0    aspirin (ECOTRIN LOW STRENGTH) 81 mg EC tablet Take 81 mg by mouth daily      atorvastatin (LIPITOR) 80 mg tablet Take 1 tablet (80 mg total) by mouth daily with dinner 30 tablet 1    budesonide-formoterol (Symbicort) 80-4.5 MCG/ACT inhaler Inhale 2 puffs 2 (two) times a day Rinse mouth after use. 30.6 g 5    cholecalciferol (VITAMIN D3) 400 units tablet Take 1 tablet by mouth every morning      Continuous Blood Gluc Sensor (Dexcom G6 Sensor) MISC Change every 10 days. E10.65      Continuous Blood Gluc Transmit (Dexcom G6 Transmitter) MISC Change every 90 days. E10.65      fluticasone (FLONASE) 50 mcg/act  nasal spray 2 sprays into each nostril daily      Gvoke HypoPen 2-Pack 1 MG/0.2ML SOAJ  (Patient taking differently: No sig reported)      Insulin Disposable Pump (Omnipod 5 G6 Pods, Gen 5,) MISC INJECT 1 UNDER THE SKIN EVERY OTHER DAY. CHANGING EVERY 2 DAYS DUE TO HIGH INSULIN REQUIREMENT.      insulin glargine (LANTUS) 100 units/mL subcutaneous injection Inject under the skin PRN for when pod doesn't work  Patient instructed to call office if pod not working to get direction on how much to give      levalbuterol (XOPENEX) 1.25 mg/3 mL nebulizer solution Take 1.25 mg by nebulization 3 (three) times a day as needed for wheezing      methocarbamol (ROBAXIN) 500 mg tablet Take 1 tablet (500 mg total) by mouth every 6 (six) hours as needed for muscle spasms 30 tablet 0    metoprolol tartrate (LOPRESSOR) 25 mg tablet Take 0.5 tablets (12.5 mg total) by mouth every 12 (twelve) hours 30 tablet 2    montelukast (SINGULAIR) 10 mg tablet TAKE 1 TABLET BY MOUTH EVERYDAY AT BEDTIME 90 tablet 1    NovoLOG 100 UNIT/ML injection Inject under the skin if needed for high blood sugar Take as needed sq as directed by doc if pump is not working       oxygen gas Inhale 3 L/min daily at bedtime. nasal cannula      pantoprazole (PROTONIX) 40 mg tablet TAKE 1 TABLET TWICE DAILY 30 MINS PRIOR TO BREAKFAST AND SUPPER. 180 tablet 3    sodium chloride 0.9 % nebulizer solution TAKE 3 ML BY NEBULIZATION AS NEEDED FOR WHEEZING 300 mL 3    warfarin (COUMADIN) 1 mg tablet Do not take any Coumadin unless instructed by the [facility] Clinic 30 tablet 1     No current facility-administered medications for this visit.     I have reviewed the relevant past medical, surgical, social and family history. I have also reviewed allergies and medications for this patient.    Objective:      Vitals:    12/03/24 1027   BP: 106/62   Pulse: 93   Resp: 18   Temp: (!) 97.3 °F (36.3 °C)   SpO2: 97%       Wt Readings from Last 3 Encounters:   12/03/24 70.8 kg (156 lb)    11/25/24 69.9 kg (154 lb)   11/21/24 70 kg (154 lb 6.4 oz)     Performance status: ECOG PS: 0  Physical Exam  Vitals and nursing note reviewed.   Constitutional:       General: He is not in acute distress.     Appearance: He is well-developed.   HENT:      Head: Normocephalic and atraumatic.   Eyes:      Conjunctiva/sclera: Conjunctivae normal.   Cardiovascular:      Rate and Rhythm: Normal rate.   Pulmonary:      Effort: Pulmonary effort is normal. No respiratory distress.   Musculoskeletal:         General: No swelling.      Cervical back: Neck supple.   Skin:     General: Skin is warm and dry.   Neurological:      Mental Status: He is alert and oriented to person, place, and time.      Gait: Gait normal.   Psychiatric:         Mood and Affect: Mood normal.         Data Review:  Pathology Result:    @LASTLAB(FINALDX:*)@      PERIPHERAL BLOOD SMEAR: INVOLVED BY A MONOTYPIC KAPPA B-CELL POPULATION WITH MYD88 MUTATION; MILD EOSINOPHILIA AND MONOCYTOSIS, WITH BACKGROUND MILD THROMBOCYTOPENIA; SEE IMPRESSION     IMPRESSION:  The peripheral blood smear shows mild leukopenia (WBC 4.29 K/uL) with atypical lymphocytes and mild eosinophilia and monocytosis, as well as background mild thrombocytopenia (plt 137 K/uL) with normal platelet morphology, without evidence of platelet clumping or satellitism. Erythrocytes are adequate in number, normocytic and normochromic, without significant morphologic abnormalities. There is no circulating blast population, significant dysplasia, rouleaux formation, infectious organisms or evidence of hemolysis.     Flow cytometry reportedly shows that the atypical lymphocytes are monotypic kappa B-cells. Additional ancillary testing is positive for a MYD88 L265P mutation, and is negative for CXCR4 mutations or 7/7q aberrations. The history of an IgM-kappa monoclonal immunoglobulin is noted. The overall findings are consistent with the patient's known, persistent circulating monoclonal B-cell  population; the differential diagnosis includes a monoclonal B-cell lymphocytosis (MBL) and circulating B-cell lymphoma.     If there is extramedullary involvement (lymph node, tissue, etc.), this represents circulating lymphoma. In the absence of extramedullary involvement, this likely represents an MBL-non-CLL type. The morphology and immunophenotype are non-specific but in the setting of circulating lymphoma, the IgM paraprotein and MYD88 mutation, would favor lymphoplasmacytic lymphoma (LPL) / Waldenström macroglobulin (WM). Correlation with systemic imaging to evaluate for lymphadenopathy, hepatosplenomegaly and other potential sites of involvement for further characterization, is recommended to differentiate MBLs from circulating lymphoma, as clinically indicated. The World Health Organization (WHO) and various international scoring systems recommend all new diagnoses of LPL correlate with patient age, hemoglobin, platelet counts, serum cold agglutinin studies, serum cryoglobulin testing, hyperviscosity studies, hepatitis C testing, serum lactate dehydrogenase (LDH) and beta-2 microglobulin levels, given some of their associations, therapeutic and prognostic implications and to classify the disease by low, intermediate and high-risk.     FLOW CYTOMETRY STUDIES: [employer] #[id number]; see separate report        CYTOGENETIC FISH STUDIES:   Cytogenetic FISH studies are performed (7q-/-7 tri) and are reportedly negative (normal); ([employer] #[id number]; see separate report):           MOLECULAR STUDIES:   MYD88 Mutation Analysis is performed and is reportedly DETECTED ([employer] #[id number]; see separate report):           CXCR4 Mutation Analysis is performed and is reportedly NOT DETECTED ([employer] #[id number]; see separate report):       Image Results:  Image result are reviewed and documented in Hematology/Oncology history    2/8/2024: CT chest with contrast: Mild splenomegaly     8/1/2024: PET  CT scan:  1. 1.9 x 1.5 cm mildly FDG avid masslike consolidation/nodule involving the left upper lobe with mild halo of groundglass opacity, new since 2/8/2024. While findings could reflect an inflammatory process, underlying malignancy of low metabolic activity is the diagnosis of exclusion. Consider further evaluation with tissue sampling as clinically indicated. If an inflammatory process is favored, short interval follow-up with CT imaging in 6 to 8 weeks is recommended to ensure stability/document resolution.     2. There is otherwise no additional focal FDG activity to suggest hypermetabolic malignancy. Patient's known mild splenomegaly is non-FDG avid.     3. 2.4 cm rim calcified splenic artery aneurysm versus less likely calcified adrenal nodule. Consider vascular surgical consultation.     Please see above for details and additional findings.    Labs:  Lab data are reviewed and documented in [facility] history.     1/9/2018: WBC: 3.6, H/H: 15.8/44.9, MCV: 88.9, platelets: 114.  ANC: 2297  2/10/2024: WBC: 3.34, H/H: 12/36, MCV: 87, platelets: 125.  AST/ALT: 22/41, alk phos: 72.  Creatinine: 0.51    Lab Results   Component Value Date    HGB 12.2 12/02/2024    HCT 39.3 12/02/2024    MCV 90 12/02/2024     12/02/2024    WBC 3.68 (L) 12/02/2024    NRBC 0 12/02/2024    BANDSPCT 1 08/07/2024    ATYLMPCT 4 (H) 08/07/2024     Lab Results   Component Value Date     01/09/2018    K 4.7 12/02/2024     12/02/2024    CO2 28 12/02/2024    BUN 20 12/02/2024    CREATININE 0.65 12/02/2024    GLUCOSE 129 11/06/2024    GLUF 116 (H) 10/25/2024    CALCIUM 8.9 12/02/2024    CORRECTEDCA 9.4 02/10/2024    AST 26 12/02/2024    ALT 37 12/02/2024    ALKPHOS 118 (H) 12/02/2024    PROT 5.8 (L) 01/09/2018    BILITOT 1.4 (H) 01/09/2018    EGFR 102 12/02/2024       Lab Results   Component Value Date    FERRITIN 16 (L) 11/16/2022    FERRITIN 16 (L) 05/17/2022       Lab Results   Component Value Date    BYEAUSMP96 400  2024    SFYPUUME70 302 2024    [id number] 417 2018: Flow cytometry: Small kappa monoclonal B-cell population identified in peripheral blood    2024:  SPEP with DALY: Serum immunofixation shows a monoclonal gammopathy identified as IgM-kappa. The history of splenomegaly and circulating monoclonal-kappa B-cells with MYD88 mutation is noted.  M-Fco: 0.11 g/deciliter  UPEP: Show selective proteinuria.  Urine immunofixation shows a faint monoclonal band identified as free kappa light chains  FK.5, FL.5, K/L ratio: 0.37  Ig, IgM: 234, IgA: 55.  B2M: 1.8  Methylmalonic acid: 170  LDH: 338    2024:  SPEP: Pending  Ig, IgM: 310, IgA: 83.  B2M: Pending  Free light chains: Pending  LDH: 263  WBC: 3.69, H/H: 12.2/29.3, MCV 90, platelets 177.  Creatinine 0.65    Disclaimer: This document was prepared using [employer] technology. If a word or phrase is confusing, or does not make sense, this is likely due to recognition error which was not discovered during this clinician's review. If you believe an error has occurred, please contact me through [facility] service line for april?cation.

## 2024-12-04 ENCOUNTER — CLINICAL SUPPORT (OUTPATIENT)
Dept: CARDIAC REHAB | Facility: CLINIC | Age: 64
End: 2024-12-04
Payer: COMMERCIAL

## 2024-12-04 DIAGNOSIS — Z95.1 S/P CABG (CORONARY ARTERY BYPASS GRAFT): Primary | ICD-10-CM

## 2024-12-04 DIAGNOSIS — E10.319 DIABETIC RETINOPATHY OF RIGHT EYE ASSOCIATED WITH TYPE 1 DIABETES MELLITUS, MACULAR EDEMA PRESENCE UNSPECIFIED, UNSPECIFIED RETINOPATHY SEVERITY (HCC): ICD-10-CM

## 2024-12-04 DIAGNOSIS — Z86.718 HISTORY OF RECURRENT DEEP VEIN THROMBOSIS (DVT): ICD-10-CM

## 2024-12-04 DIAGNOSIS — I25.118 CORONARY ARTERY DISEASE OF NATIVE ARTERY OF NATIVE HEART WITH STABLE ANGINA PECTORIS (HCC): ICD-10-CM

## 2024-12-04 LAB
KAPPA LC FREE SER-MCNC: 25.3 MG/L (ref 3.3–19.4)
KAPPA LC FREE/LAMBDA FREE SER: 0.37 {RATIO} (ref 0.26–1.65)
LAMBDA LC FREE SERPL-MCNC: 68.2 MG/L (ref 5.7–26.3)

## 2024-12-04 PROCEDURE — 93797 PHYS/QHP OP CAR RHAB WO ECG: CPT

## 2024-12-04 NOTE — PROGRESS NOTES
CARDIAC REHABILITATION   ASSESSMENT AND INDIVIDUALIZED TREATMENT PLAN  INITIAL           Today's date: 2024   # of Exercise Sessions Completed: 1- initial eval   Patient name: Otoniel Martinez      : 1960  Age: 64 y.o.       MRN: [id number]  Referring Physician: Rodger Rodriguez DO  Cardiologist: Jc Adrian MD  Provider: Olu  Clinician: Jennifer Greene MS, CEP, EPC        Treatment is tailored to this patient's individual needs.  The ITP was reviewed with the patient and all questions were answered to their satisfaction.  Additional ITP documentation can be found electronically including daily and monthly exercise summaries, daily session notes with ECG summaries, education notes, daily medication reconciliation, and daily physician supervision.      Comments: upper body restriction from pacemaker until         Dx:   Encounter Diagnoses   Name Primary?    Coronary artery disease of native artery of native heart with stable angina pectoris (HCC)     S/P CABG (coronary artery bypass graft)     History of recurrent deep vein thrombosis (DVT)     Diabetic retinopathy of right eye associated with type 1 diabetes mellitus, macular edema presence unspecified, unspecified retinopathy severity ([facility])        Description of Diagnosis: Presented for elective cardiac catheterization on 10/31/24 due to ongoing dyspnea on exertion. Cath revealed MV disease including distal LM 80% as well as prox to mid Circ 90% and mid RCA 90% Status post CABG x 3, LIMA to the LAD, SVG to PDA and SVG to OM1   Date of onset: 24  Other Cardiac History: Pt with prior MI and RCA stenting . 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    Pancreatitis     three times    Sleep apnea     not using a C-PAP    Superficial thrombophlebitis     Vision loss     reading glasses over the counter       Family History:  Family History   Problem Relation Age of Onset    Brain cancer Mother     Clotting disorder Mother     Heart disease Mother     Cancer Mother     Hyperlipidemia Mother     Stroke Father     Deep vein thrombosis Father     Emphysema Father     Coronary artery disease Paternal Uncle     Diabetes Maternal Uncle        Allergies:   Oxycodone and Other    Current Medications:   Current Outpatient Medications   Medication Sig Dispense Refill    acetaminophen (TYLENOL) 650 mg CR tablet Take 1 tablet (650 mg total) by mouth every 4 (four) hours 30 tablet 0    aspirin (ECOTRIN LOW STRENGTH) 81 mg EC tablet Take 81 mg by mouth daily      atorvastatin (LIPITOR) 80 mg tablet Take 1 tablet (80 mg total) by mouth daily with dinner 30 tablet 1    budesonide-formoterol (Symbicort) 80-4.5 MCG/ACT inhaler Inhale 2 puffs 2 (two) times a day Rinse mouth after use. 30.6 g 5    cholecalciferol (VITAMIN D3) 400 units tablet Take 1 tablet by mouth every morning      Continuous Blood Gluc Sensor (Dexcom G6 Sensor) MISC Change every 10 days. E10.65      Continuous Blood Gluc Transmit (Dexcom G6 Transmitter) MISC Change every 90 days. E10.65      fluticasone (FLONASE) 50 mcg/act nasal spray 2 sprays into each nostril daily      Gvoke HypoPen 2-Pack 1 MG/0.2ML SOAJ  (Patient taking differently: No sig reported)      Insulin Disposable Pump (Omnipod 5 G6 Pods, Gen 5,) MISC INJECT 1 UNDER THE SKIN EVERY OTHER DAY. CHANGING EVERY 2 DAYS DUE TO HIGH INSULIN REQUIREMENT.      insulin glargine (LANTUS) 100 units/mL subcutaneous injection Inject under the skin PRN for when pod doesn't  work  Patient instructed to call office if pod not working to get direction on how much to give      levalbuterol (XOPENEX) 1.25 mg/3 mL nebulizer solution Take 1.25 mg by nebulization 3 (three) times a day as needed for wheezing      methocarbamol (ROBAXIN) 500 mg tablet Take 1 tablet (500 mg total) by mouth every 6 (six) hours as needed for muscle spasms 30 tablet 0    metoprolol tartrate (LOPRESSOR) 25 mg tablet Take 0.5 tablets (12.5 mg total) by mouth every 12 (twelve) hours 30 tablet 2    montelukast (SINGULAIR) 10 mg tablet TAKE 1 TABLET BY MOUTH EVERYDAY AT BEDTIME 90 tablet 1    NovoLOG 100 UNIT/ML injection Inject under the skin if needed for high blood sugar Take as needed sq as directed by doc if pump is not working       oxygen gas Inhale 3 L/min daily at bedtime. nasal cannula      pantoprazole (PROTONIX) 40 mg tablet TAKE 1 TABLET TWICE DAILY 30 MINS PRIOR TO BREAKFAST AND SUPPER. 180 tablet 3    sodium chloride 0.9 % nebulizer solution TAKE 3 ML BY NEBULIZATION AS NEEDED FOR WHEEZING 300 mL 3    warfarin (COUMADIN) 1 mg tablet Do not take any Coumadin unless instructed by the [facility] 30 tablet 1     No current facility-administered medications for this visit.       Medication compliance: Yes   Comments: Pt reports to be compliant with medications    Physical Limitations: R hip replacement     Fall Risk: Low   Comments:  fell to knees a few times before surgery, but no issues since    Cultural needs: none      CAD Risk Factors:  Cholesterol: Yes  HTN: Yes  DM:  Type 1, has CGM and insulin pump  Obesity: No   Inactivity: No      EXERCISE ASSESSMENT:     Initial Fitness Assessment: Estrella Treadmill Protocol:  Resting:  BP: 108/64  HR: 72, Exercise:  BP: 128/74  HR: 121, METs:  5.78, and Test terminated at:  RPE 6      ECG INTERPRETATION:  atrial paced    Current Functional Status:  Occupation: retired, watches grandchildren  Recreation/Physical Activity: golfing  ADL’s:Capable of performing  light to moderate ADLs limited by sternal precautions and pacemaker restrictions  [place]: able to perform self-care, not cleared to drive yet post-pacemaker   Home exercise:  arm and leg PT exercises and walking around the house        SMART Exercise Goals:   10% improvement in functional capacity based on max METs achieved in initial fitness assessment  improved DASI score by 10%  increased exercise capacity by 40% based on peak METs tolerated in cardiac rehab exercise session  maintain > 150 minutes per week of moderate intensity exercise    Patient Specific EXERCISE GOALS:       Return to golf  Be able to walking the dogs  Decrease fatigue    Functional Capacity Screening Tool:  [facility] Activity Status Index:  5.29 METs      PSYCHOSOCIAL ASSESSMENT:    Date of last Assessment:  12/4/24  Depression screening:  PHQ-9 = 2    Interpretation:  1-4 = Minimal Depression  Anxiety screening:  SARINA-7 = 0    Interpretation: 0-4  = Not anxious    Pt self-report of depression and anxiety   Patient reports they are coping well with good social support and denies depression or anxiety    Self-reported stress level:  2   Stressors:  not being able to used his arm  Stress Management Tools: exercise, keep a positive mindset, and enjoy a hobby    SMART Psychosocial Goals:     Physical Fitness in [place] Score < 3, Pain in [place] Score < 3, Overall Health in [place] Score < 3, and feel less tired with more energy    Patient Specific PSYCHOCOSOCIAL GOALS:    Improve sleeping  Manage stress    Quality of Life Screen:  (Higher score indicates disease impact on QOL)  [facility] COOP score: 19/45     Social Support:   spouse and children  Community/Social Activities: n/a     Psychosocial Assessment as it relates to rehabilitation:   Patient denies issues with his/her family or home life that may affect their rehabilitation efforts.       NUTRITION ASSESSMENT:    Initial Weight:  156  Current Weight:     Height:   Ht Readings from  "Last 1 Encounters:   12/03/24 5' 8\" (1.727 m)       Rate Your Plate Score: 60/81    Diabetes:  DM1, CGM and insulin pump  A1c: 8.4    last measured: 10/31/24    Lipid management: Discussed diet and lipid management and Last lipid profile 9/10/24  Chol 124  TRG 60  HDL 42  LDL 70    Current Dietary Habits:  Limiting salt and carbs  Ground chicken and turkey  Rare red meat, 1x/week    SMART Nutrition Goals:   Improved Rate Your Plate score  >64, eat 6 or more servings of grain products per day, eat whole grain breads, brown rice and whole grain cereals, eat 5 or more servings of fruits and vegetables a day, eat 2 or more servings of low fat milk or yogurt a day, eat no more than 6oz of meat per day, eat meatless meals twice a week or more, rarely eat cheese or choose reduced fat or skim, rarely eat frozen desserts or choose fruit or fat-free sweets, Do not cook with oil, butter or margarine, Do not eat fried foods, use \"light\" tub margarine on bread, potatoes and vegetables, choose healthy snacks such as fruit, pretzels, and low fat crackers, choose healthy desserts and sweets such as obdulia food cake or  fruit, never add salt to food when cooking or at the table, and choose low sugar desserts and sweets    Patient Specific NUTRITION GOALS:     1. Increase water intake   2. More meatless meals   3. Decrease sweets/ desserts    Drug/Alcohol Use:   Rare alcohol, 1 beer ~6 month      OTHER CORE COMPONENT ASSESSMENT:    Tobacco Use:     N/A:  Patient is a non-smoker     Anginal Symptoms:  None   NTG use: No prescription    SMART Goals:   consistent, controlled resting BP < 130/80 and medication compliance    Patient Specific CORE COMPONENT GOALS:    Monitor BP  Medication compliance      INDIVIDUALIZED TREATMENT PLAN      EXERCISE GOALS and PLAN      Progress toward Exercise goals:   Reviewed Pt goals and determined plan of care    Exercise Intervention/plan:    education on home exercise guidelines, home exercise 30+ mins " 2 days opposite CR, Group class: Risk Factors for Heart Disease, Patient education:   Exercise After Cardiac Rehabilitation, and After discharge patient will continue to follow a regular exercise regimen with the goal of a minimum of 150 minutes per week of moderate intensity exercise.      The patient was counseled on exercise guidelines to achieve a minimum of 150 mins/wk of moderate intensity (RPE 4-6)   exercise and encouraged to add 1-2 days of exercise on opposite days of cardiac rehab as tolerated.       PHYSICIAN PRESCRIBED EXERCISE:    Current Aerobic Exercise Prescription:      Frequency: 3 days/week   Supplement with home exercise 2+ days/wk as tolerated       Minutes: 20 - 40         METS: 3-4.5            HR:  pacer limits    RPE: 4-6         Modalities: Treadmill, Lifecycle, and NuStep     Exercise workloads will be progressed gradually as tolerated, within limits of patient's ability, and according to the patient's   response to the exercise program.      Aerobic Exercise Prescription Plan for Progression   Frequency: 3 days/week of cardiac rehab       Supplement with home exercise 2+ days/wk as tolerated    Minutes: 40       >150 mins/wk of moderate intensity exercise   METS: 3.5-5   HR:  pacer limits       RPE: 4-6   Modalities: Treadmill, Airdyne bike, Lifecycle, and Rower    Strength trainin-3 days / week  12-15 repetitions  1-2 sets per modality   Will be added following at least 8 weeks post surgery and 8-10 monitored sessions   Modalities: Chest Press, Pull Downs, Arm Curl, and Seated Row    Home Exercise:  PT exercise and walking around the house     Exercise Education: benefit of exercise for CAD risk factors, home exercise guidelines, AHA guidelines to achieve >150 mins/wk of moderate exercise, and RPE scale     Readiness to change: Preparation:  (Getting ready to change)       NUTRITION GOALS AND PLAN      Nutritional   Reviewed patient's Rate your Plate. Discussed key  "elements of heart healthy eating. Reviewed patient goals for dietary modifications and their clinical implications.  Reviewed most recent lipid profile.     Patient's progress toward Nutrition goals:    Reviewed Pt goals and determined plan of care      Nutrition Intervention/plan:   group class: Reading Food Labels, group Class: Heart Healthy Eating, replace refined flours with whole grains, increase daily intake of fruits and vegetables, increase daily intake of low fat dairy, limit meat intake to less than 6oz per day, choose healthy meals while dining out, eat more meatless meals, eat reduced-fat or part-skim cheese or rarely eat, rarely eat frozen desserts, cook without fats or oils, never/rarely eat fried foods, use \"light\" tub margarine, eat healthy snacks like fruit, pretzels, and low fat crackers, choose desserts such as fruit, obdulia food cake, low-fat or fat-free sweets, never/rarely add salt to food when cooking or at the table, and choose low sugar desserts and sweets    Measurable goals were based Rate Your Plate Dietary Self-Assessment. These are the areas in which the patient could score higher on the assessment.  Goals include recommendations for a heart healthy diet based on American Heart Association.    Nutrition Education:   heart healthy eating principles  maintaining hydration  nutrition for  lipid management  exercise and diabetes management     Readiness to change: Preparation:  (Getting ready to change)       PSYCHOSOCIAL GOALS AND PLAN    Psychosocial Assessment as it relates to rehabilitation:   Patient denies issues with his/her family or home life that may affect their rehabilitation efforts.     Patient's progress toward Psychosocial goals:    Reviewed Pt goals and determined plan of care    Psychosocial Intervention/plan:   Class: Stress and Your Health, Class: Relaxation, Exercise, Keep a positive mindset, and Enjoy family    Psychosocial Education: benefits of a positive support " system and stress management techniques    Information to utilize [employer] was provided as well as contact information for counseling through  Behavioral Health and group psychotherapy groups available.    Readiness to change: Preparation:  (Getting ready to change)       OTHER CORE COMPONENTS GOALS and PLAN  Blood Pressure  Restin/64  Exercise: 128/74  Recovery: 104/72    Blood Pressure will be monitored throughout the program and cardiologist will be notified of elevated trends.    Pt will be encouraged to monitor home BP if advised by cardiologist.    Tobacco Intervention/plan:   N/A:  Pt is a non-smoker    Progress toward Core Component goals:   Reviewed Pt goals and determined plan of care    Other Core Components Intervention:   group class: Understanding Heart Disease, group class: Common Heart Medications, medication compliance, avoid processed foods, engage in regular exercise, check labels for sodium content, and monitor home BP    Group and Individual Education:  components of blood pressure management    Readiness to change: Preparation:  (Getting ready to change)

## 2024-12-05 ENCOUNTER — RESULTS FOLLOW-UP (OUTPATIENT)
Dept: HEMATOLOGY ONCOLOGY | Facility: CLINIC | Age: 64
End: 2024-12-05

## 2024-12-05 DIAGNOSIS — I25.118 CORONARY ARTERY DISEASE OF NATIVE ARTERY OF NATIVE HEART WITH STABLE ANGINA PECTORIS (HCC): Primary | ICD-10-CM

## 2024-12-05 LAB — B2 MICROGLOB SERPL-MCNC: 1.9 MG/L (ref 0.6–2.4)

## 2024-12-05 RX ORDER — ATORVASTATIN CALCIUM 40 MG/1
40 TABLET, FILM COATED ORAL DAILY
Qty: 90 TABLET | Refills: 1 | Status: SHIPPED | OUTPATIENT
Start: 2024-12-05

## 2024-12-05 NOTE — TELEPHONE ENCOUNTER
----- Message from Juli Bornson DO sent at 12/5/2024  3:20 PM EST -----  Please notify patient that I reviewed his previously pending labs (SPEP, free light chains) which remain stable.  No further testing required at this time.  Follow-up as previously mentioned with labs.

## 2024-12-05 NOTE — TELEPHONE ENCOUNTER
Returned telephone call spoke with Wife.  Reviewed Dr Bronson's note.  Wife stated she understands and Pt will have labs drawn prior to his appt in April.

## 2024-12-06 ENCOUNTER — CLINICAL SUPPORT (OUTPATIENT)
Dept: CARDIAC REHAB | Facility: CLINIC | Age: 64
End: 2024-12-06
Payer: COMMERCIAL

## 2024-12-06 DIAGNOSIS — Z86.718 HISTORY OF RECURRENT DEEP VEIN THROMBOSIS (DVT): ICD-10-CM

## 2024-12-06 DIAGNOSIS — Z95.1 S/P CABG (CORONARY ARTERY BYPASS GRAFT): ICD-10-CM

## 2024-12-06 DIAGNOSIS — E10.319 DIABETIC RETINOPATHY OF RIGHT EYE ASSOCIATED WITH TYPE 1 DIABETES MELLITUS, MACULAR EDEMA PRESENCE UNSPECIFIED, UNSPECIFIED RETINOPATHY SEVERITY (HCC): ICD-10-CM

## 2024-12-06 DIAGNOSIS — Z95.1 S/P CABG (CORONARY ARTERY BYPASS GRAFT): Primary | ICD-10-CM

## 2024-12-06 DIAGNOSIS — I25.118 CORONARY ARTERY DISEASE OF NATIVE ARTERY OF NATIVE HEART WITH STABLE ANGINA PECTORIS (HCC): ICD-10-CM

## 2024-12-06 PROCEDURE — 93798 PHYS/QHP OP CAR RHAB W/ECG: CPT

## 2024-12-06 RX ORDER — WARFARIN SODIUM 1 MG/1
TABLET ORAL
Qty: 90 TABLET | Refills: 1 | Status: SHIPPED | OUTPATIENT
Start: 2024-12-06

## 2024-12-10 ENCOUNTER — APPOINTMENT (OUTPATIENT)
Dept: LAB | Facility: IMAGING CENTER | Age: 64
End: 2024-12-10
Payer: COMMERCIAL

## 2024-12-10 ENCOUNTER — CLINICAL SUPPORT (OUTPATIENT)
Dept: CARDIAC REHAB | Facility: CLINIC | Age: 64
End: 2024-12-10
Payer: COMMERCIAL

## 2024-12-10 DIAGNOSIS — I82.409 THROMBOEMBOLISM OF DEEP VEINS OF LOWER EXTREMITY, UNSPECIFIED LATERALITY (HCC): ICD-10-CM

## 2024-12-10 DIAGNOSIS — Z95.1 S/P CABG (CORONARY ARTERY BYPASS GRAFT): Primary | ICD-10-CM

## 2024-12-10 LAB
INR PPP: 2.35 (ref 0.85–1.19)
PROTHROMBIN TIME: 25.7 SECONDS (ref 12.3–15)

## 2024-12-10 PROCEDURE — 36415 COLL VENOUS BLD VENIPUNCTURE: CPT

## 2024-12-10 PROCEDURE — 93798 PHYS/QHP OP CAR RHAB W/ECG: CPT

## 2024-12-10 PROCEDURE — 85610 PROTHROMBIN TIME: CPT

## 2024-12-11 ENCOUNTER — ANTICOAG VISIT (OUTPATIENT)
Dept: CARDIOLOGY CLINIC | Facility: CLINIC | Age: 64
End: 2024-12-11

## 2024-12-12 ENCOUNTER — CLINICAL SUPPORT (OUTPATIENT)
Dept: CARDIAC REHAB | Facility: CLINIC | Age: 64
End: 2024-12-12
Payer: COMMERCIAL

## 2024-12-12 DIAGNOSIS — Z95.1 S/P CABG (CORONARY ARTERY BYPASS GRAFT): Primary | ICD-10-CM

## 2024-12-12 PROCEDURE — 93798 PHYS/QHP OP CAR RHAB W/ECG: CPT

## 2024-12-13 ENCOUNTER — CLINICAL SUPPORT (OUTPATIENT)
Dept: CARDIAC REHAB | Facility: CLINIC | Age: 64
End: 2024-12-13
Payer: COMMERCIAL

## 2024-12-13 DIAGNOSIS — Z95.1 S/P CABG (CORONARY ARTERY BYPASS GRAFT): Primary | ICD-10-CM

## 2024-12-13 PROCEDURE — 93798 PHYS/QHP OP CAR RHAB W/ECG: CPT

## 2024-12-16 ENCOUNTER — APPOINTMENT (OUTPATIENT)
Dept: LAB | Facility: IMAGING CENTER | Age: 64
End: 2024-12-16
Payer: COMMERCIAL

## 2024-12-16 DIAGNOSIS — I82.409 THROMBOEMBOLISM OF DEEP VEINS OF LOWER EXTREMITY, UNSPECIFIED LATERALITY (HCC): ICD-10-CM

## 2024-12-16 LAB
INR PPP: 2.05 (ref 0.85–1.19)
PROTHROMBIN TIME: 23.2 SECONDS (ref 12.3–15)

## 2024-12-16 PROCEDURE — 36415 COLL VENOUS BLD VENIPUNCTURE: CPT

## 2024-12-16 PROCEDURE — 85610 PROTHROMBIN TIME: CPT

## 2024-12-17 ENCOUNTER — CLINICAL SUPPORT (OUTPATIENT)
Dept: CARDIAC REHAB | Facility: CLINIC | Age: 64
End: 2024-12-17
Payer: COMMERCIAL

## 2024-12-17 ENCOUNTER — ANTICOAG VISIT (OUTPATIENT)
Dept: CARDIOLOGY CLINIC | Facility: CLINIC | Age: 64
End: 2024-12-17

## 2024-12-17 DIAGNOSIS — Z95.1 S/P CABG (CORONARY ARTERY BYPASS GRAFT): Primary | ICD-10-CM

## 2024-12-17 PROCEDURE — 93798 PHYS/QHP OP CAR RHAB W/ECG: CPT

## 2024-12-17 NOTE — PROGRESS NOTES
PC to [facility] with good but lower INR of 2.05.  Advised same dosing of 2 mgs every M/W/F and 3 mgs all other days of the week.  Just for this week he is to take a 3 mgs tomorrow for boost.  Retest in one week.

## 2024-12-19 ENCOUNTER — CLINICAL SUPPORT (OUTPATIENT)
Dept: CARDIAC REHAB | Facility: CLINIC | Age: 64
End: 2024-12-19
Payer: COMMERCIAL

## 2024-12-19 DIAGNOSIS — Z95.1 S/P CABG (CORONARY ARTERY BYPASS GRAFT): Primary | ICD-10-CM

## 2024-12-19 PROCEDURE — 93798 PHYS/QHP OP CAR RHAB W/ECG: CPT

## 2024-12-20 ENCOUNTER — CLINICAL SUPPORT (OUTPATIENT)
Dept: CARDIAC REHAB | Facility: CLINIC | Age: 64
End: 2024-12-20
Payer: COMMERCIAL

## 2024-12-20 DIAGNOSIS — Z95.1 S/P CABG (CORONARY ARTERY BYPASS GRAFT): Primary | ICD-10-CM

## 2024-12-20 PROCEDURE — 93798 PHYS/QHP OP CAR RHAB W/ECG: CPT

## 2024-12-24 ENCOUNTER — CLINICAL SUPPORT (OUTPATIENT)
Dept: CARDIAC REHAB | Facility: CLINIC | Age: 64
End: 2024-12-24
Payer: COMMERCIAL

## 2024-12-24 DIAGNOSIS — Z95.1 S/P CABG (CORONARY ARTERY BYPASS GRAFT): Primary | ICD-10-CM

## 2024-12-24 PROCEDURE — 93798 PHYS/QHP OP CAR RHAB W/ECG: CPT

## 2024-12-26 ENCOUNTER — CLINICAL SUPPORT (OUTPATIENT)
Dept: CARDIAC REHAB | Facility: CLINIC | Age: 64
End: 2024-12-26
Payer: COMMERCIAL

## 2024-12-26 DIAGNOSIS — Z95.1 S/P CABG (CORONARY ARTERY BYPASS GRAFT): Primary | ICD-10-CM

## 2024-12-26 PROCEDURE — 93798 PHYS/QHP OP CAR RHAB W/ECG: CPT

## 2024-12-27 ENCOUNTER — CLINICAL SUPPORT (OUTPATIENT)
Dept: CARDIAC REHAB | Facility: CLINIC | Age: 64
End: 2024-12-27
Payer: COMMERCIAL

## 2024-12-27 DIAGNOSIS — Z95.1 S/P CABG (CORONARY ARTERY BYPASS GRAFT): Primary | ICD-10-CM

## 2024-12-27 PROCEDURE — 93798 PHYS/QHP OP CAR RHAB W/ECG: CPT

## 2024-12-30 ENCOUNTER — APPOINTMENT (OUTPATIENT)
Dept: LAB | Facility: IMAGING CENTER | Age: 64
End: 2024-12-30
Payer: COMMERCIAL

## 2024-12-30 ENCOUNTER — ANTICOAG VISIT (OUTPATIENT)
Dept: CARDIOLOGY CLINIC | Facility: CLINIC | Age: 64
End: 2024-12-30

## 2024-12-30 DIAGNOSIS — I82.409 THROMBOEMBOLISM OF DEEP VEINS OF LOWER EXTREMITY, UNSPECIFIED LATERALITY (HCC): ICD-10-CM

## 2024-12-30 DIAGNOSIS — I25.118 CORONARY ARTERY DISEASE OF NATIVE ARTERY OF NATIVE HEART WITH STABLE ANGINA PECTORIS (HCC): ICD-10-CM

## 2024-12-30 DIAGNOSIS — Z95.1 S/P CABG (CORONARY ARTERY BYPASS GRAFT): ICD-10-CM

## 2024-12-30 DIAGNOSIS — E10.319 DIABETIC RETINOPATHY OF RIGHT EYE ASSOCIATED WITH TYPE 1 DIABETES MELLITUS, MACULAR EDEMA PRESENCE UNSPECIFIED, UNSPECIFIED RETINOPATHY SEVERITY (HCC): ICD-10-CM

## 2024-12-30 DIAGNOSIS — Z86.718 HISTORY OF RECURRENT DEEP VEIN THROMBOSIS (DVT): ICD-10-CM

## 2024-12-30 LAB
INR PPP: 1.63 (ref 0.85–1.19)
PROTHROMBIN TIME: 19.5 SECONDS (ref 12.3–15)

## 2024-12-30 PROCEDURE — 85610 PROTHROMBIN TIME: CPT

## 2024-12-30 PROCEDURE — 36415 COLL VENOUS BLD VENIPUNCTURE: CPT

## 2024-12-30 RX ORDER — WARFARIN SODIUM 1 MG/1
TABLET ORAL
Qty: 90 TABLET | Refills: 4 | Status: SHIPPED | OUTPATIENT
Start: 2024-12-30

## 2024-12-30 NOTE — PROGRESS NOTES
Spoke with [facility], advised INR low, no missed doses, current dose verified. Advised to have patient take 2 mg Wed only, 3 mg all other days, will recheck 1/9/25

## 2024-12-31 ENCOUNTER — CLINICAL SUPPORT (OUTPATIENT)
Dept: CARDIAC REHAB | Facility: CLINIC | Age: 64
End: 2024-12-31
Payer: COMMERCIAL

## 2024-12-31 DIAGNOSIS — Z95.1 S/P CABG (CORONARY ARTERY BYPASS GRAFT): Primary | ICD-10-CM

## 2024-12-31 PROCEDURE — 93798 PHYS/QHP OP CAR RHAB W/ECG: CPT

## 2025-01-02 ENCOUNTER — CLINICAL SUPPORT (OUTPATIENT)
Dept: CARDIAC REHAB | Facility: CLINIC | Age: 65
End: 2025-01-02
Payer: COMMERCIAL

## 2025-01-02 DIAGNOSIS — Z95.1 S/P CABG (CORONARY ARTERY BYPASS GRAFT): Primary | ICD-10-CM

## 2025-01-02 PROCEDURE — 93798 PHYS/QHP OP CAR RHAB W/ECG: CPT

## 2025-01-02 NOTE — PROGRESS NOTES
CARDIAC REHABILITATION   ASSESSMENT AND INDIVIDUALIZED TREATMENT PLAN  30 DAY          Today's date: 2025   # of Exercise Sessions Completed: 13  Patient name: Otoniel Martinez      : 1960  Age: 64 y.o.       MRN: [id number]  Referring Physician: oRdger Rodriguez DO  Cardiologist: Jc Adrian MD  Provider: Olu  Clinician: Jennifer Greene, MS, CEP, EPC        Treatment is tailored to this patient's individual needs.  The ITP was reviewed with the patient and all questions were answered to their satisfaction.  Additional ITP documentation can be found electronically including daily and monthly exercise summaries, daily session notes with ECG summaries, education notes, daily medication reconciliation, and daily physician supervision.      Comments: 25: Otoniel has attended 13 exercise sessions in the past 30 days.   He tolerates 40 mins at 3.31 - 4.12 METs.  A light strength training component will be added in a future exercise session. He is tolerating progression of intensity levels to maintain RPE 4-6.   Resting BP 92/60 - 118/72 with Normal response to exercise reaching 96/68- 140/72.  Paced rhythm on tele observed. RHR 76 - 94  with Normal response to exercise reaching 102 - 130. No cardiac complaints. Oliverio is cleared to start using his upper next week, the arm bike will start to be incorporated into his exercise rx. His exercise program will be progressed as tolerated to maintain RPE 4-6. They will continue to be educated on lifestyle modification and encouraged to supplement with a home exercise program as tolerated.    24: upper body restriction from pacemaker until         Dx:   Encounter Diagnosis   Name Primary?    S/P CABG (coronary artery bypass graft) Yes       Description of Diagnosis: Presented for elective cardiac catheterization on 10/31/24 due to ongoing dyspnea on exertion. Cath revealed MV disease including distal LM 80% as well as prox to mid Circ 90% and mid RCA 90%  Status post CABG x 3, LIMA to the LAD, SVG to PDA and SVG to OM1   Date of onset: 11/6/24  Other Cardiac History: Pt with prior MI and RCA stenting 2004. 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    Pancreatitis     three times    Sleep apnea     not using a C-PAP    Superficial thrombophlebitis     Vision loss     reading glasses over the counter       Family History:  Family History   Problem Relation Age of Onset    Brain cancer Mother     Clotting disorder Mother     Heart disease Mother     Cancer Mother     Hyperlipidemia Mother     Stroke Father     Deep vein thrombosis Father     Emphysema Father     Coronary artery disease Paternal Uncle     Diabetes Maternal Uncle        Allergies:   Oxycodone and Other    Current Medications:   Current Outpatient Medications   Medication Sig Dispense Refill    acetaminophen (TYLENOL) 650 mg CR tablet Take 1 tablet (650 mg total) by mouth every 4 (four) hours 30 tablet 0    aspirin (ECOTRIN LOW STRENGTH) 81 mg EC tablet Take 81 mg by mouth daily      atorvastatin (LIPITOR) 40 mg tablet TAKE 1 TABLET BY MOUTH EVERY DAY 90 tablet 1    atorvastatin (LIPITOR) 80 mg tablet Take 1 tablet (80 mg total) by mouth daily with dinner 30 tablet 1    budesonide-formoterol (Symbicort) 80-4.5 MCG/ACT inhaler Inhale 2 puffs 2 (two) times a day Rinse mouth after use. 30.6 g 5    cholecalciferol (VITAMIN D3) 400 units tablet Take 1 tablet by mouth every morning      Continuous Blood Gluc Sensor (Dexcom G6 Sensor) MISC  Change every 10 days. E10.65      Continuous Blood Gluc Transmit (Dexcom G6 Transmitter) MISC Change every 90 days. E10.65      fluticasone (FLONASE) 50 mcg/act nasal spray 2 sprays into each nostril daily      Gvoke HypoPen 2-Pack 1 MG/0.2ML SOAJ  (Patient taking differently: No sig reported)      Insulin Disposable Pump (Omnipod 5 G6 Pods, Gen 5,) MISC INJECT 1 UNDER THE SKIN EVERY OTHER DAY. CHANGING EVERY 2 DAYS DUE TO HIGH INSULIN REQUIREMENT.      insulin glargine (LANTUS) 100 units/mL subcutaneous injection Inject under the skin PRN for when pod doesn't work  Patient instructed to call office if pod not working to get direction on how much to give      levalbuterol (XOPENEX) 1.25 mg/3 mL nebulizer solution Take 1.25 mg by nebulization 3 (three) times a day as needed for wheezing      methocarbamol (ROBAXIN) 500 mg tablet Take 1 tablet (500 mg total) by mouth every 6 (six) hours as needed for muscle spasms 30 tablet 0    metoprolol tartrate (LOPRESSOR) 25 mg tablet Take 0.5 tablets (12.5 mg total) by mouth every 12 (twelve) hours 30 tablet 2    montelukast (SINGULAIR) 10 mg tablet TAKE 1 TABLET BY MOUTH EVERYDAY AT BEDTIME 90 tablet 1    NovoLOG 100 UNIT/ML injection Inject under the skin if needed for high blood sugar Take as needed sq as directed by doc if pump is not working       oxygen gas Inhale 3 L/min daily at bedtime. nasal cannula      pantoprazole (PROTONIX) 40 mg tablet TAKE 1 TABLET TWICE DAILY 30 MINS PRIOR TO BREAKFAST AND SUPPER. 180 tablet 3    sodium chloride 0.9 % nebulizer solution TAKE 3 ML BY NEBULIZATION AS NEEDED FOR WHEEZING 300 mL 3    warfarin (COUMADIN) 1 mg tablet 1 to 3 tablets daily as directed by MD according to lab results. 90 tablet 4     No current facility-administered medications for this visit.       Medication compliance: Yes   Comments: Pt reports to be compliant with medications    Physical Limitations: R hip replacement     Fall Risk: Low   Comments:  fell to knees a  few times before surgery, but no issues since    Cultural needs: none      CAD Risk Factors:  Cholesterol: Yes  HTN: Yes  DM:  Type 1, has CGM and insulin pump  Obesity: No   Inactivity: No      EXERCISE ASSESSMENT:     Initial Fitness Assessment: Estrella Treadmill Protocol:  Resting:  BP: 108/64  HR: 72, Exercise:  BP: 128/74  HR: 121, METs:  5.78, and Test terminated at:  RPE 6      ECG INTERPRETATION:  atrial paced    Current Functional Status:  Occupation: retired, watches grandchildren  Recreation/Physical Activity: golfing  ADL’s:Capable of performing light to moderate ADLs limited by sternal precautions and pacemaker restrictions  [place]: able to perform self-care, not cleared to drive yet post-pacemaker   Home exercise:  currently not doing        SMART Exercise Goals:   10% improvement in functional capacity based on max METs achieved in initial fitness assessment  improved DASI score by 10%  increased exercise capacity by 40% based on peak METs tolerated in cardiac rehab exercise session  maintain > 150 minutes per week of moderate intensity exercise    Patient Specific EXERCISE GOALS:       Return to golf  Be able to walking the dogs  Decrease fatigue    Functional Capacity Screening Tool:  [facility] Activity Status Index:  5.29 METs      PSYCHOSOCIAL ASSESSMENT:    Date of last Assessment:  12/4/24  Depression screening:  PHQ-9 = 2    Interpretation:  1-4 = Minimal Depression  Anxiety screening:  SARINA-7 = 0    Interpretation: 0-4  = Not anxious    Pt self-report of depression and anxiety   Patient reports they are coping well with good social support and denies depression or anxiety    Self-reported stress level:  2   Stressors:  not being able to used his arm  Stress Management Tools: exercise, keep a positive mindset, and enjoy a hobby    SMART Psychosocial Goals:     Physical Fitness in [place] Score < 3, Pain in [place] Score < 3, Overall Health in [place] Score < 3, and feel less tired with  "more energy    Patient Specific PSYCHOCOSOCIAL GOALS:    Improve sleeping  Manage stress    Quality of Life Screen:  (Higher score indicates disease impact on QOL)  [facility] COOP score: 19/45     Social Support:   spouse and children  Community/Social Activities: n/a     Psychosocial Assessment as it relates to rehabilitation:   Patient denies issues with his/her family or home life that may affect their rehabilitation efforts.       NUTRITION ASSESSMENT:    Initial Weight:  156  Current Weight: 158.4    Height:   Ht Readings from Last 1 Encounters:   12/03/24 5' 8\" (1.727 m)       Rate Your Plate Score: 60/81    Diabetes:  DM1, CGM and insulin pump  A1c: 8.4    last measured: 10/31/24    Lipid management: Discussed diet and lipid management and Last lipid profile 9/10/24  Chol 124  TRG 60  HDL 42  LDL 70    Current Dietary Habits:  Limiting salt and carbs  Ground chicken and turkey  Rare red meat, 1x/week    SMART Nutrition Goals:   Improved Rate Your Plate score  >64, eat 6 or more servings of grain products per day, eat whole grain breads, brown rice and whole grain cereals, eat 5 or more servings of fruits and vegetables a day, eat 2 or more servings of low fat milk or yogurt a day, eat no more than 6oz of meat per day, eat meatless meals twice a week or more, rarely eat cheese or choose reduced fat or skim, rarely eat frozen desserts or choose fruit or fat-free sweets, Do not cook with oil, butter or margarine, Do not eat fried foods, use \"light\" tub margarine on bread, potatoes and vegetables, choose healthy snacks such as fruit, pretzels, and low fat crackers, choose healthy desserts and sweets such as obdulia food cake or  fruit, never add salt to food when cooking or at the table, and choose low sugar desserts and sweets    Patient Specific NUTRITION GOALS:     1. Increase water intake   2. More meatless meals   3. Decrease sweets/ desserts    Drug/Alcohol Use:   Rare alcohol, 1 beer ~6 month      OTHER CORE " COMPONENT ASSESSMENT:    Tobacco Use:     N/A:  Patient is a non-smoker     Anginal Symptoms:  None   NTG use: No prescription    SMART Goals:   consistent, controlled resting BP < 130/80 and medication compliance    Patient Specific CORE COMPONENT GOALS:    Monitor BP  Medication compliance      INDIVIDUALIZED TREATMENT PLAN      EXERCISE GOALS and PLAN      Progress toward Exercise goals:   Pt is progressing and showing improvement  toward the following goals:  is tolerating 40 mins of exercise at 3.31-4.12 METs, feels good overall, no cardiac s/s.  , Pt has not made progress toward the following goals: home exercise. , Will continue to educate and progress as tolerated. Is planning to return to walking his dogs    Exercise Intervention/plan:    education on home exercise guidelines, home exercise 30+ mins 2 days opposite CR, Group class: Risk Factors for Heart Disease, Patient education:   Exercise After Cardiac Rehabilitation, and After discharge patient will continue to follow a regular exercise regimen with the goal of a minimum of 150 minutes per week of moderate intensity exercise.      The patient was counseled on exercise guidelines to achieve a minimum of 150 mins/wk of moderate intensity (RPE 4-6)   exercise and encouraged to add 1-2 days of exercise on opposite days of cardiac rehab as tolerated.       PHYSICIAN PRESCRIBED EXERCISE:    Current Aerobic Exercise Prescription:      Frequency: 3 days/week   Supplement with home exercise 2+ days/wk as tolerated       Minutes: 40         METS: 3.31-4.12            HR:  pacer limits   HR: 102-130   RPE: 4-6         Modalities: Treadmill, NuStep, and Recumbent bike     Exercise workloads will be progressed gradually as tolerated, within limits of patient's ability, and according to the patient's   response to the exercise program.      Aerobic Exercise Prescription Plan for Progression   Frequency: 3 days/week of cardiac rehab       Supplement with home  "exercise 2+ days/wk as tolerated    Minutes: 40       >150 mins/wk of moderate intensity exercise   METS: 3.5-5   HR:  pacer limits       RPE: 4-6   Modalities: Treadmill, Airdyne bike, UBE, Lifecycle, and Rower    Strength trainin-3 days / week  12-15 repetitions  1-2 sets per modality   Will be added following at least 8 weeks post surgery and 8-10 monitored sessions   Modalities: Chest Press, Pull Downs, Arm Curl, and Seated Row    Home Exercise: none    Exercise Education: benefit of exercise for CAD risk factors, home exercise guidelines, AHA guidelines to achieve >150 mins/wk of moderate exercise, RPE scale, and physical activity/exercise in extreme weather conditions     Readiness to change: Action:  (Changing behavior)      NUTRITION GOALS AND PLAN    Nutritional   Reviewed patient's Rate your Plate. Discussed key elements of heart healthy eating. Reviewed patient goals for dietary modifications and their clinical implications.  Reviewed most recent lipid profile.     Patient's progress toward Nutrition goals:    Pt is progressing and showing improvement  toward the following goals:  weight maintenance, FBG <100- insulin pump.  , Pt has not made progress toward the following goals: reports eating pastries at night for snack. , Will continue to educate and progress as tolerated.      Nutrition Intervention/plan:   group Class: Heart Healthy Eating, replace refined flours with whole grains, increase daily intake of fruits and vegetables, increase daily intake of low fat dairy, limit meat intake to less than 6oz per day, choose healthy meals while dining out, eat more meatless meals, eat reduced-fat or part-skim cheese or rarely eat, rarely eat frozen desserts, cook without fats or oils, never/rarely eat fried foods, use \"light\" tub margarine, eat healthy snacks like fruit, pretzels, and low fat crackers, choose desserts such as fruit, obdulia food cake, low-fat or fat-free sweets, never/rarely add " salt to food when cooking or at the table, and choose low sugar desserts and sweets    Measurable goals were based Rate Your Plate Dietary Self-Assessment. These are the areas in which the patient could score higher on the assessment.  Goals include recommendations for a heart healthy diet based on American Heart Association.    Nutrition Education:   heart healthy eating principles  maintaining hydration  nutrition for  lipid management  exercise and diabetes management   group class:  Label Reading    Readiness to change: Preparation:  (Getting ready to change)       PSYCHOSOCIAL GOALS AND PLAN    Psychosocial Assessment as it relates to rehabilitation:   Patient denies issues with his/her family or home life that may affect their rehabilitation efforts.     Patient's progress toward Psychosocial goals:    Goals met: reports having no stress.    Psychosocial Intervention/plan:   Exercise, Keep a positive mindset, and Enjoy family    Psychosocial Education: benefits of a positive support system, stress management techniques, class:  Relaxation, and class:  Stress and Your Health     Information to utilize [employer] was provided as well as contact information for counseling through  Behavioral Health and group psychotherapy groups available.    Readiness to change: Maintenance: (Maintaining the behavior change)      OTHER CORE COMPONENTS GOALS and PLAN  Blood Pressure  Restin/60- 118/72  Exercise: 96/68- 140/72  Recovery: 90/62- 120/80    Blood Pressure will be monitored throughout the program and cardiologist will be notified of elevated trends.    Pt will be encouraged to monitor home BP if advised by cardiologist.    Tobacco Intervention/plan:   N/A:  Pt is a non-smoker    Progress toward Core Component goals:   Pt is progressing and showing improvement  toward the following goals:  medication compliance, following restrictions.  , Will continue to educate and progress as tolerated. Will focus on  drinking more water to help with BP control    Other Core Components Intervention:   group class: Understanding Heart Disease, group class: Common Heart Medications, medication compliance, avoid processed foods, engage in regular exercise, check labels for sodium content, and monitor home BP    Group and Individual Education:  components of blood pressure management    Readiness to change: Preparation:  (Getting ready to change)

## 2025-01-03 ENCOUNTER — CLINICAL SUPPORT (OUTPATIENT)
Dept: CARDIAC REHAB | Facility: CLINIC | Age: 65
End: 2025-01-03
Payer: COMMERCIAL

## 2025-01-03 DIAGNOSIS — Z95.1 S/P CABG (CORONARY ARTERY BYPASS GRAFT): Primary | ICD-10-CM

## 2025-01-03 PROCEDURE — 93798 PHYS/QHP OP CAR RHAB W/ECG: CPT

## 2025-01-07 ENCOUNTER — CLINICAL SUPPORT (OUTPATIENT)
Dept: CARDIAC REHAB | Facility: CLINIC | Age: 65
End: 2025-01-07
Payer: COMMERCIAL

## 2025-01-07 DIAGNOSIS — Z95.1 S/P CABG (CORONARY ARTERY BYPASS GRAFT): Primary | ICD-10-CM

## 2025-01-07 PROCEDURE — 93798 PHYS/QHP OP CAR RHAB W/ECG: CPT

## 2025-01-09 ENCOUNTER — NURSE TRIAGE (OUTPATIENT)
Age: 65
End: 2025-01-09

## 2025-01-09 ENCOUNTER — RESULTS FOLLOW-UP (OUTPATIENT)
Dept: CARDIOLOGY CLINIC | Facility: CLINIC | Age: 65
End: 2025-01-09

## 2025-01-09 ENCOUNTER — CLINICAL SUPPORT (OUTPATIENT)
Dept: CARDIAC REHAB | Facility: CLINIC | Age: 65
End: 2025-01-09
Payer: COMMERCIAL

## 2025-01-09 ENCOUNTER — APPOINTMENT (OUTPATIENT)
Dept: LAB | Facility: IMAGING CENTER | Age: 65
End: 2025-01-09
Payer: COMMERCIAL

## 2025-01-09 DIAGNOSIS — Z95.1 S/P CABG (CORONARY ARTERY BYPASS GRAFT): Primary | ICD-10-CM

## 2025-01-09 DIAGNOSIS — I82.409 THROMBOEMBOLISM OF DEEP VEINS OF LOWER EXTREMITY, UNSPECIFIED LATERALITY (HCC): ICD-10-CM

## 2025-01-09 LAB
INR PPP: 1.42 (ref 0.85–1.19)
PROTHROMBIN TIME: 17.6 SECONDS (ref 12.3–15)

## 2025-01-09 PROCEDURE — 85610 PROTHROMBIN TIME: CPT

## 2025-01-09 PROCEDURE — 36415 COLL VENOUS BLD VENIPUNCTURE: CPT

## 2025-01-09 PROCEDURE — 93798 PHYS/QHP OP CAR RHAB W/ECG: CPT

## 2025-01-09 NOTE — TELEPHONE ENCOUNTER
Pt's wife Oliva called stating that pt had open heart surgery on 11/6 and then had pacemaker implanted on 11/8. Oliva is concerned because pt attempted to start a pull string  today. She states that he says his chest is sore and she wanting to know if pt could have a chest xray to make sure that everything is okay?     Warmed transfer to device as well to make sure that device is operating properly.    Please advise on xray

## 2025-01-09 NOTE — TELEPHONE ENCOUNTER
Spoke with patient's wife over the phone.  She states he is having soreness in his chest when he pushes.  I suspect he has a strained muscle, likely chest wall discomfort.  I reassured her that this is not likely to be cardiac.  I do not think an x-ray will help.  He may take some Tylenol, try warm compresses, rest, and avoidance of any jerking movements or heavy lifting in the near future.  She expresses understanding.

## 2025-01-10 ENCOUNTER — ANTICOAG VISIT (OUTPATIENT)
Dept: CARDIOLOGY CLINIC | Facility: CLINIC | Age: 65
End: 2025-01-10

## 2025-01-10 ENCOUNTER — CLINICAL SUPPORT (OUTPATIENT)
Dept: CARDIAC REHAB | Facility: CLINIC | Age: 65
End: 2025-01-10
Payer: COMMERCIAL

## 2025-01-10 DIAGNOSIS — Z95.1 S/P CABG (CORONARY ARTERY BYPASS GRAFT): Primary | ICD-10-CM

## 2025-01-10 PROCEDURE — 93798 PHYS/QHP OP CAR RHAB W/ECG: CPT

## 2025-01-10 NOTE — PROGRESS NOTES
Left message for Oliva, advised patients INR is lower than it was last week, even with increased dose of Coumadin. Advised to call office to discuss further    [id number]~Oliva returned my call, current dose verified, no missed doses, no new medications or changes in diet.   Advised to have patient take 6 mg tonight, then take 4 mg Mon Fri, 3 mg all other days, will recheck next week 1/16/25

## 2025-01-14 ENCOUNTER — TELEPHONE (OUTPATIENT)
Age: 65
End: 2025-01-14

## 2025-01-14 ENCOUNTER — CLINICAL SUPPORT (OUTPATIENT)
Dept: CARDIAC REHAB | Facility: CLINIC | Age: 65
End: 2025-01-14
Payer: COMMERCIAL

## 2025-01-14 DIAGNOSIS — Z95.1 S/P CABG (CORONARY ARTERY BYPASS GRAFT): Primary | ICD-10-CM

## 2025-01-14 DIAGNOSIS — E10.319 DIABETIC RETINOPATHY OF RIGHT EYE ASSOCIATED WITH TYPE 1 DIABETES MELLITUS, MACULAR EDEMA PRESENCE UNSPECIFIED, UNSPECIFIED RETINOPATHY SEVERITY (HCC): ICD-10-CM

## 2025-01-14 DIAGNOSIS — I25.118 CORONARY ARTERY DISEASE OF NATIVE ARTERY OF NATIVE HEART WITH STABLE ANGINA PECTORIS (HCC): ICD-10-CM

## 2025-01-14 DIAGNOSIS — Z95.1 S/P CABG (CORONARY ARTERY BYPASS GRAFT): ICD-10-CM

## 2025-01-14 DIAGNOSIS — Z86.718 HISTORY OF RECURRENT DEEP VEIN THROMBOSIS (DVT): ICD-10-CM

## 2025-01-14 PROCEDURE — 93798 PHYS/QHP OP CAR RHAB W/ECG: CPT

## 2025-01-14 RX ORDER — ATORVASTATIN CALCIUM 80 MG/1
80 TABLET, FILM COATED ORAL
Qty: 90 TABLET | Refills: 1 | Status: SHIPPED | OUTPATIENT
Start: 2025-01-14

## 2025-01-14 NOTE — TELEPHONE ENCOUNTER
Pts wife, Oliva, called stating [facility] will not fill the atorvastatin (LIPITOR) 80 mg tablet. She has some questions.    She and her son are both on Crestor and Zetia for heart related issues. Oliva wants to know if these meds would be a better option for Oliverio than the Atorvastatin 80 mg.  Should she wait to discuss this with his cardiologist, Dr. Adrian, on Monday 1/20/25?  Should she give him two 40 mg tabs in the mean time?    Please advise Oliva at [phone]

## 2025-01-15 ENCOUNTER — HOSPITAL ENCOUNTER (OUTPATIENT)
Dept: CT IMAGING | Facility: HOSPITAL | Age: 65
Discharge: HOME | End: 2025-01-15
Attending: INTERNAL MEDICINE
Payer: COMMERCIAL

## 2025-01-15 DIAGNOSIS — R91.1 PULMONARY NODULE 1 CM OR GREATER IN DIAMETER: ICD-10-CM

## 2025-01-15 PROCEDURE — 71250 CT THORAX DX C-: CPT

## 2025-01-16 ENCOUNTER — CLINICAL SUPPORT (OUTPATIENT)
Dept: CARDIAC REHAB | Facility: CLINIC | Age: 65
End: 2025-01-16
Payer: COMMERCIAL

## 2025-01-16 DIAGNOSIS — Z95.1 S/P CABG (CORONARY ARTERY BYPASS GRAFT): Primary | ICD-10-CM

## 2025-01-16 PROCEDURE — 93798 PHYS/QHP OP CAR RHAB W/ECG: CPT

## 2025-01-17 ENCOUNTER — APPOINTMENT (EMERGENCY)
Dept: CT IMAGING | Facility: HOSPITAL | Age: 65
End: 2025-01-17
Payer: COMMERCIAL

## 2025-01-17 ENCOUNTER — HOSPITAL ENCOUNTER (EMERGENCY)
Facility: HOSPITAL | Age: 65
Discharge: HOME | End: 2025-01-18
Payer: COMMERCIAL

## 2025-01-17 ENCOUNTER — ANTICOAG VISIT (OUTPATIENT)
Dept: CARDIOLOGY CLINIC | Facility: CLINIC | Age: 65
End: 2025-01-17

## 2025-01-17 ENCOUNTER — APPOINTMENT (OUTPATIENT)
Dept: LAB | Facility: IMAGING CENTER | Age: 65
End: 2025-01-17
Payer: COMMERCIAL

## 2025-01-17 ENCOUNTER — CLINICAL SUPPORT (OUTPATIENT)
Dept: CARDIAC REHAB | Facility: CLINIC | Age: 65
End: 2025-01-17
Payer: COMMERCIAL

## 2025-01-17 ENCOUNTER — RESULTS FOLLOW-UP (OUTPATIENT)
Dept: INTERNAL MEDICINE CLINIC | Facility: OTHER | Age: 65
End: 2025-01-17

## 2025-01-17 VITALS
RESPIRATION RATE: 20 BRPM | HEART RATE: 77 BPM | BODY MASS INDEX: 25.11 KG/M2 | TEMPERATURE: 99.3 F | OXYGEN SATURATION: 91 % | WEIGHT: 165.12 LBS | DIASTOLIC BLOOD PRESSURE: 75 MMHG | SYSTOLIC BLOOD PRESSURE: 148 MMHG

## 2025-01-17 DIAGNOSIS — I82.409 THROMBOEMBOLISM OF DEEP VEINS OF LOWER EXTREMITY, UNSPECIFIED LATERALITY (HCC): ICD-10-CM

## 2025-01-17 DIAGNOSIS — Z86.73 OLD CEREBROVASCULAR ACCIDENT (CVA) WITHOUT LATE EFFECT: ICD-10-CM

## 2025-01-17 DIAGNOSIS — Z95.0 PACEMAKER: ICD-10-CM

## 2025-01-17 DIAGNOSIS — Z87.19 HX OF PANCREATITIS: ICD-10-CM

## 2025-01-17 DIAGNOSIS — I25.118 CORONARY ARTERY DISEASE OF NATIVE ARTERY OF NATIVE HEART WITH STABLE ANGINA PECTORIS (HCC): ICD-10-CM

## 2025-01-17 DIAGNOSIS — Z95.1 S/P CABG (CORONARY ARTERY BYPASS GRAFT): Primary | ICD-10-CM

## 2025-01-17 DIAGNOSIS — Z99.81 ON HOME OXYGEN THERAPY: ICD-10-CM

## 2025-01-17 DIAGNOSIS — K52.9 GASTROENTERITIS: ICD-10-CM

## 2025-01-17 DIAGNOSIS — E16.2 LOW BLOOD SUGAR: Primary | ICD-10-CM

## 2025-01-17 DIAGNOSIS — Z95.1 S/P CABG (CORONARY ARTERY BYPASS GRAFT): ICD-10-CM

## 2025-01-17 DIAGNOSIS — E13.9 LADA (LATENT AUTOIMMUNE DIABETES IN ADULTS), MANAGED AS TYPE 1 (HCC): ICD-10-CM

## 2025-01-17 LAB
GLUCOSE SERPL-MCNC: 179 MG/DL (ref 65–140)
INR PPP: 1.41 (ref 0.85–1.19)
PROTHROMBIN TIME: 17.5 SECONDS (ref 12.3–15)

## 2025-01-17 PROCEDURE — 74177 CT ABD & PELVIS W/CONTRAST: CPT

## 2025-01-17 PROCEDURE — 36415 COLL VENOUS BLD VENIPUNCTURE: CPT

## 2025-01-17 PROCEDURE — 93798 PHYS/QHP OP CAR RHAB W/ECG: CPT

## 2025-01-17 PROCEDURE — 99285 EMERGENCY DEPT VISIT HI MDM: CPT

## 2025-01-17 PROCEDURE — 93005 ELECTROCARDIOGRAM TRACING: CPT

## 2025-01-17 PROCEDURE — 85610 PROTHROMBIN TIME: CPT

## 2025-01-17 PROCEDURE — 82948 REAGENT STRIP/BLOOD GLUCOSE: CPT

## 2025-01-17 RX ORDER — ACETAMINOPHEN 325 MG/1
975 TABLET ORAL ONCE
Status: COMPLETED | OUTPATIENT
Start: 2025-01-18 | End: 2025-01-18

## 2025-01-17 RX ORDER — ONDANSETRON 2 MG/ML
4 INJECTION INTRAMUSCULAR; INTRAVENOUS ONCE
Status: COMPLETED | OUTPATIENT
Start: 2025-01-18 | End: 2025-01-18

## 2025-01-17 NOTE — PROGRESS NOTES
Spoke with Oliva, advised INR low again, states he did miss 2 doses this week. Advised to take 6 mg tonight and tomorrow, then 4 mg Mon Fri, 3 mg all other days. Will recheck next week 1/24/25  States patient  has visit with surgeon Mon, if not cancelled, maybe will go back to Xarelto. She will let me know.

## 2025-01-18 ENCOUNTER — APPOINTMENT (EMERGENCY)
Dept: RADIOLOGY | Facility: HOSPITAL | Age: 65
End: 2025-01-18
Payer: COMMERCIAL

## 2025-01-18 LAB
ALBUMIN SERPL BCG-MCNC: 4.1 G/DL (ref 3.5–5)
ALP SERPL-CCNC: 81 U/L (ref 34–104)
ALT SERPL W P-5'-P-CCNC: 73 U/L (ref 7–52)
ANION GAP SERPL CALCULATED.3IONS-SCNC: 8 MMOL/L (ref 4–13)
AST SERPL W P-5'-P-CCNC: 51 U/L (ref 13–39)
ATRIAL RATE: 76 BPM
BASOPHILS # BLD AUTO: 0.02 THOUSANDS/ΜL (ref 0–0.1)
BASOPHILS NFR BLD AUTO: 0 % (ref 0–1)
BILIRUB SERPL-MCNC: 0.7 MG/DL (ref 0.2–1)
BUN SERPL-MCNC: 23 MG/DL (ref 5–25)
CALCIUM SERPL-MCNC: 8.9 MG/DL (ref 8.4–10.2)
CARDIAC TROPONIN I PNL SERPL HS: 4 NG/L (ref ?–50)
CHLORIDE SERPL-SCNC: 102 MMOL/L (ref 96–108)
CO2 SERPL-SCNC: 29 MMOL/L (ref 21–32)
CREAT SERPL-MCNC: 0.59 MG/DL (ref 0.6–1.3)
EOSINOPHIL # BLD AUTO: 0.13 THOUSAND/ΜL (ref 0–0.61)
EOSINOPHIL NFR BLD AUTO: 2 % (ref 0–6)
ERYTHROCYTE [DISTWIDTH] IN BLOOD BY AUTOMATED COUNT: 14.3 % (ref 11.6–15.1)
FLUAV AG UPPER RESP QL IA.RAPID: NEGATIVE
FLUBV AG UPPER RESP QL IA.RAPID: NEGATIVE
GFR SERPL CREATININE-BSD FRML MDRD: 106 ML/MIN/1.73SQ M
GLUCOSE SERPL-MCNC: 191 MG/DL (ref 65–140)
HCT VFR BLD AUTO: 40.9 % (ref 36.5–49.3)
HGB BLD-MCNC: 12.8 G/DL (ref 12–17)
IMM GRANULOCYTES # BLD AUTO: 0.02 THOUSAND/UL (ref 0–0.2)
IMM GRANULOCYTES NFR BLD AUTO: 0 % (ref 0–2)
LIPASE SERPL-CCNC: 7 U/L (ref 11–82)
LYMPHOCYTES # BLD AUTO: 0.16 THOUSANDS/ΜL (ref 0.6–4.47)
LYMPHOCYTES NFR BLD AUTO: 2 % (ref 14–44)
MCH RBC QN AUTO: 26.5 PG (ref 26.8–34.3)
MCHC RBC AUTO-ENTMCNC: 31.3 G/DL (ref 31.4–37.4)
MCV RBC AUTO: 85 FL (ref 82–98)
MONOCYTES # BLD AUTO: 0.59 THOUSAND/ΜL (ref 0.17–1.22)
MONOCYTES NFR BLD AUTO: 7 % (ref 4–12)
NEUTROPHILS # BLD AUTO: 7.62 THOUSANDS/ΜL (ref 1.85–7.62)
NEUTS SEG NFR BLD AUTO: 89 % (ref 43–75)
NRBC BLD AUTO-RTO: 0 /100 WBCS
P AXIS: 27 DEGREES
PLATELET # BLD AUTO: 173 THOUSANDS/UL (ref 149–390)
PMV BLD AUTO: 10.1 FL (ref 8.9–12.7)
POTASSIUM SERPL-SCNC: 3.9 MMOL/L (ref 3.5–5.3)
PR INTERVAL: 144 MS
PROT SERPL-MCNC: 6.9 G/DL (ref 6.4–8.4)
QRS AXIS: 0 DEGREES
QRSD INTERVAL: 86 MS
QT INTERVAL: 450 MS
QTC INTERVAL: 506 MS
RBC # BLD AUTO: 4.83 MILLION/UL (ref 3.88–5.62)
SARS-COV+SARS-COV-2 AG RESP QL IA.RAPID: NEGATIVE
SODIUM SERPL-SCNC: 139 MMOL/L (ref 135–147)
T WAVE AXIS: 74 DEGREES
VENTRICULAR RATE: 76 BPM
WBC # BLD AUTO: 8.54 THOUSAND/UL (ref 4.31–10.16)

## 2025-01-18 PROCEDURE — 36415 COLL VENOUS BLD VENIPUNCTURE: CPT

## 2025-01-18 PROCEDURE — 83690 ASSAY OF LIPASE: CPT

## 2025-01-18 PROCEDURE — 80053 COMPREHEN METABOLIC PANEL: CPT

## 2025-01-18 PROCEDURE — 84484 ASSAY OF TROPONIN QUANT: CPT

## 2025-01-18 PROCEDURE — 93010 ELECTROCARDIOGRAM REPORT: CPT | Performed by: INTERNAL MEDICINE

## 2025-01-18 PROCEDURE — 99285 EMERGENCY DEPT VISIT HI MDM: CPT

## 2025-01-18 PROCEDURE — 96374 THER/PROPH/DIAG INJ IV PUSH: CPT

## 2025-01-18 PROCEDURE — 71045 X-RAY EXAM CHEST 1 VIEW: CPT

## 2025-01-18 PROCEDURE — 87811 SARS-COV-2 COVID19 W/OPTIC: CPT

## 2025-01-18 PROCEDURE — 85025 COMPLETE CBC W/AUTO DIFF WBC: CPT

## 2025-01-18 PROCEDURE — 87804 INFLUENZA ASSAY W/OPTIC: CPT

## 2025-01-18 RX ORDER — ONDANSETRON 4 MG/1
4 TABLET, ORALLY DISINTEGRATING ORAL EVERY 6 HOURS PRN
Qty: 6 TABLET | Refills: 0 | Status: SHIPPED | OUTPATIENT
Start: 2025-01-18

## 2025-01-18 RX ADMIN — IOHEXOL 100 ML: 350 INJECTION, SOLUTION INTRAVENOUS at 00:03

## 2025-01-18 RX ADMIN — ONDANSETRON 4 MG: 2 INJECTION INTRAMUSCULAR; INTRAVENOUS at 00:08

## 2025-01-18 RX ADMIN — ACETAMINOPHEN 975 MG: 325 TABLET, FILM COATED ORAL at 00:28

## 2025-01-18 NOTE — ED PROVIDER NOTES
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), 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HCC)     1/18/2025  1:26 AM Amos Guillen [Z87.19] Hx of pancreatitis     1/18/2025  1:26 AM Amos Guillen [Z99.81] On home oxygen therapy     1/18/2025  1:26 AM Amos Guillen [I25.118] Coronary artery disease of native artery of native heart with stable angina pectoris (HCC)     1/18/2025  1:26 AM Amos Guillen [Z95.1] S/P CABG (coronary artery bypass graft)     1/18/2025  1:26 AM Amos Guillen [Z86.73] Old cerebrovascular accident (CVA) without late effect     1/18/2025  1:26 AM Amos Guillen [Z95.0] Pacemaker           ED Disposition       None          Assessment & Plan       Medical Decision Making  Patient is a 64-year-old male presenting for evaluation of hypoglycemia abdominal pain nausea vomiting    Differential: Likely viral syndrome.  Cholecystitis versus pancreatitis versus gastritis.  Will assess for ACS    Plan: Cardiac and abdominal labs EKG chest x-ray CT abdomen pelvis symptomatic treatment monitor and reassess final dispo pending    EKG normal sinus rhythm 76 no ischemic changes QTc 506    Labs unremarkable initial troponin 4 will obtain 2-hour delta    Patient signed out pending delta troponin and CT abdomen pelvis.  Hemodynamically stable at time of signout    Amount and/or Complexity of Data Reviewed  Labs: ordered.  Radiology: ordered and independent interpretation performed.    Risk  OTC drugs.  Prescription drug management.             Medications   acetaminophen (TYLENOL) tablet 975 mg (975 mg Oral Given 1/18/25 0028)   ondansetron (ZOFRAN) injection 4 mg (4 mg Intravenous Given 1/18/25 0008)   iohexol (OMNIPAQUE) 350  MG/ML injection (MULTI-DOSE) 100 mL (100 mL Intravenous Given 1/18/25 0003)       ED Risk Strat Scores                          SBIRT 20yo+      Flowsheet Row Most Recent Value   Initial Alcohol Screen: US AUDIT-C     1. How often do you have a drink containing alcohol? 0 Filed at: 01/18/2025 0030   2. How many drinks containing alcohol do you have on a typical day you are drinking?  0 Filed at: 01/18/2025 0030   3a. Male UNDER 65: How often do you have five or more drinks on one occasion? 0 Filed at: 01/18/2025 0030   Audit-C Score 0 Filed at: 01/18/2025 0030   FANTASMA: How many times in the past year have you...    Used an illegal drug or used a prescription medication for non-medical reasons? Never Filed at: 01/18/2025 0030                            History of Present Illness       Chief Complaint   Patient presents with    Medical Problem     Pt states felt blood sugar drop earlier and dexcom read 51, which then pt toook half of glucose pen, for ems pt's sugar was 235 and pt now c/o headahce, nausea and vomiting       Past Medical History:   Diagnosis Date    Acute respiratory failure with hypoxia (HCC) 02/08/2024    CAD (coronary artery disease)     Congenital myotonia     Deep vein thrombosis (DVT) (HCC)     after tear of gastrocnemus muscle    Diabetes (HCC)     Diabetes mellitus (HCC)     Difficulty walking     hip replacement    HL (hearing loss)     decreased both  ears    Myocardial infarction (HCC)     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    Pancreatitis     three times    Sleep apnea     not using a C-PAP    Superficial thrombophlebitis     Vision loss     reading glasses over the counter      Past Surgical History:   Procedure Laterality Date    CARDIAC CATHETERIZATION N/A 10/31/2024    Procedure: Cardiac catheterization;  Surgeon: Ky Sandoval MD;  Location: [facility];  Service: Cardiology    CARDIAC CATHETERIZATION N/A 10/31/2024    Procedure: Cardiac Coronary Angiogram;  Surgeon: Ky Sandoval  MD;  Location: [facility];  Service: Cardiology    CARDIAC ELECTROPHYSIOLOGY PROCEDURE N/A 11/8/2024    Procedure: Cardiac pacer implant;  Surgeon: Frankie Valdez DO;  Location: [facility];  Service: Cardiology    CAROTID STENT      CHOLECYSTECTOMY      CIRCUMCISION      Elective    COLONOSCOPY      complete, polyps 2 years ago    CORONARY ANGIOPLASTY WITH STENT PLACEMENT      stent to RCA 2004    INGUINAL HERNIA REPAIR      IR BIOPSY BONE MARROW  8/7/2024    ORIF RADIAL SHAFT FRACTURE Left     Open Treatment of Multiple Fractures of the Radial Shaft    ORIF ULNAR / RADIAL SHAFT FRACTURE Left     Open Treatment of Multiple Fractures of the Ulnar Shaft    NC CORONARY ARTERY BYP W/VEIN & ARTERY GRAFT 3 VEIN N/A 11/6/2024    Procedure: CORONARY ARTERY BYPASS GRAFT (CABG) 3 VESSELS, LIMA - LAD, LEFT LEG EVH/SVG - PDA AND OM1; JASEN;  Surgeon: Rodger Rodriguez DO;  Location: [facility];  Service: Cardiac Surgery    TONSILLECTOMY AND ADENOIDECTOMY        Family History   Problem Relation Age of Onset    Brain cancer Mother     Clotting disorder Mother     Heart disease Mother     Cancer Mother     Hyperlipidemia Mother     Stroke Father     Deep vein thrombosis Father     Emphysema Father     Coronary artery disease Paternal Uncle     Diabetes Maternal Uncle       Social History     Tobacco Use    Smoking status: Never    Smokeless tobacco: Never   Vaping Use    Vaping status: Never Used   Substance Use Topics    Alcohol use: Not Currently    Drug use: No      E-Cigarette/Vaping    E-Cigarette Use Never User       E-Cigarette/Vaping Substances    Nicotine No     THC No     CBD No     Flavoring No     Other No     Unknown No       I have reviewed and agree with the history as documented.     Patient is a 64-year-old male past medical history of CAD diabetes presenting for evaluation of low blood sugar.  Patient reports that he was having some abdominal pain with nausea and vomiting started earlier in the day  he has been having poor p.o. intake to monitor her sugars and been unable to raise them.  States that his last measurement was 51 took his glucose pen which resolved however patient still feeling unwell.  Denying any chest pain or shortness of breath.  Denies any other complaints          Review of Systems   Constitutional:  Positive for fatigue. Negative for chills and fever.   HENT:  Negative for ear pain and sore throat.    Eyes:  Negative for pain and visual disturbance.   Respiratory:  Negative for cough and shortness of breath.    Cardiovascular:  Negative for chest pain and palpitations.   Gastrointestinal:  Positive for abdominal pain, nausea and vomiting.   Genitourinary:  Negative for dysuria and hematuria.   Musculoskeletal:  Negative for arthralgias and back pain.   Skin:  Negative for color change and rash.   Neurological:  Negative for seizures and syncope.   All other systems reviewed and are negative.          Objective       ED Triage Vitals   Temperature Pulse Blood Pressure Respirations SpO2 Patient Position - Orthostatic VS   01/17/25 2329 01/17/25 2329 01/17/25 2329 01/17/25 2330 01/17/25 2329 --   99.3 °F (37.4 °C) 77 148/75 20 91 %       Temp Source Heart Rate Source BP Location FiO2 (%) Pain Score    01/17/25 2329 -- -- -- --    Oral          Vitals      Date and Time Temp Pulse SpO2 Resp BP Pain Score FACES Pain Rating User   01/17/25 2330 -- -- -- 20 -- -- -- ES   01/17/25 2329 99.3 °F (37.4 °C) 77 91 % -- 148/75 -- -- ES            Physical Exam  Vitals and nursing note reviewed.   Constitutional:       General: He is not in acute distress.     Appearance: He is well-developed. He is not ill-appearing.   HENT:      Head: Normocephalic and atraumatic.      Mouth/Throat:      Mouth: Mucous membranes are moist.   Eyes:      Extraocular Movements: Extraocular movements intact.      Conjunctiva/sclera: Conjunctivae normal.   Cardiovascular:      Rate and Rhythm: Normal rate and regular rhythm.       Heart sounds: No murmur heard.  Pulmonary:      Effort: Pulmonary effort is normal. No respiratory distress.      Breath sounds: Normal breath sounds.   Abdominal:      Palpations: Abdomen is soft.      Tenderness: There is generalized abdominal tenderness. There is no guarding or rebound.   Musculoskeletal:         General: Normal range of motion.      Cervical back: Normal range of motion and neck supple.      Right lower leg: No edema.      Left lower leg: No edema.   Skin:     General: Skin is warm and dry.      Capillary Refill: Capillary refill takes less than 2 seconds.   Neurological:      General: No focal deficit present.      Mental Status: He is alert.         Results Reviewed       Procedure Component Value Units Date/Time    HS Troponin 0hr (reflex protocol) [[id number]]  (Normal) Collected: 01/18/25 0010    Lab Status: Final result Specimen: Blood from Arm, Left Updated: 01/18/25 0040     hs TnI 0hr 4 ng/L     HS Troponin I 2hr [[id number]]     Lab Status: No result Specimen: Blood     Comprehensive metabolic panel [[id number]]  (Abnormal) Collected: 01/18/25 0010    Lab Status: Final result Specimen: Blood from Arm, Left Updated: 01/18/25 0037     Sodium 139 mmol/L      Potassium 3.9 mmol/L      Chloride 102 mmol/L      CO2 29 mmol/L      ANION GAP 8 mmol/L      BUN 23 mg/dL      Creatinine 0.59 mg/dL      Glucose 191 mg/dL      Calcium 8.9 mg/dL      AST 51 U/L      ALT 73 U/L      Alkaline Phosphatase 81 U/L      Total Protein 6.9 g/dL      Albumin 4.1 g/dL      Total Bilirubin 0.70 mg/dL      eGFR 106 ml/min/1.73sq m     Narrative:      National Kid[employer] guidelines for Chronic Kidney Disease (CKD):     Stage 1 with normal or high GFR (GFR > 90 mL/min/1.73 square meters)    Stage 2 Mild CKD (GFR = 60-89 mL/min/1.73 square meters)    Stage 3A Moderate CKD (GFR = 45-59 mL/min/1.73 square meters)    Stage 3B Moderate CKD (GFR = 30-44 mL/min/1.73 square meters)    Stage 4 Severe CKD  (GFR = 15-29 mL/min/1.73 square meters)    Stage 5 End Stage CKD (GFR <15 mL/min/1.73 square meters)  Note: GFR calculation is accurate only with a steady state creatinine    Lipase [[id number]]  (Abnormal) Collected: 01/18/25 0010    Lab Status: Final result Specimen: Blood from Arm, Left Updated: 01/18/25 0036     Lipase 7 u/L     FLU/COVID Rapid Antigen (30 min. TAT) - Preferred screening test in ED [[id number]]  (Normal) Collected: 01/18/25 0010    Lab Status: Final result Specimen: Nares from Nose Updated: 01/18/25 0035     SARS COV Rapid Antigen Negative     Influenza A Rapid Antigen Negative     Influenza B Rapid Antigen Negative    Narrative:      This test has been performed using the [employer] Autumn 2 FLU+SARS Antigen test under the Emergency Use Authorization (EUA). This test has been validated by the  and verified by the performing laboratory. The Autumn uses lateral flow immunofluorescent sandwich assay to detect SARS-COV, Influenza A and Influenza B Antigen.     The [employer] Autumn 2 SARS Antigen test does not differentiate between SARS-CoV and SARS-CoV-2.     Negative results are presumptive and may be confirmed with a molecular assay, if necessary, for patient management. Negative results do not rule out SARS-CoV-2 or influenza infection and should not be used as the sole basis for treatment or patient management decisions. A negative test result may occur if the level of antigen in a sample is below the limit of detection of this test.     Positive results are indicative of the presence of viral antigens, but do not rule out bacterial infection or co-infection with other viruses.     All test results should be used as an adjunct to clinical observations and other information available to the provider.    FOR PEDIATRIC PATIENTS - copy/paste COVID Guidelines URL to browser: [email or web address]    CBC and differential [[id number]]  (Abnormal)  Collected: 01/18/25 0010    Lab Status: Final result Specimen: Blood from Arm, Left Updated: 01/18/25 0017     WBC 8.54 Thousand/uL      RBC 4.83 Million/uL      Hemoglobin 12.8 g/dL      Hematocrit 40.9 %      MCV 85 fL      MCH 26.5 pg      MCHC 31.3 g/dL      RDW 14.3 %      MPV 10.1 fL      Platelets 173 Thousands/uL      nRBC 0 /100 WBCs      Segmented % 89 %      Immature Grans % 0 %      Lymphocytes % 2 %      Monocytes % 7 %      Eosinophils Relative 2 %      Basophils Relative 0 %      Absolute Neutrophils 7.62 Thousands/µL      Absolute Immature Grans 0.02 Thousand/uL      Absolute Lymphocytes 0.16 Thousands/µL      Absolute Monocytes 0.59 Thousand/µL      Eosinophils Absolute 0.13 Thousand/µL      Basophils Absolute 0.02 Thousands/µL     Fingerstick Glucose (POCT) [[id number]]  (Abnormal) Collected: 01/17/25 2330    Lab Status: Final result Specimen: Blood Updated: 01/17/25 2331     POC Glucose 179 mg/dl             XR chest 1 view portable   ED Interpretation by Amos Guillen DO (01/18 0022)   Wet read - pacer and glucose monitor, no acute cardiopulm dz      CT abdomen pelvis with contrast    (Results Pending)       Procedures    ED Medication and Procedure Management   Prior to Admission Medications   Prescriptions Last Dose Informant Patient Reported? Taking?   Continuous Blood Gluc Sensor (Dexcom G6 Sensor) MISC  Spouse/Significant Other Yes No   Sig: Change every 10 days. E10.65   Continuous Blood Gluc Transmit (Dexcom G6 Transmitter) MISC  Spouse/Significant Other Yes No   Sig: Change every 90 days. E10.65   Gvoke HypoPen 2-Pack 1 MG/0.2ML SOAJ  Spouse/Significant Other Yes No   Patient taking differently: No sig reported   Insulin Disposable Pump (Omnipod 5 G6 Pods, Gen 5,) MISC  Spouse/Significant Other Yes No   Sig: INJECT 1 UNDER THE SKIN EVERY OTHER DAY. CHANGING EVERY 2 DAYS DUE TO HIGH INSULIN REQUIREMENT.   NovoLOG 100 UNIT/ML injection  Spouse/Significant Other Yes No   Sig: Inject under  the skin if needed for high blood sugar Take as needed sq as directed by doc if pump is not working    acetaminophen (TYLENOL) 650 mg CR tablet  Spouse/Significant Other No No   Sig: Take 1 tablet (650 mg total) by mouth every 4 (four) hours   aspirin (ECOTRIN LOW STRENGTH) 81 mg EC tablet  Spouse/Significant Other Yes No   Sig: Take 81 mg by mouth daily   atorvastatin (LIPITOR) 40 mg tablet   No No   Sig: TAKE 1 TABLET BY MOUTH EVERY DAY   atorvastatin (LIPITOR) 80 mg tablet   No No   Sig: Take 1 tablet (80 mg total) by mouth daily with dinner   budesonide-formoterol (Symbicort) 80-4.5 MCG/ACT inhaler  Spouse/Significant Other No No   Sig: Inhale 2 puffs 2 (two) times a day Rinse mouth after use.   cholecalciferol (VITAMIN D3) 400 units tablet  Spouse/Significant Other Yes No   Sig: Take 1 tablet by mouth every morning   fluticasone (FLONASE) 50 mcg/act nasal spray  Spouse/Significant Other Yes No   Si sprays into each nostril daily   insulin glargine (LANTUS) 100 units/mL subcutaneous injection  Spouse/Significant Other Yes No   Sig: Inject under the skin PRN for when pod doesn't work  Patient instructed to call office if pod not working to get direction on how much to give   levalbuterol (XOPENEX) 1.25 mg/3 mL nebulizer solution  Spouse/Significant Other Yes No   Sig: Take 1.25 mg by nebulization 3 (three) times a day as needed for wheezing   methocarbamol (ROBAXIN) 500 mg tablet  Spouse/Significant Other No No   Sig: Take 1 tablet (500 mg total) by mouth every 6 (six) hours as needed for muscle spasms   metoprolol tartrate (LOPRESSOR) 25 mg tablet  Spouse/Significant Other No No   Sig: Take 0.5 tablets (12.5 mg total) by mouth every 12 (twelve) hours   montelukast (SINGULAIR) 10 mg tablet   No No   Sig: TAKE 1 TABLET BY MOUTH EVERYDAY AT BEDTIME   oxygen gas  Spouse/Significant Other Yes No   Sig: Inhale 3 L/min daily at bedtime. nasal cannula   pantoprazole (PROTONIX) 40 mg tablet  Spouse/Significant Other  No No   Sig: TAKE 1 TABLET TWICE DAILY 30 MINS PRIOR TO BREAKFAST AND SUPPER.   sodium chloride 0.9 % nebulizer solution   No No   Sig: TAKE 3 ML BY NEBULIZATION AS NEEDED FOR WHEEZING   warfarin (COUMADIN) 1 mg tablet   No No   Si to 3 tablets daily as directed by MD according to lab results.      Facility-Administered Medications: None     Patient's Medications   Discharge Prescriptions    No medications on file     No discharge procedures on file.  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

## 2025-01-18 NOTE — ED ATTENDING ATTESTATION
I, Rio Segura DO, saw and evaluated the patient. All available labs and X-rays were reviewed. I discussed the patient with the resident / non-physician and agree with the resident's / non-physician practitioner's findings and plan as documented in the resident's / non-physician practicitioner's note, except where noted. At this point, I agree with the current assessment done in the ED.     NAME: Otoniel Martinez  AGE: 64 y.o. SEX: male  : 1960   MRN: [id number]  ENCOUNTER: [id number]    Disposition   Active Problems:  There are no active Hospital Problems.      ED Disposition       ED Disposition   Discharge    Condition   Stable    Date/Time   Sat 2025  2:37 AM    Comment   Otoniel Martinez discharge to home/self care.                      Assessment/Plan   Medical Decision Making  Patient with history as below presented with multiple complaints. History obtained from patient.    Differential diagnosis includes: Gastroenteritis, symptomatic hypoglycemia, obstruction, electrolyte disturbance, anemia, ACS    Plan: CBC, CMP, lipase, troponin, Tylenol, fluids, CT abdomen pelvis    ECG independently interpreted by myself as below. Labs reviewed and unremarkable. Independently reviewed imaging with no acute cardiopulmonary disease, [facility]Rad's read of abdomen pelvis without emergent findings. Patient was treated with below with improvement in symptoms. Reassessed the patient and they continue to be well appearing. Presentation most consistent with gastroenteritis.  Suspect that his hypoglycemia was low in the setting of vomiting and diarrhea.  Instructed to discontinue his insulin pump use and use manual insulin with fingerstick glucose checks until he is able to follow-up with his endocrinologist. Stable for outpatient management.    Disposition: Discharged with instructions to obtain outpatient follow up of patient's symptoms and findings, with strict return precautions if patient develops new or worsening  symptoms. Patient understands this plan and is agreeable. All questions answered. Patient discharged home with return precautions.    Amount and/or Complexity of Data Reviewed  Labs: ordered.  Radiology: ordered and independent interpretation performed.    Risk  OTC drugs.  Prescription drug management.                        History of Present Illness     Patient is a 64 y.o. male with a significant past medical history of CAD, MI, diabetes, presenting for evaluation of multiple complaints.  Patient reports that he has been having some GI upset that started at approximately 6 PM.  Patient reports that he was monitoring his blood glucose and noticed that it was low so they gave him supplemental emergency sugar which resolved this.  Patient continues to have some GI upset.  He associates some nausea with several episodes of vomiting as well as several episodes of nonbloody, nonmelanotic diarrhea.  Patient describes a generalized crampy type sensation.  He denies any chest pain or difficulty breathing.  He denies any viral type symptoms.  He does report that his insulin pump was recently changed approximately 3 days ago, however his blood sugars have been correlating with his insulin readings on fingerstick checks.  He is otherwise without acute complaint.    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    Pancreatitis     three times    Sleep apnea     not using a C-PAP    Superficial thrombophlebitis     Vision loss     reading glasses over the counter       Past Surgical History     Past Surgical History:    Procedure Laterality Date    CARDIAC CATHETERIZATION N/A 10/31/2024    Procedure: Cardiac catheterization;  Surgeon: Ky Sandoval MD;  Location: [facility];  Service: Cardiology    CARDIAC CATHETERIZATION N/A 10/31/2024    Procedure: Cardiac Coronary Angiogram;  Surgeon: Ky Sandoval MD;  Location: [facility];  Service: Cardiology    CARDIAC ELECTROPHYSIOLOGY PROCEDURE N/A 11/8/2024    Procedure: Cardiac pacer implant;  Surgeon: Frankie Valdez DO;  Location: [facility];  Service: Cardiology    CAROTID STENT      CHOLECYSTECTOMY      CIRCUMCISION      Elective    COLONOSCOPY      complete, polyps 2 years ago    CORONARY ANGIOPLASTY WITH STENT PLACEMENT      stent to RCA 2004    INGUINAL HERNIA REPAIR      IR BIOPSY BONE MARROW  8/7/2024    ORIF RADIAL SHAFT FRACTURE Left     Open Treatment of Multiple Fractures of the Radial Shaft    ORIF ULNAR / RADIAL SHAFT FRACTURE Left     Open Treatment of Multiple Fractures of the Ulnar Shaft    PA CORONARY ARTERY BYP W/VEIN & ARTERY GRAFT 3 VEIN N/A 11/6/2024    Procedure: CORONARY ARTERY BYPASS GRAFT (CABG) 3 VESSELS, LIMA - LAD, LEFT LEG EVH/SVG - PDA AND OM1; JASEN;  Surgeon: Rodger Rodriguez DO;  Location: [facility];  Service: Cardiac Surgery    TONSILLECTOMY AND ADENOIDECTOMY         Social History     Social History     Substance and Sexual Activity   Alcohol Use Not Currently     Social History     Substance and Sexual Activity   Drug Use No     Social History     Tobacco Use   Smoking Status Never   Smokeless Tobacco Never       Family History     Family History   Problem Relation Age of Onset    Brain cancer Mother     Clotting disorder Mother     Heart disease Mother     Cancer Mother     Hyperlipidemia Mother     Stroke Father     Deep vein thrombosis Father     Emphysema Father     Coronary artery disease Paternal Uncle     Diabetes Maternal Uncle        Medications Prior to Admission     Prior to Admission medications    Medication Sig  Start Date End Date Taking? Authorizing Provider   acetaminophen (TYLENOL) 650 mg CR tablet Take 1 tablet (650 mg total) by mouth every 4 (four) hours 11/11/24   Oliva Sullivan PA-C   aspirin (ECOTRIN LOW STRENGTH) 81 mg EC tablet Take 81 mg by mouth daily    Historical Provider, MD   atorvastatin (LIPITOR) 40 mg tablet TAKE 1 TABLET BY MOUTH EVERY DAY 12/5/24   Carlos Hollins DO   atorvastatin (LIPITOR) 80 mg tablet Take 1 tablet (80 mg total) by mouth daily with dinner 1/14/25   Wandy Cheatham DO   budesonide-formoterol (Symbicort) 80-4.5 MCG/ACT inhaler Inhale 2 puffs 2 (two) times a day Rinse mouth after use. 2/6/24   Tomas Solano MD   cholecalciferol (VITAMIN D3) 400 units tablet Take 1 tablet by mouth every morning    Historical Provider, MD   Continuous Blood Gluc Sensor (Dexcom G6 Sensor) MISC Change every 10 days. E10.65 10/3/22   Historical Provider, MD   Continuous Blood Gluc Transmit (Dexcom G6 Transmitter) MISC Change every 90 days. E10.65 10/3/22   Historical Provider, MD   fluticasone (FLONASE) 50 mcg/act nasal spray 2 sprays into each nostril daily    Historical Provider, MD Garcia HypoPen 2-Pack 1 MG/0.2ML SOAJ  10/21/22   Historical Provider, MD   Insulin Disposable Pump (Omnipod 5 G6 Pods, Gen 5,) MISC INJECT 1 UNDER THE SKIN EVERY OTHER DAY. CHANGING EVERY 2 DAYS DUE TO HIGH INSULIN REQUIREMENT. 4/9/24   Historical Provider, MD   insulin glargine (LANTUS) 100 units/mL subcutaneous injection Inject under the skin PRN for when pod doesn't work  Patient instructed to call office if pod not working to get direction on how much to give    Historical Provider, MD   levalbuterol (XOPENEX) 1.25 mg/3 mL nebulizer solution Take 1.25 mg by nebulization 3 (three) times a day as needed for wheezing 8/25/24   Historical Provider, MD   methocarbamol (ROBAXIN) 500 mg tablet Take 1 tablet (500 mg total) by mouth every 6 (six) hours as needed for muscle spasms 11/11/24   Oliva Sullivan PA-C    metoprolol tartrate (LOPRESSOR) 25 mg tablet Take 0.5 tablets (12.5 mg total) by mouth every 12 (twelve) hours 11/11/24   Oliva Sullivan PA-C   montelukast (SINGULAIR) 10 mg tablet TAKE 1 TABLET BY MOUTH EVERYDAY AT BEDTIME 12/3/24   Wandy Cheatham DO   NovoLOG 100 UNIT/ML injection Inject under the skin if needed for high blood sugar Take as needed sq as directed by doc if pump is not working  4/24/24   Historical Provider, MD   oxygen gas Inhale 3 L/min daily at bedtime. nasal cannula    Historical Provider, MD   pantoprazole (PROTONIX) 40 mg tablet TAKE 1 TABLET TWICE DAILY 30 MINS PRIOR TO BREAKFAST AND SUPPER. 8/5/24   Keke Howard PA-C   sodium chloride 0.9 % nebulizer solution TAKE 3 ML BY NEBULIZATION AS NEEDED FOR WHEEZING 11/25/24   Amos Le MD   warfarin (COUMADIN) 1 mg tablet 1 to 3 tablets daily as directed by MD according to lab results. 12/30/24   Carlos Hollins DO       Allergies     Allergies   Allergen Reactions    Oxycodone Hallucinations     Spouse states to never give them to him, she felt something was wrong with him.     Other Cough     Grass / dogs hair        Objective     Vitals:    01/17/25 2329 01/17/25 2330   BP: 148/75    Pulse: 77    Resp:  20   Temp: 99.3 °F (37.4 °C)    TempSrc: Oral    SpO2: 91%    Weight: 74.9 kg (165 lb 2 oz)      Body mass index is 25.11 kg/m².  No intake or output data in the 24 hours ending 01/18/25 0621  Invasive Devices       None                   Review of Systems   Constitutional:  Negative for fever.   Respiratory:  Negative for shortness of breath.    Cardiovascular:  Negative for chest pain.   Gastrointestinal:  Positive for abdominal pain, diarrhea, nausea and vomiting.        Physical Exam  Vitals and nursing note reviewed.   Constitutional:       General: He is not in acute distress.     Appearance: Normal appearance. He is not ill-appearing or toxic-appearing.   HENT:      Head: Normocephalic and atraumatic.      Right Ear:  External ear normal.      Left Ear: External ear normal.      Nose: Nose normal.   Eyes:      General: No scleral icterus.        Right eye: No discharge.         Left eye: No discharge.      Extraocular Movements: Extraocular movements intact.      Conjunctiva/sclera: Conjunctivae normal.   Cardiovascular:      Rate and Rhythm: Normal rate.      Heart sounds: Normal heart sounds. No murmur heard.     No friction rub. No gallop.   Pulmonary:      Effort: Pulmonary effort is normal. No respiratory distress.      Breath sounds: Normal breath sounds.   Abdominal:      General: Abdomen is flat. There is no distension.      Palpations: Abdomen is soft. There is no mass.      Tenderness: There is generalized abdominal tenderness.   Genitourinary:     Comments: Deferred  Skin:     General: Skin is warm and dry.   Neurological:      General: No focal deficit present.      Mental Status: He is alert.          Medications   acetaminophen (TYLENOL) tablet 975 mg (975 mg Oral Given 1/18/25 0028)   ondansetron (ZOFRAN) injection 4 mg (4 mg Intravenous Given 1/18/25 0008)   iohexol (OMNIPAQUE) 350 MG/ML injection (MULTI-DOSE) 100 mL (100 mL Intravenous Given 1/18/25 0003)        Results Reviewed       Procedure Component Value Units Date/Time    HS Troponin 0hr (reflex protocol) [[id number]]  (Normal) Collected: 01/18/25 0010    Lab Status: Final result Specimen: Blood from Arm, Left Updated: 01/18/25 0040     hs TnI 0hr 4 ng/L     Comprehensive metabolic panel [[id number]]  (Abnormal) Collected: 01/18/25 0010    Lab Status: Final result Specimen: Blood from Arm, Left Updated: 01/18/25 0037     Sodium 139 mmol/L      Potassium 3.9 mmol/L      Chloride 102 mmol/L      CO2 29 mmol/L      ANION GAP 8 mmol/L      BUN 23 mg/dL      Creatinine 0.59 mg/dL      Glucose 191 mg/dL      Calcium 8.9 mg/dL      AST 51 U/L      ALT 73 U/L      Alkaline Phosphatase 81 U/L      Total Protein 6.9 g/dL      Albumin 4.1 g/dL      Total Bilirubin 0.70  mg/dL      eGFR 106 ml/min/1.73sq m     Narrative:      National Kid[employer] Disease Foundation guidelines for Chronic Kidney Disease (CKD):     Stage 1 with normal or high GFR (GFR > 90 mL/min/1.73 square meters)    Stage 2 Mild CKD (GFR = 60-89 mL/min/1.73 square meters)    Stage 3A Moderate CKD (GFR = 45-59 mL/min/1.73 square meters)    Stage 3B Moderate CKD (GFR = 30-44 mL/min/1.73 square meters)    Stage 4 Severe CKD (GFR = 15-29 mL/min/1.73 square meters)    Stage 5 End Stage CKD (GFR <15 mL/min/1.73 square meters)  Note: GFR calculation is accurate only with a steady state creatinine    Lipase [[id number]]  (Abnormal) Collected: 01/18/25 0010    Lab Status: Final result Specimen: Blood from Arm, Left Updated: 01/18/25 0036     Lipase 7 u/L     FLU/COVID Rapid Antigen (30 min. TAT) - Preferred screening test in ED [[id number]]  (Normal) Collected: 01/18/25 0010    Lab Status: Final result Specimen: Nares from Nose Updated: 01/18/25 0035     SARS COV Rapid Antigen Negative     Influenza A Rapid Antigen Negative     Influenza B Rapid Antigen Negative    Narrative:      This test has been performed using the Quidel Autumn 2 FLU+SARS Antigen test under the Emergency Use Authorization (EUA). This test has been validated by the  and verified by the performing laboratory. The Autumn uses lateral flow immunofluorescent sandwich assay to detect SARS-COV, Influenza A and Influenza B Antigen.     The Quidel Autumn 2 SARS Antigen test does not differentiate between SARS-CoV and SARS-CoV-2.     Negative results are presumptive and may be confirmed with a molecular assay, if necessary, for patient management. Negative results do not rule out SARS-CoV-2 or influenza infection and should not be used as the sole basis for treatment or patient management decisions. A negative test result may occur if the level of antigen in a sample is below the limit of detection of this test.     Positive results are indicative of the  presence of viral antigens, but do not rule out bacterial infection or co-infection with other viruses.     All test results should be used as an adjunct to clinical observations and other information available to the provider.    FOR PEDIATRIC PATIENTS - copy/paste COVID Guidelines URL to browser: [email or web address]    CBC and differential [[id number]]  (Abnormal) Collected: 01/18/25 0010    Lab Status: Final result Specimen: Blood from Arm, Left Updated: 01/18/25 0017     WBC 8.54 Thousand/uL      RBC 4.83 Million/uL      Hemoglobin 12.8 g/dL      Hematocrit 40.9 %      MCV 85 fL      MCH 26.5 pg      MCHC 31.3 g/dL      RDW 14.3 %      MPV 10.1 fL      Platelets 173 Thousands/uL      nRBC 0 /100 WBCs      Segmented % 89 %      Immature Grans % 0 %      Lymphocytes % 2 %      Monocytes % 7 %      Eosinophils Relative 2 %      Basophils Relative 0 %      Absolute Neutrophils 7.62 Thousands/µL      Absolute Immature Grans 0.02 Thousand/uL      Absolute Lymphocytes 0.16 Thousands/µL      Absolute Monocytes 0.59 Thousand/µL      Eosinophils Absolute 0.13 Thousand/µL      Basophils Absolute 0.02 Thousands/µL     Fingerstick Glucose (POCT) [[id number]]  (Abnormal) Collected: 01/17/25 2330    Lab Status: Final result Specimen: Blood Updated: 01/17/25 2331     POC Glucose 179 mg/dl              XR chest 1 view portable   ED Interpretation by Amos Guillen DO (01/18 0022)   Wet read - pacer and glucose monitor, no acute cardiopulm dz      CT abdomen pelvis with contrast    (Results Pending)        ED Course     ED Course as of 01/18/25 0621   Sat Jan 18, 2025   0236 Vrads:    IMPRESSION: 1. The left hemicolon is mildly distended with semi solid or formed stool, raising the possibility of but not diagnostic of constipation. 2. Borderline splenomegaly.    Procedure Note: EKG  Date/Time: 1/17/2025 3581  Interpreted by: Rio Segura   Indications / Diagnosis:  abdominal pain  ECG reviewed by me, the ED Provider: yes   The EKG demonstrates:  Rhythm: normal sinus  Intervals: prolonged QTc  Axis: normal axis  QRS/Blocks: normal QRS  ST Changes: No acute ST Changes, no STD/VERONICA.    Procedures   Procedures

## 2025-01-18 NOTE — DISCHARGE INSTRUCTIONS
Your evaluation suggests that your symptoms are due to a non emergent cause.    Please follow up with your primary care physician within two days.    Return to the Emergency Department if you experience worsening or concerning symptoms.    Thank you for choosing us for your care.

## 2025-01-20 ENCOUNTER — ANTICOAG VISIT (OUTPATIENT)
Dept: CARDIOLOGY CLINIC | Facility: CLINIC | Age: 65
End: 2025-01-20

## 2025-01-20 ENCOUNTER — OFFICE VISIT (OUTPATIENT)
Dept: CARDIOLOGY CLINIC | Facility: CLINIC | Age: 65
End: 2025-01-20
Payer: COMMERCIAL

## 2025-01-20 VITALS
HEIGHT: 68 IN | WEIGHT: 160 LBS | SYSTOLIC BLOOD PRESSURE: 118 MMHG | DIASTOLIC BLOOD PRESSURE: 84 MMHG | BODY MASS INDEX: 24.25 KG/M2 | HEART RATE: 73 BPM

## 2025-01-20 DIAGNOSIS — I77.810 MILD ASCENDING AORTA DILATION (HCC): ICD-10-CM

## 2025-01-20 DIAGNOSIS — E78.2 MIXED HYPERLIPIDEMIA: ICD-10-CM

## 2025-01-20 DIAGNOSIS — J30.2 SEASONAL ALLERGIES: ICD-10-CM

## 2025-01-20 DIAGNOSIS — E10.319 DIABETIC RETINOPATHY OF RIGHT EYE ASSOCIATED WITH TYPE 1 DIABETES MELLITUS, MACULAR EDEMA PRESENCE UNSPECIFIED, UNSPECIFIED RETINOPATHY SEVERITY (HCC): ICD-10-CM

## 2025-01-20 DIAGNOSIS — I25.118 CORONARY ARTERY DISEASE OF NATIVE ARTERY OF NATIVE HEART WITH STABLE ANGINA PECTORIS (HCC): ICD-10-CM

## 2025-01-20 DIAGNOSIS — I25.10 CORONARY ARTERY DISEASE INVOLVING NATIVE CORONARY ARTERY OF NATIVE HEART WITHOUT ANGINA PECTORIS: Primary | ICD-10-CM

## 2025-01-20 DIAGNOSIS — Z95.0 PACEMAKER: ICD-10-CM

## 2025-01-20 DIAGNOSIS — Z95.1 S/P CABG (CORONARY ARTERY BYPASS GRAFT): ICD-10-CM

## 2025-01-20 DIAGNOSIS — I49.5 SICK SINUS SYNDROME (HCC): ICD-10-CM

## 2025-01-20 DIAGNOSIS — Z86.718 HISTORY OF RECURRENT DEEP VEIN THROMBOSIS (DVT): ICD-10-CM

## 2025-01-20 PROCEDURE — 99214 OFFICE O/P EST MOD 30 MIN: CPT | Performed by: INTERNAL MEDICINE

## 2025-01-20 RX ORDER — METOPROLOL TARTRATE 25 MG/1
12.5 TABLET, FILM COATED ORAL EVERY 12 HOURS SCHEDULED
Qty: 90 TABLET | Refills: 1 | Status: CANCELLED | OUTPATIENT
Start: 2025-01-20

## 2025-01-20 RX ORDER — ACYCLOVIR 400 MG/1
TABLET ORAL
COMMUNITY
Start: 2025-01-15

## 2025-01-20 RX ORDER — METOPROLOL TARTRATE 25 MG/1
12.5 TABLET, FILM COATED ORAL EVERY 12 HOURS SCHEDULED
Qty: 90 TABLET | Refills: 1 | Status: SHIPPED | OUTPATIENT
Start: 2025-01-20

## 2025-01-20 RX ORDER — CEPHALEXIN 500 MG/1
CAPSULE ORAL
COMMUNITY
Start: 2025-01-20

## 2025-01-20 NOTE — TELEPHONE ENCOUNTER
Please be advised patients spouse called to follow up on this mornings medication refill as he is down to half a tablet for tonight and he needs the medication daily. Please send to the pharmacy as soon as possible and inform patient and or spouse once it has been sent. Thank you   Patient was seen by cardiology today, was told medications are handled via pcp at this time.     metoprolol tartrate (LOPRESSOR) 25 mg tablet

## 2025-01-20 NOTE — TELEPHONE ENCOUNTER
Patients wife called stating he was in the ED Friday 1/17 for a stomach bug. She is requesting advise on what he can do about his stomach ache. I offered to make an appointment for a er f/u but she said she had other appointments to take him to and would rather a call back from the provider. She also mentioned she has been having trouble getting metoprolol tartrate (LOPRESSOR) 25 mg tablet from [facility] on file and would like someone to give them a call to see why its not able to be filled yet. Please advise back to further assist.

## 2025-01-20 NOTE — PROGRESS NOTES
Cardiology Follow Up    Otoniel Martinez  1960  [id number]  [facility] [address] [address] [phone]    1. Coronary artery disease involving native coronary artery of native heart without angina pectoris  Comprehensive metabolic panel    Lipid Panel With Direct LDL      2. Sick sinus syndrome (HCC)        3. S/P CABG (coronary artery bypass graft)        4. Mixed hyperlipidemia  Comprehensive metabolic panel    Lipid Panel With Direct LDL      5. Pacemaker        6. Mild ascending aorta dilation (HCC)            Discussion/Summary:    Multivessel CAD - After having ongoing symptoms of shortness of breath Otoniel had a cardiac catheterization that showed left main and multivessel disease.  He is now status post CABG x 3 as described below.  His LV function is preserved.  Without symptoms of angina and has clinically improved.  He is currently enrolled in cardiac rehabilitation.  He will continue on aspirin, statin and beta-blocker.    Sick sinus syndrome - He had been bradycardic even preoperatively, and then was symptomatic with his bradycardia postoperatively.  He is now status post permanent pacemaker.  He is predominantly atrially paced.  He will continue to follow with the device clinic.    Hyperlipidemia - He remains on atorvastatin 80 mg daily.  We did order updated blood work.    Mild dilation of the ascending aorta - By CT scan this measures a 4.1 cm.  We will periodically follow this with scans into the future.  We will see him back in 6 months.      Interval History:     Mr. Martinez comes in for follow-up given his cardiac history.  His history began back in 2004 in which she had an MI with stenting of the right coronary at that time.  He had also been treated for hyperlipidemia and has a history of a prior DVT for which she had been on Xarelto.  More recently in August of last year Oliverio  was already did notice some increasing shortness of breath with exertion.  A stress nuclear study was performed in September that was overall unremarkable other than diaphragmatic attenuation artifact.  With ongoing symptoms and concern from the patient and his wife, with still no explanation for shortness of breath, a cardiac catheterization was arranged.  This did demonstrate left main and multivessel CAD.  He had an echocardiogram that showed normal LV systolic function with inferior lateral hypokinesis.  He also had mild dilation of the ascending aorta.    After being found to have multivessel disease he was admitted and seen by CT surgery.  He went through a preoperative workup and then on 11/6 he had CABG x 3 with a LIMA to the LAD, SVG to PDA and SVG to OM1.  It had been noted that he was not on beta-blocker due to bradycardia even before surgery.  He continued to have issues with bradycardia postoperatively and was symptomatic, and received a permanent pacemaker.  Device checks have been normal.    Since getting out of the hospital Oliverio is felt well.  His energy levels have increased steadily and he is enrolled in cardiac rehabilitation.  His shortness of breath has improved.  He denies chest pain or any symptoms of angina.  No signs or symptoms of CHF.  No palpitations, lightheadedness or any syncope.  He has been having vertiginous type dizziness.  He did go to the ER a few days back with nausea/vomiting and was hypoglycemic.  He is still recovering.      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), 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HCC)    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HCC)    Mixed hyperlipidemia    Vertigo    Polyneuropathy    Chronic gastritis without bleeding    Old cerebrovascular accident (CVA) without late effect    LOJA (dyspnea on exertion)    Vitamin D deficiency    History of recurrent  deep vein thrombosis (DVT)    Dysfunction of right rotator cuff    Primary osteoarthritis of right hip    Recurrent cold sores    Chronic bronchitis (HCC)    Abnormal CT scan of lung    Chronic sinusitis    Insulin pump status    Obstructive sleep apnea    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)    Splenomegaly    Establishing care with new doctor, encounter for    Decreased hearing of both ears    Memory loss    Seizure (HCC)    Monoclonal gammopathy    Hx of pancreatitis    On home oxygen therapy    Splenic artery aneurysm (HCC)    Carotid stenosis, asymptomatic, bilateral    Pulmonary nodule 1 cm or greater in diameter    Left main coronary artery disease    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[facility])    Symptomatic sinus bradycardia    S/P CABG (coronary artery bypass graft)    Pacemaker    Sick sinus syndrome ([facility])    Mild ascending aorta dilation (HCC)     Past Medical History:   Diagnosis Date    Acute respiratory failure with hypoxia ([facility]) 02/08/2024    CAD (coronary artery disease)     Congenital myotonia     Deep vein thrombosis (DVT) ([facility])     after tear of gastrocnemus muscle    Diabetes ([facility])     Diabetes mellitus (HCC)     Difficulty walking     hip replacement    HL (hearing loss)     decreased both  ears    Myocardial infarction ([facility])     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[facility]) 12/06/2017    Pancreatitis     three times    Sleep apnea     not using a C-PAP    Superficial thrombophlebitis     Vision loss     reading glasses over the counter     Social History     Socioeconomic History    Marital status: /Civil Union     Spouse name: Not on file    Number of children: Not on file    Years of education: Not on file    Highest education level: Not on file   Occupational History    Not on file   Tobacco Use    Smoking status: Never    Smokeless tobacco: Never   Vaping Use    Vaping status: Never Used   Substance and Sexual Activity    Alcohol use: Not Currently    Drug use: No    Sexual  activity: Yes     Partners: Female     Birth control/protection: None   Other Topics Concern    Not on file   Social History Narrative    Consumes 1 cup of tea  And 1 glass of tea per day        Weight loss     Social Drivers of Health     Financial Resource Strain: Low Risk  (12/4/2023)    [facility] from [facility], [facility]    Overall Financial Resource Strain (CARDIA)     Difficulty of Paying Living Expenses: Not very hard   Food Insecurity: No Food Insecurity (10/31/2024)    Nursing - Inadequate Food Risk Classification     Worried About Running Out of Food in the Last Year: Never true     Ran Out of Food in the Last Year: Never true     Ran Out of Food in the Last Year: Never true   Transportation Needs: No Transportation Needs (11/29/2024)    OASIS : Transportation     Lack of Transportation (Medical): No     Lack of Transportation (Non-Medical): No     Patient Unable or Declines to Respond: No   Physical Activity: Sufficiently Active (12/4/2023)    Received from [facility]    Exercise Vital Sign     Days of Exercise per Week: 1 day     Minutes of Exercise per Session: 150+ min   Stress: No Stress Concern Present (12/4/2023)    Received from [facility], [facility]    [employer] [place] [employer] - Occupational Stress Questionnaire     Feeling of Stress : Not at all   Social Connections: Socially Integrated (12/4/2023)    Received from [facility], [facility]    Social Connection and Isolation Panel [NHANES]     Frequency of Communication with Friends and Family: Three times a week     Frequency of Social Gatherings with Friends and Family: Never     Attends Gnosticism Services: More than 4 times per year     Active Member of Clubs or Organizations: Yes     Attends Club or Organization Meetings: More than 4 times per year     Marital Status:    Intimate Partner  Violence: Unknown (10/31/2024)    Nursing IPS     Feels Physically and Emotionally Safe: Not on file     Physically Hurt by Someone: Not on file     Humiliated or Emotionally Abused by Someone: Not on file     Physically Hurt by Someone: No     Hurt or Threatened by Someone: No   Housing Stability: Unknown (10/31/2024)    Nursing: Inadequate Housing Risk Classification     Has Housing: Not on file     Worried About Losing Housing: Not on file     Unable to Get Utilities: Not on file     Unable to Pay for Housing in the Last Year: No     Has Housin      Family History   Problem Relation Age of Onset    Brain cancer Mother     Clotting disorder Mother     Heart disease Mother     Cancer Mother     Hyperlipidemia Mother     Stroke Father     Deep vein thrombosis Father     Emphysema Father     Coronary artery disease Paternal Uncle     Diabetes Maternal Uncle      Past Surgical History:   Procedure Laterality Date    CARDIAC CATHETERIZATION N/A 10/31/2024    Procedure: Cardiac catheterization;  Surgeon: Ky Sandoval MD;  Location: [facility];  Service: Cardiology    CARDIAC CATHETERIZATION N/A 10/31/2024    Procedure: Cardiac Coronary Angiogram;  Surgeon: Ky Sandoval MD;  Location: [facility];  Service: Cardiology    CARDIAC ELECTROPHYSIOLOGY PROCEDURE N/A 2024    Procedure: Cardiac pacer implant;  Surgeon: Frankie Valdez DO;  Location: [facility];  Service: Cardiology    CAROTID STENT      CHOLECYSTECTOMY      CIRCUMCISION      Elective    COLONOSCOPY      complete, polyps 2 years ago    CORONARY ANGIOPLASTY WITH STENT PLACEMENT      stent to RCA     INGUINAL HERNIA REPAIR      IR BIOPSY BONE MARROW  2024    ORIF RADIAL SHAFT FRACTURE Left     Open Treatment of Multiple Fractures of the Radial Shaft    ORIF ULNAR / RADIAL SHAFT FRACTURE Left     Open Treatment of Multiple Fractures of the Ulnar Shaft    MS CORONARY ARTERY BYP W/VEIN & ARTERY GRAFT 3 VEIN N/A 2024     Procedure: CORONARY ARTERY BYPASS GRAFT (CABG) 3 VESSELS, LIMA - LAD, LEFT LEG EVH/SVG - PDA AND OM1; JASEN;  Surgeon: Rodger Rodriguez DO;  Location: [facility];  Service: Cardiac Surgery    TONSILLECTOMY AND ADENOIDECTOMY         Current Outpatient Medications:     aspirin (ECOTRIN LOW STRENGTH) 81 mg EC tablet, Take 81 mg by mouth daily, Disp: , Rfl:     atorvastatin (LIPITOR) 80 mg tablet, Take 1 tablet (80 mg total) by mouth daily with dinner, Disp: 90 tablet, Rfl: 1    budesonide-formoterol (Symbicort) 80-4.5 MCG/ACT inhaler, Inhale 2 puffs 2 (two) times a day Rinse mouth after use., Disp: 30.6 g, Rfl: 5    cephalexin (KEFLEX) 500 mg capsule, Take by mouth, Disp: , Rfl:     Continuous Glucose  (Dexcom G7 ) INÉS, Use as directed, Disp: , Rfl:     fluticasone (FLONASE) 50 mcg/act nasal spray, 2 sprays into each nostril daily, Disp: , Rfl:     insulin glargine (LANTUS) 100 units/mL subcutaneous injection, Inject under the skin PRN for when pod doesn't work Patient instructed to call office if pod not working to get direction on how much to give, Disp: , Rfl:     levalbuterol (XOPENEX) 1.25 mg/3 mL nebulizer solution, Take 1.25 mg by nebulization 3 (three) times a day as needed for wheezing, Disp: , Rfl:     metoprolol tartrate (LOPRESSOR) 25 mg tablet, Take 0.5 tablets (12.5 mg total) by mouth every 12 (twelve) hours, Disp: 30 tablet, Rfl: 2    montelukast (SINGULAIR) 10 mg tablet, TAKE 1 TABLET BY MOUTH EVERYDAY AT BEDTIME, Disp: 90 tablet, Rfl: 1    NovoLOG 100 UNIT/ML injection, Inject under the skin if needed for high blood sugar Take as needed sq as directed by doc if pump is not working , Disp: , Rfl:     ondansetron (ZOFRAN-ODT) 4 mg disintegrating tablet, Take 1 tablet (4 mg total) by mouth every 6 (six) hours as needed for nausea or vomiting for up to 6 doses, Disp: 6 tablet, Rfl: 0    oxygen gas, Inhale 3 L/min daily at bedtime. nasal cannula, Disp: , Rfl:     pantoprazole (PROTONIX) 40 mg  tablet, TAKE 1 TABLET TWICE DAILY 30 MINS PRIOR TO BREAKFAST AND SUPPER., Disp: 180 tablet, Rfl: 3    sodium chloride 0.9 % nebulizer solution, TAKE 3 ML BY NEBULIZATION AS NEEDED FOR WHEEZING, Disp: 300 mL, Rfl: 3    acetaminophen (TYLENOL) 650 mg CR tablet, Take 1 tablet (650 mg total) by mouth every 4 (four) hours, Disp: 30 tablet, Rfl: 0    atorvastatin (LIPITOR) 40 mg tablet, TAKE 1 TABLET BY MOUTH EVERY DAY, Disp: 90 tablet, Rfl: 1    cholecalciferol (VITAMIN D3) 400 units tablet, Take 1 tablet by mouth every morning, Disp: , Rfl:     Continuous Blood Gluc Sensor (Dexcom G6 Sensor) MISC, Change every 10 days. E10.65, Disp: , Rfl:     Continuous Blood Gluc Transmit (Dexcom G6 Transmitter) MISC, Change every 90 days. E10.65, Disp: , Rfl:     Gvoke HypoPen 2-Pack 1 MG/0.2ML SOAJ, , Disp: , Rfl:     Insulin Disposable Pump (Omnipod 5 G6 Pods, Gen 5,) MISC, INJECT 1 UNDER THE SKIN EVERY OTHER DAY. CHANGING EVERY 2 DAYS DUE TO HIGH INSULIN REQUIREMENT., Disp: , Rfl:   Allergies   Allergen Reactions    Oxycodone Hallucinations     Spouse states to never give them to him, she felt something was wrong with him.     Other Cough     Grass / dogs hair        Labs:  Lab Results   Component Value Date     01/09/2018    K 3.9 01/18/2025    K 4.1 12/22/2023     01/18/2025     12/22/2023    CO2 29 01/18/2025    CO2 24 11/06/2024    CO2 30 12/22/2023    BUN 23 01/18/2025    BUN 18 12/22/2023    CREATININE 0.59 (L) 01/18/2025    CREATININE 0.63 12/22/2023    GLUCOSE 129 11/06/2024    CALCIUM 8.9 01/18/2025    CALCIUM 9.7 12/22/2023     Lab Results   Component Value Date    WBC 8.54 01/18/2025    WBC 3.6 (L) 01/09/2018    HGB 12.8 01/18/2025    HGB 12.6 02/18/2022    HCT 40.9 01/18/2025    HCT 37.0 02/18/2022    MCV 85 01/18/2025    MCV 88.9 01/09/2018     01/18/2025     (L) 01/09/2018     Lab Results   Component Value Date    CHOL 133 01/09/2018    TRIG 209 (H) 10/31/2024    TRIG 105 01/09/2018     HDL 40 10/31/2024    HDL 56 01/09/2018     Imaging:     ECHO (10/31/2024):    Left Ventricle: Left ventricular cavity size is normal. Wall thickness is mildly increased. There is mild concentric hypertrophy. The left ventricular ejection fraction is 55%. Systolic function is normal. Diastolic function is mildly abnormal, consistent with grade I (abnormal) relaxation.    The following segments are hypokinetic: basal inferolateral and mid inferolateral.    All other segments are normal.    Right Ventricle: Right ventricular cavity size is normal. Systolic function is normal.    Aorta: The aortic root is normal in size. The ascending aorta is mildly dilated. Ao root is 3.10 cm. Asc Ao is 3.8 cm.      CT chest without contrast  Result Date: 1/18/2025  Narrative: CT CHEST WITHOUT IV CONTRAST INDICATION: R91.1: Solitary pulmonary nodule. COMPARISON: CT chest 11/10/2024, 2/8/2024 TECHNIQUE: CT examination of the chest was performed without intravenous contrast. Multiplanar 2D reformatted images were created from the source data. This examination, like all CT scans performed in the [facility], was performed utilizing techniques to minimize radiation dose exposure, including the use of iterative reconstruction and automated exposure control. Radiation dose length product (DLP) for this visit: 286 mGy-cm FINDINGS: LUNGS: Stable 2 mm right upper lobe nodule (series 3 image 43). Bibasilar atelectasis versus scarring. There is no tracheal or endobronchial lesion. PLEURA: Resolution of previously seen bilateral pleural effusions, left pneumothorax, and left upper pleural nodules. HEART/GREAT VESSELS: Postsurgical changes of prior CABG. Coronary artery calcifications. Pacemaker leads terminate in the right ventricle and right atrium. Ascending thoracic aorta measures 41 mm in diameter. MEDIASTINUM AND ADITYA: Unremarkable. CHEST WALL AND LOWER NECK: Pacemaker generator present in the left anterior chest wall. 1.6 cm  "sebaceous cyst in the posterior chest wall (series 2 image 87). VISUALIZED STRUCTURES IN THE UPPER ABDOMEN: Stable 1.9 cm peripherally calcified left adrenal nodule (series 2 image 111). OSSEOUS STRUCTURES: No acute fracture or destructive osseous lesion.     Impression: 1. Stable 2 mm right upper lobe pulmonary nodule. 2. Stable ascending thoracic aortic ectasia measuring 41 mm. Recommend annual follow-up with low-dose CT. 3. Resolution of previously seen bilateral pleural effusions, left pneumothorax, and left upper pleural nodules. Workstation performed: [id number]       XR chest 1 view portable  Result Date: 1/18/2025  Narrative: XR CHEST PORTABLE INDICATION: n/v abd pain. COMPARISON: Compared with 11/18/2024 FINDINGS: Left chest wall pacemaker with intact leads. Clear lungs. No pneumothorax or pleural effusion. Normal cardiomediastinal silhouette. Bones are unremarkable for age. Normal upper abdomen.     Impression: No acute cardiopulmonary disease. Workstation performed: [id number]     Cardiac EP device report  Result Date: 1/9/2025  Narrative: MDT DUAL PM/ ACTIVE SYSTEM IS MRI CONDITIONAL NB/PT CONCERN-CARELINK TRANSMISSION: NO SIGNIFICANT HIGH RATE EPISODES. BATTERY STATUS \"10 YRS.\" AP 93%  0%. ALL AVAILABLE LEAD PARAMETERS & TRENDS WITHIN NORMAL LIMITS. NORMAL DEVICE FUNCTION. NC       Review of Systems:  Review of Systems   Constitutional:  Positive for fatigue.   HENT: Negative.     Eyes: Negative.    Respiratory: Negative.     Cardiovascular: Negative.    Gastrointestinal: Negative.    Musculoskeletal: Negative.    Skin: Negative.    Allergic/Immunologic: Negative.    Neurological:  Positive for dizziness.   Hematological: Negative.    Psychiatric/Behavioral: Negative.     All other systems reviewed and are negative.    Vitals:    01/20/25 1334   BP: 118/84   BP Location: Right arm   Patient Position: Sitting   Cuff Size: Standard   Pulse: 73   Weight: 72.6 kg (160 lb)   Height: 5' 8\" (1.727 m) "     Physical Exam  Vitals and nursing note reviewed.   Constitutional:       Appearance: He is well-developed.   HENT:      Head: Normocephalic and atraumatic.   Eyes:      General: No scleral icterus.        Right eye: No discharge.         Left eye: No discharge.      Pupils: Pupils are equal, round, and reactive to light.   Neck:      Thyroid: No thyromegaly.      Vascular: No JVD.   Cardiovascular:      Rate and Rhythm: Normal rate and regular rhythm. No extrasystoles are present.     Pulses: Normal pulses. No decreased pulses.      Heart sounds: Normal heart sounds, S1 normal and S2 normal. No murmur heard.     No friction rub. No gallop.   Pulmonary:      Effort: Pulmonary effort is normal. No respiratory distress.      Breath sounds: Normal breath sounds. No wheezing, rhonchi or rales.   Abdominal:      General: Bowel sounds are normal. There is no distension.      Palpations: Abdomen is soft.      Tenderness: There is no abdominal tenderness.   Musculoskeletal:         General: No tenderness or deformity. Normal range of motion.      Cervical back: Normal range of motion and neck supple.      Right lower leg: No edema.      Left lower leg: No edema.   Skin:     General: Skin is warm and dry.      Findings: No rash.   Neurological:      Mental Status: He is alert and oriented to person, place, and time.      Cranial Nerves: No cranial nerve deficit.   Psychiatric:         Thought Content: Thought content normal.         Judgment: Judgment normal.       Counseling / Coordination of Care  Total office time spent today 25 minutes.  Greater than 50% of total time was spent with the patient and / or family counseling and / or coordination of care.

## 2025-01-21 ENCOUNTER — APPOINTMENT (OUTPATIENT)
Dept: CARDIAC REHAB | Facility: CLINIC | Age: 65
End: 2025-01-21
Payer: COMMERCIAL

## 2025-01-23 ENCOUNTER — CLINICAL SUPPORT (OUTPATIENT)
Dept: CARDIAC REHAB | Facility: CLINIC | Age: 65
End: 2025-01-23
Payer: COMMERCIAL

## 2025-01-23 DIAGNOSIS — Z95.1 S/P CABG (CORONARY ARTERY BYPASS GRAFT): Primary | ICD-10-CM

## 2025-01-23 PROCEDURE — 93798 PHYS/QHP OP CAR RHAB W/ECG: CPT

## 2025-01-24 ENCOUNTER — CLINICAL SUPPORT (OUTPATIENT)
Dept: CARDIAC REHAB | Facility: CLINIC | Age: 65
End: 2025-01-24
Payer: COMMERCIAL

## 2025-01-24 DIAGNOSIS — Z95.1 S/P CABG (CORONARY ARTERY BYPASS GRAFT): Primary | ICD-10-CM

## 2025-01-24 PROCEDURE — 93798 PHYS/QHP OP CAR RHAB W/ECG: CPT

## 2025-01-28 ENCOUNTER — CLINICAL SUPPORT (OUTPATIENT)
Dept: CARDIAC REHAB | Facility: CLINIC | Age: 65
End: 2025-01-28
Payer: COMMERCIAL

## 2025-01-28 DIAGNOSIS — Z95.1 S/P CABG (CORONARY ARTERY BYPASS GRAFT): Primary | ICD-10-CM

## 2025-01-28 PROCEDURE — 93798 PHYS/QHP OP CAR RHAB W/ECG: CPT

## 2025-01-30 ENCOUNTER — CLINICAL SUPPORT (OUTPATIENT)
Dept: CARDIAC REHAB | Facility: CLINIC | Age: 65
End: 2025-01-30
Payer: COMMERCIAL

## 2025-01-30 DIAGNOSIS — Z95.1 S/P CABG (CORONARY ARTERY BYPASS GRAFT): Primary | ICD-10-CM

## 2025-01-30 PROCEDURE — 93798 PHYS/QHP OP CAR RHAB W/ECG: CPT

## 2025-01-30 NOTE — PROGRESS NOTES
CARDIAC REHABILITATION   ASSESSMENT AND INDIVIDUALIZED TREATMENT PLAN  60 DAY          Today's date: 2025   # of Exercise Sessions Completed: 24  Patient name: Otoniel Martinez      : 1960  Age: 64 y.o.       MRN: [id number]  Referring Physician: Rodger Rodriguez DO  Cardiologist: Jc Adrian MD  Provider: Olu  Clinician: Jennifer Greene, MS, CEP, EPC        Treatment is tailored to this patient's individual needs.  The ITP was reviewed with the patient and all questions were answered to their satisfaction.  Additional ITP documentation can be found electronically including daily and monthly exercise summaries, daily session notes with ECG summaries, education notes, daily medication reconciliation, and daily physician supervision.      Comments: 25: Oliverio's blood sugar is uncontrolled and high when he comes into rehab. Patient typically eats cereal, bagel, etc for breakfast. We have discussed having a protein before coming to help with BS control. Patient stated that the reason he takes insulin is to be able to eat those items. Staff educated patient about diet for BS control and goal of BS <300 before starting exercise.     25: Otoniel has attended 13 exercise sessions in the past 30 days.   He tolerates 40 mins at 3.31 - 4.12 METs.  A light strength training component will be added in a future exercise session. He is tolerating progression of intensity levels to maintain RPE 4-6.   Resting BP 92/60 - 118/72 with Normal response to exercise reaching 96/68- 140/72.  Paced rhythm on tele observed. RHR 76 - 94  with Normal response to exercise reaching 102 - 130. No cardiac complaints. Oliverio is cleared to start using his upper next week, the arm bike will start to be incorporated into his exercise rx. His exercise program will be progressed as tolerated to maintain RPE 4-6. They will continue to be educated on lifestyle modification and encouraged to supplement with a home exercise program as  tolerated.    12/4/24: upper body restriction from pacemaker until Jan 7th        Dx:   Encounter Diagnosis   Name Primary?    S/P CABG (coronary artery bypass graft) Yes       Description of Diagnosis: Presented for elective cardiac catheterization on 10/31/24 due to ongoing dyspnea on exertion. Cath revealed MV disease including distal LM 80% as well as prox to mid Circ 90% and mid RCA 90% Status post CABG x 3, LIMA to the LAD, SVG to PDA and SVG to OM1   Date of onset: 11/6/24  Other Cardiac History: Pt with prior MI and RCA stenting 2004. 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    Pancreatitis     three times    Sleep apnea     not using a C-PAP    Superficial thrombophlebitis     Vision loss     reading glasses over the counter       Family History:  Family History   Problem Relation Age of Onset    Brain cancer Mother     Clotting disorder Mother     Heart disease Mother     Cancer Mother     Hyperlipidemia Mother     Stroke Father     Deep vein thrombosis Father     Emphysema Father     Coronary artery disease Paternal Uncle     Diabetes Maternal Uncle        Allergies:   Oxycodone and Other    Current Medications:   Current Outpatient Medications   Medication Sig Dispense Refill    acetaminophen (TYLENOL) 650 mg CR tablet Take 1 tablet (650 mg total) by mouth every 4 (four) hours 30 tablet 0    aspirin (ECOTRIN LOW STRENGTH) 81 mg EC tablet Take 81 mg by mouth daily      atorvastatin (LIPITOR) 40 mg tablet TAKE 1 TABLET BY MOUTH EVERY DAY  90 tablet 1    atorvastatin (LIPITOR) 80 mg tablet Take 1 tablet (80 mg total) by mouth daily with dinner 90 tablet 1    budesonide-formoterol (Symbicort) 80-4.5 MCG/ACT inhaler Inhale 2 puffs 2 (two) times a day Rinse mouth after use. 30.6 g 5    cephalexin (KEFLEX) 500 mg capsule Take by mouth      cholecalciferol (VITAMIN D3) 400 units tablet Take 1 tablet by mouth every morning      Continuous Blood Gluc Sensor (Dexcom G6 Sensor) MISC Change every 10 days. E10.65      Continuous Blood Gluc Transmit (Dexcom G6 Transmitter) MISC Change every 90 days. E10.65      Continuous Glucose  (Dexcom G7 ) INÉS Use as directed      fluticasone (FLONASE) 50 mcg/act nasal spray 2 sprays into each nostril daily      Gvoke HypoPen 2-Pack 1 MG/0.2ML SOAJ  (Patient taking differently: No sig reported)      Insulin Disposable Pump (Omnipod 5 G6 Pods, Gen 5,) MISC INJECT 1 UNDER THE SKIN EVERY OTHER DAY. CHANGING EVERY 2 DAYS DUE TO HIGH INSULIN REQUIREMENT.      insulin glargine (LANTUS) 100 units/mL subcutaneous injection Inject under the skin PRN for when pod doesn't work  Patient instructed to call office if pod not working to get direction on how much to give      levalbuterol (XOPENEX) 1.25 mg/3 mL nebulizer solution Take 1.25 mg by nebulization 3 (three) times a day as needed for wheezing      metoprolol tartrate (LOPRESSOR) 25 mg tablet Take 0.5 tablets (12.5 mg total) by mouth every 12 (twelve) hours 90 tablet 1    montelukast (SINGULAIR) 10 mg tablet TAKE 1 TABLET BY MOUTH EVERYDAY AT BEDTIME 90 tablet 1    NovoLOG 100 UNIT/ML injection Inject under the skin if needed for high blood sugar Take as needed sq as directed by doc if pump is not working       ondansetron (ZOFRAN-ODT) 4 mg disintegrating tablet Take 1 tablet (4 mg total) by mouth every 6 (six) hours as needed for nausea or vomiting for up to 6 doses 6 tablet 0    oxygen gas Inhale 3 L/min daily at bedtime. nasal cannula      pantoprazole (PROTONIX)  40 mg tablet TAKE 1 TABLET TWICE DAILY 30 MINS PRIOR TO BREAKFAST AND SUPPER. 180 tablet 3    sodium chloride 0.9 % nebulizer solution TAKE 3 ML BY NEBULIZATION AS NEEDED FOR WHEEZING 300 mL 3     No current facility-administered medications for this visit.       Medication compliance: Yes   Comments: Pt reports to be compliant with medications    Physical Limitations: R hip replacement     Fall Risk: Low   Comments:  fell to knees a few times before surgery, but no issues since    Cultural needs: none      CAD Risk Factors:  Cholesterol: Yes  HTN: Yes  DM:  Type 1, has CGM and insulin pump  Obesity: No   Inactivity: No      EXERCISE ASSESSMENT:     Initial Fitness Assessment: Estrella Treadmill Protocol:  Resting:  BP: 108/64  HR: 72, Exercise:  BP: 128/74  HR: 121, METs:  5.78, and Test terminated at:  RPE 6      ECG INTERPRETATION:  atrial paced    Current Functional Status:  Occupation: retired, watches grandchildren  Recreation/Physical Activity: golfing  ADL’s:Capable of performing light to moderate ADLs limited by sternal precautions and pacemaker restrictions  [place]: able to perform self-care, not cleared to drive yet post-pacemaker   Home exercise:  currently not doing        SMART Exercise Goals:   10% improvement in functional capacity based on max METs achieved in initial fitness assessment  improved DASI score by 10%  increased exercise capacity by 40% based on peak METs tolerated in cardiac rehab exercise session  maintain > 150 minutes per week of moderate intensity exercise    Patient Specific EXERCISE GOALS:       Return to golf  Be able to walking the dogs  Decrease fatigue    Functional Capacity Screening Tool:  [facility] Activity Status Index:  5.29 METs      PSYCHOSOCIAL ASSESSMENT:    Date of last Assessment:  12/4/24  Depression screening:  PHQ-9 = 2    Interpretation:  1-4 = Minimal Depression  Anxiety screening:  SARINA-7 = 0    Interpretation: 0-4  = Not anxious    Pt self-report of  "depression and anxiety   Patient reports they are coping well with good social support and denies depression or anxiety    Self-reported stress level:  2   Stressors:  not being able to used his arm  Stress Management Tools: exercise, keep a positive mindset, and enjoy a hobby    SMART Psychosocial Goals:     Physical Fitness in [facility] Score < 3, Pain in [facility] Score < 3, Overall Health in [facility] Score < 3, and feel less tired with more energy    Patient Specific PSYCHOCOSOCIAL GOALS:    Improve sleeping- goal met  Manage stress- goal met    Quality of Life Screen:  (Higher score indicates disease impact on QOL)  [facility] COOP score: 19/45     Social Support:   spouse and children  Community/Social Activities: n/a     Psychosocial Assessment as it relates to rehabilitation:   Patient denies issues with his/her family or home life that may affect their rehabilitation efforts.       NUTRITION ASSESSMENT:    Initial Weight:  156  Current Weight: 157.8    Height:   Ht Readings from Last 1 Encounters:   01/20/25 5' 8\" (1.727 m)       Rate Your Plate Score: 60/81    Diabetes:  DM1, CGM and insulin pump  A1c: 8.4    last measured: 10/31/24    Lipid management: Discussed diet and lipid management and Last lipid profile 9/10/24  Chol 124  TRG 60  HDL 42  LDL 70    Current Dietary Habits:  Limiting salt and carbs  Ground chicken and turkey  Rare red meat, 1x/week    SMART Nutrition Goals:   Improved Rate Your Plate score  >64, eat 6 or more servings of grain products per day, eat whole grain breads, brown rice and whole grain cereals, eat 5 or more servings of fruits and vegetables a day, eat 2 or more servings of low fat milk or yogurt a day, eat no more than 6oz of meat per day, eat meatless meals twice a week or more, rarely eat cheese or choose reduced fat or skim, rarely eat frozen desserts or choose fruit or fat-free sweets, Do not cook with oil, butter or margarine, Do not eat fried foods, use \"light\" tub " margarine on bread, potatoes and vegetables, choose healthy snacks such as fruit, pretzels, and low fat crackers, choose healthy desserts and sweets such as obdulia food cake or  fruit, never add salt to food when cooking or at the table, and choose low sugar desserts and sweets    Patient Specific NUTRITION GOALS:     1. Increase water intake-goal met   2. More meatless meals   3. Decrease sweets/ desserts- goal not met    Drug/Alcohol Use:   Rare alcohol, 1 beer ~6 month      OTHER CORE COMPONENT ASSESSMENT:    Tobacco Use:     N/A:  Patient is a non-smoker     Anginal Symptoms:  None   NTG use: No prescription    SMART Goals:   consistent, controlled resting BP < 130/80 and medication compliance    Patient Specific CORE COMPONENT GOALS:    Monitor BP-  goal met  Medication compliance- goal met      INDIVIDUALIZED TREATMENT PLAN      EXERCISE GOALS and PLAN      Progress toward Exercise goals:   Pt is progressing and showing improvement  toward the following goals:  is tolerating 40 mins of exercise at 3.5-4.5 METs, feels good overall, no cardiac s/s, returned to upper body.  , Pt has not made progress toward the following goals: home exercise. , Will continue to educate and progress as tolerated. Is planning to return to walking his dogs    Exercise Intervention/plan:    education on home exercise guidelines, home exercise 30+ mins 2 days opposite CR, Patient education:   Exercise After Cardiac Rehabilitation, and After discharge patient will continue to follow a regular exercise regimen with the goal of a minimum of 150 minutes per week of moderate intensity exercise.      The patient was counseled on exercise guidelines to achieve a minimum of 150 mins/wk of moderate intensity (RPE 4-6)   exercise and encouraged to add 1-2 days of exercise on opposite days of cardiac rehab as tolerated.       PHYSICIAN PRESCRIBED EXERCISE:    Current Aerobic Exercise Prescription:      Frequency: 3 days/week   Supplement with  home exercise 2+ days/wk as tolerated       Minutes: 45         METS: 3.5-4.5            HR:  pacer limits   HR: 100-125   RPE: 4-6         Modalities: Treadmill, Airdyne bike, Lifecycle, and Rower     Exercise workloads will be progressed gradually as tolerated, within limits of patient's ability, and according to the patient's   response to the exercise program.    Aerobic Exercise Prescription Plan for Progression   Frequency: 3 days/week of cardiac rehab       Supplement with home exercise 2+ days/wk as tolerated    Minutes: 40       >150 mins/wk of moderate intensity exercise   METS: 3.5-5   HR:  pacer limits       RPE: 4-6   Modalities: Treadmill, Airdyne bike, Lifecycle, and Rower    Strength trainin-3 days / week  12-15 repetitions  1-2 sets per modality   Will be added following at least 8 weeks post surgery and 8-10 monitored sessions   Modalities: Chest Press, Pull Downs, Arm Curl, and Seated Row    Home Exercise: none    Exercise Education: benefit of exercise for CAD risk factors, home exercise guidelines, AHA guidelines to achieve >150 mins/wk of moderate exercise, RPE scale, Group class: Risk Factors for Heart Disease, and physical activity/exercise in extreme weather conditions     Readiness to change: Action:  (Changing behavior)      NUTRITION GOALS AND PLAN    Nutritional   Reviewed patient's Rate your Plate. Discussed key elements of heart healthy eating. Reviewed patient goals for dietary modifications and their clinical implications.  Reviewed most recent lipid profile.     Patient's progress toward Nutrition goals:    Pt is progressing and showing improvement  toward the following goals:  weight remains in a healthy range.  , Pt has not made progress toward the following goals: snacking on pastries, and has carby breakfast, BG uncontrolled. , Will continue to educate and progress as tolerated. Will eat eggs before rehab to see if it helps with his BS      Nutrition  "Intervention/plan:   group Class: Heart Healthy Eating, replace refined flours with whole grains, increase daily intake of fruits and vegetables, increase daily intake of low fat dairy, limit meat intake to less than 6oz per day, choose healthy meals while dining out, eat more meatless meals, eat reduced-fat or part-skim cheese or rarely eat, rarely eat frozen desserts, cook without fats or oils, never/rarely eat fried foods, use \"light\" tub margarine, eat healthy snacks like fruit, pretzels, and low fat crackers, choose desserts such as fruit, obdulia food cake, low-fat or fat-free sweets, never/rarely add salt to food when cooking or at the table, and choose low sugar desserts and sweets    Measurable goals were based Rate Your Plate Dietary Self-Assessment. These are the areas in which the patient could score higher on the assessment.  Goals include recommendations for a heart healthy diet based on American Heart Association.    Nutrition Education:   heart healthy eating principles  maintaining hydration  nutrition for  lipid management  exercise and diabetes management   group class:  Label Reading    Readiness to change: Preparation:  (Getting ready to change)       PSYCHOSOCIAL GOALS AND PLAN    Psychosocial Assessment as it relates to rehabilitation:   Patient denies issues with his/her family or home life that may affect their rehabilitation efforts.     Patient's progress toward Psychosocial goals:    Goals met: reports having no stress.    Psychosocial Intervention/plan:   Exercise, Keep a positive mindset, and Enjoy family    Psychosocial Education: benefits of a positive support system, stress management techniques, class:  Relaxation, and class:  Stress and Your Health     Information to utilize [employer] was provided as well as contact information for counseling through  Behavioral Health and group psychotherapy groups available.    Readiness to change: Maintenance: (Maintaining the behavior " change)      OTHER CORE COMPONENTS GOALS and PLAN  Blood Pressure  Restin/68- 124/74  Exercise: 104/74- 140/74  Recovery: 104/60- 118/74    Blood Pressure will be monitored throughout the program and cardiologist will be notified of elevated trends.    Pt will be encouraged to monitor home BP if advised by cardiologist.    Tobacco Intervention/plan:   N/A:  Pt is a non-smoker    Progress toward Core Component goals:   Pt is progressing and showing improvement  toward the following goals:  medication compliance, BP has been stable and within normal limits.  , Will continue to educate and progress as tolerated.     Other Core Components Intervention:   medication compliance, avoid processed foods, engage in regular exercise, check labels for sodium content, and monitor home BP    Group and Individual Education:  components of blood pressure management, group class: Understanding Heart Disease, and group class:  Common Heart Medications    Readiness to change: Action:  (Changing behavior)

## 2025-01-31 ENCOUNTER — CLINICAL SUPPORT (OUTPATIENT)
Dept: CARDIAC REHAB | Facility: CLINIC | Age: 65
End: 2025-01-31
Payer: COMMERCIAL

## 2025-01-31 DIAGNOSIS — Z95.1 S/P CABG (CORONARY ARTERY BYPASS GRAFT): Primary | ICD-10-CM

## 2025-01-31 PROCEDURE — 93798 PHYS/QHP OP CAR RHAB W/ECG: CPT

## 2025-02-04 ENCOUNTER — CLINICAL SUPPORT (OUTPATIENT)
Dept: CARDIAC REHAB | Facility: CLINIC | Age: 65
End: 2025-02-04
Payer: COMMERCIAL

## 2025-02-04 DIAGNOSIS — Z95.1 S/P CABG (CORONARY ARTERY BYPASS GRAFT): Primary | ICD-10-CM

## 2025-02-04 PROCEDURE — 93798 PHYS/QHP OP CAR RHAB W/ECG: CPT

## 2025-02-06 ENCOUNTER — APPOINTMENT (OUTPATIENT)
Dept: CARDIAC REHAB | Facility: CLINIC | Age: 65
End: 2025-02-06
Payer: COMMERCIAL

## 2025-02-07 ENCOUNTER — CLINICAL SUPPORT (OUTPATIENT)
Dept: CARDIAC REHAB | Facility: CLINIC | Age: 65
End: 2025-02-07
Payer: COMMERCIAL

## 2025-02-07 DIAGNOSIS — Z95.1 S/P CABG (CORONARY ARTERY BYPASS GRAFT): Primary | ICD-10-CM

## 2025-02-07 PROCEDURE — 93798 PHYS/QHP OP CAR RHAB W/ECG: CPT

## 2025-02-11 ENCOUNTER — CLINICAL SUPPORT (OUTPATIENT)
Dept: CARDIAC REHAB | Facility: CLINIC | Age: 65
End: 2025-02-11
Payer: COMMERCIAL

## 2025-02-11 DIAGNOSIS — Z95.1 S/P CABG (CORONARY ARTERY BYPASS GRAFT): Primary | ICD-10-CM

## 2025-02-11 PROCEDURE — 93798 PHYS/QHP OP CAR RHAB W/ECG: CPT

## 2025-02-13 ENCOUNTER — CLINICAL SUPPORT (OUTPATIENT)
Dept: CARDIAC REHAB | Facility: CLINIC | Age: 65
End: 2025-02-13
Payer: COMMERCIAL

## 2025-02-13 DIAGNOSIS — Z95.1 S/P CABG (CORONARY ARTERY BYPASS GRAFT): Primary | ICD-10-CM

## 2025-02-13 PROCEDURE — 93798 PHYS/QHP OP CAR RHAB W/ECG: CPT

## 2025-02-14 ENCOUNTER — CLINICAL SUPPORT (OUTPATIENT)
Dept: CARDIAC REHAB | Facility: CLINIC | Age: 65
End: 2025-02-14
Payer: COMMERCIAL

## 2025-02-14 DIAGNOSIS — Z95.1 S/P CABG (CORONARY ARTERY BYPASS GRAFT): Primary | ICD-10-CM

## 2025-02-14 PROCEDURE — 93798 PHYS/QHP OP CAR RHAB W/ECG: CPT

## 2025-02-18 ENCOUNTER — CLINICAL SUPPORT (OUTPATIENT)
Dept: CARDIAC REHAB | Facility: CLINIC | Age: 65
End: 2025-02-18
Payer: COMMERCIAL

## 2025-02-18 DIAGNOSIS — Z95.1 S/P CABG (CORONARY ARTERY BYPASS GRAFT): Primary | ICD-10-CM

## 2025-02-18 PROCEDURE — 93798 PHYS/QHP OP CAR RHAB W/ECG: CPT

## 2025-02-19 ENCOUNTER — RESULTS FOLLOW-UP (OUTPATIENT)
Dept: OTHER | Facility: HOSPITAL | Age: 65
End: 2025-02-19

## 2025-02-19 NOTE — TELEPHONE ENCOUNTER
Please let patient know from a pulmonary standpoint his CT scan is stable, previous pneumothorax and effusions have resolved he does have the ascending thoracic aneurysm which does appear that he is following with cardiology already      Patient needs scheduled follow-up

## 2025-02-20 ENCOUNTER — TELEPHONE (OUTPATIENT)
Age: 65
End: 2025-02-20

## 2025-02-20 ENCOUNTER — CLINICAL SUPPORT (OUTPATIENT)
Dept: CARDIAC REHAB | Facility: CLINIC | Age: 65
End: 2025-02-20
Payer: COMMERCIAL

## 2025-02-20 ENCOUNTER — TELEPHONE (OUTPATIENT)
Dept: PULMONOLOGY | Facility: CLINIC | Age: 65
End: 2025-02-20

## 2025-02-20 DIAGNOSIS — R42 VERTIGO: Primary | ICD-10-CM

## 2025-02-20 DIAGNOSIS — Z95.1 S/P CABG (CORONARY ARTERY BYPASS GRAFT): Primary | ICD-10-CM

## 2025-02-20 PROCEDURE — 93798 PHYS/QHP OP CAR RHAB W/ECG: CPT

## 2025-02-20 NOTE — TELEPHONE ENCOUNTER
Per Mansi-Pulmonary standpoint his CT scan is stable, previous pneumothorax and effusions have resolved he does have the ascending thoracic aneurysm which does appear that he is following with cardiology already. F/U made

## 2025-02-20 NOTE — TELEPHONE ENCOUNTER
02/20/25    Patient called office today and requested a PHYSICAL THERAPY SCRIPT.    Patient schedule for Physical Therapy 03/03/25 at [facility].      Any questions, please contact Patient.   Thank You.

## 2025-02-21 ENCOUNTER — CLINICAL SUPPORT (OUTPATIENT)
Dept: CARDIAC REHAB | Facility: CLINIC | Age: 65
End: 2025-02-21
Payer: COMMERCIAL

## 2025-02-21 DIAGNOSIS — Z95.1 S/P CABG (CORONARY ARTERY BYPASS GRAFT): Primary | ICD-10-CM

## 2025-02-21 PROCEDURE — 93798 PHYS/QHP OP CAR RHAB W/ECG: CPT

## 2025-02-21 NOTE — TELEPHONE ENCOUNTER
Mely Bacon, DO     2/21/25  2:35 PM  Please call pt and find out what it is for, he has not been seen since 10/ 2024    He has a f/u in 3/5/ 25    _________________________    I spoke to patient and he informed he had PT in past for his lightheadedness per Dr Bacon and it really helped him. He no longer has that but he does get dizzy at times and was wondering if PT would show benefit. He informed the Dizziness is not constant. If provider would rather discuss further at his appt on 3/5/25, he is fine with that.    Routed to provider to advise as ot requesting a call back at , or  call his wife Oliva  and leave a msg.

## 2025-02-21 NOTE — TELEPHONE ENCOUNTER
Attempted to call Oliverio , no answer.  I spoke to his wife Oliva       I spoke to Oliva  who  informs me that he has been having these periods of dizziness and unsteadiness.  this usually occurs in the morning when he is awakening.  This did occur in the past but improved since his heart surgery and pacemaker placement however recently has returned.  In the past he did try Antivert but he reports this was unsuccessful.  It is  unclear if he took this on a regular basis.  I discussed with general the Antivert can cause some drowsiness and cognitive slowing and this may have been the reason why this had not been taken on a regular basis.  He has had previous vestibular therapy with some improvement.  He is interested in proceeding with vestibular therapy again      He Does have a follow-up appointment with me on March 5 and he does have physical therapy scheduled on March 3.  I will place an order for vestibular therapy at his request.     Discussed the use of Antivert at his upcoming visit

## 2025-02-25 ENCOUNTER — CLINICAL SUPPORT (OUTPATIENT)
Dept: CARDIAC REHAB | Facility: CLINIC | Age: 65
End: 2025-02-25
Payer: COMMERCIAL

## 2025-02-25 DIAGNOSIS — Z95.1 S/P CABG (CORONARY ARTERY BYPASS GRAFT): Primary | ICD-10-CM

## 2025-02-25 PROCEDURE — 93798 PHYS/QHP OP CAR RHAB W/ECG: CPT

## 2025-02-26 ENCOUNTER — IN-CLINIC DEVICE VISIT (OUTPATIENT)
Dept: CARDIOLOGY CLINIC | Facility: CLINIC | Age: 65
End: 2025-02-26
Payer: COMMERCIAL

## 2025-02-26 ENCOUNTER — TELEPHONE (OUTPATIENT)
Dept: NEUROLOGY | Facility: CLINIC | Age: 65
End: 2025-02-26

## 2025-02-26 ENCOUNTER — RESULTS FOLLOW-UP (OUTPATIENT)
Dept: CARDIOLOGY CLINIC | Facility: CLINIC | Age: 65
End: 2025-02-26

## 2025-02-26 DIAGNOSIS — Z95.0 PRESENCE OF CARDIAC PACEMAKER: ICD-10-CM

## 2025-02-26 DIAGNOSIS — I49.5 SICK SINUS SYNDROME (HCC): Primary | ICD-10-CM

## 2025-02-26 PROCEDURE — 93280 PM DEVICE PROGR EVAL DUAL: CPT | Performed by: INTERNAL MEDICINE

## 2025-02-26 NOTE — PROGRESS NOTES
Results for orders placed or performed in visit on 02/26/25   Cardiac EP device report    Narrative    MDT DUAL PM/ ACTIVE SYSTEM IS MRI CONDITIONAL  DEVICE INTERROGATED IN THE [place] OFFICE.  BATTERY VOLTAGE ADEQUATE (12.7 YRS).  AP 96.2%   <0.1%.  ALL LEAD PARAMETERS WITHIN NORMAL LIMITS.  NO SIGNIFICANT HIGH RATE EPISODES.  PT TAKES ASA, METOPROLOL TART.   EF 58% (ECHO 11/7/2024).  DECREASE MADE TO RV AMPLITUDE TO PROMOTE DEVICE LONGEVITY WHILE MAINTAINING AN APPROPRIATE SAFETY MARGIN.   NORMAL DEVICE FUNCTION.  PAS

## 2025-02-26 NOTE — TELEPHONE ENCOUNTER
made a call for appt reminder with Dr. Bacon 3/5/25@11:00am  and also mentioned [place] office address and phone number as well. informed arrive 10 to 15 mins early for the appt.

## 2025-02-27 ENCOUNTER — CLINICAL SUPPORT (OUTPATIENT)
Dept: CARDIAC REHAB | Facility: CLINIC | Age: 65
End: 2025-02-27
Payer: COMMERCIAL

## 2025-02-27 DIAGNOSIS — Z95.1 S/P CABG (CORONARY ARTERY BYPASS GRAFT): Primary | ICD-10-CM

## 2025-02-27 PROCEDURE — 93798 PHYS/QHP OP CAR RHAB W/ECG: CPT

## 2025-02-28 ENCOUNTER — CLINICAL SUPPORT (OUTPATIENT)
Dept: CARDIAC REHAB | Facility: CLINIC | Age: 65
End: 2025-02-28
Payer: COMMERCIAL

## 2025-02-28 DIAGNOSIS — Z95.1 S/P CABG (CORONARY ARTERY BYPASS GRAFT): Primary | ICD-10-CM

## 2025-02-28 PROCEDURE — 93798 PHYS/QHP OP CAR RHAB W/ECG: CPT

## 2025-02-28 NOTE — PROGRESS NOTES
CARDIAC REHABILITATION   ASSESSMENT AND INDIVIDUALIZED TREATMENT PLAN  90 DAY and DISCHARGE            Today's date: 2025   # of Exercise Sessions Completed: 36  Patient name: Otoniel Martinez      : 1960  Age: 64 y.o.       MRN: [id number]  Referring Physician: Rodger Rodriguez DO  Cardiologist: Jc Adrian MD  Provider: Olu  Clinician: Jennifer Greene MS, CEP, EPC        Treatment is tailored to this patient's individual needs.  The ITP was reviewed with the patient and all questions were answered to their satisfaction.  Additional ITP documentation can be found electronically including daily and monthly exercise summaries, daily session notes with ECG summaries, education notes, daily medication reconciliation, and daily physician supervision.      Comments: 25: DISCHARGE SUMMARY  2025    Completed 36/36 exercise sessions  Functional capacity:   Pre: 5.8 METs, Post: 7.5 METs  Max METs tolerated in cardiac rehab:  Pre: 2.8,  Post: 5.1  [facility] QOL:  Pre: 19, Post: 20  PHQ-9:  Pre: 2, Post: 2  Weight:  Pre: 156,  Post: 159.6  Exercise session details:  55 minutes,  3.5-5.1 METs  Resting BP  100/62 - 116/72,  HR 74-87  Exercise /68 - 148/78.  HR 95 - 128  Telemetry:  paced  Symptoms: none  Discharge Plans: plans to go to [facility]  Patient's subjective report of progress: enjoyed cardiac rehab, feeling good     25: Oliverio's blood sugar is uncontrolled and high when he comes into rehab. Patient typically eats cereal, bagel, etc for breakfast. We have discussed having a protein before coming to help with BS control. Patient stated that the reason he takes insulin is to be able to eat those items. Staff educated patient about diet for BS control and goal of BS <300 before starting exercise.     25: Otoniel has attended 13 exercise sessions in the past 30 days.   He tolerates 40 mins at 3.31 - 4.12 METs.  A light strength training component will be added in a  future exercise session. He is tolerating progression of intensity levels to maintain RPE 4-6.   Resting BP 92/60 - 118/72 with Normal response to exercise reaching 96/68- 140/72.  Paced rhythm on tele observed. RHR 76 - 94  with Normal response to exercise reaching 102 - 130. No cardiac complaints. Oliverio is cleared to start using his upper next week, the arm bike will start to be incorporated into his exercise rx. His exercise program will be progressed as tolerated to maintain RPE 4-6. They will continue to be educated on lifestyle modification and encouraged to supplement with a home exercise program as tolerated.    12/4/24: upper body restriction from pacemaker until Jan 7th        Dx:   No diagnosis found.      Description of Diagnosis: Presented for elective cardiac catheterization on 10/31/24 due to ongoing dyspnea on exertion. Cath revealed MV disease including distal LM 80% as well as prox to mid Circ 90% and mid RCA 90% Status post CABG x 3, LIMA to the LAD, SVG to PDA and SVG to OM1   Date of onset: 11/6/24  Other Cardiac History: Pt with prior MI and RCA stenting 2004. 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    Pancreatitis     three times    Sleep apnea     not using a C-PAP    Superficial thrombophlebitis     Vision loss     reading glasses over the counter       Family History:  Family History   Problem Relation Age of Onset    Brain cancer Mother     Clotting disorder Mother     Heart disease Mother     Cancer Mother      Hyperlipidemia Mother     Stroke Father     Deep vein thrombosis Father     Emphysema Father     Coronary artery disease Paternal Uncle     Diabetes Maternal Uncle        Allergies:   Oxycodone and Other    Current Medications:   Current Outpatient Medications   Medication Sig Dispense Refill    acetaminophen (TYLENOL) 650 mg CR tablet Take 1 tablet (650 mg total) by mouth every 4 (four) hours 30 tablet 0    aspirin (ECOTRIN LOW STRENGTH) 81 mg EC tablet Take 81 mg by mouth daily      atorvastatin (LIPITOR) 40 mg tablet TAKE 1 TABLET BY MOUTH EVERY DAY 90 tablet 1    atorvastatin (LIPITOR) 80 mg tablet Take 1 tablet (80 mg total) by mouth daily with dinner 90 tablet 1    budesonide-formoterol (Symbicort) 80-4.5 MCG/ACT inhaler Inhale 2 puffs 2 (two) times a day Rinse mouth after use. 30.6 g 5    cephalexin (KEFLEX) 500 mg capsule Take by mouth      cholecalciferol (VITAMIN D3) 400 units tablet Take 1 tablet by mouth every morning      Continuous Blood Gluc Sensor (Dexcom G6 Sensor) MISC Change every 10 days. E10.65      Continuous Blood Gluc Transmit (Dexcom G6 Transmitter) MISC Change every 90 days. E10.65      Continuous Glucose  (Dexcom G7 ) INÉS Use as directed      fluticasone (FLONASE) 50 mcg/act nasal spray 2 sprays into each nostril daily      Gvoke HypoPen 2-Pack 1 MG/0.2ML SOAJ  (Patient taking differently: No sig reported)      Insulin Disposable Pump (Omnipod 5 G6 Pods, Gen 5,) MISC INJECT 1 UNDER THE SKIN EVERY OTHER DAY. CHANGING EVERY 2 DAYS DUE TO HIGH INSULIN REQUIREMENT.      insulin glargine (LANTUS) 100 units/mL subcutaneous injection Inject under the skin PRN for when pod doesn't work  Patient instructed to call office if pod not working to get direction on how much to give      levalbuterol (XOPENEX) 1.25 mg/3 mL nebulizer solution Take 1.25 mg by nebulization 3 (three) times a day as needed for wheezing      metoprolol tartrate (LOPRESSOR) 25 mg tablet Take 0.5 tablets (12.5 mg  total) by mouth every 12 (twelve) hours 90 tablet 1    montelukast (SINGULAIR) 10 mg tablet TAKE 1 TABLET BY MOUTH EVERYDAY AT BEDTIME 90 tablet 1    NovoLOG 100 UNIT/ML injection Inject under the skin if needed for high blood sugar Take as needed sq as directed by doc if pump is not working       ondansetron (ZOFRAN-ODT) 4 mg disintegrating tablet Take 1 tablet (4 mg total) by mouth every 6 (six) hours as needed for nausea or vomiting for up to 6 doses 6 tablet 0    oxygen gas Inhale 3 L/min daily at bedtime. nasal cannula      pantoprazole (PROTONIX) 40 mg tablet TAKE 1 TABLET TWICE DAILY 30 MINS PRIOR TO BREAKFAST AND SUPPER. 180 tablet 3    sodium chloride 0.9 % nebulizer solution TAKE 3 ML BY NEBULIZATION AS NEEDED FOR WHEEZING 300 mL 3     No current facility-administered medications for this visit.       Medication compliance: Yes   Comments: Pt reports to be compliant with medications    Physical Limitations: R hip replacement     Fall Risk: Low   Comments:  fell to knees a few times before surgery, but no issues since    Cultural needs: none      CAD Risk Factors:  Cholesterol: Yes  HTN: Yes  DM:  Type 1, has CGM and insulin pump  Obesity: No   Inactivity: No      EXERCISE ASSESSMENT:     Initial Fitness Assessment: Estrella Treadmill Protocol:  Resting:  BP: 108/64  HR: 72, Exercise:  BP: 128/74  HR: 121, METs:  5.78, and Test terminated at:  RPE 6      ECG INTERPRETATION:  atrial paced    Current Functional Status:  Occupation: retired, watches grandchildren  Recreation/Physical Activity: golfing  ADL’s:Capable of performing ADLs  [place]: able to perform self-care  Home exercise:  currently not doing        SMART Exercise Goals:   10% improvement in functional capacity based on max METs achieved in initial fitness assessment  improved DASI score by 10%  increased exercise capacity by 40% based on peak METs tolerated in cardiac rehab exercise session  maintain > 150 minutes per week of moderate  "intensity exercise    Patient Specific EXERCISE GOALS:       Return to golf- goal  Be able to walking the dogs- goal met  Decrease fatigue- goal met    Functional Capacity Screening Tool:  [facility] Activity Status Index:  7.01 METs      PSYCHOSOCIAL ASSESSMENT:    Date of last Assessment:  2/28/25  Depression screening:  PHQ-9 = 2    Interpretation:  1-4 = Minimal Depression  Anxiety screening:  SARINA-7 = 0    Interpretation: 0-4  = Not anxious    Pt self-report of depression and anxiety   Patient reports they are coping well with good social support and denies depression or anxiety    Self-reported stress level:  2   Stressors:  not being able to used his arm  Stress Management Tools: exercise, keep a positive mindset, and enjoy a hobby    SMART Psychosocial Goals:     Physical F[facility] in [facility] Score < 3, Pain in [place] Score < 3, Overall Health in [facility] Score < 3, and feel less tired with more energy    Patient Specific PSYCHOCOSOCIAL GOALS:    Improve sleeping- goal met  Manage stress- goal met    Quality of Life Screen:  (Higher score indicates disease impact on QOL)  [facility] COOP score: 20/45     Social Support:   spouse and children  Community/Social Activities: n/a     Psychosocial Assessment as it relates to rehabilitation:   Patient denies issues with his/her family or home life that may affect their rehabilitation efforts.       NUTRITION ASSESSMENT:    Initial Weight:  156  Current Weight: 159.6    Height:   Ht Readings from Last 1 Encounters:   01/20/25 5' 8\" (1.727 m)       Rate Your Plate Score: 59/81    Diabetes:  DM1, CGM and insulin pump  A1c: 8.4    last measured: 10/31/24    Lipid management: Discussed diet and lipid management and Last lipid profile 9/10/24  Chol 124  TRG 60  HDL 42  LDL 70    Current Dietary Habits:  Limiting salt and carbs  Ground chicken and turkey  Rare red meat, 1x/week    SMART Nutrition Goals:   Improved Rate Your Plate score  >64, eat 6 or more servings of grain " "products per day, eat whole grain breads, brown rice and whole grain cereals, eat 5 or more servings of fruits and vegetables a day, eat 2 or more servings of low fat milk or yogurt a day, eat no more than 6oz of meat per day, eat meatless meals twice a week or more, rarely eat cheese or choose reduced fat or skim, rarely eat frozen desserts or choose fruit or fat-free sweets, Do not cook with oil, butter or margarine, Do not eat fried foods, use \"light\" tub margarine on bread, potatoes and vegetables, choose healthy snacks such as fruit, pretzels, and low fat crackers, choose healthy desserts and sweets such as obdulia food cake or  fruit, never add salt to food when cooking or at the table, and choose low sugar desserts and sweets    Patient Specific NUTRITION GOALS:     1. Increase water intake-goal met   2. More meatless meals- goal met   3. Decrease sweets/ desserts- goal not met    Drug/Alcohol Use:   Rare alcohol, 1 beer ~6 month      OTHER CORE COMPONENT ASSESSMENT:    Tobacco Use:     N/A:  Patient is a non-smoker     Anginal Symptoms:  None   NTG use: No prescription    SMART Goals:   consistent, controlled resting BP < 130/80 and medication compliance    Patient Specific CORE COMPONENT GOALS:    Monitor BP-  goal met  Medication compliance- goal met      INDIVIDUALIZED TREATMENT PLAN      EXERCISE GOALS and PLAN      Progress toward Exercise goals:   Goals met: increased peak METs in cardiac rehab by 2105%, increased max METs in fitness assessment by 29.3%, increased DASI METs by 32.5%, returned to walking dog, decreased fatigue, planning to join community center., Patient will be encouraged to focus on lifestyle modifications following discharge.    Exercise Intervention/plan:    education on home exercise guidelines, home exercise 30+ mins 2 days opposite CR, Patient education:   Exercise After Cardiac Rehabilitation, and After discharge patient will continue to follow a regular exercise regimen with the " goal of a minimum of 150 minutes per week of moderate intensity exercise.      The patient was counseled on exercise guidelines to achieve a minimum of 150 mins/wk of moderate intensity (RPE 4-6)   exercise and encouraged to add 1-2 days of exercise on opposite days of cardiac rehab as tolerated.       PHYSICIAN PRESCRIBED EXERCISE:    Current Aerobic Exercise Prescription:      Frequency: 3 days/week   Supplement with home exercise 2+ days/wk as tolerated       Minutes: 55         METS: 3.5-5.1            HR:  pacer limits   HR:    RPE: 4-6         Modalities: Treadmill, Airdyne bike, Lifecycle, Rower, and NuStep     Exercise workloads will be progressed gradually as tolerated, within limits of patient's ability, and according to the patient's   response to the exercise program.    Exercise Progression after Discharge:    Frequency: 3-5 days of gym or home exercise   Minutes: 30-50  >150 mins/wk of moderate intensity exercise   METS: 3.8 - 5.5   HR:    RPE: 4-6   Modalities: Treadmill, Airdyne bike, Lifecycle, Rower, and NuStep    Strength trainin-3 days / week  12-15 repetitions  1-2 sets per modality   Will be added following at least 8 weeks post surgery and 8-10 monitored sessions   Modalities: Chest Press, Pull Downs, Arm Curl, and Seated Row    Home Exercise: none    Exercise Education: benefit of exercise for CAD risk factors, home exercise guidelines, AHA guidelines to achieve >150 mins/wk of moderate exercise, RPE scale, Group class: Risk Factors for Heart Disease, and physical activity/exercise in extreme weather conditions     Readiness to change: Maintenance: (Maintaining the behavior change)      NUTRITION GOALS AND PLAN    Nutritional   Reviewed patient's Rate your Plate. Discussed key elements of heart healthy eating. Reviewed patient goals for dietary modifications and their clinical implications.  Reviewed most recent lipid profile.     Patient's progress toward Nutrition  "goals:    Goals not met: still choosing unhealthy snacks, rate your plate score declined.  , Goals met: weight remained stable, improvement in BS before exercise when eating protein for breakfast., Patient will be encouraged to focus on lifestyle modifications following discharge.      Nutrition Intervention/plan:   replace refined flours with whole grains, increase daily intake of fruits and vegetables, increase daily intake of low fat dairy, limit meat intake to less than 6oz per day, choose healthy meals while dining out, eat more meatless meals, eat reduced-fat or part-skim cheese or rarely eat, rarely eat frozen desserts, cook without fats or oils, never/rarely eat fried foods, use \"light\" tub margarine, eat healthy snacks like fruit, pretzels, and low fat crackers, choose desserts such as fruit, obdulia food cake, low-fat or fat-free sweets, never/rarely add salt to food when cooking or at the table, and choose low sugar desserts and sweets    Measurable goals were based Rate Your Plate Dietary Self-Assessment. These are the areas in which the patient could score higher on the assessment.  Goals include recommendations for a heart healthy diet based on American Heart Association.    Nutrition Education:   heart healthy eating principles  maintaining hydration  nutrition for  lipid management  exercise and diabetes management   group class: Heart Healthy Eating  group class:  Label Reading    Readiness to change: Maintenance: (Maintaining the behavior change)      PSYCHOSOCIAL GOALS AND PLAN    Psychosocial Assessment as it relates to rehabilitation:   Patient denies issues with his/her family or home life that may affect their rehabilitation efforts.     Patient's progress toward Psychosocial goals:    Goals met: reports having no stress, [place], PHQ-9 and SARINA-7 scores all remain lwo., Patient will be encouraged to focus on lifestyle modifications following discharge.    Psychosocial Intervention/plan: "   Exercise, Keep a positive mindset, and Enjoy family    Psychosocial Education: benefits of a positive support system, stress management techniques, class:  Relaxation, and class:  Stress and Your Health     Information to utilize [employer] was provided as well as contact information for counseling through  Behavioral Health and group psychotherapy groups available.    Readiness to change: Maintenance: (Maintaining the behavior change)      OTHER CORE COMPONENTS GOALS and PLAN  Blood Pressure  Restin//74  Exercise: 118/68- 148/78  Recovery: 98/64- 116/70    Blood Pressure will be monitored throughout the program and cardiologist will be notified of elevated trends.    Pt will be encouraged to monitor home BP if advised by cardiologist.    Tobacco Intervention/plan:   N/A:  Pt is a non-smoker    Progress toward Core Component goals:   Goals met: BP stable and within normal limits, medication compliance., Patient will be encouraged to focus on lifestyle modifications following discharge.     Other Core Components Intervention:   medication compliance, avoid processed foods, engage in regular exercise, check labels for sodium content, and monitor home BP    Group and Individual Education:  components of blood pressure management, group class: Understanding Heart Disease, and group class:  Common Heart Medications    Readiness to change: Maintenance: (Maintaining the behavior change)

## 2025-03-03 ENCOUNTER — EVALUATION (OUTPATIENT)
Dept: PHYSICAL THERAPY | Facility: REHABILITATION | Age: 65
End: 2025-03-03
Payer: COMMERCIAL

## 2025-03-03 DIAGNOSIS — R42 VERTIGO: ICD-10-CM

## 2025-03-03 DIAGNOSIS — H81.11 BENIGN PAROXYSMAL VERTIGO OF RIGHT EAR: Primary | ICD-10-CM

## 2025-03-03 PROCEDURE — 97161 PT EVAL LOW COMPLEX 20 MIN: CPT | Performed by: PHYSICAL THERAPIST

## 2025-03-03 NOTE — PROGRESS NOTES
PT Evaluation     Today's date: 3/3/2025  Patient name: Otoniel Martinez  : 1960  MRN: [id number]  Referring provider: Mely Bacon,*  Dx:   Encounter Diagnosis     ICD-10-CM    1. Vertigo  R42 Ambulatory Referral to Physical Therapy          Start Time: 0815  Stop Time: 0850  Total time in clinic (min): 35 minutes    Assessment  Impairments: abnormal movement, activity intolerance and impaired balance  Other impairment: dizziness  Symptom irritability: low    Assessment details: Pt is a pleasant 64 y.o. male who presents to outpatient physical therapy with c/o vertigo/dizziness. Pt displays good oculomotor functioning, static balance, and gait within functional limits. Displays negative screens for VBI or cervicogenic pathologic origins. Pt displays positive right [place]-Hallpike test, with no observable nystagmus, however noted upward motion of eyelashes. Performance of right [place]-Hallpike test elicited mild symptoms of dizziness with a 15-20 second latency period. Pt treated via CRT directed to right posterior canal x2 and instructed to sit in waiting room with head upright x10 minutes prior to leaving. Also given home instructions of avoiding bending over at waist for next 24 hours. Will be reassessed next visit and will determine further plan of care as appropriate. Pt will benefit from skilled physical therapy to address functional limitations and to achieve goals. Thank you for this referral.  Understanding of Dx/Px/POC: good     Prognosis: good    Goals  ST. Patient will report </= 2 episodes of vertigo in 2 weeks.   2. Patient will demonstrate 25% improvement in balance in 4 weeks.  3. Patient will be able to perform supine>sit and sit>supine transfers without dizziness in 4 weeks.     LT. Patient will be able to perform IADLS without restriction or pain by discharge.  2. Patient will be independent in HEP by discharge.    Plan  Patient would benefit from: PT eval and skilled  PT  Referral necessary: No    Planned therapy interventions: body mechanics training, home exercise program, neuromuscular re-education, manual therapy, postural training, balance/weight bearing training, transfer training, self care, canalith repositioning and patient/caregiver education    Frequency: 1x week  Duration in weeks: 4  Treatment plan discussed with: patient        Subjective Evaluation    History of Present Illness  Mechanism of injury: 3/3/25  Patient presents to initial physical therapy evaluation with chief complaint of intermittent dizziness. He notes experiencing the dizziness when he wakes up in the morning. He notes the dizziness is not room spinning, but rather he feels lightheaded when sitting up from lying down. He explains that the symptoms have been pretty mild lately. Sometimes he notes the symptoms last from the morning until lunch time, however recently the symptoms haven't been lasting too long. He denies noting the dizziness with other body positions, denies difficulty with activities at home due to the dizziness, and has no issues with reading, scrolling on an electronic device, or driving. He denies ringing in the ears, denies trouble speaking, however notes occasional trouble with swallowing.          Recurrent probem    Quality of life: good    Patient Goals  Patient goal: decrease dizziness  Pain  No pain reported        Objective     Concurrent Complaints  Negative for tinnitus  Neuro Exam:     Dizziness  Positive for vertigo and light-headedness.     Exacerbating factors  Positive for rolling in bed and supine to/from sitting.     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), 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HCC)    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HCC)    Mixed hyperlipidemia    Vertigo    Polyneuropathy    Chronic gastritis without bleeding    Old cerebrovascular accident (CVA) without late effect    LOJA (dyspnea on exertion)    Vitamin D deficiency    History of recurrent deep vein thrombosis (DVT)    Dysfunction of right rotator cuff    Primary osteoarthritis of right hip    Recurrent cold sores    Chronic bronchitis (HCC)    Abnormal CT scan of lung    Chronic sinusitis    Insulin pump status    Obstructive sleep apnea    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)    Splenomegaly    Establishing care with new doctor, encounter for    Decreased hearing of both ears    Memory loss    Seizure (HCC)    Monoclonal gammopathy    Hx of pancreatitis    On home oxygen therapy    Splenic artery aneurysm (HCC)    Carotid stenosis, asymptomatic, bilateral    Pulmonary nodule 1 cm or greater in diameter    Left main coronary artery disease    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)    Symptomatic sinus bradycardia    S/P CABG (coronary artery bypass graft)    Pacemaker    Sick sinus syndrome (HCC)    Mild ascending aorta dilation (HCC)           Manuals             Epley Maneuver - right post canal                                                    Neuro Re-Ed                                                                                                        Ther Ex                                                                                                                     Ther Activity                                       Gait Training                                       Modalities                                          Patient  primarily treated by MIREYA Langston with direct supervision throughout session by clinical instructor/staff therapist Jama Gonzalez DPT.

## 2025-03-05 ENCOUNTER — OFFICE VISIT (OUTPATIENT)
Dept: PHYSICAL THERAPY | Facility: REHABILITATION | Age: 65
End: 2025-03-05
Payer: COMMERCIAL

## 2025-03-05 ENCOUNTER — OFFICE VISIT (OUTPATIENT)
Dept: NEUROLOGY | Facility: CLINIC | Age: 65
End: 2025-03-05
Payer: COMMERCIAL

## 2025-03-05 VITALS
SYSTOLIC BLOOD PRESSURE: 112 MMHG | DIASTOLIC BLOOD PRESSURE: 72 MMHG | OXYGEN SATURATION: 98 % | HEART RATE: 84 BPM | HEIGHT: 68 IN | RESPIRATION RATE: 14 BRPM | BODY MASS INDEX: 25.01 KG/M2 | TEMPERATURE: 97.5 F | WEIGHT: 165 LBS

## 2025-03-05 DIAGNOSIS — R41.3 MEMORY LOSS: ICD-10-CM

## 2025-03-05 DIAGNOSIS — R42 VERTIGO: ICD-10-CM

## 2025-03-05 DIAGNOSIS — H81.11 BENIGN PAROXYSMAL VERTIGO OF RIGHT EAR: Primary | ICD-10-CM

## 2025-03-05 DIAGNOSIS — R42 VERTIGO: Primary | ICD-10-CM

## 2025-03-05 DIAGNOSIS — G71.11 MYOTONIC DYSTROPHY (HCC): ICD-10-CM

## 2025-03-05 PROCEDURE — 97140 MANUAL THERAPY 1/> REGIONS: CPT | Performed by: PHYSICAL THERAPIST

## 2025-03-05 PROCEDURE — 99213 OFFICE O/P EST LOW 20 MIN: CPT | Performed by: PSYCHIATRY & NEUROLOGY

## 2025-03-05 RX ORDER — MECLIZINE HCL 12.5 MG 12.5 MG/1
12.5 TABLET ORAL EVERY 8 HOURS PRN
Qty: 30 TABLET | Refills: 1 | Status: SHIPPED | OUTPATIENT
Start: 2025-03-05

## 2025-03-05 NOTE — ASSESSMENT & PLAN NOTE
Orders:    meclizine (ANTIVERT) 12.5 MG tablet; Take 1 tablet (12.5 mg total) by mouth every 8 (eight) hours as needed for dizziness  The patient does have a history of intermittent vertigo this would occur in the morning upon awakening.  Recently his symptoms had increased.  He started the use of vestibular therapy and he has had 2 treatments.  He has been given exercises to perform.  This has helped.   I did provide him with a prescription for Antivert to be taken for severe symptomatology we discussed the common side effects of drowsiness.

## 2025-03-05 NOTE — ASSESSMENT & PLAN NOTE
Patient has a history of myotonic dystrophy.  Overall his  symptoms are stable but does report a decrease in strength.  This occurs with activity.  We discussed options such as medications but at this point he is to return to an exercise program.  Medications used in this purposes can include Tegretol, mexiletine and Valium.  Mexiletine is relatively contraindicating contraindicated due to his known use known pacemaker and cardiac disease.  He did have a side effect to Tegretol in the past with extreme agitation.          shortness of breath

## 2025-03-05 NOTE — PROGRESS NOTES
Name: Otoniel Martinez      : 1960      MRN: [id number]  Encounter Provider: Mely Bacon DO  Encounter Date: 3/5/2025   Encounter department: [facility] [place]  :  Assessment & Plan  Vertigo    Orders:    meclizine (ANTIVERT) 12.5 MG tablet; Take 1 tablet (12.5 mg total) by mouth every 8 (eight) hours as needed for dizziness  The patient does have a history of intermittent vertigo this would occur in the morning upon awakening.  Recently his symptoms had increased.  He started the use of vestibular therapy and he has had 2 treatments.  He has been given exercises to perform.  This has helped.   I did provide him with a prescription for Antivert to be taken for severe symptomatology we discussed the common side effects of drowsiness.  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HCC)  Patient has a history of myotonic dystrophy.  Overall his  symptoms are stable but does report a decrease in strength.  This occurs with activity.  We discussed options such as medications but at this point he is to return to an exercise program.  Medications used in this purposes can include Tegretol, mexiletine and Valium.  Mexiletine is relatively contraindicating contraindicated due to his known use known pacemaker and cardiac disease.  He did have a side effect to Tegretol in the past with extreme agitation.         Memory loss  Oliverio recently had neuropsychological testing which showed no evidence of vascular dementia but may be early cognitive signs.  This may have been contributed due to persistent oxygenation limitation and arrhythmia.  This is since resolved and his wife has noted some improvement of his memory.  He does have no problem with long-term memory but he continues to have intermittent issues with short-term memory.  He denies any problem getting lost in familiar places recognizing friends or family.  He has no problems with calculations.  We also discussed MRI imaging studies and MRI of neuro quant.  I  have asked his wife and him to continue to monitor.  We will discuss his symptoms in approximately 6 months and if they have progressed we may require further testing such as amyloid testing and/or a LP.               History of Present Illness               This is a 64-year-old patient with a history of myotonic dystrophy.  At the last visit he informed me of difficulty focusing finishing task and short-term memory problems.  He admitted to staring episodes.  The EEG and MRI was reviewed at the last visit.  His staring spells have improved but he continues to have intermittent problems short-term memory long-term memory.  His MoCA was a 28 out of 30.     In the interim he is undergoing a workup for leukopenia, gM kappa monoclonal gammopathy.  He did undergo bone marrow biopsy which is now followed by hematology oncology.    The last visit we did review his imaging studies and I did suggest neuropsychological testing.  In the interim he did undergo a cardiac cath which was abnormal.  He then underwent cardiac bypass this with a pacemaker placement.  His his oxygenation has been slowly improving.  His wife informs me that his memory has improved slightly with improved oxygenation and activity level.            Continues to have issues with difficulty speaking and hoarseness.  This occurs as he is walking talking for long period of time he does describe weakness in his legs occurring when he is walking for periods of time.  Speech therapy did help.  He reports overall fatigue and a lack of strength.  This has improved with cardiac rehab.    Did complain of vertiginous symptoms occurring in the morning.  Subsequently he underwent vestibular therapy with 2 treatments which has helped.  We discussed the use of Antivert which in the past he did not utilize on a regular basis.     MPRESSION:, Mri of brain 06/26/24      White matter changes suggestive of chronic microangiopathy.  No acute intracranial pathology. Chronic  infarct left posterior temporal lobe.     NeuroQuant analysis was performed: Low hippocampal volume and enlarged inferior lateral and overall ventricular system, suggestive of global ex-vacuo dilatation.  The additional finding of an abnormal Hippocampal Occupancy Score, (HOC), is concerning for   a mesial temporal lobe focused Neurodegenerative process.  Recommend reevaluation with Neuroquant imaging in 6 months.   Tpartial                           Review of Systems   Constitutional:  Negative for appetite change, fatigue and fever.   HENT: Negative.  Negative for hearing loss, tinnitus, trouble swallowing and voice change.    Eyes: Negative.  Negative for photophobia, pain and visual disturbance.   Respiratory:  Positive for shortness of breath.    Cardiovascular:  Positive for palpitations.   Gastrointestinal: Negative.  Negative for nausea and vomiting.   Endocrine: Negative.  Negative for cold intolerance.   Genitourinary: Negative.  Negative for dysuria, frequency and urgency.   Musculoskeletal:  Negative for back pain, gait problem, myalgias, neck pain and neck stiffness.   Skin: Negative.  Negative for rash.   Allergic/Immunologic: Negative.    Neurological:  Positive for dizziness. Negative for tremors, seizures, syncope, facial asymmetry, speech difficulty, weakness, light-headedness, numbness and headaches.   Hematological: Negative.  Does not bruise/bleed easily.   Psychiatric/Behavioral:  Positive for confusion. Negative for hallucinations and sleep disturbance.    All other systems reviewed and are negative.   I have personally reviewed the MA's review of systems and made changes as necessary.    Medical History Reviewed by provider this encounter:  Meds     .  Current Outpatient Medications on File Prior to Visit   Medication Sig Dispense Refill    acetaminophen (TYLENOL) 650 mg CR tablet Take 1 tablet (650 mg total) by mouth every 4 (four) hours 30 tablet 0    aspirin (ECOTRIN LOW STRENGTH) 81 mg  EC tablet Take 81 mg by mouth daily      atorvastatin (LIPITOR) 40 mg tablet TAKE 1 TABLET BY MOUTH EVERY DAY 90 tablet 1    atorvastatin (LIPITOR) 80 mg tablet Take 1 tablet (80 mg total) by mouth daily with dinner 90 tablet 1    budesonide-formoterol (Symbicort) 80-4.5 MCG/ACT inhaler Inhale 2 puffs 2 (two) times a day Rinse mouth after use. 30.6 g 5    cephalexin (KEFLEX) 500 mg capsule Take by mouth      cholecalciferol (VITAMIN D3) 400 units tablet Take 1 tablet by mouth every morning      Continuous Blood Gluc Sensor (Dexcom G6 Sensor) MISC Change every 10 days. E10.65      Continuous Blood Gluc Transmit (Dexcom G6 Transmitter) MISC Change every 90 days. E10.65      Continuous Glucose  (Dexcom G7 ) INÉS Use as directed      fluticasone (FLONASE) 50 mcg/act nasal spray 2 sprays into each nostril daily      Gvoke HypoPen 2-Pack 1 MG/0.2ML SOAJ       Insulin Disposable Pump (Omnipod 5 G6 Pods, Gen 5,) MISC INJECT 1 UNDER THE SKIN EVERY OTHER DAY. CHANGING EVERY 2 DAYS DUE TO HIGH INSULIN REQUIREMENT.      insulin glargine (LANTUS) 100 units/mL subcutaneous injection Inject under the skin PRN for when pod doesn't work  Patient instructed to call office if pod not working to get direction on how much to give      levalbuterol (XOPENEX) 1.25 mg/3 mL nebulizer solution Take 1.25 mg by nebulization 3 (three) times a day as needed for wheezing      metoprolol tartrate (LOPRESSOR) 25 mg tablet Take 0.5 tablets (12.5 mg total) by mouth every 12 (twelve) hours 90 tablet 1    montelukast (SINGULAIR) 10 mg tablet TAKE 1 TABLET BY MOUTH EVERYDAY AT BEDTIME 90 tablet 1    NovoLOG 100 UNIT/ML injection Inject under the skin if needed for high blood sugar Take as needed sq as directed by doc if pump is not working       ondansetron (ZOFRAN-ODT) 4 mg disintegrating tablet Take 1 tablet (4 mg total) by mouth every 6 (six) hours as needed for nausea or vomiting for up to 6 doses 6 tablet 0    oxygen gas Inhale 3  "L/min daily at bedtime. nasal cannula      pantoprazole (PROTONIX) 40 mg tablet TAKE 1 TABLET TWICE DAILY 30 MINS PRIOR TO BREAKFAST AND SUPPER. 180 tablet 3    sodium chloride 0.9 % nebulizer solution TAKE 3 ML BY NEBULIZATION AS NEEDED FOR WHEEZING 300 mL 3     No current facility-administered medications on file prior to visit.      Social History     Tobacco Use    Smoking status: Never    Smokeless tobacco: Never   Vaping Use    Vaping status: Never Used   Substance and Sexual Activity    Alcohol use: Not Currently    Drug use: No    Sexual activity: Yes     Partners: Female     Birth control/protection: None        Objective   /72 (BP Location: Right arm, Patient Position: Sitting, Cuff Size: Adult)   Pulse 84   Temp 97.5 °F (36.4 °C) (Temporal)   Resp 14   Ht 5' 8\" (1.727 m)   Wt 74.8 kg (165 lb)   SpO2 98%   BMI 25.09 kg/m²     Physical Exam  Eyes:      General: Lids are normal.      Extraocular Movements: Extraocular movements intact.      Pupils: Pupils are equal, round, and reactive to light.   Neurological:      Motor: Motor strength is normal.     Deep Tendon Reflexes:      Reflex Scores:       Patellar reflexes are 1+ on the right side and 1+ on the left side.       Achilles reflexes are 1+ on the right side and 1+ on the left side.      Neurological Exam  Mental Status  Awake, alert and oriented to person, place and time. Oriented to person, place and time. Language is fluent with no aphasia.    Cranial Nerves  CN II: Visual acuity is normal. Visual fields full to confrontation.  CN III, IV, VI: Extraocular movements intact bilaterally. Normal lids and orbits bilaterally. Pupils equal round and reactive to light bilaterally.  CN V: Facial sensation is normal.  CN VII: Full and symmetric facial movement.  CN VIII: Hearing is normal.  CN IX, X: Palate elevates symmetrically. Normal gag reflex.  CN XI: Shoulder shrug strength is normal.  CN XII: Tongue midline without atrophy or " fasciculations.  No nystagmus..    Motor  Normal muscle bulk throughout. Strength is 5/5 throughout all four extremities.  No myotonic events.    Sensory  Light touch is normal in upper and lower extremities. Temperature is normal in upper and lower extremities.     Reflexes                                            Right                      Left  Patellar                                1+                         1+  Achilles                                1+                         1+    Right pathological reflexes: Polo's absent.  Left pathological reflexes: Polo's absent.    Coordination  Right: Finger-to-nose normal.Left: Finger-to-nose normal.    Gait  Normal casual, toe, heel and tandem gait. Able to rise from chair without using arms.      I have spent a total time of 27 minutes in caring for this patient on the day of the visit/encounter including Counseling / Coordination of care, Documenting in the medical record, Reviewing/placing orders in the medical record (including tests, medications, and/or procedures), and Obtaining or reviewing history  .         He can return to our offices in several months but contact me if there is any other new questions or problems in the interim

## 2025-03-05 NOTE — PROGRESS NOTES
Daily Note     Today's date: 3/5/2025  Patient name: Otoniel Martinez  : 1960  MRN: [id number]  Referring provider: Mely Bacon,*  Dx:   Encounter Diagnosis     ICD-10-CM    1. Benign paroxysmal vertigo of right ear  H81.11       2. Vertigo  R42           Start Time: 0815  Stop Time: 0825  Total time in clinic (min): 10 minutes    Subjective: Pt reports that his dizziness hasn't been as bad. He notes feeling slight dizziness when he wakes up in the morning. Otherwise, no new reports.       Objective: See treatment diary below.      Assessment: Tolerated treatment well. Upon reassessing the right jhoan-hallpike, the patient demonstrated slight up beating, torsional nystagmus and was mildly symptomatic with dizziness. The epley maneuver was performed to the right posterior canal. Reassessed the right jhoan-hallpike a second time with minimal reported symptoms and no observed nystagmus, however epley maneuver was performed again. Patient educated on positioning steps to perform if symptoms re-emerge at home. Given printed handout with instructions on positioning. Patient discharged at this time.       Plan:  Patient discharged at this time. Encouraged to follow-up if any issues arise.      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), 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HCC)    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HCC)    Mixed hyperlipidemia    Vertigo    Polyneuropathy    Chronic gastritis without bleeding    Old cerebrovascular accident (CVA) without late effect    LOJA (dyspnea on exertion)    Vitamin D deficiency    History of recurrent deep vein thrombosis (DVT)    Dysfunction of right rotator cuff    Primary osteoarthritis of right hip    Recurrent cold sores    Chronic bronchitis (HCC)    Abnormal CT scan of lung    Chronic sinusitis    Insulin pump status    Obstructive sleep apnea     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)    Splenomegaly    Establishing care with new doctor, encounter for    Decreased hearing of both ears    Memory loss    Seizure (HCC)    Monoclonal gammopathy    Hx of pancreatitis    On home oxygen therapy    Splenic artery aneurysm (HCC)    Carotid stenosis, asymptomatic, bilateral    Pulmonary nodule 1 cm or greater in diameter    Left main coronary artery disease    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)    Symptomatic sinus bradycardia    S/P CABG (coronary artery bypass graft)    Pacemaker    Sick sinus syndrome (HCC)    Mild ascending aorta dilation (HCC)           Manuals 3/5            Epley Maneuver - right post canal BW (x2)                                                   Neuro Re-Ed                                                                                                        Ther Ex                                                                                                                     Ther Activity                                       Gait Training                                       Modalities                                            Patient primarily treated by MIREYA Langston with direct supervision throughout session by clinical instructor/staff therapist Jama Gonzalez DPT.

## 2025-03-05 NOTE — ASSESSMENT & PLAN NOTE
Oliverio recently had neuropsychological testing which showed no evidence of vascular dementia but may be early cognitive signs.  This may have been contributed due to persistent oxygenation limitation and arrhythmia.  This is since resolved and his wife has noted some improvement of his memory.  He does have no problem with long-term memory but he continues to have intermittent issues with short-term memory.  He denies any problem getting lost in familiar places recognizing friends or family.  He has no problems with calculations.  We also discussed MRI imaging studies and MRI of neuro quant.  I have asked his wife and him to continue to monitor.  We will discuss his symptoms in approximately 6 months and if they have progressed we may require further testing such as amyloid testing and/or a LP.

## 2025-03-06 ENCOUNTER — TELEPHONE (OUTPATIENT)
Dept: CARDIOLOGY CLINIC | Facility: CLINIC | Age: 65
End: 2025-03-06

## 2025-03-06 ENCOUNTER — OFFICE VISIT (OUTPATIENT)
Dept: INTERNAL MEDICINE CLINIC | Age: 65
End: 2025-03-06
Payer: COMMERCIAL

## 2025-03-06 VITALS
HEIGHT: 68 IN | HEART RATE: 77 BPM | WEIGHT: 164 LBS | TEMPERATURE: 97.5 F | DIASTOLIC BLOOD PRESSURE: 76 MMHG | BODY MASS INDEX: 24.86 KG/M2 | OXYGEN SATURATION: 95 % | SYSTOLIC BLOOD PRESSURE: 118 MMHG

## 2025-03-06 DIAGNOSIS — G47.33 OBSTRUCTIVE SLEEP APNEA: Primary | Chronic | ICD-10-CM

## 2025-03-06 DIAGNOSIS — J41.1 MUCOPURULENT CHRONIC BRONCHITIS (HCC): ICD-10-CM

## 2025-03-06 DIAGNOSIS — J40 BRONCHITIS: ICD-10-CM

## 2025-03-06 DIAGNOSIS — D80.2 IMMUNOGLOBULIN A DEFICIENCY (HCC): ICD-10-CM

## 2025-03-06 DIAGNOSIS — E10.319 DIABETIC RETINOPATHY OF RIGHT EYE ASSOCIATED WITH TYPE 1 DIABETES MELLITUS, MACULAR EDEMA PRESENCE UNSPECIFIED, UNSPECIFIED RETINOPATHY SEVERITY (HCC): ICD-10-CM

## 2025-03-06 PROCEDURE — 99214 OFFICE O/P EST MOD 30 MIN: CPT | Performed by: INTERNAL MEDICINE

## 2025-03-06 RX ORDER — DEXTROMETHORPHAN HYDROBROMIDE AND PROMETHAZINE HYDROCHLORIDE 15; 6.25 MG/5ML; MG/5ML
5 SYRUP ORAL 4 TIMES DAILY PRN
Qty: 180 ML | Refills: 1 | Status: SHIPPED | OUTPATIENT
Start: 2025-03-06

## 2025-03-06 NOTE — ASSESSMENT & PLAN NOTE
Lab Results   Component Value Date    HGBA1C 8.4 (H) 10/31/2024       Orders:    Albumin / creatinine urine ratio; Future

## 2025-03-06 NOTE — ASSESSMENT & PLAN NOTE
Orders:    amoxicillin-clavulanate (AUGMENTIN) 875-125 mg per tablet; Take 1 tablet by mouth every 12 (twelve) hours for 7 days    promethazine-dextromethorphan (PHENERGAN-DM) 6.25-15 mg/5 mL oral syrup; Take 5 mL by mouth 4 (four) times a day as needed for cough

## 2025-03-06 NOTE — PROGRESS NOTES
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, macular edema presence unspecified, unspecified retinopathy severity (HCC)    Lab Results   Component Value Date    HGBA1C 8.4 (H) 10/31/2024       Orders:    Albumin / creatinine urine ratio; Future    Obstructive sleep apnea         Bronchitis    Orders:    amoxicillin-clavulanate (AUGMENTIN) 875-125 mg per tablet; Take 1 tablet by mouth every 12 (twelve) hours for 7 days    promethazine-dextromethorphan (PHENERGAN-DM) 6.25-15 mg/5 mL oral syrup; Take 5 mL by mouth 4 (four) times a day as needed for cough    Mucopurulent chronic bronchitis (HCC)         Immunoglobulin A deficiency (HCC)                History of Present Illness   HPI  Review of Systems   Constitutional:  Positive for fatigue. Negative for activity change and fever.   HENT:  Positive for congestion and rhinorrhea. Negative for sinus pain and sore throat.    Eyes:  Negative for discharge.   Respiratory:  Positive for cough. Negative for shortness of breath.    Cardiovascular:  Negative for chest pain and palpitations.   Gastrointestinal:  Negative for constipation, diarrhea and nausea.   Genitourinary:  Negative for hematuria and urgency.   Musculoskeletal:  Negative for back pain and gait problem.   Neurological:  Negative for dizziness, weakness and light-headedness.   Psychiatric/Behavioral:  Negative for sleep disturbance. The patient is not nervous/anxious.        Objective   /76 (BP Location: Left arm, Patient Position: Sitting, Cuff Size: Standard)   Pulse 77   Temp 97.5 °F (36.4 °C) (Temporal)   Ht 5' 8\" (1.727 m)   Wt 74.4 kg (164 lb)   SpO2 95%   BMI 24.94 kg/m²      Physical Exam  Constitutional:       Appearance: Normal appearance. "   HENT:      Head: Normocephalic and atraumatic.      Jaw: No swelling.      Nose: Congestion present.      Right Turbinates: Enlarged and swollen.      Left Turbinates: Enlarged and swollen.   Cardiovascular:      Rate and Rhythm: Normal rate and regular rhythm.   Pulmonary:      Breath sounds: Decreased air movement present. Examination of the right-lower field reveals rales. Examination of the left-lower field reveals rales. Decreased breath sounds and rales present.   Neurological:      Mental Status: He is alert.

## 2025-03-13 DIAGNOSIS — J32.9 CHRONIC SINUSITIS, UNSPECIFIED LOCATION: ICD-10-CM

## 2025-03-13 DIAGNOSIS — J42 CHRONIC BRONCHITIS, UNSPECIFIED CHRONIC BRONCHITIS TYPE (HCC): Primary | ICD-10-CM

## 2025-03-17 ENCOUNTER — APPOINTMENT (OUTPATIENT)
Dept: LAB | Facility: IMAGING CENTER | Age: 65
End: 2025-03-17
Payer: COMMERCIAL

## 2025-03-17 DIAGNOSIS — I25.10 CORONARY ARTERY DISEASE INVOLVING NATIVE CORONARY ARTERY OF NATIVE HEART WITHOUT ANGINA PECTORIS: ICD-10-CM

## 2025-03-17 DIAGNOSIS — C83.00 LYMPHOPLASMACYTIC LYMPHOMA (HCC): ICD-10-CM

## 2025-03-17 DIAGNOSIS — D69.6 THROMBOCYTOPENIA (HCC): ICD-10-CM

## 2025-03-17 DIAGNOSIS — D47.2 IGM KAPPA MONOCLONAL GAMMOPATHY: ICD-10-CM

## 2025-03-17 DIAGNOSIS — E78.2 MIXED HYPERLIPIDEMIA: ICD-10-CM

## 2025-03-17 LAB
ALBUMIN SERPL BCG-MCNC: 3.7 G/DL (ref 3.5–5)
ALP SERPL-CCNC: 89 U/L (ref 34–104)
ALT SERPL W P-5'-P-CCNC: 63 U/L (ref 7–52)
ANION GAP SERPL CALCULATED.3IONS-SCNC: 6 MMOL/L (ref 4–13)
AST SERPL W P-5'-P-CCNC: 42 U/L (ref 13–39)
BASOPHILS # BLD AUTO: 0.01 THOUSANDS/ÂΜL (ref 0–0.1)
BASOPHILS NFR BLD AUTO: 0 % (ref 0–1)
BILIRUB SERPL-MCNC: 0.48 MG/DL (ref 0.2–1)
BUN SERPL-MCNC: 17 MG/DL (ref 5–25)
CALCIUM SERPL-MCNC: 8.8 MG/DL (ref 8.4–10.2)
CHLORIDE SERPL-SCNC: 103 MMOL/L (ref 96–108)
CHOLEST SERPL-MCNC: 121 MG/DL (ref ?–200)
CO2 SERPL-SCNC: 32 MMOL/L (ref 21–32)
CREAT SERPL-MCNC: 0.5 MG/DL (ref 0.6–1.3)
EOSINOPHIL # BLD AUTO: 0.34 THOUSAND/ÂΜL (ref 0–0.61)
EOSINOPHIL NFR BLD AUTO: 9 % (ref 0–6)
ERYTHROCYTE [DISTWIDTH] IN BLOOD BY AUTOMATED COUNT: 14.8 % (ref 11.6–15.1)
GFR SERPL CREATININE-BSD FRML MDRD: 114 ML/MIN/1.73SQ M
GLUCOSE P FAST SERPL-MCNC: 128 MG/DL (ref 65–99)
HCT VFR BLD AUTO: 39.3 % (ref 36.5–49.3)
HDLC SERPL-MCNC: 39 MG/DL
HGB BLD-MCNC: 12.1 G/DL (ref 12–17)
IGA SERPL-MCNC: 61 MG/DL (ref 66–433)
IGG SERPL-MCNC: 508 MG/DL (ref 635–1741)
IGM SERPL-MCNC: 249 MG/DL (ref 45–281)
IMM GRANULOCYTES # BLD AUTO: 0.02 THOUSAND/UL (ref 0–0.2)
IMM GRANULOCYTES NFR BLD AUTO: 1 % (ref 0–2)
LDH SERPL-CCNC: 235 U/L (ref 140–271)
LDLC SERPL CALC-MCNC: 55 MG/DL (ref 0–100)
LDLC SERPL DIRECT ASSAY-MCNC: 67 MG/DL (ref 0–100)
LYMPHOCYTES # BLD AUTO: 0.73 THOUSANDS/ÂΜL (ref 0.6–4.47)
LYMPHOCYTES NFR BLD AUTO: 20 % (ref 14–44)
MCH RBC QN AUTO: 25.7 PG (ref 26.8–34.3)
MCHC RBC AUTO-ENTMCNC: 30.8 G/DL (ref 31.4–37.4)
MCV RBC AUTO: 84 FL (ref 82–98)
MONOCYTES # BLD AUTO: 0.42 THOUSAND/ÂΜL (ref 0.17–1.22)
MONOCYTES NFR BLD AUTO: 11 % (ref 4–12)
NEUTROPHILS # BLD AUTO: 2.16 THOUSANDS/ÂΜL (ref 1.85–7.62)
NEUTS SEG NFR BLD AUTO: 59 % (ref 43–75)
NONHDLC SERPL-MCNC: 82 MG/DL
NRBC BLD AUTO-RTO: 0 /100 WBCS
PLATELET # BLD AUTO: 153 THOUSANDS/UL (ref 149–390)
PMV BLD AUTO: 10.7 FL (ref 8.9–12.7)
POTASSIUM SERPL-SCNC: 4.1 MMOL/L (ref 3.5–5.3)
PROT SERPL-MCNC: 5.7 G/DL (ref 6.4–8.4)
RBC # BLD AUTO: 4.7 MILLION/UL (ref 3.88–5.62)
SODIUM SERPL-SCNC: 141 MMOL/L (ref 135–147)
TRIGL SERPL-MCNC: 137 MG/DL (ref ?–150)
WBC # BLD AUTO: 3.68 THOUSAND/UL (ref 4.31–10.16)

## 2025-03-17 PROCEDURE — 36415 COLL VENOUS BLD VENIPUNCTURE: CPT

## 2025-03-17 PROCEDURE — 84165 PROTEIN E-PHORESIS SERUM: CPT

## 2025-03-17 PROCEDURE — 82784 ASSAY IGA/IGD/IGG/IGM EACH: CPT

## 2025-03-17 PROCEDURE — 83721 ASSAY OF BLOOD LIPOPROTEIN: CPT

## 2025-03-17 PROCEDURE — 80061 LIPID PANEL: CPT

## 2025-03-17 PROCEDURE — 83521 IG LIGHT CHAINS FREE EACH: CPT

## 2025-03-17 PROCEDURE — 83615 LACTATE (LD) (LDH) ENZYME: CPT

## 2025-03-17 PROCEDURE — 80053 COMPREHEN METABOLIC PANEL: CPT

## 2025-03-17 PROCEDURE — 82232 ASSAY OF BETA-2 PROTEIN: CPT

## 2025-03-17 PROCEDURE — 85025 COMPLETE CBC W/AUTO DIFF WBC: CPT

## 2025-03-18 LAB
KAPPA LC FREE SER-MCNC: 26.5 MG/L (ref 3.3–19.4)
KAPPA LC FREE/LAMBDA FREE SER: 0.41 {RATIO} (ref 0.26–1.65)
LAMBDA LC FREE SERPL-MCNC: 65.3 MG/L (ref 5.7–26.3)

## 2025-03-19 LAB
ALBUMIN SERPL ELPH-MCNC: 3.15 G/DL (ref 3.2–5.1)
ALBUMIN SERPL ELPH-MCNC: 59.4 % (ref 48–70)
ALPHA1 GLOB SERPL ELPH-MCNC: 0.27 G/DL (ref 0.15–0.47)
ALPHA1 GLOB SERPL ELPH-MCNC: 5.1 % (ref 1.8–7)
ALPHA2 GLOB SERPL ELPH-MCNC: 0.66 G/DL (ref 0.42–1.04)
ALPHA2 GLOB SERPL ELPH-MCNC: 12.5 % (ref 5.9–14.9)
B2 MICROGLOB SERPL-MCNC: 2.1 MG/L (ref 0.6–2.4)
BETA GLOB ABNORMAL SERPL ELPH-MCNC: 0.43 G/DL (ref 0.31–0.57)
BETA1 GLOB SERPL ELPH-MCNC: 8.1 % (ref 4.7–7.7)
BETA2 GLOB SERPL ELPH-MCNC: 5.2 % (ref 3.1–7.9)
BETA2+GAMMA GLOB SERPL ELPH-MCNC: 0.28 G/DL (ref 0.2–0.58)
GAMMA GLOB ABNORMAL SERPL ELPH-MCNC: 0.51 G/DL (ref 0.4–1.66)
GAMMA GLOB SERPL ELPH-MCNC: 9.7 % (ref 6.9–22.3)
IGG/ALB SER: 1.46 {RATIO} (ref 1.1–1.8)
PROT PATTERN SERPL ELPH-IMP: ABNORMAL
PROT SERPL-MCNC: 5.3 G/DL (ref 6.4–8.2)

## 2025-03-19 PROCEDURE — 84165 PROTEIN E-PHORESIS SERUM: CPT | Performed by: PATHOLOGY

## 2025-03-19 RX ORDER — LEVALBUTEROL INHALATION SOLUTION 1.25 MG/3ML
SOLUTION RESPIRATORY (INHALATION)
Qty: 270 ML | Refills: 2 | Status: SHIPPED | OUTPATIENT
Start: 2025-03-19

## 2025-03-21 ENCOUNTER — HOSPITAL ENCOUNTER (OUTPATIENT)
Dept: NON INVASIVE DIAGNOSTICS | Facility: HOSPITAL | Age: 65
Discharge: HOME | End: 2025-03-21
Payer: COMMERCIAL

## 2025-03-21 DIAGNOSIS — I72.8 SPLENIC ARTERY ANEURYSM (HCC): ICD-10-CM

## 2025-03-21 PROCEDURE — 93975 VASCULAR STUDY: CPT

## 2025-03-21 PROCEDURE — 93975 VASCULAR STUDY: CPT | Performed by: SURGERY

## 2025-03-27 ENCOUNTER — TELEPHONE (OUTPATIENT)
Dept: VASCULAR SURGERY | Facility: CLINIC | Age: 65
End: 2025-03-27

## 2025-03-27 NOTE — TELEPHONE ENCOUNTER
Called patient's spouse for more information. Patient has been experiencing constant discomfort above naval, below the diaphragm. Denies current post prandial pain or recent unintentional weight loss. Please advise.

## 2025-03-27 NOTE — TELEPHONE ENCOUNTER
"Called and spoke directly with patient.  Patient's symptoms include intermittent \"discomfort\" not pain that can happen at any time of day.  This has been ongoing for a few months.  Location midway between rib cage and umbilicus.  More uncomfortable when patient applies pressure to the area.    He denies postprandial pain or fullness, N/V, or unintentional weight loss.  Patient does had not have issues with eating, or food fear.  His diet has remained the same and tolerating well.    Reviewed imaging findings with patient.  His SMA and ESTUARDO are patent.  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  Known, splenic aneurysm which appears stable.  Reviewed symptoms of rupture and when to seek medical attention.  Patient has office follow-up scheduled to review imaging and also evaluate.  Advised patient to call with any new or worsening symptoms, questions or concerns.  "

## 2025-03-27 NOTE — TELEPHONE ENCOUNTER
Otoniel Martinez to [facility] (supporting Melissa Dsouza MD)         3/26/25  9:52 AM  Oliverio Jacksonak, my spouse on 3/21/25. His  1960. 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 Can you please review the results and get back to me at , many thanks. He does have discomfort which concerns me.

## 2025-04-03 ENCOUNTER — OFFICE VISIT (OUTPATIENT)
Dept: PULMONOLOGY | Facility: CLINIC | Age: 65
End: 2025-04-03
Payer: COMMERCIAL

## 2025-04-03 VITALS
DIASTOLIC BLOOD PRESSURE: 68 MMHG | OXYGEN SATURATION: 97 % | HEIGHT: 68 IN | WEIGHT: 161 LBS | TEMPERATURE: 97 F | SYSTOLIC BLOOD PRESSURE: 108 MMHG | HEART RATE: 81 BPM | BODY MASS INDEX: 24.4 KG/M2

## 2025-04-03 DIAGNOSIS — G71.11 MYOTONIC DYSTROPHY (HCC): ICD-10-CM

## 2025-04-03 DIAGNOSIS — R91.1 PULMONARY NODULE 1 CM OR GREATER IN DIAMETER: ICD-10-CM

## 2025-04-03 DIAGNOSIS — G47.33 OSA (OBSTRUCTIVE SLEEP APNEA): ICD-10-CM

## 2025-04-03 DIAGNOSIS — J32.9 CHRONIC SINUSITIS, UNSPECIFIED LOCATION: Primary | ICD-10-CM

## 2025-04-03 DIAGNOSIS — J42 CHRONIC BRONCHITIS, UNSPECIFIED CHRONIC BRONCHITIS TYPE (HCC): ICD-10-CM

## 2025-04-03 PROCEDURE — 99214 OFFICE O/P EST MOD 30 MIN: CPT | Performed by: INTERNAL MEDICINE

## 2025-04-03 RX ORDER — INSULIN PMP CART,AUT,G6/7,CNTR
1 EACH SUBCUTANEOUS
COMMUNITY
Start: 2025-03-31

## 2025-04-03 NOTE — PROGRESS NOTES
"Pulmonary Follow Up Note   Otoniel Martinez 64 y.o. male MRN: [id number]  4/3/2025    Assessment:  Chronic bronchitis  Likely chronic allergic rhinitis/bronchitis initially noted in 2/2024  Other considerations/DDx reversible airway disease given the positive [facility] allergy panel vs immunoglobulin deficiency causing recurrent sinusitis  Likely aggravated by retained secretion from neuromuscular weakness/myotonic dystrophy unable to clear secretions properly  Hospitalized in 2/2024 for acute exacerbation treated with antibiotics/steroids with good outcomes  Improved significantly since treatment for exacerbation almost a year ago  Currently on Symbicort 80, does not use nebulized sodium chloride    Plan:  Continue Symbicort/normal saline nebs as PRN    Pulmonary nodule  New since 2/2024 noted on PET/CT that was done for monoclonal gammopathy/myeloma workup  1.9 x 1.5 cm at the KALEB/mild SUV uptake  Follow-up CT chest 9/16 showed decrease in size to 1.0 x 0.6 cm  Serial follow-up in 11/20/2024 and 1/20/2025 showed improvement of the KALEB nodule  Likely infectious/inflammatory changes  Other RUL 2 mm nodule, does not require further follow-up since he is not a high risk, lifelong non-smoker, no significant environmental/occupational hazard exposure  He will continue to follow with oncology for monoclonal gammopathy/myeloma workup    Obstructive sleep apnea  Reports first Dx more than 10 years ago with sleep study however no records available  Over the past few years, noticed more excessive daytime sleepiness, a.m. headache and unrefreshing sleep, wife states that he snores a lot  Reschedule sleep study, already approved by insurance    Return in about 4 months (around 8/3/2025).    History of Present Illness     Follow up for: chronic bronchitis     Background:  As per initial consult note     \"64 y.o. male with a h/o polyneuropathy, chronic gastritis, myotonic dystrophy, CAD/PCI 2004, CVA, osteoarthritis, DVT/Eliquis, " "chronic sinusitis, IDDM/insulin pump     Presented today for initial evaluation by pulmonary for chronic cough/mucus production     First noted approximately 5 years ago, episodes of minimally productive cough/chest congestion, especially around the allergy season/cold.  Impact his ability to perform duties at [employer] such as singing.  Associated with production of yellow mucus/oftentimes sticky.  Had partial relief from his Mucinex.  Also associated with chronic nasal congestion, known to have recurrent sinusitis from before did not follow with ENT since 2020.     In 2022, hospitalized for pneumonia/chest congestion to [facility], chest CT at that time showed bibasilar opacities/lingular opacities.     Hx myotonic dystrophy: Diagnosed 1988, noted difficulty walking/muscle spasms worse in the cold weather, underwent muscle biopsies.     Currently, episodes are manageable, tries to cough so hard to produce the mucus after that feels relief.  Never been on inhalers before, no prior formal diagnosis of asthma or COPD, lifelong non-smoker.        Social/exposure history  Lived in [facility] [place] all his life  Lifelong non-smoker, nonalcoholic  Worked for the [employer] office for decades, retired in 2017  Lives in an old house built in 1993, had water leak several months ago but no exposure to mold  Pets: 3 dogs     Family history: Dad had breathing issues/was a smoker\"     2/6 /2024 visit-Mucinex, Symbicort 80.  PFT/[facility] allergy panel which was positive.  Sputum cultures 6/1 NGTD.  Flonase        2/8/2024 visit-worse cough/sinusitis symptoms and URI, new hypoxemia with exertion.  Direct admit.  Antibiotic azithromycin/ceftriaxone, mucus clearing therapy.  Normal procalcitonin COVID/flu/RSV.  Sputum cultures normal ryley.  CT chest showed dependent GGO's at the RLL diffuse bronchial wall thickening/bronchitis.  CT sinus with mild to moderate sinusitis worse in the ethmoid sinuses.  Evaluated by ENT no pus on endoscopy no " gross polyps did not feel acute sinusitis.  Discharged on steroids/antibiotics     2/2024 visit-feeling better symptomatically,, PFT with MVV/MEP/MIP, no exertional hypoxemia.  Continue aggressive mucus clearing.  Still required 2-3 LPM at night/sleep study ordered     4/2024 visit - continue Symbicort, referral to allergy/immunology.      10/2024 visit-continue Symbicort, Astelin nasal spray.  Follow-up CT chest showed improvement of the KALEB nodule.    Interval History  Since last seen, no major respiratory changes.  No recurrence of chronic bronchitis symptoms, is very cautious about cleaning/vacuuming at home after the dog.  No cough, chest congestion, wheezing or nasal congestion.  Continued to be active, independent.  Dyspnea on exertion is improving since having CABG surgery.  Reports weakness/hoarseness of voice following the extubation for few weeks, improved with gargling with salt and water.      Review of Systems  As per hpi, all other systems reviewed and were negative    Studies:  Imaging and other studies: I have personally reviewed pertinent films in PACS     CT PE/20 3/2022-bibasilar/lingular airspace opacity suspect infectious/inflammatory/mucous plug concern for aspiration.     PET scan 8/1/2024-KALEB new nodule 1.9 x 1.5 SUV 2.8, mild halo of GGO, this was not present on CT in 2/2024.  Suspect inflammatory process/malignancy not excluded low metabolic activity, recommended at least 6 to 8 weeks follow-up vs biopsies     CT chest 9/16/2024-decrease in size of the lingular consolidation now 1.0 x 0.6 cm stable RUL nodule 3 mm.  Splenomegaly.    CT chest 11/10/2024-interval resolution of KALEB opacity suspect infectious/inflammatory changes.  Small bilateral pleural effusion, minimal left pleural hematoma, pneumomediastinum/retrosternal mediastinal edema.    CT chest 1/15/2025-stable 2 mm RUL nodule.  Bibasilar atelectatic changes.  Resolution of the prior bilateral pleural effusion/left pneumothorax,  also resolution of KALEB pleural nodules.     Pulmonary function testing:      PFT 3/18/2024-ratio 76%, FEV1 2.59 L / 83%, FVC 3.4 L / 85%.  TLC 81%, RV 89%, DLCO 79% MVV 55%, MAP 49%, MIP 37%     6-minute walk test 2/8/2024-baseline SpO2 93% on room air, heart rate 76.  Able to walk 266 m in 6 minutes, lowest SpO2 at 88% after 2 minutes, required 2 LPM to end exercise with SpO2 95%     EKG, Pathology, and Other Studies: I have personally reviewed pertinent reports.       Immunoglobulin levels 2/20/2024 and 2/20/2018  IGA  66 - 433 mg/dL 58 Low  40.0 Low  R   IGG  635 - 1,741 mg/dL 316 Low  361.0 Low  R   IGM  45 - 281 mg/dL 254 208.0 R            Component  Ref Range & Units 2/23/24  9:05 AM   IgG 1  248 - 810 mg/dL 155 Low    IgG 2  130 - 555 mg/dL 116 Low    IgG 3  15 - 102 mg/dL 37   IgG 4  2 - 96 mg/dL 11   IgG  603 - 1613 mg/dL 366 Low          [facility] Panel, Adult 2/6/2024        Component  Ref Range & Units 2/6/24  9:45 AM   A.ALTERNATA  0.00 - 0.09 kUA/I <0.10   A.FUMIGATUS  0.00 - 0.09 kUA/I <0.10   B[place] Grass  0.00 - 0.09 kUA/I 0.36 High    Bloomery  0.00 - 0.09 kUA/I <0.10   Cat Epithellium-Dander  0.00 - 0.09 kUA/I <0.10   C.HERBARUM  0.00 - 0.09 kUA/I <0.10   Cockroach  0.00 - 0.09 kUA/I 0.41 High    Common Silver Birch  0.00 - 0.09 kUA/I <0.10   [place]  0.00 - 0.09 kUA/I <0.10   D. farinae  0.00 - 0.09 kUA/I <0.10   D. pteronyssinus  0.00 - 0.09 kUA/I 0.12 High    Dog Dander  0.00 - 0.09 kUA/I 2.30 High    Elm IgE  0.00 - 0.09 kUA/I <0.10   [place] Tree  0.00 - 0.09 kUA/I <0.10   Mugwort  0.00 - 0.09 kUA/I <0.10   Obion Tree  0.00 - 0.09 kUA/I <0.10   Oak  0.00 - 0.09 kUA/I <0.10   P.CHRYSOGENUM  0.00 - 0.09 kUA/I <0.10   Rough Pigweed  IgE  0.00 - 0.09 kUA/I <0.10   Common Ragweed  0.00 - 0.09 kUA/I <0.10   Sheep Sorrel IgE  0.00 - 0.09 kUA/I <0.10   [place] Tree  0.00 - 0.09 kUA/I <0.10   Osmin Grass  0.00 - 0.09 kUA/I 1.89 High    [place] Tree  0.00 - 0.09 kUA/I <0.10  "  [place] Tree  0.00 - 0.09 kUA/I <0.10   IgE  0 - 113 kU/l 87.7   MOUSE URINE  0.00 - 0.09 kUA/I <0.10           Past medical, surgical, social and family histories reviewed.      Medications/Allergies: Reviewed      Vitals: Blood pressure 108/68, pulse 81, temperature (!) 97 °F (36.1 °C), temperature source Tympanic, height 5' 8\" (1.727 m), weight 73 kg (161 lb), SpO2 97%. Body mass index is 24.48 kg/m². Oxygen Therapy  SpO2: 97 %  Oxygen Therapy: None (Room air)      Physical Exam  Body mass index is 24.48 kg/m².   Gen: not in acute distress,   Neck/Eyes: supple,  PERRL  Ear: normal appearance, no significant hearing impairment  Nose:  normal nasal mucosa, no drainage  Mouth:  unremarkable/normal appearance of lips, teeth and gums  Oropharynx: mucosa is moist, no focal lesions or erythema  Salivary glands: soft nontender  Chest: normal respiratory efforts, clear breath sounds bilaterally  CV: RRR, no murmurs appreciated, no edema  Abdomen: soft, non tender  Extremities:  No observed deformity   Skin: unremarkable  Neuro: AAO X3, no focal motor deficit        Labs:  Lab Results   Component Value Date    WBC 3.68 (L) 03/17/2025    HGB 12.1 03/17/2025    HCT 39.3 03/17/2025    MCV 84 03/17/2025     03/17/2025     Lab Results   Component Value Date    GLUCOSE 129 11/06/2024    CALCIUM 8.8 03/17/2025     01/09/2018    K 4.1 03/17/2025    CO2 32 03/17/2025     03/17/2025    BUN 17 03/17/2025    CREATININE 0.50 (L) 03/17/2025     Lab Results   Component Value Date    IGE 87.7 02/06/2024     Lab Results   Component Value Date    ALT 63 (H) 03/17/2025    AST 42 (H) 03/17/2025    ALKPHOS 89 03/17/2025    BILITOT 1.4 (H) 01/09/2018           Portions of the record may have been created with voice recognition software.  Occasional wrong word or \"sound a like\" substitutions may have occurred due to the inherent limitations of voice recognition software.  Read the chart carefully and recognize, using " context, where substitutions have occurred    Tomas Jack M.D.  [facility]

## 2025-04-04 ENCOUNTER — TELEPHONE (OUTPATIENT)
Dept: PULMONOLOGY | Facility: CLINIC | Age: 65
End: 2025-04-04

## 2025-04-04 NOTE — TELEPHONE ENCOUNTER
Spoke with patient's wife and informed her that Dr. Jack placed a new Sleep Study order and the Sleep [facility] will soon get in contact with them to schedule the test. Patient's wife verbalized understanding.

## 2025-04-07 ENCOUNTER — TRANSCRIBE ORDERS (OUTPATIENT)
Dept: SLEEP CENTER | Facility: CLINIC | Age: 65
End: 2025-04-07

## 2025-04-07 DIAGNOSIS — G47.19 EXCESSIVE DAYTIME SLEEPINESS: Primary | ICD-10-CM

## 2025-04-08 ENCOUNTER — OFFICE VISIT (OUTPATIENT)
Dept: HEMATOLOGY ONCOLOGY | Facility: CLINIC | Age: 65
End: 2025-04-08
Payer: COMMERCIAL

## 2025-04-08 VITALS
HEIGHT: 68 IN | BODY MASS INDEX: 24.63 KG/M2 | WEIGHT: 162.5 LBS | RESPIRATION RATE: 16 BRPM | SYSTOLIC BLOOD PRESSURE: 124 MMHG | DIASTOLIC BLOOD PRESSURE: 64 MMHG | OXYGEN SATURATION: 97 % | TEMPERATURE: 98 F | HEART RATE: 76 BPM

## 2025-04-08 DIAGNOSIS — R91.8 ABNORMAL CT SCAN OF LUNG: ICD-10-CM

## 2025-04-08 DIAGNOSIS — D69.6 THROMBOCYTOPENIA (HCC): ICD-10-CM

## 2025-04-08 DIAGNOSIS — C83.00 LYMPHOPLASMACYTIC LYMPHOMA (HCC): Primary | ICD-10-CM

## 2025-04-08 DIAGNOSIS — D72.819 LEUKOPENIA, UNSPECIFIED TYPE: ICD-10-CM

## 2025-04-08 DIAGNOSIS — R16.1 SPLENOMEGALY: ICD-10-CM

## 2025-04-08 PROCEDURE — 99214 OFFICE O/P EST MOD 30 MIN: CPT | Performed by: INTERNAL MEDICINE

## 2025-04-08 RX ORDER — ACYCLOVIR 400 MG/1
TABLET ORAL
COMMUNITY
Start: 2025-04-02

## 2025-04-08 NOTE — TELEPHONE ENCOUNTER
Pt's wife calling in to get number to schedule sleep study. She states no one called her and they would like to schedule asap

## 2025-04-08 NOTE — ASSESSMENT & PLAN NOTE
Orders:    CBC and differential; Future    Comprehensive metabolic panel; Future    Protein electrophoresis, serum; Future    IgG, IgA, IgM; Future    [place]/Lambda Light Chains Free With Ratio, Serum; Future    LD,Blood; Future    Beta 2 microglobulin, serum; Future

## 2025-04-08 NOTE — ASSESSMENT & PLAN NOTE
Left upper lobe lung nodule    Patient was incidentally noted to have a 1.9 x 1.5 cm mildly FDG avid left upper lobe lung nodule.  Findings could reflect an inflammatory process versus an underlying malignant process.  Agree with pulmonary eval to follow-up with a CT scan in 6 weeks to assess further.  Will need biopsy if persists.  Low suspicion that this is related to his monoclonal gammopathy.

## 2025-04-08 NOTE — PROGRESS NOTES
Name: Otoniel Martinez      : 1960      MRN: [id number]  Encounter Provider: Juli Bronson DO  Encounter Date: 2025   Encounter department: [facility] [place]  :  Assessment & Plan  Lymphoplasmacytic lymphoma (HCC)  1.  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  CXCR4 mutation not detected. Low risk  >> Low risk: Bone marrow infiltration: 20%, IgM: 234, beta-2 microglobulin: 1.8, albumin: 3.7  2.  Mild leukopenia without neutropenia  3.  Chronic thrombocytopenia  4.  Incidentally noted mild splenomegaly on CT chest     Otoniel Martinez is a 64 y.o. male with past medical history significant for CAD, diabetes, DVT after tear in gastrocnemius muscle (on indefinite anticoagulation with Xarelto), recurrent pancreatitis, diabetes type 1 and sleep apnea.  Patient was admitted to [facility] from 2024 - 2/10/2024 with acute respiratory failure with hypoxia which was suspected related to acute on chronic bronchitis.  He was treated with antibiotics and prednisone.  He underwent a CT of the chest which incidentally noted splenomegaly.  He underwent a bone marrow biopsy on 2024 which is consistent with a low-grade CD5 positive B-cell lymphoma accompanied by clonal kappa restricted plasma cells detected by flow cytometry normocellular marrow, together with IgM kappa gammopathy and MYD 88 mutation detected in peripheral blood flow suggestive of lymphoplasmacytic lymphoma.  No apple green birefringent material is identified on Congo red stain, normal male karyotype, no evidence of abnormalities by FISH and no mutation detected any of the genes on the NGS panel. Serum immunofixation showed a monoclonal gammopathy identified as IgM kappa, M-Fco: 0.11 g/deciliter.  Quantitative immunoglobulins with no elevation in IgM.  Beta-2 microglobulin 1.8.  Patient with overall low risk disease which equates to median time to  progression of 9.2 years.  Recommendation reviewed per NCCN guidelines.  Patient currently asymptomatic.      We discussed lymphoplasmacytic lymphoma is a mature B-cell lymphoma usually involving the bone marrow, spleen or lymph nodes. Waldenström macroglobulinemia is a distinct clinicopathologic entity demonstrating LPL in the bone marrow with an IgM monoclonal gammopathy in the blood. The clinical presentation of LPL is varied and can include symptoms related to tumor infiltration (lymphadenopathy, organomegaly, cytopenias) or monoclonal protein production (hyperviscosity, neuropathy).  Patient currently remains asymptomatic.     Labs from 3/17/2025 reviewed.  SPEP shows a faint monoclonal band in the gamma region.  Band is too small to accurately measure by densitometry.  Quantitative immunoglobulins remain stable, LDH WNL, kappa lambda ratio WNL. Overall remains asymptomatic from oncologic perspective. Will continue to monitor.  Provided patient education.  Office f/u in 3-4 months with labs prior.    Orders:    CBC and differential; Future    Comprehensive metabolic panel; Future    Protein electrophoresis, serum; Future    IgG, IgA, IgM; Future    Bly/Lambda Light Chains Free With Ratio, Serum; Future    LD,Blood; Future    Beta 2 microglobulin, serum; Future    Leukopenia, unspecified type    Orders:    CBC and differential; Future    Comprehensive metabolic panel; Future    Protein electrophoresis, serum; Future    IgG, IgA, IgM; Future    Bly/Lambda Light Chains Free With Ratio, Serum; Future    LD,Blood; Future    Beta 2 microglobulin, serum; Future    Thrombocytopenia (HCC)    Orders:    CBC and differential; Future    Comprehensive metabolic panel; Future    Protein electrophoresis, serum; Future    IgG, IgA, IgM; Future    Bly/Lambda Light Chains Free With Ratio, Serum; Future    LD,Blood; Future    Beta 2 microglobulin, serum; Future    Splenomegaly         Abnormal CT scan of lung  Left upper  lobe lung nodule    Patient was incidentally noted to have a 1.9 x 1.5 cm mildly FDG avid left upper lobe lung nodule.  Findings could reflect an inflammatory process versus an underlying malignant process.  Agree with pulmonary eval to follow-up with a CT scan in 6 weeks to assess further.  Will need biopsy if persists.  Low suspicion that this is related to his monoclonal gammopathy.       Recurrent DVT  Currently on indefinite anticoagulation.  On treatment with Xarelto.    Splenic artery aneurysm  Incidentally noted to have a 2.4 cm splenic artery aneurysm on imaging.  Recommend vascular surgery consultation.    Return in about 4 months (around 8/8/2025) for Office Visit, Labs.    History of Present Illness   Chief Complaint   Patient presents with    Follow-up       Pertinent Medical History   Otoniel Martinez is a 63-year-old male with past medical history significant for CAD, diabetes, DVT after tear in gastrocnemius muscle on indefinite anticoagulation with Xarelto), recurrent pancreatitis, diabetes type 1 and sleep apnea.  Patient was admitted to [facility] from 2/8/2024 - 2/10/2024 with acute respiratory failure with hypoxia which was suspected related to acute on chronic bronchitis.  He was treated with antibiotics and prednisone.  He underwent a CT of the chest which incidentally noted splenomegaly.  Patient referred to hematology for further evaluation.  He presents for initial consultation.     Patient denies any fever, night sweats, weight loss. Denies any abd pain, n/v/d. Denies frequent or recurrent illness. ETOH use socially. Denies easy bruising or bleeding.      Interval History:   Subsequent workup in the interim shows IgM kappa monoclonal gammopathy on SPEP.  Peripheral smear with involvement by a monotypic kappa B-cell population with MYD88 mutation.  Patient denies any fever, night sweats or unexpected weight loss.     Interval history 8/12/2024:  In the interim, patient  "underwent bone marrow biopsy on 8/7/2024 which remains pending.  PET CT scan noted a hypermetabolic masslike consolidation in the left upper lobe measuring 1.9 x 1.5 cm.  Mild splenomegaly.  Incidentally noted a 2.4 cm splenic artery aneurysm for which vascular surgical consultation recommended.     Interval history 9/3/2024:  Patient presents for follow-up visit accompanied by his wife.  Patient denies any fever, night sweats, respiratory symptoms or bone pain.  In the interim he underwent bone marrow biopsy which was consistent with lymphoplasmacytic lymphoma.     Interval history 12/3/2024:  Patient presents for follow-up accompanied by wife.  Recently underwent coronary artery bypass grafting on 11/6/2024.  He is to start cardiac rehab.  Following with cardiology    04/08/25:   Accompanied by his wife.  Denies any fever, night sweats or weight loss.  Remains active.  Going to the gym 6 days a week.        Review of Systems   Constitutional:  Negative for chills, fatigue and fever.   Respiratory:  Negative for cough and shortness of breath.    Cardiovascular:  Negative for chest pain and palpitations.   Gastrointestinal:  Negative for abdominal pain and vomiting.   Genitourinary:  Negative for hematuria.   Musculoskeletal:  Negative for arthralgias and back pain.   Skin:  Negative for rash.   Hematological:  Does not bruise/bleed easily.   All other systems reviewed and are negative.        Objective   /64 (BP Location: Left arm, Patient Position: Sitting, Cuff Size: Adult)   Pulse 76   Temp 98 °F (36.7 °C) (Temporal)   Resp 16   Ht 5' 8\" (1.727 m)   Wt 73.7 kg (162 lb 8 oz)   SpO2 97%   BMI 24.71 kg/m²     Pain Screening:  Pain Score: 0-No pain  ECOG     Physical Exam  Vitals reviewed.   Constitutional:       General: He is not in acute distress.     Appearance: Normal appearance.   HENT:      Head: Normocephalic and atraumatic.      Mouth/Throat:      Mouth: Mucous membranes are moist.   Eyes:      " Extraocular Movements: Extraocular movements intact.      Conjunctiva/sclera: Conjunctivae normal.   Cardiovascular:      Rate and Rhythm: Normal rate.   Pulmonary:      Effort: Pulmonary effort is normal. No respiratory distress.      Breath sounds: Normal breath sounds.   Abdominal:      General: Abdomen is flat. There is no distension.      Palpations: Abdomen is soft.   Musculoskeletal:      Cervical back: Normal range of motion.      Right lower leg: No edema.      Left lower leg: No edema.   Skin:     General: Skin is warm.      Coloration: Skin is not jaundiced.   Neurological:      Mental Status: He is alert and oriented to person, place, and time. Mental status is at baseline.      Gait: Gait normal.   Psychiatric:         Thought Content: Thought content normal.         Labs: I have reviewed the following labs:  Lab Results   Component Value Date/Time    WBC 3.68 (L) 03/17/2025 07:09 AM    RBC 4.70 03/17/2025 07:09 AM    Hemoglobin 12.1 03/17/2025 07:09 AM    Hematocrit 39.3 03/17/2025 07:09 AM    MCV 84 03/17/2025 07:09 AM    MCH 25.7 (L) 03/17/2025 07:09 AM    RDW 14.8 03/17/2025 07:09 AM    Platelets 153 03/17/2025 07:09 AM    Segmented % 59 03/17/2025 07:09 AM    Lymphocytes % 20 03/17/2025 07:09 AM    Monocytes % 11 03/17/2025 07:09 AM    Eosinophils Relative 9 (H) 03/17/2025 07:09 AM    Basophils Relative 0 03/17/2025 07:09 AM    Immature Grans % 1 03/17/2025 07:09 AM    Absolute Neutrophils 2.16 03/17/2025 07:09 AM     Lab Results   Component Value Date/Time    Potassium 4.1 03/17/2025 07:09 AM    Chloride 103 03/17/2025 07:09 AM    CO2 32 03/17/2025 07:09 AM    CO2, i-STAT 24 11/06/2024 01:03 PM    BUN 17 03/17/2025 07:09 AM    Creatinine 0.50 (L) 03/17/2025 07:09 AM    Glucose, i-STAT 129 11/06/2024 01:03 PM    Glucose, Fasting 128 (H) 03/17/2025 07:09 AM    Calcium 8.8 03/17/2025 07:09 AM    AST 42 (H) 03/17/2025 07:09 AM    ALT 63 (H) 03/17/2025 07:09 AM    Alkaline Phosphatase 89 03/17/2025  07:09 AM    Total Protein 5.7 (L) 2025 07:09 AM    Total Protein 5.3 (L) 2025 07:09 AM    Albumin 3.7 2025 07:09 AM    Total Bilirubin 0.48 2025 07:09 AM    eGFR 114 2025 07:09 AM     2024: Flow cytometry: Small kappa monoclonal B-cell population identified in peripheral blood     2024:  SPEP with DALY: Serum immunofixation shows a monoclonal gammopathy identified as IgM-kappa. The history of splenomegaly and circulating monoclonal-kappa B-cells with MYD88 mutation is noted.  M-Fco: 0.11 g/deciliter  UPEP: Show selective proteinuria.  Urine immunofixation shows a faint monoclonal band identified as free kappa light chains  FK.5, FL.5, K/L ratio: 0.37  Ig, IgM: 234, IgA: 55.  B2M: 1.8  Methylmalonic acid: 170  LDH: 338     2024:  SPEP: Pending  Ig, IgM: 310, IgA: 83.  B2M: Pending  Free light chains: Pending  LDH: 263  WBC: 3.69, H/H: 12.2/29.3, MCV 90, platelets 177.  Creatinine 0.65    Lab Results   Component Value Date/Time    SPEP Interpretation See Comment 2025 07:09 AM    Beta-2 Globulin % 5.2 2025 07:09 AM     2025 07:09 AM    Ig [place] Free Light Chain 26.5 (H) 2025 07:09 AM    Ig Lambda Free Light Chain 65.3 (H) 2025 07:09 AM    Gamma Globulin % 9.7 2025 07:09 AM     (L) 2025 07:09 AM    IGA 61 (L) 2025 07:09 AM     2025 07:09 AM          Pathology Result:     PERIPHERAL BLOOD SMEAR: INVOLVED BY A MONOTYPIC KAPPA B-CELL POPULATION WITH MYD88 MUTATION; MILD EOSINOPHILIA AND MONOCYTOSIS, WITH BACKGROUND MILD THROMBOCYTOPENIA; SEE IMPRESSION     IMPRESSION:  The peripheral blood smear shows mild leukopenia (WBC 4.29 K/uL) with atypical lymphocytes and mild eosinophilia and monocytosis, as well as background mild thrombocytopenia (plt 137 K/uL) with normal platelet morphology, without evidence of platelet clumping or satellitism. Erythrocytes are adequate in number,  normocytic and normochromic, without significant morphologic abnormalities. There is no circulating blast population, significant dysplasia, rouleaux formation, infectious organisms or evidence of hemolysis.     Flow cytometry reportedly shows that the atypical lymphocytes are monotypic kappa B-cells. Additional ancillary testing is positive for a MYD88 L265P mutation, and is negative for CXCR4 mutations or 7/7q aberrations. The history of an IgM-kappa monoclonal immunoglobulin is noted. The overall findings are consistent with the patient's known, persistent circulating monoclonal B-cell population; the differential diagnosis includes a monoclonal B-cell lymphocytosis (MBL) and circulating B-cell lymphoma.     If there is extramedullary involvement (lymph node, tissue, etc.), this represents circulating lymphoma. In the absence of extramedullary involvement, this likely represents an MBL-non-CLL type. The morphology and immunophenotype are non-specific but in the setting of circulating lymphoma, the IgM paraprotein and MYD88 mutation, would favor lymphoplasmacytic lymphoma (LPL) / Waldenström macroglobulin (WM). Correlation with systemic imaging to evaluate for lymphadenopathy, hepatosplenomegaly and other potential sites of involvement for further characterization, is recommended to differentiate MBLs from circulating lymphoma, as clinically indicated. The World Health Organization (WHO) and various international scoring systems recommend all new diagnoses of LPL correlate with patient age, hemoglobin, platelet counts, serum cold agglutinin studies, serum cryoglobulin testing, hyperviscosity studies, hepatitis C testing, serum lactate dehydrogenase (LDH) and beta-2 microglobulin levels, given some of their associations, therapeutic and prognostic implications and to classify the disease by low, intermediate and high-risk.     FLOW CYTOMETRY STUDIES: [employer] #[id number]; see separate report        CYTOGENETIC  FISH STUDIES:   Cytogenetic FISH studies are performed (7q-/-7 tri) and are reportedly negative (normal); ([employer] #[id number]; see separate report):           MOLECULAR STUDIES:   MYD88 Mutation Analysis is performed and is reportedly DETECTED ([employer] #[id number]; see separate report):           CXCR4 Mutation Analysis is performed and is reportedly NOT DETECTED ([employer] #[id number]; see separate report):       Radiology Results Review: I have reviewed the following images/report studies in PACS: No results found.   2/8/2024: CT chest with contrast: Mild splenomegaly      8/1/2024: PET CT scan:  1. 1.9 x 1.5 cm mildly FDG avid masslike consolidation/nodule involving the left upper lobe with mild halo of groundglass opacity, new since 2/8/2024. While findings could reflect an inflammatory process, underlying malignancy of low metabolic activity is the diagnosis of exclusion. Consider further evaluation with tissue sampling as clinically indicated. If an inflammatory process is favored, short interval follow-up with CT imaging in 6 to 8 weeks is recommended to ensure stability/document resolution.     2. There is otherwise no additional focal FDG activity to suggest hypermetabolic malignancy. Patient's known mild splenomegaly is non-FDG avid.     3. 2.4 cm rim calcified splenic artery aneurysm versus less likely calcified adrenal nodule. Consider vascular surgical consultation.     Please see above for details and additional findings.     Administrative Statements   I have spent a total time of 31 minutes in caring for this patient on the day of the visit/encounter including Diagnostic results, Prognosis, Instructions for management, Patient and family education, and Obtaining or reviewing history  .

## 2025-04-09 ENCOUNTER — TELEPHONE (OUTPATIENT)
Age: 65
End: 2025-04-09

## 2025-04-09 NOTE — TELEPHONE ENCOUNTER
Wife called in asking if its possible to schedule sleep study asap after pulmonary visit    I did advise the request was pending for insurance auth.    Please advise.

## 2025-04-10 ENCOUNTER — TELEPHONE (OUTPATIENT)
Age: 65
End: 2025-04-10

## 2025-04-10 DIAGNOSIS — I25.10 ARTERIOSCLEROSIS OF CORONARY ARTERY: ICD-10-CM

## 2025-04-10 DIAGNOSIS — I82.409 THROMBOEMBOLISM OF DEEP VEINS OF LOWER EXTREMITY, UNSPECIFIED LATERALITY (HCC): Primary | ICD-10-CM

## 2025-04-10 NOTE — TELEPHONE ENCOUNTER
PROVIDERS,   PLEASE ADDRESS SINCE PCP IS OUT OF THE OFFICE.   THANK YOU!    Please see cardiology's response to patient request for Xarelto refill:

## 2025-04-10 NOTE — TELEPHONE ENCOUNTER
Wife called asking if patient can get Xarelto refilled. Medication not listed with active meds. Pt was on it before his bypass surgery and was advised by cardiology to resume the medication. They had some left from before the surgery. Pt is not seeing cardiology currently and wondering if PCP could fill the medication. Wife will call back with the dosage .

## 2025-04-16 DIAGNOSIS — J32.9 CHRONIC SINUSITIS, UNSPECIFIED LOCATION: ICD-10-CM

## 2025-04-16 DIAGNOSIS — J42 CHRONIC BRONCHITIS, UNSPECIFIED CHRONIC BRONCHITIS TYPE (HCC): ICD-10-CM

## 2025-04-16 RX ORDER — LEVALBUTEROL INHALATION SOLUTION 1.25 MG/3ML
SOLUTION RESPIRATORY (INHALATION)
Qty: 810 ML | Refills: 1 | Status: SHIPPED | OUTPATIENT
Start: 2025-04-16

## 2025-04-23 ENCOUNTER — TELEPHONE (OUTPATIENT)
Dept: VASCULAR SURGERY | Facility: CLINIC | Age: 65
End: 2025-04-23

## 2025-04-23 ENCOUNTER — OFFICE VISIT (OUTPATIENT)
Dept: VASCULAR SURGERY | Facility: CLINIC | Age: 65
End: 2025-04-23
Payer: COMMERCIAL

## 2025-04-23 VITALS
HEART RATE: 71 BPM | HEIGHT: 69 IN | OXYGEN SATURATION: 99 % | WEIGHT: 163 LBS | BODY MASS INDEX: 24.14 KG/M2 | SYSTOLIC BLOOD PRESSURE: 111 MMHG | DIASTOLIC BLOOD PRESSURE: 71 MMHG

## 2025-04-23 DIAGNOSIS — E13.9 LADA (LATENT AUTOIMMUNE DIABETES IN ADULTS), MANAGED AS TYPE 1 (HCC): ICD-10-CM

## 2025-04-23 DIAGNOSIS — I72.8 SPLENIC ARTERY ANEURYSM (HCC): Primary | ICD-10-CM

## 2025-04-23 DIAGNOSIS — E78.2 MIXED HYPERLIPIDEMIA: ICD-10-CM

## 2025-04-23 PROCEDURE — 99214 OFFICE O/P EST MOD 30 MIN: CPT

## 2025-04-23 NOTE — ASSESSMENT & PLAN NOTE
Patient reports that he has been doing well since his last office visit. He is denying any nausea, vomiting, or unplanned weight loss. He does note some mild abdominal discomfort as well as decreased appetite. He remains as ASA and atorvastatin for medical optimization. Remains active and independent with ADLs.    Imaging:  Mesenteric duplex 3/21/2025:  Splenic artery aneurysm not visualized, however splenic artery aneurysm again visualized on recent CT abdomen and pelvis completed in January.  Aneurysm appears stable in size at 2 cm. 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  -We discussed the results of mesenteric artery duplex at length  -Continue medical optimization with atorvastatin and aspirin  -As aneurysm was note visualized on mesenteric duplex, will initiate annual surveillance with noncontrast abdominal CT scan.  -Instructed patient to notify office of any post prandial abdominal pain, appetite change, or unplanned weight loss  -Instructed patient to report to the ED for any sudden onset abdominal pain or low back pain.  -Follow up in the office in 1 year.    Orders:    CT abdomen pelvis wo contrast; Future

## 2025-04-23 NOTE — ASSESSMENT & PLAN NOTE
Lab Results   Component Value Date    HGBA1C 8.4 (H) 10/31/2024   Poorly controlled, insulin dependant. Continue management per PCP

## 2025-04-23 NOTE — PROGRESS NOTES
Name: Otoniel Martinez      : 1960      MRN: [id number]  Encounter Provider: INDER Damico  Encounter Date: 2025   Encounter department: [facility] [place]  :  Assessment & Plan  Splenic artery aneurysm (HCC)  Patient reports that he has been doing well since his last office visit. He is denying any nausea, vomiting, or unplanned weight loss. He does note some mild abdominal discomfort as well as decreased appetite. He remains as ASA and atorvastatin for medical optimization. Remains active and independent with ADLs.    Imaging:  Mesenteric duplex 3/21/2025:  Splenic artery aneurysm not visualized, however splenic artery aneurysm again visualized on recent CT abdomen and pelvis completed in January.  Aneurysm appears stable in size at 2 cm. 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  -We discussed the results of mesenteric artery duplex at length  -Continue medical optimization with atorvastatin and aspirin  -As aneurysm was note visualized on mesenteric duplex, will initiate annual surveillance with noncontrast abdominal CT scan.  -Instructed patient to notify office of any post prandial abdominal pain, appetite change, or unplanned weight loss  -Instructed patient to report to the ED for any sudden onset abdominal pain or low back pain.  -Follow up in the office in 1 year.    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 Continue management per PCP         Mixed hyperlipidemia  Continue atorvastatin.             History of Present Illness   HPI  Otoniel Martinez is a 65 y.o. male Patient is a 65-year-old male, non-smoker, with PMH HTN, HLD, CAD s/p CABG, type I DM, CVA, chronic gastritis,  osteoarthritis, KULDIP, seizure disorder, asymptomatic bilateral carotid artery stenosis, SSS s/p pacemaker placement, chronic bronchitis, and splenic artery aneurysm.  Patient is presenting to the vascular surgery office to review results of mesenteric artery duplex completed 3/21/2025.    Patient reports that he has been doing well since his last office visit. He is denying any nausea, vomiting, or unplanned weight loss. He does note some mild abdominal discomfort as well as decreased appetite. He remains as ASA and atorvastatin for medical optimization. Remains active and independent with ADLs.    History obtained from: patient and patient's Significant Other    Review of Systems   Constitutional:  Positive for appetite change. Negative for unexpected weight change.   HENT: Negative.     Eyes: Negative.    Respiratory: Negative.     Cardiovascular: Negative.    Gastrointestinal:  Positive for abdominal pain. Negative for abdominal distention, nausea and vomiting.   Endocrine: Negative.    Genitourinary: Negative.    Musculoskeletal: Negative.    Skin: Negative.    Allergic/Immunologic: Negative.    Neurological: Negative.    Hematological: Negative.    Psychiatric/Behavioral: Negative.       Pertinent Medical History   Past Medical History:   Diagnosis Date    Acute respiratory failure with hypoxia ([facility]ly [facility]) 02/08/2024    CAD (coronary artery disease)     Congenital myotonia     Coronary artery disease     Deep vein thrombosis (DVT) (Formerly [facility])     after tear of gastrocnemus muscle    Diabetes ([facility]ly [facility])     Diabetes mellitus (Formerly [facility])     Difficulty walking     hip replacement    HL (hearing loss)     decreased both  ears    Hyperlipidemia     Myocardial infarction ([facility]ly [facility])     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  Current Outpatient Medications on File Prior to Visit   Medication Sig Dispense Refill    acetaminophen (TYLENOL) 650 mg CR tablet Take 1 tablet (650 mg total) by mouth every 4 (four) hours 30 tablet 0    aspirin (ECOTRIN LOW STRENGTH) 81 mg EC tablet Take 81 mg by mouth daily      atorvastatin (LIPITOR) 80 mg tablet Take 1 tablet (80 mg total) by mouth daily with dinner 90 tablet 1    budesonide-formoterol (Symbicort) 80-4.5 MCG/ACT inhaler Inhale 2 puffs 2 (two) times a day Rinse mouth after use. 30.6 g 5    cephalexin (KEFLEX) 500 mg capsule Take by mouth      cholecalciferol (VITAMIN D3) 400 units tablet Take 1 tablet by mouth every morning      Cyanocobalamin (Vitamin B-12) 50 MCG LOZG Take 1 mcg by mouth daily      fluticasone (FLONASE) 50 mcg/act nasal spray 2 sprays into each nostril daily      Insulin Disposable Pump (Omnipod 5 G6 Pods, Gen 5,) MISC INJECT 1 UNDER THE SKIN EVERY OTHER DAY. CHANGING EVERY 2 DAYS DUE TO HIGH INSULIN REQUIREMENT.      insulin glargine (LANTUS) 100 units/mL subcutaneous injection Inject under the skin PRN for when pod doesn't work  Patient instructed to call office if pod not working to get direction on how much to give      levalbuterol (XOPENEX) 1.25 mg/3 mL nebulizer solution TAKE 3 ML (1.25 MG TOTAL) BY NEBULIZATION 3 (THREE) TIMES A  mL 1    meclizine (ANTIVERT) 12.5 MG tablet Take 1 tablet (12.5 mg total) by mouth every 8 (eight) hours as needed for dizziness 30 tablet 1    metoprolol tartrate (LOPRESSOR) 25 mg tablet Take 0.5 tablets (12.5 mg total) by mouth every 12 (twelve) hours 90 tablet 1    montelukast (SINGULAIR) 10 mg tablet TAKE 1 TABLET BY MOUTH EVERYDAY AT BEDTIME 90 tablet 1    NovoLOG 100 UNIT/ML injection Inject under the skin if needed for high blood sugar Take as needed sq as directed by doc if pump is not working       ondansetron (ZOFRAN-ODT) 4 mg disintegrating tablet Take 1 tablet (4 mg total) by mouth every 6 (six) hours as  "needed for nausea or vomiting for up to 6 doses 6 tablet 0    oxygen gas Inhale 3 L/min daily at bedtime. nasal cannula      pantoprazole (PROTONIX) 40 mg tablet TAKE 1 TABLET TWICE DAILY 30 MINS PRIOR TO BREAKFAST AND SUPPER. 180 tablet 3    rivaroxaban (Xarelto) 20 mg tablet Take 1 tablet (20 mg total) by mouth daily with breakfast 90 tablet 0    sodium chloride (OCEAN) 0.65 % nasal spray 1 spray into each nostril as needed for congestion or rhinitis 50 mL 5    sodium chloride 0.9 % nebulizer solution TAKE 3 ML BY NEBULIZATION AS NEEDED FOR WHEEZING 300 mL 3    Continuous Glucose  (Dexcom G7 ) INÉS Use as directed      Continuous Glucose Sensor (Dexcom G7 Sensor) CHANGE EVERY 10 DAYS E10.69      Gvoke HypoPen 2-Pack 1 MG/0.2ML SOAJ       Insulin Disposable Pump (Omnipod 5 XivG5G3 Intro Gen 5) KIT Inject 1 each under the skin every other day      promethazine-dextromethorphan (PHENERGAN-DM) 6.25-15 mg/5 mL oral syrup Take 5 mL by mouth 4 (four) times a day as needed for cough 180 mL 1     No current facility-administered medications on file prior to visit.      Social History     Tobacco Use    Smoking status: Never     Passive exposure: Never    Smokeless tobacco: Never   Vaping Use    Vaping status: Never Used   Substance and Sexual Activity    Alcohol use: Not Currently    Drug use: No    Sexual activity: Yes     Partners: Female     Birth control/protection: None        Objective   /71 (BP Location: Right arm, Patient Position: Sitting)   Pulse 71   Ht 5' 8.5\" (1.74 m)   Wt 73.9 kg (163 lb)   SpO2 99%   BMI 24.42 kg/m²      Physical Exam  Vitals and nursing note reviewed.   Constitutional:       General: He is not in acute distress.     Appearance: Normal appearance. He is well-developed.   HENT:      Head: Normocephalic and atraumatic.   Eyes:      Conjunctiva/sclera: Conjunctivae normal.   Cardiovascular:      Rate and Rhythm: Normal rate and regular rhythm.      Pulses:           " Radial pulses are 2+ on the right side and 2+ on the left side.      Heart sounds: Normal heart sounds. No murmur heard.  Pulmonary:      Effort: Pulmonary effort is normal. No respiratory distress.      Breath sounds: Normal breath sounds.   Abdominal:      General: There is no distension or abdominal bruit.      Palpations: Abdomen is soft. There is no pulsatile mass.      Tenderness: There is no abdominal tenderness.   Musculoskeletal:         General: No swelling or tenderness.      Cervical back: Normal range of motion and neck supple.   Skin:     General: Skin is warm and dry.      Capillary Refill: Capillary refill takes less than 2 seconds.   Neurological:      General: No focal deficit present.      Mental Status: He is alert and oriented to person, place, and time.   Psychiatric:         Mood and Affect: Mood normal.         Behavior: Behavior normal.         Administrative Statements   I have spent a total time of 30 minutes in caring for this patient on the day of the visit/encounter including Diagnostic results, Prognosis, Risks and benefits of tx options, Instructions for management, Patient and family education, Importance of tx compliance, Risk factor reductions, Impressions, Counseling / Coordination of care, Documenting in the medical record, Reviewing/placing orders in the medical record (including tests, medications, and/or procedures), and Obtaining or reviewing history  .

## 2025-04-23 NOTE — TELEPHONE ENCOUNTER
This is a reminder; patient is due for RR w/ MJL . Please call patient and schedule the following by the dates provided.    Patient's appointment(s) are due on or after [id number] .    Dopplers  [] Abdominal Aorta Iliac (AOIL)  [] Carotid (CV)   [] Celiac and/or Mesenteric  [] Endovascular Aortic Repair (EVAR)   [] Hemodialysis Access (HD)   [] Lower Limb Arterial (JOANA)  [] Lower Limb Venous (LEV)  [] Lower Limb Venous Duplex with Reflux (LEVDR)  [] Renal Artery  [] Upper Limb Arterial (UEA)    [] Upper Limb Venous (UEV)              [] JACQUE and Waveform analysis     Advanced Imaging   [] CTA head/neck    [] CTA abdomen    [x] CTA abdomen & pelvis (appt set 1/19/26)    [] CT abdomen with/ without contrast  [] CT abdomen with contrast  [] CT abdomen without contrast    [] CT abdomen & pelvis with/ without contrast  [] CT abdomen & pelvis with contrast  [] CT abdomen & pelvis without contrast    Office Visit   [] New patient, patient last seen over 3 years ago  [] Risk factor modification (RFM)   [x] Follow up   [] Lost to follow up (LTFU)

## 2025-05-09 ENCOUNTER — RA CDI HCC (OUTPATIENT)
Dept: OTHER | Facility: HOSPITAL | Age: 65
End: 2025-05-09

## 2025-05-09 NOTE — PROGRESS NOTES
HCC coding opportunities          Chart Reviewed number of suggestions sent to Provider: 1    E11.65, Z74.9     Patients Insurance     Medicare Insurance: [employer]

## 2025-05-15 ENCOUNTER — OFFICE VISIT (OUTPATIENT)
Dept: INTERNAL MEDICINE CLINIC | Facility: OTHER | Age: 65
End: 2025-05-15
Payer: COMMERCIAL

## 2025-05-15 VITALS
OXYGEN SATURATION: 98 % | HEART RATE: 73 BPM | BODY MASS INDEX: 23.88 KG/M2 | SYSTOLIC BLOOD PRESSURE: 130 MMHG | DIASTOLIC BLOOD PRESSURE: 70 MMHG | HEIGHT: 69 IN | WEIGHT: 161.2 LBS | TEMPERATURE: 97.7 F

## 2025-05-15 DIAGNOSIS — E10.65 TYPE I DIABETES MELLITUS WITH HYPEROSMOLAR COMA (HCC): ICD-10-CM

## 2025-05-15 DIAGNOSIS — D51.8 OTHER VITAMIN B12 DEFICIENCY ANEMIAS: ICD-10-CM

## 2025-05-15 DIAGNOSIS — Z12.5 SCREENING PSA (PROSTATE SPECIFIC ANTIGEN): ICD-10-CM

## 2025-05-15 DIAGNOSIS — Z00.00 WELCOME TO MEDICARE PREVENTIVE VISIT: Primary | ICD-10-CM

## 2025-05-15 DIAGNOSIS — E10.69 TYPE I DIABETES MELLITUS WITH HYPEROSMOLAR COMA (HCC): ICD-10-CM

## 2025-05-15 DIAGNOSIS — G62.9 POLYNEUROPATHY: ICD-10-CM

## 2025-05-15 DIAGNOSIS — E55.9 VITAMIN D DEFICIENCY: ICD-10-CM

## 2025-05-15 DIAGNOSIS — E87.0 TYPE I DIABETES MELLITUS WITH HYPEROSMOLAR COMA (HCC): ICD-10-CM

## 2025-05-15 PROBLEM — J40 BRONCHITIS: Status: RESOLVED | Noted: 2024-02-06 | Resolved: 2025-05-15

## 2025-05-15 PROBLEM — E11.3511 TYPE 2 DIABETES MELLITUS WITH RIGHT EYE AFFECTED BY PROLIFERATIVE RETINOPATHY AND MACULAR EDEMA, WITH LONG-TERM CURRENT USE OF INSULIN (HCC): Status: ACTIVE | Noted: 2025-05-15

## 2025-05-15 PROBLEM — Z79.4 TYPE 2 DIABETES MELLITUS WITH RIGHT EYE AFFECTED BY PROLIFERATIVE RETINOPATHY AND MACULAR EDEMA, WITH LONG-TERM CURRENT USE OF INSULIN (HCC): Status: ACTIVE | Noted: 2025-05-15

## 2025-05-15 PROCEDURE — G0403 EKG FOR INITIAL PREVENT EXAM: HCPCS

## 2025-05-15 PROCEDURE — G0403 EKG FOR INITIAL PREVENT EXAM: HCPCS | Performed by: FAMILY MEDICINE

## 2025-05-15 PROCEDURE — G0402 INITIAL PREVENTIVE EXAM: HCPCS | Performed by: FAMILY MEDICINE

## 2025-05-15 NOTE — ASSESSMENT & PLAN NOTE
EKG: normal EKG, normal sinus rhythm, unchanged from previous tracings, nonspecific ST and T waves changes.

## 2025-05-15 NOTE — PROGRESS NOTES
Diabetic Foot Exam    Patient's shoes and socks removed.    Right Foot/Ankle   Right Foot Inspection  Skin Exam: skin normal and skin intact. No dry skin, no callus, no erythema, no maceration, no abnormal color, no pre-ulcer, no ulcer and no callus.     Toe Exam: ROM and strength within normal limits.     Sensory   Vibration: intact  Proprioception: intact  Monofilament testing: intact    Vascular  Capillary refills: < 3 seconds  The right DP pulse is 2+. The right PT pulse is 2+.     Left Foot/Ankle  Left Foot Inspection  Skin Exam: skin normal, skin intact and callus. No dry skin, no warmth, no erythema, no maceration, normal color, no pre-ulcer and no ulcer.     Toe Exam: ROM and strength within normal limits.     Sensory   Vibration: intact  Proprioception: intact  Monofilament testing: intact    Vascular  The left DP pulse is 2+. The left PT pulse is 2+.     Assign Risk Category  No deformity present  No loss of protective sensation  No weak pulses  Risk: 0

## 2025-05-15 NOTE — PATIENT INSTRUCTIONS
Medicare Preventive Visit Patient Instructions  Thank you for completing your Welcome to Medicare Visit or Medicare Annual Wellness Visit today. Your next wellness visit will be due in one year (5/16/2026).  The screening/preventive services that you may require over the next 5-10 years are detailed below. Some tests may not apply to you based off risk factors and/or age. Screening tests ordered at today's visit but not completed yet may show as past due. Also, please note that scanned in results may not display below.  Preventive Screenings:  Service Recommendations Previous Testing/Comments   Colorectal Cancer Screening  Colonoscopy    Fecal Occult Blood Test (FOBT)/Fecal Immunochemical Test (FIT)  Fecal DNA/Cologuard Test  Flexible Sigmoidoscopy Age: 45-75 years old   Colonoscopy: every 10 years (May be performed more frequently if at higher risk)  OR  FOBT/FIT: every 1 year  OR  Cologuard: every 3 years  OR  Sigmoidoscopy: every 5 years  Screening may be recommended earlier than age 45 if at higher risk for colorectal cancer. Also, an individualized decision between you and your healthcare provider will decide whether screening between the ages of 76-85 would be appropriate. Colonoscopy: 11/30/2023  FOBT/FIT: Not on file  Cologuard: Not on file  Sigmoidoscopy: Not on file    Screening Current     Prostate Cancer Screening Individualized decision between patient and health care provider in men between ages of 55-69   Medicare will cover every 12 months beginning on the day after your 50th birthday PSA: 0.43 ng/mL           Hepatitis C Screening Once for adults born between 1945 and 1965  More frequently in patients at high risk for Hepatitis C Hep C Antibody: 03/13/2024    Screening Current   Diabetes Screening 1-2 times per year if you're at risk for diabetes or have pre-diabetes Fasting glucose: 128 mg/dL (3/17/2025)  A1C: 8.4 % (10/31/2024)  Screening Not Indicated  History Diabetes   Cholesterol Screening  Once every 5 years if you don't have a lipid disorder. May order more often based on risk factors. Lipid panel: 03/17/2025  Screening Not Indicated  History Lipid Disorder      Other Preventive Screenings Covered by Medicare:  Abdominal Aortic Aneurysm (AAA) Screening: covered once if your at risk. You're considered to be at risk if you have a family history of AAA or a male between the age of 65-75 who smoking at least 100 cigarettes in your lifetime.  Lung Cancer Screening: covers low dose CT scan once per year if you meet all of the following conditions: (1) Age 55-77; (2) No signs or symptoms of lung cancer; (3) Current smoker or have quit smoking within the last 15 years; (4) You have a tobacco smoking history of at least 20 pack years (packs per day x number of years you smoked); (5) You get a written order from a healthcare provider.  Glaucoma Screening: covered annually if you're considered high risk: (1) You have diabetes OR (2) Family history of glaucoma OR (3)  aged 50 and older OR (4)  American aged 65 and older  Osteoporosis Screening: covered every 2 years if you meet one of the following conditions: (1) Have a vertebral abnormality; (2) On glucocorticoid therapy for more than 3 months; (3) Have primary hyperparathyroidism; (4) On osteoporosis medications and need to assess response to drug therapy.  HIV Screening: covered annually if you're between the age of 15-65. Also covered annually if you are younger than 15 and older than 65 with risk factors for HIV infection. For pregnant patients, it is covered up to 3 times per pregnancy.    Immunizations:  Immunization Recommendations   Influenza Vaccine Annual influenza vaccination during flu season is recommended for all persons aged >= 6 months who do not have contraindications   Pneumococcal Vaccine   * Pneumococcal conjugate vaccine = PCV13 (Prevnar 13), PCV15 (Vaxneuvance), PCV20 (Prevnar 20)  * Pneumococcal polysaccharide  vaccine = PPSV23 (Pneumovax) Adults 19-63 yo with certain risk factors or if 65+ yo  If never received any pneumonia vaccine: recommend Prevnar 20 (PCV20)  Give PCV20 if previously received 1 dose of PCV13 or PPSV23   Hepatitis B Vaccine 3 dose series if at intermediate or high risk (ex: diabetes, end stage renal disease, liver disease)   Respiratory syncytial virus (RSV) Vaccine - COVERED BY MEDICARE PART D  * RSVPreF3 (Arexvy) CDC recommends that adults 60 years of age and older may receive a single dose of RSV vaccine using shared clinical decision-making (SCDM)   Tetanus (Td) Vaccine - COST NOT COVERED BY MEDICARE PART B Following completion of primary series, a booster dose should be given every 10 years to maintain immunity against tetanus. Td may also be given as tetanus wound prophylaxis.   Tdap Vaccine - COST NOT COVERED BY MEDICARE PART B Recommended at least once for all adults. For pregnant patients, recommended with each pregnancy.   Shingles Vaccine (Shingrix) - COST NOT COVERED BY MEDICARE PART B  2 shot series recommended in those 19 years and older who have or will have weakened immune systems or those 50 years and older     Health Maintenance Due:      Topic Date Due   • Colorectal Cancer Screening  11/30/2028   • HIV Screening  Completed   • Hepatitis C Screening  Completed     Immunizations Due:      Topic Date Due   • COVID-19 Vaccine (4 - 2024-25 season) 09/01/2024     Advance Directives   What are advance directives?  Advance directives are legal documents that state your wishes and plans for medical care. These plans are made ahead of time in case you lose your ability to make decisions for yourself. Advance directives can apply to any medical decision, such as the treatments you want, and if you want to donate organs.   What are the types of advance directives?  There are many types of advance directives, and each state has rules about how to use them. You may choose a combination of any of  the following:  Living will:  This is a written record of the treatment you want. You can also choose which treatments you do not want, which to limit, and which to stop at a certain time. This includes surgery, medicine, IV fluid, and tube feedings.   Durable power of  for healthcare (DPAHC):  This is a written record that states who you want to make healthcare choices for you when you are unable to make them for yourself. This person, called a proxy, is usually a family member or a friend. You may choose more than 1 proxy.  Do not resuscitate (DNR) order:  A DNR order is used in case your heart stops beating or you stop breathing. It is a request not to have certain forms of treatment, such as CPR. A DNR order may be included in other types of advance directives.  Medical directive:  This covers the care that you want if you are in a coma, near death, or unable to make decisions for yourself. You can list the treatments you want for each condition. Treatment may include pain medicine, surgery, blood transfusions, dialysis, IV or tube feedings, and a ventilator (breathing machine).  Values history:  This document has questions about your views, beliefs, and how you feel and think about life. This information can help others choose the care that you would choose.  Why are advance directives important?  An advance directive helps you control your care. Although spoken wishes may be used, it is better to have your wishes written down. Spoken wishes can be misunderstood, or not followed. Treatments may be given even if you do not want them. An advance directive may make it easier for your family to make difficult choices about your care.   Fall Prevention    Fall prevention  includes ways to make your home and other areas safer. It also includes ways you can move more carefully to prevent a fall. Health conditions that cause changes in your blood pressure, vision, or muscle strength and coordination may  increase your risk for falls. Medicines may also increase your risk for falls if they make you dizzy, weak, or sleepy.   Fall prevention tips:   Stand or sit up slowly.    Use assistive devices as directed.    Wear shoes that fit well and have soles that .    Wear a personal alarm.    Stay active.    Manage your medical conditions.    Home Safety Tips:  Add items to prevent falls in the bathroom.    Keep paths clear.    Install bright lights in your home.    Keep items you use often on shelves within reach.    Paint or place reflective tape on the edges of your stairs.     © Copyright [employer] 2018 Information is for End User's use only and may not be sold, redistributed or otherwise used for commercial purposes. All illustrations and images included in CareNotes® are the copyrighted property of [employer]., Inc[employer] or [employer]

## 2025-05-15 NOTE — PROGRESS NOTES
Name: Otoniel Martinez      : 1960      MRN: [id number]  Encounter Provider: Wandy Cheatham DO  Encounter Date: 5/15/2025   Encounter department: [facility]  :  Assessment & Plan  Type I diabetes mellitus with hyperosmolar coma (HCC)    Lab Results   Component Value Date    HGBA1C 8.4 (H) 10/31/2024       Orders:    Diabetic foot exam; Future    POCT ECG    Screening PSA (prostate specific antigen)    Orders:    PSA, Total Screen; Future    Polyneuropathy    Orders:    Vitamin B12; Future    Vitamin D deficiency    Orders:    Vitamin D 25 hydroxy; Future    Other vitamin B12 deficiency anemias        Orders:    Vitamin B12; Future    Welcome to Medicare preventive visit         EKG: normal EKG, normal sinus rhythm, unchanged from previous tracings, nonspecific ST and T waves changes.         Preventive health issues were discussed with patient, and age appropriate screening tests were ordered as noted in patient's After Visit Summary. Personalized health advice and appropriate referrals for health education or preventive services given if needed, as noted in patient's After Visit Summary.    History of Present Illness     HPI   Patient Care Team:  Wandy Cheatham DO as PCP - General (Family Medicine)  MD James Bobo DO Breanna Henry, DO Ibrahim Syeda Solano MD (Pulmonary Disease)  Juli Bronson DO (Hematology and Oncology)    Review of Systems      Constitutional:  Denies fever or chills   Eyes:  Denies double , blurry vision or eye pain  HENT:  Denies nasal congestion, sore throat or new hearing issues  Respiratory:  Denies cough or new shortness of breath or wheezing  Cardiovascular:  Denies palpitations or chest pain  GI:  Denies abdominal pain, nausea, or vomiting, no loose stools, no reflux  Integument:  Denies rash , no open areas  Neurologic:  Denies headache or focal weakness, no dizziness  : no dysuria, or hematuria      Medical  History Reviewed by provider this encounter:       Annual Wellness Visit Questionnaire       Health Risk Assessment:   Patient rates overall health as good. Patient feels that their physical health rating is slightly better. Patient is satisfied with their life. Eyesight was rated as same. Hearing was rated as same. Patient feels that their emotional and mental health rating is same. Patients states they are sometimes angry. Patient states they are sometimes unusually tired/fatigued. Pain experienced in the last 7 days has been none. Patient states that he has experienced no weight loss or gain in last 6 months.     Depression Screening:   PHQ-2 Score: 0      Fall Risk Screening:   In the past year, patient has experienced: history of falling in past year    Number of falls: 1  Injured during fall?: No    Feels unsteady when standing or walking?: No    Worried about falling?: No      Home Safety:  Patient has trouble with stairs inside or outside of their home. Patient has working smoke alarms and has no working carbon monoxide detector. Home safety hazards include: none.     Nutrition:   Current diet is Diabetic.     Medications:   Patient is currently taking over-the-counter supplements. OTC medications include: see medication list. Patient is able to manage medications.     Activities of Daily Living (ADLs)/Instrumental Activities of Daily Living (IADLs):   Walk and transfer into and out of bed and chair?: Yes  Dress and groom yourself?: Yes    Bathe or shower yourself?: Yes    Feed yourself? Yes  Do your laundry/housekeeping?: No  Manage your money, pay your bills and track your expenses?: Yes  Make your own meals?: Yes    Do your own shopping?: Yes    Previous Hospitalizations:   Any hospitalizations or ED visits within the last 12 months?: Yes      Advance Care Planning:       Comments: His wife    Cognitive Screening:   Provider or family/friend/caregiver concerned regarding cognition?: No    Preventive  Screenings      Cardiovascular Screening:    General: Screening Not Indicated and History Lipid Disorder      Diabetes Screening:     General: Screening Not Indicated and History Diabetes      Colorectal Cancer Screening:     General: Screening Current      Abdominal Aortic Aneurysm (AAA) Screening:    Risk factors include: age between 65-76 yo        Lung Cancer Screening:     General: Screening Not Indicated      Hepatitis C Screening:    General: Screening Current    Screening, Brief Intervention, and Referral to Treatment (SBIRT)     Screening  Typical number of drinks in a day: 0  Typical number of drinks in a week: 0  Interpretation: Low risk drinking behavior.    AUDIT-C Screenin) How often did you have a drink containing alcohol in the past year? never  2) How many drinks did you have on a typical day when you were drinking in the past year? 0  3) How often did you have 6 or more drinks on one occasion in the past year? never    AUDIT-C Score: 0  Interpretation: Score 0-3 (male): Negative screen for alcohol misuse    Single Item Drug Screening:  How often have you used an illegal drug (including marijuana) or a prescription medication for non-medical reasons in the past year? never    Single Item Drug Screen Score: 0  Interpretation: Negative screen for possible drug use disorder    Brief Intervention  Alcohol & drug use screenings were reviewed. No concerns regarding substance use disorder identified.     Other Counseling Topics:   Car/seat belt/driving safety, skin self-exam, sunscreen and regular weightbearing exercise and calcium and vitamin D intake.     Social Drivers of Health     Financial Resource Strain: Low Risk  (2023)    Received from [facility]    Overall Financial Resource Strain (CARDIA)     Difficulty of Paying Living Expenses: Not very hard   Food Insecurity: No Food Insecurity (5/15/2025)    Nursing - Inadequate Food Risk Classification     Worried About Running  "Out of Food in the Last Year: Never true     Ran Out of Food in the Last Year: Never true     Ran Out of Food in the Last Year: Never true   Transportation Needs: No Transportation Needs (5/15/2025)    PRAPARE - Transportation     Lack of Transportation (Medical): No     Lack of Transportation (Non-Medical): No   Housing Stability: Low Risk  (5/15/2025)    Housing Stability Vital Sign     Unable to Pay for Housing in the Last Year: No     Number of Times Moved in the Last Year: 0     Homeless in the Last Year: No   Utilities: Not At Risk (5/15/2025)    [facility] Utilities     Threatened with loss of utilities: No     Vision Screening    Right eye Left eye Both eyes   Without correction 20/25 20/40 20/25   With correction          Objective   /70 (BP Location: Right arm, Patient Position: Sitting, Cuff Size: Standard)   Pulse 73   Temp 97.7 °F (36.5 °C) (Temporal)   Ht 5' 8.5\" (1.74 m)   Wt 73.1 kg (161 lb 3.2 oz)   SpO2 98%   BMI 24.15 kg/m²     Physical Exam    Constitutional:  Well developed, well nourished, no acute distress, non-toxic appearance   Eyes:  PERRL, conjunctiva normal , non icteric sclera  HENT:  Atraumatic, oropharynx moist. Neck-  supple   Respiratory:  CTA b/l, normal breath sounds, no rales, no wheezing   Cardiovascular:  RRR, no murmurs, no LE edema b/l  GI:  Soft, nondistended, normal bowel sounds x 4, nontender, no organomegaly, no mass, no rebound, no guarding   Neurologic:  no focal deficits noted   Psychiatric:  Speech and behavior appropriate , AAO x 3      "

## 2025-05-29 ENCOUNTER — RESULTS FOLLOW-UP (OUTPATIENT)
Dept: CARDIOLOGY CLINIC | Facility: CLINIC | Age: 65
End: 2025-05-29

## 2025-05-29 ENCOUNTER — TELEPHONE (OUTPATIENT)
Dept: CARDIOLOGY CLINIC | Facility: CLINIC | Age: 65
End: 2025-05-29

## 2025-05-29 ENCOUNTER — REMOTE DEVICE CLINIC VISIT (OUTPATIENT)
Dept: CARDIOLOGY CLINIC | Facility: CLINIC | Age: 65
End: 2025-05-29
Payer: COMMERCIAL

## 2025-05-29 DIAGNOSIS — Z95.0 CARDIAC PACEMAKER IN SITU: Primary | ICD-10-CM

## 2025-05-29 PROCEDURE — 93296 REM INTERROG EVL PM/IDS: CPT | Performed by: INTERNAL MEDICINE

## 2025-05-29 PROCEDURE — 93294 REM INTERROG EVL PM/LDLS PM: CPT | Performed by: INTERNAL MEDICINE

## 2025-05-29 NOTE — PROGRESS NOTES
Results for orders placed or performed in visit on 05/29/25   Cardiac EP device report    Narrative    MDT DUAL PM/ ACTIVE SYSTEM IS MRI CONDITIONAL  CARELINK TRANSMISSION: BATTERY VOLTAGE ADEQUATE (12.4 YRS). AP-97%, <0.1%. ALL AVAILABLE LEAD PARAMETERS WITHIN NORMAL LIMITS. NO SIGNIFICANT HIGH RATE EPISODES. NORMAL DEVICE FUNCTION. GV

## 2025-05-29 NOTE — TELEPHONE ENCOUNTER
Wife called to review remote monitoring. Wife also stated pt feels sob w/ steps & asked if any adjustment can be made to pacemaker. Informed rate response can be adjusted more aggressive but may not notice a big difference since already programmed aggressive. Scheduled reprogramming to be done on 6/2/25 as requested.

## 2025-05-31 DIAGNOSIS — J30.2 SEASONAL ALLERGIES: ICD-10-CM

## 2025-05-31 RX ORDER — MONTELUKAST SODIUM 10 MG/1
10 TABLET ORAL
Qty: 90 TABLET | Refills: 1 | Status: SHIPPED | OUTPATIENT
Start: 2025-05-31

## 2025-06-01 ENCOUNTER — TELEPHONE (OUTPATIENT)
Dept: OTHER | Facility: OTHER | Age: 65
End: 2025-06-01

## 2025-06-01 NOTE — TELEPHONE ENCOUNTER
Patient is calling regarding cancelling an appointment.    Date/Time:6/2/2025  9:30 AM     Patient was rescheduled: YES [] NO [x]    Patient requesting call back to reschedule: YES [x] NO []

## 2025-06-14 PROBLEM — Z00.00 WELCOME TO MEDICARE PREVENTIVE VISIT: Status: RESOLVED | Noted: 2025-05-15 | Resolved: 2025-06-14

## 2025-06-20 ENCOUNTER — TELEPHONE (OUTPATIENT)
Age: 65
End: 2025-06-20

## 2025-06-20 NOTE — TELEPHONE ENCOUNTER
Pt's wife states she was speaking with Kovio in regards to getting portable concentrator for vacation at the end of July. Kovio informed her pt will just need script from provider

## 2025-06-23 NOTE — TELEPHONE ENCOUNTER
Pls call the pt and schedule anytime nursing visit for a 6min walk test. Pls text me the soon the test is completed and uploaded

## 2025-06-25 DIAGNOSIS — I25.118 CORONARY ARTERY DISEASE OF NATIVE ARTERY OF NATIVE HEART WITH STABLE ANGINA PECTORIS (HCC): ICD-10-CM

## 2025-06-25 DIAGNOSIS — Z86.718 HISTORY OF RECURRENT DEEP VEIN THROMBOSIS (DVT): ICD-10-CM

## 2025-06-25 DIAGNOSIS — E10.319 DIABETIC RETINOPATHY OF RIGHT EYE ASSOCIATED WITH TYPE 1 DIABETES MELLITUS, MACULAR EDEMA PRESENCE UNSPECIFIED, UNSPECIFIED RETINOPATHY SEVERITY (HCC): ICD-10-CM

## 2025-06-25 DIAGNOSIS — Z95.1 S/P CABG (CORONARY ARTERY BYPASS GRAFT): ICD-10-CM

## 2025-06-25 RX ORDER — ATORVASTATIN CALCIUM 80 MG/1
80 TABLET, FILM COATED ORAL
Qty: 90 TABLET | Refills: 1 | Status: SHIPPED | OUTPATIENT
Start: 2025-06-25

## 2025-06-25 NOTE — TELEPHONE ENCOUNTER
Patients wife returned call stating that Otoniel will be able to come in on 6/27 for the 6MW. Wife also wants to know if the doctor can place an order to [employer] for a small tank because pt is actually leaving for vacation on 6/27 and wants to be able to have oxygen to take with him. Please advise.

## 2025-06-27 ENCOUNTER — OFFICE VISIT (OUTPATIENT)
Dept: PULMONOLOGY | Facility: CLINIC | Age: 65
End: 2025-06-27
Payer: COMMERCIAL

## 2025-06-27 ENCOUNTER — DOCUMENTATION (OUTPATIENT)
Dept: PULMONOLOGY | Facility: CLINIC | Age: 65
End: 2025-06-27

## 2025-06-27 VITALS
DIASTOLIC BLOOD PRESSURE: 68 MMHG | BODY MASS INDEX: 23.99 KG/M2 | HEIGHT: 69 IN | WEIGHT: 162 LBS | OXYGEN SATURATION: 96 % | HEART RATE: 75 BPM | SYSTOLIC BLOOD PRESSURE: 108 MMHG | TEMPERATURE: 97.1 F

## 2025-06-27 DIAGNOSIS — J42 CHRONIC BRONCHITIS, UNSPECIFIED CHRONIC BRONCHITIS TYPE (HCC): Primary | ICD-10-CM

## 2025-06-27 PROCEDURE — 94618 PULMONARY STRESS TESTING: CPT

## 2025-07-08 DIAGNOSIS — I25.118 CORONARY ARTERY DISEASE OF NATIVE ARTERY OF NATIVE HEART WITH STABLE ANGINA PECTORIS (HCC): ICD-10-CM

## 2025-07-08 DIAGNOSIS — Z95.1 S/P CABG (CORONARY ARTERY BYPASS GRAFT): ICD-10-CM

## 2025-07-08 DIAGNOSIS — E10.319 DIABETIC RETINOPATHY OF RIGHT EYE ASSOCIATED WITH TYPE 1 DIABETES MELLITUS, MACULAR EDEMA PRESENCE UNSPECIFIED, UNSPECIFIED RETINOPATHY SEVERITY (HCC): ICD-10-CM

## 2025-07-08 DIAGNOSIS — Z86.718 HISTORY OF RECURRENT DEEP VEIN THROMBOSIS (DVT): ICD-10-CM

## 2025-07-09 RX ORDER — METOPROLOL TARTRATE 25 MG/1
12.5 TABLET, FILM COATED ORAL EVERY 12 HOURS
Qty: 90 TABLET | Refills: 1 | Status: SHIPPED | OUTPATIENT
Start: 2025-07-09

## 2025-07-15 DIAGNOSIS — I82.409 THROMBOEMBOLISM OF DEEP VEINS OF LOWER EXTREMITY, UNSPECIFIED LATERALITY (HCC): ICD-10-CM

## 2025-07-17 ENCOUNTER — APPOINTMENT (OUTPATIENT)
Dept: LAB | Facility: IMAGING CENTER | Age: 65
End: 2025-07-17
Payer: COMMERCIAL

## 2025-07-17 DIAGNOSIS — C83.00 LYMPHOPLASMACYTIC LYMPHOMA (HCC): ICD-10-CM

## 2025-07-17 DIAGNOSIS — Z12.5 SCREENING PSA (PROSTATE SPECIFIC ANTIGEN): ICD-10-CM

## 2025-07-17 DIAGNOSIS — D72.819 LEUKOPENIA, UNSPECIFIED TYPE: ICD-10-CM

## 2025-07-17 DIAGNOSIS — E10.319 DIABETIC RETINOPATHY OF RIGHT EYE ASSOCIATED WITH TYPE 1 DIABETES MELLITUS, MACULAR EDEMA PRESENCE UNSPECIFIED, UNSPECIFIED RETINOPATHY SEVERITY (HCC): ICD-10-CM

## 2025-07-17 DIAGNOSIS — D69.6 THROMBOCYTOPENIA (HCC): ICD-10-CM

## 2025-07-17 DIAGNOSIS — G62.9 POLYNEUROPATHY: ICD-10-CM

## 2025-07-17 DIAGNOSIS — D51.8 OTHER VITAMIN B12 DEFICIENCY ANEMIAS: ICD-10-CM

## 2025-07-17 DIAGNOSIS — E55.9 VITAMIN D DEFICIENCY: ICD-10-CM

## 2025-07-17 LAB
25(OH)D3 SERPL-MCNC: 49.9 NG/ML (ref 30–100)
ALBUMIN SERPL BCG-MCNC: 3.7 G/DL (ref 3.5–5)
ALP SERPL-CCNC: 91 U/L (ref 34–104)
ALT SERPL W P-5'-P-CCNC: 62 U/L (ref 7–52)
ANION GAP SERPL CALCULATED.3IONS-SCNC: 8 MMOL/L (ref 4–13)
AST SERPL W P-5'-P-CCNC: 36 U/L (ref 13–39)
BASOPHILS # BLD AUTO: 0.02 THOUSANDS/ÂΜL (ref 0–0.1)
BASOPHILS NFR BLD AUTO: 1 % (ref 0–1)
BILIRUB SERPL-MCNC: 0.64 MG/DL (ref 0.2–1)
BUN SERPL-MCNC: 17 MG/DL (ref 5–25)
CALCIUM SERPL-MCNC: 9.2 MG/DL (ref 8.4–10.2)
CHLORIDE SERPL-SCNC: 104 MMOL/L (ref 96–108)
CO2 SERPL-SCNC: 30 MMOL/L (ref 21–32)
CREAT SERPL-MCNC: 0.51 MG/DL (ref 0.6–1.3)
CREAT UR-MCNC: 207.1 MG/DL
EOSINOPHIL # BLD AUTO: 0.22 THOUSAND/ÂΜL (ref 0–0.61)
EOSINOPHIL NFR BLD AUTO: 6 % (ref 0–6)
ERYTHROCYTE [DISTWIDTH] IN BLOOD BY AUTOMATED COUNT: 17.5 % (ref 11.6–15.1)
GFR SERPL CREATININE-BSD FRML MDRD: 112 ML/MIN/1.73SQ M
GLUCOSE P FAST SERPL-MCNC: 75 MG/DL (ref 65–99)
HCT VFR BLD AUTO: 39.9 % (ref 36.5–49.3)
HGB BLD-MCNC: 12.5 G/DL (ref 12–17)
IGA SERPL-MCNC: 55 MG/DL (ref 66–433)
IGG SERPL-MCNC: 432 MG/DL (ref 635–1741)
IGM SERPL-MCNC: 280 MG/DL (ref 45–281)
IMM GRANULOCYTES # BLD AUTO: 0.02 THOUSAND/UL (ref 0–0.2)
IMM GRANULOCYTES NFR BLD AUTO: 1 % (ref 0–2)
LDH SERPL-CCNC: 276 U/L (ref 140–271)
LYMPHOCYTES # BLD AUTO: 0.76 THOUSANDS/ÂΜL (ref 0.6–4.47)
LYMPHOCYTES NFR BLD AUTO: 22 % (ref 14–44)
MCH RBC QN AUTO: 25.7 PG (ref 26.8–34.3)
MCHC RBC AUTO-ENTMCNC: 31.3 G/DL (ref 31.4–37.4)
MCV RBC AUTO: 82 FL (ref 82–98)
MICROALBUMIN UR-MCNC: 36 MG/L
MICROALBUMIN/CREAT 24H UR: 17 MG/G CREATININE (ref 0–30)
MONOCYTES # BLD AUTO: 0.54 THOUSAND/ÂΜL (ref 0.17–1.22)
MONOCYTES NFR BLD AUTO: 15 % (ref 4–12)
NEUTROPHILS # BLD AUTO: 1.96 THOUSANDS/ÂΜL (ref 1.85–7.62)
NEUTS SEG NFR BLD AUTO: 55 % (ref 43–75)
NRBC BLD AUTO-RTO: 0 /100 WBCS
PLATELET # BLD AUTO: 161 THOUSANDS/UL (ref 149–390)
PMV BLD AUTO: 10.6 FL (ref 8.9–12.7)
POTASSIUM SERPL-SCNC: 4.2 MMOL/L (ref 3.5–5.3)
PROT SERPL-MCNC: 6.3 G/DL (ref 6.4–8.4)
PSA SERPL-MCNC: 0.48 NG/ML (ref 0–4)
RBC # BLD AUTO: 4.86 MILLION/UL (ref 3.88–5.62)
SODIUM SERPL-SCNC: 142 MMOL/L (ref 135–147)
VIT B12 SERPL-MCNC: 1005 PG/ML (ref 180–914)
WBC # BLD AUTO: 3.52 THOUSAND/UL (ref 4.31–10.16)

## 2025-07-17 PROCEDURE — 80053 COMPREHEN METABOLIC PANEL: CPT

## 2025-07-17 PROCEDURE — 82306 VITAMIN D 25 HYDROXY: CPT

## 2025-07-17 PROCEDURE — 83615 LACTATE (LD) (LDH) ENZYME: CPT

## 2025-07-17 PROCEDURE — 85025 COMPLETE CBC W/AUTO DIFF WBC: CPT

## 2025-07-17 PROCEDURE — 36415 COLL VENOUS BLD VENIPUNCTURE: CPT

## 2025-07-17 PROCEDURE — 83521 IG LIGHT CHAINS FREE EACH: CPT

## 2025-07-17 PROCEDURE — 82043 UR ALBUMIN QUANTITATIVE: CPT

## 2025-07-17 PROCEDURE — 82570 ASSAY OF URINE CREATININE: CPT

## 2025-07-17 PROCEDURE — 84165 PROTEIN E-PHORESIS SERUM: CPT

## 2025-07-17 PROCEDURE — G0103 PSA SCREENING: HCPCS

## 2025-07-17 PROCEDURE — 82232 ASSAY OF BETA-2 PROTEIN: CPT

## 2025-07-17 PROCEDURE — 82784 ASSAY IGA/IGD/IGG/IGM EACH: CPT

## 2025-07-17 PROCEDURE — 82607 VITAMIN B-12: CPT

## 2025-07-17 RX ORDER — RIVAROXABAN 20 MG/1
20 TABLET, FILM COATED ORAL
Qty: 90 TABLET | Refills: 1 | OUTPATIENT
Start: 2025-07-17

## 2025-07-17 NOTE — TELEPHONE ENCOUNTER
PROVIDERS,   PLEASE ADDRESS SINCE PCP IS OUT OF THE OFFICE.   THANK YOU!      Per previous notes in chart, pcp would be filling Xarelto per cardiology request.      Please approve refill request.

## 2025-07-17 NOTE — TELEPHONE ENCOUNTER
*NOT A DUPLICATE*    Pt is now out of Rx and script was denied as a duplicate in error. Please address asap

## 2025-07-18 LAB
ALBUMIN SERPL ELPH-MCNC: 3.59 G/DL (ref 3.2–5.1)
ALBUMIN SERPL ELPH-MCNC: 59.9 % (ref 48–70)
ALPHA1 GLOB SERPL ELPH-MCNC: 0.31 G/DL (ref 0.15–0.47)
ALPHA1 GLOB SERPL ELPH-MCNC: 5.1 % (ref 1.8–7)
ALPHA2 GLOB SERPL ELPH-MCNC: 0.75 G/DL (ref 0.42–1.04)
ALPHA2 GLOB SERPL ELPH-MCNC: 12.5 % (ref 5.9–14.9)
BETA GLOB ABNORMAL SERPL ELPH-MCNC: 0.49 G/DL (ref 0.31–0.57)
BETA1 GLOB SERPL ELPH-MCNC: 8.2 % (ref 4.7–7.7)
BETA2 GLOB SERPL ELPH-MCNC: 5.2 % (ref 3.1–7.9)
BETA2+GAMMA GLOB SERPL ELPH-MCNC: 0.31 G/DL (ref 0.2–0.58)
GAMMA GLOB ABNORMAL SERPL ELPH-MCNC: 0.55 G/DL (ref 0.4–1.66)
GAMMA GLOB SERPL ELPH-MCNC: 9.1 % (ref 6.9–22.3)
IGG/ALB SER: 1.49 {RATIO} (ref 1.1–1.8)
KAPPA LC FREE SER-MCNC: 24.3 MG/L (ref 3.3–19.4)
KAPPA LC FREE/LAMBDA FREE SER: 0.39 {RATIO} (ref 0.26–1.65)
LAMBDA LC FREE SERPL-MCNC: 62.7 MG/L (ref 5.7–26.3)
PROT SERPL-MCNC: 6 G/DL (ref 6.4–8.4)

## 2025-07-18 PROCEDURE — 84165 PROTEIN E-PHORESIS SERUM: CPT | Performed by: PATHOLOGY

## 2025-07-19 LAB — B2 MICROGLOB SERPL-MCNC: 2.1 MG/L (ref 0.6–2.4)

## 2025-07-21 ENCOUNTER — IN-CLINIC DEVICE VISIT (OUTPATIENT)
Dept: CARDIOLOGY CLINIC | Facility: CLINIC | Age: 65
End: 2025-07-21

## 2025-07-21 DIAGNOSIS — Z95.0 PRESENCE OF CARDIAC PACEMAKER: Primary | ICD-10-CM

## 2025-07-21 PROCEDURE — RECHECK: Performed by: STUDENT IN AN ORGANIZED HEALTH CARE EDUCATION/TRAINING PROGRAM

## 2025-07-21 NOTE — PROGRESS NOTES
"Results for orders placed or performed in visit on 07/21/25   Cardiac EP device report    Narrative    MDT DUAL PM/ ACTIVE SYSTEM IS MRI CONDITIONAL  DEVICE INTERROGATED IN THE [place] OFFICE. NON-BILLABLE RATE RESPONSE REPROGRAMMING. BATTERY VOLTAGE ADEQUATE (12.2 YR). AP 96.6%.  <0.1%. ALL LEAD PARAMETERS WITHIN NORMAL LIMITS. NO SIGNIFICANT HIGH RATE EPISODES. PVC BURDEN <0.1%. PT C/O SOB W/ INCLINES. ACTIVITY ACCELERATION SET TO 15 S. WALK TEST W/ STAIRS PERFORMED; PT ENDORSES FEELING LESS \"HEAVINESS\" WITH EXERTION ON STAIRS. RA LEAD FF OS NOTED ON TELEMETRY; ATRIAL SENSITIVTY DECREASED FROM 0.15 mV TO 0.30 mV W/ RESOLUTION. NORMAL DEVICE FUNCTION. [facility]         "

## 2025-07-24 ENCOUNTER — IN-CLINIC DEVICE VISIT (OUTPATIENT)
Dept: CARDIOLOGY CLINIC | Facility: CLINIC | Age: 65
End: 2025-07-24

## 2025-07-24 ENCOUNTER — TELEPHONE (OUTPATIENT)
Age: 65
End: 2025-07-24

## 2025-07-24 ENCOUNTER — NURSE TRIAGE (OUTPATIENT)
Age: 65
End: 2025-07-24

## 2025-07-24 DIAGNOSIS — Z95.0 PRESENCE OF CARDIAC PACEMAKER: ICD-10-CM

## 2025-07-24 DIAGNOSIS — I49.5 SICK SINUS SYNDROME (HCC): Primary | ICD-10-CM

## 2025-07-24 PROCEDURE — RECHECK

## 2025-07-24 NOTE — TELEPHONE ENCOUNTER
Pt's wife Oliva called in requesting to speak to the device clinic. Shai transferred call to Nivia

## 2025-07-24 NOTE — TELEPHONE ENCOUNTER
"I spoke with Oliva ,pt's wife-pt was feeling short of breath with stairs, so device setting re-programmed rate response more aggressive (activity acceleration set to 15 secs).  Walk test w/stairs performed, pt admitted to feeling less \"heaviness\" with exertion.    Today, Oliva states since change in setting, pt had been dizzy constantly, drove to a family member's home and states he \"didn't remember driving there\".     Pt was also on Antivert once daily, recently changed to BID dose. Oliva is requesting re-programming to the original setting on device for activity acceleration, but will also call the neurologist who increased the Antivert.      Pt will come in today.  "

## 2025-07-24 NOTE — TELEPHONE ENCOUNTER
"REASON FOR CONVERSATION: Dizziness  Received a call from spouse, pt is experiencing dizziness. Spouse is stating had pacemaker setting changes and wondering if that is causing the dizziness. He has a history of dizziness, but since the change it has been worse  SYMPTOMS: dizziness    OTHER HEALTH INFORMATION:     PROTOCOL DISPOSITION: Discuss With PCP and Callback by Nurse Today    CARE ADVICE PROVIDED:   Contacted device, and warm transferred- pt spouse disconnected, device will call her back to discuss    PRACTICE FOLLOW-UP:   Will discuss with provider  Reason for Disposition   Taking a medicine that could cause dizziness (e.g., blood pressure medications, diuretics)    Answer Assessment - Initial Assessment Questions  1. DESCRIPTION: \"Describe your dizziness.\"      unsteady  2. LIGHTHEADED: \"Do you feel lightheaded?\" (e.g., somewhat faint, woozy, weak upon standing)      Feels woozy-   3. VERTIGO: \"Do you feel like either you or the room is spinning or tilting?\" (i.e., vertigo)      Spinning has been dx with vertigo  4. SEVERITY: \"How bad is it?\"  \"Do you feel like you are going to faint?\" \"Can you stand and walk?\"      Today feels unsteady   Today seems worse than has been   5. ONSET:  \"When did the dizziness begin?\"      Since pacemaker changed  6. AGGRAVATING FACTORS: \"Does anything make it worse?\" (e.g., standing, change in head position)      Through out the day  7. HEART RATE: \"Can you tell me your heart rate?\" \"How many beats in 15 seconds?\"  (Note: Not all patients can do this.)        HR 75 today  8. CAUSE: \"What do you think is causing the dizziness?\" (e.g., decreased fluids or food, diarrhea, emotional distress, heat exposure, new medicine, sudden standing, vomiting; unknown)      Recently had pacemaker setting changed  9. RECURRENT SYMPTOM: \"Have you had dizziness before?\" If Yes, ask: \"When was the last time?\" \"What happened that time?\"      Has a history but today is worse  10. OTHER SYMPTOMS: \"Do " "you have any other symptoms?\" (e.g., fever, chest pain, vomiting, diarrhea, bleeding)    Protocols used: Dizziness-Adult-[place]    "

## 2025-07-24 NOTE — TELEPHONE ENCOUNTER
Pt's wife called in to see if Dr. Bacon has any other suggestions on what pt can do to alleviate dizziness. She stated the PT and the meclizine has not been helping.

## 2025-07-27 ENCOUNTER — NURSE TRIAGE (OUTPATIENT)
Dept: OTHER | Facility: OTHER | Age: 65
End: 2025-07-27

## 2025-08-01 NOTE — TELEPHONE ENCOUNTER
"REASON FOR CONVERSATION: Dizziness    SYMPTOMS: Patient is still dizzy in the morning when he wakes up.     OTHER HEALTH INFORMATION: Patient just got back from [place] and is feeling better than before.     PROTOCOL DISPOSITION: Call PCP When Office is Open    CARE ADVICE PROVIDED: CALL BACK IF:  * You have more questions or concerns  * You become worse.    PRACTICE FOLLOW-UP: Dr. Bacon: Please advise, thank you!    Reason for Disposition   [1] Caller has NON-URGENT medicine question about med that PCP prescribed AND [2] triager unable to answer question    Answer Assessment - Initial Assessment Questions  1. NAME of MEDICINE: \"What medicine(s) are you calling about?\"      Meclizine     2. QUESTION: \"What is your question?\" (e.g., double dose of medicine, side effect)      Is there any medications that could could work better. Patient wife said the medication Meclizine has been around for a while and wants to know if there is a newer or better compound out there he could try.     3. PRESCRIBER: \"Who prescribed the medicine?\" Reason: if prescribed by specialist, call should be referred to that group.      Dr. Bacon.     4. SYMPTOMS: \"Do you have any symptoms?\" If Yes, ask: \"What symptoms are you having?\"  \"How bad are the symptoms (e.g., mild, moderate, severe)      Bobbit wife said he is doing better than when she initially called us 07/24/2025. Patient wife thinks he started doing better since his pace maker adjustment.     Patient is taking meclizine at breakfast and dinner. Windy gets dizzy when he wakes up and the \"crystal PT exercises\" help a little. Patient wife just wants to know if it can be improved further.    Protocols used: Medication Question Call-Adult-[place]    "

## 2025-08-04 ENCOUNTER — OFFICE VISIT (OUTPATIENT)
Dept: INTERNAL MEDICINE CLINIC | Facility: OTHER | Age: 65
End: 2025-08-04
Payer: COMMERCIAL

## 2025-08-04 VITALS
TEMPERATURE: 98.6 F | SYSTOLIC BLOOD PRESSURE: 110 MMHG | HEART RATE: 73 BPM | HEIGHT: 69 IN | BODY MASS INDEX: 24.17 KG/M2 | OXYGEN SATURATION: 97 % | WEIGHT: 163.2 LBS | DIASTOLIC BLOOD PRESSURE: 70 MMHG

## 2025-08-04 DIAGNOSIS — H91.93 DECREASED HEARING OF BOTH EARS: ICD-10-CM

## 2025-08-04 DIAGNOSIS — H61.23 BILATERAL IMPACTED CERUMEN: Primary | ICD-10-CM

## 2025-08-04 PROCEDURE — 99213 OFFICE O/P EST LOW 20 MIN: CPT

## 2025-08-04 PROCEDURE — 69210 REMOVE IMPACTED EAR WAX UNI: CPT

## 2025-08-04 PROCEDURE — G2211 COMPLEX E/M VISIT ADD ON: HCPCS

## 2025-08-12 ENCOUNTER — OFFICE VISIT (OUTPATIENT)
Dept: HEMATOLOGY ONCOLOGY | Facility: CLINIC | Age: 65
End: 2025-08-12
Payer: COMMERCIAL

## 2025-08-19 ENCOUNTER — HOSPITAL ENCOUNTER (OUTPATIENT)
Dept: SLEEP CENTER | Facility: CLINIC | Age: 65
Discharge: HOME | End: 2025-08-19
Payer: COMMERCIAL

## 2025-08-19 ENCOUNTER — OFFICE VISIT (OUTPATIENT)
Dept: CARDIOLOGY CLINIC | Facility: CLINIC | Age: 65
End: 2025-08-19
Payer: COMMERCIAL

## 2025-08-19 VITALS
WEIGHT: 162 LBS | HEART RATE: 71 BPM | BODY MASS INDEX: 23.99 KG/M2 | SYSTOLIC BLOOD PRESSURE: 118 MMHG | HEIGHT: 69 IN | DIASTOLIC BLOOD PRESSURE: 78 MMHG

## 2025-08-19 DIAGNOSIS — I25.10 CORONARY ARTERY DISEASE INVOLVING NATIVE CORONARY ARTERY OF NATIVE HEART WITHOUT ANGINA PECTORIS: Primary | ICD-10-CM

## 2025-08-19 DIAGNOSIS — I49.5 SICK SINUS SYNDROME (HCC): ICD-10-CM

## 2025-08-19 DIAGNOSIS — G47.19 EXCESSIVE DAYTIME SLEEPINESS: ICD-10-CM

## 2025-08-19 DIAGNOSIS — I77.810 MILD ASCENDING AORTA DILATION (HCC): ICD-10-CM

## 2025-08-19 DIAGNOSIS — Z95.0 PACEMAKER: ICD-10-CM

## 2025-08-19 DIAGNOSIS — E78.2 MIXED HYPERLIPIDEMIA: ICD-10-CM

## 2025-08-19 DIAGNOSIS — Z95.1 S/P CABG (CORONARY ARTERY BYPASS GRAFT): ICD-10-CM

## 2025-08-19 PROCEDURE — 95810 POLYSOM 6/> YRS 4/> PARAM: CPT

## 2025-08-19 PROCEDURE — 95810 POLYSOM 6/> YRS 4/> PARAM: CPT | Performed by: INTERNAL MEDICINE

## 2025-08-19 PROCEDURE — 99214 OFFICE O/P EST MOD 30 MIN: CPT | Performed by: INTERNAL MEDICINE

## 2025-08-19 RX ORDER — AMOXICILLIN 500 MG/1
2000 CAPSULE ORAL ONCE AS NEEDED
COMMUNITY
Start: 2025-08-13

## 2025-08-20 PROBLEM — G47.33 OSA (OBSTRUCTIVE SLEEP APNEA): Status: ACTIVE | Noted: 2025-08-20

## 2025-08-21 PROBLEM — G47.19 EXCESSIVE DAYTIME SLEEPINESS: Status: ACTIVE | Noted: 2025-08-21

## (undated) DEVICE — LIGHT HANDLE COVER SLEEVE DISP BLUE STELLAR

## (undated) DEVICE — 32 FR STRAIGHT – SOFT PVC CATHETER: Brand: PVC THORACIC CATHETERS

## (undated) DEVICE — GAUZE SPONGES,16 PLY: Brand: CURITY

## (undated) DEVICE — SUT TIGERTAPE STERNAL CLOSURE W/BLUNT NDL AR-7289T

## (undated) DEVICE — INTENDED FOR TISSUE SEPARATION, AND OTHER PROCEDURES THAT REQUIRE A SHARP SURGICAL BLADE TO PUNCTURE OR CUT.: Brand: BARD-PARKER ® CARBON RIB-BACK BLADES

## (undated) DEVICE — ADHESIVE SKIN HIGH VISCOSITY EXOFIN 1ML

## (undated) DEVICE — GLOVE INDICATOR PI UNDERGLOVE SZ 8 BLUE

## (undated) DEVICE — TRAY FOLEY 16FR SURESTEP TEMP SENS URIMETER STAT LOK

## (undated) DEVICE — SUT SILK 0 CT-1 30 IN 424H

## (undated) DEVICE — AIRLIFE™ TRI-FLO™ SUCTION CATHETER: Brand: AIRLIFE™

## (undated) DEVICE — GUIDEWIRE WHOLEY .035 145 CM FLOP STR TIP

## (undated) DEVICE — SILVER-COATED ANTIBACTERIAL BARRIER DRESSING: Brand: ACTICOAT SURGIC 10X12CM 5PK US

## (undated) DEVICE — PLUMEPEN PRO 10FT

## (undated) DEVICE — INTENDED FOR TISSUE SEPARATION, AND OTHER PROCEDURES THAT REQUIRE A SHARP SURGICAL BLADE TO PUNCTURE OR CUT.: Brand: BARD-PARKER SAFETY BLADES SIZE 15, STERILE

## (undated) DEVICE — PLEDGET CARDIO PTFE 9.5 X 4.8 SOFT LF (6EA/PK)

## (undated) DEVICE — AORTIC PUNCH 5.2 MM DISP

## (undated) DEVICE — SUT PROLENE 6-0 C-1/C-1 30 IN 8307H

## (undated) DEVICE — TUBING INSUFFLATION SET ISO CONNECTOR

## (undated) DEVICE — THERMOFLECT BLANKET, L, 25EA                               TS THERMOFLECT BLANKET, 48" X 84", SILVER, 5/BG, 5 BG/CS NW: Brand: THERMOFLECT

## (undated) DEVICE — BLADE BEAVER MINI SZ 69

## (undated) DEVICE — VASOVIEW HEMOPRO 2: Brand: VASOVIEW HEMOPRO 2

## (undated) DEVICE — SYRINGE 50ML LL

## (undated) DEVICE — DGW .035 FC J3MM 260CM TEF: Brand: EMERALD

## (undated) DEVICE — EVERGRIP INSERT SET 86MM: Brand: FOGARTY EVERGRIP

## (undated) DEVICE — ACE WRAP 6 IN UNSTERILE

## (undated) DEVICE — SUT PROLENE 4-0 RB-1/RB-1 36 IN 8557H

## (undated) DEVICE — CATH DIAG 5FR .045 100CM FR4

## (undated) DEVICE — SUT PDS PLUS 1 CTB 36 IN PDPB359T

## (undated) DEVICE — RADIFOCUS OPTITORQUE ANGIOGRAPHIC CATHETER: Brand: OPTITORQUE

## (undated) DEVICE — ELECTRODE BLADE E-Z CLEAN 4IN -0014A

## (undated) DEVICE — LIGACLIP MCA MULTIPLE CLIP APPLIERS, 20 SMALL CLIPS: Brand: LIGACLIP

## (undated) DEVICE — CATH GUIDING FIXED SHAPE 43CM

## (undated) DEVICE — BONE WAX WHITE: Brand: BONE WAX WHITE

## (undated) DEVICE — INTRO SHEATH PEEL AWAY 7FR

## (undated) DEVICE — SUT MONOCRYL PLUS 4-0 PS-2 18 IN MCP496G

## (undated) DEVICE — PAD GROUNDING DUAL ADULT

## (undated) DEVICE — MICRO HVTSA, 0.5G AND HVTSA SOURCEMARK PRODUCT CODE M1206 AND M1206-01: Brand: EXOFIN MICRO HVTSA, 0.5G

## (undated) DEVICE — NEEDLE INT MAMMARY ARTERIOTOMY CAN

## (undated) DEVICE — SUT FIBERTAPE STERNAL CLOSURE W/CUTTING NDL AR-7288

## (undated) DEVICE — PACK CUSTOM PERFUSION PLEG PK

## (undated) DEVICE — SUT ETHIBOND 2-0 SH-1/SH-1 30 IN X763H

## (undated) DEVICE — FILTER SMOKE EVAC VIROSAFE

## (undated) DEVICE — GLIDESHEATH BASIC HYDROPHILIC COATED INTRODUCER SHEATH: Brand: GLIDESHEATH

## (undated) DEVICE — ANTIBACTERIAL UNDYED BRAIDED (POLYGLACTIN 910), SYNTHETIC ABSORBABLE SUTURE: Brand: COATED VICRYL

## (undated) DEVICE — OASIS DRAIN, SINGLE, INLINE & ATS COMPATIBLE: Brand: OASIS

## (undated) DEVICE — TR BAND RADIAL ARTERY COMPRESSION DEVICE: Brand: TR BAND

## (undated) DEVICE — 3000CC GUARDIAN II: Brand: GUARDIAN

## (undated) DEVICE — SUT SILK 2 60 IN SA8H

## (undated) DEVICE — ALCON OPHTHALMIC KNIFE 15 °: Brand: ALCON

## (undated) DEVICE — GLOVE SRG BIOGEL ECLIPSE 8

## (undated) DEVICE — 32 FR RIGHT ANGLE – SOFT PVC CATHETER: Brand: PVC THORACIC CATHETERS

## (undated) DEVICE — SUT MONOCRYL PLUS 3-0 PS-2 27 IN MCP427H

## (undated) DEVICE — SUT SILK 3-0 SH CR/8 18 IN C013D

## (undated) DEVICE — DRESSING ALLEVYN LIFE HEEL 25 X 25.2CM

## (undated) DEVICE — JP CHANNEL DRAIN, 24FR HUBLESS: Brand: CARDINAL HEALTH

## (undated) DEVICE — SUT PROLENE 4-0 BB 36 IN 8581H

## (undated) DEVICE — RED RUBBER URETHRAL CATHETER: Brand: DOVER

## (undated) DEVICE — DRESSING ACTICOAT AG 4 X 5 IN

## (undated) DEVICE — SLITTER ADJUSTABLE

## (undated) DEVICE — PACK CABG PBDS

## (undated) DEVICE — DGW .035 FC J3MM 150CM TEF: Brand: EMERALD

## (undated) DEVICE — STERNAL WIRE

## (undated) DEVICE — SUT PROLENE 5-0 C-1/C-1 36 IN 8321H

## (undated) DEVICE — SUT PROLENE 7-0 BV175-8/BV175-8 24 IN EPM8747

## (undated) DEVICE — RECIP.STERNUM SAW BLADE 34/7.5/0.7MM: Brand: AESCULAP

## (undated) DEVICE — PUMP TUBING FUSION PACK

## (undated) DEVICE — BLANKET HYPOTHERMIA ADULT GAYMAR

## (undated) DEVICE — 40601 PROLONGED POSITIONING SYSTEM: Brand: 40601 PROLONGED POSITIONING SYSTEM

## (undated) DEVICE — PACK CUSTOM PERFUSION ANH

## (undated) DEVICE — PENCIL ELECTROSURG E-Z CLEAN -0035H